# Patient Record
Sex: FEMALE | Race: WHITE | NOT HISPANIC OR LATINO | Employment: UNEMPLOYED | URBAN - NONMETROPOLITAN AREA
[De-identification: names, ages, dates, MRNs, and addresses within clinical notes are randomized per-mention and may not be internally consistent; named-entity substitution may affect disease eponyms.]

---

## 2018-01-18 NOTE — PROGRESS NOTES
Assessment    1  Urinary tract infection with hematuria, site unspecified (599 0,599 70) (N39 0,R31  9)    Plan  Urinary tract infection with hematuria, site unspecified    · Nitrofurantoin Monohyd Macro 100 MG Oral Capsule; TAKE 1 CAPSULE TWICE  DAILY UNTIL GONE    Discussion/Summary  Discussion Summary:   Urine will be sent  meds sent  hygiene, hydration and f/u with pcp if no improvement  Chief Complaint    1  Urinary Frequency  Chief Complaint Free Text Note Form: States of frequency of urination with abdominal cramping for 2 days,2/10      History of Present Illness  HPI: Corazon Oneill is here today c/o urinary frequency and discomfort for a few days, no fever or back pain  No nausea  Hospital Based Practices Required Assessment:   Pain Assessment   the patient states they have pain  The pain is located in the lower abdominal  The patient describes the pain as cramping  (on a scale of 0 to 10, the patient rates the pain at 2 )   Abuse And Domestic Violence Screen    Yes, the patient is safe at home  The patient states no one is hurting them  Depression And Suicide Screen  No, the patient has not had thoughts of hurting themself  No, the patient has not felt depressed in the past 7 days  Review of Systems  Focused-Female:   Genitourinary: as noted in HPI  Musculoskeletal: no complaints of arthralgia, no myalgia, no joint swelling or stiffness, no limb pain or swelling  Integumentary: no complaints of skin rash or lesion, no itching or dry skin, no skin wounds  Neurological: no complaints of headache, no confusion, no numbness or tingling, no dizziness or fainting  Active Problems    1  Hypercholesteremia (272 0) (E78 0)   2  Hypertension (401 9) (I10)   3  Urinary tract infection with hematuria, site unspecified (599 0,599 70) (N39 0,R31  9)    Social History    · Denied: History of Alcohol use   · Denied: History of Tobacco use    Surgical History    1   History of Hysterectomy    Current Meds   1  Co Q10 TABS; Therapy: (Recorded:16Jan2016) to Recorded   2  Crestor TABS; Therapy: (Recorded:16Jan2016) to Recorded   3  Diovan CAPS; Therapy: (Recorded:16Jan2016) to Recorded   4  Fish Oil OIL; Therapy: (Recorded:16Jan2016) to Recorded   5  PredniSONE TABS; Therapy: (Recorded:16Jan2016) to Recorded    Allergies    1  No Known Drug Allergies    Vitals  Signs [Data Includes: Current Encounter]   Recorded: [id number] 11:01AM   Temperature: 97 8 F  Heart Rate: 90  Respiration: 18  Systolic: 186  Diastolic: 70  Height: 5 ft 3 in  Weight: 186 lb   BMI Calculated: 32 95  BSA Calculated: 1 88  O2 Saturation: 96  Pain Scale: 2    Physical Exam    Constitutional   General appearance: No acute distress, well appearing and well nourished  Abdomen   Abdomen: Non-tender, no masses  Liver and spleen: No hepatomegaly or splenomegaly         Signatures   Electronically signed by : Rosa Ingram [facility]; Jan 16 2016 11:17AM EST                       (Author)    Electronically signed by : Chaya Serrano DO; Jan 16 2016  5:54PM EST

## 2023-11-26 ENCOUNTER — OFFICE VISIT (OUTPATIENT)
Dept: URGENT CARE | Facility: MEDICAL CENTER | Age: 78
End: 2023-11-26
Payer: MEDICARE

## 2023-11-26 VITALS
WEIGHT: 146 LBS | BODY MASS INDEX: 24.3 KG/M2 | OXYGEN SATURATION: 98 % | HEART RATE: 107 BPM | DIASTOLIC BLOOD PRESSURE: 77 MMHG | TEMPERATURE: 98.6 F | SYSTOLIC BLOOD PRESSURE: 167 MMHG | RESPIRATION RATE: 18 BRPM

## 2023-11-26 DIAGNOSIS — R35.0 FREQUENCY OF URINATION: Primary | ICD-10-CM

## 2023-11-26 LAB
SL AMB  POCT GLUCOSE, UA: NORMAL
SL AMB LEUKOCYTE ESTERASE,UA: NORMAL
SL AMB POCT BILIRUBIN,UA: NORMAL
SL AMB POCT BLOOD,UA: NORMAL
SL AMB POCT CLARITY,UA: CLEAR
SL AMB POCT COLOR,UA: YELLOW
SL AMB POCT KETONES,UA: NORMAL
SL AMB POCT NITRITE,UA: NORMAL
SL AMB POCT PH,UA: 6.5
SL AMB POCT SPECIFIC GRAVITY,UA: 1
SL AMB POCT URINE PROTEIN: NORMAL
SL AMB POCT UROBILINOGEN: 0.2

## 2023-11-26 PROCEDURE — 99213 OFFICE O/P EST LOW 20 MIN: CPT | Performed by: STUDENT IN AN ORGANIZED HEALTH CARE EDUCATION/TRAINING PROGRAM

## 2023-11-26 PROCEDURE — 81002 URINALYSIS NONAUTO W/O SCOPE: CPT | Performed by: STUDENT IN AN ORGANIZED HEALTH CARE EDUCATION/TRAINING PROGRAM

## 2023-11-26 PROCEDURE — G0463 HOSPITAL OUTPT CLINIC VISIT: HCPCS | Performed by: STUDENT IN AN ORGANIZED HEALTH CARE EDUCATION/TRAINING PROGRAM

## 2023-11-26 NOTE — PROGRESS NOTES
[facility]        NAME: Brandon Masterson is a 78 y.o. female  : 1945    MRN: [id number]    Assessment and Plan   Frequency of urination [R35.0]  1. Frequency of urination  POCT urine dip          Results for orders placed or performed in visit on 23   POCT urine dip   Result Value Ref Range    LEUKOCYTE ESTERASE,UA neg     NITRITE,UA neg     SL AMB POCT UROBILINOGEN 0.2     POCT URINE PROTEIN neg      PH,UA 6.5     BLOOD,UA neg     SPECIFIC GRAVITY,UA 1.005     KETONES,UA neg     BILIRUBIN,UA neg     GLUCOSE, UA neg      COLOR,UA yellow     CLARITY,UA clear      UA unremarkable, no suspicion of a UTI, frequent urination likely secondary to increased water intake. As for the other constitutional symptoms patient has such as weakness, confusion, significant unexpected weight loss, poor appetite, abnormal CBC results, she does have an appointment coming up with a hematologist in 2 to 3 weeks and I agree that this requires further work-up to rule out cancer first and foremost.  No acute concern at this time to be addressed in this regard. Patient Instructions       Follow up with PCP in 3-5 days. Proceed to  ER if symptoms worsen. Chief Complaint     Chief Complaint   Patient presents with    Possible UTI     Frequent urination that started last night          History of Present Illness       HPI    P/w daughter and , all provide history. Reports frequent urination that started last night. Denies fever, chills, nausea, flank pain, dysuria, hematuria, pelvic pressure, incontinence. Daughter does add that patient has recently reported a lot of weakness, poor appetite, 40lb weight loss over 2 months, confusion. Evaluated by PCP and noted to have abnormal WBC and RBC counts and has been referred to hematology for further evaluation. Also reports patient has been drinking more water as lab work showed that she is dehydrated.     Review of Systems   Review of Systems Constitutional:  Positive for appetite change, fatigue and unexpected weight change. Negative for chills and fever. HENT:  Negative for ear pain and sore throat. Eyes:  Negative for pain and visual disturbance. Respiratory:  Negative for cough, chest tightness and shortness of breath. Cardiovascular:  Negative for chest pain and palpitations. Gastrointestinal:  Negative for abdominal pain, constipation, diarrhea, nausea and vomiting. Genitourinary:  Positive for frequency. Negative for decreased urine volume, dysuria, flank pain, hematuria, menstrual problem, pelvic pain and urgency. Musculoskeletal:  Negative for arthralgias and back pain. Skin:  Negative for color change and rash. Neurological:  Positive for weakness. Negative for seizures and syncope. Psychiatric/Behavioral:  Positive for confusion. Negative for dysphoric mood and suicidal ideas. All other systems reviewed and are negative.       Current Medications       Current Outpatient Medications:     amLODIPine (NORVASC) 2.5 mg tablet, , Disp: , Rfl:     aspirin (ECOTRIN LOW STRENGTH) 81 mg EC tablet, Take 81 mg by mouth, Disp: , Rfl:     co-enzyme Q-10 30 MG capsule, Take by mouth, Disp: , Rfl:     DULoxetine (CYMBALTA) 60 mg delayed release capsule, , Disp: , Rfl:     FISH OIL-CANOLA OIL-VIT D3 PO, Use, Disp: , Rfl:     gabapentin (NEURONTIN) 100 mg capsule, , Disp: , Rfl:     TURMERIC PO, Take 1 capsule by mouth daily, Disp: , Rfl:     Current Allergies     Allergies as of 11/26/2023    (No Known Allergies)            The following portions of the patient's history were reviewed and updated as appropriate: allergies, current medications, past family history, past medical history, past social history, past surgical history and problem list.     Past Medical History:   Diagnosis Date    Fibromyalgia     Hyperlipidemia     Hypertension        Past Surgical History:   Procedure Laterality Date    ACHILLES TENDON REPAIR Right ADENOIDECTOMY      APPENDECTOMY      SKIN CANCER EXCISION      x 4 - back    TONSILLECTOMY      TOTAL ABDOMINAL HYSTERECTOMY         Family History   Problem Relation Age of Onset    Cancer Mother     Cervical cancer Sister     Breast cancer Sister     Breast cancer Sister          Medications have been verified. Objective   /77   Pulse (!) 107   Temp 98.6 °F (37 °C)   Resp 18   Wt 66.2 kg (146 lb)   SpO2 98%   BMI 24.30 kg/m²        Physical Exam     Physical Exam  Constitutional:       General: She is not in acute distress. Appearance: Normal appearance. HENT:      Head: Normocephalic and atraumatic. Eyes:      Extraocular Movements: Extraocular movements intact. Conjunctiva/sclera: Conjunctivae normal.   Cardiovascular:      Rate and Rhythm: Normal rate. Pulmonary:      Effort: Pulmonary effort is normal. No respiratory distress. Abdominal:      Tenderness: There is no abdominal tenderness. There is no right CVA tenderness, left CVA tenderness, guarding or rebound. Skin:     General: Skin is warm and dry. Neurological:      General: No focal deficit present. Mental Status: She is alert and oriented to person, place, and time.    Psychiatric:         Mood and Affect: Mood normal.         Behavior: Behavior normal.

## 2024-01-01 ENCOUNTER — APPOINTMENT (INPATIENT)
Dept: CT IMAGING | Facility: HOSPITAL | Age: 79
DRG: 871 | End: 2024-01-01
Payer: MEDICARE

## 2024-01-01 ENCOUNTER — APPOINTMENT (INPATIENT)
Dept: RADIOLOGY | Facility: HOSPITAL | Age: 79
DRG: 871 | End: 2024-01-01
Payer: MEDICARE

## 2024-01-01 ENCOUNTER — APPOINTMENT (INPATIENT)
Dept: NON INVASIVE DIAGNOSTICS | Facility: HOSPITAL | Age: 79
DRG: 871 | End: 2024-01-01
Payer: MEDICARE

## 2024-01-01 ENCOUNTER — APPOINTMENT (INPATIENT)
Dept: GASTROENTEROLOGY | Facility: HOSPITAL | Age: 79
DRG: 871 | End: 2024-01-01
Payer: MEDICARE

## 2024-01-01 ENCOUNTER — APPOINTMENT (INPATIENT)
Dept: RADIOLOGY | Facility: HOSPITAL | Age: 79
DRG: 871 | End: 2024-01-01
Attending: RADIOLOGY
Payer: MEDICARE

## 2024-01-01 ENCOUNTER — HOSPITAL ENCOUNTER (INPATIENT)
Facility: HOSPITAL | Age: 79
LOS: 17 days | DRG: 871 | End: 2024-03-20
Attending: INTERNAL MEDICINE | Admitting: INTERNAL MEDICINE
Payer: MEDICARE

## 2024-01-01 ENCOUNTER — ANESTHESIA EVENT (OUTPATIENT)
Dept: ANESTHESIOLOGY | Facility: HOSPITAL | Age: 79
End: 2024-01-01

## 2024-01-01 ENCOUNTER — ANESTHESIA (OUTPATIENT)
Dept: ANESTHESIOLOGY | Facility: HOSPITAL | Age: 79
End: 2024-01-01

## 2024-01-01 VITALS
OXYGEN SATURATION: 99 % | HEIGHT: 64 IN | TEMPERATURE: 97.4 F | RESPIRATION RATE: 12 BRPM | DIASTOLIC BLOOD PRESSURE: 51 MMHG | WEIGHT: 184.3 LBS | BODY MASS INDEX: 31.47 KG/M2 | HEART RATE: 88 BPM | SYSTOLIC BLOOD PRESSURE: 119 MMHG

## 2024-01-01 DIAGNOSIS — M32.9 LUPUS (HCC): ICD-10-CM

## 2024-01-01 DIAGNOSIS — I82.409 DVT (DEEP VENOUS THROMBOSIS) (HCC): ICD-10-CM

## 2024-01-01 DIAGNOSIS — B95.2 BACTEREMIA DUE TO ENTEROCOCCUS: ICD-10-CM

## 2024-01-01 DIAGNOSIS — E87.0 HYPERNATREMIA: ICD-10-CM

## 2024-01-01 DIAGNOSIS — R34 OLIGURIA: ICD-10-CM

## 2024-01-01 DIAGNOSIS — R19.7 DIARRHEA OF PRESUMED INFECTIOUS ORIGIN: ICD-10-CM

## 2024-01-01 DIAGNOSIS — G93.41 METABOLIC ENCEPHALOPATHY: ICD-10-CM

## 2024-01-01 DIAGNOSIS — R65.21 SEPTIC SHOCK (HCC): ICD-10-CM

## 2024-01-01 DIAGNOSIS — K51.00 PANCOLITIS (HCC): ICD-10-CM

## 2024-01-01 DIAGNOSIS — E87.20 METABOLIC ACIDOSIS: ICD-10-CM

## 2024-01-01 DIAGNOSIS — L89.90 PRESSURE ULCERS OF SKIN OF MULTIPLE TOPOGRAPHIC SITES: ICD-10-CM

## 2024-01-01 DIAGNOSIS — K92.2 GASTROINTESTINAL HEMORRHAGE, UNSPECIFIED GASTROINTESTINAL HEMORRHAGE TYPE: ICD-10-CM

## 2024-01-01 DIAGNOSIS — A41.9 SEPTIC SHOCK (HCC): ICD-10-CM

## 2024-01-01 DIAGNOSIS — J18.9 PNEUMONIA OF RIGHT LOWER LOBE DUE TO INFECTIOUS ORGANISM: ICD-10-CM

## 2024-01-01 DIAGNOSIS — R78.81 BACTEREMIA DUE TO ENTEROCOCCUS: ICD-10-CM

## 2024-01-01 DIAGNOSIS — J96.01 ACUTE RESPIRATORY FAILURE WITH HYPOXIA (HCC): Primary | ICD-10-CM

## 2024-01-01 DIAGNOSIS — K92.2 GI BLEED: ICD-10-CM

## 2024-01-01 DIAGNOSIS — I48.91 NEW ONSET A-FIB (HCC): ICD-10-CM

## 2024-01-01 DIAGNOSIS — M32.14 ISN/RPS CLASS III GLOMERULONEPHRITIS DUE TO SYSTEMIC LUPUS ERYTHEMATOSUS (SLE) (HCC): ICD-10-CM

## 2024-01-01 DIAGNOSIS — E43 SEVERE PROTEIN-CALORIE MALNUTRITION (HCC): ICD-10-CM

## 2024-01-01 DIAGNOSIS — R60.1 ANASARCA: ICD-10-CM

## 2024-01-01 LAB
ABO GROUP BLD BPU: NORMAL
ABO GROUP BLD: NORMAL
ALBUMIN SERPL BCP-MCNC: 1.9 G/DL (ref 3.5–5)
ALBUMIN SERPL BCP-MCNC: 2 G/DL (ref 3.5–5)
ALBUMIN SERPL BCP-MCNC: 2.1 G/DL (ref 3.5–5)
ALBUMIN SERPL BCP-MCNC: 2.1 G/DL (ref 3.5–5)
ALBUMIN SERPL BCP-MCNC: 2.2 G/DL (ref 3.5–5)
ALBUMIN SERPL BCP-MCNC: 2.3 G/DL (ref 3.5–5)
ALBUMIN SERPL BCP-MCNC: 2.4 G/DL (ref 3.5–5)
ALBUMIN SERPL BCP-MCNC: 2.7 G/DL (ref 3.5–5)
ALBUMIN SERPL BCP-MCNC: 2.8 G/DL (ref 3.5–5)
ALBUMIN SERPL BCP-MCNC: 2.9 G/DL (ref 3.5–5)
ALBUMIN SERPL BCP-MCNC: 3.6 G/DL (ref 3.5–5)
ALP SERPL-CCNC: 118 U/L (ref 34–104)
ALP SERPL-CCNC: 123 U/L (ref 34–104)
ALP SERPL-CCNC: 194 U/L (ref 34–104)
ALP SERPL-CCNC: 222 U/L (ref 34–104)
ALP SERPL-CCNC: 53 U/L (ref 34–104)
ALP SERPL-CCNC: 55 U/L (ref 34–104)
ALP SERPL-CCNC: 60 U/L (ref 34–104)
ALP SERPL-CCNC: 60 U/L (ref 34–104)
ALP SERPL-CCNC: 61 U/L (ref 34–104)
ALP SERPL-CCNC: 65 U/L (ref 34–104)
ALP SERPL-CCNC: 74 U/L (ref 34–104)
ALP SERPL-CCNC: 75 U/L (ref 34–104)
ALP SERPL-CCNC: 83 U/L (ref 34–104)
ALT SERPL W P-5'-P-CCNC: 109 U/L (ref 7–52)
ALT SERPL W P-5'-P-CCNC: 157 U/L (ref 7–52)
ALT SERPL W P-5'-P-CCNC: 28 U/L (ref 7–52)
ALT SERPL W P-5'-P-CCNC: 30 U/L (ref 7–52)
ALT SERPL W P-5'-P-CCNC: 385 U/L (ref 7–52)
ALT SERPL W P-5'-P-CCNC: 42 U/L (ref 7–52)
ALT SERPL W P-5'-P-CCNC: 74 U/L (ref 7–52)
ALT SERPL W P-5'-P-CCNC: 77 U/L (ref 7–52)
ALT SERPL W P-5'-P-CCNC: 79 U/L (ref 7–52)
ALT SERPL W P-5'-P-CCNC: 93 U/L (ref 7–52)
ALT SERPL W P-5'-P-CCNC: 99 U/L (ref 7–52)
ANION GAP SERPL CALCULATED.3IONS-SCNC: 10 MMOL/L (ref 4–13)
ANION GAP SERPL CALCULATED.3IONS-SCNC: 11 MMOL/L (ref 4–13)
ANION GAP SERPL CALCULATED.3IONS-SCNC: 12 MMOL/L
ANION GAP SERPL CALCULATED.3IONS-SCNC: 12 MMOL/L
ANION GAP SERPL CALCULATED.3IONS-SCNC: 13 MMOL/L
ANION GAP SERPL CALCULATED.3IONS-SCNC: 5 MMOL/L (ref 4–13)
ANION GAP SERPL CALCULATED.3IONS-SCNC: 6 MMOL/L
ANION GAP SERPL CALCULATED.3IONS-SCNC: 6 MMOL/L (ref 4–13)
ANION GAP SERPL CALCULATED.3IONS-SCNC: 6 MMOL/L (ref 4–13)
ANION GAP SERPL CALCULATED.3IONS-SCNC: 7 MMOL/L
ANION GAP SERPL CALCULATED.3IONS-SCNC: 7 MMOL/L (ref 4–13)
ANION GAP SERPL CALCULATED.3IONS-SCNC: 7 MMOL/L (ref 4–13)
ANION GAP SERPL CALCULATED.3IONS-SCNC: 8 MMOL/L
ANION GAP SERPL CALCULATED.3IONS-SCNC: 8 MMOL/L
ANION GAP SERPL CALCULATED.3IONS-SCNC: 8 MMOL/L (ref 4–13)
ANION GAP SERPL CALCULATED.3IONS-SCNC: 8 MMOL/L (ref 4–13)
ANION GAP SERPL CALCULATED.3IONS-SCNC: 9 MMOL/L
ANION GAP SERPL CALCULATED.3IONS-SCNC: 9 MMOL/L (ref 4–13)
ANION GAP SERPL CALCULATED.3IONS-SCNC: 9 MMOL/L (ref 4–13)
ANISOCYTOSIS BLD QL SMEAR: PRESENT
AORTIC ROOT: 3 CM
AORTIC ROOT: 3.1 CM
APICAL FOUR CHAMBER EJECTION FRACTION: 57 %
APICAL FOUR CHAMBER EJECTION FRACTION: 60 %
APTT PPP: 27 SECONDS (ref 23–37)
APTT PPP: 29 SECONDS (ref 23–37)
APTT PPP: 30 SECONDS (ref 23–37)
APTT PPP: 31 SECONDS (ref 23–37)
APTT PPP: 32 SECONDS (ref 23–37)
APTT PPP: 40 SECONDS (ref 23–37)
APTT PPP: 41 SECONDS (ref 23–37)
APTT PPP: 64 SECONDS (ref 23–37)
APTT PPP: >210 SECONDS (ref 23–37)
APTT PPP: >210 SECONDS (ref 23–37)
ARTERIAL PATENCY WRIST A: NO
ARTERIAL PATENCY WRIST A: YES
ASCENDING AORTA: 3.7 CM
AST SERPL W P-5'-P-CCNC: 14 U/L (ref 13–39)
AST SERPL W P-5'-P-CCNC: 15 U/L (ref 13–39)
AST SERPL W P-5'-P-CCNC: 156 U/L (ref 13–39)
AST SERPL W P-5'-P-CCNC: 16 U/L (ref 13–39)
AST SERPL W P-5'-P-CCNC: 174 U/L (ref 13–39)
AST SERPL W P-5'-P-CCNC: 22 U/L (ref 13–39)
AST SERPL W P-5'-P-CCNC: 29 U/L (ref 13–39)
AST SERPL W P-5'-P-CCNC: 38 U/L (ref 13–39)
AST SERPL W P-5'-P-CCNC: 44 U/L (ref 13–39)
AST SERPL W P-5'-P-CCNC: 49 U/L (ref 13–39)
AST SERPL W P-5'-P-CCNC: 515 U/L (ref 13–39)
AST SERPL W P-5'-P-CCNC: 53 U/L (ref 13–39)
AST SERPL W P-5'-P-CCNC: 80 U/L (ref 13–39)
ATRIAL RATE: 146 BPM
ATRIAL RATE: 160 BPM
ATRIAL RATE: 165 BPM
ATRIAL RATE: 56 BPM
BACTERIA BLD CULT: NORMAL
BACTERIA BLD CULT: NORMAL
BACTERIA UR QL AUTO: ABNORMAL /HPF
BASE EX.OXY STD BLDV CALC-SCNC: 28.6 % (ref 60–80)
BASE EX.OXY STD BLDV CALC-SCNC: 84.4 % (ref 60–80)
BASE EX.OXY STD BLDV CALC-SCNC: 91.9 % (ref 60–80)
BASE EXCESS BLDA CALC-SCNC: -2.6 MMOL/L
BASE EXCESS BLDA CALC-SCNC: -3.2 MMOL/L
BASE EXCESS BLDA CALC-SCNC: -3.3 MMOL/L
BASE EXCESS BLDA CALC-SCNC: -5 MMOL/L (ref -2–3)
BASE EXCESS BLDA CALC-SCNC: -6 MMOL/L (ref -2–3)
BASE EXCESS BLDV CALC-SCNC: -5.4 MMOL/L
BASE EXCESS BLDV CALC-SCNC: -5.6 MMOL/L
BASE EXCESS BLDV CALC-SCNC: -6.3 MMOL/L
BASOPHILS # BLD AUTO: 0.02 THOUSANDS/ÂΜL (ref 0–0.1)
BASOPHILS # BLD AUTO: 0.03 THOUSANDS/ÂΜL (ref 0–0.1)
BASOPHILS # BLD AUTO: 0.04 THOUSANDS/ÂΜL (ref 0–0.1)
BASOPHILS # BLD AUTO: 0.04 THOUSANDS/ÂΜL (ref 0–0.1)
BASOPHILS # BLD AUTO: 0.05 THOUSANDS/ÂΜL (ref 0–0.1)
BASOPHILS # BLD AUTO: 0.05 THOUSANDS/ÂΜL (ref 0–0.1)
BASOPHILS # BLD AUTO: 0.07 THOUSANDS/ÂΜL (ref 0–0.1)
BASOPHILS # BLD AUTO: 0.11 THOUSANDS/ÂΜL (ref 0–0.1)
BASOPHILS # BLD AUTO: 0.11 THOUSANDS/ÂΜL (ref 0–0.1)
BASOPHILS # BLD MANUAL: 0 THOUSAND/UL (ref 0–0.1)
BASOPHILS NFR BLD AUTO: 0 % (ref 0–1)
BASOPHILS NFR BLD AUTO: 1 % (ref 0–1)
BASOPHILS NFR BLD AUTO: 1 % (ref 0–1)
BASOPHILS NFR MAR MANUAL: 0 % (ref 0–1)
BILIRUB DIRECT SERPL-MCNC: 0.18 MG/DL (ref 0–0.2)
BILIRUB DIRECT SERPL-MCNC: 0.4 MG/DL (ref 0–0.2)
BILIRUB DIRECT SERPL-MCNC: 0.72 MG/DL (ref 0–0.2)
BILIRUB DIRECT SERPL-MCNC: 1.66 MG/DL (ref 0–0.2)
BILIRUB SERPL-MCNC: 0.59 MG/DL (ref 0.2–1)
BILIRUB SERPL-MCNC: 0.64 MG/DL (ref 0.2–1)
BILIRUB SERPL-MCNC: 0.67 MG/DL (ref 0.2–1)
BILIRUB SERPL-MCNC: 0.67 MG/DL (ref 0.2–1)
BILIRUB SERPL-MCNC: 0.71 MG/DL (ref 0.2–1)
BILIRUB SERPL-MCNC: 0.72 MG/DL (ref 0.2–1)
BILIRUB SERPL-MCNC: 0.82 MG/DL (ref 0.2–1)
BILIRUB SERPL-MCNC: 0.83 MG/DL (ref 0.2–1)
BILIRUB SERPL-MCNC: 0.83 MG/DL (ref 0.2–1)
BILIRUB SERPL-MCNC: 0.87 MG/DL (ref 0.2–1)
BILIRUB SERPL-MCNC: 0.92 MG/DL (ref 0.2–1)
BILIRUB SERPL-MCNC: 1.63 MG/DL (ref 0.2–1)
BILIRUB SERPL-MCNC: 2.95 MG/DL (ref 0.2–1)
BILIRUB UR QL STRIP: NEGATIVE
BLD GP AB SCN SERPL QL: NEGATIVE
BLD SMEAR INTERP: NORMAL
BODY TEMPERATURE: 95.2 DEGREES FEHRENHEIT
BPU ID: NORMAL
BSA FOR ECHO PROCEDURE: 1.86 M2
BSA FOR ECHO PROCEDURE: 1.88 M2
BUDDING YEAST: PRESENT
BUN SERPL-MCNC: 22 MG/DL (ref 5–25)
BUN SERPL-MCNC: 23 MG/DL (ref 5–25)
BUN SERPL-MCNC: 24 MG/DL (ref 5–25)
BUN SERPL-MCNC: 25 MG/DL (ref 5–25)
BUN SERPL-MCNC: 26 MG/DL (ref 5–25)
BUN SERPL-MCNC: 27 MG/DL (ref 5–25)
BUN SERPL-MCNC: 35 MG/DL (ref 5–25)
BUN SERPL-MCNC: 35 MG/DL (ref 5–25)
BURR CELLS BLD QL SMEAR: PRESENT
C3 SERPL-MCNC: 32 MG/DL (ref 87–200)
C4 SERPL-MCNC: <8 MG/DL (ref 19–52)
CA-I BLD-SCNC: 1.06 MMOL/L (ref 1.12–1.32)
CA-I BLD-SCNC: 1.08 MMOL/L (ref 1.12–1.32)
CA-I BLD-SCNC: 1.11 MMOL/L (ref 1.12–1.32)
CA-I BLD-SCNC: 1.12 MMOL/L (ref 1.12–1.32)
CA-I BLD-SCNC: 1.14 MMOL/L (ref 1.12–1.32)
CA-I BLD-SCNC: 1.17 MMOL/L (ref 1.12–1.32)
CA-I BLD-SCNC: 1.17 MMOL/L (ref 1.12–1.32)
CA-I BLD-SCNC: 1.2 MMOL/L (ref 1.12–1.32)
CA-I BLD-SCNC: 1.23 MMOL/L (ref 1.12–1.32)
CALCIUM ALBUM COR SERPL-MCNC: 10 MG/DL (ref 8.3–10.1)
CALCIUM ALBUM COR SERPL-MCNC: 8.3 MG/DL (ref 8.3–10.1)
CALCIUM ALBUM COR SERPL-MCNC: 8.4 MG/DL (ref 8.3–10.1)
CALCIUM ALBUM COR SERPL-MCNC: 8.7 MG/DL (ref 8.3–10.1)
CALCIUM ALBUM COR SERPL-MCNC: 8.7 MG/DL (ref 8.3–10.1)
CALCIUM ALBUM COR SERPL-MCNC: 8.8 MG/DL (ref 8.3–10.1)
CALCIUM ALBUM COR SERPL-MCNC: 8.9 MG/DL (ref 8.3–10.1)
CALCIUM ALBUM COR SERPL-MCNC: 9.1 MG/DL (ref 8.3–10.1)
CALCIUM ALBUM COR SERPL-MCNC: 9.2 MG/DL (ref 8.3–10.1)
CALCIUM SERPL-MCNC: 6.7 MG/DL (ref 8.4–10.2)
CALCIUM SERPL-MCNC: 7 MG/DL (ref 8.4–10.2)
CALCIUM SERPL-MCNC: 7.2 MG/DL (ref 8.4–10.2)
CALCIUM SERPL-MCNC: 7.2 MG/DL (ref 8.4–10.2)
CALCIUM SERPL-MCNC: 7.3 MG/DL (ref 8.4–10.2)
CALCIUM SERPL-MCNC: 7.4 MG/DL (ref 8.4–10.2)
CALCIUM SERPL-MCNC: 7.4 MG/DL (ref 8.4–10.2)
CALCIUM SERPL-MCNC: 7.5 MG/DL (ref 8.4–10.2)
CALCIUM SERPL-MCNC: 7.7 MG/DL (ref 8.4–10.2)
CALCIUM SERPL-MCNC: 7.8 MG/DL (ref 8.4–10.2)
CALCIUM SERPL-MCNC: 7.8 MG/DL (ref 8.4–10.2)
CALCIUM SERPL-MCNC: 8.1 MG/DL (ref 8.4–10.2)
CALCIUM SERPL-MCNC: 8.1 MG/DL (ref 8.4–10.2)
CALCIUM SERPL-MCNC: 8.2 MG/DL (ref 8.4–10.2)
CALCIUM SERPL-MCNC: 8.2 MG/DL (ref 8.4–10.2)
CALCIUM SERPL-MCNC: 8.3 MG/DL (ref 8.4–10.2)
CALCIUM SERPL-MCNC: 8.3 MG/DL (ref 8.4–10.2)
CALCIUM SERPL-MCNC: 8.4 MG/DL (ref 8.4–10.2)
CALCIUM SERPL-MCNC: 8.5 MG/DL (ref 8.4–10.2)
CALCIUM SERPL-MCNC: 8.5 MG/DL (ref 8.4–10.2)
CALCIUM SERPL-MCNC: 9 MG/DL (ref 8.4–10.2)
CHLORIDE SERPL-SCNC: 108 MMOL/L (ref 96–108)
CHLORIDE SERPL-SCNC: 109 MMOL/L (ref 96–108)
CHLORIDE SERPL-SCNC: 111 MMOL/L (ref 96–108)
CHLORIDE SERPL-SCNC: 112 MMOL/L (ref 96–108)
CHLORIDE SERPL-SCNC: 113 MMOL/L (ref 96–108)
CHLORIDE SERPL-SCNC: 113 MMOL/L (ref 96–108)
CHLORIDE SERPL-SCNC: 114 MMOL/L (ref 96–108)
CHLORIDE SERPL-SCNC: 114 MMOL/L (ref 96–108)
CHLORIDE SERPL-SCNC: 115 MMOL/L (ref 96–108)
CHLORIDE SERPL-SCNC: 116 MMOL/L (ref 96–108)
CHLORIDE SERPL-SCNC: 116 MMOL/L (ref 96–108)
CHLORIDE SERPL-SCNC: 117 MMOL/L (ref 96–108)
CHLORIDE SERPL-SCNC: 118 MMOL/L (ref 96–108)
CLARITY UR: ABNORMAL
CMV DNA SERPL NAA+PROBE-ACNC: 1500 IU/ML
CMV DNA SERPL NAA+PROBE-ACNC: DETECTED [IU]/ML
CMV IGG SERPL IA-ACNC: 9.2 U/ML (ref 0–0.59)
CMV IGM SERPL IA-ACNC: <30 AU/ML (ref 0–29.9)
CO2 SERPL-SCNC: 16 MMOL/L (ref 21–32)
CO2 SERPL-SCNC: 19 MMOL/L (ref 21–32)
CO2 SERPL-SCNC: 21 MMOL/L (ref 21–32)
CO2 SERPL-SCNC: 22 MMOL/L (ref 21–32)
CO2 SERPL-SCNC: 23 MMOL/L (ref 21–32)
CO2 SERPL-SCNC: 24 MMOL/L (ref 21–32)
CO2 SERPL-SCNC: 24 MMOL/L (ref 21–32)
CO2 SERPL-SCNC: 25 MMOL/L (ref 21–32)
CO2 SERPL-SCNC: 26 MMOL/L (ref 21–32)
CO2 SERPL-SCNC: 27 MMOL/L (ref 21–32)
CO2 SERPL-SCNC: 28 MMOL/L (ref 21–32)
COLOR UR: YELLOW
CORTIS SERPL-MCNC: 10.9 UG/DL
CORTIS SERPL-MCNC: 7.3 UG/DL
CORTIS SERPL-MCNC: 98.2 UG/DL
CREAT SERPL-MCNC: 0.68 MG/DL (ref 0.6–1.3)
CREAT SERPL-MCNC: 0.69 MG/DL (ref 0.6–1.3)
CREAT SERPL-MCNC: 0.7 MG/DL (ref 0.6–1.3)
CREAT SERPL-MCNC: 0.71 MG/DL (ref 0.6–1.3)
CREAT SERPL-MCNC: 0.74 MG/DL (ref 0.6–1.3)
CREAT SERPL-MCNC: 0.76 MG/DL (ref 0.6–1.3)
CREAT SERPL-MCNC: 0.86 MG/DL (ref 0.6–1.3)
CREAT SERPL-MCNC: 0.88 MG/DL (ref 0.6–1.3)
CREAT SERPL-MCNC: 0.88 MG/DL (ref 0.6–1.3)
CREAT SERPL-MCNC: 0.89 MG/DL (ref 0.6–1.3)
CREAT SERPL-MCNC: 0.92 MG/DL (ref 0.6–1.3)
CREAT SERPL-MCNC: 0.92 MG/DL (ref 0.6–1.3)
CREAT SERPL-MCNC: 0.98 MG/DL (ref 0.6–1.3)
CREAT SERPL-MCNC: 1.01 MG/DL (ref 0.6–1.3)
CREAT SERPL-MCNC: 1.02 MG/DL (ref 0.6–1.3)
CREAT SERPL-MCNC: 1.04 MG/DL (ref 0.6–1.3)
CREAT SERPL-MCNC: 1.06 MG/DL (ref 0.6–1.3)
CREAT SERPL-MCNC: 1.07 MG/DL (ref 0.6–1.3)
CREAT SERPL-MCNC: 1.14 MG/DL (ref 0.6–1.3)
CREAT SERPL-MCNC: 1.15 MG/DL (ref 0.6–1.3)
CREAT SERPL-MCNC: 1.16 MG/DL (ref 0.6–1.3)
CREAT SERPL-MCNC: 1.18 MG/DL (ref 0.6–1.3)
CREAT SERPL-MCNC: 1.18 MG/DL (ref 0.6–1.3)
CREAT SERPL-MCNC: 1.19 MG/DL (ref 0.6–1.3)
CREAT SERPL-MCNC: 1.25 MG/DL (ref 0.6–1.3)
CREAT SERPL-MCNC: 1.26 MG/DL (ref 0.6–1.3)
CREAT SERPL-MCNC: 1.27 MG/DL (ref 0.6–1.3)
CREAT UR-MCNC: 11.5 MG/DL
CREAT UR-MCNC: 17.8 MG/DL
CREAT UR-MCNC: 8 MG/DL
CROSSMATCH: NORMAL
CRP SERPL QL: 39.9 MG/L
CRP SERPL QL: <1 MG/L
DSDNA AB SER-ACNC: <1 IU/ML (ref 0–9)
E WAVE DECELERATION TIME: 204 MS
E/A RATIO: 0.75
EOSINOPHIL # BLD AUTO: 0 THOUSAND/ÂΜL (ref 0–0.61)
EOSINOPHIL # BLD AUTO: 0.01 THOUSAND/ÂΜL (ref 0–0.61)
EOSINOPHIL # BLD AUTO: 0.02 THOUSAND/ÂΜL (ref 0–0.61)
EOSINOPHIL # BLD MANUAL: 0 THOUSAND/UL (ref 0–0.4)
EOSINOPHIL NFR BLD AUTO: 0 % (ref 0–6)
EOSINOPHIL NFR BLD MANUAL: 0 % (ref 0–6)
ERYTHROCYTE [DISTWIDTH] IN BLOOD BY AUTOMATED COUNT: 15 % (ref 11.6–15.1)
ERYTHROCYTE [DISTWIDTH] IN BLOOD BY AUTOMATED COUNT: 15.2 % (ref 11.6–15.1)
ERYTHROCYTE [DISTWIDTH] IN BLOOD BY AUTOMATED COUNT: 15.4 % (ref 11.6–15.1)
ERYTHROCYTE [DISTWIDTH] IN BLOOD BY AUTOMATED COUNT: 15.4 % (ref 11.6–15.1)
ERYTHROCYTE [DISTWIDTH] IN BLOOD BY AUTOMATED COUNT: 15.6 % (ref 11.6–15.1)
ERYTHROCYTE [DISTWIDTH] IN BLOOD BY AUTOMATED COUNT: 16 % (ref 11.6–15.1)
ERYTHROCYTE [DISTWIDTH] IN BLOOD BY AUTOMATED COUNT: 16.2 % (ref 11.6–15.1)
ERYTHROCYTE [DISTWIDTH] IN BLOOD BY AUTOMATED COUNT: 16.6 % (ref 11.6–15.1)
ERYTHROCYTE [DISTWIDTH] IN BLOOD BY AUTOMATED COUNT: 16.9 % (ref 11.6–15.1)
ERYTHROCYTE [DISTWIDTH] IN BLOOD BY AUTOMATED COUNT: 17.2 % (ref 11.6–15.1)
ERYTHROCYTE [DISTWIDTH] IN BLOOD BY AUTOMATED COUNT: 17.5 % (ref 11.6–15.1)
ERYTHROCYTE [DISTWIDTH] IN BLOOD BY AUTOMATED COUNT: 17.8 % (ref 11.6–15.1)
ERYTHROCYTE [DISTWIDTH] IN BLOOD BY AUTOMATED COUNT: 18 % (ref 11.6–15.1)
ERYTHROCYTE [DISTWIDTH] IN BLOOD BY AUTOMATED COUNT: 18.2 % (ref 11.6–15.1)
ERYTHROCYTE [DISTWIDTH] IN BLOOD BY AUTOMATED COUNT: 18.3 % (ref 11.6–15.1)
ERYTHROCYTE [DISTWIDTH] IN BLOOD BY AUTOMATED COUNT: 18.6 % (ref 11.6–15.1)
ERYTHROCYTE [DISTWIDTH] IN BLOOD BY AUTOMATED COUNT: 18.6 % (ref 11.6–15.1)
ERYTHROCYTE [DISTWIDTH] IN BLOOD BY AUTOMATED COUNT: 18.8 % (ref 11.6–15.1)
ERYTHROCYTE [DISTWIDTH] IN BLOOD BY AUTOMATED COUNT: 18.9 % (ref 11.6–15.1)
ERYTHROCYTE [DISTWIDTH] IN BLOOD BY AUTOMATED COUNT: 18.9 % (ref 11.6–15.1)
ERYTHROCYTE [DISTWIDTH] IN BLOOD BY AUTOMATED COUNT: 19.4 % (ref 11.6–15.1)
ERYTHROCYTE [SEDIMENTATION RATE] IN BLOOD: <1 MM/HOUR (ref 0–29)
FDP BLD QL AGGL: >10 <20
FIBRINOGEN PPP-MCNC: 114 MG/DL (ref 207–520)
FIBRINOGEN PPP-MCNC: 166 MG/DL (ref 207–520)
FIBRINOGEN PPP-MCNC: 269 MG/DL (ref 207–520)
FRACTIONAL SHORTENING: 27 (ref 28–44)
GALACTOMANNAN AG SPEC IA-ACNC: 0.03 INDEX (ref 0–0.49)
GFR SERPL CREATININE-BSD FRML MDRD: 39 ML/MIN/1.73SQ M
GFR SERPL CREATININE-BSD FRML MDRD: 40 ML/MIN/1.73SQ M
GFR SERPL CREATININE-BSD FRML MDRD: 40 ML/MIN/1.73SQ M
GFR SERPL CREATININE-BSD FRML MDRD: 43 ML/MIN/1.73SQ M
GFR SERPL CREATININE-BSD FRML MDRD: 44 ML/MIN/1.73SQ M
GFR SERPL CREATININE-BSD FRML MDRD: 45 ML/MIN/1.73SQ M
GFR SERPL CREATININE-BSD FRML MDRD: 45 ML/MIN/1.73SQ M
GFR SERPL CREATININE-BSD FRML MDRD: 49 ML/MIN/1.73SQ M
GFR SERPL CREATININE-BSD FRML MDRD: 49 ML/MIN/1.73SQ M
GFR SERPL CREATININE-BSD FRML MDRD: 50 ML/MIN/1.73SQ M
GFR SERPL CREATININE-BSD FRML MDRD: 52 ML/MIN/1.73SQ M
GFR SERPL CREATININE-BSD FRML MDRD: 52 ML/MIN/1.73SQ M
GFR SERPL CREATININE-BSD FRML MDRD: 54 ML/MIN/1.73SQ M
GFR SERPL CREATININE-BSD FRML MDRD: 58 ML/MIN/1.73SQ M
GFR SERPL CREATININE-BSD FRML MDRD: 58 ML/MIN/1.73SQ M
GFR SERPL CREATININE-BSD FRML MDRD: 61 ML/MIN/1.73SQ M
GFR SERPL CREATININE-BSD FRML MDRD: 62 ML/MIN/1.73SQ M
GFR SERPL CREATININE-BSD FRML MDRD: 62 ML/MIN/1.73SQ M
GFR SERPL CREATININE-BSD FRML MDRD: 64 ML/MIN/1.73SQ M
GFR SERPL CREATININE-BSD FRML MDRD: 74 ML/MIN/1.73SQ M
GFR SERPL CREATININE-BSD FRML MDRD: 76 ML/MIN/1.73SQ M
GFR SERPL CREATININE-BSD FRML MDRD: 80 ML/MIN/1.73SQ M
GFR SERPL CREATININE-BSD FRML MDRD: 82 ML/MIN/1.73SQ M
GLUCOSE SERPL-MCNC: 100 MG/DL (ref 65–140)
GLUCOSE SERPL-MCNC: 101 MG/DL (ref 65–140)
GLUCOSE SERPL-MCNC: 103 MG/DL (ref 65–140)
GLUCOSE SERPL-MCNC: 107 MG/DL (ref 65–140)
GLUCOSE SERPL-MCNC: 108 MG/DL (ref 65–140)
GLUCOSE SERPL-MCNC: 112 MG/DL (ref 65–140)
GLUCOSE SERPL-MCNC: 113 MG/DL (ref 65–140)
GLUCOSE SERPL-MCNC: 114 MG/DL (ref 65–140)
GLUCOSE SERPL-MCNC: 114 MG/DL (ref 65–140)
GLUCOSE SERPL-MCNC: 115 MG/DL (ref 65–140)
GLUCOSE SERPL-MCNC: 117 MG/DL (ref 65–140)
GLUCOSE SERPL-MCNC: 118 MG/DL (ref 65–140)
GLUCOSE SERPL-MCNC: 119 MG/DL (ref 65–140)
GLUCOSE SERPL-MCNC: 120 MG/DL (ref 65–140)
GLUCOSE SERPL-MCNC: 123 MG/DL (ref 65–140)
GLUCOSE SERPL-MCNC: 125 MG/DL (ref 65–140)
GLUCOSE SERPL-MCNC: 125 MG/DL (ref 65–140)
GLUCOSE SERPL-MCNC: 126 MG/DL (ref 65–140)
GLUCOSE SERPL-MCNC: 127 MG/DL (ref 65–140)
GLUCOSE SERPL-MCNC: 127 MG/DL (ref 65–140)
GLUCOSE SERPL-MCNC: 130 MG/DL (ref 65–140)
GLUCOSE SERPL-MCNC: 131 MG/DL (ref 65–140)
GLUCOSE SERPL-MCNC: 132 MG/DL (ref 65–140)
GLUCOSE SERPL-MCNC: 133 MG/DL (ref 65–140)
GLUCOSE SERPL-MCNC: 134 MG/DL (ref 65–140)
GLUCOSE SERPL-MCNC: 135 MG/DL (ref 65–140)
GLUCOSE SERPL-MCNC: 136 MG/DL (ref 65–140)
GLUCOSE SERPL-MCNC: 140 MG/DL (ref 65–140)
GLUCOSE SERPL-MCNC: 142 MG/DL (ref 65–140)
GLUCOSE SERPL-MCNC: 143 MG/DL (ref 65–140)
GLUCOSE SERPL-MCNC: 144 MG/DL (ref 65–140)
GLUCOSE SERPL-MCNC: 146 MG/DL (ref 65–140)
GLUCOSE SERPL-MCNC: 149 MG/DL (ref 65–140)
GLUCOSE SERPL-MCNC: 150 MG/DL (ref 65–140)
GLUCOSE SERPL-MCNC: 151 MG/DL (ref 65–140)
GLUCOSE SERPL-MCNC: 152 MG/DL (ref 65–140)
GLUCOSE SERPL-MCNC: 152 MG/DL (ref 65–140)
GLUCOSE SERPL-MCNC: 153 MG/DL (ref 65–140)
GLUCOSE SERPL-MCNC: 154 MG/DL (ref 65–140)
GLUCOSE SERPL-MCNC: 155 MG/DL (ref 65–140)
GLUCOSE SERPL-MCNC: 156 MG/DL (ref 65–140)
GLUCOSE SERPL-MCNC: 158 MG/DL (ref 65–140)
GLUCOSE SERPL-MCNC: 159 MG/DL (ref 65–140)
GLUCOSE SERPL-MCNC: 161 MG/DL (ref 65–140)
GLUCOSE SERPL-MCNC: 161 MG/DL (ref 65–140)
GLUCOSE SERPL-MCNC: 162 MG/DL (ref 65–140)
GLUCOSE SERPL-MCNC: 162 MG/DL (ref 65–140)
GLUCOSE SERPL-MCNC: 163 MG/DL (ref 65–140)
GLUCOSE SERPL-MCNC: 163 MG/DL (ref 65–140)
GLUCOSE SERPL-MCNC: 164 MG/DL (ref 65–140)
GLUCOSE SERPL-MCNC: 165 MG/DL (ref 65–140)
GLUCOSE SERPL-MCNC: 166 MG/DL (ref 65–140)
GLUCOSE SERPL-MCNC: 167 MG/DL (ref 65–140)
GLUCOSE SERPL-MCNC: 167 MG/DL (ref 65–140)
GLUCOSE SERPL-MCNC: 170 MG/DL (ref 65–140)
GLUCOSE SERPL-MCNC: 172 MG/DL (ref 65–140)
GLUCOSE SERPL-MCNC: 173 MG/DL (ref 65–140)
GLUCOSE SERPL-MCNC: 174 MG/DL (ref 65–140)
GLUCOSE SERPL-MCNC: 174 MG/DL (ref 65–140)
GLUCOSE SERPL-MCNC: 175 MG/DL (ref 65–140)
GLUCOSE SERPL-MCNC: 179 MG/DL (ref 65–140)
GLUCOSE SERPL-MCNC: 179 MG/DL (ref 65–140)
GLUCOSE SERPL-MCNC: 180 MG/DL (ref 65–140)
GLUCOSE SERPL-MCNC: 180 MG/DL (ref 65–140)
GLUCOSE SERPL-MCNC: 183 MG/DL (ref 65–140)
GLUCOSE SERPL-MCNC: 188 MG/DL (ref 65–140)
GLUCOSE SERPL-MCNC: 188 MG/DL (ref 65–140)
GLUCOSE SERPL-MCNC: 189 MG/DL (ref 65–140)
GLUCOSE SERPL-MCNC: 191 MG/DL (ref 65–140)
GLUCOSE SERPL-MCNC: 198 MG/DL (ref 65–140)
GLUCOSE SERPL-MCNC: 198 MG/DL (ref 65–140)
GLUCOSE SERPL-MCNC: 200 MG/DL (ref 65–140)
GLUCOSE SERPL-MCNC: 201 MG/DL (ref 65–140)
GLUCOSE SERPL-MCNC: 207 MG/DL (ref 65–140)
GLUCOSE SERPL-MCNC: 209 MG/DL (ref 65–140)
GLUCOSE SERPL-MCNC: 209 MG/DL (ref 65–140)
GLUCOSE SERPL-MCNC: 210 MG/DL (ref 65–140)
GLUCOSE SERPL-MCNC: 224 MG/DL (ref 65–140)
GLUCOSE SERPL-MCNC: 224 MG/DL (ref 65–140)
GLUCOSE SERPL-MCNC: 231 MG/DL (ref 65–140)
GLUCOSE SERPL-MCNC: 234 MG/DL (ref 65–140)
GLUCOSE SERPL-MCNC: 234 MG/DL (ref 65–140)
GLUCOSE SERPL-MCNC: 240 MG/DL (ref 65–140)
GLUCOSE SERPL-MCNC: 243 MG/DL (ref 65–140)
GLUCOSE SERPL-MCNC: 247 MG/DL (ref 65–140)
GLUCOSE SERPL-MCNC: 252 MG/DL (ref 65–140)
GLUCOSE SERPL-MCNC: 254 MG/DL (ref 65–140)
GLUCOSE SERPL-MCNC: 256 MG/DL (ref 65–140)
GLUCOSE SERPL-MCNC: 256 MG/DL (ref 65–140)
GLUCOSE SERPL-MCNC: 265 MG/DL (ref 65–140)
GLUCOSE SERPL-MCNC: 266 MG/DL (ref 65–140)
GLUCOSE SERPL-MCNC: 267 MG/DL (ref 65–140)
GLUCOSE SERPL-MCNC: 267 MG/DL (ref 65–140)
GLUCOSE SERPL-MCNC: 268 MG/DL (ref 65–140)
GLUCOSE SERPL-MCNC: 269 MG/DL (ref 65–140)
GLUCOSE SERPL-MCNC: 272 MG/DL (ref 65–140)
GLUCOSE SERPL-MCNC: 272 MG/DL (ref 65–140)
GLUCOSE SERPL-MCNC: 275 MG/DL (ref 65–140)
GLUCOSE SERPL-MCNC: 276 MG/DL (ref 65–140)
GLUCOSE SERPL-MCNC: 278 MG/DL (ref 65–140)
GLUCOSE SERPL-MCNC: 290 MG/DL (ref 65–140)
GLUCOSE SERPL-MCNC: 295 MG/DL (ref 65–140)
GLUCOSE SERPL-MCNC: 302 MG/DL (ref 65–140)
GLUCOSE SERPL-MCNC: 302 MG/DL (ref 65–140)
GLUCOSE SERPL-MCNC: 307 MG/DL (ref 65–140)
GLUCOSE SERPL-MCNC: 311 MG/DL (ref 65–140)
GLUCOSE SERPL-MCNC: 312 MG/DL (ref 65–140)
GLUCOSE SERPL-MCNC: 338 MG/DL (ref 65–140)
GLUCOSE SERPL-MCNC: 56 MG/DL (ref 65–140)
GLUCOSE SERPL-MCNC: 82 MG/DL (ref 65–140)
GLUCOSE SERPL-MCNC: 84 MG/DL (ref 65–140)
GLUCOSE SERPL-MCNC: 90 MG/DL (ref 65–140)
GLUCOSE SERPL-MCNC: 94 MG/DL (ref 65–140)
GLUCOSE UR STRIP-MCNC: NEGATIVE MG/DL
HCO3 BLDA-SCNC: 17.8 MMOL/L (ref 22–28)
HCO3 BLDA-SCNC: 18.1 MMOL/L (ref 22–28)
HCO3 BLDA-SCNC: 19.1 MMOL/L (ref 22–28)
HCO3 BLDA-SCNC: 21.1 MMOL/L (ref 22–28)
HCO3 BLDA-SCNC: 22.5 MMOL/L (ref 22–28)
HCO3 BLDV-SCNC: 19.8 MMOL/L (ref 24–30)
HCO3 BLDV-SCNC: 20.4 MMOL/L (ref 24–30)
HCO3 BLDV-SCNC: 21.6 MMOL/L (ref 24–30)
HCT VFR BLD AUTO: 20 % (ref 34.8–46.1)
HCT VFR BLD AUTO: 20.3 % (ref 34.8–46.1)
HCT VFR BLD AUTO: 20.4 % (ref 34.8–46.1)
HCT VFR BLD AUTO: 20.5 % (ref 34.8–46.1)
HCT VFR BLD AUTO: 20.5 % (ref 34.8–46.1)
HCT VFR BLD AUTO: 22 % (ref 34.8–46.1)
HCT VFR BLD AUTO: 23.8 % (ref 34.8–46.1)
HCT VFR BLD AUTO: 23.8 % (ref 34.8–46.1)
HCT VFR BLD AUTO: 25.5 % (ref 34.8–46.1)
HCT VFR BLD AUTO: 26.1 % (ref 34.8–46.1)
HCT VFR BLD AUTO: 26.4 % (ref 34.8–46.1)
HCT VFR BLD AUTO: 26.6 % (ref 34.8–46.1)
HCT VFR BLD AUTO: 26.9 % (ref 34.8–46.1)
HCT VFR BLD AUTO: 27 % (ref 34.8–46.1)
HCT VFR BLD AUTO: 27.2 % (ref 34.8–46.1)
HCT VFR BLD AUTO: 27.2 % (ref 34.8–46.1)
HCT VFR BLD AUTO: 27.5 % (ref 34.8–46.1)
HCT VFR BLD AUTO: 27.5 % (ref 34.8–46.1)
HCT VFR BLD AUTO: 27.8 % (ref 34.8–46.1)
HCT VFR BLD AUTO: 29 % (ref 34.8–46.1)
HCT VFR BLD AUTO: 35.3 % (ref 34.8–46.1)
HCT VFR BLD AUTO: 35.3 % (ref 34.8–46.1)
HCT VFR BLD AUTO: 35.8 % (ref 34.8–46.1)
HCT VFR BLD AUTO: 36.8 % (ref 34.8–46.1)
HCT VFR BLD AUTO: 39.5 % (ref 34.8–46.1)
HCT VFR BLD CALC: 25 % (ref 34.8–46.1)
HCT VFR BLD CALC: 30 % (ref 34.8–46.1)
HGB BLD-MCNC: 10.2 G/DL (ref 11.5–15.4)
HGB BLD-MCNC: 10.4 G/DL (ref 11.5–15.4)
HGB BLD-MCNC: 12 G/DL (ref 11.5–15.4)
HGB BLD-MCNC: 12 G/DL (ref 11.5–15.4)
HGB BLD-MCNC: 12.4 G/DL (ref 11.5–15.4)
HGB BLD-MCNC: 12.4 G/DL (ref 11.5–15.4)
HGB BLD-MCNC: 12.5 G/DL (ref 11.5–15.4)
HGB BLD-MCNC: 12.7 G/DL (ref 11.5–15.4)
HGB BLD-MCNC: 12.7 G/DL (ref 11.5–15.4)
HGB BLD-MCNC: 12.8 G/DL (ref 11.5–15.4)
HGB BLD-MCNC: 13.6 G/DL (ref 11.5–15.4)
HGB BLD-MCNC: 14 G/DL (ref 11.5–15.4)
HGB BLD-MCNC: 5.8 G/DL (ref 11.5–15.4)
HGB BLD-MCNC: 6.2 G/DL (ref 11.5–15.4)
HGB BLD-MCNC: 6.3 G/DL (ref 11.5–15.4)
HGB BLD-MCNC: 6.5 G/DL (ref 11.5–15.4)
HGB BLD-MCNC: 6.6 G/DL (ref 11.5–15.4)
HGB BLD-MCNC: 6.6 G/DL (ref 11.5–15.4)
HGB BLD-MCNC: 6.7 G/DL (ref 11.5–15.4)
HGB BLD-MCNC: 6.8 G/DL (ref 11.5–15.4)
HGB BLD-MCNC: 7.1 G/DL (ref 11.5–15.4)
HGB BLD-MCNC: 7.3 G/DL (ref 11.5–15.4)
HGB BLD-MCNC: 7.6 G/DL (ref 11.5–15.4)
HGB BLD-MCNC: 7.6 G/DL (ref 11.5–15.4)
HGB BLD-MCNC: 7.8 G/DL (ref 11.5–15.4)
HGB BLD-MCNC: 7.9 G/DL (ref 11.5–15.4)
HGB BLD-MCNC: 8 G/DL (ref 11.5–15.4)
HGB BLD-MCNC: 8 G/DL (ref 11.5–15.4)
HGB BLD-MCNC: 8.1 G/DL (ref 11.5–15.4)
HGB BLD-MCNC: 8.2 G/DL (ref 11.5–15.4)
HGB BLD-MCNC: 8.3 G/DL (ref 11.5–15.4)
HGB BLD-MCNC: 8.4 G/DL (ref 11.5–15.4)
HGB BLD-MCNC: 8.5 G/DL (ref 11.5–15.4)
HGB BLD-MCNC: 8.6 G/DL (ref 11.5–15.4)
HGB BLD-MCNC: 8.6 G/DL (ref 11.5–15.4)
HGB BLD-MCNC: 8.7 G/DL (ref 11.5–15.4)
HGB BLD-MCNC: 8.7 G/DL (ref 11.5–15.4)
HGB BLD-MCNC: 8.8 G/DL (ref 11.5–15.4)
HGB BLD-MCNC: 8.9 G/DL (ref 11.5–15.4)
HGB BLD-MCNC: 9 G/DL (ref 11.5–15.4)
HGB BLD-MCNC: 9.1 G/DL (ref 11.5–15.4)
HGB BLD-MCNC: 9.2 G/DL (ref 11.5–15.4)
HGB BLD-MCNC: 9.2 G/DL (ref 11.5–15.4)
HGB BLD-MCNC: 9.3 G/DL (ref 11.5–15.4)
HGB BLDA-MCNC: 10.2 G/DL (ref 11.5–15.4)
HGB BLDA-MCNC: 8.5 G/DL (ref 11.5–15.4)
HGB UR QL STRIP.AUTO: ABNORMAL
HOROWITZ INDEX BLDA+IHG-RTO: 50 MM[HG]
HSV1 DNA SPEC QL NAA+PROBE: POSITIVE
HSV2 DNA SPEC QL NAA+PROBE: NEGATIVE
HYPHAE YEAST: PRESENT
IMM GRANULOCYTES # BLD AUTO: 0.12 THOUSAND/UL (ref 0–0.2)
IMM GRANULOCYTES # BLD AUTO: 0.17 THOUSAND/UL (ref 0–0.2)
IMM GRANULOCYTES # BLD AUTO: 0.19 THOUSAND/UL (ref 0–0.2)
IMM GRANULOCYTES # BLD AUTO: 0.19 THOUSAND/UL (ref 0–0.2)
IMM GRANULOCYTES # BLD AUTO: 0.32 THOUSAND/UL (ref 0–0.2)
IMM GRANULOCYTES # BLD AUTO: 0.5 THOUSAND/UL (ref 0–0.2)
IMM GRANULOCYTES # BLD AUTO: >0.5 THOUSAND/UL (ref 0–0.2)
IMM GRANULOCYTES NFR BLD AUTO: 1 % (ref 0–2)
IMM GRANULOCYTES NFR BLD AUTO: 2 % (ref 0–2)
IMM GRANULOCYTES NFR BLD AUTO: 2 % (ref 0–2)
IMM GRANULOCYTES NFR BLD AUTO: 3 % (ref 0–2)
IMM GRANULOCYTES NFR BLD AUTO: 4 % (ref 0–2)
INR PPP: 1 (ref 0.84–1.19)
INR PPP: 1.14 (ref 0.84–1.19)
INR PPP: 1.2 (ref 0.84–1.19)
INR PPP: 1.22 (ref 0.84–1.19)
INR PPP: 1.22 (ref 0.84–1.19)
INR PPP: 1.25 (ref 0.84–1.19)
INR PPP: 1.33 (ref 0.84–1.19)
INR PPP: 1.39 (ref 0.84–1.19)
INR PPP: 1.49 (ref 0.84–1.19)
INR PPP: 1.65 (ref 0.84–1.19)
INR PPP: 1.86 (ref 0.84–1.19)
INTERVENTRICULAR SEPTUM IN DIASTOLE (PARASTERNAL SHORT AXIS VIEW): 1.3 CM
INTERVENTRICULAR SEPTUM: 1.3 CM (ref 0.6–1.1)
IVC: 10 MM
KETONES UR STRIP-MCNC: ABNORMAL MG/DL
LACTATE SERPL-SCNC: 2.1 MMOL/L (ref 0.5–2)
LACTATE SERPL-SCNC: 2.3 MMOL/L (ref 0.5–2)
LACTATE SERPL-SCNC: 3 MMOL/L (ref 0.5–2)
LACTATE SERPL-SCNC: 3.1 MMOL/L (ref 0.5–2)
LEFT ATRIUM SIZE: 2.9 CM
LEFT INTERNAL DIMENSION IN SYSTOLE: 2.2 CM (ref 2.1–4)
LEFT VENTRICLE DIASTOLIC VOLUME (MOD BIPLANE): 62 ML
LEFT VENTRICLE DIASTOLIC VOLUME INDEX (MOD BIPLANE): 33.3 ML/M2
LEFT VENTRICLE SYSTOLIC VOLUME (MOD BIPLANE): 27 ML
LEFT VENTRICLE SYSTOLIC VOLUME INDEX (MOD BIPLANE): 14.5 ML/M2
LEFT VENTRICULAR INTERNAL DIMENSION IN DIASTOLE: 3 CM (ref 3.5–6)
LEFT VENTRICULAR POSTERIOR WALL IN END DIASTOLE: 1.3 CM
LEFT VENTRICULAR STROKE VOLUME: 20 ML
LEUKOCYTE ESTERASE UR QL STRIP: ABNORMAL
LV EF: 57 %
LVSV (TEICH): 20 ML
LYMPHOCYTES # BLD AUTO: 0.22 THOUSANDS/ÂΜL (ref 0.6–4.47)
LYMPHOCYTES # BLD AUTO: 0.23 THOUSANDS/ÂΜL (ref 0.6–4.47)
LYMPHOCYTES # BLD AUTO: 0.34 THOUSAND/UL (ref 0.6–4.47)
LYMPHOCYTES # BLD AUTO: 0.38 THOUSANDS/ÂΜL (ref 0.6–4.47)
LYMPHOCYTES # BLD AUTO: 0.39 THOUSANDS/ÂΜL (ref 0.6–4.47)
LYMPHOCYTES # BLD AUTO: 0.75 THOUSANDS/ÂΜL (ref 0.6–4.47)
LYMPHOCYTES # BLD AUTO: 1.1 THOUSANDS/ÂΜL (ref 0.6–4.47)
LYMPHOCYTES # BLD AUTO: 1.1 THOUSANDS/ÂΜL (ref 0.6–4.47)
LYMPHOCYTES # BLD AUTO: 1.12 THOUSAND/UL (ref 0.6–4.47)
LYMPHOCYTES # BLD AUTO: 1.17 THOUSANDS/ÂΜL (ref 0.6–4.47)
LYMPHOCYTES # BLD AUTO: 1.35 THOUSANDS/ÂΜL (ref 0.6–4.47)
LYMPHOCYTES # BLD AUTO: 1.42 THOUSAND/UL (ref 0.6–4.47)
LYMPHOCYTES # BLD AUTO: 1.62 THOUSAND/UL (ref 0.6–4.47)
LYMPHOCYTES # BLD AUTO: 15 % (ref 14–44)
LYMPHOCYTES # BLD AUTO: 3 % (ref 14–44)
LYMPHOCYTES # BLD AUTO: 6 % (ref 14–44)
LYMPHOCYTES # BLD AUTO: 7 % (ref 14–44)
LYMPHOCYTES NFR BLD AUTO: 10 % (ref 14–44)
LYMPHOCYTES NFR BLD AUTO: 2 % (ref 14–44)
LYMPHOCYTES NFR BLD AUTO: 2 % (ref 14–44)
LYMPHOCYTES NFR BLD AUTO: 3 % (ref 14–44)
LYMPHOCYTES NFR BLD AUTO: 3 % (ref 14–44)
LYMPHOCYTES NFR BLD AUTO: 4 % (ref 14–44)
LYMPHOCYTES NFR BLD AUTO: 5 % (ref 14–44)
LYMPHOCYTES NFR BLD AUTO: 5 % (ref 14–44)
LYMPHOCYTES NFR BLD AUTO: 9 % (ref 14–44)
MAGNESIUM SERPL-MCNC: 1.7 MG/DL (ref 1.9–2.7)
MAGNESIUM SERPL-MCNC: 1.8 MG/DL (ref 1.9–2.7)
MAGNESIUM SERPL-MCNC: 1.9 MG/DL (ref 1.9–2.7)
MAGNESIUM SERPL-MCNC: 2.3 MG/DL (ref 1.9–2.7)
MAGNESIUM SERPL-MCNC: 2.3 MG/DL (ref 1.9–2.7)
MAGNESIUM SERPL-MCNC: 3 MG/DL (ref 1.9–2.7)
MCH RBC QN AUTO: 29.1 PG (ref 26.8–34.3)
MCH RBC QN AUTO: 29.5 PG (ref 26.8–34.3)
MCH RBC QN AUTO: 29.7 PG (ref 26.8–34.3)
MCH RBC QN AUTO: 29.7 PG (ref 26.8–34.3)
MCH RBC QN AUTO: 29.9 PG (ref 26.8–34.3)
MCH RBC QN AUTO: 30 PG (ref 26.8–34.3)
MCH RBC QN AUTO: 30.3 PG (ref 26.8–34.3)
MCH RBC QN AUTO: 30.4 PG (ref 26.8–34.3)
MCH RBC QN AUTO: 30.5 PG (ref 26.8–34.3)
MCH RBC QN AUTO: 30.6 PG (ref 26.8–34.3)
MCH RBC QN AUTO: 30.7 PG (ref 26.8–34.3)
MCH RBC QN AUTO: 30.7 PG (ref 26.8–34.3)
MCH RBC QN AUTO: 30.8 PG (ref 26.8–34.3)
MCH RBC QN AUTO: 31 PG (ref 26.8–34.3)
MCH RBC QN AUTO: 31.1 PG (ref 26.8–34.3)
MCH RBC QN AUTO: 31.3 PG (ref 26.8–34.3)
MCH RBC QN AUTO: 31.4 PG (ref 26.8–34.3)
MCH RBC QN AUTO: 31.6 PG (ref 26.8–34.3)
MCHC RBC AUTO-ENTMCNC: 30.5 G/DL (ref 31.4–37.4)
MCHC RBC AUTO-ENTMCNC: 31.6 G/DL (ref 31.4–37.4)
MCHC RBC AUTO-ENTMCNC: 31.9 G/DL (ref 31.4–37.4)
MCHC RBC AUTO-ENTMCNC: 32.5 G/DL (ref 31.4–37.4)
MCHC RBC AUTO-ENTMCNC: 32.7 G/DL (ref 31.4–37.4)
MCHC RBC AUTO-ENTMCNC: 32.7 G/DL (ref 31.4–37.4)
MCHC RBC AUTO-ENTMCNC: 32.8 G/DL (ref 31.4–37.4)
MCHC RBC AUTO-ENTMCNC: 33.1 G/DL (ref 31.4–37.4)
MCHC RBC AUTO-ENTMCNC: 33.2 G/DL (ref 31.4–37.4)
MCHC RBC AUTO-ENTMCNC: 33.3 G/DL (ref 31.4–37.4)
MCHC RBC AUTO-ENTMCNC: 33.3 G/DL (ref 31.4–37.4)
MCHC RBC AUTO-ENTMCNC: 33.7 G/DL (ref 31.4–37.4)
MCHC RBC AUTO-ENTMCNC: 34.2 G/DL (ref 31.4–37.4)
MCHC RBC AUTO-ENTMCNC: 34.4 G/DL (ref 31.4–37.4)
MCHC RBC AUTO-ENTMCNC: 34.5 G/DL (ref 31.4–37.4)
MCHC RBC AUTO-ENTMCNC: 34.6 G/DL (ref 31.4–37.4)
MCHC RBC AUTO-ENTMCNC: 35.1 G/DL (ref 31.4–37.4)
MCHC RBC AUTO-ENTMCNC: 35.2 G/DL (ref 31.4–37.4)
MCHC RBC AUTO-ENTMCNC: 35.5 G/DL (ref 31.4–37.4)
MCHC RBC AUTO-ENTMCNC: 36 G/DL (ref 31.4–37.4)
MCV RBC AUTO: 101 FL (ref 82–98)
MCV RBC AUTO: 84 FL (ref 82–98)
MCV RBC AUTO: 86 FL (ref 82–98)
MCV RBC AUTO: 87 FL (ref 82–98)
MCV RBC AUTO: 87 FL (ref 82–98)
MCV RBC AUTO: 88 FL (ref 82–98)
MCV RBC AUTO: 89 FL (ref 82–98)
MCV RBC AUTO: 90 FL (ref 82–98)
MCV RBC AUTO: 90 FL (ref 82–98)
MCV RBC AUTO: 91 FL (ref 82–98)
MCV RBC AUTO: 92 FL (ref 82–98)
MCV RBC AUTO: 93 FL (ref 82–98)
MCV RBC AUTO: 94 FL (ref 82–98)
MCV RBC AUTO: 94 FL (ref 82–98)
MCV RBC AUTO: 97 FL (ref 82–98)
MCV RBC AUTO: 99 FL (ref 82–98)
METAMYELOCYTES NFR BLD MANUAL: 1 % (ref 0–1)
METAMYELOCYTES NFR BLD MANUAL: 1 % (ref 0–1)
MICROALBUMIN UR-MCNC: 25.7 MG/L
MICROALBUMIN/CREAT 24H UR: 223 MG/G CREATININE (ref 0–30)
MICROCYTES BLD QL AUTO: PRESENT
MONOCYTES # BLD AUTO: 0.23 THOUSAND/UL (ref 0–1.22)
MONOCYTES # BLD AUTO: 0.28 THOUSAND/UL (ref 0–1.22)
MONOCYTES # BLD AUTO: 0.33 THOUSAND/ÂΜL (ref 0.17–1.22)
MONOCYTES # BLD AUTO: 0.39 THOUSAND/ÂΜL (ref 0.17–1.22)
MONOCYTES # BLD AUTO: 0.39 THOUSAND/ÂΜL (ref 0.17–1.22)
MONOCYTES # BLD AUTO: 0.42 THOUSAND/ÂΜL (ref 0.17–1.22)
MONOCYTES # BLD AUTO: 0.43 THOUSAND/ÂΜL (ref 0.17–1.22)
MONOCYTES # BLD AUTO: 0.45 THOUSAND/ÂΜL (ref 0.17–1.22)
MONOCYTES # BLD AUTO: 0.46 THOUSAND/ÂΜL (ref 0.17–1.22)
MONOCYTES # BLD AUTO: 0.48 THOUSAND/UL (ref 0–1.22)
MONOCYTES # BLD AUTO: 0.53 THOUSAND/ÂΜL (ref 0.17–1.22)
MONOCYTES # BLD AUTO: 0.59 THOUSAND/ÂΜL (ref 0.17–1.22)
MONOCYTES # BLD AUTO: 1.08 THOUSAND/UL (ref 0–1.22)
MONOCYTES NFR BLD AUTO: 1 % (ref 4–12)
MONOCYTES NFR BLD AUTO: 2 % (ref 4–12)
MONOCYTES NFR BLD AUTO: 2 % (ref 4–12)
MONOCYTES NFR BLD AUTO: 3 % (ref 4–12)
MONOCYTES NFR BLD AUTO: 4 % (ref 4–12)
MONOCYTES NFR BLD AUTO: 4 % (ref 4–12)
MONOCYTES NFR BLD AUTO: 5 % (ref 4–12)
MONOCYTES NFR BLD: 2 % (ref 4–12)
MONOCYTES NFR BLD: 3 % (ref 4–12)
MONOCYTES NFR BLD: 3 % (ref 4–12)
MONOCYTES NFR BLD: 4 % (ref 4–12)
MRSA NOSE QL CULT: NORMAL
MUCOUS THREADS UR QL AUTO: ABNORMAL
MV PEAK A VEL: 0.61 M/S
MV PEAK E VEL: 46 CM/S
MV STENOSIS PRESSURE HALF TIME: 59 MS
MV VALVE AREA P 1/2 METHOD: 3.73
MYCOBACTERIUM SPEC CULT: NORMAL
NASAL CANNULA: 6
NEUTROPHILS # BLD AUTO: 10.92 THOUSANDS/ÂΜL (ref 1.85–7.62)
NEUTROPHILS # BLD AUTO: 11 THOUSANDS/ÂΜL (ref 1.85–7.62)
NEUTROPHILS # BLD AUTO: 12.41 THOUSANDS/ÂΜL (ref 1.85–7.62)
NEUTROPHILS # BLD AUTO: 13.95 THOUSANDS/ÂΜL (ref 1.85–7.62)
NEUTROPHILS # BLD AUTO: 21.49 THOUSANDS/ÂΜL (ref 1.85–7.62)
NEUTROPHILS # BLD AUTO: 23.67 THOUSANDS/ÂΜL (ref 1.85–7.62)
NEUTROPHILS # BLD AUTO: 25.41 THOUSANDS/ÂΜL (ref 1.85–7.62)
NEUTROPHILS # BLD AUTO: 6.38 THOUSANDS/ÂΜL (ref 1.85–7.62)
NEUTROPHILS # BLD AUTO: 9.91 THOUSANDS/ÂΜL (ref 1.85–7.62)
NEUTROPHILS # BLD MANUAL: 10.89 THOUSAND/UL (ref 1.85–7.62)
NEUTROPHILS # BLD MANUAL: 14.23 THOUSAND/UL (ref 1.85–7.62)
NEUTROPHILS # BLD MANUAL: 23.96 THOUSAND/UL (ref 1.85–7.62)
NEUTROPHILS # BLD MANUAL: 7.77 THOUSAND/UL (ref 1.85–7.62)
NEUTS BAND NFR BLD MANUAL: 12 % (ref 0–8)
NEUTS BAND NFR BLD MANUAL: 2 % (ref 0–8)
NEUTS SEG NFR BLD AUTO: 77 % (ref 43–75)
NEUTS SEG NFR BLD AUTO: 82 % (ref 43–75)
NEUTS SEG NFR BLD AUTO: 83 % (ref 43–75)
NEUTS SEG NFR BLD AUTO: 83 % (ref 43–75)
NEUTS SEG NFR BLD AUTO: 87 % (ref 43–75)
NEUTS SEG NFR BLD AUTO: 88 % (ref 43–75)
NEUTS SEG NFR BLD AUTO: 88 % (ref 43–75)
NEUTS SEG NFR BLD AUTO: 90 % (ref 43–75)
NEUTS SEG NFR BLD AUTO: 91 % (ref 43–75)
NEUTS SEG NFR BLD AUTO: 94 % (ref 43–75)
NEUTS SEG NFR BLD AUTO: 94 % (ref 43–75)
NEUTS SEG NFR BLD AUTO: 95 % (ref 43–75)
NEUTS SEG NFR BLD AUTO: 95 % (ref 43–75)
NITRITE UR QL STRIP: NEGATIVE
NON VENT ROOM AIR: 21 %
NON-SQ EPI CELLS URNS QL MICRO: ABNORMAL /HPF
NRBC BLD AUTO-RTO: 0 /100 WBCS
NRBC BLD AUTO-RTO: 0 /100 WBCS
NRBC BLD AUTO-RTO: 1 /100 WBCS
NRBC BLD AUTO-RTO: 18 /100 WBCS
NRBC BLD AUTO-RTO: 30 /100 WBCS
NRBC BLD AUTO-RTO: 38 /100 WBC (ref 0–2)
NRBC BLD AUTO-RTO: 4 /100 WBCS
NRBC BLD AUTO-RTO: 40 /100 WBC (ref 0–2)
NRBC BLD AUTO-RTO: 64 /100 WBCS
O2 CT BLDA-SCNC: 11.2 ML/DL (ref 16–23)
O2 CT BLDA-SCNC: 18 ML/DL (ref 16–23)
O2 CT BLDA-SCNC: 9.8 ML/DL (ref 16–23)
O2 CT BLDV-SCNC: 13.2 ML/DL
O2 CT BLDV-SCNC: 15.2 ML/DL
O2 CT BLDV-SCNC: 3.6 ML/DL
OSMOLALITY UR: 340 MMOL/KG
OXYHGB MFR BLDA: 95.9 % (ref 94–97)
OXYHGB MFR BLDA: 96 % (ref 94–97)
OXYHGB MFR BLDA: 97.2 % (ref 94–97)
P AXIS: 117 DEGREES
P AXIS: 53 DEGREES
PATHOLOGY REVIEW: YES
PCO2 BLD: 19 MMOL/L (ref 21–32)
PCO2 BLD: 20 MMOL/L (ref 21–32)
PCO2 BLD: 25.8 MM HG (ref 36–44)
PCO2 BLD: 35.8 MM HG (ref 36–44)
PCO2 BLDA: 23.9 MM HG (ref 36–44)
PCO2 BLDA: 33.9 MM HG (ref 36–44)
PCO2 BLDA: 40.1 MM HG (ref 36–44)
PCO2 BLDV: 41.7 MM HG (ref 42–50)
PCO2 BLDV: 41.9 MM HG (ref 42–50)
PCO2 BLDV: 49.7 MM HG (ref 42–50)
PCO2 TEMP ADJ BLDA: 36.9 MM HG (ref 36–44)
PEEP RESPIRATORY: 6 CM[H2O]
PH BLD: 7.33 [PH] (ref 7.35–7.45)
PH BLD: 7.39 [PH] (ref 7.35–7.45)
PH BLD: 7.45 [PH] (ref 7.35–7.45)
PH BLDA: 7.37 [PH] (ref 7.35–7.45)
PH BLDA: 7.41 [PH] (ref 7.35–7.45)
PH BLDA: 7.5 [PH] (ref 7.35–7.45)
PH BLDV: 7.25 [PH] (ref 7.3–7.4)
PH BLDV: 7.29 [PH] (ref 7.3–7.4)
PH BLDV: 7.31 [PH] (ref 7.3–7.4)
PH UR STRIP.AUTO: 5 [PH]
PHOSPHATE SERPL-MCNC: 2.2 MG/DL (ref 2.3–4.1)
PHOSPHATE SERPL-MCNC: 2.4 MG/DL (ref 2.3–4.1)
PHOSPHATE SERPL-MCNC: 3.5 MG/DL (ref 2.3–4.1)
PHOSPHATE SERPL-MCNC: 3.8 MG/DL (ref 2.3–4.1)
PHOSPHATE SERPL-MCNC: 3.9 MG/DL (ref 2.3–4.1)
PHOSPHATE SERPL-MCNC: 4 MG/DL (ref 2.3–4.1)
PHOSPHATE SERPL-MCNC: 4.4 MG/DL (ref 2.3–4.1)
PHOSPHATE SERPL-MCNC: 4.6 MG/DL (ref 2.3–4.1)
PLATELET # BLD AUTO: 107 THOUSANDS/UL (ref 149–390)
PLATELET # BLD AUTO: 118 THOUSANDS/UL (ref 149–390)
PLATELET # BLD AUTO: 118 THOUSANDS/UL (ref 149–390)
PLATELET # BLD AUTO: 123 THOUSANDS/UL (ref 149–390)
PLATELET # BLD AUTO: 138 THOUSANDS/UL (ref 149–390)
PLATELET # BLD AUTO: 182 THOUSANDS/UL (ref 149–390)
PLATELET # BLD AUTO: 186 THOUSANDS/UL (ref 149–390)
PLATELET # BLD AUTO: 199 THOUSANDS/UL (ref 149–390)
PLATELET # BLD AUTO: 205 THOUSANDS/UL (ref 149–390)
PLATELET # BLD AUTO: 23 THOUSANDS/UL (ref 149–390)
PLATELET # BLD AUTO: 284 THOUSANDS/UL (ref 149–390)
PLATELET # BLD AUTO: 43 THOUSANDS/UL (ref 149–390)
PLATELET # BLD AUTO: 44 THOUSANDS/UL (ref 149–390)
PLATELET # BLD AUTO: 54 THOUSANDS/UL (ref 149–390)
PLATELET # BLD AUTO: 56 THOUSANDS/UL (ref 149–390)
PLATELET # BLD AUTO: 57 THOUSANDS/UL (ref 149–390)
PLATELET # BLD AUTO: 61 THOUSANDS/UL (ref 149–390)
PLATELET # BLD AUTO: 65 THOUSANDS/UL (ref 149–390)
PLATELET # BLD AUTO: 66 THOUSANDS/UL (ref 149–390)
PLATELET # BLD AUTO: 68 THOUSANDS/UL (ref 149–390)
PLATELET # BLD AUTO: 82 THOUSANDS/UL (ref 149–390)
PLATELET # BLD AUTO: 85 THOUSANDS/UL (ref 149–390)
PLATELET # BLD AUTO: 89 THOUSANDS/UL (ref 149–390)
PLATELET # BLD AUTO: 92 THOUSANDS/UL (ref 149–390)
PLATELET # BLD AUTO: 95 THOUSANDS/UL (ref 149–390)
PLATELET BLD QL SMEAR: ABNORMAL
PLATELET BLD QL SMEAR: ADEQUATE
PMV BLD AUTO: 10.7 FL (ref 8.9–12.7)
PMV BLD AUTO: 10.9 FL (ref 8.9–12.7)
PMV BLD AUTO: 11 FL (ref 8.9–12.7)
PMV BLD AUTO: 11.2 FL (ref 8.9–12.7)
PMV BLD AUTO: 11.4 FL (ref 8.9–12.7)
PMV BLD AUTO: 11.7 FL (ref 8.9–12.7)
PMV BLD AUTO: 11.8 FL (ref 8.9–12.7)
PMV BLD AUTO: 11.9 FL (ref 8.9–12.7)
PMV BLD AUTO: 12.9 FL (ref 8.9–12.7)
PMV BLD AUTO: 13.3 FL (ref 8.9–12.7)
PMV BLD AUTO: 13.4 FL (ref 8.9–12.7)
PMV BLD AUTO: 13.6 FL (ref 8.9–12.7)
PMV BLD AUTO: 13.9 FL (ref 8.9–12.7)
PMV BLD AUTO: 14.1 FL (ref 8.9–12.7)
PMV BLD AUTO: 14.2 FL (ref 8.9–12.7)
PMV BLD AUTO: 14.5 FL (ref 8.9–12.7)
PO2 BLD: 103.7 MM HG (ref 75–129)
PO2 BLD: 168 MM HG (ref 75–129)
PO2 BLD: 54 MM HG (ref 75–129)
PO2 BLDA: 114.9 MM HG (ref 75–129)
PO2 BLDA: 161.7 MM HG (ref 75–129)
PO2 BLDA: 87.8 MM HG (ref 75–129)
PO2 BLDV: 20.8 MM HG (ref 35–45)
PO2 BLDV: 54.4 MM HG (ref 35–45)
PO2 BLDV: 80.2 MM HG (ref 35–45)
POIKILOCYTOSIS BLD QL SMEAR: PRESENT
POLYCHROMASIA BLD QL SMEAR: PRESENT
POTASSIUM BLD-SCNC: 3 MMOL/L (ref 3.5–5.3)
POTASSIUM BLD-SCNC: 3.3 MMOL/L (ref 3.5–5.3)
POTASSIUM SERPL-SCNC: 2.8 MMOL/L (ref 3.5–5.3)
POTASSIUM SERPL-SCNC: 2.9 MMOL/L (ref 3.5–5.3)
POTASSIUM SERPL-SCNC: 3.1 MMOL/L (ref 3.5–5.3)
POTASSIUM SERPL-SCNC: 3.1 MMOL/L (ref 3.5–5.3)
POTASSIUM SERPL-SCNC: 3.2 MMOL/L (ref 3.5–5.3)
POTASSIUM SERPL-SCNC: 3.2 MMOL/L (ref 3.5–5.3)
POTASSIUM SERPL-SCNC: 3.3 MMOL/L (ref 3.5–5.3)
POTASSIUM SERPL-SCNC: 3.4 MMOL/L (ref 3.5–5.3)
POTASSIUM SERPL-SCNC: 3.5 MMOL/L (ref 3.5–5.3)
POTASSIUM SERPL-SCNC: 3.6 MMOL/L (ref 3.5–5.3)
POTASSIUM SERPL-SCNC: 3.6 MMOL/L (ref 3.5–5.3)
POTASSIUM SERPL-SCNC: 3.7 MMOL/L (ref 3.5–5.3)
POTASSIUM SERPL-SCNC: 3.8 MMOL/L (ref 3.5–5.3)
POTASSIUM SERPL-SCNC: 3.9 MMOL/L (ref 3.5–5.3)
POTASSIUM SERPL-SCNC: 4 MMOL/L (ref 3.5–5.3)
POTASSIUM SERPL-SCNC: 4 MMOL/L (ref 3.5–5.3)
POTASSIUM SERPL-SCNC: 4.2 MMOL/L (ref 3.5–5.3)
POTASSIUM SERPL-SCNC: 4.5 MMOL/L (ref 3.5–5.3)
PR INTERVAL: 0 MS
PR INTERVAL: 0 MS
PR INTERVAL: 179 MS
PR INTERVAL: 88 MS
PROCALCITONIN SERPL-MCNC: 0.3 NG/ML
PROCALCITONIN SERPL-MCNC: 3.08 NG/ML
PROT SERPL-MCNC: 3.2 G/DL (ref 6.4–8.4)
PROT SERPL-MCNC: 3.3 G/DL (ref 6.4–8.4)
PROT SERPL-MCNC: 3.5 G/DL (ref 6.4–8.4)
PROT SERPL-MCNC: 3.7 G/DL (ref 6.4–8.4)
PROT SERPL-MCNC: 3.7 G/DL (ref 6.4–8.4)
PROT SERPL-MCNC: 3.8 G/DL (ref 6.4–8.4)
PROT SERPL-MCNC: 3.8 G/DL (ref 6.4–8.4)
PROT SERPL-MCNC: 4 G/DL (ref 6.4–8.4)
PROT SERPL-MCNC: 4.3 G/DL (ref 6.4–8.4)
PROT SERPL-MCNC: 4.6 G/DL (ref 6.4–8.4)
PROT SERPL-MCNC: 4.6 G/DL (ref 6.4–8.4)
PROT UR STRIP-MCNC: ABNORMAL MG/DL
PROT UR-MCNC: 34 MG/DL
PROT/CREAT UR: 4.25 MG/G{CREAT} (ref 0–0.1)
PROTHROMBIN TIME: 13.4 SECONDS (ref 11.6–14.5)
PROTHROMBIN TIME: 14.8 SECONDS (ref 11.6–14.5)
PROTHROMBIN TIME: 15.5 SECONDS (ref 11.6–14.5)
PROTHROMBIN TIME: 15.6 SECONDS (ref 11.6–14.5)
PROTHROMBIN TIME: 15.7 SECONDS (ref 11.6–14.5)
PROTHROMBIN TIME: 15.9 SECONDS (ref 11.6–14.5)
PROTHROMBIN TIME: 16.7 SECONDS (ref 11.6–14.5)
PROTHROMBIN TIME: 17.2 SECONDS (ref 11.6–14.5)
PROTHROMBIN TIME: 18.2 SECONDS (ref 11.6–14.5)
PROTHROMBIN TIME: 19.7 SECONDS (ref 11.6–14.5)
PROTHROMBIN TIME: 21.6 SECONDS (ref 11.6–14.5)
QRS AXIS: -2 DEGREES
QRS AXIS: 18 DEGREES
QRS AXIS: 2 DEGREES
QRS AXIS: 8 DEGREES
QRSD INTERVAL: 129 MS
QRSD INTERVAL: 133 MS
QRSD INTERVAL: 67 MS
QRSD INTERVAL: 88 MS
QT INTERVAL: 267 MS
QT INTERVAL: 271 MS
QT INTERVAL: 288 MS
QT INTERVAL: 500 MS
QTC INTERVAL: 441 MS
QTC INTERVAL: 449 MS
QTC INTERVAL: 449 MS
QTC INTERVAL: 483 MS
RA PRESSURE ESTIMATED: 8 MMHG
RBC # BLD AUTO: 2.01 MILLION/UL (ref 3.81–5.12)
RBC # BLD AUTO: 2.07 MILLION/UL (ref 3.81–5.12)
RBC # BLD AUTO: 2.24 MILLION/UL (ref 3.81–5.12)
RBC # BLD AUTO: 2.37 MILLION/UL (ref 3.81–5.12)
RBC # BLD AUTO: 2.41 MILLION/UL (ref 3.81–5.12)
RBC # BLD AUTO: 2.52 MILLION/UL (ref 3.81–5.12)
RBC # BLD AUTO: 2.59 MILLION/UL (ref 3.81–5.12)
RBC # BLD AUTO: 2.63 MILLION/UL (ref 3.81–5.12)
RBC # BLD AUTO: 2.83 MILLION/UL (ref 3.81–5.12)
RBC # BLD AUTO: 2.87 MILLION/UL (ref 3.81–5.12)
RBC # BLD AUTO: 2.91 MILLION/UL (ref 3.81–5.12)
RBC # BLD AUTO: 2.93 MILLION/UL (ref 3.81–5.12)
RBC # BLD AUTO: 2.95 MILLION/UL (ref 3.81–5.12)
RBC # BLD AUTO: 3.06 MILLION/UL (ref 3.81–5.12)
RBC # BLD AUTO: 3.15 MILLION/UL (ref 3.81–5.12)
RBC # BLD AUTO: 3.16 MILLION/UL (ref 3.81–5.12)
RBC # BLD AUTO: 3.46 MILLION/UL (ref 3.81–5.12)
RBC # BLD AUTO: 3.96 MILLION/UL (ref 3.81–5.12)
RBC # BLD AUTO: 4.18 MILLION/UL (ref 3.81–5.12)
RBC # BLD AUTO: 4.23 MILLION/UL (ref 3.81–5.12)
RBC # BLD AUTO: 4.25 MILLION/UL (ref 3.81–5.12)
RBC # BLD AUTO: 4.53 MILLION/UL (ref 3.81–5.12)
RBC #/AREA URNS AUTO: ABNORMAL /HPF
RBC MORPH BLD: PRESENT
RH BLD: POSITIVE
RHODAMINE-AURAMINE STN SPEC: NORMAL
RIGHT ATRIUM AREA SYSTOLE A4C: 11.3 CM2
RIGHT VENTRICLE ID DIMENSION: 3.4 CM
RIGHT VENTRICLE ID DIMENSION: 3.9 CM
RV PSP: 37 MMHG
SAO2 % BLD FROM PO2: 90 % (ref 60–85)
SAO2 % BLD FROM PO2: 99 % (ref 60–85)
SCAN RESULT: NORMAL
SIMV VENT INSPIRED AIR FIO2: 80
SIMV VENT PEEP: 6
SIMV VENT TIDAL VOLUME: 400
SIMV VENT: ABNORMAL
SIMV VENT: ABNORMAL
SL CV LV EF: 50
SL CV LV EF: 65
SL CV PED ECHO LEFT VENTRICLE DIASTOLIC VOLUME (MOD BIPLANE) 2D: 36 ML
SL CV PED ECHO LEFT VENTRICLE SYSTOLIC VOLUME (MOD BIPLANE) 2D: 16 ML
SMUDGE CELLS BLD QL SMEAR: PRESENT
SODIUM 24H UR-SCNC: 107 MOL/L
SODIUM BLD-SCNC: 143 MMOL/L (ref 136–145)
SODIUM BLD-SCNC: 147 MMOL/L (ref 136–145)
SODIUM SERPL-SCNC: 138 MMOL/L (ref 135–147)
SODIUM SERPL-SCNC: 138 MMOL/L (ref 135–147)
SODIUM SERPL-SCNC: 141 MMOL/L (ref 135–147)
SODIUM SERPL-SCNC: 142 MMOL/L (ref 135–147)
SODIUM SERPL-SCNC: 143 MMOL/L (ref 135–147)
SODIUM SERPL-SCNC: 143 MMOL/L (ref 135–147)
SODIUM SERPL-SCNC: 144 MMOL/L (ref 135–147)
SODIUM SERPL-SCNC: 145 MMOL/L (ref 135–147)
SODIUM SERPL-SCNC: 146 MMOL/L (ref 135–147)
SODIUM SERPL-SCNC: 147 MMOL/L (ref 135–147)
SODIUM SERPL-SCNC: 148 MMOL/L (ref 135–147)
SODIUM SERPL-SCNC: 149 MMOL/L (ref 135–147)
SODIUM SERPL-SCNC: 150 MMOL/L (ref 135–147)
SP GR UR STRIP.AUTO: 1.03 (ref 1–1.03)
SPECIMEN EXPIRATION DATE: NORMAL
SPECIMEN SOURCE: ABNORMAL
T WAVE AXIS: -41 DEGREES
T WAVE AXIS: 218 DEGREES
T WAVE AXIS: 256 DEGREES
T WAVE AXIS: 4 DEGREES
T4 FREE SERPL-MCNC: 0.38 NG/DL (ref 0.61–1.12)
TR MAX PG: 29 MMHG
TR MAX PG: 40.9 MMHG
TR PEAK VELOCITY: 2.7 M/S
TR PEAK VELOCITY: 3.2 M/S
TRICUSPID VALVE PEAK REGURGITATION VELOCITY: 2.71 M/S
TSH SERPL DL<=0.05 MIU/L-ACNC: 0.68 UIU/ML (ref 0.45–4.5)
TSH SERPL DL<=0.05 MIU/L-ACNC: 0.7 UIU/ML (ref 0.45–4.5)
TSH SERPL DL<=0.05 MIU/L-ACNC: 1.33 UIU/ML (ref 0.45–4.5)
UNIT DISPENSE STATUS: NORMAL
UNIT PRODUCT CODE: NORMAL
UNIT PRODUCT VOLUME: 125 ML
UNIT PRODUCT VOLUME: 205 ML
UNIT PRODUCT VOLUME: 205 ML
UNIT PRODUCT VOLUME: 226 ML
UNIT PRODUCT VOLUME: 245 ML
UNIT PRODUCT VOLUME: 250 ML
UNIT PRODUCT VOLUME: 280 ML
UNIT PRODUCT VOLUME: 300 ML
UNIT PRODUCT VOLUME: 312 ML
UNIT PRODUCT VOLUME: 320 ML
UNIT PRODUCT VOLUME: 350 ML
UNIT RH: NORMAL
UROBILINOGEN UR STRIP-ACNC: <2 MG/DL
VENT AC: 16
VENT SIMV: 16
VENT- AC: AC
VENTRICULAR RATE: 146 BPM
VENTRICULAR RATE: 164 BPM
VENTRICULAR RATE: 165 BPM
VENTRICULAR RATE: 56 BPM
VT SETTING VENT: 230 ML
WBC # BLD AUTO: 11.46 THOUSAND/UL (ref 4.31–10.16)
WBC # BLD AUTO: 11.99 THOUSAND/UL (ref 4.31–10.16)
WBC # BLD AUTO: 12.11 THOUSAND/UL (ref 4.31–10.16)
WBC # BLD AUTO: 13 THOUSAND/UL (ref 4.31–10.16)
WBC # BLD AUTO: 13.21 THOUSAND/UL (ref 4.31–10.16)
WBC # BLD AUTO: 13.25 THOUSAND/UL (ref 4.31–10.16)
WBC # BLD AUTO: 14.03 THOUSAND/UL (ref 4.31–10.16)
WBC # BLD AUTO: 14.86 THOUSAND/UL (ref 4.31–10.16)
WBC # BLD AUTO: 15.99 THOUSAND/UL (ref 4.31–10.16)
WBC # BLD AUTO: 16.43 THOUSAND/UL (ref 4.31–10.16)
WBC # BLD AUTO: 17.12 THOUSAND/UL (ref 4.31–10.16)
WBC # BLD AUTO: 17.24 THOUSAND/UL (ref 4.31–10.16)
WBC # BLD AUTO: 18.58 THOUSAND/UL (ref 4.31–10.16)
WBC # BLD AUTO: 24.05 THOUSAND/UL (ref 4.31–10.16)
WBC # BLD AUTO: 25.9 THOUSAND/UL (ref 4.31–10.16)
WBC # BLD AUTO: 26.3 THOUSAND/UL (ref 4.31–10.16)
WBC # BLD AUTO: 26.92 THOUSAND/UL (ref 4.31–10.16)
WBC # BLD AUTO: 26.92 THOUSAND/UL (ref 4.31–10.16)
WBC # BLD AUTO: 6.25 THOUSAND/UL (ref 4.31–10.16)
WBC # BLD AUTO: 7.35 THOUSAND/UL (ref 4.31–10.16)
WBC # BLD AUTO: 7.35 THOUSAND/UL (ref 4.31–10.16)
WBC # BLD AUTO: 7.49 THOUSAND/UL (ref 4.31–10.16)
WBC # BLD AUTO: 9.22 THOUSAND/UL (ref 4.31–10.16)
WBC # BLD AUTO: 9.47 THOUSAND/UL (ref 4.31–10.16)
WBC #/AREA URNS AUTO: ABNORMAL /HPF

## 2024-01-01 PROCEDURE — 88341 IMHCHEM/IMCYTCHM EA ADD ANTB: CPT | Performed by: STUDENT IN AN ORGANIZED HEALTH CARE EDUCATION/TRAINING PROGRAM

## 2024-01-01 PROCEDURE — 82570 ASSAY OF URINE CREATININE: CPT

## 2024-01-01 PROCEDURE — 80048 BASIC METABOLIC PNL TOTAL CA: CPT

## 2024-01-01 PROCEDURE — 85027 COMPLETE CBC AUTOMATED: CPT

## 2024-01-01 PROCEDURE — P9016 RBC LEUKOCYTES REDUCED: HCPCS

## 2024-01-01 PROCEDURE — 93321 DOPPLER ECHO F-UP/LMTD STD: CPT

## 2024-01-01 PROCEDURE — 99233 SBSQ HOSP IP/OBS HIGH 50: CPT | Performed by: INTERNAL MEDICINE

## 2024-01-01 PROCEDURE — 84443 ASSAY THYROID STIM HORMONE: CPT | Performed by: PHYSICIAN ASSISTANT

## 2024-01-01 PROCEDURE — 82948 REAGENT STRIP/BLOOD GLUCOSE: CPT

## 2024-01-01 PROCEDURE — 86901 BLOOD TYPING SEROLOGIC RH(D): CPT | Performed by: NURSE PRACTITIONER

## 2024-01-01 PROCEDURE — 86850 RBC ANTIBODY SCREEN: CPT

## 2024-01-01 PROCEDURE — 93010 ELECTROCARDIOGRAM REPORT: CPT | Performed by: INTERNAL MEDICINE

## 2024-01-01 PROCEDURE — 03HY32Z INSERTION OF MONITORING DEVICE INTO UPPER ARTERY, PERCUTANEOUS APPROACH: ICD-10-PCS | Performed by: INTERNAL MEDICINE

## 2024-01-01 PROCEDURE — 82533 TOTAL CORTISOL: CPT | Performed by: PHYSICIAN ASSISTANT

## 2024-01-01 PROCEDURE — 99232 SBSQ HOSP IP/OBS MODERATE 35: CPT | Performed by: INTERNAL MEDICINE

## 2024-01-01 PROCEDURE — 84100 ASSAY OF PHOSPHORUS: CPT | Performed by: NURSE PRACTITIONER

## 2024-01-01 PROCEDURE — 71045 X-RAY EXAM CHEST 1 VIEW: CPT

## 2024-01-01 PROCEDURE — 85018 HEMOGLOBIN: CPT

## 2024-01-01 PROCEDURE — 85730 THROMBOPLASTIN TIME PARTIAL: CPT

## 2024-01-01 PROCEDURE — 94640 AIRWAY INHALATION TREATMENT: CPT

## 2024-01-01 PROCEDURE — 93325 DOPPLER ECHO COLOR FLOW MAPG: CPT | Performed by: INTERNAL MEDICINE

## 2024-01-01 PROCEDURE — P9040 RBC LEUKOREDUCED IRRADIATED: HCPCS

## 2024-01-01 PROCEDURE — 84145 PROCALCITONIN (PCT): CPT | Performed by: PHYSICIAN ASSISTANT

## 2024-01-01 PROCEDURE — 82805 BLOOD GASES W/O2 SATURATION: CPT

## 2024-01-01 PROCEDURE — 93321 DOPPLER ECHO F-UP/LMTD STD: CPT | Performed by: INTERNAL MEDICINE

## 2024-01-01 PROCEDURE — 84132 ASSAY OF SERUM POTASSIUM: CPT

## 2024-01-01 PROCEDURE — 93005 ELECTROCARDIOGRAM TRACING: CPT

## 2024-01-01 PROCEDURE — 86923 COMPATIBILITY TEST ELECTRIC: CPT

## 2024-01-01 PROCEDURE — NC001 PR NO CHARGE: Performed by: INTERNAL MEDICINE

## 2024-01-01 PROCEDURE — B4141ZZ FLUOROSCOPY OF SUPERIOR MESENTERIC ARTERY USING LOW OSMOLAR CONTRAST: ICD-10-PCS | Performed by: RADIOLOGY

## 2024-01-01 PROCEDURE — 02HV33Z INSERTION OF INFUSION DEVICE INTO SUPERIOR VENA CAVA, PERCUTANEOUS APPROACH: ICD-10-PCS | Performed by: INTERNAL MEDICINE

## 2024-01-01 PROCEDURE — P9012 CRYOPRECIPITATE EACH UNIT: HCPCS

## 2024-01-01 PROCEDURE — 94760 N-INVAS EAR/PLS OXIMETRY 1: CPT

## 2024-01-01 PROCEDURE — 85384 FIBRINOGEN ACTIVITY: CPT

## 2024-01-01 PROCEDURE — 81001 URINALYSIS AUTO W/SCOPE: CPT

## 2024-01-01 PROCEDURE — 85027 COMPLETE CBC AUTOMATED: CPT | Performed by: FAMILY MEDICINE

## 2024-01-01 PROCEDURE — 85025 COMPLETE CBC W/AUTO DIFF WBC: CPT | Performed by: NURSE PRACTITIONER

## 2024-01-01 PROCEDURE — 97163 PT EVAL HIGH COMPLEX 45 MIN: CPT

## 2024-01-01 PROCEDURE — 85018 HEMOGLOBIN: CPT | Performed by: NURSE PRACTITIONER

## 2024-01-01 PROCEDURE — 30233L1 TRANSFUSION OF NONAUTOLOGOUS FRESH PLASMA INTO PERIPHERAL VEIN, PERCUTANEOUS APPROACH: ICD-10-PCS | Performed by: INTERNAL MEDICINE

## 2024-01-01 PROCEDURE — 36248 INS CATH ABD/L-EXT ART ADDL: CPT | Performed by: RADIOLOGY

## 2024-01-01 PROCEDURE — 85025 COMPLETE CBC W/AUTO DIFF WBC: CPT

## 2024-01-01 PROCEDURE — C1887 CATHETER, GUIDING: HCPCS

## 2024-01-01 PROCEDURE — 86644 CMV ANTIBODY: CPT

## 2024-01-01 PROCEDURE — 94003 VENT MGMT INPAT SUBQ DAY: CPT

## 2024-01-01 PROCEDURE — P9037 PLATE PHERES LEUKOREDU IRRAD: HCPCS

## 2024-01-01 PROCEDURE — 80053 COMPREHEN METABOLIC PANEL: CPT

## 2024-01-01 PROCEDURE — 36620 INSERTION CATHETER ARTERY: CPT

## 2024-01-01 PROCEDURE — 80076 HEPATIC FUNCTION PANEL: CPT

## 2024-01-01 PROCEDURE — 94150 VITAL CAPACITY TEST: CPT

## 2024-01-01 PROCEDURE — P9017 PLASMA 1 DONOR FRZ W/IN 8 HR: HCPCS

## 2024-01-01 PROCEDURE — 86225 DNA ANTIBODY NATIVE: CPT

## 2024-01-01 PROCEDURE — 4A133J1 MONITORING OF ARTERIAL PULSE, PERIPHERAL, PERCUTANEOUS APPROACH: ICD-10-PCS | Performed by: INTERNAL MEDICINE

## 2024-01-01 PROCEDURE — 85027 COMPLETE CBC AUTOMATED: CPT | Performed by: INTERNAL MEDICINE

## 2024-01-01 PROCEDURE — 84100 ASSAY OF PHOSPHORUS: CPT

## 2024-01-01 PROCEDURE — 87206 SMEAR FLUORESCENT/ACID STAI: CPT | Performed by: INTERNAL MEDICINE

## 2024-01-01 PROCEDURE — 82330 ASSAY OF CALCIUM: CPT

## 2024-01-01 PROCEDURE — 86920 COMPATIBILITY TEST SPIN: CPT

## 2024-01-01 PROCEDURE — 75726 ARTERY X-RAYS ABDOMEN: CPT | Performed by: RADIOLOGY

## 2024-01-01 PROCEDURE — 71250 CT THORAX DX C-: CPT

## 2024-01-01 PROCEDURE — 80048 BASIC METABOLIC PNL TOTAL CA: CPT | Performed by: NURSE PRACTITIONER

## 2024-01-01 PROCEDURE — 80053 COMPREHEN METABOLIC PANEL: CPT | Performed by: PHYSICIAN ASSISTANT

## 2024-01-01 PROCEDURE — 97167 OT EVAL HIGH COMPLEX 60 MIN: CPT

## 2024-01-01 PROCEDURE — 85730 THROMBOPLASTIN TIME PARTIAL: CPT | Performed by: NURSE PRACTITIONER

## 2024-01-01 PROCEDURE — 87040 BLOOD CULTURE FOR BACTERIA: CPT

## 2024-01-01 PROCEDURE — 85007 BL SMEAR W/DIFF WBC COUNT: CPT | Performed by: INTERNAL MEDICINE

## 2024-01-01 PROCEDURE — 99238 HOSP IP/OBS DSCHRG MGMT 30/<: CPT

## 2024-01-01 PROCEDURE — 85014 HEMATOCRIT: CPT

## 2024-01-01 PROCEDURE — 86645 CMV ANTIBODY IGM: CPT

## 2024-01-01 PROCEDURE — 85027 COMPLETE CBC AUTOMATED: CPT | Performed by: NURSE PRACTITIONER

## 2024-01-01 PROCEDURE — 84145 PROCALCITONIN (PCT): CPT

## 2024-01-01 PROCEDURE — 80053 COMPREHEN METABOLIC PANEL: CPT | Performed by: NURSE PRACTITIONER

## 2024-01-01 PROCEDURE — 82947 ASSAY GLUCOSE BLOOD QUANT: CPT

## 2024-01-01 PROCEDURE — 99291 CRITICAL CARE FIRST HOUR: CPT | Performed by: INTERNAL MEDICINE

## 2024-01-01 PROCEDURE — 30233M1 TRANSFUSION OF NONAUTOLOGOUS PLASMA CRYOPRECIPITATE INTO PERIPHERAL VEIN, PERCUTANEOUS APPROACH: ICD-10-PCS | Performed by: INTERNAL MEDICINE

## 2024-01-01 PROCEDURE — 82803 BLOOD GASES ANY COMBINATION: CPT

## 2024-01-01 PROCEDURE — 36140 INTRO NDL ICATH UPR/LXTR ART: CPT

## 2024-01-01 PROCEDURE — 94664 DEMO&/EVAL PT USE INHALER: CPT

## 2024-01-01 PROCEDURE — 85610 PROTHROMBIN TIME: CPT

## 2024-01-01 PROCEDURE — 88342 IMHCHEM/IMCYTCHM 1ST ANTB: CPT | Performed by: STUDENT IN AN ORGANIZED HEALTH CARE EDUCATION/TRAINING PROGRAM

## 2024-01-01 PROCEDURE — 85014 HEMATOCRIT: CPT | Performed by: INTERNAL MEDICINE

## 2024-01-01 PROCEDURE — 93308 TTE F-UP OR LMTD: CPT | Performed by: INTERNAL MEDICINE

## 2024-01-01 PROCEDURE — 85018 HEMOGLOBIN: CPT | Performed by: FAMILY MEDICINE

## 2024-01-01 PROCEDURE — 80053 COMPREHEN METABOLIC PANEL: CPT | Performed by: INTERNAL MEDICINE

## 2024-01-01 PROCEDURE — NC001 PR NO CHARGE

## 2024-01-01 PROCEDURE — 86140 C-REACTIVE PROTEIN: CPT

## 2024-01-01 PROCEDURE — 02HV33Z INSERTION OF INFUSION DEVICE INTO SUPERIOR VENA CAVA, PERCUTANEOUS APPROACH: ICD-10-PCS

## 2024-01-01 PROCEDURE — 88305 TISSUE EXAM BY PATHOLOGIST: CPT | Performed by: STUDENT IN AN ORGANIZED HEALTH CARE EDUCATION/TRAINING PROGRAM

## 2024-01-01 PROCEDURE — 45382 COLONOSCOPY W/CONTROL BLEED: CPT | Performed by: INTERNAL MEDICINE

## 2024-01-01 PROCEDURE — 84443 ASSAY THYROID STIM HORMONE: CPT

## 2024-01-01 PROCEDURE — 31500 INSERT EMERGENCY AIRWAY: CPT | Performed by: INTERNAL MEDICINE

## 2024-01-01 PROCEDURE — 85610 PROTHROMBIN TIME: CPT | Performed by: NURSE PRACTITIONER

## 2024-01-01 PROCEDURE — C1894 INTRO/SHEATH, NON-LASER: HCPCS

## 2024-01-01 PROCEDURE — 30233N1 TRANSFUSION OF NONAUTOLOGOUS RED BLOOD CELLS INTO PERIPHERAL VEIN, PERCUTANEOUS APPROACH: ICD-10-PCS | Performed by: INTERNAL MEDICINE

## 2024-01-01 PROCEDURE — 87116 MYCOBACTERIA CULTURE: CPT | Performed by: INTERNAL MEDICINE

## 2024-01-01 PROCEDURE — 82043 UR ALBUMIN QUANTITATIVE: CPT | Performed by: INTERNAL MEDICINE

## 2024-01-01 PROCEDURE — 45334 SIGMOIDOSCOPY FOR BLEEDING: CPT | Performed by: INTERNAL MEDICINE

## 2024-01-01 PROCEDURE — C1751 CATH, INF, PER/CENT/MIDLINE: HCPCS

## 2024-01-01 PROCEDURE — 83735 ASSAY OF MAGNESIUM: CPT

## 2024-01-01 PROCEDURE — 36247 INS CATH ABD/L-EXT ART 3RD: CPT | Performed by: RADIOLOGY

## 2024-01-01 PROCEDURE — 84439 ASSAY OF FREE THYROXINE: CPT

## 2024-01-01 PROCEDURE — 92610 EVALUATE SWALLOWING FUNCTION: CPT

## 2024-01-01 PROCEDURE — 93010 ELECTROCARDIOGRAM REPORT: CPT

## 2024-01-01 PROCEDURE — 70450 CT HEAD/BRAIN W/O DYE: CPT

## 2024-01-01 PROCEDURE — NC001 PR NO CHARGE: Performed by: STUDENT IN AN ORGANIZED HEALTH CARE EDUCATION/TRAINING PROGRAM

## 2024-01-01 PROCEDURE — 99223 1ST HOSP IP/OBS HIGH 75: CPT | Performed by: INTERNAL MEDICINE

## 2024-01-01 PROCEDURE — 37191 INS ENDOVAS VENA CAVA FILTR: CPT

## 2024-01-01 PROCEDURE — 87529 HSV DNA AMP PROBE: CPT

## 2024-01-01 PROCEDURE — 87081 CULTURE SCREEN ONLY: CPT | Performed by: INTERNAL MEDICINE

## 2024-01-01 PROCEDURE — 99222 1ST HOSP IP/OBS MODERATE 55: CPT | Performed by: SURGERY

## 2024-01-01 PROCEDURE — 74174 CTA ABD&PLVS W/CONTRAST: CPT

## 2024-01-01 PROCEDURE — 82570 ASSAY OF URINE CREATININE: CPT | Performed by: INTERNAL MEDICINE

## 2024-01-01 PROCEDURE — 37191 INS ENDOVAS VENA CAVA FILTR: CPT | Performed by: RADIOLOGY

## 2024-01-01 PROCEDURE — C9113 INJ PANTOPRAZOLE SODIUM, VIA: HCPCS

## 2024-01-01 PROCEDURE — 86901 BLOOD TYPING SEROLOGIC RH(D): CPT

## 2024-01-01 PROCEDURE — 94668 MNPJ CHEST WALL SBSQ: CPT

## 2024-01-01 PROCEDURE — 88365 INSITU HYBRIDIZATION (FISH): CPT | Performed by: STUDENT IN AN ORGANIZED HEALTH CARE EDUCATION/TRAINING PROGRAM

## 2024-01-01 PROCEDURE — 85652 RBC SED RATE AUTOMATED: CPT

## 2024-01-01 PROCEDURE — 83605 ASSAY OF LACTIC ACID: CPT

## 2024-01-01 PROCEDURE — 75726 ARTERY X-RAYS ABDOMEN: CPT

## 2024-01-01 PROCEDURE — 83935 ASSAY OF URINE OSMOLALITY: CPT

## 2024-01-01 PROCEDURE — 82805 BLOOD GASES W/O2 SATURATION: CPT | Performed by: INTERNAL MEDICINE

## 2024-01-01 PROCEDURE — 84295 ASSAY OF SERUM SODIUM: CPT

## 2024-01-01 PROCEDURE — 94002 VENT MGMT INPAT INIT DAY: CPT

## 2024-01-01 PROCEDURE — 93308 TTE F-UP OR LMTD: CPT

## 2024-01-01 PROCEDURE — 92526 ORAL FUNCTION THERAPY: CPT

## 2024-01-01 PROCEDURE — C1769 GUIDE WIRE: HCPCS

## 2024-01-01 PROCEDURE — C1880 VENA CAVA FILTER: HCPCS

## 2024-01-01 PROCEDURE — 99232 SBSQ HOSP IP/OBS MODERATE 35: CPT | Performed by: FAMILY MEDICINE

## 2024-01-01 PROCEDURE — 5A1935Z RESPIRATORY VENTILATION, LESS THAN 24 CONSECUTIVE HOURS: ICD-10-PCS | Performed by: INTERNAL MEDICINE

## 2024-01-01 PROCEDURE — 86900 BLOOD TYPING SEROLOGIC ABO: CPT

## 2024-01-01 PROCEDURE — 84300 ASSAY OF URINE SODIUM: CPT

## 2024-01-01 PROCEDURE — 99232 SBSQ HOSP IP/OBS MODERATE 35: CPT | Performed by: SURGERY

## 2024-01-01 PROCEDURE — 85384 FIBRINOGEN ACTIVITY: CPT | Performed by: NURSE PRACTITIONER

## 2024-01-01 PROCEDURE — 85007 BL SMEAR W/DIFF WBC COUNT: CPT | Performed by: FAMILY MEDICINE

## 2024-01-01 PROCEDURE — 30233R1 TRANSFUSION OF NONAUTOLOGOUS PLATELETS INTO PERIPHERAL VEIN, PERCUTANEOUS APPROACH: ICD-10-PCS | Performed by: INTERNAL MEDICINE

## 2024-01-01 PROCEDURE — 0W3P8ZZ CONTROL BLEEDING IN GASTROINTESTINAL TRACT, VIA NATURAL OR ARTIFICIAL OPENING ENDOSCOPIC: ICD-10-PCS | Performed by: INTERNAL MEDICINE

## 2024-01-01 PROCEDURE — 99497 ADVNCD CARE PLAN 30 MIN: CPT | Performed by: FAMILY MEDICINE

## 2024-01-01 PROCEDURE — 94669 MECHANICAL CHEST WALL OSCILL: CPT

## 2024-01-01 PROCEDURE — 82533 TOTAL CORTISOL: CPT

## 2024-01-01 PROCEDURE — 75774 ARTERY X-RAY EACH VESSEL: CPT | Performed by: RADIOLOGY

## 2024-01-01 PROCEDURE — 87305 ASPERGILLUS AG IA: CPT | Performed by: INTERNAL MEDICINE

## 2024-01-01 PROCEDURE — 85007 BL SMEAR W/DIFF WBC COUNT: CPT

## 2024-01-01 PROCEDURE — 36569 INSJ PICC 5 YR+ W/O IMAGING: CPT

## 2024-01-01 PROCEDURE — 85049 AUTOMATED PLATELET COUNT: CPT

## 2024-01-01 PROCEDURE — C1760 CLOSURE DEV, VASC: HCPCS

## 2024-01-01 PROCEDURE — 4A133B1 MONITORING OF ARTERIAL PRESSURE, PERIPHERAL, PERCUTANEOUS APPROACH: ICD-10-PCS | Performed by: INTERNAL MEDICINE

## 2024-01-01 PROCEDURE — 71275 CT ANGIOGRAPHY CHEST: CPT

## 2024-01-01 PROCEDURE — 93325 DOPPLER ECHO COLOR FLOW MAPG: CPT

## 2024-01-01 PROCEDURE — NC001 PR NO CHARGE: Performed by: RADIOLOGY

## 2024-01-01 PROCEDURE — 74178 CT ABD&PLV WO CNTR FLWD CNTR: CPT

## 2024-01-01 PROCEDURE — 80048 BASIC METABOLIC PNL TOTAL CA: CPT | Performed by: FAMILY MEDICINE

## 2024-01-01 PROCEDURE — 31500 INSERT EMERGENCY AIRWAY: CPT

## 2024-01-01 PROCEDURE — 97168 OT RE-EVAL EST PLAN CARE: CPT

## 2024-01-01 PROCEDURE — 83735 ASSAY OF MAGNESIUM: CPT | Performed by: FAMILY MEDICINE

## 2024-01-01 PROCEDURE — 85060 BLOOD SMEAR INTERPRETATION: CPT | Performed by: PATHOLOGY

## 2024-01-01 PROCEDURE — 83735 ASSAY OF MAGNESIUM: CPT | Performed by: NURSE PRACTITIONER

## 2024-01-01 PROCEDURE — 84156 ASSAY OF PROTEIN URINE: CPT

## 2024-01-01 PROCEDURE — 85362 FIBRIN DEGRADATION PRODUCTS: CPT

## 2024-01-01 PROCEDURE — 76937 US GUIDE VASCULAR ACCESS: CPT | Performed by: RADIOLOGY

## 2024-01-01 PROCEDURE — 5A1945Z RESPIRATORY VENTILATION, 24-96 CONSECUTIVE HOURS: ICD-10-PCS | Performed by: INTERNAL MEDICINE

## 2024-01-01 PROCEDURE — 85025 COMPLETE CBC W/AUTO DIFF WBC: CPT | Performed by: PHYSICIAN ASSISTANT

## 2024-01-01 PROCEDURE — 85018 HEMOGLOBIN: CPT | Performed by: INTERNAL MEDICINE

## 2024-01-01 PROCEDURE — 86160 COMPLEMENT ANTIGEN: CPT

## 2024-01-01 PROCEDURE — 99449 NTRPROF PH1/NTRNET/EHR 31/>: CPT | Performed by: STUDENT IN AN ORGANIZED HEALTH CARE EDUCATION/TRAINING PROGRAM

## 2024-01-01 PROCEDURE — 0BH17EZ INSERTION OF ENDOTRACHEAL AIRWAY INTO TRACHEA, VIA NATURAL OR ARTIFICIAL OPENING: ICD-10-PCS | Performed by: INTERNAL MEDICINE

## 2024-01-01 PROCEDURE — 86900 BLOOD TYPING SEROLOGIC ABO: CPT | Performed by: NURSE PRACTITIONER

## 2024-01-01 PROCEDURE — 86850 RBC ANTIBODY SCREEN: CPT | Performed by: NURSE PRACTITIONER

## 2024-01-01 PROCEDURE — 45331 SIGMOIDOSCOPY AND BIOPSY: CPT | Performed by: INTERNAL MEDICINE

## 2024-01-01 PROCEDURE — 06H03DZ INSERTION OF INTRALUMINAL DEVICE INTO INFERIOR VENA CAVA, PERCUTANEOUS APPROACH: ICD-10-PCS | Performed by: RADIOLOGY

## 2024-01-01 RX ORDER — FUROSEMIDE 10 MG/ML
40 INJECTION INTRAMUSCULAR; INTRAVENOUS
Status: COMPLETED | OUTPATIENT
Start: 2024-01-01 | End: 2024-01-01

## 2024-01-01 RX ORDER — PANTOPRAZOLE SODIUM 40 MG/1
40 TABLET, DELAYED RELEASE ORAL
Status: DISCONTINUED | OUTPATIENT
Start: 2024-01-01 | End: 2024-01-01

## 2024-01-01 RX ORDER — FUROSEMIDE 10 MG/ML
10 SYRINGE (ML) INJECTION CONTINUOUS
Status: DISCONTINUED | OUTPATIENT
Start: 2024-01-01 | End: 2024-01-01

## 2024-01-01 RX ORDER — FUROSEMIDE 10 MG/ML
40 INJECTION INTRAMUSCULAR; INTRAVENOUS
Status: DISCONTINUED | OUTPATIENT
Start: 2024-01-01 | End: 2024-01-01

## 2024-01-01 RX ORDER — POTASSIUM CHLORIDE 29.8 MG/ML
40 INJECTION INTRAVENOUS ONCE
Status: COMPLETED | OUTPATIENT
Start: 2024-01-01 | End: 2024-01-01

## 2024-01-01 RX ORDER — OLANZAPINE 5 MG/1
5 TABLET, ORALLY DISINTEGRATING ORAL
Status: DISCONTINUED | OUTPATIENT
Start: 2024-01-01 | End: 2024-01-01

## 2024-01-01 RX ORDER — DIPHENHYDRAMINE HYDROCHLORIDE 50 MG/ML
25 INJECTION INTRAMUSCULAR; INTRAVENOUS ONCE
Status: COMPLETED | OUTPATIENT
Start: 2024-01-01 | End: 2024-01-01

## 2024-01-01 RX ORDER — CEFEPIME HYDROCHLORIDE 2 G/50ML
2000 INJECTION, SOLUTION INTRAVENOUS EVERY 8 HOURS
Status: DISCONTINUED | OUTPATIENT
Start: 2024-01-01 | End: 2024-01-01

## 2024-01-01 RX ORDER — QUETIAPINE FUMARATE 25 MG/1
12.5 TABLET, FILM COATED ORAL
Status: DISCONTINUED | OUTPATIENT
Start: 2024-01-01 | End: 2024-01-01

## 2024-01-01 RX ORDER — POTASSIUM CHLORIDE 29.8 MG/ML
40 INJECTION INTRAVENOUS ONCE
Qty: 100 ML | Refills: 0 | Status: COMPLETED | OUTPATIENT
Start: 2024-01-01 | End: 2024-01-01

## 2024-01-01 RX ORDER — ACETAMINOPHEN 10 MG/ML
1000 INJECTION, SOLUTION INTRAVENOUS EVERY 6 HOURS PRN
Status: DISCONTINUED | OUTPATIENT
Start: 2024-01-01 | End: 2024-01-01

## 2024-01-01 RX ORDER — SODIUM CHLORIDE 9 MG/ML
100 INJECTION, SOLUTION INTRAVENOUS CONTINUOUS
Status: DISCONTINUED | OUTPATIENT
Start: 2024-01-01 | End: 2024-01-01

## 2024-01-01 RX ORDER — FENTANYL CITRATE 50 UG/ML
INJECTION, SOLUTION INTRAMUSCULAR; INTRAVENOUS
Status: COMPLETED
Start: 2024-01-01 | End: 2024-01-01

## 2024-01-01 RX ORDER — POTASSIUM CHLORIDE 29.8 MG/ML
40 INJECTION INTRAVENOUS EVERY 4 HOURS
Qty: 200 ML | Refills: 0 | Status: DISCONTINUED | OUTPATIENT
Start: 2024-01-01 | End: 2024-01-01

## 2024-01-01 RX ORDER — PROPOFOL 10 MG/ML
50 INJECTION, EMULSION INTRAVENOUS ONCE
Status: DISCONTINUED | OUTPATIENT
Start: 2024-01-01 | End: 2024-01-01

## 2024-01-01 RX ORDER — HYDROMORPHONE HCL/PF 1 MG/ML
0.3 SYRINGE (ML) INJECTION EVERY 2 HOUR PRN
Status: DISCONTINUED | OUTPATIENT
Start: 2024-01-01 | End: 2024-01-01 | Stop reason: HOSPADM

## 2024-01-01 RX ORDER — POTASSIUM CHLORIDE, DEXTROSE MONOHYDRATE 150; 5 MG/100ML; G/100ML
50 INJECTION, SOLUTION INTRAVENOUS CONTINUOUS
Status: DISPENSED | OUTPATIENT
Start: 2024-01-01 | End: 2024-01-01

## 2024-01-01 RX ORDER — MAGNESIUM SULFATE 1 G/100ML
1 INJECTION INTRAVENOUS ONCE
Status: COMPLETED | OUTPATIENT
Start: 2024-01-01 | End: 2024-01-01

## 2024-01-01 RX ORDER — HEPARIN SODIUM 1000 [USP'U]/ML
6000 INJECTION, SOLUTION INTRAVENOUS; SUBCUTANEOUS ONCE
Status: COMPLETED | OUTPATIENT
Start: 2024-01-01 | End: 2024-01-01

## 2024-01-01 RX ORDER — HEPARIN SODIUM 1000 [USP'U]/ML
6000 INJECTION, SOLUTION INTRAVENOUS; SUBCUTANEOUS EVERY 6 HOURS PRN
Status: DISCONTINUED | OUTPATIENT
Start: 2024-01-01 | End: 2024-01-01

## 2024-01-01 RX ORDER — FUROSEMIDE 10 MG/ML
80 INJECTION INTRAMUSCULAR; INTRAVENOUS ONCE
Status: COMPLETED | OUTPATIENT
Start: 2024-01-01 | End: 2024-01-01

## 2024-01-01 RX ORDER — INSULIN LISPRO 100 [IU]/ML
2-12 INJECTION, SOLUTION INTRAVENOUS; SUBCUTANEOUS EVERY 6 HOURS SCHEDULED
Status: DISCONTINUED | OUTPATIENT
Start: 2024-01-01 | End: 2024-01-01

## 2024-01-01 RX ORDER — HALOPERIDOL 5 MG/ML
0.5 INJECTION INTRAMUSCULAR EVERY 2 HOUR PRN
Status: DISCONTINUED | OUTPATIENT
Start: 2024-01-01 | End: 2024-01-01 | Stop reason: HOSPADM

## 2024-01-01 RX ORDER — MAGNESIUM SULFATE HEPTAHYDRATE 40 MG/ML
2 INJECTION, SOLUTION INTRAVENOUS ONCE
Status: COMPLETED | OUTPATIENT
Start: 2024-01-01 | End: 2024-01-01

## 2024-01-01 RX ORDER — FUROSEMIDE 10 MG/ML
80 INJECTION INTRAMUSCULAR; INTRAVENOUS ONCE
Status: DISCONTINUED | OUTPATIENT
Start: 2024-01-01 | End: 2024-01-01

## 2024-01-01 RX ORDER — ONDANSETRON 2 MG/ML
4 INJECTION INTRAMUSCULAR; INTRAVENOUS EVERY 4 HOURS PRN
Status: DISCONTINUED | OUTPATIENT
Start: 2024-01-01 | End: 2024-01-01 | Stop reason: HOSPADM

## 2024-01-01 RX ORDER — ACETAMINOPHEN 325 MG/1
650 TABLET ORAL EVERY 6 HOURS PRN
Status: DISCONTINUED | OUTPATIENT
Start: 2024-01-01 | End: 2024-01-01

## 2024-01-01 RX ORDER — LABETALOL HYDROCHLORIDE 5 MG/ML
INJECTION, SOLUTION INTRAVENOUS
Status: DISPENSED
Start: 2024-01-01 | End: 2024-01-01

## 2024-01-01 RX ORDER — POTASSIUM CHLORIDE 20MEQ/15ML
20 LIQUID (ML) ORAL ONCE
Status: COMPLETED | OUTPATIENT
Start: 2024-01-01 | End: 2024-01-01

## 2024-01-01 RX ORDER — FUROSEMIDE 10 MG/ML
40 INJECTION INTRAMUSCULAR; INTRAVENOUS
Qty: 8 ML | Refills: 0 | Status: COMPLETED | OUTPATIENT
Start: 2024-01-01 | End: 2024-01-01

## 2024-01-01 RX ORDER — FENTANYL CITRATE-0.9 % NACL/PF 10 MCG/ML
50 PLASTIC BAG, INJECTION (ML) INTRAVENOUS CONTINUOUS
Status: DISCONTINUED | OUTPATIENT
Start: 2024-01-01 | End: 2024-01-01

## 2024-01-01 RX ORDER — PROPOFOL 10 MG/ML
50 INJECTION, EMULSION INTRAVENOUS ONCE
Status: DISCONTINUED | OUTPATIENT
Start: 2024-01-01 | End: 2024-01-01 | Stop reason: SDUPTHER

## 2024-01-01 RX ORDER — VALACYCLOVIR HYDROCHLORIDE 500 MG/1
1000 TABLET, FILM COATED ORAL EVERY 8 HOURS SCHEDULED
Status: DISCONTINUED | OUTPATIENT
Start: 2024-01-01 | End: 2024-01-01

## 2024-01-01 RX ORDER — POTASSIUM CHLORIDE 20 MEQ/1
40 TABLET, EXTENDED RELEASE ORAL ONCE
Status: COMPLETED | OUTPATIENT
Start: 2024-01-01 | End: 2024-01-01

## 2024-01-01 RX ORDER — POTASSIUM CHLORIDE, DEXTROSE MONOHYDRATE 150; 5 MG/100ML; G/100ML
50 INJECTION, SOLUTION INTRAVENOUS CONTINUOUS
Status: DISCONTINUED | OUTPATIENT
Start: 2024-01-01 | End: 2024-01-01

## 2024-01-01 RX ORDER — LORAZEPAM 2 MG/ML
1 INJECTION INTRAMUSCULAR
Status: DISCONTINUED | OUTPATIENT
Start: 2024-01-01 | End: 2024-01-01 | Stop reason: HOSPADM

## 2024-01-01 RX ORDER — ESCITALOPRAM OXALATE 10 MG/1
10 TABLET ORAL DAILY
Status: DISCONTINUED | OUTPATIENT
Start: 2024-01-01 | End: 2024-01-01

## 2024-01-01 RX ORDER — FENTANYL CITRATE 50 UG/ML
25 INJECTION, SOLUTION INTRAMUSCULAR; INTRAVENOUS ONCE
Status: COMPLETED | OUTPATIENT
Start: 2024-01-01 | End: 2024-01-01

## 2024-01-01 RX ORDER — HALOPERIDOL 5 MG/ML
0.5 INJECTION INTRAMUSCULAR EVERY 2 HOUR PRN
Status: DISCONTINUED | OUTPATIENT
Start: 2024-01-01 | End: 2024-01-01

## 2024-01-01 RX ORDER — FAMOTIDINE 20 MG/1
20 TABLET, FILM COATED ORAL DAILY
Status: DISCONTINUED | OUTPATIENT
Start: 2024-01-01 | End: 2024-01-01

## 2024-01-01 RX ORDER — METOPROLOL TARTRATE 1 MG/ML
5 INJECTION, SOLUTION INTRAVENOUS ONCE
Status: COMPLETED | OUTPATIENT
Start: 2024-01-01 | End: 2024-01-01

## 2024-01-01 RX ORDER — ACETAMINOPHEN 10 MG/ML
1000 INJECTION, SOLUTION INTRAVENOUS ONCE
Status: COMPLETED | OUTPATIENT
Start: 2024-01-01 | End: 2024-01-01

## 2024-01-01 RX ORDER — POTASSIUM CHLORIDE 20MEQ/15ML
40 LIQUID (ML) ORAL ONCE
Status: COMPLETED | OUTPATIENT
Start: 2024-01-01 | End: 2024-01-01

## 2024-01-01 RX ORDER — ONDANSETRON 2 MG/ML
4 INJECTION INTRAMUSCULAR; INTRAVENOUS ONCE AS NEEDED
Status: DISCONTINUED | OUTPATIENT
Start: 2024-01-01 | End: 2024-01-01

## 2024-01-01 RX ORDER — POTASSIUM CHLORIDE 14.9 MG/ML
20 INJECTION INTRAVENOUS ONCE
Qty: 100 ML | Refills: 0 | Status: COMPLETED | OUTPATIENT
Start: 2024-01-01 | End: 2024-01-01

## 2024-01-01 RX ORDER — FENTANYL CITRATE 50 UG/ML
50 INJECTION, SOLUTION INTRAMUSCULAR; INTRAVENOUS EVERY 2 HOUR PRN
Status: DISCONTINUED | OUTPATIENT
Start: 2024-01-01 | End: 2024-01-01

## 2024-01-01 RX ORDER — ALBUMIN, HUMAN INJ 5% 5 %
25 SOLUTION INTRAVENOUS ONCE
Status: DISCONTINUED | OUTPATIENT
Start: 2024-01-01 | End: 2024-01-01

## 2024-01-01 RX ORDER — INSULIN LISPRO 100 [IU]/ML
1-5 INJECTION, SOLUTION INTRAVENOUS; SUBCUTANEOUS
Status: DISCONTINUED | OUTPATIENT
Start: 2024-01-01 | End: 2024-01-01

## 2024-01-01 RX ORDER — PANTOPRAZOLE SODIUM 40 MG/10ML
40 INJECTION, POWDER, LYOPHILIZED, FOR SOLUTION INTRAVENOUS
Status: DISCONTINUED | OUTPATIENT
Start: 2024-01-01 | End: 2024-01-01

## 2024-01-01 RX ORDER — SODIUM CHLORIDE 9 MG/ML
20 INJECTION, SOLUTION INTRAVENOUS ONCE
Status: COMPLETED | OUTPATIENT
Start: 2024-01-01 | End: 2024-01-01

## 2024-01-01 RX ORDER — METHYLPREDNISOLONE SODIUM SUCCINATE 125 MG/2ML
50 INJECTION, POWDER, LYOPHILIZED, FOR SOLUTION INTRAMUSCULAR; INTRAVENOUS DAILY
Status: DISCONTINUED | OUTPATIENT
Start: 2024-01-01 | End: 2024-01-01

## 2024-01-01 RX ORDER — METOPROLOL TARTRATE 1 MG/ML
5 INJECTION, SOLUTION INTRAVENOUS ONCE
Status: DISCONTINUED | OUTPATIENT
Start: 2024-01-01 | End: 2024-01-01

## 2024-01-01 RX ORDER — ACETAMINOPHEN 325 MG/1
650 TABLET ORAL ONCE
Status: DISCONTINUED | OUTPATIENT
Start: 2024-01-01 | End: 2024-01-01

## 2024-01-01 RX ORDER — SODIUM CHLORIDE, SODIUM GLUCONATE, SODIUM ACETATE, POTASSIUM CHLORIDE, MAGNESIUM CHLORIDE, SODIUM PHOSPHATE, DIBASIC, AND POTASSIUM PHOSPHATE .53; .5; .37; .037; .03; .012; .00082 G/100ML; G/100ML; G/100ML; G/100ML; G/100ML; G/100ML; G/100ML
500 INJECTION, SOLUTION INTRAVENOUS ONCE
Status: COMPLETED | OUTPATIENT
Start: 2024-01-01 | End: 2024-01-01

## 2024-01-01 RX ORDER — GLYCOPYRROLATE 0.2 MG/ML
0.1 INJECTION INTRAMUSCULAR; INTRAVENOUS EVERY 4 HOURS PRN
Status: DISCONTINUED | OUTPATIENT
Start: 2024-01-01 | End: 2024-01-01 | Stop reason: HOSPADM

## 2024-01-01 RX ORDER — LINEZOLID 2 MG/ML
600 INJECTION, SOLUTION INTRAVENOUS EVERY 12 HOURS
Status: DISCONTINUED | OUTPATIENT
Start: 2024-01-01 | End: 2024-01-01

## 2024-01-01 RX ORDER — LABETALOL HYDROCHLORIDE 5 MG/ML
5 INJECTION, SOLUTION INTRAVENOUS ONCE
Status: DISCONTINUED | OUTPATIENT
Start: 2024-01-01 | End: 2024-01-01

## 2024-01-01 RX ORDER — CEFTRIAXONE 1 G/50ML
1000 INJECTION, SOLUTION INTRAVENOUS EVERY 24 HOURS
Status: DISCONTINUED | OUTPATIENT
Start: 2024-01-01 | End: 2024-01-01 | Stop reason: ALTCHOICE

## 2024-01-01 RX ORDER — HEPARIN SODIUM 1000 [USP'U]/ML
3000 INJECTION, SOLUTION INTRAVENOUS; SUBCUTANEOUS EVERY 6 HOURS PRN
Status: DISCONTINUED | OUTPATIENT
Start: 2024-01-01 | End: 2024-01-01

## 2024-01-01 RX ORDER — DILTIAZEM HYDROCHLORIDE 5 MG/ML
10 INJECTION INTRAVENOUS ONCE
Status: COMPLETED | OUTPATIENT
Start: 2024-01-01 | End: 2024-01-01

## 2024-01-01 RX ORDER — FUROSEMIDE 10 MG/ML
20 INJECTION INTRAMUSCULAR; INTRAVENOUS ONCE
Status: DISCONTINUED | OUTPATIENT
Start: 2024-01-01 | End: 2024-01-01

## 2024-01-01 RX ORDER — POTASSIUM CHLORIDE 14.9 MG/ML
20 INJECTION INTRAVENOUS ONCE
Status: COMPLETED | OUTPATIENT
Start: 2024-01-01 | End: 2024-01-01

## 2024-01-01 RX ORDER — PREDNISONE 20 MG/1
40 TABLET ORAL DAILY
Status: DISCONTINUED | OUTPATIENT
Start: 2024-01-01 | End: 2024-01-01

## 2024-01-01 RX ORDER — DEXTROSE MONOHYDRATE 50 MG/ML
100 INJECTION, SOLUTION INTRAVENOUS CONTINUOUS
Status: DISCONTINUED | OUTPATIENT
Start: 2024-01-01 | End: 2024-01-01

## 2024-01-01 RX ORDER — HYDROMORPHONE HCL IN WATER/PF 6 MG/30 ML
0.2 PATIENT CONTROLLED ANALGESIA SYRINGE INTRAVENOUS
Status: DISCONTINUED | OUTPATIENT
Start: 2024-01-01 | End: 2024-01-01

## 2024-01-01 RX ORDER — HEPARIN SODIUM 10000 [USP'U]/100ML
3-30 INJECTION, SOLUTION INTRAVENOUS
Status: DISCONTINUED | OUTPATIENT
Start: 2024-01-01 | End: 2024-01-01

## 2024-01-01 RX ORDER — PROPOFOL 10 MG/ML
5-50 INJECTION, EMULSION INTRAVENOUS
Status: DISCONTINUED | OUTPATIENT
Start: 2024-01-01 | End: 2024-01-01

## 2024-01-01 RX ORDER — POTASSIUM CHLORIDE, DEXTROSE MONOHYDRATE 150; 5 MG/100ML; G/100ML
100 INJECTION, SOLUTION INTRAVENOUS CONTINUOUS
Status: DISCONTINUED | OUTPATIENT
Start: 2024-01-01 | End: 2024-01-01

## 2024-01-01 RX ORDER — POTASSIUM CHLORIDE 14.9 MG/ML
20 INJECTION INTRAVENOUS ONCE
Status: DISCONTINUED | OUTPATIENT
Start: 2024-01-01 | End: 2024-01-01

## 2024-01-01 RX ORDER — ATORVASTATIN CALCIUM 40 MG/1
40 TABLET, FILM COATED ORAL
Status: DISCONTINUED | OUTPATIENT
Start: 2024-01-01 | End: 2024-01-01

## 2024-01-01 RX ORDER — INSULIN LISPRO 100 [IU]/ML
1-5 INJECTION, SOLUTION INTRAVENOUS; SUBCUTANEOUS EVERY 6 HOURS SCHEDULED
Status: DISCONTINUED | OUTPATIENT
Start: 2024-01-01 | End: 2024-01-01 | Stop reason: SDUPTHER

## 2024-01-01 RX ORDER — INSULIN LISPRO 100 [IU]/ML
1-6 INJECTION, SOLUTION INTRAVENOUS; SUBCUTANEOUS EVERY 6 HOURS SCHEDULED
Status: DISCONTINUED | OUTPATIENT
Start: 2024-01-01 | End: 2024-01-01

## 2024-01-01 RX ORDER — SODIUM CHLORIDE, SODIUM GLUCONATE, SODIUM ACETATE, POTASSIUM CHLORIDE, MAGNESIUM CHLORIDE, SODIUM PHOSPHATE, DIBASIC, AND POTASSIUM PHOSPHATE .53; .5; .37; .037; .03; .012; .00082 G/100ML; G/100ML; G/100ML; G/100ML; G/100ML; G/100ML; G/100ML
100 INJECTION, SOLUTION INTRAVENOUS CONTINUOUS
Status: DISCONTINUED | OUTPATIENT
Start: 2024-01-01 | End: 2024-01-01

## 2024-01-01 RX ORDER — MAGNESIUM SULFATE HEPTAHYDRATE 40 MG/ML
2 INJECTION, SOLUTION INTRAVENOUS ONCE
Qty: 50 ML | Refills: 0 | Status: COMPLETED | OUTPATIENT
Start: 2024-01-01 | End: 2024-01-01

## 2024-01-01 RX ORDER — CARVEDILOL 6.25 MG/1
6.25 TABLET ORAL 2 TIMES DAILY WITH MEALS
Status: DISCONTINUED | OUTPATIENT
Start: 2024-01-01 | End: 2024-01-01

## 2024-01-01 RX ORDER — BISACODYL 10 MG
10 SUPPOSITORY, RECTAL RECTAL DAILY PRN
Status: DISCONTINUED | OUTPATIENT
Start: 2024-01-01 | End: 2024-01-01 | Stop reason: HOSPADM

## 2024-01-01 RX ORDER — DEXTROSE MONOHYDRATE 25 G/50ML
INJECTION, SOLUTION INTRAVENOUS
Status: COMPLETED
Start: 2024-01-01 | End: 2024-01-01

## 2024-01-01 RX ORDER — LABETALOL HYDROCHLORIDE 5 MG/ML
20 INJECTION, SOLUTION INTRAVENOUS EVERY 6 HOURS PRN
Status: DISCONTINUED | OUTPATIENT
Start: 2024-01-01 | End: 2024-01-01

## 2024-01-01 RX ORDER — INSULIN LISPRO 100 [IU]/ML
1-6 INJECTION, SOLUTION INTRAVENOUS; SUBCUTANEOUS
Status: DISCONTINUED | OUTPATIENT
Start: 2024-01-01 | End: 2024-01-01

## 2024-01-01 RX ORDER — FUROSEMIDE 10 MG/ML
40 INJECTION INTRAMUSCULAR; INTRAVENOUS ONCE
Status: DISCONTINUED | OUTPATIENT
Start: 2024-01-01 | End: 2024-01-01

## 2024-01-01 RX ORDER — DILTIAZEM HYDROCHLORIDE 60 MG/1
120 TABLET, FILM COATED ORAL DAILY
Status: DISCONTINUED | OUTPATIENT
Start: 2024-01-01 | End: 2024-01-01

## 2024-01-01 RX ORDER — LEVALBUTEROL INHALATION SOLUTION 1.25 MG/3ML
1.25 SOLUTION RESPIRATORY (INHALATION) EVERY 8 HOURS PRN
Status: DISCONTINUED | OUTPATIENT
Start: 2024-01-01 | End: 2024-01-01

## 2024-01-01 RX ORDER — LIDOCAINE 50 MG/G
1 PATCH TOPICAL DAILY
Status: DISCONTINUED | OUTPATIENT
Start: 2024-01-01 | End: 2024-01-01

## 2024-01-01 RX ORDER — CHLORHEXIDINE GLUCONATE ORAL RINSE 1.2 MG/ML
15 SOLUTION DENTAL EVERY 12 HOURS SCHEDULED
Status: DISCONTINUED | OUTPATIENT
Start: 2024-01-01 | End: 2024-01-01

## 2024-01-01 RX ORDER — POTASSIUM CHLORIDE 14.9 MG/ML
20 INJECTION INTRAVENOUS
Status: COMPLETED | OUTPATIENT
Start: 2024-01-01 | End: 2024-01-01

## 2024-01-01 RX ORDER — DILTIAZEM HYDROCHLORIDE 60 MG/1
60 TABLET, FILM COATED ORAL EVERY 6 HOURS SCHEDULED
Status: DISCONTINUED | OUTPATIENT
Start: 2024-01-01 | End: 2024-01-01

## 2024-01-01 RX ORDER — FUROSEMIDE 10 MG/ML
40 INJECTION INTRAMUSCULAR; INTRAVENOUS ONCE
Status: COMPLETED | OUTPATIENT
Start: 2024-01-01 | End: 2024-01-01

## 2024-01-01 RX ORDER — VANCOMYCIN HYDROCHLORIDE 1 G/200ML
15 INJECTION, SOLUTION INTRAVENOUS EVERY 12 HOURS
Status: DISCONTINUED | OUTPATIENT
Start: 2024-01-01 | End: 2024-01-01 | Stop reason: ALTCHOICE

## 2024-01-01 RX ORDER — PROPOFOL 10 MG/ML
INJECTION, EMULSION INTRAVENOUS
Status: COMPLETED
Start: 2024-01-01 | End: 2024-01-01

## 2024-01-01 RX ORDER — DILTIAZEM HYDROCHLORIDE 60 MG/1
30 TABLET, FILM COATED ORAL EVERY 6 HOURS SCHEDULED
Status: DISCONTINUED | OUTPATIENT
Start: 2024-01-01 | End: 2024-01-01

## 2024-01-01 RX ORDER — CALCIUM GLUCONATE 20 MG/ML
1 INJECTION, SOLUTION INTRAVENOUS ONCE
Status: COMPLETED | OUTPATIENT
Start: 2024-01-01 | End: 2024-01-01

## 2024-01-01 RX ORDER — CALCIUM GLUCONATE 20 MG/ML
2 INJECTION, SOLUTION INTRAVENOUS ONCE
Status: COMPLETED | OUTPATIENT
Start: 2024-01-01 | End: 2024-01-01

## 2024-01-01 RX ORDER — ACETAMINOPHEN 650 MG/20.3ML
650 SUSPENSION ORAL EVERY 4 HOURS PRN
Status: DISCONTINUED | OUTPATIENT
Start: 2024-01-01 | End: 2024-01-01 | Stop reason: HOSPADM

## 2024-01-01 RX ORDER — PANTOPRAZOLE SODIUM 40 MG/10ML
40 INJECTION, POWDER, LYOPHILIZED, FOR SOLUTION INTRAVENOUS EVERY 12 HOURS SCHEDULED
Status: DISCONTINUED | OUTPATIENT
Start: 2024-01-01 | End: 2024-01-01

## 2024-01-01 RX ORDER — DIPHENHYDRAMINE HCL 25 MG
25 TABLET ORAL ONCE
Status: DISCONTINUED | OUTPATIENT
Start: 2024-01-01 | End: 2024-01-01

## 2024-01-01 RX ORDER — METOLAZONE 5 MG/1
5 TABLET ORAL ONCE
Status: COMPLETED | OUTPATIENT
Start: 2024-01-01 | End: 2024-01-01

## 2024-01-01 RX ORDER — LORAZEPAM 2 MG/ML
0.5 INJECTION INTRAMUSCULAR
Status: DISCONTINUED | OUTPATIENT
Start: 2024-01-01 | End: 2024-01-01

## 2024-01-01 RX ORDER — ALBUMIN (HUMAN) 12.5 G/50ML
25 SOLUTION INTRAVENOUS 4 TIMES DAILY
Status: DISCONTINUED | OUTPATIENT
Start: 2024-01-01 | End: 2024-01-01

## 2024-01-01 RX ORDER — DEXMEDETOMIDINE HYDROCHLORIDE 4 UG/ML
.1-.7 INJECTION, SOLUTION INTRAVENOUS
Status: DISCONTINUED | OUTPATIENT
Start: 2024-01-01 | End: 2024-01-01

## 2024-01-01 RX ORDER — MIRTAZAPINE 7.5 MG/1
7.5 TABLET, FILM COATED ORAL
Status: DISCONTINUED | OUTPATIENT
Start: 2024-01-01 | End: 2024-01-01

## 2024-01-01 RX ORDER — INSULIN LISPRO 100 [IU]/ML
1-5 INJECTION, SOLUTION INTRAVENOUS; SUBCUTANEOUS EVERY 6 HOURS SCHEDULED
Status: DISCONTINUED | OUTPATIENT
Start: 2024-01-01 | End: 2024-01-01

## 2024-01-01 RX ORDER — ALBUMIN, HUMAN INJ 5% 5 %
25 SOLUTION INTRAVENOUS ONCE
Status: COMPLETED | OUTPATIENT
Start: 2024-01-01 | End: 2024-01-01

## 2024-01-01 RX ORDER — IPRATROPIUM BROMIDE AND ALBUTEROL SULFATE 2.5; .5 MG/3ML; MG/3ML
3 SOLUTION RESPIRATORY (INHALATION)
Status: DISCONTINUED | OUTPATIENT
Start: 2024-01-01 | End: 2024-01-01

## 2024-01-01 RX ORDER — CEFEPIME HYDROCHLORIDE 2 G/50ML
2000 INJECTION, SOLUTION INTRAVENOUS EVERY 12 HOURS
Status: DISCONTINUED | OUTPATIENT
Start: 2024-01-01 | End: 2024-01-01

## 2024-01-01 RX ORDER — METRONIDAZOLE 500 MG/100ML
500 INJECTION, SOLUTION INTRAVENOUS EVERY 8 HOURS
Status: DISCONTINUED | OUTPATIENT
Start: 2024-01-01 | End: 2024-01-01

## 2024-01-01 RX ORDER — ATORVASTATIN CALCIUM 40 MG/1
40 TABLET, FILM COATED ORAL DAILY
Status: DISCONTINUED | OUTPATIENT
Start: 2024-01-01 | End: 2024-01-01

## 2024-01-01 RX ORDER — SODIUM CHLORIDE, SODIUM GLUCONATE, SODIUM ACETATE, POTASSIUM CHLORIDE, MAGNESIUM CHLORIDE, SODIUM PHOSPHATE, DIBASIC, AND POTASSIUM PHOSPHATE .53; .5; .37; .037; .03; .012; .00082 G/100ML; G/100ML; G/100ML; G/100ML; G/100ML; G/100ML; G/100ML
500 INJECTION, SOLUTION INTRAVENOUS ONCE
Status: DISCONTINUED | OUTPATIENT
Start: 2024-01-01 | End: 2024-01-01

## 2024-01-01 RX ORDER — ATOVAQUONE 750 MG/5ML
1500 SUSPENSION ORAL
Status: DISCONTINUED | OUTPATIENT
Start: 2024-01-01 | End: 2024-01-01

## 2024-01-01 RX ORDER — ALBUTEROL SULFATE 2.5 MG/3ML
2.5 SOLUTION RESPIRATORY (INHALATION) ONCE AS NEEDED
Status: DISCONTINUED | OUTPATIENT
Start: 2024-01-01 | End: 2024-01-01

## 2024-01-01 RX ORDER — FENTANYL CITRATE 50 UG/ML
100 INJECTION, SOLUTION INTRAMUSCULAR; INTRAVENOUS ONCE
Status: COMPLETED | OUTPATIENT
Start: 2024-01-01 | End: 2024-01-01

## 2024-01-01 RX ORDER — QUETIAPINE FUMARATE 25 MG/1
25 TABLET, FILM COATED ORAL
Status: DISCONTINUED | OUTPATIENT
Start: 2024-01-01 | End: 2024-01-01

## 2024-01-01 RX ORDER — PROPOFOL 10 MG/ML
50 INJECTION, EMULSION INTRAVENOUS CONTINUOUS
Status: DISCONTINUED | OUTPATIENT
Start: 2024-01-01 | End: 2024-01-01

## 2024-01-01 RX ADMIN — ATORVASTATIN CALCIUM 40 MG: 40 TABLET, FILM COATED ORAL at 10:36

## 2024-01-01 RX ADMIN — FENTANYL CITRATE 25 MCG: 50 INJECTION INTRAMUSCULAR; INTRAVENOUS at 02:59

## 2024-01-01 RX ADMIN — INSULIN LISPRO 1 UNITS: 100 INJECTION, SOLUTION INTRAVENOUS; SUBCUTANEOUS at 23:45

## 2024-01-01 RX ADMIN — SODIUM CHLORIDE 3 G: 9 INJECTION, SOLUTION INTRAVENOUS at 18:22

## 2024-01-01 RX ADMIN — SODIUM CHLORIDE 1000 MG: 0.9 INJECTION, SOLUTION INTRAVENOUS at 18:30

## 2024-01-01 RX ADMIN — INSULIN LISPRO 1 UNITS: 100 INJECTION, SOLUTION INTRAVENOUS; SUBCUTANEOUS at 18:10

## 2024-01-01 RX ADMIN — SODIUM CHLORIDE 3 G: 9 INJECTION, SOLUTION INTRAVENOUS at 04:30

## 2024-01-01 RX ADMIN — DILTIAZEM HYDROCHLORIDE 30 MG: 60 TABLET ORAL at 06:06

## 2024-01-01 RX ADMIN — PANTOPRAZOLE SODIUM 40 MG: 40 TABLET, DELAYED RELEASE ORAL at 06:36

## 2024-01-01 RX ADMIN — Medication 20 MG: at 13:51

## 2024-01-01 RX ADMIN — PANTOPRAZOLE SODIUM 40 MG: 40 INJECTION, POWDER, FOR SOLUTION INTRAVENOUS at 08:10

## 2024-01-01 RX ADMIN — POTASSIUM CHLORIDE 40 MEQ: 29.8 INJECTION, SOLUTION INTRAVENOUS at 18:13

## 2024-01-01 RX ADMIN — POLYETHYLENE GLYCOL 3350, SODIUM SULFATE ANHYDROUS, SODIUM BICARBONATE, SODIUM CHLORIDE, POTASSIUM CHLORIDE 4000 ML: 236; 22.74; 6.74; 5.86; 2.97 POWDER, FOR SOLUTION ORAL at 11:16

## 2024-01-01 RX ADMIN — POLYETHYLENE GLYCOL 3350, SODIUM SULFATE ANHYDROUS, SODIUM BICARBONATE, SODIUM CHLORIDE, POTASSIUM CHLORIDE 2000 ML: 236; 22.74; 6.74; 5.86; 2.97 POWDER, FOR SOLUTION ORAL at 08:39

## 2024-01-01 RX ADMIN — INSULIN LISPRO 3 UNITS: 100 INJECTION, SOLUTION INTRAVENOUS; SUBCUTANEOUS at 11:18

## 2024-01-01 RX ADMIN — INSULIN LISPRO 1 UNITS: 100 INJECTION, SOLUTION INTRAVENOUS; SUBCUTANEOUS at 21:38

## 2024-01-01 RX ADMIN — GANCICLOVIR SODIUM 170 MG: 50 INJECTION, POWDER, LYOPHILIZED, FOR SOLUTION INTRAVENTRICULAR at 18:09

## 2024-01-01 RX ADMIN — FOLIC ACID: 5 INJECTION, SOLUTION INTRAMUSCULAR; INTRAVENOUS; SUBCUTANEOUS at 09:41

## 2024-01-01 RX ADMIN — IPRATROPIUM BROMIDE AND ALBUTEROL SULFATE 3 ML: 2.5; .5 SOLUTION RESPIRATORY (INHALATION) at 07:09

## 2024-01-01 RX ADMIN — PROPOFOL 30 MCG/KG/MIN: 10 INJECTION, EMULSION INTRAVENOUS at 17:21

## 2024-01-01 RX ADMIN — VALACYCLOVIR 1000 MG: 500 TABLET, FILM COATED ORAL at 14:02

## 2024-01-01 RX ADMIN — INSULIN LISPRO 1 UNITS: 100 INJECTION, SOLUTION INTRAVENOUS; SUBCUTANEOUS at 08:06

## 2024-01-01 RX ADMIN — PROPOFOL 5 MCG/KG/MIN: 10 INJECTION, EMULSION INTRAVENOUS at 17:06

## 2024-01-01 RX ADMIN — SODIUM CHLORIDE 3 G: 9 INJECTION, SOLUTION INTRAVENOUS at 05:04

## 2024-01-01 RX ADMIN — Medication 20 MG: at 08:43

## 2024-01-01 RX ADMIN — FAMOTIDINE 20 MG: 20 TABLET ORAL at 08:42

## 2024-01-01 RX ADMIN — SODIUM CHLORIDE, SODIUM GLUCONATE, SODIUM ACETATE, POTASSIUM CHLORIDE, MAGNESIUM CHLORIDE, SODIUM PHOSPHATE, DIBASIC, AND POTASSIUM PHOSPHATE 125 ML/HR: .53; .5; .37; .037; .03; .012; .00082 INJECTION, SOLUTION INTRAVENOUS at 20:26

## 2024-01-01 RX ADMIN — ESCITALOPRAM OXALATE 10 MG: 10 TABLET ORAL at 08:00

## 2024-01-01 RX ADMIN — OLANZAPINE 5 MG: 5 TABLET, ORALLY DISINTEGRATING ORAL at 21:38

## 2024-01-01 RX ADMIN — INSULIN LISPRO 2 UNITS: 100 INJECTION, SOLUTION INTRAVENOUS; SUBCUTANEOUS at 11:29

## 2024-01-01 RX ADMIN — ESCITALOPRAM OXALATE 10 MG: 10 TABLET ORAL at 10:36

## 2024-01-01 RX ADMIN — IPRATROPIUM BROMIDE AND ALBUTEROL SULFATE 3 ML: 2.5; .5 SOLUTION RESPIRATORY (INHALATION) at 19:10

## 2024-01-01 RX ADMIN — SODIUM CHLORIDE 3 G: 9 INJECTION, SOLUTION INTRAVENOUS at 23:19

## 2024-01-01 RX ADMIN — CALCIUM GLUCONATE 2 G: 20 INJECTION, SOLUTION INTRAVENOUS at 06:04

## 2024-01-01 RX ADMIN — DILTIAZEM HYDROCHLORIDE 30 MG: 60 TABLET ORAL at 13:35

## 2024-01-01 RX ADMIN — INSULIN LISPRO 1 UNITS: 100 INJECTION, SOLUTION INTRAVENOUS; SUBCUTANEOUS at 18:02

## 2024-01-01 RX ADMIN — COLLAGENASE SANTYL: 250 OINTMENT TOPICAL at 08:18

## 2024-01-01 RX ADMIN — SODIUM CHLORIDE 3 G: 9 INJECTION, SOLUTION INTRAVENOUS at 20:31

## 2024-01-01 RX ADMIN — PREDNISONE 50 MG: 20 TABLET ORAL at 08:01

## 2024-01-01 RX ADMIN — PHYTONADIONE 10 MG: 10 INJECTION, EMULSION INTRAMUSCULAR; INTRAVENOUS; SUBCUTANEOUS at 20:58

## 2024-01-01 RX ADMIN — INSULIN LISPRO 1 UNITS: 100 INJECTION, SOLUTION INTRAVENOUS; SUBCUTANEOUS at 16:44

## 2024-01-01 RX ADMIN — INSULIN LISPRO 2 UNITS: 100 INJECTION, SOLUTION INTRAVENOUS; SUBCUTANEOUS at 01:42

## 2024-01-01 RX ADMIN — SODIUM CHLORIDE 3 G: 9 INJECTION, SOLUTION INTRAVENOUS at 15:59

## 2024-01-01 RX ADMIN — INSULIN LISPRO 1 UNITS: 100 INJECTION, SOLUTION INTRAVENOUS; SUBCUTANEOUS at 12:03

## 2024-01-01 RX ADMIN — FENTANYL CITRATE 100 MCG: 50 INJECTION, SOLUTION INTRAMUSCULAR; INTRAVENOUS at 16:34

## 2024-01-01 RX ADMIN — INSULIN LISPRO 2 UNITS: 100 INJECTION, SOLUTION INTRAVENOUS; SUBCUTANEOUS at 21:25

## 2024-01-01 RX ADMIN — PREDNISONE 40 MG: 20 TABLET ORAL at 08:04

## 2024-01-01 RX ADMIN — DILTIAZEM HYDROCHLORIDE 30 MG: 60 TABLET ORAL at 13:05

## 2024-01-01 RX ADMIN — SODIUM CHLORIDE, SODIUM GLUCONATE, SODIUM ACETATE, POTASSIUM CHLORIDE, MAGNESIUM CHLORIDE, SODIUM PHOSPHATE, DIBASIC, AND POTASSIUM PHOSPHATE 125 ML/HR: .53; .5; .37; .037; .03; .012; .00082 INJECTION, SOLUTION INTRAVENOUS at 11:19

## 2024-01-01 RX ADMIN — POTASSIUM CHLORIDE 40 MEQ: 400 INJECTION, SOLUTION INTRAVENOUS at 13:02

## 2024-01-01 RX ADMIN — ACETAMINOPHEN 325MG 650 MG: 325 TABLET ORAL at 08:41

## 2024-01-01 RX ADMIN — SODIUM CHLORIDE 3 G: 9 INJECTION, SOLUTION INTRAVENOUS at 17:47

## 2024-01-01 RX ADMIN — SODIUM CHLORIDE 2 UNITS/HR: 9 INJECTION, SOLUTION INTRAVENOUS at 10:56

## 2024-01-01 RX ADMIN — POTASSIUM CHLORIDE 20 MEQ: 14.9 INJECTION, SOLUTION INTRAVENOUS at 10:00

## 2024-01-01 RX ADMIN — SODIUM CHLORIDE 80 MG: 9 INJECTION, SOLUTION INTRAVENOUS at 20:37

## 2024-01-01 RX ADMIN — HEPARIN SODIUM 18 UNITS/KG/HR: 10000 INJECTION, SOLUTION INTRAVENOUS at 11:38

## 2024-01-01 RX ADMIN — MAGNESIUM SULFATE HEPTAHYDRATE 2 G: 40 INJECTION, SOLUTION INTRAVENOUS at 15:41

## 2024-01-01 RX ADMIN — SODIUM CHLORIDE 3 G: 9 INJECTION, SOLUTION INTRAVENOUS at 23:00

## 2024-01-01 RX ADMIN — DILTIAZEM HYDROCHLORIDE 30 MG: 60 TABLET ORAL at 11:17

## 2024-01-01 RX ADMIN — PREDNISONE 40 MG: 20 TABLET ORAL at 08:18

## 2024-01-01 RX ADMIN — POTASSIUM CHLORIDE 40 MEQ: 29.8 INJECTION, SOLUTION INTRAVENOUS at 05:11

## 2024-01-01 RX ADMIN — SODIUM CHLORIDE, SODIUM GLUCONATE, SODIUM ACETATE, POTASSIUM CHLORIDE, MAGNESIUM CHLORIDE, SODIUM PHOSPHATE, DIBASIC, AND POTASSIUM PHOSPHATE 125 ML/HR: .53; .5; .37; .037; .03; .012; .00082 INJECTION, SOLUTION INTRAVENOUS at 03:34

## 2024-01-01 RX ADMIN — INSULIN LISPRO 2 UNITS: 100 INJECTION, SOLUTION INTRAVENOUS; SUBCUTANEOUS at 11:49

## 2024-01-01 RX ADMIN — SODIUM CHLORIDE 3 G: 9 INJECTION, SOLUTION INTRAVENOUS at 23:30

## 2024-01-01 RX ADMIN — METOPROLOL TARTRATE 5 MG: 5 INJECTION INTRAVENOUS at 15:49

## 2024-01-01 RX ADMIN — DEXTROSE 100 ML/HR: 5 SOLUTION INTRAVENOUS at 15:45

## 2024-01-01 RX ADMIN — SODIUM CHLORIDE 3 G: 9 INJECTION, SOLUTION INTRAVENOUS at 10:57

## 2024-01-01 RX ADMIN — FENTANYL CITRATE 100 MCG: 50 INJECTION INTRAMUSCULAR; INTRAVENOUS at 16:34

## 2024-01-01 RX ADMIN — INSULIN LISPRO 2 UNITS: 100 INJECTION, SOLUTION INTRAVENOUS; SUBCUTANEOUS at 17:47

## 2024-01-01 RX ADMIN — CARVEDILOL 6.25 MG: 6.25 TABLET, FILM COATED ORAL at 08:15

## 2024-01-01 RX ADMIN — DEXTROSE AND POTASSIUM CHLORIDE 50 ML/HR: 5; .15 SOLUTION INTRAVENOUS at 11:06

## 2024-01-01 RX ADMIN — SODIUM CHLORIDE 1 UNITS/HR: 9 INJECTION, SOLUTION INTRAVENOUS at 16:34

## 2024-01-01 RX ADMIN — PANTOPRAZOLE SODIUM 40 MG: 40 INJECTION, POWDER, FOR SOLUTION INTRAVENOUS at 09:41

## 2024-01-01 RX ADMIN — FAMOTIDINE 20 MG: 20 TABLET ORAL at 08:04

## 2024-01-01 RX ADMIN — INSULIN LISPRO 2 UNITS: 100 INJECTION, SOLUTION INTRAVENOUS; SUBCUTANEOUS at 17:34

## 2024-01-01 RX ADMIN — SODIUM CHLORIDE 3 G: 9 INJECTION, SOLUTION INTRAVENOUS at 13:30

## 2024-01-01 RX ADMIN — DILTIAZEM HYDROCHLORIDE 30 MG: 60 TABLET ORAL at 12:28

## 2024-01-01 RX ADMIN — ESCITALOPRAM OXALATE 10 MG: 10 TABLET ORAL at 08:14

## 2024-01-01 RX ADMIN — DILTIAZEM HYDROCHLORIDE 30 MG: 60 TABLET, FILM COATED ORAL at 13:33

## 2024-01-01 RX ADMIN — Medication 10 MG/HR: at 06:55

## 2024-01-01 RX ADMIN — SODIUM CHLORIDE 3 G: 9 INJECTION, SOLUTION INTRAVENOUS at 11:37

## 2024-01-01 RX ADMIN — POTASSIUM CHLORIDE 20 MEQ: 1.5 SOLUTION ORAL at 07:33

## 2024-01-01 RX ADMIN — VALACYCLOVIR 1000 MG: 500 TABLET, FILM COATED ORAL at 05:48

## 2024-01-01 RX ADMIN — INSULIN LISPRO 8 UNITS: 100 INJECTION, SOLUTION INTRAVENOUS; SUBCUTANEOUS at 17:41

## 2024-01-01 RX ADMIN — OLANZAPINE 5 MG: 5 TABLET, ORALLY DISINTEGRATING ORAL at 22:27

## 2024-01-01 RX ADMIN — SODIUM CHLORIDE 3 G: 9 INJECTION, SOLUTION INTRAVENOUS at 16:00

## 2024-01-01 RX ADMIN — ACETAMINOPHEN 1000 MG: 10 INJECTION INTRAVENOUS at 01:41

## 2024-01-01 RX ADMIN — POTASSIUM CHLORIDE 40 MEQ: 29.8 INJECTION, SOLUTION INTRAVENOUS at 08:28

## 2024-01-01 RX ADMIN — ATORVASTATIN CALCIUM 40 MG: 40 TABLET, FILM COATED ORAL at 08:00

## 2024-01-01 RX ADMIN — INSULIN LISPRO 2 UNITS: 100 INJECTION, SOLUTION INTRAVENOUS; SUBCUTANEOUS at 12:30

## 2024-01-01 RX ADMIN — METHYLPREDNISOLONE SODIUM SUCCINATE 50 MG: 125 INJECTION, POWDER, FOR SOLUTION INTRAMUSCULAR; INTRAVENOUS at 08:36

## 2024-01-01 RX ADMIN — PANTOPRAZOLE SODIUM 40 MG: 40 INJECTION, POWDER, FOR SOLUTION INTRAVENOUS at 20:13

## 2024-01-01 RX ADMIN — PREDNISONE 40 MG: 20 TABLET ORAL at 08:42

## 2024-01-01 RX ADMIN — Medication 20 MG: at 09:54

## 2024-01-01 RX ADMIN — DEXTROSE 100 ML/HR: 5 SOLUTION INTRAVENOUS at 16:34

## 2024-01-01 RX ADMIN — COLLAGENASE SANTYL: 250 OINTMENT TOPICAL at 08:02

## 2024-01-01 RX ADMIN — ALBUMIN (HUMAN) 25 G: 0.25 INJECTION, SOLUTION INTRAVENOUS at 22:03

## 2024-01-01 RX ADMIN — DIPHENHYDRAMINE HYDROCHLORIDE 25 MG: 50 INJECTION, SOLUTION INTRAMUSCULAR; INTRAVENOUS at 15:36

## 2024-01-01 RX ADMIN — SODIUM CHLORIDE 3 G: 9 INJECTION, SOLUTION INTRAVENOUS at 12:12

## 2024-01-01 RX ADMIN — POTASSIUM CHLORIDE 20 MEQ: 14.9 INJECTION, SOLUTION INTRAVENOUS at 11:23

## 2024-01-01 RX ADMIN — SODIUM CHLORIDE 3 G: 9 INJECTION, SOLUTION INTRAVENOUS at 05:27

## 2024-01-01 RX ADMIN — FENTANYL CITRATE 50 MCG: 50 INJECTION INTRAMUSCULAR; INTRAVENOUS at 02:37

## 2024-01-01 RX ADMIN — METRONIDAZOLE 500 MG: 500 INJECTION, SOLUTION INTRAVENOUS at 01:46

## 2024-01-01 RX ADMIN — DILTIAZEM HYDROCHLORIDE 30 MG: 60 TABLET ORAL at 23:46

## 2024-01-01 RX ADMIN — FUROSEMIDE 80 MG: 10 INJECTION, SOLUTION INTRAMUSCULAR; INTRAVENOUS at 07:33

## 2024-01-01 RX ADMIN — COLLAGENASE SANTYL 1 APPLICATION: 250 OINTMENT TOPICAL at 10:14

## 2024-01-01 RX ADMIN — ACETAMINOPHEN 1000 MG: 10 INJECTION INTRAVENOUS at 15:36

## 2024-01-01 RX ADMIN — COLLAGENASE SANTYL: 250 OINTMENT TOPICAL at 08:55

## 2024-01-01 RX ADMIN — OLANZAPINE 5 MG: 5 TABLET, ORALLY DISINTEGRATING ORAL at 23:09

## 2024-01-01 RX ADMIN — DILTIAZEM HYDROCHLORIDE 15 MG/HR: 5 INJECTION INTRAVENOUS at 11:07

## 2024-01-01 RX ADMIN — PANTOPRAZOLE SODIUM 40 MG: 40 TABLET, DELAYED RELEASE ORAL at 08:36

## 2024-01-01 RX ADMIN — DEXMEDETOMIDINE HYDROCHLORIDE 0.2 MCG/KG/HR: 400 INJECTION INTRAVENOUS at 03:30

## 2024-01-01 RX ADMIN — INSULIN LISPRO 1 UNITS: 100 INJECTION, SOLUTION INTRAVENOUS; SUBCUTANEOUS at 05:26

## 2024-01-01 RX ADMIN — CARVEDILOL 6.25 MG: 6.25 TABLET, FILM COATED ORAL at 16:34

## 2024-01-01 RX ADMIN — INSULIN LISPRO 3 UNITS: 100 INJECTION, SOLUTION INTRAVENOUS; SUBCUTANEOUS at 07:10

## 2024-01-01 RX ADMIN — SODIUM CHLORIDE 3 G: 9 INJECTION, SOLUTION INTRAVENOUS at 06:18

## 2024-01-01 RX ADMIN — Medication 10 MG/HR: at 10:51

## 2024-01-01 RX ADMIN — INSULIN LISPRO 1 UNITS: 100 INJECTION, SOLUTION INTRAVENOUS; SUBCUTANEOUS at 07:55

## 2024-01-01 RX ADMIN — SODIUM CHLORIDE 3 G: 9 INJECTION, SOLUTION INTRAVENOUS at 09:08

## 2024-01-01 RX ADMIN — ESCITALOPRAM OXALATE 10 MG: 10 TABLET ORAL at 08:04

## 2024-01-01 RX ADMIN — IOHEXOL 100 ML: 350 INJECTION, SOLUTION INTRAVENOUS at 19:12

## 2024-01-01 RX ADMIN — DILTIAZEM HYDROCHLORIDE 30 MG: 60 TABLET ORAL at 11:28

## 2024-01-01 RX ADMIN — POTASSIUM CHLORIDE 40 MEQ: 1.5 SOLUTION ORAL at 07:20

## 2024-01-01 RX ADMIN — DILTIAZEM HYDROCHLORIDE 12.5 MG/HR: 5 INJECTION INTRAVENOUS at 19:16

## 2024-01-01 RX ADMIN — Medication 20 MG: at 10:36

## 2024-01-01 RX ADMIN — ALBUMIN (HUMAN) 25 G: 0.25 INJECTION, SOLUTION INTRAVENOUS at 17:47

## 2024-01-01 RX ADMIN — DEXTROSE 100 ML/HR: 5 SOLUTION INTRAVENOUS at 02:33

## 2024-01-01 RX ADMIN — CARVEDILOL 6.25 MG: 6.25 TABLET, FILM COATED ORAL at 08:00

## 2024-01-01 RX ADMIN — FAMOTIDINE 20 MG: 20 TABLET ORAL at 10:36

## 2024-01-01 RX ADMIN — ACETAMINOPHEN 325MG 650 MG: 325 TABLET ORAL at 17:26

## 2024-01-01 RX ADMIN — SODIUM CHLORIDE 0.5 UNITS/HR: 9 INJECTION, SOLUTION INTRAVENOUS at 02:52

## 2024-01-01 RX ADMIN — FAMOTIDINE 20 MG: 20 TABLET ORAL at 09:25

## 2024-01-01 RX ADMIN — POLYETHYLENE GLYCOL 3350, SODIUM SULFATE ANHYDROUS, SODIUM BICARBONATE, SODIUM CHLORIDE, POTASSIUM CHLORIDE 2000 ML: 236; 22.74; 6.74; 5.86; 2.97 POWDER, FOR SOLUTION ORAL at 06:16

## 2024-01-01 RX ADMIN — Medication 20 MG: at 08:00

## 2024-01-01 RX ADMIN — GANCICLOVIR SODIUM 170 MG: 50 INJECTION, POWDER, LYOPHILIZED, FOR SOLUTION INTRAVENTRICULAR at 13:14

## 2024-01-01 RX ADMIN — SODIUM CHLORIDE 3 G: 9 INJECTION, SOLUTION INTRAVENOUS at 04:45

## 2024-01-01 RX ADMIN — SODIUM CHLORIDE 3 G: 9 INJECTION, SOLUTION INTRAVENOUS at 18:23

## 2024-01-01 RX ADMIN — Medication 20 MG: at 08:15

## 2024-01-01 RX ADMIN — INSULIN LISPRO 2 UNITS: 100 INJECTION, SOLUTION INTRAVENOUS; SUBCUTANEOUS at 05:00

## 2024-01-01 RX ADMIN — POTASSIUM CHLORIDE 40 MEQ: 29.8 INJECTION, SOLUTION INTRAVENOUS at 17:04

## 2024-01-01 RX ADMIN — ESCITALOPRAM OXALATE 10 MG: 10 TABLET ORAL at 08:43

## 2024-01-01 RX ADMIN — VALACYCLOVIR 1000 MG: 500 TABLET, FILM COATED ORAL at 22:11

## 2024-01-01 RX ADMIN — NOREPINEPHRINE BITARTRATE 3 MCG/MIN: 1 INJECTION, SOLUTION, CONCENTRATE INTRAVENOUS at 17:22

## 2024-01-01 RX ADMIN — GANCICLOVIR SODIUM 170 MG: 50 INJECTION, POWDER, LYOPHILIZED, FOR SOLUTION INTRAVENTRICULAR at 17:04

## 2024-01-01 RX ADMIN — DEXTROSE MONOHYDRATE 25 ML: 25 INJECTION, SOLUTION INTRAVENOUS at 19:50

## 2024-01-01 RX ADMIN — LEVALBUTEROL HYDROCHLORIDE 1.25 MG: 1.25 SOLUTION RESPIRATORY (INHALATION) at 14:15

## 2024-01-01 RX ADMIN — SODIUM CHLORIDE 1000 MG: 0.9 INJECTION, SOLUTION INTRAVENOUS at 10:57

## 2024-01-01 RX ADMIN — SODIUM CHLORIDE 100 ML/HR: 0.9 INJECTION, SOLUTION INTRAVENOUS at 17:04

## 2024-01-01 RX ADMIN — DILTIAZEM HYDROCHLORIDE 30 MG: 60 TABLET ORAL at 05:00

## 2024-01-01 RX ADMIN — PREDNISONE 40 MG: 20 TABLET ORAL at 08:00

## 2024-01-01 RX ADMIN — NOREPINEPHRINE BITARTRATE 15 MCG/MIN: 1 INJECTION, SOLUTION, CONCENTRATE INTRAVENOUS at 09:48

## 2024-01-01 RX ADMIN — INSULIN LISPRO 2 UNITS: 100 INJECTION, SOLUTION INTRAVENOUS; SUBCUTANEOUS at 23:19

## 2024-01-01 RX ADMIN — COLLAGENASE SANTYL: 250 OINTMENT TOPICAL at 09:28

## 2024-01-01 RX ADMIN — POTASSIUM CHLORIDE 20 MEQ: 14.9 INJECTION, SOLUTION INTRAVENOUS at 06:44

## 2024-01-01 RX ADMIN — FUROSEMIDE 40 MG: 10 INJECTION, SOLUTION INTRAMUSCULAR; INTRAVENOUS at 15:10

## 2024-01-01 RX ADMIN — SODIUM CHLORIDE 3 G: 9 INJECTION, SOLUTION INTRAVENOUS at 09:55

## 2024-01-01 RX ADMIN — DILTIAZEM HYDROCHLORIDE 30 MG: 60 TABLET ORAL at 17:58

## 2024-01-01 RX ADMIN — INSULIN LISPRO 3 UNITS: 100 INJECTION, SOLUTION INTRAVENOUS; SUBCUTANEOUS at 15:57

## 2024-01-01 RX ADMIN — POTASSIUM CHLORIDE 40 MEQ: 29.8 INJECTION, SOLUTION INTRAVENOUS at 05:39

## 2024-01-01 RX ADMIN — Medication 50 MCG/HR: at 19:35

## 2024-01-01 RX ADMIN — CARVEDILOL 6.25 MG: 6.25 TABLET, FILM COATED ORAL at 08:11

## 2024-01-01 RX ADMIN — POTASSIUM CHLORIDE 40 MEQ: 1.5 SOLUTION ORAL at 05:12

## 2024-01-01 RX ADMIN — SODIUM CHLORIDE 3 G: 9 INJECTION, SOLUTION INTRAVENOUS at 10:05

## 2024-01-01 RX ADMIN — SODIUM CHLORIDE 3 G: 9 INJECTION, SOLUTION INTRAVENOUS at 05:46

## 2024-01-01 RX ADMIN — CEFEPIME HYDROCHLORIDE 2000 MG: 2 INJECTION, SOLUTION INTRAVENOUS at 05:06

## 2024-01-01 RX ADMIN — ATORVASTATIN CALCIUM 40 MG: 40 TABLET, FILM COATED ORAL at 09:38

## 2024-01-01 RX ADMIN — INSULIN LISPRO 2 UNITS: 100 INJECTION, SOLUTION INTRAVENOUS; SUBCUTANEOUS at 05:52

## 2024-01-01 RX ADMIN — CHLORHEXIDINE GLUCONATE 15 ML: 1.2 RINSE ORAL at 20:32

## 2024-01-01 RX ADMIN — HYDROMORPHONE HYDROCHLORIDE 0.3 MG: 0.5 INJECTION, SOLUTION INTRAMUSCULAR; INTRAVENOUS; SUBCUTANEOUS at 13:55

## 2024-01-01 RX ADMIN — ATORVASTATIN CALCIUM 40 MG: 40 TABLET, FILM COATED ORAL at 08:14

## 2024-01-01 RX ADMIN — FUROSEMIDE 80 MG: 10 INJECTION, SOLUTION INTRAVENOUS at 07:32

## 2024-01-01 RX ADMIN — LINEZOLID 600 MG: 600 INJECTION, SOLUTION INTRAVENOUS at 21:49

## 2024-01-01 RX ADMIN — CALCIUM GLUCONATE 1 G: 20 INJECTION, SOLUTION INTRAVENOUS at 06:15

## 2024-01-01 RX ADMIN — SODIUM CHLORIDE 3 G: 9 INJECTION, SOLUTION INTRAVENOUS at 17:16

## 2024-01-01 RX ADMIN — DILTIAZEM HYDROCHLORIDE 30 MG: 60 TABLET ORAL at 05:53

## 2024-01-01 RX ADMIN — POTASSIUM CHLORIDE 20 MEQ: 14.9 INJECTION, SOLUTION INTRAVENOUS at 05:52

## 2024-01-01 RX ADMIN — INSULIN LISPRO 3 UNITS: 100 INJECTION, SOLUTION INTRAVENOUS; SUBCUTANEOUS at 20:44

## 2024-01-01 RX ADMIN — CARVEDILOL 6.25 MG: 6.25 TABLET, FILM COATED ORAL at 10:08

## 2024-01-01 RX ADMIN — INSULIN LISPRO 2 UNITS: 100 INJECTION, SOLUTION INTRAVENOUS; SUBCUTANEOUS at 00:13

## 2024-01-01 RX ADMIN — INSULIN LISPRO 4 UNITS: 100 INJECTION, SOLUTION INTRAVENOUS; SUBCUTANEOUS at 23:58

## 2024-01-01 RX ADMIN — SODIUM CHLORIDE, SODIUM GLUCONATE, SODIUM ACETATE, POTASSIUM CHLORIDE, MAGNESIUM CHLORIDE, SODIUM PHOSPHATE, DIBASIC, AND POTASSIUM PHOSPHATE 125 ML/HR: .53; .5; .37; .037; .03; .012; .00082 INJECTION, SOLUTION INTRAVENOUS at 03:33

## 2024-01-01 RX ADMIN — DILTIAZEM HYDROCHLORIDE 30 MG: 60 TABLET ORAL at 17:27

## 2024-01-01 RX ADMIN — INSULIN LISPRO 2 UNITS: 100 INJECTION, SOLUTION INTRAVENOUS; SUBCUTANEOUS at 17:31

## 2024-01-01 RX ADMIN — IPRATROPIUM BROMIDE AND ALBUTEROL SULFATE 3 ML: 2.5; .5 SOLUTION RESPIRATORY (INHALATION) at 00:28

## 2024-01-01 RX ADMIN — DEXTROSE AND POTASSIUM CHLORIDE 50 ML/HR: 5; .15 SOLUTION INTRAVENOUS at 02:45

## 2024-01-01 RX ADMIN — PREDNISONE 40 MG: 20 TABLET ORAL at 10:10

## 2024-01-01 RX ADMIN — INSULIN LISPRO 1 UNITS: 100 INJECTION, SOLUTION INTRAVENOUS; SUBCUTANEOUS at 08:36

## 2024-01-01 RX ADMIN — INSULIN LISPRO 2 UNITS: 100 INJECTION, SOLUTION INTRAVENOUS; SUBCUTANEOUS at 22:28

## 2024-01-01 RX ADMIN — POTASSIUM CHLORIDE 40 MEQ: 29.8 INJECTION, SOLUTION INTRAVENOUS at 07:33

## 2024-01-01 RX ADMIN — OLANZAPINE 5 MG: 5 TABLET, ORALLY DISINTEGRATING ORAL at 21:24

## 2024-01-01 RX ADMIN — SODIUM CHLORIDE 3 G: 9 INJECTION, SOLUTION INTRAVENOUS at 15:13

## 2024-01-01 RX ADMIN — PREDNISONE 40 MG: 20 TABLET ORAL at 09:25

## 2024-01-01 RX ADMIN — FAMOTIDINE 20 MG: 20 TABLET ORAL at 08:00

## 2024-01-01 RX ADMIN — MAGNESIUM SULFATE HEPTAHYDRATE 1 G: 1 INJECTION, SOLUTION INTRAVENOUS at 09:11

## 2024-01-01 RX ADMIN — CARVEDILOL 6.25 MG: 6.25 TABLET, FILM COATED ORAL at 15:57

## 2024-01-01 RX ADMIN — FUROSEMIDE 80 MG: 10 INJECTION, SOLUTION INTRAMUSCULAR; INTRAVENOUS at 15:39

## 2024-01-01 RX ADMIN — SODIUM CHLORIDE 3 G: 9 INJECTION, SOLUTION INTRAVENOUS at 20:36

## 2024-01-01 RX ADMIN — HALOPERIDOL LACTATE 0.5 MG: 5 INJECTION, SOLUTION INTRAMUSCULAR at 18:58

## 2024-01-01 RX ADMIN — MORPHINE SULFATE 2 MG: 2 INJECTION, SOLUTION INTRAMUSCULAR; INTRAVENOUS at 16:05

## 2024-01-01 RX ADMIN — SODIUM CHLORIDE, SODIUM GLUCONATE, SODIUM ACETATE, POTASSIUM CHLORIDE, MAGNESIUM CHLORIDE, SODIUM PHOSPHATE, DIBASIC, AND POTASSIUM PHOSPHATE 75 ML/HR: .53; .5; .37; .037; .03; .012; .00082 INJECTION, SOLUTION INTRAVENOUS at 05:04

## 2024-01-01 RX ADMIN — IOHEXOL 38 ML: 350 INJECTION, SOLUTION INTRAVENOUS at 11:45

## 2024-01-01 RX ADMIN — ATORVASTATIN CALCIUM 40 MG: 40 TABLET, FILM COATED ORAL at 08:42

## 2024-01-01 RX ADMIN — ALBUMIN (HUMAN) 25 G: 0.25 INJECTION, SOLUTION INTRAVENOUS at 11:06

## 2024-01-01 RX ADMIN — POTASSIUM PHOSPHATE, MONOBASIC POTASSIUM PHOSPHATE, DIBASIC 30 MMOL: 224; 236 INJECTION, SOLUTION, CONCENTRATE INTRAVENOUS at 07:45

## 2024-01-01 RX ADMIN — SODIUM CHLORIDE 3 G: 9 INJECTION, SOLUTION INTRAVENOUS at 16:32

## 2024-01-01 RX ADMIN — ATORVASTATIN CALCIUM 40 MG: 40 TABLET, FILM COATED ORAL at 10:10

## 2024-01-01 RX ADMIN — LORAZEPAM 0.5 MG: 2 INJECTION INTRAMUSCULAR; INTRAVENOUS at 16:05

## 2024-01-01 RX ADMIN — PREDNISONE 50 MG: 20 TABLET ORAL at 09:41

## 2024-01-01 RX ADMIN — MAGNESIUM SULFATE HEPTAHYDRATE 2 G: 40 INJECTION, SOLUTION INTRAVENOUS at 04:36

## 2024-01-01 RX ADMIN — IPRATROPIUM BROMIDE AND ALBUTEROL SULFATE 3 ML: 2.5; .5 SOLUTION RESPIRATORY (INHALATION) at 15:54

## 2024-01-01 RX ADMIN — ATORVASTATIN CALCIUM 40 MG: 40 TABLET, FILM COATED ORAL at 08:01

## 2024-01-01 RX ADMIN — NOREPINEPHRINE BITARTRATE 7 MCG/MIN: 1 INJECTION, SOLUTION, CONCENTRATE INTRAVENOUS at 04:27

## 2024-01-01 RX ADMIN — INSULIN LISPRO 4 UNITS: 100 INJECTION, SOLUTION INTRAVENOUS; SUBCUTANEOUS at 06:17

## 2024-01-01 RX ADMIN — CARVEDILOL 6.25 MG: 6.25 TABLET, FILM COATED ORAL at 08:41

## 2024-01-01 RX ADMIN — SODIUM CHLORIDE 3 G: 9 INJECTION, SOLUTION INTRAVENOUS at 22:34

## 2024-01-01 RX ADMIN — SODIUM CHLORIDE 3 G: 9 INJECTION, SOLUTION INTRAVENOUS at 10:33

## 2024-01-01 RX ADMIN — DILTIAZEM HYDROCHLORIDE 30 MG: 60 TABLET ORAL at 19:02

## 2024-01-01 RX ADMIN — FUROSEMIDE 40 MG: 10 INJECTION, SOLUTION INTRAMUSCULAR; INTRAVENOUS at 09:59

## 2024-01-01 RX ADMIN — MAGNESIUM SULFATE HEPTAHYDRATE 2 G: 40 INJECTION, SOLUTION INTRAVENOUS at 07:44

## 2024-01-01 RX ADMIN — INSULIN LISPRO 1 UNITS: 100 INJECTION, SOLUTION INTRAVENOUS; SUBCUTANEOUS at 02:03

## 2024-01-01 RX ADMIN — FUROSEMIDE 40 MG: 10 INJECTION, SOLUTION INTRAMUSCULAR; INTRAVENOUS at 16:44

## 2024-01-01 RX ADMIN — CEFEPIME HYDROCHLORIDE 2000 MG: 2 INJECTION, SOLUTION INTRAVENOUS at 17:38

## 2024-01-01 RX ADMIN — SODIUM CHLORIDE 3 G: 9 INJECTION, SOLUTION INTRAVENOUS at 22:06

## 2024-01-01 RX ADMIN — DILTIAZEM HYDROCHLORIDE 30 MG: 60 TABLET ORAL at 08:06

## 2024-01-01 RX ADMIN — SODIUM CHLORIDE 20 ML/HR: 0.9 INJECTION, SOLUTION INTRAVENOUS at 15:50

## 2024-01-01 RX ADMIN — DEXMEDETOMIDINE HYDROCHLORIDE 0.2 MCG/KG/HR: 400 INJECTION INTRAVENOUS at 02:19

## 2024-01-01 RX ADMIN — SODIUM CHLORIDE, SODIUM GLUCONATE, SODIUM ACETATE, POTASSIUM CHLORIDE, MAGNESIUM CHLORIDE, SODIUM PHOSPHATE, DIBASIC, AND POTASSIUM PHOSPHATE 75 ML/HR: .53; .5; .37; .037; .03; .012; .00082 INJECTION, SOLUTION INTRAVENOUS at 23:04

## 2024-01-01 RX ADMIN — SODIUM CHLORIDE 3 G: 9 INJECTION, SOLUTION INTRAVENOUS at 09:52

## 2024-01-01 RX ADMIN — FUROSEMIDE 40 MG: 10 INJECTION, SOLUTION INTRAMUSCULAR; INTRAVENOUS at 11:56

## 2024-01-01 RX ADMIN — IOHEXOL 20 ML: 350 INJECTION, SOLUTION INTRAVENOUS at 15:50

## 2024-01-01 RX ADMIN — FAMOTIDINE 20 MG: 20 TABLET ORAL at 10:06

## 2024-01-01 RX ADMIN — DILTIAZEM HYDROCHLORIDE 30 MG: 60 TABLET ORAL at 00:00

## 2024-01-01 RX ADMIN — FENTANYL CITRATE 50 MCG: 50 INJECTION INTRAMUSCULAR; INTRAVENOUS at 06:31

## 2024-01-01 RX ADMIN — SODIUM CHLORIDE 3 G: 9 INJECTION, SOLUTION INTRAVENOUS at 20:28

## 2024-01-01 RX ADMIN — METOLAZONE 5 MG: 5 TABLET ORAL at 14:49

## 2024-01-01 RX ADMIN — CARVEDILOL 6.25 MG: 6.25 TABLET, FILM COATED ORAL at 07:33

## 2024-01-01 RX ADMIN — CARVEDILOL 6.25 MG: 6.25 TABLET, FILM COATED ORAL at 16:43

## 2024-01-01 RX ADMIN — SODIUM CHLORIDE, SODIUM GLUCONATE, SODIUM ACETATE, POTASSIUM CHLORIDE, MAGNESIUM CHLORIDE, SODIUM PHOSPHATE, DIBASIC, AND POTASSIUM PHOSPHATE 500 ML: .53; .5; .37; .037; .03; .012; .00082 INJECTION, SOLUTION INTRAVENOUS at 23:37

## 2024-01-01 RX ADMIN — VASOPRESSIN 0.04 UNITS/MIN: 20 INJECTION INTRAVENOUS at 19:32

## 2024-01-01 RX ADMIN — COLLAGENASE SANTYL: 250 OINTMENT TOPICAL at 10:36

## 2024-01-01 RX ADMIN — FOLIC ACID: 5 INJECTION, SOLUTION INTRAMUSCULAR; INTRAVENOUS; SUBCUTANEOUS at 08:02

## 2024-01-01 RX ADMIN — FOLIC ACID: 5 INJECTION, SOLUTION INTRAMUSCULAR; INTRAVENOUS; SUBCUTANEOUS at 08:10

## 2024-01-01 RX ADMIN — CEFEPIME HYDROCHLORIDE 2000 MG: 2 INJECTION, SOLUTION INTRAVENOUS at 07:47

## 2024-01-01 RX ADMIN — DILTIAZEM HYDROCHLORIDE 30 MG: 60 TABLET ORAL at 07:12

## 2024-01-01 RX ADMIN — DILTIAZEM HYDROCHLORIDE 5 MG/HR: 5 INJECTION INTRAVENOUS at 16:55

## 2024-01-01 RX ADMIN — SODIUM CHLORIDE 1000 MG: 0.9 INJECTION, SOLUTION INTRAVENOUS at 09:57

## 2024-01-01 RX ADMIN — DEXTROSE AND POTASSIUM CHLORIDE 50 ML/HR: 5; .15 SOLUTION INTRAVENOUS at 09:54

## 2024-01-01 RX ADMIN — VASOPRESSIN 0.04 UNITS/MIN: 20 INJECTION INTRAVENOUS at 03:27

## 2024-01-01 RX ADMIN — SODIUM CHLORIDE 3 G: 9 INJECTION, SOLUTION INTRAVENOUS at 16:21

## 2024-01-01 RX ADMIN — HEPARIN SODIUM 6000 UNITS: 1000 INJECTION INTRAVENOUS; SUBCUTANEOUS at 11:37

## 2024-01-01 RX ADMIN — CHLORHEXIDINE GLUCONATE 15 ML: 1.2 RINSE ORAL at 08:01

## 2024-01-01 RX ADMIN — ATORVASTATIN CALCIUM 40 MG: 40 TABLET, FILM COATED ORAL at 08:04

## 2024-01-01 RX ADMIN — CARVEDILOL 6.25 MG: 6.25 TABLET, FILM COATED ORAL at 18:03

## 2024-01-01 RX ADMIN — SODIUM CHLORIDE 3 G: 9 INJECTION, SOLUTION INTRAVENOUS at 03:17

## 2024-01-01 RX ADMIN — INSULIN LISPRO 2 UNITS: 100 INJECTION, SOLUTION INTRAVENOUS; SUBCUTANEOUS at 00:43

## 2024-01-01 RX ADMIN — Medication 50 MCG/HR: at 08:30

## 2024-01-01 RX ADMIN — SODIUM CHLORIDE 3 G: 9 INJECTION, SOLUTION INTRAVENOUS at 11:23

## 2024-01-01 RX ADMIN — FAMOTIDINE 20 MG: 20 TABLET ORAL at 08:15

## 2024-01-01 RX ADMIN — FENTANYL CITRATE 25 MCG: 50 INJECTION INTRAMUSCULAR; INTRAVENOUS at 23:49

## 2024-01-01 RX ADMIN — ALBUMIN (HUMAN) 25 G: 12.5 INJECTION, SOLUTION INTRAVENOUS at 10:35

## 2024-01-01 RX ADMIN — POTASSIUM CHLORIDE 20 MEQ: 14.9 INJECTION, SOLUTION INTRAVENOUS at 09:16

## 2024-01-01 RX ADMIN — Medication 10 MG/HR: at 11:00

## 2024-01-01 RX ADMIN — DILTIAZEM HYDROCHLORIDE 15 MG/HR: 5 INJECTION INTRAVENOUS at 02:43

## 2024-01-01 RX ADMIN — METHYLPREDNISOLONE SODIUM SUCCINATE 50 MG: 125 INJECTION, POWDER, FOR SOLUTION INTRAMUSCULAR; INTRAVENOUS at 09:52

## 2024-01-01 RX ADMIN — MAGNESIUM SULFATE HEPTAHYDRATE 2 G: 40 INJECTION, SOLUTION INTRAVENOUS at 06:04

## 2024-01-01 RX ADMIN — SODIUM CHLORIDE 3 G: 9 INJECTION, SOLUTION INTRAVENOUS at 04:23

## 2024-01-01 RX ADMIN — PREDNISONE 50 MG: 20 TABLET ORAL at 08:10

## 2024-01-01 RX ADMIN — ESCITALOPRAM OXALATE 10 MG: 10 TABLET ORAL at 10:06

## 2024-01-01 RX ADMIN — NOREPINEPHRINE BITARTRATE 10 MCG/MIN: 1 INJECTION, SOLUTION, CONCENTRATE INTRAVENOUS at 15:30

## 2024-01-01 RX ADMIN — SODIUM CHLORIDE, SODIUM GLUCONATE, SODIUM ACETATE, POTASSIUM CHLORIDE, MAGNESIUM CHLORIDE, SODIUM PHOSPHATE, DIBASIC, AND POTASSIUM PHOSPHATE 75 ML/HR: .53; .5; .37; .037; .03; .012; .00082 INJECTION, SOLUTION INTRAVENOUS at 12:37

## 2024-01-01 RX ADMIN — POTASSIUM CHLORIDE 40 MEQ: 29.8 INJECTION, SOLUTION INTRAVENOUS at 09:38

## 2024-01-01 RX ADMIN — SODIUM CHLORIDE 3 G: 9 INJECTION, SOLUTION INTRAVENOUS at 18:06

## 2024-01-01 RX ADMIN — FENTANYL CITRATE 50 MCG: 50 INJECTION INTRAMUSCULAR; INTRAVENOUS at 22:54

## 2024-01-01 RX ADMIN — METOROPROLOL TARTRATE 5 MG: 5 INJECTION, SOLUTION INTRAVENOUS at 04:23

## 2024-01-01 RX ADMIN — COLLAGENASE SANTYL 1 APPLICATION: 250 OINTMENT TOPICAL at 08:04

## 2024-01-01 RX ADMIN — CARVEDILOL 6.25 MG: 6.25 TABLET, FILM COATED ORAL at 15:38

## 2024-01-01 RX ADMIN — ALBUMIN (HUMAN) 25 G: 0.25 INJECTION, SOLUTION INTRAVENOUS at 17:58

## 2024-01-01 RX ADMIN — IOHEXOL 100 ML: 350 INJECTION, SOLUTION INTRAVENOUS at 05:56

## 2024-01-01 RX ADMIN — PROPOFOL 20 MCG/KG/MIN: 10 INJECTION, EMULSION INTRAVENOUS at 22:04

## 2024-01-01 RX ADMIN — PROPOFOL 30 MCG/KG/MIN: 10 INJECTION, EMULSION INTRAVENOUS at 15:30

## 2024-01-01 RX ADMIN — INSULIN LISPRO 1 UNITS: 100 INJECTION, SOLUTION INTRAVENOUS; SUBCUTANEOUS at 05:52

## 2024-01-01 RX ADMIN — SODIUM CHLORIDE 3 G: 9 INJECTION, SOLUTION INTRAVENOUS at 12:55

## 2024-01-01 RX ADMIN — DEXTROSE AND POTASSIUM CHLORIDE 100 ML/HR: 5; .15 SOLUTION INTRAVENOUS at 08:13

## 2024-01-01 RX ADMIN — ATORVASTATIN CALCIUM 40 MG: 40 TABLET, FILM COATED ORAL at 08:18

## 2024-01-01 RX ADMIN — Medication 10 MCG/MIN: at 15:30

## 2024-01-01 RX ADMIN — SODIUM CHLORIDE 3 G: 9 INJECTION, SOLUTION INTRAVENOUS at 05:22

## 2024-01-01 RX ADMIN — FUROSEMIDE 40 MG: 10 INJECTION, SOLUTION INTRAVENOUS at 11:37

## 2024-01-01 RX ADMIN — GANCICLOVIR SODIUM 170 MG: 50 INJECTION, POWDER, LYOPHILIZED, FOR SOLUTION INTRAVENTRICULAR at 17:20

## 2024-01-01 RX ADMIN — SODIUM CHLORIDE 3 G: 9 INJECTION, SOLUTION INTRAVENOUS at 16:56

## 2024-01-01 RX ADMIN — POTASSIUM CHLORIDE 40 MEQ: 29.8 INJECTION, SOLUTION INTRAVENOUS at 10:54

## 2024-01-01 RX ADMIN — SODIUM CHLORIDE 1000 MG: 0.9 INJECTION, SOLUTION INTRAVENOUS at 17:19

## 2024-01-01 RX ADMIN — CALCIUM GLUCONATE 2 G: 20 INJECTION, SOLUTION INTRAVENOUS at 06:15

## 2024-01-01 RX ADMIN — NOREPINEPHRINE BITARTRATE 12 MCG/MIN: 1 INJECTION, SOLUTION, CONCENTRATE INTRAVENOUS at 22:05

## 2024-01-01 RX ADMIN — POTASSIUM CHLORIDE 20 MEQ: 14.9 INJECTION, SOLUTION INTRAVENOUS at 07:54

## 2024-01-01 RX ADMIN — SODIUM CHLORIDE 3 G: 9 INJECTION, SOLUTION INTRAVENOUS at 04:39

## 2024-01-01 RX ADMIN — CARVEDILOL 6.25 MG: 6.25 TABLET, FILM COATED ORAL at 16:36

## 2024-01-01 RX ADMIN — ALBUMIN (HUMAN) 25 G: 0.25 INJECTION, SOLUTION INTRAVENOUS at 08:04

## 2024-01-01 RX ADMIN — SODIUM CHLORIDE 3 G: 9 INJECTION, SOLUTION INTRAVENOUS at 22:30

## 2024-01-01 RX ADMIN — POTASSIUM CHLORIDE 40 MEQ: 29.8 INJECTION, SOLUTION INTRAVENOUS at 07:44

## 2024-01-01 RX ADMIN — POTASSIUM CHLORIDE 40 MEQ: 29.8 INJECTION, SOLUTION INTRAVENOUS at 17:59

## 2024-01-01 RX ADMIN — SODIUM CHLORIDE 3 G: 9 INJECTION, SOLUTION INTRAVENOUS at 02:03

## 2024-01-01 RX ADMIN — DAPTOMYCIN 600 MG: 500 INJECTION, POWDER, LYOPHILIZED, FOR SOLUTION INTRAVENOUS at 12:29

## 2024-01-01 RX ADMIN — DILTIAZEM HYDROCHLORIDE 15 MG/HR: 5 INJECTION INTRAVENOUS at 10:18

## 2024-01-01 RX ADMIN — DILTIAZEM HYDROCHLORIDE 10 MG: 5 INJECTION INTRAVENOUS at 16:25

## 2024-01-01 RX ADMIN — METOLAZONE 5 MG: 5 TABLET ORAL at 07:32

## 2024-01-01 RX ADMIN — INSULIN LISPRO 4 UNITS: 100 INJECTION, SOLUTION INTRAVENOUS; SUBCUTANEOUS at 00:29

## 2024-01-01 RX ADMIN — RITUXIMAB 708.8 MG: 10 INJECTION, SOLUTION INTRAVENOUS at 17:43

## 2024-01-01 RX ADMIN — DILTIAZEM HYDROCHLORIDE 30 MG: 60 TABLET ORAL at 18:03

## 2024-01-01 RX ADMIN — POTASSIUM CHLORIDE 20 MEQ: 14.9 INJECTION, SOLUTION INTRAVENOUS at 15:57

## 2024-01-01 RX ADMIN — POTASSIUM CHLORIDE 20 MEQ: 14.9 INJECTION, SOLUTION INTRAVENOUS at 20:25

## 2024-01-01 RX ADMIN — SODIUM CHLORIDE 3 G: 9 INJECTION, SOLUTION INTRAVENOUS at 15:27

## 2024-01-01 RX ADMIN — ATORVASTATIN CALCIUM 40 MG: 40 TABLET, FILM COATED ORAL at 15:38

## 2024-01-01 RX ADMIN — FUROSEMIDE 40 MG: 10 INJECTION, SOLUTION INTRAVENOUS at 15:18

## 2024-01-01 RX ADMIN — Medication 10 MG/HR: at 04:29

## 2024-01-01 RX ADMIN — COLLAGENASE SANTYL 1 APPLICATION: 250 OINTMENT TOPICAL at 13:05

## 2024-01-01 RX ADMIN — FUROSEMIDE 40 MG: 10 INJECTION, SOLUTION INTRAMUSCULAR; INTRAVENOUS at 17:02

## 2024-01-01 RX ADMIN — FUROSEMIDE 40 MG: 10 INJECTION, SOLUTION INTRAVENOUS at 07:47

## 2024-01-01 RX ADMIN — DILTIAZEM HYDROCHLORIDE 30 MG: 60 TABLET ORAL at 00:36

## 2024-01-01 RX ADMIN — SODIUM CHLORIDE 6 UNITS/HR: 9 INJECTION, SOLUTION INTRAVENOUS at 15:31

## 2024-01-01 RX ADMIN — COLLAGENASE SANTYL 1 APPLICATION: 250 OINTMENT TOPICAL at 08:27

## 2024-01-01 RX ADMIN — INSULIN LISPRO 1 UNITS: 100 INJECTION, SOLUTION INTRAVENOUS; SUBCUTANEOUS at 12:14

## 2024-01-01 RX ADMIN — ATORVASTATIN CALCIUM 40 MG: 40 TABLET, FILM COATED ORAL at 08:10

## 2024-01-01 RX ADMIN — POTASSIUM CHLORIDE 20 MEQ: 14.9 INJECTION, SOLUTION INTRAVENOUS at 07:32

## 2024-01-01 RX ADMIN — POTASSIUM CHLORIDE 40 MEQ: 29.8 INJECTION, SOLUTION INTRAVENOUS at 15:45

## 2024-01-01 RX ADMIN — GANCICLOVIR SODIUM 170 MG: 50 INJECTION, POWDER, LYOPHILIZED, FOR SOLUTION INTRAVENTRICULAR at 04:55

## 2024-01-01 RX ADMIN — LEVALBUTEROL HYDROCHLORIDE 1.25 MG: 1.25 SOLUTION RESPIRATORY (INHALATION) at 11:28

## 2024-01-01 RX ADMIN — SODIUM CHLORIDE 3 G: 9 INJECTION, SOLUTION INTRAVENOUS at 02:49

## 2024-01-01 RX ADMIN — MORPHINE SULFATE 2 MG: 2 INJECTION, SOLUTION INTRAMUSCULAR; INTRAVENOUS at 18:58

## 2024-01-01 RX ADMIN — SODIUM CHLORIDE 3 G: 9 INJECTION, SOLUTION INTRAVENOUS at 03:47

## 2024-01-01 RX ADMIN — ALBUMIN (HUMAN) 25 G: 0.25 INJECTION, SOLUTION INTRAVENOUS at 11:57

## 2024-01-01 RX ADMIN — Medication 20 MG: at 08:04

## 2024-01-01 RX ADMIN — SODIUM CHLORIDE 3 G: 9 INJECTION, SOLUTION INTRAVENOUS at 08:36

## 2024-01-01 RX ADMIN — DILTIAZEM HYDROCHLORIDE 10 MG: 5 INJECTION INTRAVENOUS at 02:02

## 2024-01-01 RX ADMIN — SODIUM CHLORIDE 3 G: 9 INJECTION, SOLUTION INTRAVENOUS at 11:33

## 2024-01-01 RX ADMIN — SODIUM CHLORIDE 3 G: 9 INJECTION, SOLUTION INTRAVENOUS at 21:58

## 2024-01-01 RX ADMIN — SODIUM CHLORIDE 1000 MG: 0.9 INJECTION, SOLUTION INTRAVENOUS at 08:19

## 2024-01-01 RX ADMIN — POTASSIUM CHLORIDE 40 MEQ: 29.8 INJECTION, SOLUTION INTRAVENOUS at 07:13

## 2024-01-01 RX ADMIN — DILTIAZEM HYDROCHLORIDE 30 MG: 60 TABLET ORAL at 17:47

## 2024-01-01 RX ADMIN — DILTIAZEM HYDROCHLORIDE 30 MG: 60 TABLET ORAL at 00:24

## 2024-01-01 RX ADMIN — MAGNESIUM SULFATE HEPTAHYDRATE 2 G: 40 INJECTION, SOLUTION INTRAVENOUS at 03:17

## 2024-01-01 RX ADMIN — SODIUM CHLORIDE 3 G: 9 INJECTION, SOLUTION INTRAVENOUS at 04:41

## 2024-01-01 RX ADMIN — SODIUM CHLORIDE 3 G: 9 INJECTION, SOLUTION INTRAVENOUS at 08:32

## 2024-01-01 RX ADMIN — POTASSIUM CHLORIDE 40 MEQ: 29.8 INJECTION, SOLUTION INTRAVENOUS at 17:14

## 2024-01-01 RX ADMIN — SODIUM CHLORIDE 3 G: 9 INJECTION, SOLUTION INTRAVENOUS at 23:28

## 2024-01-01 RX ADMIN — INSULIN LISPRO 1 UNITS: 100 INJECTION, SOLUTION INTRAVENOUS; SUBCUTANEOUS at 12:13

## 2024-01-01 RX ADMIN — CHLORHEXIDINE GLUCONATE 15 ML: 1.2 RINSE ORAL at 08:10

## 2024-01-01 RX ADMIN — CHLORHEXIDINE GLUCONATE 15 ML: 1.2 RINSE ORAL at 20:13

## 2024-01-01 RX ADMIN — SODIUM CHLORIDE, SODIUM GLUCONATE, SODIUM ACETATE, POTASSIUM CHLORIDE, MAGNESIUM CHLORIDE, SODIUM PHOSPHATE, DIBASIC, AND POTASSIUM PHOSPHATE 500 ML: .53; .5; .37; .037; .03; .012; .00082 INJECTION, SOLUTION INTRAVENOUS at 02:50

## 2024-01-01 RX ADMIN — PREDNISONE 40 MG: 20 TABLET ORAL at 10:37

## 2024-01-01 RX ADMIN — SODIUM CHLORIDE 3 G: 9 INJECTION, SOLUTION INTRAVENOUS at 18:35

## 2024-01-01 RX ADMIN — FUROSEMIDE 40 MG: 10 INJECTION, SOLUTION INTRAMUSCULAR; INTRAVENOUS at 18:03

## 2024-01-01 RX ADMIN — INSULIN LISPRO 2 UNITS: 100 INJECTION, SOLUTION INTRAVENOUS; SUBCUTANEOUS at 06:10

## 2024-01-01 RX ADMIN — ATOVAQUONE 1500 MG: 750 SUSPENSION ORAL at 08:11

## 2024-01-01 RX ADMIN — ALBUMIN (HUMAN) 25 G: 0.25 INJECTION, SOLUTION INTRAVENOUS at 22:06

## 2024-01-01 RX ADMIN — CHLORHEXIDINE GLUCONATE 15 ML: 1.2 RINSE ORAL at 20:57

## 2024-01-01 RX ADMIN — DILTIAZEM HYDROCHLORIDE 30 MG: 60 TABLET ORAL at 11:44

## 2024-01-01 RX ADMIN — CHLORHEXIDINE GLUCONATE 15 ML: 1.2 RINSE ORAL at 09:41

## 2024-01-01 RX ADMIN — GANCICLOVIR SODIUM 170 MG: 50 INJECTION, POWDER, LYOPHILIZED, FOR SOLUTION INTRAVENTRICULAR at 14:01

## 2024-01-01 RX ADMIN — POTASSIUM CHLORIDE 40 MEQ: 1500 TABLET, EXTENDED RELEASE ORAL at 09:24

## 2024-01-01 RX ADMIN — SODIUM CHLORIDE 3 G: 9 INJECTION, SOLUTION INTRAVENOUS at 22:03

## 2024-01-01 RX ADMIN — SODIUM CHLORIDE 3 G: 9 INJECTION, SOLUTION INTRAVENOUS at 22:07

## 2024-01-01 RX ADMIN — METRONIDAZOLE 500 MG: 500 INJECTION, SOLUTION INTRAVENOUS at 18:36

## 2024-01-01 RX ADMIN — SODIUM CHLORIDE 3 G: 9 INJECTION, SOLUTION INTRAVENOUS at 22:59

## 2024-01-01 RX ADMIN — NOREPINEPHRINE BITARTRATE 4 MCG/MIN: 1 INJECTION, SOLUTION, CONCENTRATE INTRAVENOUS at 01:41

## 2024-01-01 RX ADMIN — SODIUM CHLORIDE 2 UNITS/HR: 9 INJECTION, SOLUTION INTRAVENOUS at 11:09

## 2024-02-19 ENCOUNTER — APPOINTMENT (EMERGENCY)
Dept: CT IMAGING | Facility: HOSPITAL | Age: 79
DRG: 189 | End: 2024-02-19
Payer: MEDICARE

## 2024-02-19 ENCOUNTER — HOSPITAL ENCOUNTER (INPATIENT)
Facility: HOSPITAL | Age: 79
LOS: 1 days | Discharge: ANOTHER HOSPITAL | DRG: 189 | End: 2024-02-20
Attending: EMERGENCY MEDICINE | Admitting: FAMILY MEDICINE
Payer: MEDICARE

## 2024-02-19 ENCOUNTER — LAB REQUISITION (OUTPATIENT)
Dept: LAB | Facility: HOSPITAL | Age: 79
DRG: 189 | End: 2024-02-19
Payer: MEDICARE

## 2024-02-19 ENCOUNTER — APPOINTMENT (EMERGENCY)
Dept: RADIOLOGY | Facility: HOSPITAL | Age: 79
DRG: 189 | End: 2024-02-19
Payer: MEDICARE

## 2024-02-19 DIAGNOSIS — R09.02 HYPOXIA: ICD-10-CM

## 2024-02-19 DIAGNOSIS — J96.01 ACUTE HYPOXIC RESPIRATORY FAILURE (HCC): ICD-10-CM

## 2024-02-19 DIAGNOSIS — D64.9 ANEMIA: ICD-10-CM

## 2024-02-19 DIAGNOSIS — D74.9 METHEMOGLOBINEMIA: ICD-10-CM

## 2024-02-19 DIAGNOSIS — K51.00 PANCOLITIS (HCC): Primary | ICD-10-CM

## 2024-02-19 DIAGNOSIS — E87.1 HYPO-OSMOLALITY AND HYPONATREMIA: ICD-10-CM

## 2024-02-19 DIAGNOSIS — D63.8 ANEMIA IN OTHER CHRONIC DISEASES CLASSIFIED ELSEWHERE: ICD-10-CM

## 2024-02-19 DIAGNOSIS — M32.9 LUPUS (HCC): ICD-10-CM

## 2024-02-19 PROBLEM — R79.89 HIGH SERUM LACTIC ACID: Status: ACTIVE | Noted: 2024-02-19

## 2024-02-19 PROBLEM — E83.42 HYPOMAGNESEMIA: Status: ACTIVE | Noted: 2024-02-19

## 2024-02-19 PROBLEM — G93.41 METABOLIC ENCEPHALOPATHY: Status: ACTIVE | Noted: 2024-02-19

## 2024-02-19 PROBLEM — I10 HYPERTENSION: Status: ACTIVE | Noted: 2024-02-19

## 2024-02-19 LAB
2HR DELTA HS TROPONIN: -2 NG/L
4HR DELTA HS TROPONIN: -2 NG/L
ABO GROUP BLD: NORMAL
ABO GROUP BLD: NORMAL
ACANTHOCYTES BLD QL SMEAR: PRESENT
ALBUMIN SERPL BCP-MCNC: 2.5 G/DL (ref 3.5–5)
ALP SERPL-CCNC: 59 U/L (ref 34–104)
ALT SERPL W P-5'-P-CCNC: 21 U/L (ref 7–52)
ANION GAP SERPL CALCULATED.3IONS-SCNC: 10 MMOL/L
ANION GAP SERPL CALCULATED.3IONS-SCNC: 8 MMOL/L
ANISOCYTOSIS BLD QL SMEAR: PRESENT
APTT PPP: 26 SECONDS (ref 23–37)
ARTERIAL PATENCY WRIST A: YES
ARTERIAL PATENCY WRIST A: YES
AST SERPL W P-5'-P-CCNC: 16 U/L (ref 13–39)
ATRIAL RATE: 78 BPM
ATRIAL RATE: 79 BPM
BASE EXCESS BLDA CALC-SCNC: -0.1 MMOL/L
BASE EXCESS BLDA CALC-SCNC: -1.9 MMOL/L
BASOPHILS # BLD MANUAL: 0 THOUSAND/UL (ref 0–0.1)
BASOPHILS NFR MAR MANUAL: 0 % (ref 0–1)
BILIRUB SERPL-MCNC: 1.89 MG/DL (ref 0.2–1)
BLD GP AB SCN SERPL QL: NEGATIVE
BUN SERPL-MCNC: 25 MG/DL (ref 5–25)
BUN SERPL-MCNC: 25 MG/DL (ref 5–25)
CALCIUM ALBUM COR SERPL-MCNC: 9.6 MG/DL (ref 8.3–10.1)
CALCIUM SERPL-MCNC: 8.4 MG/DL (ref 8.4–10.2)
CALCIUM SERPL-MCNC: 8.4 MG/DL (ref 8.4–10.2)
CARDIAC TROPONIN I PNL SERPL HS: 13 NG/L
CARDIAC TROPONIN I PNL SERPL HS: 13 NG/L
CARDIAC TROPONIN I PNL SERPL HS: 15 NG/L
CHLORIDE SERPL-SCNC: 100 MMOL/L (ref 96–108)
CHLORIDE SERPL-SCNC: 100 MMOL/L (ref 96–108)
CO2 SERPL-SCNC: 22 MMOL/L (ref 21–32)
CO2 SERPL-SCNC: 25 MMOL/L (ref 21–32)
CREAT SERPL-MCNC: 1.02 MG/DL (ref 0.6–1.3)
CREAT SERPL-MCNC: 1.08 MG/DL (ref 0.6–1.3)
EOSINOPHIL # BLD MANUAL: 0.08 THOUSAND/UL (ref 0–0.4)
EOSINOPHIL NFR BLD MANUAL: 1 % (ref 0–6)
ERYTHROCYTE [DISTWIDTH] IN BLOOD BY AUTOMATED COUNT: 17.4 % (ref 11.6–15.1)
ERYTHROCYTE [DISTWIDTH] IN BLOOD BY AUTOMATED COUNT: 21.4 % (ref 11.6–15.1)
ERYTHROCYTE [DISTWIDTH] IN BLOOD BY AUTOMATED COUNT: 21.6 % (ref 11.6–15.1)
FLUAV RNA RESP QL NAA+PROBE: NEGATIVE
FLUBV RNA RESP QL NAA+PROBE: NEGATIVE
GFR SERPL CREATININE-BSD FRML MDRD: 48 ML/MIN/1.73SQ M
GFR SERPL CREATININE-BSD FRML MDRD: 52 ML/MIN/1.73SQ M
GLUCOSE SERPL-MCNC: 302 MG/DL (ref 65–140)
GLUCOSE SERPL-MCNC: 305 MG/DL (ref 65–140)
HCO3 BLDA-SCNC: 21.8 MMOL/L (ref 22–28)
HCO3 BLDA-SCNC: 24.5 MMOL/L (ref 22–28)
HCT VFR BLD AUTO: 17.1 % (ref 34.8–46.1)
HCT VFR BLD AUTO: 17.2 % (ref 34.8–46.1)
HCT VFR BLD AUTO: 29.8 % (ref 34.8–46.1)
HGB BLD-MCNC: 5.2 G/DL (ref 11.5–15.4)
HGB BLD-MCNC: 5.2 G/DL (ref 11.5–15.4)
HGB BLD-MCNC: 7.4 G/DL (ref 11.5–15.4)
HGB BLD-MCNC: 9.6 G/DL (ref 11.5–15.4)
INR PPP: 1.11 (ref 0.84–1.19)
LACTATE SERPL-SCNC: 1.2 MMOL/L (ref 0.5–2)
LACTATE SERPL-SCNC: 3.3 MMOL/L (ref 0.5–2)
LYMPHOCYTES # BLD AUTO: 0.3 THOUSAND/UL (ref 0.6–4.47)
LYMPHOCYTES # BLD AUTO: 4 % (ref 14–44)
MACROCYTES BLD QL AUTO: PRESENT
MAGNESIUM SERPL-MCNC: 1.6 MG/DL (ref 1.9–2.7)
MCH RBC QN AUTO: 30.2 PG (ref 26.8–34.3)
MCH RBC QN AUTO: 30.6 PG (ref 26.8–34.3)
MCH RBC QN AUTO: 30.6 PG (ref 26.8–34.3)
MCHC RBC AUTO-ENTMCNC: 30.2 G/DL (ref 31.4–37.4)
MCHC RBC AUTO-ENTMCNC: 30.4 G/DL (ref 31.4–37.4)
MCHC RBC AUTO-ENTMCNC: 32.2 G/DL (ref 31.4–37.4)
MCV RBC AUTO: 101 FL (ref 82–98)
MCV RBC AUTO: 95 FL (ref 82–98)
MCV RBC AUTO: 99 FL (ref 82–98)
MONOCYTES # BLD AUTO: 0.08 THOUSAND/UL (ref 0–1.22)
MONOCYTES NFR BLD: 1 % (ref 4–12)
NASAL CANNULA: ABNORMAL
NASAL CANNULA: ABNORMAL
NEUTROPHILS # BLD MANUAL: 7.14 THOUSAND/UL (ref 1.85–7.62)
NEUTS BAND NFR BLD MANUAL: 11 % (ref 0–8)
NEUTS SEG NFR BLD AUTO: 83 % (ref 43–75)
NRBC BLD AUTO-RTO: 5 /100 WBC (ref 0–2)
O2 CT BLDA-SCNC: 9.2 ML/DL (ref 16–23)
O2 CT BLDA-SCNC: 9.3 ML/DL (ref 16–23)
OXYHGB MFR BLDA: 83.3 % (ref 94–97)
OXYHGB MFR BLDA: 85.2 % (ref 94–97)
P AXIS: 39 DEGREES
PCO2 BLDA: 31.9 MM HG (ref 36–44)
PCO2 BLDA: 39.7 MM HG (ref 36–44)
PH BLDA: 7.41 [PH] (ref 7.35–7.45)
PH BLDA: 7.45 [PH] (ref 7.35–7.45)
PLATELET # BLD AUTO: 168 THOUSANDS/UL (ref 149–390)
PLATELET # BLD AUTO: 212 THOUSANDS/UL (ref 149–390)
PLATELET # BLD AUTO: 220 THOUSANDS/UL (ref 149–390)
PLATELET BLD QL SMEAR: ADEQUATE
PMV BLD AUTO: 10.6 FL (ref 8.9–12.7)
PMV BLD AUTO: 10.8 FL (ref 8.9–12.7)
PMV BLD AUTO: 11.4 FL (ref 8.9–12.7)
PO2 BLDA: 191.7 MM HG (ref 75–129)
PO2 BLDA: 254.8 MM HG (ref 75–129)
POIKILOCYTOSIS BLD QL SMEAR: PRESENT
POLYCHROMASIA BLD QL SMEAR: PRESENT
POTASSIUM SERPL-SCNC: 3.4 MMOL/L (ref 3.5–5.3)
POTASSIUM SERPL-SCNC: 3.6 MMOL/L (ref 3.5–5.3)
PR INTERVAL: 158 MS
PROT SERPL-MCNC: 3.9 G/DL (ref 6.4–8.4)
PROTHROMBIN TIME: 14.2 SECONDS (ref 11.6–14.5)
QRS AXIS: -3 DEGREES
QRS AXIS: 0 DEGREES
QRSD INTERVAL: 144 MS
QRSD INTERVAL: 146 MS
QT INTERVAL: 418 MS
QT INTERVAL: 426 MS
QTC INTERVAL: 476 MS
QTC INTERVAL: 488 MS
RBC # BLD AUTO: 1.7 MILLION/UL (ref 3.81–5.12)
RBC # BLD AUTO: 1.72 MILLION/UL (ref 3.81–5.12)
RBC # BLD AUTO: 3.14 MILLION/UL (ref 3.81–5.12)
RBC MORPH BLD: PRESENT
RH BLD: POSITIVE
RH BLD: POSITIVE
RSV RNA RESP QL NAA+PROBE: NEGATIVE
SARS-COV-2 RNA RESP QL NAA+PROBE: NEGATIVE
SODIUM SERPL-SCNC: 132 MMOL/L (ref 135–147)
SODIUM SERPL-SCNC: 133 MMOL/L (ref 135–147)
SPECIMEN EXPIRATION DATE: NORMAL
SPECIMEN SOURCE: ABNORMAL
SPECIMEN SOURCE: ABNORMAL
T WAVE AXIS: -22 DEGREES
T WAVE AXIS: -45 DEGREES
VENTRICULAR RATE: 78 BPM
VENTRICULAR RATE: 79 BPM
WBC # BLD AUTO: 7.37 THOUSAND/UL (ref 4.31–10.16)
WBC # BLD AUTO: 7.6 THOUSAND/UL (ref 4.31–10.16)
WBC # BLD AUTO: 9.55 THOUSAND/UL (ref 4.31–10.16)

## 2024-02-19 PROCEDURE — 86920 COMPATIBILITY TEST SPIN: CPT

## 2024-02-19 PROCEDURE — 85018 HEMOGLOBIN: CPT

## 2024-02-19 PROCEDURE — C9113 INJ PANTOPRAZOLE SODIUM, VIA: HCPCS | Performed by: PHYSICIAN ASSISTANT

## 2024-02-19 PROCEDURE — 36600 WITHDRAWAL OF ARTERIAL BLOOD: CPT

## 2024-02-19 PROCEDURE — 82805 BLOOD GASES W/O2 SATURATION: CPT | Performed by: FAMILY MEDICINE

## 2024-02-19 PROCEDURE — 93010 ELECTROCARDIOGRAM REPORT: CPT | Performed by: INTERNAL MEDICINE

## 2024-02-19 PROCEDURE — 71045 X-RAY EXAM CHEST 1 VIEW: CPT

## 2024-02-19 PROCEDURE — 99291 CRITICAL CARE FIRST HOUR: CPT | Performed by: PHYSICIAN ASSISTANT

## 2024-02-19 PROCEDURE — 83735 ASSAY OF MAGNESIUM: CPT | Performed by: PHYSICIAN ASSISTANT

## 2024-02-19 PROCEDURE — 84484 ASSAY OF TROPONIN QUANT: CPT | Performed by: PHYSICIAN ASSISTANT

## 2024-02-19 PROCEDURE — P9016 RBC LEUKOCYTES REDUCED: HCPCS

## 2024-02-19 PROCEDURE — 96374 THER/PROPH/DIAG INJ IV PUSH: CPT

## 2024-02-19 PROCEDURE — G1004 CDSM NDSC: HCPCS

## 2024-02-19 PROCEDURE — 85027 COMPLETE CBC AUTOMATED: CPT | Performed by: INTERNAL MEDICINE

## 2024-02-19 PROCEDURE — 86900 BLOOD TYPING SEROLOGIC ABO: CPT | Performed by: PHYSICIAN ASSISTANT

## 2024-02-19 PROCEDURE — 80053 COMPREHEN METABOLIC PANEL: CPT | Performed by: PHYSICIAN ASSISTANT

## 2024-02-19 PROCEDURE — 36415 COLL VENOUS BLD VENIPUNCTURE: CPT | Performed by: PHYSICIAN ASSISTANT

## 2024-02-19 PROCEDURE — 86901 BLOOD TYPING SEROLOGIC RH(D): CPT | Performed by: PHYSICIAN ASSISTANT

## 2024-02-19 PROCEDURE — 85730 THROMBOPLASTIN TIME PARTIAL: CPT | Performed by: PHYSICIAN ASSISTANT

## 2024-02-19 PROCEDURE — 30233N1 TRANSFUSION OF NONAUTOLOGOUS RED BLOOD CELLS INTO PERIPHERAL VEIN, PERCUTANEOUS APPROACH: ICD-10-PCS | Performed by: FAMILY MEDICINE

## 2024-02-19 PROCEDURE — 71260 CT THORAX DX C+: CPT

## 2024-02-19 PROCEDURE — 36430 TRANSFUSION BLD/BLD COMPNT: CPT

## 2024-02-19 PROCEDURE — 85610 PROTHROMBIN TIME: CPT | Performed by: PHYSICIAN ASSISTANT

## 2024-02-19 PROCEDURE — 99223 1ST HOSP IP/OBS HIGH 75: CPT | Performed by: FAMILY MEDICINE

## 2024-02-19 PROCEDURE — 85027 COMPLETE CBC AUTOMATED: CPT | Performed by: NURSE PRACTITIONER

## 2024-02-19 PROCEDURE — 74177 CT ABD & PELVIS W/CONTRAST: CPT

## 2024-02-19 PROCEDURE — 93005 ELECTROCARDIOGRAM TRACING: CPT

## 2024-02-19 PROCEDURE — 87040 BLOOD CULTURE FOR BACTERIA: CPT | Performed by: PHYSICIAN ASSISTANT

## 2024-02-19 PROCEDURE — 94760 N-INVAS EAR/PLS OXIMETRY 1: CPT

## 2024-02-19 PROCEDURE — 85027 COMPLETE CBC AUTOMATED: CPT | Performed by: PHYSICIAN ASSISTANT

## 2024-02-19 PROCEDURE — 96361 HYDRATE IV INFUSION ADD-ON: CPT

## 2024-02-19 PROCEDURE — 82805 BLOOD GASES W/O2 SATURATION: CPT

## 2024-02-19 PROCEDURE — 80048 BASIC METABOLIC PNL TOTAL CA: CPT | Performed by: NURSE PRACTITIONER

## 2024-02-19 PROCEDURE — 99284 EMERGENCY DEPT VISIT MOD MDM: CPT

## 2024-02-19 PROCEDURE — 0241U HB NFCT DS VIR RESP RNA 4 TRGT: CPT | Performed by: PHYSICIAN ASSISTANT

## 2024-02-19 PROCEDURE — 86850 RBC ANTIBODY SCREEN: CPT | Performed by: PHYSICIAN ASSISTANT

## 2024-02-19 PROCEDURE — 85007 BL SMEAR W/DIFF WBC COUNT: CPT | Performed by: PHYSICIAN ASSISTANT

## 2024-02-19 PROCEDURE — 83605 ASSAY OF LACTIC ACID: CPT | Performed by: PHYSICIAN ASSISTANT

## 2024-02-19 RX ORDER — ESCITALOPRAM OXALATE 10 MG/1
10 TABLET ORAL DAILY
Status: ON HOLD | COMMUNITY

## 2024-02-19 RX ORDER — DAPSONE 100 MG/1
100 TABLET ORAL DAILY
Status: DISCONTINUED | OUTPATIENT
Start: 2024-02-20 | End: 2024-02-19

## 2024-02-19 RX ORDER — ATORVASTATIN CALCIUM 40 MG/1
40 TABLET, FILM COATED ORAL DAILY
Status: DISCONTINUED | OUTPATIENT
Start: 2024-02-20 | End: 2024-02-20 | Stop reason: HOSPADM

## 2024-02-19 RX ORDER — LOSARTAN POTASSIUM 100 MG/1
100 TABLET ORAL DAILY
Status: ON HOLD | COMMUNITY

## 2024-02-19 RX ORDER — PANTOPRAZOLE SODIUM 40 MG/10ML
40 INJECTION, POWDER, LYOPHILIZED, FOR SOLUTION INTRAVENOUS ONCE
Status: COMPLETED | OUTPATIENT
Start: 2024-02-19 | End: 2024-02-19

## 2024-02-19 RX ORDER — FAMOTIDINE 20 MG/1
20 TABLET, FILM COATED ORAL DAILY
Status: ON HOLD | COMMUNITY

## 2024-02-19 RX ORDER — MIRTAZAPINE 7.5 MG/1
7.5 TABLET, FILM COATED ORAL
Status: ON HOLD | COMMUNITY

## 2024-02-19 RX ORDER — CARVEDILOL 6.25 MG/1
6.25 TABLET ORAL 2 TIMES DAILY WITH MEALS
COMMUNITY
End: 2024-02-20

## 2024-02-19 RX ORDER — CEFTRIAXONE 1 G/50ML
1000 INJECTION, SOLUTION INTRAVENOUS EVERY 24 HOURS
Status: DISCONTINUED | OUTPATIENT
Start: 2024-02-19 | End: 2024-02-20 | Stop reason: HOSPADM

## 2024-02-19 RX ORDER — MYCOPHENOLATE MOFETIL 500 MG/1
TABLET ORAL
Status: ON HOLD | COMMUNITY
Start: 2024-01-10

## 2024-02-19 RX ORDER — METRONIDAZOLE 500 MG/100ML
500 INJECTION, SOLUTION INTRAVENOUS EVERY 8 HOURS
Status: DISCONTINUED | OUTPATIENT
Start: 2024-02-19 | End: 2024-02-20 | Stop reason: HOSPADM

## 2024-02-19 RX ORDER — ALBUTEROL SULFATE 2.5 MG/3ML
2.5 SOLUTION RESPIRATORY (INHALATION) EVERY 4 HOURS PRN
Status: DISCONTINUED | OUTPATIENT
Start: 2024-02-19 | End: 2024-02-20 | Stop reason: HOSPADM

## 2024-02-19 RX ORDER — DAPSONE 100 MG/1
100 TABLET ORAL DAILY
COMMUNITY
End: 2024-02-20

## 2024-02-19 RX ORDER — HYDROXYCHLOROQUINE SULFATE 300 MG/1
1 TABLET ORAL DAILY
Status: ON HOLD | COMMUNITY

## 2024-02-19 RX ORDER — ESCITALOPRAM OXALATE 10 MG/1
10 TABLET ORAL DAILY
Status: DISCONTINUED | OUTPATIENT
Start: 2024-02-20 | End: 2024-02-20 | Stop reason: HOSPADM

## 2024-02-19 RX ORDER — HYDROXYCHLOROQUINE SULFATE 200 MG/1
200 TABLET, FILM COATED ORAL DAILY
Status: DISCONTINUED | OUTPATIENT
Start: 2024-02-20 | End: 2024-02-19

## 2024-02-19 RX ORDER — MIRTAZAPINE 15 MG/1
7.5 TABLET, FILM COATED ORAL
Status: DISCONTINUED | OUTPATIENT
Start: 2024-02-19 | End: 2024-02-20 | Stop reason: HOSPADM

## 2024-02-19 RX ORDER — PREDNISONE 10 MG/1
60 TABLET ORAL DAILY
Status: ON HOLD | COMMUNITY

## 2024-02-19 RX ORDER — ALBUTEROL SULFATE 2.5 MG/3ML
2.5 SOLUTION RESPIRATORY (INHALATION) EVERY 2 HOUR PRN
Status: ON HOLD | COMMUNITY
Start: 2024-02-16 | End: 2024-03-03

## 2024-02-19 RX ORDER — PANTOPRAZOLE SODIUM 40 MG/10ML
40 INJECTION, POWDER, LYOPHILIZED, FOR SOLUTION INTRAVENOUS EVERY 12 HOURS SCHEDULED
Status: DISCONTINUED | OUTPATIENT
Start: 2024-02-19 | End: 2024-02-20 | Stop reason: HOSPADM

## 2024-02-19 RX ORDER — ATORVASTATIN CALCIUM 40 MG/1
40 TABLET, FILM COATED ORAL DAILY
Status: ON HOLD | COMMUNITY

## 2024-02-19 RX ORDER — PANTOPRAZOLE SODIUM 40 MG/1
40 TABLET, DELAYED RELEASE ORAL 2 TIMES DAILY
Status: ON HOLD | COMMUNITY

## 2024-02-19 RX ADMIN — IOHEXOL 100 ML: 350 INJECTION, SOLUTION INTRAVENOUS at 13:43

## 2024-02-19 RX ADMIN — CEFTRIAXONE 1000 MG: 1 INJECTION, SOLUTION INTRAVENOUS at 18:14

## 2024-02-19 RX ADMIN — PANTOPRAZOLE SODIUM 40 MG: 40 INJECTION, POWDER, FOR SOLUTION INTRAVENOUS at 15:41

## 2024-02-19 RX ADMIN — SODIUM CHLORIDE 500 ML: 0.9 INJECTION, SOLUTION INTRAVENOUS at 14:03

## 2024-02-19 RX ADMIN — METRONIDAZOLE 500 MG: 5 INJECTION, SOLUTION INTRAVENOUS at 18:47

## 2024-02-19 NOTE — ED PROVIDER NOTES
History  Chief Complaint   Patient presents with    Abnormal Lab     Pt comes from SNF, recently had a CBC done this morning and hemoglobin was 5.2. Pt complains of dizziness and being lethargic.      Patient presents to the emergency department today from the fifth floor nursing facility.  She is here for evaluation of weakness dizziness lightheadedness and anemia noted on blood work today of 5.2.    I did review recent [facility] admission.  According to the notation on discharge summary she was admitted to [facility] on the ninth of this month secondary to syncope.  Workup included chest x-ray CT scan of the head CT scan PE CTA of the chest.  No acute abnormalities were noted.  She was noted to have progressive lower extremity weakness.  Notation from the 15th of this month noted the patient was still weak.  Her initial hemoglobin was 9.8 they noted to baseline somewhere between 9 and 11.  She was subsequently discharged to the skilled nursing facility.    Patient at bedside is acutely toxic appearing she is hypoxic hypotensive pale.  She is alert and oriented admitting to feeling generally weak and dizzy.  She states she had 4 episodes of diarrhea last evening she did not note any color unfortunately of the stool.  Immediate digital rectal exam was completed and is yellow to brown stool noted however it does test guaiac positive.    Blood was immediately ordered.  Patient was on 8 L nasal cannula by nursing staff upon my evaluation they noted hypoxia in the mid 80s despite 6 L nasal cannula.  Initial blood pressure 87/43.        Prior to Admission Medications   Prescriptions Last Dose Informant Patient Reported? Taking?   Cholecalciferol 50 MCG (2000 UT) TABS   Yes Yes   Sig: Take 1 tablet by mouth daily   DULoxetine (CYMBALTA) 60 mg delayed release capsule  Self Yes No   FISH OIL-CANOLA OIL-VIT D3 PO  Self Yes No   Sig: Use   Hydroxychloroquine Sulfate 300 MG TABS   Yes Yes   Sig: Take 1 tablet by mouth  daily   TURMERIC PO  Self Yes No   Sig: Take 1 capsule by mouth daily   albuterol (2.5 mg/3 mL) 0.083 % nebulizer solution   Yes Yes   Sig: Inhale 2.5 mg every 2 (two) hours as needed   amLODIPine (NORVASC) 2.5 mg tablet  Self Yes Yes   Sig: Take 5 mg by mouth daily   aspirin (ECOTRIN LOW STRENGTH) 81 mg EC tablet  Self Yes Yes   Sig: Take 81 mg by mouth   atorvastatin (LIPITOR) 40 mg tablet   Yes Yes   Sig: Take 40 mg by mouth daily   carvedilol (COREG) 6.25 mg tablet   Yes Yes   Sig: Take 6.25 mg by mouth 2 (two) times a day with meals   co-enzyme Q-10 30 MG capsule  Self Yes Yes   Sig: Take by mouth   cyanocobalamin (VITAMIN B-12) 2500 MCG TABS   Yes Yes   Sig: Take 1,000 mcg by mouth daily   dapsone 100 mg tablet   Yes Yes   Sig: Take 100 mg by mouth daily   escitalopram (LEXAPRO) 10 mg tablet   Yes Yes   Sig: Take 10 mg by mouth daily   famotidine (PEPCID) 20 mg tablet   Yes Yes   Sig: Take 20 mg by mouth daily   gabapentin (NEURONTIN) 100 mg capsule  Self Yes No   losartan (COZAAR) 100 MG tablet   Yes Yes   Sig: Take 100 mg by mouth daily   miconazole (MICOTIN) 2 % powder   Yes Yes   Sig: Apply 1 Application topically 2 (two) times a day   mirtazapine (REMERON) 7.5 MG tablet   Yes Yes   Sig: Take 7.5 mg by mouth daily at bedtime   mycophenolate (CELLCEPT) 500 mg tablet   Yes Yes   Sig: Take 2 tabs twice daily as instructed   pantoprazole (PROTONIX) 40 mg tablet   Yes Yes   Sig: Take 40 mg by mouth 2 (two) times a day   predniSONE 20 mg tablet   Yes Yes   Sig: Take 30 mg by mouth daily      Facility-Administered Medications: None       Past Medical History:   Diagnosis Date    Diabetes mellitus (HCC)     Fibromyalgia     Hyperlipidemia     Hypertension        Past Surgical History:   Procedure Laterality Date    ACHILLES TENDON REPAIR Right     ADENOIDECTOMY      APPENDECTOMY      SKIN CANCER EXCISION      x 4 - back    TONSILLECTOMY      TOTAL ABDOMINAL HYSTERECTOMY      US GUIDED KIDNEY BIOPSY  12/26/2023        Family History   Problem Relation Age of Onset    Cancer Mother     Cervical cancer Sister     Breast cancer Sister     Breast cancer Sister      I have reviewed and agree with the history as documented.    E-Cigarette/Vaping    E-Cigarette Use Never User      E-Cigarette/Vaping Substances    Nicotine No     THC No     CBD No     Flavoring No     Other No     Unknown No      Social History     Tobacco Use    Smoking status: Never    Smokeless tobacco: Never   Vaping Use    Vaping status: Never Used   Substance Use Topics    Alcohol use: Not Currently    Drug use: Never       Review of Systems   Constitutional:  Negative for chills and fever.   HENT:  Negative for ear pain and sore throat.    Eyes:  Negative for pain and visual disturbance.   Respiratory:  Negative for cough and shortness of breath.    Cardiovascular:  Negative for chest pain and palpitations.   Gastrointestinal:  Positive for diarrhea. Negative for abdominal pain and vomiting.   Genitourinary:  Negative for dysuria and hematuria.   Musculoskeletal:  Negative for arthralgias and back pain.   Skin:  Negative for color change and rash.   Neurological:  Positive for dizziness, weakness and light-headedness. Negative for seizures and syncope.   All other systems reviewed and are negative.      Physical Exam  Physical Exam  Vitals reviewed.   Constitutional:       General: She is not in acute distress.     Appearance: She is well-developed. She is ill-appearing and toxic-appearing. She is not diaphoretic.   HENT:      Right Ear: External ear normal. No swelling. Tympanic membrane is not bulging.      Left Ear: External ear normal. No swelling. Tympanic membrane is not bulging.      Nose: Nose normal.      Mouth/Throat:      Pharynx: No oropharyngeal exudate.   Eyes:      General: Lids are normal.      Conjunctiva/sclera: Conjunctivae normal.      Pupils: Pupils are equal, round, and reactive to light.   Neck:      Thyroid: No thyromegaly.       Vascular: No JVD.      Trachea: No tracheal deviation.   Cardiovascular:      Rate and Rhythm: Normal rate and regular rhythm.      Pulses: Normal pulses.      Heart sounds: Normal heart sounds. No murmur heard.     No friction rub. No gallop.   Pulmonary:      Effort: Pulmonary effort is normal. No respiratory distress.      Breath sounds: Normal breath sounds. No stridor. No wheezing or rales.   Chest:      Chest wall: No tenderness.   Abdominal:      General: Bowel sounds are normal. There is no distension.      Palpations: Abdomen is soft. There is no mass.      Tenderness: There is no abdominal tenderness. There is no guarding or rebound. Negative signs include Warren's sign.      Hernia: No hernia is present.   Genitourinary:     Rectum: Guaiac result positive.      Comments: Digital rectal exam completed with nursing at bedside, brown stool noted however guaiac testing performed, control positive sample positive.  Musculoskeletal:         General: No tenderness. Normal range of motion.      Cervical back: Normal range of motion and neck supple. No edema. Normal range of motion.   Lymphadenopathy:      Cervical: No cervical adenopathy.   Skin:     General: Skin is warm and dry.      Capillary Refill: Capillary refill takes less than 2 seconds.      Coloration: Skin is not pale.      Findings: No erythema or rash.   Neurological:      General: No focal deficit present.      Mental Status: She is alert and oriented to person, place, and time.      GCS: GCS eye subscore is 4. GCS verbal subscore is 5. GCS motor subscore is 6.      Cranial Nerves: No cranial nerve deficit.      Sensory: No sensory deficit.      Deep Tendon Reflexes: Reflexes are normal and symmetric.   Psychiatric:         Mood and Affect: Mood normal.         Speech: Speech normal.         Behavior: Behavior normal.         Thought Content: Thought content normal.         Judgment: Judgment normal.         Vital Signs  ED Triage Vitals    Temperature Pulse Respirations Blood Pressure SpO2   02/19/24 1314 02/19/24 1254 02/19/24 1324 02/19/24 1254 02/19/24 1254   99.7 °F (37.6 °C) 78 20 (!) 87/43 (!) 86 %      Temp Source Heart Rate Source Patient Position - Orthostatic VS BP Location FiO2 (%)   02/19/24 1314 02/19/24 1254 02/19/24 1254 02/19/24 1254 --   Rectal Monitor Lying Left arm       Pain Score       --                  Vitals:    02/19/24 1445 02/19/24 1500 02/19/24 1530 02/19/24 1546   BP: 108/55 109/55 110/57 122/55   Pulse: 74 75 76 73   Patient Position - Orthostatic VS:             Visual Acuity      ED Medications  Medications   sodium chloride 0.9 % bolus 500 mL (0 mL Intravenous Stopped 2/19/24 1548)   iohexol (OMNIPAQUE) 350 MG/ML injection (MULTI-DOSE) 100 mL (100 mL Intravenous Given 2/19/24 1343)   pantoprazole (PROTONIX) injection 40 mg (40 mg Intravenous Given 2/19/24 1541)       Diagnostic Studies  Results Reviewed       Procedure Component Value Units Date/Time    HS Troponin I 2hr [[id number]] Collected: 02/19/24 1532    Lab Status: In process Specimen: Blood from Arm, Left Updated: 02/19/24 1534    HS Troponin I 4hr [[id number]]     Lab Status: No result Specimen: Blood     FLU/RSV/COVID - if FLU/RSV clinically relevant [[id number]]  (Normal) Collected: 02/19/24 1317    Lab Status: Final result Specimen: Nares from Nose Updated: 02/19/24 1409     SARS-CoV-2 Negative     INFLUENZA A PCR Negative     INFLUENZA B PCR Negative     RSV PCR Negative    Narrative:      FOR PEDIATRIC PATIENTS - copy/paste COVID Guidelines URL to browser: [email or web address]    SARS-CoV-2 assay is a Nucleic Acid Amplification assay intended for the  qualitative detection of nucleic acid from SARS-CoV-2 in nasopharyngeal  swabs. Results are for the presumptive identification of SARS-CoV-2 RNA.    Positive results are indicative of infection with SARS-CoV-2, the virus  causing COVID-19, but do not  rule out bacterial infection or co-infection  with other viruses. Laboratories within the [place] and its  territories are required to report all positive results to the appropriate  public health authorities. Negative results do not preclude SARS-CoV-2  infection and should not be used as the sole basis for treatment or other  patient management decisions. Negative results must be combined with  clinical observations, patient history, and epidemiological information.  This test has not been FDA cleared or approved.    This test has been authorized by FDA under an Emergency Use Authorization  (EUA). This test is only authorized for the duration of time the  declaration that circumstances exist justifying the authorization of the  emergency use of an in vitro diagnostic tests for detection of SARS-CoV-2  virus and/or diagnosis of COVID-19 infection under section 564(b)(1) of  the Act, 21 U.S.C. 360bbb-3(b)(1), unless the authorization is terminated  or revoked sooner. The test has been validated but independent review by FDA  and CLIA is pending.    Test performed using [employer] Gene[employer]pert: This RT-PCR assay targets N2,  a region unique to SARS-CoV-2. A conserved region in the E-gene was chosen  for pan-Sarbecovirus detection which includes SARS-CoV-2.    According to CMS-2020-01-R, this platform meets the definition of high-throughput technology.    RBC Morphology Reflex Test [[id number]] Collected: 02/19/24 1317    Lab Status: Final result Specimen: Blood from Arm, Left Updated: 02/19/24 1401    CBC and differential [[id number]]  (Abnormal) Collected: 02/19/24 1317    Lab Status: Final result Specimen: Blood from Arm, Left Updated: 02/19/24 1350     WBC 7.60 Thousand/uL      RBC 1.70 Million/uL      Hemoglobin 5.2 g/dL      Hematocrit 17.2 %       fL      MCH 30.6 pg      MCHC 30.2 g/dL      RDW 21.6 %      MPV 11.4 fL      Platelets 220 Thousands/uL     Lactic acid, plasma (w/reflex if result > 2.0)  [[id number]]  (Abnormal) Collected: 02/19/24 1317    Lab Status: Final result Specimen: Blood from Arm, Left Updated: 02/19/24 1349     LACTIC ACID 3.3 mmol/L     Narrative:      Result may be elevated if tourniquet was used during collection.    Lactic acid 2 Hours [[id number]]     Lab Status: No result Specimen: Blood     HS Troponin 0hr (reflex protocol) [[id number]]  (Normal) Collected: 02/19/24 1317    Lab Status: Final result Specimen: Blood from Arm, Left Updated: 02/19/24 1347     hs TnI 0hr 15 ng/L     Manual Differential(PHLEBS Do Not Order) [[id number]]  (Abnormal) Collected: 02/19/24 1317    Lab Status: Final result Specimen: Blood from Arm, Left Updated: 02/19/24 1344     Segmented % 83 %      Bands % 11 %      Lymphocytes % 4 %      Monocytes % 1 %      Eosinophils, % 1 %      Basophils % 0 %      Absolute Neutrophils 7.14 Thousand/uL      Lymphocytes Absolute 0.30 Thousand/uL      Monocytes Absolute 0.08 Thousand/uL      Eosinophils Absolute 0.08 Thousand/uL      Basophils Absolute 0.00 Thousand/uL      Total Counted --     nRBC 5 /100 WBC      RBC Morphology Present     Platelet Estimate Adequate     Acanthocytes Present     Anisocytosis Present     Macrocytes Present     Poikilocytes Present     Polychromasia Present    Comprehensive metabolic panel [[id number]]  (Abnormal) Collected: 02/19/24 1317    Lab Status: Final result Specimen: Blood from Arm, Left Updated: 02/19/24 1341     Sodium 133 mmol/L      Potassium 3.6 mmol/L      Chloride 100 mmol/L      CO2 25 mmol/L      ANION GAP 8 mmol/L      BUN 25 mg/dL      Creatinine 1.08 mg/dL      Glucose 302 mg/dL      Calcium 8.4 mg/dL      Corrected Calcium 9.6 mg/dL      AST 16 U/L      ALT 21 U/L      Alkaline Phosphatase 59 U/L      Total Protein 3.9 g/dL      Albumin 2.5 g/dL      Total Bilirubin 1.89 mg/dL      eGFR 48 ml/min/1.73sq m     Narrative:      National Kidney Disease Foundation guidelines for Chronic Kidney Disease (CKD):     Stage 1 with  normal or high GFR (GFR > 90 mL/min/1.73 square meters)    Stage 2 Mild CKD (GFR = 60-89 mL/min/1.73 square meters)    Stage 3A Moderate CKD (GFR = 45-59 mL/min/1.73 square meters)    Stage 3B Moderate CKD (GFR = 30-44 mL/min/1.73 square meters)    Stage 4 Severe CKD (GFR = 15-29 mL/min/1.73 square meters)    Stage 5 End Stage CKD (GFR <15 mL/min/1.73 square meters)  Note: GFR calculation is accurate only with a steady state creatinine    Magnesium [[id number]]  (Abnormal) Collected: 02/19/24 1317    Lab Status: Final result Specimen: Blood from Arm, Left Updated: 02/19/24 1341     Magnesium 1.6 mg/dL     Protime-INR [[id number]]  (Normal) Collected: 02/19/24 1317    Lab Status: Final result Specimen: Blood from Arm, Left Updated: 02/19/24 1336     Protime 14.2 seconds      INR 1.11    APTT [[id number]]  (Normal) Collected: 02/19/24 1317    Lab Status: Final result Specimen: Blood from Arm, Left Updated: 02/19/24 1336     PTT 26 seconds     Blood culture #1 [[id number]] Collected: 02/19/24 1317    Lab Status: In process Specimen: Blood from Arm, Right Updated: 02/19/24 1322    Blood culture #2 [[id number]] Collected: 02/19/24 1317    Lab Status: In process Specimen: Blood from Arm, Left Updated: 02/19/24 1322    UA w Reflex to Microscopic w Reflex to Culture [[id number]]     Lab Status: No result Specimen: Urine                    CT chest abdomen pelvis w contrast   Final Result by Karo Blood MD (02/19 1500)      Findings suspicious for mild pancolitis.      Few gas locules in the urinary bladder. This could be related to recent instrumentation, but could also be related to infection. Recommend clinical correlation and suggest urinalysis to exclude infection.      The study was marked in EPIC for immediate notification.               Workstation performed: [id number]         XR chest 1 view portable    (Results Pending)              Procedures  ECG 12 Lead Documentation Only    Date/Time: 2/19/2024 1:38  PM    Performed by: Teddy Spangler PA-C  Authorized by: Teddy Spangler PA-C    Indications / Diagnosis:  Weakness  ECG reviewed by me, the ED Provider: yes    Patient location:  ED  Previous ECG:     Comparison to cardiac monitor: Yes    Interpretation:     Interpretation: non-specific    Rate:     ECG rate:  78    ECG rate assessment: normal    Rhythm:     Rhythm: sinus rhythm    Ectopy:     Ectopy: none    QRS:     QRS axis:  Normal  Conduction:     Conduction: abnormal      Abnormal conduction: complete RBBB    ST segments:     ST segments:  Normal  CriticalCare Time    Date/Time: 2/19/2024 3:55 PM    Performed by: Teddy Spangler PA-C  Authorized by: Teddy Spangler PA-C    Critical care provider statement:     Critical care time (minutes):  35    Critical care time was exclusive of:  Separately billable procedures and treating other patients    Critical care was necessary to treat or prevent imminent or life-threatening deterioration of the following conditions:  Circulatory failure    Critical care was time spent personally by me on the following activities:  Examination of patient, review of old charts, ordering and review of laboratory studies, ordering and review of radiographic studies, ordering and performing treatments and interventions, evaluation of patient's response to treatment, development of treatment plan with patient or surrogate and obtaining history from patient or surrogate  Comments:      Patient presents hypotensive as well as hypoxic requiring mid flow nasal cannula 11 L/min, she is found to be profoundly anemic and very symptomatic with his anemia and required blood transfusion.  This was compared to 2010, similar findings noted at that point.         ED Course             HEART Risk Score      Flowsheet Row Most Recent Value   Heart Score Risk Calculator    History 0 Filed at: 02/19/2024 1341   ECG 0 Filed at: 02/19/2024 1341   Age 2 Filed at: 02/19/2024 1341    Risk Factors 2 Filed at: 02/19/2024 1341   Troponin 0 Filed at: 02/19/2024 1341   HEART Score 4 Filed at: 02/19/2024 1341                          SBIRT 20yo+      Flowsheet Row Most Recent Value   Initial Alcohol Screen: US AUDIT-C     1. How often do you have a drink containing alcohol? 0 Filed at: 02/19/2024 1408   2. How many drinks containing alcohol do you have on a typical day you are drinking?  0 Filed at: 02/19/2024 1408   3a. Male UNDER 65: How often do you have five or more drinks on one occasion? 0 Filed at: 02/19/2024 1408   3b. FEMALE Any Age, or MALE 65+: How often do you have 4 or more drinks on one occassion? 0 Filed at: 02/19/2024 1408   Audit-C Score 0 Filed at: 02/19/2024 1408   ALIYAH: How many times in the past year have you...    Used an illegal drug or used a prescription medication for non-medical reasons? Never Filed at: 02/19/2024 1408                      Medical Decision Making  78-year-old female presenting from a skilled nursing facility on the fifth floor of this building who is here for evaluation of anemia weakness dizziness.  She did have recent admission through [facility] for syncope.  She tells me that she has had diarrhea x 4 last evening but was unsure of the color of the stool.  She presents hypoxic as well as hypotensive.  She is ill-appearing she admits to lightheadedness dizziness weakness she does appear pale.  Differential diagnosis includes acute coronary syndrome acute blood loss anemia acute kidney injury acute coronary syndrome sepsis  GI hemorrhage.    Workup significant for anemia hypoxia pancolitis she received IV Protonix blood transfusion small bolus of normal saline.  Discussed case with internal medicine agreeable to stepdown admission here.    Amount and/or Complexity of Data Reviewed  Labs: ordered.  Radiology: ordered.    Risk  Prescription drug management.  Decision regarding hospitalization.             Disposition  Final diagnoses:    Pancolitis (HCC)   Anemia   Hypoxia     Time reflects when diagnosis was documented in both MDM as applicable and the Disposition within this note       Time User Action Codes Description Comment    2/19/2024  3:03 PM Teddy Spangler Add [K51.00] Pancolitis (HCC)     2/19/2024  3:03 PM Teddy Spangler Add [D64.9] Anemia     2/19/2024  3:55 PM Teddy Spangler Add [R09.02] Hypoxia           ED Disposition       ED Disposition   Admit    Condition   Stable    Date/Time   Mon Feb 19, 2024 6184    Comment   Case was discussed with Dr. Sánchez and the patient's admission status was agreed to be Admission Status: inpatient status to the service of Dr. Sánchez .               Follow-up Information    None         Patient's Medications   Discharge Prescriptions    No medications on file       No discharge procedures on file.    PDMP Review       None            ED Provider  Electronically Signed by             Teddy Spangler PA-C  02/19/24 8070

## 2024-02-19 NOTE — ASSESSMENT & PLAN NOTE
Recently diagnosed 12/2023  Complicated by lupus nephritis placed on CellCept   Following with rheumatology at [facility]  Managed with plaquenil 300mg, prednisone 30mg, Cellcept 500bid, Dapsone 100mg.   Hold above regimen

## 2024-02-19 NOTE — H&P
[facility]  H&P  Name: Darlyn Nguyen 78 y.o. female I MRN: [id number]  Unit/Bed#: RM10 I Date of Admission: 2/19/2024   Date of Service: 2/19/2024 I Hospital Day: 0      Assessment/Plan   * Acute on chronic anemia  Assessment & Plan  Hgb 5.3  Baseline 9-10  IV Protonix bid  Hold ASA. Hold CellCept until GIB r/o  2 units pRBCs ordered in ED  Likely to need additional unit   Check Hgb 1 hr after transfusion completed - additional units as needed  Then trend H&H q6  Rectal exam in ED with brown stool, guaiac positive  Transfuse as indicated, Hgb >7  CLD. NPO MN  GI consult appreciated    Acute hypoxic respiratory failure (HCC)  Assessment & Plan  Hypoxic to 80s on arrival, Requiring 11L MFNC   Was on 3L NC when discharged from [facility], prior no known oxygen requirement  No clear etiology identified during that hospitalization  Wean as able to maintain SpO2 >90%  Despite this, patient in NAD wo respiratory complaints  CT chest unrevealing   Pulmonary consult appreciated         High serum lactic acid  Assessment & Plan  Lactic 3.3  Likely with acute anemia, hypovolemia   Trend     Hypomagnesemia  Assessment & Plan  Mag 1.6   Given 2mg mag sulfate    Metabolic encephalopathy  Assessment & Plan  Patient somnolent, easily awakens to verbal stimuli. Appropriately oriented.   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   Hold above regimen             VTE Pharmacologic Prophylaxis:   SCDs  Code Status: Level 1 - Full Code   Discussion with family:  Attending updated family at bedside.     Anticipated Length of Stay: Patient will be admitted on an inpatient basis with an anticipated length of stay of greater than 2 midnights secondary to AoC anemia, acute hypoxic respiratory failure.    Total Time Spent on Date of Encounter in care of patient:  mins. This time was spent on one or more of the following: performing physical exam; counseling and coordination of care; obtaining or reviewing history; documenting in the medical record; reviewing/ordering tests, medications or procedures; communicating with other healthcare professionals and discussing with patient's family/caregivers.    Chief Complaint: lethargy     History of Present Illness:  Darlyn Nguyen is a 78 y.o. female with a PMH of lupus, HTN, who presents given dizziness, lethargy noted at 5th floor rehab today. Had lab work revealing Hgb 5.2. She reported 4-5 episodes of diarrhea last night. Denies overt signs of GIB.  Otherwise, patient denies any acute concerns. She is somnolent, but awakens to verbal stimuli and responds appropriately.     Per chart review, patient was recently at [facility] for syncope. Was suspected to be vasovagal. Was discharged on 2/15 on 3L NC. Hgb noted to be 9.8. she additionally had hospitalization 12/16-12/28 where she was diagnosed with lups and initiated on dapsone, prednisone, plaquenil, Cellcept. In ED, had CT c/a/p revealing mild pancolitis. She was placed on 11L MFNC with stabilization in oxygen levels. She was then admitted to step-down unit for further evaluation and treatment with both GI and pulmonary consults     Review of Systems:  Review of Systems   Constitutional:  Negative for chills and fever.   Respiratory:  Negative for shortness of breath.    Cardiovascular:  Negative for chest pain.   Gastrointestinal:  Positive  for diarrhea. Negative for abdominal pain, nausea and vomiting.   Genitourinary:  Negative for difficulty urinating.   Neurological:  Positive for weakness.       Past Medical and Surgical History:   Past Medical History:   Diagnosis Date    Diabetes mellitus (HCC)     Fibromyalgia     Hyperlipidemia     Hypertension        Past Surgical History:   Procedure Laterality Date    ACHILLES TENDON REPAIR Right     ADENOIDECTOMY      APPENDECTOMY      SKIN CANCER EXCISION      x 4 - back    TONSILLECTOMY      TOTAL ABDOMINAL HYSTERECTOMY      US GUIDED KIDNEY BIOPSY  12/26/2023       Meds/Allergies:  Prior to Admission medications    Medication Sig Start Date End Date Taking? Authorizing Provider   albuterol (2.5 mg/3 mL) 0.083 % nebulizer solution Inhale 2.5 mg every 2 (two) hours as needed 2/16/24 2/15/25 Yes Historical Provider, MD   amLODIPine (NORVASC) 2.5 mg tablet Take 5 mg by mouth daily 12/2/21  Yes Historical Provider, MD   aspirin (ECOTRIN LOW STRENGTH) 81 mg EC tablet Take 81 mg by mouth   Yes Historical Provider, MD   atorvastatin (LIPITOR) 40 mg tablet Take 40 mg by mouth daily   Yes Historical Provider, MD   carvedilol (COREG) 6.25 mg tablet Take 6.25 mg by mouth 2 (two) times a day with meals   Yes Historical Provider, MD   Cholecalciferol 50 MCG (2000 UT) TABS Take 1 tablet by mouth daily   Yes Historical Provider, MD   co-enzyme Q-10 30 MG capsule Take by mouth   Yes Historical Provider, MD   cyanocobalamin (VITAMIN B-12) 2500 MCG TABS Take 1,000 mcg by mouth daily   Yes Historical Provider, MD   dapsone 100 mg tablet Take 100 mg by mouth daily   Yes Historical Provider, MD   escitalopram (LEXAPRO) 10 mg tablet Take 10 mg by mouth daily   Yes Historical Provider, MD   famotidine (PEPCID) 20 mg tablet Take 20 mg by mouth daily   Yes Historical Provider, MD   Hydroxychloroquine Sulfate 300 MG TABS Take 1 tablet by mouth daily   Yes Historical Provider, MD   losartan (COZAAR) 100 MG tablet Take 100 mg by  mouth daily   Yes Historical Provider, MD   miconazole (MICOTIN) 2 % powder Apply 1 Application topically 2 (two) times a day 2/16/24 2/26/24 Yes Historical Provider, MD   mirtazapine (REMERON) 7.5 MG tablet Take 7.5 mg by mouth daily at bedtime   Yes Historical Provider, MD   mycophenolate (CELLCEPT) 500 mg tablet Take 2 tabs twice daily as instructed 1/10/24  Yes Historical Provider, MD   pantoprazole (PROTONIX) 40 mg tablet Take 40 mg by mouth 2 (two) times a day   Yes Historical Provider, MD   predniSONE 20 mg tablet Take 30 mg by mouth daily   Yes Historical Provider, MD   DULoxetine (CYMBALTA) 60 mg delayed release capsule  12/31/21   Historical Provider, MD   FISH OIL-CANOLA OIL-VIT D3 PO Use    Historical Provider, MD   gabapentin (NEURONTIN) 100 mg capsule  12/5/21   Historical Provider, MD   TURMERIC PO Take 1 capsule by mouth daily    Historical Provider, MD     I have reviewed home medications using recent Epic encounter.    Allergies:   Allergies   Allergen Reactions    Rosuvastatin Other (See Comments)     Per SNF    Simvastatin Other (See Comments)     Per SNF       Social History:  Marital Status: /Civil Union   Occupation:   Patient Pre-hospital Living Situation:   Patient Pre-hospital Level of Mobility:   Patient Pre-hospital Diet Restrictions:   Substance Use History:   Social History     Substance and Sexual Activity   Alcohol Use Not Currently     Social History     Tobacco Use   Smoking Status Never   Smokeless Tobacco Never     Social History     Substance and Sexual Activity   Drug Use Never       Family History:  Family History   Problem Relation Age of Onset    Cancer Mother     Cervical cancer Sister     Breast cancer Sister     Breast cancer Sister        Physical Exam:     Vitals:   Blood Pressure: 119/57 (02/19/24 1600)  Pulse: 72 (02/19/24 1600)  Temperature: 97.7 °F (36.5 °C) (02/19/24 1600)  Temp Source: Temporal (02/19/24 1600)  Respirations: 18 (02/19/24 1600)  SpO2: 92 %  (02/19/24 1600)    Physical Exam  Constitutional:       Appearance: She is ill-appearing.   HENT:      Head: Normocephalic and atraumatic.   Cardiovascular:      Rate and Rhythm: Normal rate and regular rhythm.   Pulmonary:      Effort: Pulmonary effort is normal. No respiratory distress.      Breath sounds: Normal breath sounds. No wheezing or rales.      Comments: On 15L MFNC  Abdominal:      General: Abdomen is flat.      Palpations: Abdomen is soft.      Tenderness: There is abdominal tenderness.      Comments: Mild TTP in lower quadrants   Musculoskeletal:      Right lower leg: No edema.      Left lower leg: No edema.   Skin:     General: Skin is warm and dry.   Neurological:      General: No focal deficit present.      Mental Status: She is oriented to person, place, and time.      GCS: GCS eye subscore is 3. GCS verbal subscore is 5. GCS motor subscore is 6.      Cranial Nerves: No cranial nerve deficit, dysarthria or facial asymmetry.      Comments: Somnolent. Awakens to verbal stimuli, answers questions appropriately    Psychiatric:         Mood and Affect: Mood normal.         Behavior: Behavior normal.          Additional Data:     Lab Results:  Results from last 7 days   Lab Units 02/19/24  1317   WBC Thousand/uL 7.60   HEMOGLOBIN g/dL 5.2*   HEMATOCRIT % 17.2*   PLATELETS Thousands/uL 220   BANDS PCT % 11*   LYMPHO PCT % 4*   MONO PCT % 1*   EOS PCT % 1     Results from last 7 days   Lab Units 02/19/24  1317   SODIUM mmol/L 133*   POTASSIUM mmol/L 3.6   CHLORIDE mmol/L 100   CO2 mmol/L 25   BUN mg/dL 25   CREATININE mg/dL 1.08   ANION GAP mmol/L 8   CALCIUM mg/dL 8.4   ALBUMIN g/dL 2.5*   TOTAL BILIRUBIN mg/dL 1.89*   ALK PHOS U/L 59   ALT U/L 21   AST U/L 16   GLUCOSE RANDOM mg/dL 302*     Results from last 7 days   Lab Units 02/19/24  1317   INR  1.11             Results from last 7 days   Lab Units 02/19/24  1317   LACTIC ACID mmol/L 3.3*       Lines/Drains:  Invasive Devices       Peripheral  Intravenous Line  Duration             Peripheral IV 02/19/24 Dorsal (posterior);Left Hand <1 day    Peripheral IV 02/19/24 Left;Ventral (anterior) Forearm <1 day    Peripheral IV 02/19/24 Proximal;Right;Ventral (anterior) Forearm <1 day    Peripheral IV 02/19/24 Right;Ventral (anterior) Forearm <1 day                        Imaging: Reviewed radiology reports from this admission including: chest CT scan and abdominal/pelvic CT  CT chest abdomen pelvis w contrast   Final Result by Karo Blood MD (02/19 1500)      Findings suspicious for mild pancolitis.      Few gas locules in the urinary bladder. This could be related to recent instrumentation, but could also be related to infection. Recommend clinical correlation and suggest urinalysis to exclude infection.      The study was marked in EPIC for immediate notification.               Workstation performed: [id number]         XR chest 1 view portable    (Results Pending)       EKG and Other Studies Reviewed on Admission:   EKG: NSR. HR 78.    ** Please Note: This note has been constructed using a voice recognition system. **

## 2024-02-19 NOTE — ASSESSMENT & PLAN NOTE
Patient somnolent, easily awakens to verbal stimuli. Appropriately oriented.   Neuro exam non-focal  Suspect with above anemia, infection  Monitor neuro checks   Bicarb normal on CMP

## 2024-02-19 NOTE — ASSESSMENT & PLAN NOTE
Reporting 4 episodes of diarrhea last evening to ED provider   CT abd/pelvis revealing mild pancolitis   Stool studies   IV Rocephin/Flagyl   GI consult appreciated

## 2024-02-19 NOTE — QUICK NOTE
Pulmonary Brief Note    New consult noted, extensive history with recent admission to [facility] from 2/9/2024 - 2/13/2024 followed by d/c to SNF.  During this admission she was noted to have chronic anemia (hgb 9-11) with progressive LE weakness and syncope.  She was seen by rheumatology at that time.  She was recently diagnosed with lupus during prior admission [facility] from 12/16/2023 to 12/20/2023 and started on prednisone, Plaquenil, dapsone and Protonix, and was more recently started on CellCept.  She presented to the ED today from her SNF due to hemoglobin of 5.2 on morning labs with dizziness and lethargy.  No obvious source of bleeding was noted (although GENI was guaiac positive), and a CT C/A/P showed only evidence of mild pancolitis and gas in the urinary bladder with no clear pulmonary source.  She was noted to be hypotensive on admission, and since this time has had increasing O2 requirements up to 11 L O2.  She did have a recent CTA PE study at [facility] on 2/13/2024 which showed no evidence of PE or acute abnormalities per read although imaging is not currently available.    The patient's anemia is likely contributing to her hypoxia, less suspicious for VTE given recent negative CTA PE study although the current CT from here is not a PE protocol.  No clear pulmonary etiology is identified on imaging.  Recommend pRBC transfusion to goal hgb of 7 unless active bleeding is identified.  Patient needs an ABG, she is on dapsone which can cause methemoglobinemia.  If significant discrepancy between PA O2 and SpO2 would check methemoglobin level.  Would also recommend echocardiogram with bubble study when able.  Check LE dopplers.    Would recommend transfer to higher level of care given profound anemia and hypoxia.    Lindsay Hernandez MD

## 2024-02-19 NOTE — QUICK NOTE
Case was reviewed with accepting provider, will monitor patient at [facility]  Check ABG with Methhemoglobin  Transfuse for Hgb < 8     Rheumatology consult when available

## 2024-02-19 NOTE — ASSESSMENT & PLAN NOTE
Hypotensive initially, improved with IVF hydration  Hold home losartan 100, carvedilol 6.25 bid, Norvasc 5mg  Trend

## 2024-02-19 NOTE — ASSESSMENT & PLAN NOTE
Hypoxic to 80s on arrival, Requiring 11L MFNC   Was on 3L NC when discharged from [facility], prior no known oxygen requirement  No clear etiology identified during that hospitalization  Wean as able to maintain SpO2 >90%  Despite this, patient in NAD wo respiratory complaints  CT chest unrevealing   Pulmonary consult appreciated

## 2024-02-19 NOTE — ASSESSMENT & PLAN NOTE
Hgb 5.3  Baseline 9-10  IV Protonix bid  Hold ASA. Hold CellCept until GIB r/o  2 units pRBCs ordered in ED  Likely to need additional unit   Check Hgb 1 hr after transfusion completed - additional units as needed  Then trend H&H q6  Rectal exam in ED with brown stool, guaiac positive  Transfuse as indicated, Hgb >7  CLD. NPO MN  GI consult appreciated

## 2024-02-20 ENCOUNTER — APPOINTMENT (INPATIENT)
Dept: NON INVASIVE DIAGNOSTICS | Facility: HOSPITAL | Age: 79
DRG: 189 | End: 2024-02-20
Payer: MEDICARE

## 2024-02-20 VITALS
TEMPERATURE: 96.9 F | SYSTOLIC BLOOD PRESSURE: 143 MMHG | OXYGEN SATURATION: 86 % | WEIGHT: 138.89 LBS | HEART RATE: 84 BPM | BODY MASS INDEX: 23.71 KG/M2 | HEIGHT: 64 IN | RESPIRATION RATE: 16 BRPM | DIASTOLIC BLOOD PRESSURE: 65 MMHG

## 2024-02-20 LAB
ABO GROUP BLD BPU: NORMAL
ABO GROUP BLD BPU: NORMAL
ALBUMIN SERPL BCP-MCNC: 2.8 G/DL (ref 3.5–5)
ALP SERPL-CCNC: 75 U/L (ref 34–104)
ALT SERPL W P-5'-P-CCNC: 25 U/L (ref 7–52)
ANION GAP SERPL CALCULATED.3IONS-SCNC: 8 MMOL/L
AORTIC ROOT: 3.5 CM
APTT PPP: 26 SECONDS (ref 23–37)
AST SERPL W P-5'-P-CCNC: 14 U/L (ref 13–39)
BACTERIA UR QL AUTO: ABNORMAL /HPF
BASE EXCESS BLDA CALC-SCNC: -1.5 MMOL/L
BILIRUB SERPL-MCNC: 1.27 MG/DL (ref 0.2–1)
BILIRUB UR QL STRIP: ABNORMAL
BPU ID: NORMAL
BPU ID: NORMAL
BSA FOR ECHO PROCEDURE: 1.68 M2
BUN SERPL-MCNC: 23 MG/DL (ref 5–25)
CALCIUM ALBUM COR SERPL-MCNC: 9.6 MG/DL (ref 8.3–10.1)
CALCIUM SERPL-MCNC: 8.6 MG/DL (ref 8.4–10.2)
CHLORIDE SERPL-SCNC: 103 MMOL/L (ref 96–108)
CLARITY UR: ABNORMAL
CO2 SERPL-SCNC: 26 MMOL/L (ref 21–32)
COLOR UR: ABNORMAL
CREAT SERPL-MCNC: 0.99 MG/DL (ref 0.6–1.3)
CROSSMATCH: NORMAL
CROSSMATCH: NORMAL
E WAVE DECELERATION TIME: 310 MS
E/A RATIO: 0.64
ERYTHROCYTE [DISTWIDTH] IN BLOOD BY AUTOMATED COUNT: 17.8 % (ref 11.6–15.1)
ERYTHROCYTE [DISTWIDTH] IN BLOOD BY AUTOMATED COUNT: 18.8 % (ref 11.6–15.1)
FERRITIN SERPL-MCNC: 4269 NG/ML (ref 11–307)
FOLATE SERPL-MCNC: 5.2 NG/ML
GFR SERPL CREATININE-BSD FRML MDRD: 53 ML/MIN/1.73SQ M
GLUCOSE SERPL-MCNC: 221 MG/DL (ref 65–140)
GLUCOSE UR STRIP-MCNC: NEGATIVE MG/DL
HCO3 BLDA-SCNC: 21.5 MMOL/L (ref 22–28)
HCT VFR BLD AUTO: 29.2 % (ref 34.8–46.1)
HCT VFR BLD AUTO: 29.7 % (ref 34.8–46.1)
HGB BLD-MCNC: 9.8 G/DL (ref 11.5–15.4)
HGB BLD-MCNC: 9.8 G/DL (ref 11.5–15.4)
HGB UR QL STRIP.AUTO: ABNORMAL
INR PPP: 0.94 (ref 0.84–1.19)
INR PPP: 1.04 (ref 0.84–1.19)
IRON SATN MFR SERPL: 47 % (ref 15–50)
IRON SERPL-MCNC: 85 UG/DL (ref 50–212)
KETONES UR STRIP-MCNC: NEGATIVE MG/DL
LAAS-AP2: 22.4 CM2
LAAS-AP4: 18.9 CM2
LEFT ATRIUM SIZE: 2.3 CM
LEFT ATRIUM VOLUME (MOD BIPLANE): 69 ML
LEFT ATRIUM VOLUME INDEX (MOD BIPLANE): 41.1 ML/M2
LEUKOCYTE ESTERASE UR QL STRIP: ABNORMAL
MAGNESIUM SERPL-MCNC: 1.7 MG/DL (ref 1.9–2.7)
MCH RBC QN AUTO: 31.1 PG (ref 26.8–34.3)
MCH RBC QN AUTO: 31.2 PG (ref 26.8–34.3)
MCHC RBC AUTO-ENTMCNC: 33 G/DL (ref 31.4–37.4)
MCHC RBC AUTO-ENTMCNC: 33.6 G/DL (ref 31.4–37.4)
MCV RBC AUTO: 93 FL (ref 82–98)
MCV RBC AUTO: 95 FL (ref 82–98)
METHGB MFR BLDCO: 17.6 % (ref 0.1–1.5)
MV E'TISSUE VEL-LAT: 6 CM/S
MV E'TISSUE VEL-SEP: 4 CM/S
MV PEAK A VEL: 0.91 M/S
MV PEAK E VEL: 58 CM/S
MV STENOSIS PRESSURE HALF TIME: 90 MS
MV VALVE AREA P 1/2 METHOD: 2.44
NASAL CANNULA: ABNORMAL
NITRITE UR QL STRIP: POSITIVE
NON-SQ EPI CELLS URNS QL MICRO: ABNORMAL /HPF
O2 CT BLDA-SCNC: 12.7 ML/DL (ref 16–23)
OXYHGB MFR BLDA: 80.7 % (ref 94–97)
PCO2 BLDA: 30.3 MM HG (ref 36–44)
PH BLDA: 7.47 [PH] (ref 7.35–7.45)
PH UR STRIP.AUTO: 5.5 [PH]
PHOSPHATE SERPL-MCNC: 3.2 MG/DL (ref 2.3–4.1)
PLATELET # BLD AUTO: 156 THOUSANDS/UL (ref 149–390)
PLATELET # BLD AUTO: 181 THOUSANDS/UL (ref 149–390)
PMV BLD AUTO: 11 FL (ref 8.9–12.7)
PMV BLD AUTO: 11.5 FL (ref 8.9–12.7)
PO2 BLDA: 282.2 MM HG (ref 75–129)
POTASSIUM SERPL-SCNC: 3.5 MMOL/L (ref 3.5–5.3)
PROT SERPL-MCNC: 4.9 G/DL (ref 6.4–8.4)
PROT UR STRIP-MCNC: ABNORMAL MG/DL
PROTHROMBIN TIME: 12.5 SECONDS (ref 11.6–14.5)
PROTHROMBIN TIME: 13.5 SECONDS (ref 11.6–14.5)
PULM VEIN S/D RATIO: 0.63
PV PEAK D VEL: 0.19 M/S
PV PEAK S VEL: 0.12 M/S
RBC # BLD AUTO: 3.14 MILLION/UL (ref 3.81–5.12)
RBC # BLD AUTO: 3.15 MILLION/UL (ref 3.81–5.12)
RBC #/AREA URNS AUTO: ABNORMAL /HPF
RIGHT ATRIUM AREA SYSTOLE A4C: 12.8 CM2
RIGHT VENTRICLE ID DIMENSION: 2.5 CM
SL CV LEFT ATRIUM LENGTH A2C: 5.3 CM
SL CV LV EF: 65
SODIUM SERPL-SCNC: 137 MMOL/L (ref 135–147)
SP GR UR STRIP.AUTO: 1.02 (ref 1–1.03)
SPECIMEN SOURCE: ABNORMAL
TIBC SERPL-MCNC: 180 UG/DL (ref 250–450)
TR MAX PG: 27 MMHG
TR PEAK VELOCITY: 2.6 M/S
TRICUSPID ANNULAR PLANE SYSTOLIC EXCURSION: 2 CM
TRICUSPID VALVE PEAK E WAVE VELOCITY: 0.11 M/S
TRICUSPID VALVE PEAK REGURGITATION VELOCITY: 2.61 M/S
UIBC SERPL-MCNC: 95 UG/DL (ref 155–355)
UNIT DISPENSE STATUS: NORMAL
UNIT DISPENSE STATUS: NORMAL
UNIT PRODUCT CODE: NORMAL
UNIT PRODUCT CODE: NORMAL
UNIT PRODUCT VOLUME: 350 ML
UNIT PRODUCT VOLUME: 350 ML
UNIT RH: NORMAL
UNIT RH: NORMAL
UROBILINOGEN UR QL STRIP.AUTO: 0.2 E.U./DL
VIT B12 SERPL-MCNC: 6569 PG/ML (ref 180–914)
WBC # BLD AUTO: 10.57 THOUSAND/UL (ref 4.31–10.16)
WBC # BLD AUTO: 9.69 THOUSAND/UL (ref 4.31–10.16)
WBC #/AREA URNS AUTO: ABNORMAL /HPF

## 2024-02-20 PROCEDURE — 83550 IRON BINDING TEST: CPT | Performed by: FAMILY MEDICINE

## 2024-02-20 PROCEDURE — 94760 N-INVAS EAR/PLS OXIMETRY 1: CPT

## 2024-02-20 PROCEDURE — 82746 ASSAY OF FOLIC ACID SERUM: CPT | Performed by: FAMILY MEDICINE

## 2024-02-20 PROCEDURE — C9113 INJ PANTOPRAZOLE SODIUM, VIA: HCPCS | Performed by: FAMILY MEDICINE

## 2024-02-20 PROCEDURE — 87086 URINE CULTURE/COLONY COUNT: CPT | Performed by: PHYSICIAN ASSISTANT

## 2024-02-20 PROCEDURE — 99222 1ST HOSP IP/OBS MODERATE 55: CPT | Performed by: INTERNAL MEDICINE

## 2024-02-20 PROCEDURE — 81001 URINALYSIS AUTO W/SCOPE: CPT | Performed by: PHYSICIAN ASSISTANT

## 2024-02-20 PROCEDURE — 80053 COMPREHEN METABOLIC PANEL: CPT | Performed by: INTERNAL MEDICINE

## 2024-02-20 PROCEDURE — 85610 PROTHROMBIN TIME: CPT | Performed by: FAMILY MEDICINE

## 2024-02-20 PROCEDURE — 83735 ASSAY OF MAGNESIUM: CPT | Performed by: INTERNAL MEDICINE

## 2024-02-20 PROCEDURE — 85027 COMPLETE CBC AUTOMATED: CPT | Performed by: FAMILY MEDICINE

## 2024-02-20 PROCEDURE — 85610 PROTHROMBIN TIME: CPT | Performed by: INTERNAL MEDICINE

## 2024-02-20 PROCEDURE — 83050 HGB METHEMOGLOBIN QUAN: CPT

## 2024-02-20 PROCEDURE — 82805 BLOOD GASES W/O2 SATURATION: CPT

## 2024-02-20 PROCEDURE — 93970 EXTREMITY STUDY: CPT

## 2024-02-20 PROCEDURE — 36600 WITHDRAWAL OF ARTERIAL BLOOD: CPT

## 2024-02-20 PROCEDURE — 84100 ASSAY OF PHOSPHORUS: CPT | Performed by: INTERNAL MEDICINE

## 2024-02-20 PROCEDURE — 82607 VITAMIN B-12: CPT | Performed by: FAMILY MEDICINE

## 2024-02-20 PROCEDURE — 93970 EXTREMITY STUDY: CPT | Performed by: SURGERY

## 2024-02-20 PROCEDURE — 87186 SC STD MICRODIL/AGAR DIL: CPT | Performed by: PHYSICIAN ASSISTANT

## 2024-02-20 PROCEDURE — 83540 ASSAY OF IRON: CPT | Performed by: FAMILY MEDICINE

## 2024-02-20 PROCEDURE — 93306 TTE W/DOPPLER COMPLETE: CPT | Performed by: INTERNAL MEDICINE

## 2024-02-20 PROCEDURE — 87493 C DIFF AMPLIFIED PROBE: CPT

## 2024-02-20 PROCEDURE — 85027 COMPLETE CBC AUTOMATED: CPT | Performed by: INTERNAL MEDICINE

## 2024-02-20 PROCEDURE — 99223 1ST HOSP IP/OBS HIGH 75: CPT

## 2024-02-20 PROCEDURE — 99239 HOSP IP/OBS DSCHRG MGMT >30: CPT | Performed by: FAMILY MEDICINE

## 2024-02-20 PROCEDURE — 93306 TTE W/DOPPLER COMPLETE: CPT

## 2024-02-20 PROCEDURE — 85730 THROMBOPLASTIN TIME PARTIAL: CPT | Performed by: FAMILY MEDICINE

## 2024-02-20 PROCEDURE — 82728 ASSAY OF FERRITIN: CPT | Performed by: FAMILY MEDICINE

## 2024-02-20 PROCEDURE — 87077 CULTURE AEROBIC IDENTIFY: CPT | Performed by: PHYSICIAN ASSISTANT

## 2024-02-20 RX ORDER — HEPARIN SODIUM 10000 [USP'U]/100ML
3-30 INJECTION, SOLUTION INTRAVENOUS
Status: DISCONTINUED | OUTPATIENT
Start: 2024-02-20 | End: 2024-02-20 | Stop reason: HOSPADM

## 2024-02-20 RX ORDER — HYDROXYCHLOROQUINE SULFATE 200 MG/1
200 TABLET, FILM COATED ORAL DAILY
Status: DISCONTINUED | OUTPATIENT
Start: 2024-02-20 | End: 2024-02-20 | Stop reason: HOSPADM

## 2024-02-20 RX ADMIN — METRONIDAZOLE 500 MG: 5 INJECTION, SOLUTION INTRAVENOUS at 02:54

## 2024-02-20 RX ADMIN — HEPARIN SODIUM 18 UNITS/KG/HR: 10000 INJECTION, SOLUTION INTRAVENOUS at 14:10

## 2024-02-20 RX ADMIN — METRONIDAZOLE 500 MG: 5 INJECTION, SOLUTION INTRAVENOUS at 11:39

## 2024-02-20 RX ADMIN — ESCITALOPRAM OXALATE 10 MG: 10 TABLET ORAL at 09:13

## 2024-02-20 RX ADMIN — PANTOPRAZOLE SODIUM 40 MG: 40 INJECTION, POWDER, FOR SOLUTION INTRAVENOUS at 09:12

## 2024-02-20 RX ADMIN — ATORVASTATIN CALCIUM 40 MG: 40 TABLET, FILM COATED ORAL at 09:13

## 2024-02-20 RX ADMIN — PREDNISONE 30 MG: 20 TABLET ORAL at 13:11

## 2024-02-20 RX ADMIN — HYDROXYCHLOROQUINE SULFATE 200 MG: 200 TABLET, FILM COATED ORAL at 13:55

## 2024-02-20 NOTE — DISCHARGE INSTR - OTHER ORDERS
Plan:   -Cleanse bilateral buttocks sacrum inner lower buttocks with soap and water, pat dry apply thin layer of Triad paste BID and after each incontinent episode.  -Apply Hydraguard cream Bid to bilateral heels and distal posterior tibia. Offload on 2 pillows at all times.  -[id number] Low Air Loss Mattress

## 2024-02-20 NOTE — NURSING NOTE
Patient transferred to [facility]. Patient VSS. Patient and family verbalized understanding of transfer. Report given to RN at receiving facility.  All belongings returned to  upon discharge.

## 2024-02-20 NOTE — CASE MANAGEMENT
Case Management Assessment & Discharge Planning Note    Patient name Darlyn Nguyen  Location / MRN [id number]  : 1945 Date 2024       Current Admission Date: 2024  Current Admission Diagnosis:Acute on chronic anemia   Patient Active Problem List    Diagnosis Date Noted    Acute on chronic anemia 2024    Lupus (HCC) 2024    Acute hypoxic respiratory failure (HCC) 2024    Hypertension 2024    Pancolitis (HCC) 2024    Metabolic encephalopathy 2024    Hypomagnesemia 2024    High serum lactic acid 2024      LOS (days): 1  Geometric Mean LOS (GMLOS) (days): 4.6  Days to GMLOS:3.8     OBJECTIVE:    Risk of Unplanned Readmission Score: 12.93         Current admission status: Inpatient  Referral Reason:  (discharge planning to return to [facility] patient is not bedhold -spouse paying to keep bed)    Preferred Pharmacy:   [id number] - [place] - [address]  [address]  [place] [address]  Phone: [phone] Fax: [phone]    Primary Care Provider: Yaa Hedrick MD    Primary Insurance: MEDICARE  Secondary Insurance: [facility]    ASSESSMENT:    CM met with patient spouse at the bedside,baseline information  was obtained. CM discussed the role of CM in helping the patient develop a discharge plan and assist the patient in carry out their plan.    Patient resides in [facility] since 24.    Baseline per spouse patient was to weak to walk needed total assistance.      Active Health Care Proxies    There are no active Health Care Proxies on file.       Advance Directives  Does patient have a Health Care POA?: Yes  Does patient have Advance Directives?: Yes  Advance Directives: Living will, Power of  for health care  Primary Contact: Krystle Nguyen  spouse   [phone]         Readmission Root Cause  30 Day Readmission: No    Patient Information  Admitted from::  Facility  Mental Status: Alert  During Assessment patient was accompanied by: Not accompanied during assessment  Assessment information provided by:: Spouse  Primary Caregiver: Spouse  Caregiver's Name:: Krystle Nguyen  spouse  Caregiver's Relationship to Patient:: Significant Other  Caregiver's Telephone Number:: [phone]  Support Systems: Spouse/significant other  [facility] Residence: [facility]  [facility] do you live in[place]  Home entry access options. Select all that apply.: Elevator  Type of Current Residence: Facility  Upon entering residence, is there a bedroom on the main floor (no further steps)?: Yes  Upon entering residence, is there a bathroom on the main floor (no further steps)?: Yes  Living Arrangements: Lives w/ Spouse/significant other  Is patient a ?: No    Activities of Daily Living Prior to Admission  Functional Status: Total dependent  Completes ADLs independently?: No  Level of ADL dependence: Total Dependent  Ambulates independently?: No  Level of ambulatory dependence: Total Dependent  Does patient use assisted devices?: No  Does patient currently own DME?: No  Does patient have a history of Outpatient Therapy (PT/OT)?: No  Does the patient have a history of Short-Term Rehab?: No  Does patient have a history of HHC?: No  Does patient currently have HHC?: No         Patient Information Continued  Income Source: Pension/alf  Does patient have prescription coverage?: Yes  Does patient receive dialysis treatments?: No  Does patient have a history of substance abuse?: No  Does patient have a history of Mental Health Diagnosis?: No         Means of Transportation  [facility]      Social Determinants of Health (SDOH)      Flowsheet Row Most Recent Value   Housing Stability    In the last 12 months, was there a time when you were not able to pay the mortgage or rent on time? N   In the last 12 months, how many places have you lived? 1   In  the last 12 months, was there a time when you did not have a steady place to sleep or slept in a shelter (including now)? N   Transportation Needs    In the past 12 months, has lack of transportation kept you from medical appointments or from getting medications? no   In the past 12 months, has lack of transportation kept you from meetings, work, or from getting things needed for daily living? No   Food Insecurity    Within the past 12 months, you worried that your food would run out before you got the money to buy more. Never true   Within the past 12 months, the food you bought just didn't last and you didn't have money to get more. Never true   Utilities    In the past 12 months has the electric, gas, oil, or water company threatened to shut off services in your home? No            DISCHARGE DETAILS:    Discharge planning discussed with:: patient spouse            CM to follow for discharge Plans

## 2024-02-20 NOTE — DISCHARGE SUMMARY
Discharge Summary - Darlyn Nguyen 78 y.o. female MRN: [id number]    Unit/Bed#:  Encounter: [id number]    Admission Date:   Admission Orders (From admission, onward)       Ordered        02/19/24 1555  INPATIENT ADMISSION  Once                            Admitting Diagnosis: Pancolitis (HCC) [K51.00]  Anemia [D64.9]  Lupus (HCC) [M32.9]  Hypoxia [R09.02]  Low hemoglobin [D64.9]  Acute hypoxic respiratory failure (HCC) [J96.01]    HPI: Darlyn Nguyen is a 78 y.o. female with a PMH of lupus, HTN, who presents given dizziness, lethargy noted at 5th floor rehab today. Had lab work revealing Hgb 5.2. She reported 4-5 episodes of diarrhea last night. Denies overt signs of GIB.  Otherwise, patient denies any acute concerns. She is somnolent, but awakens to verbal stimuli and responds appropriately.      Per chart review, patient was recently at [facility] for syncope. Was suspected to be vasovagal. Was discharged on 2/15 on 3L NC. Hgb noted to be 9.8. she additionally had hospitalization 12/16-12/28 where she was diagnosed with lups and initiated on dapsone, prednisone, plaquenil, Cellcept. In ED, had CT c/a/p revealing mild pancolitis. She was placed on 11L MFNC with stabilization in oxygen levels. She was then admitted to step-down unit for further evaluation and treatment with both GI and pulmonary consults     Procedures Performed:   Orders Placed This Encounter   Procedures    Critical Care    ED ECG Documentation Only       Summary of Hospital Course:     1) Acute hypoxemic respiratory failure  2) Methemoglobinemia secondary to dapsone  3) Acute Bilateral DVT    78-year-old female with recent diagnosis of lupus, treated with prednisone and transition to CellCept/hydroxychloroquine/dapsone presented to the ED for evaluation from skilled nursing facility.  She was found to have a hemoglobin of 5.2, was requiring upwards of 11 L nasal cannula.    Patient received 2 units PRBCs, hemoglobin responded rather  well.  Hemoglobin on discharge was 9.8    Patient was evaluated by pulmonary medicine and review of ABGs, patient was noted to have SpO2 and PaO2 that were not correlating.  Methemoglobin was noted at 11%, and subsequently 17% prior to transfer. However patient did not have any acute complaints. In fact family stated that she looks at baseline.     Venous duplex revealed bilateral DVT, and patient will be started on heparin gtt. Discussed with accepting doctor that they will continue to monitor hemoglobin closely.  Patient received contrast yesterday, with her history of lupus nephritis is hesitant to administer another dose of contrast for CTA PE protocol.    Extensive discussion with specialist at [facility] [place], ultimately it was felt the patient is better served in a facility where rheumatology could evaluate the patient daily. Family accepted transfer to [facility] under the service of Dr. Azar            Significant Findings, Care, Treatment and Services Provided:     CT    Findings suspicious for mild pancolitis.     Few gas locules in the urinary bladder. This could be related to recent instrumentation, but could also be related to infection. Recommend clinical correlation and suggest urinalysis to exclude infection.     Complications: none    Discharge Diagnosis: see above    Medical Problems       Resolved Problems  Date Reviewed: 2/19/2024   None         Condition at Discharge: fair         Discharge instructions/Information to patient and family:   See after visit summary for information provided to patient and family.      Provisions for Follow-Up Care:  See after visit summary for information related to follow-up care and any pertinent home health orders.      PCP: Yaa Hedrick MD    Disposition: [facility]. Dr. Azar    Planned Readmission: No      Discharge Statement   I spent 90 minutes discharging the patient. This time was spent on the day of discharge. I had direct contact with the  patient on the day of discharge. Additional documentation is required if more than 30 minutes were spent on discharge.     Discharge Medications:  See after visit summary for reconciled discharge medications provided to patient and family.

## 2024-02-20 NOTE — PLAN OF CARE
Problem: PAIN - ADULT  Goal: Verbalizes/displays adequate comfort level or baseline comfort level  Description: Interventions:  - Encourage patient to monitor pain and request assistance  - Assess pain using appropriate pain scale  - Administer analgesics based on type and severity of pain and evaluate response  - Implement non-pharmacological measures as appropriate and evaluate response  - Consider cultural and social influences on pain and pain management  - Notify physician/advanced practitioner if interventions unsuccessful or patient reports new pain  Outcome: Progressing     Problem: INFECTION - ADULT  Goal: Absence or prevention of progression during hospitalization  Description: INTERVENTIONS:  - Assess and monitor for signs and symptoms of infection  - Monitor lab/diagnostic results  - Monitor all insertion sites, i.e. indwelling lines, tubes, and drains  - Monitor endotracheal if appropriate and nasal secretions for changes in amount and color  - Hardy appropriate cooling/warming therapies per order  - Administer medications as ordered  - Instruct and encourage patient and family to use good hand hygiene technique  - Identify and instruct in appropriate isolation precautions for identified infection/condition  Outcome: Progressing     Problem: SAFETY ADULT  Goal: Patient will remain free of falls  Description: INTERVENTIONS:  - Educate patient/family on patient safety including physical limitations  - Instruct patient to call for assistance with activity   - Consult OT/PT to assist with strengthening/mobility   - Keep Call bell within reach  - Keep bed low and locked with side rails adjusted as appropriate  - Keep care items and personal belongings within reach  - Initiate and maintain comfort rounds  - Make Fall Risk Sign visible to staff  - Offer Toileting every  Hours, in advance of need  - Initiate/Maintain alarm  - Obtain necessary fall risk management equipment:   - Apply yellow socks and  bracelet for high fall risk patients  - Consider moving patient to room near nurses station  Outcome: Progressing

## 2024-02-20 NOTE — CONSULTS
INTERPROFESSIONAL (PHONE) CONSULTATION - Medical Toxicology  Darlyn Nguyen 78 y.o. 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 Therefore, a dose of methylene blue at 1-2mg/kg can be given. It is worth noting that the pulse ox may read very low shortly after methylene blue is given. This is of no concern should be no cause for alarm. It is reflective only of the blue color of the medication.     Dapsone has a very long half-life. Therefore, methemoglobinemia can persist for quite some time and rebound after methylene blue. Therefore, cimetidine can be used for its  inhibition to reduce the oxidative metabolites that are responsible for inducing methemoglobinemia. Cimetidine 400mg TID can be started as well.         I did review the chart and the patient is confirmed not to have G6PD deficiency. Therefore, methylene blue can be given.     For further questions, please contact the medical  on call via [place] [employer] between 8am and 9pm. If between 9pm and 8am, please reach out to the [facility] at [phone].     Please see additional teaching note below:    Hx and PE limited by the dynamics of a phone consultation. I have not personally interviewed or evaluated the patient, but only advised based on the information provided to me. Primary provider is responsible for all clinical decisions.     Pertinent history, physical exam and clinical findings and course discussed: Darlyn Nguyen is a 78 y.o. year old female who presents with anemia and methemoglobinemia. She was sent to the ED today from a SNF due to  "dizziness, lightheadedness, and \"lethargy\". She was found to have low pulse ox. She had bloodwork that revealed significant anemia as well. Pt had been started on dapsone, plaquenil, prednisone within the past couple months for lupus. Work up here revealed Hgb of 5.3. She was given a blood transfusion. Methemoglobin level checked due to the dapsone and found to be 17.6%.     Review of systems and physical exam not performed by me.    Historical Information   Past Medical History:   Diagnosis Date    Diabetes mellitus (HCC)     Fibromyalgia     Hyperlipidemia     Hypertension      Past Surgical History:   Procedure Laterality Date    ACHILLES TENDON REPAIR Right     ADENOIDECTOMY      APPENDECTOMY      SKIN CANCER EXCISION      x 4 - back    TONSILLECTOMY      TOTAL ABDOMINAL HYSTERECTOMY      US GUIDED KIDNEY BIOPSY  12/26/2023     Social History   Social History     Substance and Sexual Activity   Alcohol Use Not Currently     Social History     Substance and Sexual Activity   Drug Use Never     Social History     Tobacco Use   Smoking Status Never   Smokeless Tobacco Never     Family History   Problem Relation Age of Onset    Cancer Mother     Cervical cancer Sister     Breast cancer Sister     Breast cancer Sister         Prior to Admission medications    Medication Sig Start Date End Date Taking? Authorizing Provider   albuterol (2.5 mg/3 mL) 0.083 % nebulizer solution Inhale 2.5 mg every 2 (two) hours as needed 2/16/24 2/15/25 Yes Historical Provider, MD   amLODIPine (NORVASC) 2.5 mg tablet Take 5 mg by mouth daily 12/2/21  Yes Historical Provider, MD   aspirin (ECOTRIN LOW STRENGTH) 81 mg EC tablet Take 81 mg by mouth   Yes Historical Provider, MD   atorvastatin (LIPITOR) 40 mg tablet Take 40 mg by mouth daily   Yes Historical Provider, MD   carvedilol (COREG) 6.25 mg tablet Take 6.25 mg by mouth 2 (two) times a day with meals   Yes Historical Provider, MD   Cholecalciferol 50 MCG (2000 UT) TABS Take 1 tablet " by mouth daily   Yes Historical Provider, MD   co-enzyme Q-10 30 MG capsule Take by mouth   Yes Historical Provider, MD   cyanocobalamin (VITAMIN B-12) 2500 MCG TABS Take 1,000 mcg by mouth daily   Yes Historical Provider, MD   DULoxetine (CYMBALTA) 60 mg delayed release capsule  12/31/21  Yes Historical Provider, MD   escitalopram (LEXAPRO) 10 mg tablet Take 10 mg by mouth daily   Yes Historical Provider, MD   famotidine (PEPCID) 20 mg tablet Take 20 mg by mouth daily   Yes Historical Provider, MD   FISH OIL-CANOLA OIL-VIT D3 PO Use   Yes Historical Provider, MD   Hydroxychloroquine Sulfate 300 MG TABS Take 1.5 tablets by mouth daily   Yes Historical Provider, MD   losartan (COZAAR) 100 MG tablet Take 100 mg by mouth daily   Yes Historical Provider, MD   miconazole (MICOTIN) 2 % powder Apply 1 Application topically 2 (two) times a day 2/16/24 2/26/24 Yes Historical Provider, MD   mirtazapine (REMERON) 7.5 MG tablet Take 7.5 mg by mouth daily at bedtime   Yes Historical Provider, MD   mycophenolate (CELLCEPT) 500 mg tablet Take 2 tabs twice daily as instructed 1/10/24  Yes Historical Provider, MD   predniSONE 20 mg tablet Take 30 mg by mouth daily   Yes Historical Provider, MD   TURMERIC PO Take 1 capsule by mouth daily   Yes Historical Provider, MD   dapsone 100 mg tablet Take 100 mg by mouth daily  Patient not taking: Reported on 2/19/2024    Historical Provider, MD   gabapentin (NEURONTIN) 100 mg capsule  12/5/21   Historical Provider, MD   pantoprazole (PROTONIX) 40 mg tablet Take 40 mg by mouth 2 (two) times a day    Historical Provider, MD       Current Facility-Administered Medications   Medication Dose Route Frequency    albuterol inhalation solution 2.5 mg  2.5 mg Nebulization Q4H PRN    atorvastatin (LIPITOR) tablet 40 mg  40 mg Oral Daily    cefTRIAXone (ROCEPHIN) IVPB (premix in dextrose) 1,000 mg 50 mL  1,000 mg Intravenous Q24H    escitalopram (LEXAPRO) tablet 10 mg  10 mg Oral Daily     hydroxychloroquine (PLAQUENIL) tablet 200 mg  200 mg Oral Daily    metroNIDAZOLE (FLAGYL) IVPB (premix) 500 mg 100 mL  500 mg Intravenous Q8H    mirtazapine (REMERON) tablet 7.5 mg  7.5 mg Oral HS    pantoprazole (PROTONIX) injection 40 mg  40 mg Intravenous Q12H CRUZITO    predniSONE tablet 30 mg  30 mg Oral Daily       Allergies   Allergen Reactions    Rosuvastatin Other (See Comments)     Per SNF    Simvastatin Other (See Comments)     Per SNF       Objective       Intake/Output Summary (Last 24 hours) at 2/20/2024 1313  Last data filed at 2/20/2024 0301  Gross per 24 hour   Intake 1389.17 ml   Output 275 ml   Net 1114.17 ml       Invasive Devices:   Peripheral IV 02/19/24 Right;Ventral (anterior) Forearm (Active)   Site Assessment [facility] 02/19/24 2100   Dressing Type Transparent 02/20/24 0734   Line Status Saline locked 02/20/24 0734   Dressing Status Clean;Dry;Intact 02/20/24 0734       Peripheral IV 02/19/24 Proximal;Right;Ventral (anterior) Forearm (Active)   Site Assessment [facility] 02/19/24 2100   Dressing Type Transparent 02/20/24 0734   Line Status Saline locked 02/20/24 0734   Dressing Status Clean;Dry;Intact 02/20/24 0734       Peripheral IV 02/19/24 Left;Ventral (anterior) Forearm (Active)   Site Assessment [facility] 02/19/24 2100   Dressing Type Transparent 02/20/24 0734   Line Status Saline locked;Infusing 02/20/24 0734   Dressing Status Clean;Dry;Intact 02/20/24 0734       Peripheral IV 02/19/24 Dorsal (posterior);Left Hand (Active)   Site Assessment [facility] 02/19/24 2100   Dressing Type Transparent 02/20/24 0734   Line Status Saline locked 02/20/24 0734   Dressing Status Clean;Dry;Intact 02/20/24 0734       External Urinary Catheter (Active)   Collection Container Canister and suction tubing (For Female) 02/19/24 2301   Suction Pressure (mmHg) 100 mmHg 02/19/24 2301   Interventions Removed and skin assessed;Pericare performed;Device changed 02/20/24 1112   Output (mL) 275 mL 02/20/24 0301       Vitals   Vitals:     "02/20/24 0504 02/20/24 0601 02/20/24 0650 02/20/24 1005   BP: 110/56 143/65  143/65   TempSrc: Tympanic  Tympanic    Pulse: 83 83  84   Resp: 20 16     Patient Position - Orthostatic VS: Lying Lying     Temp: (!) 96.8 °F (36 °C)  (!) 96.9 °F (36.1 °C)          EKG, Pathology, and/or Other Studies: I have personally reviewed pertinent reports.        Lab Results: I have personally reviewed pertinent reports.      Labs:    Results from last 7 days   Lab Units 02/20/24  0459 02/19/24  1815 02/19/24  1317   WBC Thousand/uL 10.57*   < > 7.60   HEMOGLOBIN g/dL 9.8*   < > 5.2*   HEMATOCRIT % 29.2*   < > 17.2*   PLATELETS Thousands/uL 181   < > 220   LYMPHO PCT %  --   --  4*   MONO PCT %  --   --  1*   EOS PCT %  --   --  1    < > = values in this interval not displayed.      Results from last 7 days   Lab Units 02/20/24  0459   SODIUM mmol/L 137   POTASSIUM mmol/L 3.5   CHLORIDE mmol/L 103   CO2 mmol/L 26   BUN mg/dL 23   CREATININE mg/dL 0.99   CALCIUM mg/dL 8.6   ALK PHOS U/L 75   ALT U/L 25   AST U/L 14   MAGNESIUM mg/dL 1.7*   PHOSPHORUS mg/dL 3.2      Results from last 7 days   Lab Units 02/20/24  0459 02/19/24  1317   INR  0.94 1.11   PTT seconds  --  26     Results from last 7 days   Lab Units 02/19/24  1645 02/19/24  1317   LACTIC ACID mmol/L 1.2 3.3*     No results found for: \"TROPONINI\"          Invalid input(s): \"EXTPREGUR\"      Imaging Studies: I have personally reviewed pertinent reports.      Counseling / Coordination of Care  Total time spent today 31 minutes. This was a phone consultation.       "

## 2024-02-20 NOTE — CONSULTS
Consultation - Pulmonary Medicine   Darlyn Nguyen 78 y.o. female MRN: [id number]  Unit/Bed#: [facility] 204 Encounter: [id number]      Physician Requesting Consult: Carlos Sánchez MD  Reason for Consult: Acute hypoxic respiratory failure    Assessment/Plan:  Acute Hypoxic Respiratory Failure  Methemoglobinemia  Anemia  SLE w/ Lupus Nephritis  Acute B/L LE DVTs    - Methemoglobinemia likely 2/2 dapsone which was started ~6 weeks ago due to new diagnosis of SLE.  Permanently discontinue dapsone.  - Rising methemoglobin levels likely 2/2 long half life of dapsone, per tox note recommend cimetidine to help with P450 inhibition and dose of methylene blue.  - Patient's true O2 needs are not reflected in pulse-ox 2/2 methemoglobinemia, saturations on ABG are adequate.  - Etiology of anemia unclear, recommend hematology evaluation given no obvious source of bleeding, would also check basic hemolysis labs.  - Start heparin gtt for new B/L LE DVTs.  - Echo/bubble study with no significant finds, given history of lupus nephritis would avoid additional contrast of a CT PE study at this time.  - The patient's dapsone was also doubling as her PJP prophylaxis, she will need the initiation of likely atovaquone as Bactrim is generally avoided in lupus patients.  - Plan currently is to transfer to [facility] where she has been following with rheumatology previously.  Hold on checking SLE labs given plan for transfer.  ______________________________________________________________________    HPI:    Darlyn Nguyen is a 78 y.o. female with PMHx significant for SLE w/ glomerulonephritis, HTN, chronic anemia, HLD, essential tremor and MCI who presented on 2/19/2024 due to anemia on lab work completed at [facility].  Lab work at [facility] revealed a hemoglobin of 5, on admission hemoglobin repeat was 5.2 x2, and she was noted to be hypoxic initially requiring 4 L O2 with rapid increase to 15 L O2 before being weaned to 11L O2.  Imaging with CT C/A/P with contrast  (not PE protocol) showed no findings in the chest, but mild pancolitis with air in the urinary bladder.  There were no reports of rupesh bleeding from the patient or staff at the SNF, and a GENI on admission was guaiac positive.  Pulmonary was consulted due to persistent hypoxia with unclear cause.  Of note the patient has had 2 recent admissions to [facility] 12/16/2023 - 12/28/2023 due to rash, vomiting, fatigue and decreased appetite where she was diagnosed with SLE and started on prednisone 60mg qd, dapsone 100mg and plaquenil.  She was also found to have nephrotic range proteinuria and renal bx was completed which showed class 3 lupus nephritis.  After discharge she was started on MMF initially 500mg BID and then 1000mg BID while prednisone was tapered.  By time of admission to [facility] she had been tapered to 30mg prednisone daily with a decrease in 10mg planned every 2 weeks.  She was then admitted to [facility] again from 2/9/2024 to 2/13/2024 due to a syncopal episode.  On admission her hgb was 9.8 (baseline 9-11) and she was noting LE weakness.  She was seen by rheumatology who ordered SLE activity markers which were improved from prior.  Towards the end of her admission she was noted to have intermittent hypoxia for which she had a CT PE study which did not show any evidence of PE and she was able to be discharged without O2 requirements.  G6PD testing was WNL.    Currently, the patient remains on 11L O2, serial ABGs have revealed a rising methemoglobin level with discordance between SaO2/PaO2 and SpO2.  She notes feeling weak and quite fatigued at this time, but denies dyspnea, chest pain, palpitations, abd pain or N/V/D.  She confirms she has not had any hemoptysis, hematemesis, hematuria or hematochezia/melena.    Methgb level since admission: 11.6% > 13.4% > 17.6%.    Review of Systems:    Full 12 point review of systems was performed. Aside from what was mentioned in the HPI, it is otherwise  "negative.    Historical Information   Past Medical History:   Diagnosis Date    Diabetes mellitus (HCC)     Fibromyalgia     Hyperlipidemia     Hypertension      Past Surgical History:   Procedure Laterality Date    ACHILLES TENDON REPAIR Right     ADENOIDECTOMY      APPENDECTOMY      SKIN CANCER EXCISION      x 4 - back    TONSILLECTOMY      TOTAL ABDOMINAL HYSTERECTOMY      US GUIDED KIDNEY BIOPSY  12/26/2023     Social History   Social History     Substance and Sexual Activity   Alcohol Use Not Currently     Social History     Tobacco Use   Smoking Status Never   Smokeless Tobacco Never       Family History:   Family History   Problem Relation Age of Onset    Cancer Mother     Cervical cancer Sister     Breast cancer Sister     Breast cancer Sister        Medications:  The patient's active and prehospital medications were reviewed.  Current Facility-Administered Medications   Medication Dose Route Frequency Provider Last Rate    albuterol  2.5 mg Nebulization Q4H PRN Prema Olmstead PA-C      atorvastatin  40 mg Oral Daily Prema Olmstead PA-C      cefTRIAXone  1,000 mg Intravenous Q24H Prema Olmstead PA-C Stopped (02/19/24 1842)    escitalopram  10 mg Oral Daily Prema Olmstead PA-C      metroNIDAZOLE  500 mg Intravenous Q8H Prema Olmstead PA-C 500 mg (02/20/24 1139)    mirtazapine  7.5 mg Oral HS Prema Olmstead PA-C      pantoprazole  40 mg Intravenous Q12H [facility] Carlos Sánchez MD         PhysicalExamination:  Vitals:   Vitals:    02/20/24 0504 02/20/24 0601 02/20/24 0650 02/20/24 1005   BP: 110/56 143/65  143/65   BP Location: Left arm Left arm     Pulse: 83 83  84   Resp: 20 16     Temp: (!) 96.8 °F (36 °C)  (!) 96.9 °F (36.1 °C)    TempSrc: Tympanic  Tympanic    SpO2: (!) 89% (!) 88%  (!) 86%   Weight:    63 kg (138 lb 14.2 oz)   Height:    5' 4\" (1.626 m)     Body mass index is 23.84 kg/m².  Temp  Min: 96.2 °F (35.7 °C)  Max: 99.7 °F (37.6 " °C)  IBW (Ideal Body Weight): 54.7 kg    SpO2: (!) 86 %,   SpO2 Activity: At Rest,   O2 Device: Mid flow nasal cannula    Physical Exam  Vitals reviewed.   Constitutional:       Appearance: She is ill-appearing.   HENT:      Head: Normocephalic and atraumatic.   Eyes:      General: No scleral icterus.  Cardiovascular:      Rate and Rhythm: Normal rate and regular rhythm.      Heart sounds: No murmur heard.     No friction rub. No gallop.   Pulmonary:      Effort: Pulmonary effort is normal.      Breath sounds: No wheezing, rhonchi or rales.      Comments: On 11L O2, saturations ranging 87-90%  Abdominal:      General: There is no distension.      Palpations: Abdomen is soft.      Tenderness: There is no abdominal tenderness.   Musculoskeletal:      Right lower leg: No edema.      Left lower leg: No edema.   Skin:     General: Skin is warm and dry.   Neurological:      General: No focal deficit present.      Comments: Appropriately responding to questions, appears fatigued       Diagnostic Data:  CBC:   Results from last 7 days   Lab Units 02/20/24  0459 02/19/24  2309 02/19/24  1815 02/19/24  1317   WBC Thousand/uL 10.57* 9.55  --  7.60   HEMOGLOBIN g/dL 9.8* 9.6* 7.4* 5.2*   HEMATOCRIT % 29.2* 29.8*  --  17.2*   PLATELETS Thousands/uL 181 168  --  220     CMP:   Results from last 7 days   Lab Units 02/20/24  0459 02/19/24  1317 02/19/24  1210   SODIUM mmol/L 137 133* 132*   POTASSIUM mmol/L 3.5 3.6 3.4*   CHLORIDE mmol/L 103 100 100   CO2 mmol/L 26 25 22   BUN mg/dL 23 25 25   CREATININE mg/dL 0.99 1.08 1.02   CALCIUM mg/dL 8.6 8.4 8.4   ALK PHOS U/L 75 59  --    ALT U/L 25 21  --    AST U/L 14 16  --        Microbiology:  Results from last 7 days   Lab Units 02/19/24  1317   BLOOD CULTURE  Received in Microbiology Lab. Culture in Progress.  Received in Microbiology Lab. Culture in Progress.         Results from last 7 days   Lab Units 02/19/24  1317   SARS-COV-2  Negative   INFLUENZA A PCR  Negative   INFLUENZA B  PCR  Negative   RSV PCR  Negative     ABG:   Lab Results   Component Value Date    PHART 7.468 (H) 02/20/2024    MDX3UEW 30.3 (L) 02/20/2024    PO2ART 282.2 (H) 02/20/2024    YKV7DXW 21.5 (L) 02/20/2024    BEART -1.5 02/20/2024    SOURCE Brachial, Right 02/20/2024     Imaging:  The pertinent imaging studies were reviewed personally on the PACS system:  B/L LE Duplex -- 2/20/2024  RIGHT LOWER LIMB:  There is evidence of acute non-occlusive deep vein thrombosis noted in the  common femoral and deep femoral veins and occlusive deep vein thrombosis in the  femoral, popliteal, gastrocnemius, posterior tibial and peroneal veins.  No evidence of superficial thrombophlebitis noted.  Doppler evaluation shows a normal response to augmentation maneuvers..  Popliteal, posterior tibial and anterior tibial arterial Doppler waveform's are  biphasic/monophasic.     LEFT LOWER LIMB:  There is evidence of acute non-occlusive deep vein thrombosis noted in the  peroneal veins.  No evidence of superficial thrombophlebitis noted.  Doppler evaluation shows a normal response to augmentation maneuvers.  Popliteal, posterior tibial and anterior tibial arterial Doppler waveform's are  triphasic/biphasic.    2D Echo w/ Bubble Study -- 2/20/2024    Left Ventricle: Left ventricular cavity size is normal. Wall thickness is normal. The left ventricular ejection fraction is 65%. Systolic function is vigorous. Wall motion is normal. Diastolic function is mildly abnormal, consistent with grade I (abnormal) relaxation.    Right Ventricle: Right ventricular cavity size is normal. Systolic function is normal.    Atrial Septum: No patent foramen ovale detected,with provocation with cough, using agitated saline contrast and with valsalva, using agitated saline contrast.    Mitral Valve: There is mild regurgitation.    This was a technically difficult study    CT C/A/P w/ Contrast -- 2/19/2024  LUNGS: No evidence of consolidation. No suspicious pulmonary  "nodule. Central airways are patent.  PLEURA: Unremarkable.  HEART/GREAT VESSELS: Heart is unremarkable for patient's age. No thoracic aortic aneurysm.  MEDIASTINUM AND GUILHERME: Unremarkable.  CHEST WALL AND LOWER NECK: Unremarkable.  Findings suspicious for mild pancolitis.  Few gas locules in the urinary bladder. This could be related to recent instrumentation, but could also be related to infection. Recommend clinical correlation and suggest urinalysis to exclude infection.    Cardiac lab/EKG/telemetry/ECHO:   Results from last 7 days   Lab Units 02/19/24  1815 02/19/24  1532 02/19/24  1317   HS TNI 0HR ng/L  --   --  15   HS TNI 2HR ng/L  --  13  --    HS TNI 4HR ng/L 13  --   --          No results found for: \"BNP\"    Code Status: Level 1 - Full Code    Lindsay Hernandez MD  "

## 2024-02-20 NOTE — CASE MANAGEMENT
Case Management Discharge Planning Note    Patient name Darlyn Nguyen  Location / MRN [id number]  : 1945 Date 2024       Current Admission Date: 2024  Current Admission Diagnosis:Acute on chronic anemia   Patient Active Problem List    Diagnosis Date Noted    Acute on chronic anemia 2024    Lupus (HCC) 2024    Acute hypoxic respiratory failure (HCC) 2024    Hypertension 2024    Pancolitis (HCC) 2024    Metabolic encephalopathy 2024    Hypomagnesemia 2024    High serum lactic acid 2024      LOS (days): 1  Geometric Mean LOS (GMLOS) (days): 4.6  Days to GMLOS:3.7     OBJECTIVE:  Risk of Unplanned Readmission Score: 12.96         Current admission status: Inpatient   Preferred Pharmacy:   [id number] - [place] - [address]  [address]  [place] [address]  Phone: [phone] Fax: [phone]    Primary Care Provider: Yaa Hedrick MD    Primary Insurance: MEDICARE  Secondary Insurance: [facility]    DISCHARGE DETAILS:          Patient [facility]  [place] [facility] under the service of Dr. Azar  rheumatology.    [facility] setting up transport.

## 2024-02-20 NOTE — CONSULTS
Consultation -  [facility]  Darlyn Nguyen 78 y.o. female MRN: [id number]  Unit/Bed#:  Encounter: [id number]        Inpatient consult to gastroenterology  Consult performed by: AUGUSTA Little  Consult ordered by: Carlos Sánchez MD          Reason for Consult / Principal Problem:     Pancolitis  Acute on chronic anemia      ASSESSMENT AND PLAN:     The patient is a 78 y.o. female with a past medical history of lupus, hypertension, DM, fibromyalgia, and hyperlipidemia, who is being seen for a consultation today for CT evidence of mild pancolitis as well as acute on chronic anemia.  The patient has received 2 units of packed red blood cells with stabilization of her hemoglobin currently at 9.8.    Serology: (2/20/24): Glucose 221, total protein 4.9, albumin 2.8, total bilirubin 1.27, magnesium 1.7, WBCs 10.57, RBCs 3.15, H&H 9.8/29.2, RDW 17.8.    Exam:  Oral mucosa normal upon visual inspection, without any sores, lesions, or ulcerations. Sclera without icterus and benign. Lung sounds decreased, but CTA b/l. Normal S1 & S2 upon exam. Abdomen is soft, nondistended, nontender with faint bowel sounds x 4.  No edema noted of the b/l lower extremities upon exam today. Skin is non-icteric.     Pancolitis  Acute on chronic anemia  Metabolic encephalopathy  High serum lactic acid  Hypertension  Lupus  Acute hypoxic respiratory failure    After a discussion with the attending at this point time since she does not have any overt signs of GI bleeding and her hemoglobin has stabilized after 2 units of blood and her respiratory status is concerning, we will hold off any kind of endoscopic procedures for now, but could consider a colonoscopy prior to discharge if stable or even as an outpatient, again, unless her hemoglobin will continue to be unstable or she would have any overt signs of GI bleeding.     There is also a question as to if the addition of the dapsone and Cellcept could possibly be linked  to the the current anemia since she started on this medications at the end of December.     Changed diet to clear liquids and can advance diet as tolerated for now.    Continue to maintain a large bore IV.  Continue to monitor hemoglobin and transfuse as per protocol.   Continue with serial abdominal exams.   Continue to monitor stool output for any overt signs of GI bleeding.   Continue PPI as ordered.   Continue PRN antiemetics as ordered.   Continue rest of medications as per primary team.   Continue supportive care.     Recommend continued follow up with Rheumatology regarding the new Lupus diagnosis for management and treatment.     GI will continue to follow.     ______________________________________________________________________    HPI: Patient is a 78 y.o. female with a past medical history of lupus, hypertension, DM, fibromyalgia, and hyperlipidemia, who was brought into the ER yesterday from the [facility] for rehab for dizziness and lethargy.  Her hemoglobin upon exam was 5.2 without any obvious or overt signs of GI bleeding.  She is currently in the ICU on high flow oxygen with her oxygen requirements decreasing since yesterday.  She has received 2 units of packed red blood cells and her hemoglobin has increased to 9.8.  Upon admission a CT scan of the chest abdomen pelvis with contrast did show mild pancolitis with few gas locules in the urinary bladder. This could be related to recent instrumentation, but could also be related to infection. Recommend clinical correlation and suggest urinalysis to exclude infection.     Recently she was hospitalized at [facility] with a primary diagnosis of lupus with initiation of dapsone, prednisone, Plaquenil, and CellCept.  The patient did have an EGD on December 19, 2023 while hospitalized early evaluate showed antral gastritis with the pathology coming back normal.    The patient currently denies any reflux, nausea, dysphagia, vomiting, decreased  appetite, unplanned weight loss, or abdominal pain.    The patient currently denies any bloating, diarrhea, constipation, straining, melena or bloody stools. Last BM: 2/18/24 without any blood or melena per patient. Flatus: Minimal.    The patient is currently is currently n.p.o.    Serology Upon Admission: (2/19/24) sodium 133, glucose 302, total protein 3.9, albumin 2.5, total bilirubin 1.89, magnesium 1.6, lactic acid 3.3, RBCs 1.70, H&H 5.2/17.2, , MCHC 30.2, RDW 21.6.    Colon Cancer  Personal: Denied  Family: Sister at unknown age.    Liver Disease  Personal: Denied  Family: Denied    Tobacco/Vaping: Denied  ETOH: Denied Hx of ETOH: Denied  Marijuana: Denied  Illicit Drug Use: Denied Hx of Illicit Drug Use: Denied    Meds: Protonix 40 mg twice daily, metronidazole 500 mg every 8.  NSAID Use: Denied  Tylenol Use: Denied    Imaging: (2/19/24) CT of the chest abdomen and pelvis with contrast: Mild pancolitis. Few gas locules in the urinary bladder. This could be related to recent instrumentation, but could also be related to infection. Recommend clinical correlation and suggest urinalysis to exclude infection.     Endoscopy History: EGD: (12/19/23): Antral gastritis. Path: Normal.     COLONOSCOPY: (2/2023 with [facility]): Normal per family no records available for review.    REVIEW OF SYSTEMS:    CONSTITUTIONAL: Denies any fever, chills, rigors, and weight loss.  HEENT: No earache or tinnitus. Denies hearing loss or visual disturbances.  CARDIOVASCULAR: No chest pain or palpitations.   RESPIRATORY: Denies any cough, hemoptysis, shortness of breath or dyspnea on exertion.  GASTROINTESTINAL: As noted in the History of Present Illness.   GENITOURINARY: No problems with urination. Denies any hematuria or dysuria.  NEUROLOGIC: No dizziness or vertigo, denies headaches.   MUSCULOSKELETAL: Denies any muscle or joint pain.   SKIN: Denies skin rashes or itching.   ENDOCRINE: Denies excessive thirst. Denies intolerance  to heat or cold.  PSYCHOSOCIAL: Denies depression or anxiety. Denies any recent memory loss.       Historical Information   Past Medical History:   Diagnosis Date    Diabetes mellitus (HCC)     Fibromyalgia     Hyperlipidemia     Hypertension      Past Surgical History:   Procedure Laterality Date    ACHILLES TENDON REPAIR Right     ADENOIDECTOMY      APPENDECTOMY      SKIN CANCER EXCISION      x 4 - back    TONSILLECTOMY      TOTAL ABDOMINAL HYSTERECTOMY      US GUIDED KIDNEY BIOPSY  12/26/2023     Social History   Social History     Substance and Sexual Activity   Alcohol Use Not Currently     Social History     Substance and Sexual Activity   Drug Use Never     Social History     Tobacco Use   Smoking Status Never   Smokeless Tobacco Never     Family History   Problem Relation Age of Onset    Cancer Mother     Cervical cancer Sister     Breast cancer Sister     Breast cancer Sister        Meds/Allergies     Medications Prior to Admission   Medication    albuterol (2.5 mg/3 mL) 0.083 % nebulizer solution    amLODIPine (NORVASC) 2.5 mg tablet    aspirin (ECOTRIN LOW STRENGTH) 81 mg EC tablet    atorvastatin (LIPITOR) 40 mg tablet    carvedilol (COREG) 6.25 mg tablet    Cholecalciferol 50 MCG (2000 UT) TABS    co-enzyme Q-10 30 MG capsule    cyanocobalamin (VITAMIN B-12) 2500 MCG TABS    DULoxetine (CYMBALTA) 60 mg delayed release capsule    escitalopram (LEXAPRO) 10 mg tablet    famotidine (PEPCID) 20 mg tablet    FISH OIL-CANOLA OIL-VIT D3 PO    Hydroxychloroquine Sulfate 300 MG TABS    losartan (COZAAR) 100 MG tablet    miconazole (MICOTIN) 2 % powder    mirtazapine (REMERON) 7.5 MG tablet    mycophenolate (CELLCEPT) 500 mg tablet    predniSONE 20 mg tablet    TURMERIC PO    dapsone 100 mg tablet    gabapentin (NEURONTIN) 100 mg capsule    pantoprazole (PROTONIX) 40 mg tablet     Current Facility-Administered Medications   Medication Dose Route Frequency    albuterol inhalation solution 2.5 mg  2.5 mg  "Nebulization Q4H PRN    atorvastatin (LIPITOR) tablet 40 mg  40 mg Oral Daily    cefTRIAXone (ROCEPHIN) IVPB (premix in dextrose) 1,000 mg 50 mL  1,000 mg Intravenous Q24H    escitalopram (LEXAPRO) tablet 10 mg  10 mg Oral Daily    metroNIDAZOLE (FLAGYL) IVPB (premix) 500 mg 100 mL  500 mg Intravenous Q8H    mirtazapine (REMERON) tablet 7.5 mg  7.5 mg Oral HS    pantoprazole (PROTONIX) injection 40 mg  40 mg Intravenous Q12H CRUZITO       Allergies   Allergen Reactions    Rosuvastatin Other (See Comments)     Per SNF    Simvastatin Other (See Comments)     Per SNF           Objective     Blood pressure 143/65, pulse 83, temperature (!) 96.9 °F (36.1 °C), temperature source Tympanic, resp. rate 16, height 5' 4\" (1.626 m), weight 63 kg (139 lb), SpO2 (!) 88%. Body mass index is 23.86 kg/m².      Intake/Output Summary (Last 24 hours) at 2/20/2024 0814  Last data filed at 2/20/2024 0301  Gross per 24 hour   Intake 1389.17 ml   Output 275 ml   Net 1114.17 ml         PHYSICAL EXAM:      General Appearance:   Alert, cooperative, no distress   HEENT:   Normocephalic, atraumatic, anicteric.     Neck:  Supple, symmetrical, trachea midline   Lungs:   Clear to auscultation bilaterally; no rales, rhonchi or wheezing; respirations unlabored    Heart::   Regular rate and rhythm; no murmur, rub, or gallop.   Abdomen:   Soft, non-tender, non-distended; normal bowel sounds; no masses, no organomegaly    Genitalia:   Deferred    Rectal:   Deferred    Extremities:  No cyanosis, clubbing or edema    Pulses:  2+ and symmetric all extremities    Skin:  No jaundice, rashes, or lesions    Lymph nodes:  No palpable cervical lymphadenopathy        Lab Results:   Admission on 02/19/2024   Component Date Value    Ventricular Rate 02/19/2024 79     Atrial Rate 02/19/2024 79     QRSD Interval 02/19/2024 146     QT Interval 02/19/2024 426     QTC Interval 02/19/2024 488     QRS Axis 02/19/2024 -3     T Wave [place] 02/19/2024 -22     WBC 02/19/2024 7.60  "    RBC 02/19/2024 1.70 (L)     Hemoglobin 02/19/2024 5.2 (LL)     Hematocrit 02/19/2024 17.2 (L)     MCV 02/19/2024 101 (H)     MCH 02/19/2024 30.6     MCHC 02/19/2024 30.2 (L)     RDW 02/19/2024 21.6 (H)     MPV 02/19/2024 11.4     Platelets 02/19/2024 220     Sodium 02/19/2024 133 (L)     Potassium 02/19/2024 3.6     Chloride 02/19/2024 100     CO2 02/19/2024 25     ANION GAP 02/19/2024 8     BUN 02/19/2024 25     Creatinine 02/19/2024 1.08     Glucose 02/19/2024 302 (H)     Calcium 02/19/2024 8.4     Corrected Calcium 02/19/2024 9.6     AST 02/19/2024 16     ALT 02/19/2024 21     Alkaline Phosphatase 02/19/2024 59     Total Protein 02/19/2024 3.9 (L)     Albumin 02/19/2024 2.5 (L)     Total Bilirubin 02/19/2024 1.89 (H)     eGFR 02/19/2024 48     Protime 02/19/2024 14.2     INR 02/19/2024 1.11     PTT 02/19/2024 26     ABO Grouping 02/19/2024 O     Rh Factor 02/19/2024 Positive     Antibody Screen 02/19/2024 Negative     Specimen Expiration Date 02/19/2024 [id number]     SARS-CoV-2 02/19/2024 Negative     INFLUENZA A PCR 02/19/2024 Negative     INFLUENZA B PCR 02/19/2024 Negative     RSV PCR 02/19/2024 Negative     Blood Culture 02/19/2024 Received in Microbiology Lab. Culture in Progress.     Blood Culture 02/19/2024 Received in Microbiology Lab. Culture in Progress.     Magnesium 02/19/2024 1.6 (L)     LACTIC ACID 02/19/2024 3.3 (HH)     hs TnI 0hr 02/19/2024 15     Color, UA 02/20/2024 Layla     Clarity, UA 02/20/2024 Cloudy     Specific Gravity, UA 02/20/2024 1.020     pH, UA 02/20/2024 5.5     Leukocytes, UA 02/20/2024 Trace (A)     Nitrite, UA 02/20/2024 Positive (A)     Protein, UA 02/20/2024 100 (2+) (A)     Glucose, UA 02/20/2024 Negative     Ketones, UA 02/20/2024 Negative     Urobilinogen, UA 02/20/2024 0.2     Bilirubin, UA 02/20/2024 Small (A)     Occult Blood, UA 02/20/2024 Small (A)     Unit Product Code 02/20/2024 [id number]     Unit Number 02/20/2024 [id number]     Unit ABO 02/20/2024 O     Unit  RH 02/20/2024 POS     Crossmatch 02/20/2024 Compatible     Unit Dispense Status 02/20/2024 Presumed Trans     Unit Product Volume 02/20/2024 350     Unit Product Code 02/20/2024 [id number]     Unit Number 02/20/2024 [id number]     Unit ABO 02/20/2024 O     Unit RH 02/20/2024 POS     Crossmatch 02/20/2024 Compatible     Unit Dispense Status 02/20/2024 Presumed Trans     Unit Product Volume 02/20/2024 350     Ventricular Rate 02/19/2024 78     Atrial Rate 02/19/2024 78     MA Interval 02/19/2024 158     QRSD Interval 02/19/2024 144     QT Interval 02/19/2024 418     QTC Interval 02/19/2024 476     P Axis 02/19/2024 39     QRS Axis 02/19/2024 0     T Wave [place] 02/19/2024 -45     ABO Grouping 02/19/2024 O     Rh Factor 02/19/2024 Positive     Segmented % 02/19/2024 83 (H)     Bands % 02/19/2024 11 (H)     Lymphocytes % 02/19/2024 4 (L)     Monocytes % 02/19/2024 1 (L)     Eosinophils, % 02/19/2024 1     Basophils % 02/19/2024 0     Absolute Neutrophils 02/19/2024 7.14     Lymphocytes Absolute 02/19/2024 0.30 (L)     Monocytes Absolute 02/19/2024 0.08     Eosinophils Absolute 02/19/2024 0.08     Basophils Absolute 02/19/2024 0.00     nRBC 02/19/2024 5 (H)     RBC Morphology 02/19/2024 Present     Platelet Estimate 02/19/2024 Adequate     Acanthocytes 02/19/2024 Present     Anisocytosis 02/19/2024 Present     Macrocytes 02/19/2024 Present     Poikilocytes 02/19/2024 Present     Polychromasia 02/19/2024 Present     hs TnI 2hr 02/19/2024 13     Delta 2hr hsTnI 02/19/2024 -2     LACTIC ACID 02/19/2024 1.2     hs TnI 4hr 02/19/2024 13     Delta 4hr hsTnI 02/19/2024 -2     pH, Arterial 02/19/2024 7.409     pCO2, Arterial 02/19/2024 39.7     pO2, Arterial 02/19/2024 254.8 (H)     HCO3, Arterial 02/19/2024 24.5     Base Excess, Arterial 02/19/2024 -0.1     O2 Content, Arterial 02/19/2024 9.3 (L)     O2 HGB,Arterial  02/19/2024 85.2 (L)     SOURCE 02/19/2024 Radial, Left     ZANA TEST 02/19/2024 Yes     Nasal Cannula  02/19/2024 11 lpm midflow     Hemoglobin 02/19/2024 7.4 (L)     pH, Arterial 02/19/2024 7.452 (H)     pCO2, Arterial 02/19/2024 31.9 (L)     pO2, Arterial 02/19/2024 191.7 (H)     HCO3, Arterial 02/19/2024 21.8 (L)     Base Excess, Arterial 02/19/2024 -1.9     O2 Content, Arterial 02/19/2024 9.2 (L)     O2 HGB,Arterial  02/19/2024 83.3 (L)     SOURCE 02/19/2024 Radial, Left     ZANA TEST 02/19/2024 Yes     Nasal Cannula 02/19/2024 8 lpm midflow     WBC 02/19/2024 9.55     RBC 02/19/2024 3.14 (L)     Hemoglobin 02/19/2024 9.6 (L)     Hematocrit 02/19/2024 29.8 (L)     MCV 02/19/2024 95     MCH 02/19/2024 30.6     MCHC 02/19/2024 32.2     RDW 02/19/2024 17.4 (H)     Platelets 02/19/2024 168     MPV 02/19/2024 10.8     RBC, UA 02/20/2024 1-2     WBC, UA 02/20/2024 20-30 (A)     Epithelial Cells 02/20/2024 Occasional     Bacteria, UA 02/20/2024 Innumerable (A)     WBC 02/20/2024 10.57 (H)     RBC 02/20/2024 3.15 (L)     Hemoglobin 02/20/2024 9.8 (L)     Hematocrit 02/20/2024 29.2 (L)     MCV 02/20/2024 93     MCH 02/20/2024 31.1     MCHC 02/20/2024 33.6     RDW 02/20/2024 17.8 (H)     Platelets 02/20/2024 181     MPV 02/20/2024 11.5     Sodium 02/20/2024 137     Potassium 02/20/2024 3.5     Chloride 02/20/2024 103     CO2 02/20/2024 26     ANION GAP 02/20/2024 8     BUN 02/20/2024 23     Creatinine 02/20/2024 0.99     Glucose 02/20/2024 221 (H)     Calcium 02/20/2024 8.6     Corrected Calcium 02/20/2024 9.6     AST 02/20/2024 14     ALT 02/20/2024 25     Alkaline Phosphatase 02/20/2024 75     Total Protein 02/20/2024 4.9 (L)     Albumin 02/20/2024 2.8 (L)     Total Bilirubin 02/20/2024 1.27 (H)     eGFR 02/20/2024 53     Magnesium 02/20/2024 1.7 (L)     Phosphorus 02/20/2024 3.2     Protime 02/20/2024 12.5     INR 02/20/2024 0.94        Imaging Studies: I have personally reviewed pertinent imaging studies.

## 2024-02-20 NOTE — WOUND OSTOMY CARE
Consult Note - Wound   Darlyn Nguyen 78 y.o. female MRN: [id number]  Unit/Bed#:  Encounter: [id number]        History and Present Illness:  78  year old female admitted with High serum lactic acid,hypomagnesium,Metabolic encephalopathy, Pancolitis,Acute hypoxic respiratory failure,Lupus, Acute on chronic anemai.    Assessment Findings: Transport here, transporting to [facility] at this time.  Seen today for initial wound assessment.Positioned on side, on Low air loss mattress. Has bilateral heel foams on.alert and oriented.  Agreeable to assessment, pt is transferring to [facility] approx 1600. Spouse present.   1)POA bilateral buttock, gluteal fold, and lower inner aspect of both buttocks partial thickness skin loss, Right upper outer buttocks 100% tan slough adhered to wound bed. Wounds are mix of friction moisture sheer and pressure. Right upper outer buttocks appears at stage 3 wound.  2)Gluteal fold appears due to moisture, pt has been experiencing loose frequent stools.Partial thickness skin loss, blanchable 100% pink  3)Right inner lower buttocks deep purple non blanchable appears as Deep Tissue Injury POA DTI may evolve to stage 3 4 or unstageable wound.  4)Lower inner aspect of left and right buttocks partial thickness skin loss 1005 pink.  5)Bilateral heel foams removed, pt has red, dry intact slow to shayne distal right tibia. Foam left off and hydraguard applied with heel offloaded.  6)Left heel red slow to shayne. Heel offloaded.  Plan:   -Cleanse bilateral buttocks sacrum inner lower buttocks with soap and water, pat dry apply thin layer of Triad paste BID and after each incontinent episode.  -Apply Hydraguard cream Bid to bilateral heels and distal posterior tibia. Offload on 2 pillows at all times.  -[id number] Low Air Loss Mattress     Wounds:  Wound 02/19/24 Pressure Injury Buttocks (Active)   Wound Image   02/20/24 1523   Wound Description Beefy red;Slough;Tan;White 02/20/24 1523   Pressure Injury Stage DTPI  02/20/24 1523   Shanta-wound Assessment Intact;Erythema;Fragile 02/20/24 1523   Wound Length (cm) 14 cm 02/20/24 1523   Wound Width (cm) 8 cm 02/20/24 1523   Wound Depth (cm) 0.1 cm 02/20/24 1523   Wound Surface Area (cm^2) 112 cm^2 02/20/24 1523   Wound Volume (cm^3) 11.2 cm^3 02/20/24 1523   Calculated Wound Volume (cm^3) 11.2 cm^3 02/20/24 1523   Drainage Amount None 02/20/24 1523   Treatments Cleansed;Site care 02/20/24 1523   Dressing Changed Reinforced 02/20/24 1523   Patient Tolerance Tolerated poorly 02/20/24 1523   Dressing Status Remoistened 02/20/24 1523       Wound 02/20/24 Pressure Injury Tibial Distal;Posterior;Right (Active)   Wound Image    02/20/24 1528   Wound Description Dry;Intact;Beefy red 02/20/24 1528   Pressure Injury Stage 1 02/20/24 1528   Shanta-wound Assessment Dry;Intact 02/20/24 1528   Wound Length (cm) 0.5 cm 02/20/24 1528   Wound Width (cm) 1 cm 02/20/24 1528   Wound Surface Area (cm^2) 0.5 cm^2 02/20/24 1528   Drainage Amount None 02/20/24 1528   Treatments Site care;Elevated 02/20/24 1528   Dressing Open to air 02/20/24 1528   Dressing Changed Other (Comment) 02/20/24 1528   Patient Tolerance Tolerated well 02/20/24 1528   Dressing Status Remoistened 02/20/24 1528       Wound 02/20/24 Pressure Injury Heel Distal;Left;Posterior (Active)   Wound Image    02/20/24 1529   Wound Description Beefy red;Intact;Dry 02/20/24 1529   Pressure Injury Stage 1 02/20/24 1529   Hsanta-wound Assessment Dry;Intact 02/20/24 1529   Wound Length (cm) 3 cm 02/20/24 1529   Wound Width (cm) 4 cm 02/20/24 1529   Wound Surface Area (cm^2) 12 cm^2 02/20/24 1529   Drainage Amount None 02/20/24 1529   Treatments Site care;Elevated 02/20/24 1529   Dressing Open to air 02/20/24 1529   Dressing Changed Other (Comment) 02/20/24 1529   Wound Care will sign off  Call or Tigertext with any questions    Payton CHRISTENSENN RN CWON

## 2024-02-20 NOTE — PLAN OF CARE
Problem: PAIN - ADULT  Goal: Verbalizes/displays adequate comfort level or baseline comfort level  Description: Interventions:  - Encourage patient to monitor pain and request assistance  - Assess pain using appropriate pain scale  - Administer analgesics based on type and severity of pain and evaluate response  - Implement non-pharmacological measures as appropriate and evaluate response  - Consider cultural and social influences on pain and pain management  - Notify physician/advanced practitioner if interventions unsuccessful or patient reports new pain  Outcome: Progressing     Problem: INFECTION - ADULT  Goal: Absence or prevention of progression during hospitalization  Description: INTERVENTIONS:  - Assess and monitor for signs and symptoms of infection  - Monitor lab/diagnostic results  - Monitor all insertion sites, i.e. indwelling lines, tubes, and drains  - Monitor endotracheal if appropriate and nasal secretions for changes in amount and color  - [place] appropriate cooling/warming therapies per order  - Administer medications as ordered  - Instruct and encourage patient and family to use good hand hygiene technique  - Identify and instruct in appropriate isolation precautions for identified infection/condition  Outcome: Progressing     Problem: SAFETY ADULT  Goal: Patient will remain free of falls  Description: INTERVENTIONS:  - Educate patient/family on patient safety including physical limitations  - Instruct patient to call for assistance with activity   - Consult OT/PT to assist with strengthening/mobility   - Keep Call bell within reach  - Keep bed low and locked with side rails adjusted as appropriate  - Keep care items and personal belongings within reach  - Initiate and maintain comfort rounds  - Make Fall Risk Sign visible to staff  - Offer Toileting every 2 Hours, in advance of need  - Initiate/Maintain bed/chair alarm  - Obtain necessary fall risk management equipment:   - Apply yellow  socks and bracelet for high fall risk patients  - Consider moving patient to room near nurses station  Outcome: Progressing  Goal: Maintain or return to baseline ADL function  Description: INTERVENTIONS:  -  Assess patient's ability to carry out ADLs; assess patient's baseline for ADL function and identify physical deficits which impact ability to perform ADLs (bathing, care of mouth/teeth, toileting, grooming, dressing, etc.)  - Assess/evaluate cause of self-care deficits   - Assess range of motion  - Assess patient's mobility; develop plan if impaired  - Assess patient's need for assistive devices and provide as appropriate  - Encourage maximum independence but intervene and supervise when necessary  - Involve family in performance of ADLs  - Assess for home care needs following discharge   - Consider OT consult to assist with ADL evaluation and planning for discharge  - Provide patient education as appropriate  Outcome: Progressing  Goal: Maintains/Returns to pre admission functional level  Description: INTERVENTIONS:  - Perform AM-PAC 6 Click Basic Mobility/ Daily Activity assessment daily.  - Set and communicate daily mobility goal to care team and patient/family/caregiver.   - Collaborate with rehabilitation services on mobility goals if consulted  - Perform Range of Motion 3 times a day.  - Reposition patient every 2 hours.  - Dangle patient 3 times a day  - Stand patient 3 times a day  - Ambulate patient 3 times a day  - Out of bed to chair 3 times a day   - Out of bed for meals 3 times a day  - Out of bed for toileting  - Record patient progress and toleration of activity level   Outcome: Progressing     Problem: DISCHARGE PLANNING  Goal: Discharge to home or other facility with appropriate resources  Description: INTERVENTIONS:  - Identify barriers to discharge w/patient and caregiver  - Arrange for needed discharge resources and transportation as appropriate  - Identify discharge learning needs (meds,  wound care, etc.)  - Refer to Case Management Department for coordinating discharge planning if the patient needs post-hospital services based on physician/advanced practitioner order or complex needs related to functional status, cognitive ability, or social support system  Outcome: Progressing     Problem: Knowledge Deficit  Goal: Patient/family/caregiver demonstrates understanding of disease process, treatment plan, medications, and discharge instructions  Description: Complete learning assessment and assess knowledge base.  Interventions:  - Provide teaching at level of understanding  - Provide teaching via preferred learning methods  Outcome: Progressing     Problem: CARDIOVASCULAR - ADULT  Goal: Maintains optimal cardiac output and hemodynamic stability  Description: INTERVENTIONS:  - Monitor I/O, vital signs and rhythm  - Monitor for S/S and trends of decreased cardiac output  - Administer and titrate ordered vasoactive medications to optimize hemodynamic stability  - Assess quality of pulses, skin color and temperature  - Assess for signs of decreased coronary artery perfusion  - Instruct patient to report change in severity of symptoms  Outcome: Progressing  Goal: Absence of cardiac dysrhythmias or at baseline rhythm  Description: INTERVENTIONS:  - Continuous cardiac monitoring, vital signs, obtain 12 lead EKG if ordered  - Administer antiarrhythmic and heart rate control medications as ordered  - Monitor electrolytes and administer replacement therapy as ordered  Outcome: Progressing     Problem: RESPIRATORY - ADULT  Goal: Achieves optimal ventilation and oxygenation  Description: INTERVENTIONS:  - Assess for changes in respiratory status  - Assess for changes in mentation and behavior  - Position to facilitate oxygenation and minimize respiratory effort  - Oxygen administered by appropriate delivery if ordered  - Initiate smoking cessation education as indicated  - Encourage broncho-pulmonary hygiene  including cough, deep breathe, Incentive Spirometry  - Assess the need for suctioning and aspirate as needed  - Assess and instruct to report SOB or any respiratory difficulty  - Respiratory Therapy support as indicated  Outcome: Progressing     Problem: GASTROINTESTINAL - ADULT  Goal: Minimal or absence of nausea and/or vomiting  Description: INTERVENTIONS:  - Administer IV fluids if ordered to ensure adequate hydration  - Maintain NPO status until nausea and vomiting are resolved  - Nasogastric tube if ordered  - Administer ordered antiemetic medications as needed  - Provide nonpharmacologic comfort measures as appropriate  - Advance diet as tolerated, if ordered  - Consider nutrition services referral to assist patient with adequate nutrition and appropriate food choices  Outcome: Progressing  Goal: Maintains or returns to baseline bowel function  Description: INTERVENTIONS:  - Assess bowel function  - Encourage oral fluids to ensure adequate hydration  - Administer IV fluids if ordered to ensure adequate hydration  - Administer ordered medications as needed  - Encourage mobilization and activity  - Consider nutritional services referral to assist patient with adequate nutrition and appropriate food choices  Outcome: Progressing  Goal: Maintains adequate nutritional intake  Description: INTERVENTIONS:  - Monitor percentage of each meal consumed  - Identify factors contributing to decreased intake, treat as appropriate  - Assist with meals as needed  - Monitor I&O, weight, and lab values if indicated  - Obtain nutrition services referral as needed  Outcome: Progressing  Goal: Oral mucous membranes remain intact  Description: INTERVENTIONS  - Assess oral mucosa and hygiene practices  - Implement preventative oral hygiene regimen  - Implement oral medicated treatments as ordered  - Initiate Nutrition services referral as needed  Outcome: Progressing     Problem: HEMATOLOGIC - ADULT  Goal: Maintains hematologic  stability  Description: INTERVENTIONS  - Assess for signs and symptoms of bleeding or hemorrhage  - Monitor labs  - Administer supportive blood products/factors as ordered and appropriate  Outcome: Progressing

## 2024-02-20 NOTE — RESPIRATORY THERAPY NOTE
RT Protocol Note  Darlyn Nguyen 78 y.o. female MRN: [id number]  Unit/Bed#:  Encounter: [id number]    Assessment    Principal Problem:    Acute on chronic anemia  Active Problems:    Lupus (HCC)    Acute hypoxic respiratory failure (HCC)    Hypertension    Pancolitis (HCC)    Metabolic encephalopathy    Hypomagnesemia    High serum lactic acid      Home Pulmonary Medications:         Past Medical History:   Diagnosis Date    Diabetes mellitus (HCC)     Fibromyalgia     Hyperlipidemia     Hypertension      Social History     Socioeconomic History    Marital status: /Civil Union     Spouse name: None    Number of children: None    Years of education: None    Highest education level: None   Occupational History    None   Tobacco Use    Smoking status: Never    Smokeless tobacco: Never   Vaping Use    Vaping status: Never Used   Substance and Sexual Activity    Alcohol use: Not Currently    Drug use: Never    Sexual activity: None   Other Topics Concern    None   Social History Narrative    None     Social Determinants of Health     Financial Resource Strain: Low Risk  (2/9/2024)    Received from [facility]    Overall Financial Resource Strain (CARDIA)     Difficulty of Paying Living Expenses: Not hard at all   Food Insecurity: No Food Insecurity (2/9/2024)    Received from [facility]    Hunger Vital Sign     Worried About Running Out of Food in the Last Year: Never true     Ran Out of Food in the Last Year: Never true   Transportation Needs: No Transportation Needs (2/9/2024)    Received from [facility]    PRAPARE - Transportation     Lack of Transportation (Medical): No     Lack of Transportation (Non-Medical): No   Physical Activity: Not on file   Stress: Not on file   Social Connections: Not on file   Intimate Partner Violence: Not At Risk (2/9/2024)    Received from [facility]    Humiliation, Afraid, Rape, and Kick questionnaire      "Fear of Current or Ex-Partner: No     Emotionally Abused: No     Physically Abused: No     Sexually Abused: No   Housing Stability: Low Risk  (2/9/2024)    Received from [facility]    Housing Stability Vital Sign     Unable to Pay for Housing in the Last Year: No     Number of Places Lived in the Last Year: 1     Unstable Housing in the Last Year: No       Subjective         Objective    Physical Exam:   Assessment Type: Assess only  General Appearance: Alert, Awake  Respiratory Pattern: Normal  Chest Assessment: Chest expansion symmetrical  Bilateral Breath Sounds: Diminished  Cough: None    Vitals:  Blood pressure 110/56, pulse 83, temperature (!) 96.8 °F (36 °C), temperature source Tympanic, resp. rate 20, height 5' 4\" (1.626 m), weight 63 kg (139 lb), SpO2 (!) 89%.    Results from last 7 days   Lab Units 02/19/24  1905   PH ART  7.452*   PCO2 ART mm Hg 31.9*   PO2 ART mm Hg 191.7*   HCO3 ART mmol/L 21.8*   BASE EXC ART mmol/L -1.9   O2 CONTENT ART mL/dL 9.2*   O2 HGB, ARTERIAL % 83.3*   ABG SOURCE  Radial, Left   ZANA TEST  Yes       Imaging and other studies: I have personally reviewed pertinent reports.            Plan    Respiratory Plan: Mild Distress pathway        Resp Comments: Patient assessed per RT protocol   "

## 2024-02-21 LAB — C DIFF TOX GENS STL QL NAA+PROBE: NEGATIVE

## 2024-02-22 LAB
BACTERIA UR CULT: ABNORMAL
BACTERIA UR CULT: ABNORMAL

## 2024-02-24 LAB
BACTERIA BLD CULT: NORMAL
BACTERIA BLD CULT: NORMAL

## 2024-02-29 ENCOUNTER — LAB REQUISITION (OUTPATIENT)
Dept: LAB | Facility: HOSPITAL | Age: 79
End: 2024-02-29

## 2024-02-29 ENCOUNTER — APPOINTMENT (EMERGENCY)
Dept: CT IMAGING | Facility: HOSPITAL | Age: 79
DRG: 208 | End: 2024-02-29
Payer: MEDICARE

## 2024-02-29 ENCOUNTER — HOSPITAL ENCOUNTER (INPATIENT)
Facility: HOSPITAL | Age: 79
LOS: 2 days | DRG: 208 | End: 2024-03-03
Attending: EMERGENCY MEDICINE | Admitting: FAMILY MEDICINE
Payer: MEDICARE

## 2024-02-29 ENCOUNTER — APPOINTMENT (EMERGENCY)
Dept: RADIOLOGY | Facility: HOSPITAL | Age: 79
DRG: 208 | End: 2024-02-29
Payer: MEDICARE

## 2024-02-29 DIAGNOSIS — R78.81 BACTEREMIA: ICD-10-CM

## 2024-02-29 DIAGNOSIS — J18.9 PNEUMONIA: ICD-10-CM

## 2024-02-29 DIAGNOSIS — E87.20 LACTIC ACIDOSIS: ICD-10-CM

## 2024-02-29 DIAGNOSIS — R78.81 POSITIVE BLOOD CULTURE: ICD-10-CM

## 2024-02-29 DIAGNOSIS — R19.7 DIARRHEA OF PRESUMED INFECTIOUS ORIGIN: ICD-10-CM

## 2024-02-29 DIAGNOSIS — A41.9 SEPSIS (HCC): ICD-10-CM

## 2024-02-29 DIAGNOSIS — M32.14 GLOMERULAR DISEASE IN SYSTEMIC LUPUS ERYTHEMATOSUS (HCC): ICD-10-CM

## 2024-02-29 DIAGNOSIS — R19.5 POSITIVE FECAL OCCULT BLOOD TEST: ICD-10-CM

## 2024-02-29 DIAGNOSIS — K92.2 LOWER GI BLEED: Primary | ICD-10-CM

## 2024-02-29 DIAGNOSIS — R34 OLIGURIA: ICD-10-CM

## 2024-02-29 DIAGNOSIS — D64.9 ANEMIA: ICD-10-CM

## 2024-02-29 DIAGNOSIS — K52.9 COLITIS: ICD-10-CM

## 2024-02-29 DIAGNOSIS — S31.000A SACRAL WOUND: ICD-10-CM

## 2024-02-29 DIAGNOSIS — D62 ACUTE BLOOD LOSS ANEMIA: ICD-10-CM

## 2024-02-29 DIAGNOSIS — E87.70 FLUID OVERLOAD: ICD-10-CM

## 2024-02-29 DIAGNOSIS — R78.81 GRAM-POSITIVE COCCI BACTEREMIA: ICD-10-CM

## 2024-02-29 PROBLEM — I82.409 DVT (DEEP VENOUS THROMBOSIS) (HCC): Status: ACTIVE | Noted: 2024-02-29

## 2024-02-29 LAB
ABO GROUP BLD: NORMAL
ALBUMIN SERPL BCP-MCNC: 2.4 G/DL (ref 3.5–5)
ALP SERPL-CCNC: 59 U/L (ref 34–104)
ALT SERPL W P-5'-P-CCNC: 15 U/L (ref 7–52)
ANION GAP SERPL CALCULATED.3IONS-SCNC: 8 MMOL/L
ANISOCYTOSIS BLD QL SMEAR: PRESENT
APTT PPP: 31 SECONDS (ref 23–37)
AST SERPL W P-5'-P-CCNC: 13 U/L (ref 13–39)
ATRIAL RATE: 78 BPM
BACTERIA UR QL AUTO: NORMAL /HPF
BASOPHILS # BLD MANUAL: 0 THOUSAND/UL (ref 0–0.1)
BASOPHILS NFR MAR MANUAL: 0 % (ref 0–1)
BILIRUB SERPL-MCNC: 0.72 MG/DL (ref 0.2–1)
BILIRUB UR QL STRIP: NEGATIVE
BLD GP AB SCN SERPL QL: NEGATIVE
BNP SERPL-MCNC: 64 PG/ML (ref 0–100)
BUN SERPL-MCNC: 58 MG/DL (ref 5–25)
CALCIUM ALBUM COR SERPL-MCNC: 9.9 MG/DL (ref 8.3–10.1)
CALCIUM SERPL-MCNC: 8.6 MG/DL (ref 8.4–10.2)
CHLORIDE SERPL-SCNC: 101 MMOL/L (ref 96–108)
CLARITY UR: CLEAR
CO2 SERPL-SCNC: 23 MMOL/L (ref 21–32)
COLOR UR: YELLOW
CREAT SERPL-MCNC: 1.1 MG/DL (ref 0.6–1.3)
EOSINOPHIL # BLD MANUAL: 0 THOUSAND/UL (ref 0–0.4)
EOSINOPHIL NFR BLD MANUAL: 0 % (ref 0–6)
ERYTHROCYTE [DISTWIDTH] IN BLOOD BY AUTOMATED COUNT: 18.6 % (ref 11.6–15.1)
FLUAV RNA RESP QL NAA+PROBE: NEGATIVE
FLUBV RNA RESP QL NAA+PROBE: NEGATIVE
GFR SERPL CREATININE-BSD FRML MDRD: 47 ML/MIN/1.73SQ M
GLUCOSE SERPL-MCNC: 272 MG/DL (ref 65–140)
GLUCOSE UR STRIP-MCNC: NEGATIVE MG/DL
HCT VFR BLD AUTO: 23.7 % (ref 34.8–46.1)
HCT VFR BLD AUTO: 30.5 % (ref 34.8–46.1)
HGB BLD-MCNC: 7 G/DL (ref 11.5–15.4)
HGB BLD-MCNC: 9.3 G/DL (ref 11.5–15.4)
HGB UR QL STRIP.AUTO: ABNORMAL
INR PPP: 1.95 (ref 0.84–1.19)
KETONES UR STRIP-MCNC: ABNORMAL MG/DL
LACTATE SERPL-SCNC: 1.8 MMOL/L (ref 0.5–2)
LACTATE SERPL-SCNC: 4.1 MMOL/L (ref 0.5–2)
LEUKOCYTE ESTERASE UR QL STRIP: NEGATIVE
LYMPHOCYTES # BLD AUTO: 0.96 THOUSAND/UL (ref 0.6–4.47)
LYMPHOCYTES # BLD AUTO: 11 % (ref 14–44)
MACROCYTES BLD QL AUTO: PRESENT
MAGNESIUM SERPL-MCNC: 2 MG/DL (ref 1.9–2.7)
MCH RBC QN AUTO: 31.5 PG (ref 26.8–34.3)
MCHC RBC AUTO-ENTMCNC: 29.5 G/DL (ref 31.4–37.4)
MCV RBC AUTO: 107 FL (ref 82–98)
MONOCYTES # BLD AUTO: 0.44 THOUSAND/UL (ref 0–1.22)
MONOCYTES NFR BLD: 5 % (ref 4–12)
NEUTROPHILS # BLD MANUAL: 7.32 THOUSAND/UL (ref 1.85–7.62)
NEUTS BAND NFR BLD MANUAL: 5 % (ref 0–8)
NEUTS SEG NFR BLD AUTO: 79 % (ref 43–75)
NITRITE UR QL STRIP: POSITIVE
NON-SQ EPI CELLS URNS QL MICRO: NORMAL /HPF
P AXIS: 43 DEGREES
PH UR STRIP.AUTO: 5.5 [PH]
PLATELET # BLD AUTO: 315 THOUSANDS/UL (ref 149–390)
PLATELET BLD QL SMEAR: ADEQUATE
PMV BLD AUTO: 11.8 FL (ref 8.9–12.7)
POTASSIUM SERPL-SCNC: 4.8 MMOL/L (ref 3.5–5.3)
PR INTERVAL: 170 MS
PROCALCITONIN SERPL-MCNC: 0.61 NG/ML
PROT SERPL-MCNC: 4.1 G/DL (ref 6.4–8.4)
PROT UR STRIP-MCNC: ABNORMAL MG/DL
PROTHROMBIN TIME: 21.9 SECONDS (ref 11.6–14.5)
QRS AXIS: 13 DEGREES
QRSD INTERVAL: 142 MS
QT INTERVAL: 416 MS
QTC INTERVAL: 474 MS
RBC # BLD AUTO: 2.22 MILLION/UL (ref 3.81–5.12)
RBC #/AREA URNS AUTO: NORMAL /HPF
RBC MORPH BLD: PRESENT
RH BLD: POSITIVE
RSV RNA RESP QL NAA+PROBE: NEGATIVE
SARS-COV-2 RNA RESP QL NAA+PROBE: NEGATIVE
SODIUM SERPL-SCNC: 132 MMOL/L (ref 135–147)
SP GR UR STRIP.AUTO: 1.01 (ref 1–1.03)
SPECIMEN EXPIRATION DATE: NORMAL
T WAVE AXIS: -24 DEGREES
TSH SERPL DL<=0.05 MIU/L-ACNC: 1.25 UIU/ML (ref 0.45–4.5)
UROBILINOGEN UR QL STRIP.AUTO: 0.2 E.U./DL
VENTRICULAR RATE: 78 BPM
WBC # BLD AUTO: 8.72 THOUSAND/UL (ref 4.31–10.16)
WBC #/AREA URNS AUTO: NORMAL /HPF

## 2024-02-29 PROCEDURE — 80053 COMPREHEN METABOLIC PANEL: CPT

## 2024-02-29 PROCEDURE — 87070 CULTURE OTHR SPECIMN AEROBIC: CPT | Performed by: NURSE PRACTITIONER

## 2024-02-29 PROCEDURE — 36430 TRANSFUSION BLD/BLD COMPNT: CPT

## 2024-02-29 PROCEDURE — 84443 ASSAY THYROID STIM HORMONE: CPT | Performed by: PHYSICIAN ASSISTANT

## 2024-02-29 PROCEDURE — 87186 SC STD MICRODIL/AGAR DIL: CPT | Performed by: PHYSICIAN ASSISTANT

## 2024-02-29 PROCEDURE — 86920 COMPATIBILITY TEST SPIN: CPT

## 2024-02-29 PROCEDURE — 87186 SC STD MICRODIL/AGAR DIL: CPT | Performed by: NURSE PRACTITIONER

## 2024-02-29 PROCEDURE — 86901 BLOOD TYPING SEROLOGIC RH(D): CPT

## 2024-02-29 PROCEDURE — 30233N1 TRANSFUSION OF NONAUTOLOGOUS RED BLOOD CELLS INTO PERIPHERAL VEIN, PERCUTANEOUS APPROACH: ICD-10-PCS | Performed by: EMERGENCY MEDICINE

## 2024-02-29 PROCEDURE — 83735 ASSAY OF MAGNESIUM: CPT

## 2024-02-29 PROCEDURE — 87154 CUL TYP ID BLD PTHGN 6+ TRGT: CPT

## 2024-02-29 PROCEDURE — 84145 PROCALCITONIN (PCT): CPT

## 2024-02-29 PROCEDURE — 0241U HB NFCT DS VIR RESP RNA 4 TRGT: CPT

## 2024-02-29 PROCEDURE — 85730 THROMBOPLASTIN TIME PARTIAL: CPT

## 2024-02-29 PROCEDURE — 99285 EMERGENCY DEPT VISIT HI MDM: CPT

## 2024-02-29 PROCEDURE — P9040 RBC LEUKOREDUCED IRRADIATED: HCPCS

## 2024-02-29 PROCEDURE — 99285 EMERGENCY DEPT VISIT HI MDM: CPT | Performed by: EMERGENCY MEDICINE

## 2024-02-29 PROCEDURE — 74177 CT ABD & PELVIS W/CONTRAST: CPT

## 2024-02-29 PROCEDURE — 87086 URINE CULTURE/COLONY COUNT: CPT | Performed by: PHYSICIAN ASSISTANT

## 2024-02-29 PROCEDURE — 87205 SMEAR GRAM STAIN: CPT | Performed by: NURSE PRACTITIONER

## 2024-02-29 PROCEDURE — 96367 TX/PROPH/DG ADDL SEQ IV INF: CPT

## 2024-02-29 PROCEDURE — 71045 X-RAY EXAM CHEST 1 VIEW: CPT

## 2024-02-29 PROCEDURE — 96368 THER/DIAG CONCURRENT INF: CPT

## 2024-02-29 PROCEDURE — 85610 PROTHROMBIN TIME: CPT

## 2024-02-29 PROCEDURE — 87449 NOS EACH ORGANISM AG IA: CPT | Performed by: PHYSICIAN ASSISTANT

## 2024-02-29 PROCEDURE — 83880 ASSAY OF NATRIURETIC PEPTIDE: CPT

## 2024-02-29 PROCEDURE — 87186 SC STD MICRODIL/AGAR DIL: CPT

## 2024-02-29 PROCEDURE — 36415 COLL VENOUS BLD VENIPUNCTURE: CPT

## 2024-02-29 PROCEDURE — 96365 THER/PROPH/DIAG IV INF INIT: CPT

## 2024-02-29 PROCEDURE — 85027 COMPLETE CBC AUTOMATED: CPT

## 2024-02-29 PROCEDURE — 86850 RBC ANTIBODY SCREEN: CPT

## 2024-02-29 PROCEDURE — 87077 CULTURE AEROBIC IDENTIFY: CPT

## 2024-02-29 PROCEDURE — 87040 BLOOD CULTURE FOR BACTERIA: CPT

## 2024-02-29 PROCEDURE — 93010 ELECTROCARDIOGRAM REPORT: CPT | Performed by: INTERNAL MEDICINE

## 2024-02-29 PROCEDURE — 85014 HEMATOCRIT: CPT | Performed by: PHYSICIAN ASSISTANT

## 2024-02-29 PROCEDURE — 87081 CULTURE SCREEN ONLY: CPT | Performed by: PHYSICIAN ASSISTANT

## 2024-02-29 PROCEDURE — 86900 BLOOD TYPING SEROLOGIC ABO: CPT

## 2024-02-29 PROCEDURE — 85018 HEMOGLOBIN: CPT | Performed by: PHYSICIAN ASSISTANT

## 2024-02-29 PROCEDURE — 83605 ASSAY OF LACTIC ACID: CPT

## 2024-02-29 PROCEDURE — 87077 CULTURE AEROBIC IDENTIFY: CPT | Performed by: NURSE PRACTITIONER

## 2024-02-29 PROCEDURE — 85007 BL SMEAR W/DIFF WBC COUNT: CPT

## 2024-02-29 PROCEDURE — 93005 ELECTROCARDIOGRAM TRACING: CPT

## 2024-02-29 PROCEDURE — 87077 CULTURE AEROBIC IDENTIFY: CPT | Performed by: PHYSICIAN ASSISTANT

## 2024-02-29 PROCEDURE — 81001 URINALYSIS AUTO W/SCOPE: CPT | Performed by: PHYSICIAN ASSISTANT

## 2024-02-29 PROCEDURE — 99223 1ST HOSP IP/OBS HIGH 75: CPT | Performed by: FAMILY MEDICINE

## 2024-02-29 RX ORDER — AMLODIPINE BESYLATE 5 MG/1
5 TABLET ORAL DAILY
Status: ON HOLD | COMMUNITY

## 2024-02-29 RX ORDER — CEFTRIAXONE 1 G/50ML
1000 INJECTION, SOLUTION INTRAVENOUS EVERY 24 HOURS
Status: DISCONTINUED | OUTPATIENT
Start: 2024-02-29 | End: 2024-03-02

## 2024-02-29 RX ORDER — UBIDECARENONE 100 MG
100 CAPSULE ORAL DAILY
Status: ON HOLD | COMMUNITY

## 2024-02-29 RX ORDER — MIRTAZAPINE 15 MG/1
7.5 TABLET, FILM COATED ORAL
Status: DISCONTINUED | OUTPATIENT
Start: 2024-02-29 | End: 2024-03-03

## 2024-02-29 RX ORDER — ATORVASTATIN CALCIUM 40 MG/1
40 TABLET, FILM COATED ORAL DAILY
Status: DISCONTINUED | OUTPATIENT
Start: 2024-03-01 | End: 2024-03-03

## 2024-02-29 RX ORDER — ESCITALOPRAM OXALATE 10 MG/1
10 TABLET ORAL DAILY
Status: DISCONTINUED | OUTPATIENT
Start: 2024-03-01 | End: 2024-03-03

## 2024-02-29 RX ORDER — ATOVAQUONE 750 MG/5ML
750 SUSPENSION ORAL 2 TIMES DAILY
Status: ON HOLD | COMMUNITY

## 2024-02-29 RX ORDER — ACETAMINOPHEN 325 MG/1
650 TABLET ORAL EVERY 6 HOURS PRN
Status: DISCONTINUED | OUTPATIENT
Start: 2024-02-29 | End: 2024-03-03

## 2024-02-29 RX ORDER — METHOCARBAMOL 500 MG/1
500 TABLET, FILM COATED ORAL EVERY 12 HOURS PRN
Status: ON HOLD | COMMUNITY

## 2024-02-29 RX ORDER — OXYCODONE HYDROCHLORIDE 5 MG/1
5 TABLET ORAL EVERY 6 HOURS PRN
Status: DISCONTINUED | OUTPATIENT
Start: 2024-02-29 | End: 2024-03-03

## 2024-02-29 RX ORDER — FOLIC ACID 1 MG/1
1 TABLET ORAL DAILY
Status: DISCONTINUED | OUTPATIENT
Start: 2024-03-01 | End: 2024-03-03

## 2024-02-29 RX ORDER — OXYCODONE HYDROCHLORIDE 5 MG/1
2.5 TABLET ORAL EVERY 6 HOURS PRN
Status: ON HOLD | COMMUNITY

## 2024-02-29 RX ORDER — ALBUTEROL SULFATE 2.5 MG/3ML
2.5 SOLUTION RESPIRATORY (INHALATION) EVERY 2 HOUR PRN
Status: DISCONTINUED | OUTPATIENT
Start: 2024-02-29 | End: 2024-03-03 | Stop reason: HOSPADM

## 2024-02-29 RX ORDER — ONDANSETRON 2 MG/ML
4 INJECTION INTRAMUSCULAR; INTRAVENOUS EVERY 6 HOURS PRN
Status: DISCONTINUED | OUTPATIENT
Start: 2024-02-29 | End: 2024-03-03 | Stop reason: HOSPADM

## 2024-02-29 RX ORDER — SODIUM CHLORIDE, SODIUM GLUCONATE, SODIUM ACETATE, POTASSIUM CHLORIDE, MAGNESIUM CHLORIDE, SODIUM PHOSPHATE, DIBASIC, AND POTASSIUM PHOSPHATE .53; .5; .37; .037; .03; .012; .00082 G/100ML; G/100ML; G/100ML; G/100ML; G/100ML; G/100ML; G/100ML
500 INJECTION, SOLUTION INTRAVENOUS ONCE
Status: COMPLETED | OUTPATIENT
Start: 2024-02-29 | End: 2024-02-29

## 2024-02-29 RX ORDER — ARGININE/GLUTAMINE/CALCIUM BMB 7G-7G-1.5G
1 POWDER IN PACKET (EA) ORAL 2 TIMES DAILY
Status: ON HOLD | COMMUNITY

## 2024-02-29 RX ORDER — CARVEDILOL 6.25 MG/1
6.25 TABLET ORAL 2 TIMES DAILY WITH MEALS
Status: ON HOLD | COMMUNITY

## 2024-02-29 RX ORDER — INSULIN LISPRO 100 [IU]/ML
2-10 INJECTION, SOLUTION INTRAVENOUS; SUBCUTANEOUS
Status: ON HOLD | COMMUNITY

## 2024-02-29 RX ORDER — FAMOTIDINE 20 MG/1
20 TABLET, FILM COATED ORAL DAILY
Status: DISCONTINUED | OUTPATIENT
Start: 2024-03-01 | End: 2024-03-03

## 2024-02-29 RX ORDER — GUAIFENESIN 600 MG/1
600 TABLET, EXTENDED RELEASE ORAL EVERY 12 HOURS SCHEDULED
Status: DISCONTINUED | OUTPATIENT
Start: 2024-03-01 | End: 2024-03-03

## 2024-02-29 RX ORDER — HYDROMORPHONE HCL IN WATER/PF 6 MG/30 ML
0.2 PATIENT CONTROLLED ANALGESIA SYRINGE INTRAVENOUS EVERY 4 HOURS PRN
Status: DISCONTINUED | OUTPATIENT
Start: 2024-02-29 | End: 2024-03-03

## 2024-02-29 RX ORDER — MYCOPHENOLATE MOFETIL 250 MG/1
1000 CAPSULE ORAL EVERY 12 HOURS SCHEDULED
Status: DISCONTINUED | OUTPATIENT
Start: 2024-02-29 | End: 2024-03-02

## 2024-02-29 RX ORDER — METRONIDAZOLE 500 MG/1
500 TABLET ORAL EVERY 8 HOURS SCHEDULED
Status: DISCONTINUED | OUTPATIENT
Start: 2024-02-29 | End: 2024-03-03

## 2024-02-29 RX ORDER — PREDNISONE 20 MG/1
60 TABLET ORAL DAILY
Status: COMPLETED | OUTPATIENT
Start: 2024-03-01 | End: 2024-03-02

## 2024-02-29 RX ORDER — HYDROXYCHLOROQUINE SULFATE 200 MG/1
300 TABLET, FILM COATED ORAL DAILY
Status: DISCONTINUED | OUTPATIENT
Start: 2024-03-01 | End: 2024-03-03

## 2024-02-29 RX ORDER — CARVEDILOL 3.12 MG/1
6.25 TABLET ORAL 2 TIMES DAILY WITH MEALS
Status: DISCONTINUED | OUTPATIENT
Start: 2024-02-29 | End: 2024-03-03

## 2024-02-29 RX ORDER — PANTOPRAZOLE SODIUM 40 MG/1
40 TABLET, DELAYED RELEASE ORAL
Status: DISCONTINUED | OUTPATIENT
Start: 2024-03-01 | End: 2024-03-03

## 2024-02-29 RX ADMIN — MIRTAZAPINE 7.5 MG: 15 TABLET, FILM COATED ORAL at 21:26

## 2024-02-29 RX ADMIN — IOHEXOL 100 ML: 350 INJECTION, SOLUTION INTRAVENOUS at 13:49

## 2024-02-29 RX ADMIN — SODIUM CHLORIDE, SODIUM GLUCONATE, SODIUM ACETATE, POTASSIUM CHLORIDE, MAGNESIUM CHLORIDE, SODIUM PHOSPHATE, DIBASIC, AND POTASSIUM PHOSPHATE 500 ML: .53; .5; .37; .037; .03; .012; .00082 INJECTION, SOLUTION INTRAVENOUS at 12:39

## 2024-02-29 RX ADMIN — PIPERACILLIN AND TAZOBACTAM 2.25 G: 2; .25 INJECTION, POWDER, FOR SOLUTION INTRAVENOUS at 12:39

## 2024-02-29 RX ADMIN — CEFTRIAXONE 1000 MG: 1 INJECTION, SOLUTION INTRAVENOUS at 21:23

## 2024-02-29 RX ADMIN — METRONIDAZOLE 500 MG: 500 TABLET ORAL at 21:26

## 2024-02-29 RX ADMIN — THIAMINE HYDROCHLORIDE 50 MG: 100 INJECTION, SOLUTION INTRAMUSCULAR; INTRAVENOUS at 14:13

## 2024-02-29 RX ADMIN — MYCOPHENOLATE MOFETIL 1000 MG: 250 CAPSULE ORAL at 21:26

## 2024-02-29 NOTE — ED PROVIDER NOTES
History  Chief Complaint   Patient presents with    Rectal Bleeding     5th floor staff reports that patient had bloody diarrhea this morning. Patient reports rectal pain but no abdominal pain.      78-year-old female past medical history remarkable for SLE with secondary lupus nephritis, DVT ( on Eliquis) who presents to the emergency department from short-term rehab for further management of acute anemia and positive FOBT .     The patient was recently hospitalized from 2/19/2024 -2/20/2024 for acute anemia and acute respiratory failure management.  She received 2 blood units during the hospitalization with remarkable stabilization of her H/H.  However the patient was diagnosed with methemoglobinemia during the hospitalization secondary to recent Use for SLE management and was to [facility] on 2/20/2024 for further management.  She was discharged to the rehab center from [facility] on 2/24/2024.       Today the patient reports generalized weakness and fatigue along with buttock pain.  She is not sure about the onset of symptoms.  No fevers or chills, nausea or vomiting, chest pain or tightness, or shortness of breath.  Patient denies witnessing any blood per rectum or change in stool color however reports having loose stools for the past several weeks.      Report from nursing staff at the rehab center remarkable for positive Hemoccult test, acute anemia that was noted earlier today.   No blood per rectum or change in stool color was noted.          Prior to Admission Medications   Prescriptions Last Dose Informant Patient Reported? Taking?   DULoxetine (CYMBALTA) 60 mg delayed release capsule  Self Yes No   FISH OIL-CANOLA OIL-VIT D3 PO  Self Yes No   Sig: Use   Hydroxychloroquine Sulfate 300 MG TABS   Yes No   Sig: Take 1.5 tablets by mouth daily   TURMERIC PO  Self Yes No   Sig: Take 1 capsule by mouth daily   albuterol (2.5 mg/3 mL) 0.083 % nebulizer solution   Yes No   Sig: Inhale 2.5 mg every 2 (two)  hours as needed   aspirin (ECOTRIN LOW STRENGTH) 81 mg EC tablet  Self Yes No   Sig: Take 81 mg by mouth   atorvastatin (LIPITOR) 40 mg tablet   Yes No   Sig: Take 40 mg by mouth daily   escitalopram (LEXAPRO) 10 mg tablet   Yes No   Sig: Take 10 mg by mouth daily   famotidine (PEPCID) 20 mg tablet   Yes No   Sig: Take 20 mg by mouth daily   gabapentin (NEURONTIN) 100 mg capsule  Self Yes No   losartan (COZAAR) 100 MG tablet   Yes No   Sig: Take 100 mg by mouth daily   mirtazapine (REMERON) 7.5 MG tablet   Yes No   Sig: Take 7.5 mg by mouth daily at bedtime   mycophenolate (CELLCEPT) 500 mg tablet   Yes No   Sig: Take 2 tabs twice daily as instructed   pantoprazole (PROTONIX) 40 mg tablet   Yes No   Sig: Take 40 mg by mouth 2 (two) times a day   predniSONE 20 mg tablet   Yes No   Sig: Take 30 mg by mouth daily      Facility-Administered Medications: None       Past Medical History:   Diagnosis Date    Diabetes mellitus (HCC)     Fibromyalgia     Hyperlipidemia     Hypertension        Past Surgical History:   Procedure Laterality Date    ACHILLES TENDON REPAIR Right     ADENOIDECTOMY      APPENDECTOMY      SKIN CANCER EXCISION      x 4 - back    TONSILLECTOMY      TOTAL ABDOMINAL HYSTERECTOMY      US GUIDED KIDNEY BIOPSY  12/26/2023       Family History   Problem Relation Age of Onset    Cancer Mother     Cervical cancer Sister     Breast cancer Sister     Breast cancer Sister      I have reviewed and agree with the history as documented.    E-Cigarette/Vaping    E-Cigarette Use Never User      E-Cigarette/Vaping Substances    Nicotine No     THC No     CBD No     Flavoring No     Other No     Unknown No      Social History     Tobacco Use    Smoking status: Never    Smokeless tobacco: Never   Vaping Use    Vaping status: Never Used   Substance Use Topics    Alcohol use: Not Currently    Drug use: Never        Review of Systems   Constitutional:  Positive for activity change and fatigue.   Respiratory:  Positive for  cough. Negative for shortness of breath.    Cardiovascular:  Negative for chest pain and palpitations.   Gastrointestinal:  Positive for blood in stool (per [facility] nursing staff ( microscopic)) and diarrhea. Negative for abdominal pain, nausea and vomiting.   Genitourinary:  Negative for dysuria.       Physical Exam  ED Triage Vitals   Temperature Pulse Respirations Blood Pressure SpO2   24 1137 24 1137 24 1137 24 1137 24 1212   97.7 °F (36.5 °C) 78 15 113/55 (!) 85 %      Temp Source Heart Rate Source Patient Position - Orthostatic VS BP Location FiO2 (%)   24 1137 24 1137 24 1137 24 1137 --   Temporal Monitor Lying Left arm       Pain Score       24 1137       4             Orthostatic Vital Signs  Vitals:    24 1300 24 1330 24 1405 24 1530   BP: 109/54 112/54 105/52 106/59   Pulse: 69 69 72 74   Patient Position - Orthostatic VS: Lying Lying  Lying       Physical Exam  Vitals and nursing note reviewed.   Constitutional:       Appearance: She is ill-appearing.   HENT:      Mouth/Throat:      Mouth: Mucous membranes are moist.      Pharynx: Oropharynx is clear.   Cardiovascular:      Rate and Rhythm: Normal rate and regular rhythm.      Pulses: Normal pulses.           Radial pulses are 2+ on the right side and 2+ on the left side.      Heart sounds: Normal heart sounds. No murmur heard.  Pulmonary:      Effort: Pulmonary effort is normal.      Breath sounds: Decreased breath sounds and rales present.   Abdominal:      General: Abdomen is flat. Bowel sounds are normal.      Tenderness: There is abdominal tenderness (to deep palpation) in the right lower quadrant, epigastric area and left lower quadrant.   Musculoskeletal:      Cervical back: Normal range of motion.      Right lower le+ Edema present.      Left lower le+ Edema present.   Skin:     Capillary Refill: Capillary refill takes less than 2 seconds.              Comments: 3 wounds noted on the marked area above  G1 - tender to palpation.   Skin sloughing/erosions noted.   No masses/fluctuance on palpation .   Neurological:      General: No focal deficit present.      Mental Status: She is alert.         ED Medications  Medications   piperacillin-tazobactam (ZOSYN) 2.25 g in sodium chloride 0.9 % 50 mL IVPB (0 g Intravenous Stopped 2/29/24 1308)   multi-electrolyte (ISOLYTE-S PH 7.4) bolus 500 mL (0 mL Intravenous Stopped 2/29/24 1339)   thiamine (VITAMIN B1) 50 mg in sodium chloride 0.9 % 50 mL IVPB (0 mg Intravenous Stopped 2/29/24 1443)   iohexol (OMNIPAQUE) 350 MG/ML injection (MULTI-DOSE) 100 mL (100 mL Intravenous Given 2/29/24 1349)       Diagnostic Studies  Results Reviewed       Procedure Component Value Units Date/Time    Lactic acid 2 Hours [[id number]]  (Normal) Collected: 02/29/24 1526    Lab Status: Final result Specimen: Blood from Arm, Right Updated: 02/29/24 1547     LACTIC ACID 1.8 mmol/L     Narrative:      Result may be elevated if tourniquet was used during collection.    FLU/RSV/COVID - if FLU/RSV clinically relevant [[id number]]  (Normal) Collected: 02/29/24 1405    Lab Status: Final result Specimen: Nares from Nose Updated: 02/29/24 1454     SARS-CoV-2 Negative     INFLUENZA A PCR Negative     INFLUENZA B PCR Negative     RSV PCR Negative    Narrative:      FOR PEDIATRIC PATIENTS - copy/paste COVID Guidelines URL to browser: [email or web address]    SARS-CoV-2 assay is a Nucleic Acid Amplification assay intended for the  qualitative detection of nucleic acid from SARS-CoV-2 in nasopharyngeal  swabs. Results are for the presumptive identification of SARS-CoV-2 RNA.    Positive results are indicative of infection with SARS-CoV-2, the virus  causing COVID-19, but do not rule out bacterial infection or co-infection  with other viruses. Laboratories within the [place] and its  territories are  required to report all positive results to the appropriate  public health authorities. Negative results do not preclude SARS-CoV-2  infection and should not be used as the sole basis for treatment or other  patient management decisions. Negative results must be combined with  clinical observations, patient history, and epidemiological information.  This test has not been FDA cleared or approved.    This test has been authorized by FDA under an Emergency Use Authorization  (EUA). This test is only authorized for the duration of time the  declaration that circumstances exist justifying the authorization of the  emergency use of an in vitro diagnostic tests for detection of SARS-CoV-2  virus and/or diagnosis of COVID-19 infection under section 564(b)(1) of  the Act, 21 U.S.C. 360bbb-3(b)(1), unless the authorization is terminated  or revoked sooner. The test has been validated but independent review by FDA  and CLIA is pending.    Test performed using [employer] GeneXpert: This RT-PCR assay targets N2,  a region unique to SARS-CoV-2. A conserved region in the E-gene was chosen  for pan-Sarbecovirus detection which includes SARS-CoV-2.    According to CMS-2020-01-R, this platform meets the definition of high-throughput technology.    Magnesium [[id number]]  (Normal) Collected: 02/29/24 1226    Lab Status: Final result Specimen: Blood from Arm, Left Updated: 02/29/24 1307     Magnesium 2.0 mg/dL     Procalcitonin [[id number]]  (Abnormal) Collected: 02/29/24 1226    Lab Status: Final result Specimen: Blood from Arm, Left Updated: 02/29/24 1303     Procalcitonin 0.61 ng/ml     RBC Morphology Reflex Test [[id number]] Collected: 02/29/24 1226    Lab Status: Final result Specimen: Blood from Arm, Left Updated: 02/29/24 1301    Lactic acid [[id number]]  (Abnormal) Collected: 02/29/24 1226    Lab Status: Final result Specimen: Blood from Arm, Left Updated: 02/29/24 1259     LACTIC ACID 4.1 mmol/L     Narrative:      Result may be  elevated if tourniquet was used during collection.    B-Type Natriuretic Peptide(BNP) [[id number]]  (Normal) Collected: 02/29/24 1226    Lab Status: Final result Specimen: Blood from Arm, Left Updated: 02/29/24 1259     BNP 64 pg/mL     CBC and differential [[id number]]  (Abnormal) Collected: 02/29/24 1226    Lab Status: Final result Specimen: Blood from Arm, Left Updated: 02/29/24 1254     WBC 8.72 Thousand/uL      RBC 2.22 Million/uL      Hemoglobin 7.0 g/dL      Hematocrit 23.7 %       fL      MCH 31.5 pg      MCHC 29.5 g/dL      RDW 18.6 %      MPV 11.8 fL      Platelets 315 Thousands/uL     Comprehensive metabolic panel [[id number]]  (Abnormal) Collected: 02/29/24 1226    Lab Status: Final result Specimen: Blood from Arm, Left Updated: 02/29/24 1254     Sodium 132 mmol/L      Potassium 4.8 mmol/L      Chloride 101 mmol/L      CO2 23 mmol/L      ANION GAP 8 mmol/L      BUN 58 mg/dL      Creatinine 1.10 mg/dL      Glucose 272 mg/dL      Calcium 8.6 mg/dL      Corrected Calcium 9.9 mg/dL      AST 13 U/L      ALT 15 U/L      Alkaline Phosphatase 59 U/L      Total Protein 4.1 g/dL      Albumin 2.4 g/dL      Total Bilirubin 0.72 mg/dL      eGFR 47 ml/min/1.73sq m     Narrative:      National Kidney Disease Foundation guidelines for Chronic Kidney Disease (CKD):     Stage 1 with normal or high GFR (GFR > 90 mL/min/1.73 square meters)    Stage 2 Mild CKD (GFR = 60-89 mL/min/1.73 square meters)    Stage 3A Moderate CKD (GFR = 45-59 mL/min/1.73 square meters)    Stage 3B Moderate CKD (GFR = 30-44 mL/min/1.73 square meters)    Stage 4 Severe CKD (GFR = 15-29 mL/min/1.73 square meters)    Stage 5 End Stage CKD (GFR <15 mL/min/1.73 square meters)  Note: GFR calculation is accurate only with a steady state creatinine    Manual Differential(PHLEBS Do Not Order) [[id number]]  (Abnormal) Collected: 02/29/24 1226    Lab Status: Final result Specimen: Blood from Arm, Left Updated: 02/29/24 1254     Segmented % 79 %      Bands  % 5 %      Lymphocytes % 11 %      Monocytes % 5 %      Eosinophils, % 0 %      Basophils % 0 %      Absolute Neutrophils 7.32 Thousand/uL      Lymphocytes Absolute 0.96 Thousand/uL      Monocytes Absolute 0.44 Thousand/uL      Eosinophils Absolute 0.00 Thousand/uL      Basophils Absolute 0.00 Thousand/uL      Total Counted --     RBC Morphology Present     Platelet Estimate Adequate     Anisocytosis Present     Macrocytes Present    Protime-INR [[id number]]  (Abnormal) Collected: 02/29/24 1226    Lab Status: Final result Specimen: Blood from Arm, Left Updated: 02/29/24 1253     Protime 21.9 seconds      INR 1.95    APTT [[id number]]  (Normal) Collected: 02/29/24 1226    Lab Status: Final result Specimen: Blood from Arm, Left Updated: 02/29/24 1253     PTT 31 seconds     Blood culture #1 [[id number]] Collected: 02/29/24 1226    Lab Status: In process Specimen: Blood from Arm, Right Updated: 02/29/24 1236    Blood culture #2 [[id number]] Collected: 02/29/24 1226    Lab Status: In process Specimen: Blood from Arm, Left Updated: 02/29/24 1235    UA w Reflex to Microscopic w Reflex to Culture [[id number]]     Lab Status: No result Specimen: Urine                    CT abdomen pelvis with contrast   Final Result by Olman Tierney MD (02/29 1510)      Patchy right basilar consolidation suspicious for pneumonia.      Persistent fluid throughout the colon similar in appearance to 2/19/2024 with improved areas of mucosal hyperemia and thickening in keeping with an improving colitis.         The study was marked in EPIC for immediate notification.      Workstation performed: [id number]         XR chest 1 view portable   ED Interpretation by Dallin Massey DO (02/29 1237)   Airway is midline. Lungs are clear bilaterally with no evidence of pulmonary vascular congestion/focal infiltrate/pleural effusion/pneumothorax. Cardiac and mediastinal silhouettes are within normal limits. Osseous structures appear normal.               Procedures  ECG 12 Lead Documentation Only    Date/Time: 2/29/2024 12:29 PM    Performed by: Darien Layton MD  Authorized by: Darien Layton MD    ECG reviewed by me, the ED Provider: yes    Patient location:  ED  Previous ECG:     Previous ECG:  Compared to current    Similarity:  No change  Interpretation:     Interpretation: abnormal    Rate:     ECG rate assessment: normal    Rhythm:     Rhythm: sinus rhythm    QRS:     QRS axis:  Normal  Conduction:     Conduction: abnormal      Abnormal conduction: complete RBBB    ST segments:     ST segments:  Normal  T waves:     T waves: non-specific          ED Course                             SBIRT 20yo+      Flowsheet Row Most Recent Value   Initial Alcohol Screen: US AUDIT-C     1. How often do you have a drink containing alcohol? 0 Filed at: 02/29/2024 1137   2. How many drinks containing alcohol do you have on a typical day you are drinking?  0 Filed at: 02/29/2024 1137   3a. Male UNDER 65: How often do you have five or more drinks on one occasion? 0 Filed at: 02/29/2024 1137   3b. FEMALE Any Age, or MALE 65+: How often do you have 4 or more drinks on one occassion? 0 Filed at: 02/29/2024 1137   Audit-C Score 0 Filed at: 02/29/2024 1137   ALIYAH: How many times in the past year have you...    Used an illegal drug or used a prescription medication for non-medical reasons? Never Filed at: 02/29/2024 1137                  Medical Decision Making  78-year-old female presents to the emergency department from [facility] center with positive Hemoccult study and acute anemia per nursing staff.  No blood per rectum noted or melena.    Patient is complaining of generalized fatigue and nonspecific abdominal discomfort and lower back pain.  Initial ED workup included a CBC which was remarkable for downtrending hemoglobin from 7.5-7 today.  F0BT positive    Borderline hypotensive.  Patient received 500 mL Plasma-Lyte bolus x 1  With a marked improvement in blood  pressure.    Interval follow-up at 1: 30 pm ; patient reports marked improvement in symptoms.    CT ABD/P :   Patchy right basilar consolidation suspicious for pneumonia.  Persistent fluid throughout the colon similar in appearance to 2/19/2024 with improved areas of mucosal hyperemia and thickening in keeping with an improving colitis.    S/p Zosyn x 1     Patient will receiving 1 packed RBC unit today.  Case was discussed with hospitalist team and we agreed on admitting the patient to observation status for further management of lactic acidosis/colitis/acute anemia/complicated pneumonia    Amount and/or Complexity of Data Reviewed  Labs: ordered.  Radiology: ordered and independent interpretation performed.    Risk  Prescription drug management.  Decision regarding hospitalization.          Disposition  Final diagnoses:   Lower GI bleed   Acute blood loss anemia   Colitis   Anemia   Pneumonia     Time reflects when diagnosis was documented in both MDM as applicable and the Disposition within this note       Time User Action Codes Description Comment    2/29/2024  3:52 PM Darien Layton Add [K92.2] Lower GI bleed     2/29/2024  3:52 PM Darien Layton Add [D62] Acute blood loss anemia     2/29/2024  3:52 PM Darien Layton Add [K52.9] Colitis     2/29/2024  3:52 PM Almyadirani Ledezma Add [D64.9] Anemia     2/29/2024  3:53 PM Darien Layton Add [J18.9] Pneumonia           ED Disposition       ED Disposition   Admit    Condition   Fair    Date/Time   Thu Feb 29, 2024 9102    Comment   Case was discussed with Dr. Tasha Cee and the patient's admission status was agreed to be Admission Status: observation status to the service of Dr. Tasha Cee .               Follow-up Information    None         Patient's Medications   Discharge Prescriptions    No medications on file     No discharge procedures on file.    PDMP Review       None             ED Provider  Attending physically available and evaluated Darlyn STEPHENSON  Wendy. I managed the patient along with the ED Attending.    Electronically Signed by           Darien Layton MD  02/29/24 0542

## 2024-02-29 NOTE — PLAN OF CARE
Problem: PAIN - ADULT  Goal: Verbalizes/displays adequate comfort level or baseline comfort level  Description: Interventions:  - Encourage patient to monitor pain and request assistance  - Assess pain using appropriate pain scale  - Administer analgesics based on type and severity of pain and evaluate response  - Implement non-pharmacological measures as appropriate and evaluate response  - Consider cultural and social influences on pain and pain management  - Notify physician/advanced practitioner if interventions unsuccessful or patient reports new pain  Outcome: Progressing     Problem: INFECTION - ADULT  Goal: Absence or prevention of progression during hospitalization  Description: INTERVENTIONS:  - Assess and monitor for signs and symptoms of infection  - Monitor lab/diagnostic results  - Monitor all insertion sites, i.e. indwelling lines, tubes, and drains  - Monitor endotracheal if appropriate and nasal secretions for changes in amount and color  - [place] appropriate cooling/warming therapies per order  - Administer medications as ordered  - Instruct and encourage patient and family to use good hand hygiene technique  - Identify and instruct in appropriate isolation precautions for identified infection/condition  Outcome: Progressing  Goal: Absence of fever/infection during neutropenic period  Description: INTERVENTIONS:  - Monitor WBC    Outcome: Progressing     Problem: SAFETY ADULT  Goal: Patient will remain free of falls  Description: INTERVENTIONS:  - Educate patient/family on patient safety including physical limitations  - Instruct patient to call for assistance with activity   - Consult OT/PT to assist with strengthening/mobility   - Keep Call bell within reach  - Keep bed low and locked with side rails adjusted as appropriate  - Keep care items and personal belongings within reach  - Initiate and maintain comfort rounds  - Make Fall Risk Sign visible to staff  - Offer Toileting every 2 Hours,  in advance of need  - Initiate/Maintain bed/chair alarm  - Obtain necessary fall risk management equipment: bed/chair alarm  - Apply yellow socks and bracelet for high fall risk patients  - Consider moving patient to room near nurses station  Outcome: Progressing  Goal: Maintain or return to baseline ADL function  Description: INTERVENTIONS:  -  Assess patient's ability to carry out ADLs; assess patient's baseline for ADL function and identify physical deficits which impact ability to perform ADLs (bathing, care of mouth/teeth, toileting, grooming, dressing, etc.)  - Assess/evaluate cause of self-care deficits   - Assess range of motion  - Assess patient's mobility; develop plan if impaired  - Assess patient's need for assistive devices and provide as appropriate  - Encourage maximum independence but intervene and supervise when necessary  - Involve family in performance of ADLs  - Assess for home care needs following discharge   - Consider OT consult to assist with ADL evaluation and planning for discharge  - Provide patient education as appropriate  Outcome: Progressing  Goal: Maintains/Returns to pre admission functional level  Description: INTERVENTIONS:  - Perform AM-PAC 6 Click Basic Mobility/ Daily Activity assessment daily.  - Set and communicate daily mobility goal to care team and patient/family/caregiver.   - Collaborate with rehabilitation services on mobility goals if consulted  - Perform Range of Motion 3-4 times a day.  - Reposition patient every 2 hours.  - Dangle patient 3 times a day  - Stand patient 3 times a day  - Ambulate patient 3 times a day  - Out of bed to chair 3 times a day   - Out of bed for meals 3 times a day  - Out of bed for toileting  - Record patient progress and toleration of activity level   Outcome: Progressing     Problem: DISCHARGE PLANNING  Goal: Discharge to home or other facility with appropriate resources  Description: INTERVENTIONS:  - Identify barriers to discharge  w/patient and caregiver  - Arrange for needed discharge resources and transportation as appropriate  - Identify discharge learning needs (meds, wound care, etc.)  - Arrange for interpretive services to assist at discharge as needed  - Refer to Case Management Department for coordinating discharge planning if the patient needs post-hospital services based on physician/advanced practitioner order or complex needs related to functional status, cognitive ability, or social support system  Outcome: Progressing     Problem: Knowledge Deficit  Goal: Patient/family/caregiver demonstrates understanding of disease process, treatment plan, medications, and discharge instructions  Description: Complete learning assessment and assess knowledge base.  Interventions:  - Provide teaching at level of understanding  - Provide teaching via preferred learning methods  Outcome: Progressing     Problem: CARDIOVASCULAR - ADULT  Goal: Maintains optimal cardiac output and hemodynamic stability  Description: INTERVENTIONS:  - Monitor I/O, vital signs and rhythm  - Monitor for S/S and trends of decreased cardiac output  - Administer and titrate ordered vasoactive medications to optimize hemodynamic stability  - Assess quality of pulses, skin color and temperature  - Assess for signs of decreased coronary artery perfusion  - Instruct patient to report change in severity of symptoms  Outcome: Progressing  Goal: Absence of cardiac dysrhythmias or at baseline rhythm  Description: INTERVENTIONS:  - Continuous cardiac monitoring, vital signs, obtain 12 lead EKG if ordered  - Administer antiarrhythmic and heart rate control medications as ordered  - Monitor electrolytes and administer replacement therapy as ordered  Outcome: Progressing     Problem: GASTROINTESTINAL - ADULT  Goal: Minimal or absence of nausea and/or vomiting  Description: INTERVENTIONS:  - Administer IV fluids if ordered to ensure adequate hydration  - Maintain NPO status until  nausea and vomiting are resolved  - Nasogastric tube if ordered  - Administer ordered antiemetic medications as needed  - Provide nonpharmacologic comfort measures as appropriate  - Advance diet as tolerated, if ordered  - Consider nutrition services referral to assist patient with adequate nutrition and appropriate food choices  Outcome: Progressing  Goal: Maintains or returns to baseline bowel function  Description: INTERVENTIONS:  - Assess bowel function  - Encourage oral fluids to ensure adequate hydration  - Administer IV fluids if ordered to ensure adequate hydration  - Administer ordered medications as needed  - Encourage mobilization and activity  - Consider nutritional services referral to assist patient with adequate nutrition and appropriate food choices  Outcome: Progressing  Goal: Maintains adequate nutritional intake  Description: INTERVENTIONS:  - Monitor percentage of each meal consumed  - Identify factors contributing to decreased intake, treat as appropriate  - Assist with meals as needed  - Monitor I&O, weight, and lab values if indicated  - Obtain nutrition services referral as needed  Outcome: Progressing     Problem: SKIN/TISSUE INTEGRITY - ADULT  Goal: Skin Integrity remains intact(Skin Breakdown Prevention)  Description: Assess:  -Perform Дмитрий assessment every shift  -Clean and moisturize skin every day  -Inspect skin when repositioning, toileting, and assisting with ADLS  -Assess under medical devices such as oxygen tubing every 2 hours  -Assess extremities for adequate circulation and sensation     Bed Management:  -Have minimal linens on bed & keep smooth, unwrinkled  -Change linens as needed when moist or perspiring  -Avoid sitting or lying in one position for more than 2 hours while in bed  -Keep HOB at 30degrees     Toileting:  -Offer bedside commode  -Assess for incontinence every 2 hours    Activity:  -Mobilize patient 3 times a day  -Encourage activity and walks on unit  -Encourage  or provide ROM exercises   -Turn and reposition patient every 2 Hours  -Use appropriate equipment to lift or move patient in bed  -Instruct/ Assist with weight shifting every 15 minutes when out of bed in chair  -Consider limitation of chair time 2 hour intervals    Skin Care:  -Avoid use of baby powder, tape, friction and shearing, hot water or constrictive clothing  -Relieve pressure over bony prominences using pillows/wedges  -Do not massage red bony areas    Outcome: Progressing  Goal: Incision(s), wounds(s) or drain site(s) healing without S/S of infection  Description: INTERVENTIONS  - Assess and document dressing, incision, wound bed, drain sites and surrounding tissue  - Provide patient and family education  - Perform skin care/dressing changes every shift  Outcome: Progressing  Goal: Pressure injury heals and does not worsen  Description: Interventions:  - Implement low air loss mattress or specialty surface (Criteria met)  - Apply silicone foam dressing  - Instruct/assist with weight shifting every 15 minutes when in chair   - Limit chair time to 2 hour intervals  - Use special pressure reducing interventions such as air cushion when in chair   - Apply fecal or urinary incontinence containment device   - Perform passive or active ROM every 3-4 hours  - Turn and reposition patient & offload bony prominences every 2 hours   - Utilize friction reducing device or surface for transfers   - Consider consults to  interdisciplinary teams such as wound care RN  - Consider nutrition services referral as needed  Outcome: Progressing

## 2024-02-29 NOTE — ED NOTES
Pt transferred to [address]. 4th floor charge made aware.      Pablo Medrano RN  02/29/24 [id number]

## 2024-02-29 NOTE — Clinical Note
Case was discussed with Dr. Tasha Cee and the patient's admission status was agreed to be Admission Status: observation status to the service of Dr. Tasha Cee .

## 2024-03-01 PROBLEM — S31.000A SACRAL WOUND: Status: ACTIVE | Noted: 2024-03-01

## 2024-03-01 PROBLEM — L89.90 PRESSURE ULCERS OF SKIN OF MULTIPLE TOPOGRAPHIC SITES: Status: ACTIVE | Noted: 2024-03-01

## 2024-03-01 LAB
ABO GROUP BLD BPU: NORMAL
ALBUMIN SERPL BCP-MCNC: 2.6 G/DL (ref 3.5–5)
ALL TARGETS: NOT DETECTED
ALP SERPL-CCNC: 65 U/L (ref 34–104)
ALT SERPL W P-5'-P-CCNC: 15 U/L (ref 7–52)
ANION GAP SERPL CALCULATED.3IONS-SCNC: 8 MMOL/L
ANISOCYTOSIS BLD QL SMEAR: PRESENT
APTT PPP: 29 SECONDS (ref 23–37)
APTT PPP: >210 SECONDS (ref 23–37)
AST SERPL W P-5'-P-CCNC: 11 U/L (ref 13–39)
BASOPHILS # BLD MANUAL: 0 THOUSAND/UL (ref 0–0.1)
BASOPHILS NFR MAR MANUAL: 0 % (ref 0–1)
BILIRUB SERPL-MCNC: 0.71 MG/DL (ref 0.2–1)
BPU ID: NORMAL
BUN SERPL-MCNC: 42 MG/DL (ref 5–25)
CALCIUM ALBUM COR SERPL-MCNC: 9.8 MG/DL (ref 8.3–10.1)
CALCIUM SERPL-MCNC: 8.7 MG/DL (ref 8.4–10.2)
CHLORIDE SERPL-SCNC: 102 MMOL/L (ref 96–108)
CO2 SERPL-SCNC: 25 MMOL/L (ref 21–32)
CREAT SERPL-MCNC: 0.87 MG/DL (ref 0.6–1.3)
CROSSMATCH: NORMAL
EOSINOPHIL # BLD MANUAL: 0 THOUSAND/UL (ref 0–0.4)
EOSINOPHIL NFR BLD MANUAL: 0 % (ref 0–6)
ERYTHROCYTE [DISTWIDTH] IN BLOOD BY AUTOMATED COUNT: 21.3 % (ref 11.6–15.1)
EST. AVERAGE GLUCOSE BLD GHB EST-MCNC: 114 MG/DL
GFR SERPL CREATININE-BSD FRML MDRD: 63 ML/MIN/1.73SQ M
GLUCOSE SERPL-MCNC: 170 MG/DL (ref 65–140)
GLUCOSE SERPL-MCNC: 214 MG/DL (ref 65–140)
HBA1C MFR BLD: 5.6 %
HCT VFR BLD AUTO: 29 % (ref 34.8–46.1)
HGB BLD-MCNC: 9 G/DL (ref 11.5–15.4)
INR PPP: 1.2 (ref 0.84–1.19)
L PNEUMO1 AG UR QL IA.RAPID: NEGATIVE
LYMPHOCYTES # BLD AUTO: 1.04 THOUSAND/UL (ref 0.6–4.47)
LYMPHOCYTES # BLD AUTO: 8 % (ref 14–44)
MACROCYTES BLD QL AUTO: PRESENT
MAGNESIUM SERPL-MCNC: 2 MG/DL (ref 1.9–2.7)
MCH RBC QN AUTO: 30 PG (ref 26.8–34.3)
MCHC RBC AUTO-ENTMCNC: 31 G/DL (ref 31.4–37.4)
MCV RBC AUTO: 97 FL (ref 82–98)
METHGB MFR BLDCO: 0.3 % (ref 0.1–1.5)
MONOCYTES # BLD AUTO: 0.78 THOUSAND/UL (ref 0–1.22)
MONOCYTES NFR BLD: 6 % (ref 4–12)
NEUTROPHILS # BLD MANUAL: 11.15 THOUSAND/UL (ref 1.85–7.62)
NEUTS BAND NFR BLD MANUAL: 11 % (ref 0–8)
NEUTS SEG NFR BLD AUTO: 75 % (ref 43–75)
NRBC BLD AUTO-RTO: 3 /100 WBC (ref 0–2)
PLATELET # BLD AUTO: 325 THOUSANDS/UL (ref 149–390)
PLATELET BLD QL SMEAR: ADEQUATE
PMV BLD AUTO: 11.5 FL (ref 8.9–12.7)
POTASSIUM SERPL-SCNC: 4.5 MMOL/L (ref 3.5–5.3)
PROCALCITONIN SERPL-MCNC: 0.76 NG/ML
PROT SERPL-MCNC: 4.6 G/DL (ref 6.4–8.4)
PROTHROMBIN TIME: 15.1 SECONDS (ref 11.6–14.5)
RBC # BLD AUTO: 3 MILLION/UL (ref 3.81–5.12)
RBC MORPH BLD: PRESENT
S PNEUM AG UR QL: NEGATIVE
SODIUM SERPL-SCNC: 135 MMOL/L (ref 135–147)
UNIT DISPENSE STATUS: NORMAL
UNIT PRODUCT CODE: NORMAL
UNIT PRODUCT VOLUME: 350 ML
UNIT RH: NORMAL
WBC # BLD AUTO: 12.97 THOUSAND/UL (ref 4.31–10.16)

## 2024-03-01 PROCEDURE — 85730 THROMBOPLASTIN TIME PARTIAL: CPT

## 2024-03-01 PROCEDURE — 99222 1ST HOSP IP/OBS MODERATE 55: CPT | Performed by: SURGERY

## 2024-03-01 PROCEDURE — 82948 REAGENT STRIP/BLOOD GLUCOSE: CPT

## 2024-03-01 PROCEDURE — 97530 THERAPEUTIC ACTIVITIES: CPT

## 2024-03-01 PROCEDURE — 97167 OT EVAL HIGH COMPLEX 60 MIN: CPT

## 2024-03-01 PROCEDURE — 85007 BL SMEAR W/DIFF WBC COUNT: CPT | Performed by: PHYSICIAN ASSISTANT

## 2024-03-01 PROCEDURE — 85610 PROTHROMBIN TIME: CPT | Performed by: FAMILY MEDICINE

## 2024-03-01 PROCEDURE — 85027 COMPLETE CBC AUTOMATED: CPT | Performed by: PHYSICIAN ASSISTANT

## 2024-03-01 PROCEDURE — NC001 PR NO CHARGE

## 2024-03-01 PROCEDURE — 83735 ASSAY OF MAGNESIUM: CPT | Performed by: PHYSICIAN ASSISTANT

## 2024-03-01 PROCEDURE — 80053 COMPREHEN METABOLIC PANEL: CPT | Performed by: PHYSICIAN ASSISTANT

## 2024-03-01 PROCEDURE — 99232 SBSQ HOSP IP/OBS MODERATE 35: CPT | Performed by: FAMILY MEDICINE

## 2024-03-01 PROCEDURE — 99223 1ST HOSP IP/OBS HIGH 75: CPT | Performed by: STUDENT IN AN ORGANIZED HEALTH CARE EDUCATION/TRAINING PROGRAM

## 2024-03-01 PROCEDURE — 85730 THROMBOPLASTIN TIME PARTIAL: CPT | Performed by: FAMILY MEDICINE

## 2024-03-01 PROCEDURE — 84145 PROCALCITONIN (PCT): CPT | Performed by: PHYSICIAN ASSISTANT

## 2024-03-01 PROCEDURE — 97163 PT EVAL HIGH COMPLEX 45 MIN: CPT

## 2024-03-01 PROCEDURE — 83050 HGB METHEMOGLOBIN QUAN: CPT | Performed by: PHYSICIAN ASSISTANT

## 2024-03-01 PROCEDURE — 83036 HEMOGLOBIN GLYCOSYLATED A1C: CPT | Performed by: FAMILY MEDICINE

## 2024-03-01 PROCEDURE — 92610 EVALUATE SWALLOWING FUNCTION: CPT

## 2024-03-01 RX ORDER — HEPARIN SODIUM 1000 [USP'U]/ML
1950 INJECTION, SOLUTION INTRAVENOUS; SUBCUTANEOUS EVERY 6 HOURS PRN
Status: DISCONTINUED | OUTPATIENT
Start: 2024-03-01 | End: 2024-03-03 | Stop reason: HOSPADM

## 2024-03-01 RX ORDER — HEPARIN SODIUM 10000 [USP'U]/100ML
3-20 INJECTION, SOLUTION INTRAVENOUS
Status: DISCONTINUED | OUTPATIENT
Start: 2024-03-01 | End: 2024-03-03 | Stop reason: HOSPADM

## 2024-03-01 RX ORDER — HEPARIN SODIUM 1000 [USP'U]/ML
3900 INJECTION, SOLUTION INTRAVENOUS; SUBCUTANEOUS EVERY 6 HOURS PRN
Status: DISCONTINUED | OUTPATIENT
Start: 2024-03-01 | End: 2024-03-03 | Stop reason: HOSPADM

## 2024-03-01 RX ORDER — HEPARIN SODIUM 1000 [USP'U]/ML
3900 INJECTION, SOLUTION INTRAVENOUS; SUBCUTANEOUS ONCE
Status: COMPLETED | OUTPATIENT
Start: 2024-03-01 | End: 2024-03-01

## 2024-03-01 RX ADMIN — OXYCODONE HYDROCHLORIDE 5 MG: 5 TABLET ORAL at 10:11

## 2024-03-01 RX ADMIN — ESCITALOPRAM OXALATE 10 MG: 10 TABLET ORAL at 09:44

## 2024-03-01 RX ADMIN — FOLIC ACID 1 MG: 1 TABLET ORAL at 09:44

## 2024-03-01 RX ADMIN — PREDNISONE 60 MG: 20 TABLET ORAL at 09:43

## 2024-03-01 RX ADMIN — CARVEDILOL 6.25 MG: 3.12 TABLET, FILM COATED ORAL at 09:43

## 2024-03-01 RX ADMIN — ATORVASTATIN CALCIUM 40 MG: 40 TABLET, FILM COATED ORAL at 09:44

## 2024-03-01 RX ADMIN — HYDROMORPHONE HYDROCHLORIDE 0.2 MG: 0.2 INJECTION, SOLUTION INTRAMUSCULAR; INTRAVENOUS; SUBCUTANEOUS at 21:17

## 2024-03-01 RX ADMIN — MIRTAZAPINE 7.5 MG: 15 TABLET, FILM COATED ORAL at 21:18

## 2024-03-01 RX ADMIN — METRONIDAZOLE 500 MG: 500 TABLET ORAL at 06:04

## 2024-03-01 RX ADMIN — ONDANSETRON 4 MG: 2 INJECTION INTRAMUSCULAR; INTRAVENOUS at 16:41

## 2024-03-01 RX ADMIN — HYDROXYCHLOROQUINE SULFATE 300 MG: 200 TABLET, FILM COATED ORAL at 11:48

## 2024-03-01 RX ADMIN — PANTOPRAZOLE SODIUM 40 MG: 40 TABLET, DELAYED RELEASE ORAL at 06:04

## 2024-03-01 RX ADMIN — MYCOPHENOLATE MOFETIL 1000 MG: 250 CAPSULE ORAL at 21:17

## 2024-03-01 RX ADMIN — METRONIDAZOLE 500 MG: 500 TABLET ORAL at 21:17

## 2024-03-01 RX ADMIN — MYCOPHENOLATE MOFETIL 1000 MG: 250 CAPSULE ORAL at 09:43

## 2024-03-01 RX ADMIN — HEPARIN SODIUM 3900 UNITS: 1000 INJECTION INTRAVENOUS; SUBCUTANEOUS at 14:51

## 2024-03-01 RX ADMIN — PANTOPRAZOLE SODIUM 40 MG: 40 TABLET, DELAYED RELEASE ORAL at 16:41

## 2024-03-01 RX ADMIN — OXYCODONE HYDROCHLORIDE 5 MG: 5 TABLET ORAL at 18:16

## 2024-03-01 RX ADMIN — ACETAMINOPHEN 650 MG: 325 TABLET ORAL at 06:04

## 2024-03-01 RX ADMIN — CEFTRIAXONE 1000 MG: 1 INJECTION, SOLUTION INTRAVENOUS at 18:22

## 2024-03-01 RX ADMIN — VANCOMYCIN HYDROCHLORIDE 1500 MG: 5 INJECTION, POWDER, LYOPHILIZED, FOR SOLUTION INTRAVENOUS at 16:31

## 2024-03-01 RX ADMIN — GUAIFENESIN 600 MG: 600 TABLET, EXTENDED RELEASE ORAL at 09:44

## 2024-03-01 RX ADMIN — FAMOTIDINE 20 MG: 20 TABLET, FILM COATED ORAL at 09:44

## 2024-03-01 RX ADMIN — HYDROMORPHONE HYDROCHLORIDE 0.2 MG: 0.2 INJECTION, SOLUTION INTRAMUSCULAR; INTRAVENOUS; SUBCUTANEOUS at 11:48

## 2024-03-01 RX ADMIN — METRONIDAZOLE 500 MG: 500 TABLET ORAL at 14:43

## 2024-03-01 RX ADMIN — HEPARIN SODIUM 12 UNITS/KG/HR: 10000 INJECTION, SOLUTION INTRAVENOUS at 14:44

## 2024-03-01 RX ADMIN — GUAIFENESIN 600 MG: 600 TABLET, EXTENDED RELEASE ORAL at 21:17

## 2024-03-01 NOTE — PROGRESS NOTES
Darlyn Nguyen is a 78 y.o. female who is currently ordered Vancomycin IV with management by the Pharmacy Consult service.  Relevant clinical data and objective / subjective history reviewed.  Vancomycin Assessment:  Indication and Goal AUC/Trough: Bacteremia (goal -600, trough >10)  Clinical Status:  New  Micro:     Renal Function:  SCr: 0.87 mg/dL  CrCl: 48 mL/min  Renal replacement: Not on dialysis  Days of Therapy: 1  Current Dose: 1000mg IV q12h  Vancomycin Plan:  New Dosinmg IV once, 1250mg IV q24h  Estimated AUC: 500 mcg*hr/mL  Estimated Trough: 14.7 mcg/mL  Next Level: 3/4/24 with AM labs  Renal Function Monitoring: Daily BMP and UOP  Pharmacy will continue to follow closely for s/sx of nephrotoxicity, infusion reactions and appropriateness of therapy.  BMP and CBC will be ordered per protocol. We will continue to follow the patient’s culture results and clinical progress daily.    Shant Arnett, Pharmacist

## 2024-03-01 NOTE — PLAN OF CARE
Problem: OCCUPATIONAL THERAPY ADULT  Goal: Performs self-care activities at highest level of function for planned discharge setting.  See evaluation for individualized goals.  Description: Treatment Interventions: ADL retraining, UE strengthening/ROM, Functional transfer training, Endurance training, Patient/family training, Equipment evaluation/education, Compensatory technique education, Activityengagement, Energy conservation     See flowsheet documentation for full assessment, interventions and recommendations.   Outcome: Progressing  Note: Limitation: Decreased ADL status, Decreased UE ROM, Decreased UE strength, Decreased Safe judgement during ADL, Decreased endurance, Decreased self-care trans, Decreased high-level ADLs     Assessment: Pt is a 78 y.o. female seen for OT evaluation s/p admit to [facility] on 2/29/2024 w/ Diarrhea of presumed infectious origin.  Comorbidities affecting pt's functional performance at time of assessment include: HTN, hyperlipidemia, fibromyalgia, diabetes mellitus. Personal factors affecting pt at time of IE include:behavioral pattern, difficulty performing ADLS, difficulty performing IADLS , limited insight into deficits, decreased initiation and engagement , health management , and environment. Prior to admission, pt was (A) with ADLs and IADLs. Upon evaluation: Pt requires Max(A) level with no use of device 2* the following deficits impacting occupational performance: weakness, decreased ROM, decreased strength, decreased balance, decreased tolerance, impaired initiation, impaired sequencing, impaired problem solving, and decreased safety awareness. Pt to benefit from continued skilled OT tx while in the hospital to address deficits as defined above and maximize level of functional independence w ADL's and functional mobility. Occupational Performance areas to address include: grooming, bathing/shower, toilet hygiene, dressing, health maintenance, functional mobility, and clothing  management. The patient's raw score on the AM-PAC Daily Activity Inpatient Short Form is 13. A raw score of less than 19 suggests the patient may benefit from discharge to post-acute rehabilitation services. Discharge recommendation at this time is level I maximum resource intensity.  Pt benefited from co-evaluation of skilled OT and PT therapists in order to most appropriately address functional deficits d/t extensive assistance required for safe functional mobility, decreased activity tolerance, and regression from functioning level prior to admission and/or onset of present illness. OT/PT objectives were addressed separately; please see PT note for specific goal areas targeted.     Rehab Resource Intensity Level, OT: I (Maximum Resource Intensity)

## 2024-03-01 NOTE — PLAN OF CARE
Problem: PHYSICAL THERAPY ADULT  Goal: Performs mobility at highest level of function for planned discharge setting.  See evaluation for individualized goals.  Description: Treatment/Interventions: Functional transfer training, LE strengthening/ROM, Therapeutic exercise, Endurance training, Bed mobility, Gait training          See flowsheet documentation for full assessment, interventions and recommendations.  Note: Prognosis: Guarded  Problem List: Decreased strength, Decreased endurance, Impaired balance, Decreased mobility  Assessment: Patient is a 78 y.o. female evaluated by Physical Therapy s/p admit to [facility] on 2/29/2024 with admitting diagnosis of: Acute blood loss anemia, Colitis, Rectal bleeding, Pneumonia, Anemia, Lower GI bleed, and principal problem of: Diarrhea of presumed infectious origin. PT was consulted to assess patient's functional mobility and discharge needs. Ordered are PT Evaluation and treatment with activity level of: up and OOB as tolerated. Comorbidities affecting patient's physical performance at time of assessment include:  chronic anemia, lupus, HTN, DVT . Personal factors affecting the patient at time of IE include: ambulating with assistive device, inability to navigate community distances, inability/difficulty performing IADLs, and inability/difficulty performing ADLs. Please locate objective findings from PT assessment regarding body systems outlined above. Upon evaluation, pt motivated to participate however required maxA x 2 to perform all bed mobility. Sitting EOB, pt core stability gradually declining to the point where she was unable to maintain upright seated posture or self-correct LOB. Because of this, further transfer training not attempted. Pt returned to supine and repositioned for safety and comfort. HR and SpO2 remained WFL on RA throughout; 1L O2 reapplied for comfort at end of session. The patient's AM-PAC Basic Mobility Inpatient Short Form Raw  Score is 6. A Raw score of less than or equal to 16 suggests the patient may benefit from discharge to post-acute rehabilitation services. Please also refer to the recommendation of the Physical Therapist for safe discharge planning. Co treatment with OT secondary to complex medical condition of pt, possible A of 2 required to achieve and maintain transitional movements, requiring the need of skilled therapeutic intervention of 2 therapists to achieve delivery of services. Pt will benefit from continued PT intervention during LOS to address current deficits, increase LOF, and facilitate safe d/c to next level of care when medically appropriate. D/c recommendation at this time is rehabilitation resource intensity level I.        Rehab Resource Intensity Level, PT: I (Maximum Resource Intensity)    See flowsheet documentation for full assessment.

## 2024-03-01 NOTE — SPEECH THERAPY NOTE
Speech Language/Pathology  Speech-Language Pathology Bedside Swallow Evaluation      Patient Name: Darlyn Nguyen    Today's Date: 3/1/2024     Problem List  Principal Problem:    Diarrhea of presumed infectious origin  Active Problems:    Chronic anemia    Lupus (HCC)    Hypertension    Pneumonia    Positive fecal occult blood test    DVT (deep venous thrombosis) (HCC)      Past Medical History  Past Medical History:   Diagnosis Date    Diabetes mellitus (HCC)     Fibromyalgia     Hyperlipidemia     Hypertension        Past Surgical History  Past Surgical History:   Procedure Laterality Date    ACHILLES TENDON REPAIR Right     ADENOIDECTOMY      APPENDECTOMY      SKIN CANCER EXCISION      x 4 - back    TONSILLECTOMY      TOTAL ABDOMINAL HYSTERECTOMY      US GUIDED KIDNEY BIOPSY  12/26/2023       Summary   Pt presented with s/s suggestive of mild oral and suspected mild-moderate pharyngeal dysphagia.  Symptoms or concerns included unorganized mastication, sluggish/reduced bolus formation and AP transfer with suspected oral retention after soft solids, reduced hyolaryngal excursion, audible swallow incoordination most notable with thin liquid via straw, and inconsistent secondary swallow observed after intake of soft solid suspicious of pharyngeal retention. S/s of asp/pen include immediate wet cough x1 following thin liquid via cup however unsure if related to asp/pen as pt observed to have persistent wet cough from baseline. Pt is familiar to clinician as she was receiving ST services on 5th floor ; it is notable that pt reported significant decrease in intake stemming from October of 2023 which she contributes to not being able to taste and also feeling nauseous during/after meals. Pt also was observed to having low level of alertness/ not being able to stay awake during meal time which is suspected to be contributing to low appetite. Pt has good awareness to swallow impairment. It is recommended at this time for  diet modification of Dysphagia level 2/thin liquid, no straws, in addition to ensuring that supplemental nutrition shake is provided at meals to promote proper nutritional intake.     Risk/s for Aspiration: mild-moderate      Recommended Diet: mechanically altered/level 2 diet and thin liquids   Recommended Form of Meds: whole with puree and crushed with puree   Aspiration precautions and swallowing strategies: only feed when fully alert, slow rate of feeding, small bites/sips, no straws, and alternating bites and sips  Other Recommendations: Continue frequent oral care    Current Medical Status  Per MD 2/29/2024:  78-year-old female past medical history remarkable for SLE with secondary lupus nephritis, DVT ( on Eliquis) who presents to the emergency department from short-term rehab for further management of acute anemia and positive FOBT .      The patient was recently hospitalized from 2/19/2024 -2/20/2024 for acute anemia and acute respiratory failure management.  She received 2 blood units during the hospitalization with remarkable stabilization of her H/H.  However the patient was diagnosed with methemoglobinemia during the hospitalization secondary to recent Use for SLE management and was to [facility] on 2/20/2024 for further management.  She was discharged to the rehab center from [facility] on 2/24/2024.         Today the patient reports generalized weakness and fatigue along with buttock pain.  She is not sure about the onset of symptoms.  No fevers or chills, nausea or vomiting, chest pain or tightness, or shortness of breath.  Patient denies witnessing any blood per rectum or change in stool color however reports having loose stools for the past several weeks.     Report from nursing staff at the rehab center remarkable for positive Hemoccult test, acute anemia that was noted earlier today.   No blood per rectum or change in stool color was noted.    Allergies:  No known food allergies    Past  medical history:  Please see H&P for details    Special Studies:  2/29/2024 Imaging CT abdomen pelvis w contrast:  IMPRESSION:     Patchy right basilar consolidation suspicious for pneumonia.     Persistent fluid throughout the colon similar in appearance to 2/19/2024 with improved areas of mucosal hyperemia and thickening in keeping with an improving colitis.    Social/Education/Vocational Hx:  Pt lives with family    Swallow Information   Current Risks for Dysphagia & Aspiration: known history of dysphagia and decreased intake at meals  Current Symptoms/Concerns: coughing with po and decreased intake causing nutritional concern   Current Diet: regular diet and thin liquids   Baseline Diet: regular diet and thin liquids      Baseline Assessment   Behavior/Cognition: alert and lethargic  Speech/Language Status: able to follow commands and limited verbal output  Patient Positioning: upright in bed  Pain Status/Interventions/Response to Interventions: No report of or nonverbal indications of pain.       Swallow Mechanism Exam  Facial: symmetrical  Labial: WFL  Lingual: WFL  Velum: symmetrical  Mandible: adequate ROM  Dentition: adequate  Vocal quality:clear/adequate   Volitional Cough: strong/productive   Respiratory Status: on 1L O2 via NC  Tracheostomy: n/a    Consistencies Assessed and Performance   Consistencies Administered: thin liquids and soft solids  Materials administered included thin liquid via cup and straw and banana     Oral Stage: mild  Mastication was unorganized with the materials administered today. Bolus formation and transfer were  sluggish/reduced. Suspect oral retention with soft solid.  No overt s/s reduced oral control.    Pharyngeal Stage: mild-moderate  Swallowing initiation appeared prompt.  Laryngeal rise was palpated and judged to be reduced. Audible swallow incoordination most notable with thin liquid via straw. Inconsistent secondary swallow observed after intake of soft solid  suspicious of pharyngeal retention.  Cough x1 following thin liquid via cup however unsure if related to asp/pen as pt observed to have persistent cough from baseline.  No throat clearing, change in vocal quality or respiratory status noted today.     Esophageal Concerns:  HX of GERD, observed belch during PO x1    Strategies and Efficacy: Pt required maximum verbal cues for use of small sips/bites and slow rate of intake.     Summary and Recommendations (see above)    Results Reviewed with: patient and family     Treatment Recommended: Yes to further  tolerance to diet and further train pt in compensatory strategies to maximize swallow function.    Frequency of treatment: 1/2x per week    Patient Stated Goal: Pt did not state goal     Dysphagia LTG  -Patient will demonstrate safe and effective oral intake (without overt s/s significant oral/pharyngeal dysphagia including s/s penetration or aspiration) for the highest appropriate diet level.     Short Term Goals:  -Pt will tolerate Dysphagia 2/mechanical soft diet and thin liquid with no significant s/s oral or pharyngeal dysphagia across 1-3 diagnostic session/s.    -Patient will tolerate trials of upgraded food and/or liquid texture with no significant s/s of oral or pharyngeal dysphagia including aspiration across 1-3 diagnostic sessions     -Patient will utilize trained swallowing maneuver (e.g., supraglottic swallow, Mendelsohn maneuver, effortful swallow, etc.) with 80%  to facilitate improved airway protection,  tongue base retraction, pharyngeal constriction  and/or clearance of the bolus through the pharynx with 90% accuracy    Speech Therapy Prognosis   Prognosis: fair    Prognosis Considerations: age, medical status, and prior medical history

## 2024-03-01 NOTE — ASSESSMENT & PLAN NOTE
Baseline hgb around 9.0  Was recently worked up extensively for acute on chronic anemia at [facility] without significant cause  When discharged from [facility] was at 7.5, on admission around 7 range  Folate level there was slightly low, replete here  EGD showed mild gastritis on biopsies  Denies melena, hematochezia  Continue to trend, no overt bleeding noted per SNF staff but FOBT returned positive

## 2024-03-01 NOTE — ASSESSMENT & PLAN NOTE
Seen on imaging  Initiated on ceftriaxone  Check urinary antigens  Mucinex  Check procal in the AM

## 2024-03-01 NOTE — ED ATTENDING ATTESTATION
2/29/2024    I, Dallin Massey DO, saw and evaluated the patient. I have discussed the patient with Dr Layotn and agree with  his findings, Plan of Care, and MDM as documented in his note, except where noted. All available labs and Radiology studies were reviewed.  I was present for key portions of any procedure(s) performed by Dr Layton,and I was immediately available to provide assistance.     At this point I agree with the current assessment done in the Emergency Department.  I have conducted an independent evaluation of this patient. The history and physical is as follows:    This is an 78-year-old woman who is brought to the emergency department from the fifth floor rehab facility in the hospital for evaluation of blood in stool noted by nursing staff while they were changing her today. Patient herself is unaware of any bleeding as she is dependent upon the nursing staff for toileting and cleaning.  By report of fifth floor nursing staff, there was visible blood in stool along with a positive fecal occult blood test.  Patient transferred to the emergency department for further evaluation.  Patient had recently been hospitalized from 19-24 February for acute anemia and respiratory failure; received 2 units PRBC during that hospitalization with improvement in hemoglobin. 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  Eventually improved from all these acute issues and discharged to rehab facility on 24 February.  Recently started on apixaban as well.  Patient herself reports no nausea/vomiting/chest pain/wheeze/cough/dyspnea.  Patient's family did later note a phlegmy cough that was new for patient; patient also hypoxemic in the emergency department which was unusual for her (had been hypoxemic and in fact requiring high flow nasal cannula during recent hospitalization for acute respiratory failure, but was eventually weaned to no oxygen  requirement at time of transfer to rehab facility).  Patient self denies bleeding elsewhere of which she is aware including any epistaxis/hemoptysis/hematemesis.  Slated for colonoscopy in the near future related to blood loss.  Did have recent EGD that showed no source for blood loss.  ROS as per HPI; otherwise negative.    General: Awake/alert/no distress.   Vital signs and nursing notes reviewed  Pale    HEENT:  Normocephalic/atraumatic  External ear/hearing wnl bilaterally.    Neck:  Phonation normal with no stridor/dysphonia.  Normal active ROM.  No palpable masses or thyromegaly.  No overlying skin changes.    Cardiac:  Radial pulses 2+ bilaterally  DP/PT pulses 2+ bilaterally  RRR with s1/s2; no m/r/g.    Pulmonary:   No respiratory distress.  No accessory muscle use  Lungs CTA b/l with no w/c/r    Abdomen:  Flat. Nondistended. Mild epigastric and right lower quadrant ttp.  Negative Rovsing sign.  No palpable masses.  No guarding/rebound ttp.    Skin:  Warm/dry. No diaphoresis.  Multiple stent areas of soft tissue excoriation and blistering with epidermal sloughing in the lumbar back and buttocks with exposed dermis.  No deeper structures involved.  Barrier ointment applied to these areas.    Neuro:  GCS 15.  PERRLA; EOMI. Facial expressions symmetric.  Tongue/uvula midline.  Shoulder shrug equal bilaterally  Strength 5/5 in UE/LE bilaterally  Intact sensation in UE/LE bilaterally.    ED course: Fecal occult blood testing obtained by RN in the ED showed brown stool that was guaiac positive.  There was no visible blood.  No melena.  Exam as documented notable for the skin excoriations as well for mild epigastric abdominal tenderness.  Concern for intra-abdominal pathology including diverticulitis/colitis or other inflammatory abnormalities versus bleeding from diverticuli/angiodysplasia.  There was no evidence of hematochezia or melena, and only laboratory testing to assess endorgan perfusion status as well as  current hemoglobin.  CT imaging given the reproducible tenderness present on examination.  Laboratory testing was notable for elevated lactic at suggesting endorgan dysfunction along with slightly worsening anemia with hemoglobin 7 g/deciliter compared to most recent prior to 7.5 g/deciliter.  Mild hyponatremia similar to prior.  There was a mild elevation in BUN/creatinine compared to prior.  BMP was normal. Elevated procalcitonin raised suspicion for infection although of unclear source. CT abdomen/pelvis did not reveal any intra-abdominal pathology that accounted for symptoms, although patient was found to have pneumonia which would correlate with the elevated procalcitonin, cough, and hypoxemia.  Piperacillin/tazobactam was administered empirically due to suspicion for infection.  This should provide adequate infection for lower respiratory tract infection at this point.  The patient also administered 1 unit PRBCs given the endorgan dysfunction as demonstrated by the elevated lactic acid.  Hospitalization indicated given these multiple metabolic abnormalities.  Consultation made with internal medicine who agreed with inpatient admission.    ED Course  ED Course as of 03/01/24 0725   Thu Feb 29, 2024   1235 ECG NSR 78 bpm    QTc 474  Right bundle branch block  No acute ST changes.  T wave inversions consistent with right bundle branch block.  No Q waves.  Interpreted by me   1300 Lactic acid(!!)  Elevated   1301 Manual Differential(PHLEBS Do Not Order)(!)   1301 B-Type Natriuretic Peptide(BNP)  Normal   1301 Comprehensive metabolic panel(!)  Mild hyponatremia; similar to prior  Mild elevation in BUN/creatinine compared to prior  Transaminases normal   1301 APTT   1301 Protime-INR(!)  Elevated consistent with apixaban usage   1301 CBC and differential(!)  WBC normal.  Slight decrement in hemoglobin compared to prior.  Platelets normal.   1304 Elevated lactic acid suggesting endorgan dysfunction in  setting of GI bleeding or at least symptomatic anemia; will transfuse 1 unit PRBCs   1304 Procalcitonin(!)  Elevated increasing concern for infection.  Empiric piperacillin/tazobactam already ordered   1311 Magnesium   1354 CT completed and awaiting interpretation   1436 Plan for hospitalization given endorgan dysfunction demonstrated by elevated lactic acid once CT report is obtained   1457 FLU/RSV/COVID - if FLU/RSV clinically relevant   1526 CT abdomen pelvis with contrast  IMPRESSION:     Patchy right basilar consolidation suspicious for pneumonia.     Persistent fluid throughout the colon similar in appearance to 2/19/2024 with improved areas of mucosal hyperemia and thickening in keeping with an improving colitis.     1549 Lactic acid 2 Hours         Critical Care Time  Procedures

## 2024-03-01 NOTE — CONSULTS
"Consultation -  [facility]  Darlyn Nguyen 78 y.o. 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 Hx of appendectomy. Pt was sent from 5th floor rehabilitation unit for diarrhea and episode of rectal bleeding (?) with positive FOBT. She had a contrasted CT of the abdomen and pelvis completed which commented on persistent fluid throughout the colon similar to appearance on CT and 02/19/2024 indicating improving colitis and a right basilar consolidation.  Hemoglobin on admission was 7.0, , platelets 315, BUN 58, creatinine 1.10, AST 13, ALT 15, ALP 59, albumin 2.4, T. bili 0.72, INR 1.95.  Stool studies were ordered though not yet completed.  She was transfused 1U PRBC.  Repeat CBC on 3/1/2024 with a hemoglobin of 9.0. She was started on abx therapy with ceftriaxone and flagyl.  notes intermittent diarrhea over past few months however she will also have periods of time with normally formed brown stool.     Pt has had a challenging few months, in 12/2023 she was diagnosed with lupus, started on prednisone, Plaquenil, CellCept, and dapsone.  She was noted to have an anemia at that time and underwent an EGD which demonstrated gastritis only, with biopsies negative for H. pylori.    She was hospitalized again in 02/2024, diagnosed with methemoglobinemia at that time, had acute anemia once again, Hb lanette of 5.0, attributed to hemolytic anemia from dapsone use. Stool panel was negative for infection, she had a normal fecal elastase at 556, though she had elevated fecal calprotectin at 493. " Dapsone was d/c and atovaquone was started.     Colitis on CT scan  Elevated fecal calprotectin   Intermittent diarrhea    Etiol of colitis/alteration in bowel habits unclear at this time. Consider infectious, inflammatory, ischemic, medication induced, etc.   Follow up on results of infectious stool studies completed while inpatient. Within past few weeks had elevated fecal calprotectin.   Primary team has started patient on antibiotics.   Ideally, would want to proceed with either limited colonoscopy vs complete colonoscopy in a timely manner to investigate into these abnormalities.     Macrocytic anemia  Low folate   Positive FOBT    Maintain IV access, monitor Hb, transfuse per protocol or for hemodynamic instability.   Etiol is not clear at this time. May be related to outlet bleeding, colitis, polyp, malignancy, etc.   Eventual colonoscopy would be recommended to evaluate into this abnormal stool testing.   Continue to monitor stool output for signs of overt bleeding.   Labs from outside facility on 02/20/24 did not indicate iron deficiency (though could be abnormal in the setting of acute inflammation)   B12 was adequately repleted, folate low at 5.6, would recommend supplementation.     GI will continue to follow along with this pt at present.   ______________________________________________________________________    HPI: Patient is a 78 y.o. 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 Hx of appendectomy.     Patient presented to ER from fifth floor rehabilitation with complaints of diarrhea and rectal bleeding.  She had FOBT completed which was positive. She had a contrasted CT of the abdomen and pelvis completed which commented on persistent fluid throughout the colon similar to appearance on CT and 02/19/2024 indicating improving colitis and a right basilar consolidation.  Hemoglobin on admission was 7.0, MCV  107, platelets 315, BUN 58, creatinine 1.10, AST 13, ALT 15, ALP 59, albumin 2.4, T. bili 0.72, INR 1.95.  Stool studies were ordered though not yet completed.  She was transfused 1U PRBC.  Repeat CBC on 3/1/2024 with a hemoglobin of 9.0.    Her  is at bedside and helps to provide majority of history.  He shares that her issues with intermittent diarrhea started back in 10/2023.  She began to have episodic syncopal events, and would have diarrhea following these events.  It was initially nonbloody.  In between these episodes, she was having formed brown stool with no excess straining or diarrhea.  She has had a challenging few months, in 12/2023 she was diagnosed with lupus, started on prednisone, Plaquenil, CellCept, and dapsone.  She was noted to have an anemia at that time and underwent an EGD which demonstrated gastritis only, with biopsies negative for H. pylori.    She was hospitalized again in 02/2024, diagnosed with methemoglobinemia at that time, had acute anemia once again, Hb lanette of 5.0, attributed to hemolytic anemia from dapsone use. Stool panel was negative for infection, she had a normal fecal elastase at 556, though she had elevated fecal calprotectin at 493.    Pt has a personal history of colon polyps. No family hx of CRC.    Endoscopic history:   EGD: 12/2023:   A. Gastric antrum, biopsy: Mild chronic gastritis with associated regenerative changes. Immunostain for Helicobacter pylori negative   for result     Review of Systems   Constitutional:  Positive for appetite change and fatigue.   HENT:  Negative for mouth sores and trouble swallowing.    Respiratory:  Positive for shortness of breath.    Gastrointestinal:  Positive for blood in stool and diarrhea. Negative for abdominal distention, abdominal pain, constipation, nausea and vomiting.   Skin:  Negative for pallor.   Per HPI    Historical Information   Past Medical History:   Diagnosis Date    Diabetes mellitus (HCC)     Fibromyalgia      Hyperlipidemia     Hypertension      Past Surgical History:   Procedure Laterality Date    ACHILLES TENDON REPAIR Right     ADENOIDECTOMY      APPENDECTOMY      SKIN CANCER EXCISION      x 4 - back    TONSILLECTOMY      TOTAL ABDOMINAL HYSTERECTOMY      US GUIDED KIDNEY BIOPSY  12/26/2023     Social History   Social History     Substance and Sexual Activity   Alcohol Use Not Currently     Social History     Substance and Sexual Activity   Drug Use Never     Social History     Tobacco Use   Smoking Status Never   Smokeless Tobacco Never     Family History   Problem Relation Age of Onset    Cancer Mother     Cervical cancer Sister     Breast cancer Sister     Breast cancer Sister      Meds/Allergies     Medications Prior to Admission   Medication    Acetaminophen 500 MG    amLODIPine (NORVASC) 5 mg tablet    apixaban (ELIQUIS) 5 mg    atorvastatin (LIPITOR) 40 mg tablet    atovaquone (MEPRON) 750 mg/5 mL suspension    carvedilol (Coreg) 6.25 mg tablet    Cholecalciferol 50 MCG (2000 UT) CAPS    co-enzyme Q-10 100 mg capsule    Cyanocobalamin 1000 MCG CAPS    escitalopram (LEXAPRO) 10 mg tablet    famotidine (PEPCID) 20 mg tablet    Hydroxychloroquine Sulfate 300 MG TABS    insulin lispro (HumALOG/ADMELOG) 100 units/mL injection    insulin lispro (HumALOG/ADMELOG) 100 units/mL injection    losartan (COZAAR) 100 MG tablet    methocarbamol (ROBAXIN) 500 mg tablet    mirtazapine (REMERON) 7.5 MG tablet    mycophenolate (CELLCEPT) 500 mg tablet    Nutritional Supplements (Al) PACK    oxyCODONE (ROXICODONE) 5 immediate release tablet    pantoprazole (PROTONIX) 40 mg tablet    predniSONE 10 mg tablet    albuterol (2.5 mg/3 mL) 0.083 % nebulizer solution    aspirin (ECOTRIN LOW STRENGTH) 81 mg EC tablet    DULoxetine (CYMBALTA) 60 mg delayed release capsule    FISH OIL-CANOLA OIL-VIT D3 PO    gabapentin (NEURONTIN) 100 mg capsule    TURMERIC PO     Current Facility-Administered Medications   Medication Dose Route  "Frequency    acetaminophen (TYLENOL) tablet 650 mg  650 mg Oral Q6H PRN    albuterol inhalation solution 2.5 mg  2.5 mg Nebulization Q2H PRN    atorvastatin (LIPITOR) tablet 40 mg  40 mg Oral Daily    carvedilol (COREG) tablet 6.25 mg  6.25 mg Oral BID With Meals    cefTRIAXone (ROCEPHIN) IVPB (premix in dextrose) 1,000 mg 50 mL  1,000 mg Intravenous Q24H    escitalopram (LEXAPRO) tablet 10 mg  10 mg Oral Daily    famotidine (PEPCID) tablet 20 mg  20 mg Oral Daily    folic acid (FOLVITE) tablet 1 mg  1 mg Oral Daily    guaiFENesin (MUCINEX) 12 hr tablet 600 mg  600 mg Oral Q12H CRUZITO    HYDROmorphone HCl (DILAUDID) injection 0.2 mg  0.2 mg Intravenous Q4H PRN    hydroxychloroquine (PLAQUENIL) tablet 300 mg  300 mg Oral Daily    metroNIDAZOLE (FLAGYL) tablet 500 mg  500 mg Oral Q8H CRUZITO    mirtazapine (REMERON) tablet 7.5 mg  7.5 mg Oral HS    mycophenolate (CELLCEPT) capsule 1,000 mg  1,000 mg Oral Q12H CRUZITO    ondansetron (ZOFRAN) injection 4 mg  4 mg Intravenous Q6H PRN    oxyCODONE (ROXICODONE) IR tablet 5 mg  5 mg Oral Q6H PRN    pantoprazole (PROTONIX) EC tablet 40 mg  40 mg Oral BID AC    predniSONE tablet 60 mg  60 mg Oral Daily     Allergies   Allergen Reactions    Rosuvastatin Other (See Comments)     Per SNF    Simvastatin Other (See Comments)     Per SNF     Objective     Blood pressure 141/75, pulse 87, temperature 98.9 °F (37.2 °C), temperature source Oral, resp. rate 21, height 5' 4\" (1.626 m), weight 67 kg (147 lb 11.3 oz), SpO2 96%. Body mass index is 25.35 kg/m².      Intake/Output Summary (Last 24 hours) at 3/1/2024 0732  Last data filed at 2/29/2024 2229  Gross per 24 hour   Intake 1480 ml   Output 600 ml   Net 880 ml     Physical Exam  Vitals and nursing note reviewed.   Constitutional:       General: She is not in acute distress.     Appearance: She is well-developed.   HENT:      Head: Normocephalic and atraumatic.   Eyes:      General: No scleral icterus.     Conjunctiva/sclera: Conjunctivae " normal.   Cardiovascular:      Rate and Rhythm: Normal rate.      Heart sounds: Murmur heard.   Pulmonary:      Effort: Pulmonary effort is normal. No respiratory distress.      Comments: 1L O2  Abdominal:      General: Bowel sounds are normal. There is no distension.      Palpations: Abdomen is soft.      Tenderness: There is no abdominal tenderness. There is no guarding or rebound.   Genitourinary:     Comments: Unable to perform rectal examination at bedside given pt's body habitus and immobility   Musculoskeletal:      Right lower leg: Edema present.      Left lower leg: Edema present.   Skin:     General: Skin is warm and dry.      Coloration: Skin is not jaundiced.   Neurological:      General: No focal deficit present.      Mental Status: She is alert.   Psychiatric:         Mood and Affect: Mood normal.         Behavior: Behavior normal.        Lab Results:   Admission on 02/29/2024   Component Date Value    Ventricular Rate 02/29/2024 78     Atrial Rate 02/29/2024 78     [place] Interval 02/29/2024 170     QRSD Interval 02/29/2024 142     QT Interval 02/29/2024 416     QTC Interval 02/29/2024 474     P Axis 02/29/2024 43     QRS [place] 02/29/2024 13     T Wave [place] 02/29/2024 -24     WBC 02/29/2024 8.72     RBC 02/29/2024 2.22 (L)     Hemoglobin 02/29/2024 7.0 (L)     Hematocrit 02/29/2024 23.7 (L)     MCV 02/29/2024 107 (H)     MCH 02/29/2024 31.5     MCHC 02/29/2024 29.5 (L)     RDW 02/29/2024 18.6 (H)     MPV 02/29/2024 11.8     Platelets 02/29/2024 315     Sodium 02/29/2024 132 (L)     Potassium 02/29/2024 4.8     Chloride 02/29/2024 101     CO2 02/29/2024 23     ANION GAP 02/29/2024 8     BUN 02/29/2024 58 (H)     Creatinine 02/29/2024 1.10     Glucose 02/29/2024 272 (H)     Calcium 02/29/2024 8.6     Corrected Calcium 02/29/2024 9.9     AST 02/29/2024 13     ALT 02/29/2024 15     Alkaline Phosphatase 02/29/2024 59     Total Protein 02/29/2024 4.1 (L)     Albumin 02/29/2024 2.4 (L)     Total Bilirubin  02/29/2024 0.72     eGFR 02/29/2024 47     BNP 02/29/2024 64     LACTIC ACID 02/29/2024 4.1 (HH)     Procalcitonin 02/29/2024 0.61 (H)     Protime 02/29/2024 21.9 (H)     INR 02/29/2024 1.95 (H)     PTT 02/29/2024 31     Blood Culture 02/29/2024 Received in Microbiology Lab. Culture in Progress.     Blood Culture 02/29/2024 Received in Microbiology Lab. Culture in Progress.     Color, UA 02/29/2024 Yellow     Clarity, UA 02/29/2024 Clear     Specific Gravity, UA 02/29/2024 1.015     pH, UA 02/29/2024 5.5     Leukocytes, UA 02/29/2024 Negative     Nitrite, UA 02/29/2024 Positive (A)     Protein, UA 02/29/2024 Trace (A)     Glucose, UA 02/29/2024 Negative     Ketones, UA 02/29/2024 Trace (A)     Urobilinogen, UA 02/29/2024 0.2     Bilirubin, UA 02/29/2024 Negative     Occult Blood, UA 02/29/2024 Trace-Intact (A)     Magnesium 02/29/2024 2.0     ABO Grouping 02/29/2024 O     Rh Factor 02/29/2024 Positive     Antibody Screen 02/29/2024 Negative     Specimen Expiration Date 02/29/2024 [id number]     Segmented % 02/29/2024 79 (H)     Bands % 02/29/2024 5     Lymphocytes % 02/29/2024 11 (L)     Monocytes % 02/29/2024 5     Eosinophils, % 02/29/2024 0     Basophils % 02/29/2024 0     Absolute Neutrophils 02/29/2024 7.32     Lymphocytes Absolute 02/29/2024 0.96     Monocytes Absolute 02/29/2024 0.44     Eosinophils Absolute 02/29/2024 0.00     Basophils Absolute 02/29/2024 0.00     RBC Morphology 02/29/2024 Present     Platelet Estimate 02/29/2024 Adequate     Anisocytosis 02/29/2024 Present     Macrocytes 02/29/2024 Present     LACTIC ACID 02/29/2024 1.8     Unit Product Code 03/01/2024 [id number]     Unit Number 03/01/2024 [id number]     Unit ABO 03/01/2024 O     Unit RH 03/01/2024 POS     Crossmatch 03/01/2024 Compatible     Unit Dispense Status 03/01/2024 Presumed Trans     Unit Product Volume 03/01/2024 350     SARS-CoV-2 02/29/2024 Negative     INFLUENZA A PCR 02/29/2024 Negative     INFLUENZA B PCR 02/29/2024  Negative     RSV PCR 02/29/2024 Negative     Hemoglobin 02/29/2024 9.3 (L)     Hematocrit 02/29/2024 30.5 (L)     TSH 3RD GENERATON 02/29/2024 1.248     RBC, UA 02/29/2024 None Seen     WBC, UA 02/29/2024 None Seen     Epithelial Cells 02/29/2024 None Seen     Bacteria, UA 02/29/2024 Occasional     Sodium 03/01/2024 135     Potassium 03/01/2024 4.5     Chloride 03/01/2024 102     CO2 03/01/2024 25     ANION GAP 03/01/2024 8     BUN 03/01/2024 42 (H)     Creatinine 03/01/2024 0.87     Glucose 03/01/2024 170 (H)     Calcium 03/01/2024 8.7     Corrected Calcium 03/01/2024 9.8     AST 03/01/2024 11 (L)     ALT 03/01/2024 15     Alkaline Phosphatase 03/01/2024 65     Total Protein 03/01/2024 4.6 (L)     Albumin 03/01/2024 2.6 (L)     Total Bilirubin 03/01/2024 0.71     eGFR 03/01/2024 63     Magnesium 03/01/2024 2.0     WBC 03/01/2024 12.97 (H)     RBC 03/01/2024 3.00 (L)     Hemoglobin 03/01/2024 9.0 (L)     Hematocrit 03/01/2024 29.0 (L)     MCV 03/01/2024 97     MCH 03/01/2024 30.0     MCHC 03/01/2024 31.0 (L)     RDW 03/01/2024 21.3 (H)     MPV 03/01/2024 11.5     Platelets 03/01/2024 325     Procalcitonin 03/01/2024 0.76 (H)     Methemoglobin 03/01/2024 0.3     POC Glucose 03/01/2024 214 (H)     Segmented % 03/01/2024 75     Bands % 03/01/2024 11 (H)     Lymphocytes % 03/01/2024 8 (L)     Monocytes % 03/01/2024 6     Eosinophils, % 03/01/2024 0     Basophils % 03/01/2024 0     Absolute Neutrophils 03/01/2024 11.15 (H)     Lymphocytes Absolute 03/01/2024 1.04     Monocytes Absolute 03/01/2024 0.78     Eosinophils Absolute 03/01/2024 0.00     Basophils Absolute 03/01/2024 0.00     nRBC 03/01/2024 3 (H)     RBC Morphology 03/01/2024 Present     Platelet Estimate 03/01/2024 Adequate     Anisocytosis 03/01/2024 Present     Macrocytes 03/01/2024 Present        Imaging Studies: I have personally reviewed pertinent imaging studies.    Ginger Dhillon PA-C    **Please note:  Dictation voice to text software may have been  used in the creation of this record.  Occasional wrong word or “sound alike” substitutions may have occurred due to the inherent limitations of voice recognition software.  Read the chart carefully and recognize, using context, where substitutions have occurred.**

## 2024-03-01 NOTE — H&P
[facility]  H&P  Name: Darlyn Nguyen 78 y.o. female I MRN: [id number]  Unit/Bed#: 406-01 I Date of Admission: 2/29/2024   Date of Service: 2/29/2024 I Hospital Day: 0      Assessment/Plan   DVT (deep venous thrombosis) (HCC)  Assessment & Plan  Recently diagnosed on most recent [facility] admission  Was initiated on Eliquis at that time and was still taking 10 mg BID per loading instructions  Hold for now    Positive fecal occult blood test  Assessment & Plan  Positive prior to admission, monitor stool output and blood counts    Pneumonia  Assessment & Plan  Seen on imaging  Initiated on ceftriaxone  Check urinary antigens  Mucinex  Check procal in the AM    Hypertension  Assessment & Plan  BP stable, continue PTA meds    Lupus (HCC)  Assessment & Plan  Continue on cellcept and atovaquon  Was initiated on steroid taper on [facility] admission    Chronic anemia  Assessment & Plan  Baseline hgb around 9.0  Was recently worked up extensively for acute on chronic anemia at [facility] without significant cause  When discharged from [facility] was at 7.5, on admission around 7 range  Folate level there was slightly low, replete here  EGD showed mild gastritis on biopsies  Denies melena, hematochezia  Continue to trend, no overt bleeding noted per SNF staff but FOBT returned positive    * Diarrhea of presumed infectious origin  Assessment & Plan  Patient reports this to be ongoing  C diff negative 9 days ago  Fecal calprotectin and elastase were elevated on most recent [facility] admission  Check stool enteric panel  CT abdomen does show inflammation  Nursing to monitor stool output as at [facility] she was not noted to have diarrhea  GI consulted           VTE Pharmacologic Prophylaxis:   Moderate Risk (Score 3-4) - Pharmacological DVT Prophylaxis Contraindicated. Sequential Compression Devices Ordered.  Code Status: Level 1 - Full Code   Discussion with family: Updated  () at bedside.    Anticipated Length of  Stay: Patient will be admitted on an observation basis with an anticipated length of stay of less than 2 midnights secondary to monitoring blood counts.    Total Time Spent on Date of Encounter in care of patient: 55 mins. This time was spent on one or more of the following: performing physical exam; counseling and coordination of care; obtaining or reviewing history; documenting in the medical record; reviewing/ordering tests, medications or procedures; communicating with other healthcare professionals and discussing with patient's family/caregivers.    Chief Complaint: diarrhea, positive FOBT    History of Present Illness:  Darlyn Nguyen is a 78 y.o. female with a PMH of lupus, DM, HTN who presents with diarrhea. Patient was recently admitted to the hospital. 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 She was transferred to [facility] soon after. During that hospitalization, she was worked up for diarrhea and acute anemia. C diff negative, fecal calprotectin elevated, elastase wnl. LDH was elevated , folate slightly low, haptoglobin low, bilirubin wnl. EGD was completed and biopsies showed mild gastritis. She required 2 units to be transfused while inpatient. She was noted to not have significant diarrhea during that admission despite patient's complaints of frequent diarrhea ongoing. She was discharged to SNF. At SNF, she was noted to have a diarrhea episode. She had FOBT performed on the stool and it was positive. Patient does admit to rectal pain, fatigue, and weakness.  Hgb on discharged from [facility] 7.5, hgb today 7.0. CXR does show concern for pneumonia. Redceived zosyn in the ED.    Review of Systems:  Review of Systems   Constitutional:  Positive for fatigue. Negative for diaphoresis and fever.   Respiratory:  Positive for cough and shortness of breath.    Cardiovascular:  Negative for chest pain, palpitations and leg swelling.   Gastrointestinal:  Positive for blood in stool and diarrhea. Negative for nausea  and vomiting.   Genitourinary:  Negative for difficulty urinating, dyspareunia and hematuria.   Musculoskeletal:  Negative for arthralgias and back pain.   Neurological:  Positive for weakness. Negative for dizziness, light-headedness and headaches.   Psychiatric/Behavioral:  Negative for confusion. The patient is not nervous/anxious.        Past Medical and Surgical History:   Past Medical History:   Diagnosis Date    Diabetes mellitus (HCC)     Fibromyalgia     Hyperlipidemia     Hypertension        Past Surgical History:   Procedure Laterality Date    ACHILLES TENDON REPAIR Right     ADENOIDECTOMY      APPENDECTOMY      SKIN CANCER EXCISION      x 4 - back    TONSILLECTOMY      TOTAL ABDOMINAL HYSTERECTOMY      US GUIDED KIDNEY BIOPSY  12/26/2023       Meds/Allergies:  Prior to Admission medications    Medication Sig Start Date End Date Taking? Authorizing Provider   Acetaminophen 500 MG Take 2 capsules by mouth every 6 (six) hours as needed for mild pain   Yes Historical Provider, MD   amLODIPine (NORVASC) 5 mg tablet Take 5 mg by mouth daily   Yes Historical Provider, MD   apixaban (ELIQUIS) 5 mg Take 5 mg by mouth 2 (two) times a day   Yes Historical Provider, MD   atorvastatin (LIPITOR) 40 mg tablet Take 40 mg by mouth daily   Yes Historical Provider, MD   atovaquone (MEPRON) 750 mg/5 mL suspension Take 750 mg by mouth 2 (two) times a day   Yes Historical Provider, MD   carvedilol (Coreg) 6.25 mg tablet Take 6.25 mg by mouth 2 (two) times a day with meals   Yes Historical Provider, MD   Cholecalciferol 50 MCG (2000 UT) CAPS Take 1 capsule by mouth daily   Yes Historical Provider, MD   co-enzyme Q-10 100 mg capsule Take 100 mg by mouth daily   Yes Historical Provider, MD   Cyanocobalamin 1000 MCG CAPS Take 1 capsule by mouth daily   Yes Historical Provider, MD   escitalopram (LEXAPRO) 10 mg tablet Take 10 mg by mouth daily   Yes Historical Provider, MD   famotidine (PEPCID) 20 mg tablet Take 20 mg by mouth  daily   Yes Historical Provider, MD   Hydroxychloroquine Sulfate 300 MG TABS Take 1 tablet by mouth daily   Yes Historical Provider, MD   insulin lispro (HumALOG/ADMELOG) 100 units/mL injection Inject 2-10 Units under the skin 3 (three) times a day with meals   Yes Historical Provider, MD   insulin lispro (HumALOG/ADMELOG) 100 units/mL injection Inject 2-10 Units under the skin daily at bedtime   Yes Historical Provider, MD   losartan (COZAAR) 100 MG tablet Take 100 mg by mouth daily   Yes Historical Provider, MD   methocarbamol (ROBAXIN) 500 mg tablet Take 500 mg by mouth every 12 (twelve) hours as needed for muscle spasms   Yes Historical Provider, MD   mirtazapine (REMERON) 7.5 MG tablet Take 7.5 mg by mouth daily at bedtime   Yes Historical Provider, MD   mycophenolate (CELLCEPT) 500 mg tablet Take 2 tabs twice daily as instructed 1/10/24  Yes Historical Provider, MD   Nutritional Supplements (Al) PACK Take 1 each by mouth 2 (two) times a day   Yes Historical Provider, MD   oxyCODONE (ROXICODONE) 5 immediate release tablet Take 2.5 mg by mouth every 6 (six) hours as needed for moderate pain   Yes Historical Provider, MD   pantoprazole (PROTONIX) 40 mg tablet Take 40 mg by mouth 2 (two) times a day   Yes Historical Provider, MD   predniSONE 10 mg tablet Take 60 mg by mouth daily   Yes Historical Provider, MD   albuterol (2.5 mg/3 mL) 0.083 % nebulizer solution Inhale 2.5 mg every 2 (two) hours as needed  Patient not taking: Reported on 2/29/2024 2/16/24 2/15/25  Historical Provider, MD   aspirin (ECOTRIN LOW STRENGTH) 81 mg EC tablet Take 81 mg by mouth  Patient not taking: Reported on 2/29/2024    Historical Provider, MD   DULoxetine (CYMBALTA) 60 mg delayed release capsule  12/31/21   Historical Provider, MD   FISH OIL-CANOLA OIL-VIT D3 PO Use  Patient not taking: Reported on 2/29/2024    Historical Provider, MD   gabapentin (NEURONTIN) 100 mg capsule  12/5/21   Historical Provider, MD   TURMERIC PO Take 1  "capsule by mouth daily  Patient not taking: Reported on 2/29/2024    Historical Provider, MD     I have reviewed home medications using recent Epic encounter.    Allergies:   Allergies   Allergen Reactions    Rosuvastatin Other (See Comments)     Per SNF    Simvastatin Other (See Comments)     Per SNF       Social History:  Marital Status: /Civil Union   Occupation: none  Patient Pre-hospital Living Situation: Home  Patient Pre-hospital Level of Mobility: walks  Patient Pre-hospital Diet Restrictions: none  Substance Use History:   Social History     Substance and Sexual Activity   Alcohol Use Not Currently     Social History     Tobacco Use   Smoking Status Never   Smokeless Tobacco Never     Social History     Substance and Sexual Activity   Drug Use Never       Family History:  Family History   Problem Relation Age of Onset    Cancer Mother     Cervical cancer Sister     Breast cancer Sister     Breast cancer Sister        Physical Exam:     Vitals:   Blood Pressure: 126/61 (02/29/24 2229)  Pulse: 78 (02/29/24 2229)  Temperature: 98.1 °F (36.7 °C) (02/29/24 2229)  Temp Source: Tympanic (02/29/24 2229)  Respirations: 18 (02/29/24 2229)  Height: 5' 4\" (162.6 cm) (02/29/24 1652)  Weight - Scale: 67 kg (147 lb 11.3 oz) (02/29/24 1652)  SpO2: 96 % (02/29/24 2229)    Physical Exam  Vitals and nursing note reviewed.   Constitutional:       Appearance: She is obese. She is ill-appearing.   Cardiovascular:      Rate and Rhythm: Normal rate and regular rhythm.      Pulses: Normal pulses.      Heart sounds: Normal heart sounds.   Pulmonary:      Effort: Pulmonary effort is normal.      Breath sounds: No wheezing or rales.   Abdominal:      General: Bowel sounds are normal.      Tenderness: There is no abdominal tenderness.   Musculoskeletal:      Right lower leg: No edema.      Left lower leg: No edema.   Neurological:      Mental Status: She is alert. Mental status is at baseline.   Psychiatric:         Mood and " Affect: Mood normal.         Behavior: Behavior normal.          Additional Data:     Lab Results:  Results from last 7 days   Lab Units 02/29/24  1804 02/29/24  1226   WBC Thousand/uL  --  8.72   HEMOGLOBIN g/dL 9.3* 7.0*   HEMATOCRIT % 30.5* 23.7*   PLATELETS Thousands/uL  --  315   BANDS PCT %  --  5   LYMPHO PCT %  --  11*   MONO PCT %  --  5   EOS PCT %  --  0     Results from last 7 days   Lab Units 02/29/24  1226   SODIUM mmol/L 132*   POTASSIUM mmol/L 4.8   CHLORIDE mmol/L 101   CO2 mmol/L 23   BUN mg/dL 58*   CREATININE mg/dL 1.10   ANION GAP mmol/L 8   CALCIUM mg/dL 8.6   ALBUMIN g/dL 2.4*   TOTAL BILIRUBIN mg/dL 0.72   ALK PHOS U/L 59   ALT U/L 15   AST U/L 13   GLUCOSE RANDOM mg/dL 272*     Results from last 7 days   Lab Units 02/29/24  1226   INR  1.95*         Results from last 7 days   Lab Units 02/26/24  1134   HEMOGLOBIN A1C % 5.6     Results from last 7 days   Lab Units 02/29/24  1526 02/29/24  1226   LACTIC ACID mmol/L 1.8 4.1*   PROCALCITONIN ng/ml  --  0.61*       Lines/Drains:  Invasive Devices       Peripheral Intravenous Line  Duration             Peripheral IV 02/29/24 Proximal;Right;Ventral (anterior) Forearm <1 day              Drain  Duration             External Urinary Catheter 9 days                        Imaging: Reviewed radiology reports from this admission including: abdominal/pelvic CT  CT abdomen pelvis with contrast   Final Result by Olman Tierney MD (02/29 1510)      Patchy right basilar consolidation suspicious for pneumonia.      Persistent fluid throughout the colon similar in appearance to 2/19/2024 with improved areas of mucosal hyperemia and thickening in keeping with an improving colitis.         The study was marked in EPIC for immediate notification.      Workstation performed: [id number]         XR chest 1 view portable   ED Interpretation by Dallin Massey DO (02/29 1237)   Airway is midline. Lungs are clear bilaterally with no evidence of pulmonary  vascular congestion/focal infiltrate/pleural effusion/pneumothorax. Cardiac and mediastinal silhouettes are within normal limits. Osseous structures appear normal.        Final Result by Edwar Mehta MD (02/29 1638)      Right lung opacities, suspicious for pneumonia in the appropriate clinical setting.         Resident: RUFINO DAVID I, the attending radiologist, have reviewed the images and agree with the final report above.      Workstation performed: [id number]             EKG and Other Studies Reviewed on Admission:   EKG: NSR. HR 76.    ** Please Note: This note has been constructed using a voice recognition system. **

## 2024-03-01 NOTE — PLAN OF CARE
Problem: PAIN - ADULT  Goal: Verbalizes/displays adequate comfort level or baseline comfort level  Description: Interventions:  - Encourage patient to monitor pain and request assistance  - Assess pain using appropriate pain scale  - Administer analgesics based on type and severity of pain and evaluate response  - Implement non-pharmacological measures as appropriate and evaluate response  - Consider cultural and social influences on pain and pain management  - Notify physician/advanced practitioner if interventions unsuccessful or patient reports new pain  Outcome: Progressing     Problem: INFECTION - ADULT  Goal: Absence or prevention of progression during hospitalization  Description: INTERVENTIONS:  - Assess and monitor for signs and symptoms of infection  - Monitor lab/diagnostic results  - Monitor all insertion sites, i.e. indwelling lines, tubes, and drains  - Monitor endotracheal if appropriate and nasal secretions for changes in amount and color  - [place] appropriate cooling/warming therapies per order  - Administer medications as ordered  - Instruct and encourage patient and family to use good hand hygiene technique  - Identify and instruct in appropriate isolation precautions for identified infection/condition  Outcome: Progressing  Goal: Absence of fever/infection during neutropenic period  Description: INTERVENTIONS:  - Monitor WBC    Outcome: Progressing     Problem: SAFETY ADULT  Goal: Patient will remain free of falls  Description: INTERVENTIONS:  - Educate patient/family on patient safety including physical limitations  - Instruct patient to call for assistance with activity   - Consult OT/PT to assist with strengthening/mobility   - Keep Call bell within reach  - Keep bed low and locked with side rails adjusted as appropriate  - Keep care items and personal belongings within reach  - Initiate and maintain comfort rounds  - Make Fall Risk Sign visible to staff  - Offer Toileting every 2 Hours,  in advance of need  - Initiate/Maintain bed alarm  - Obtain necessary fall risk management equipment:   - Apply yellow socks and bracelet for high fall risk patients  - Consider moving patient to room near nurses station  Outcome: Progressing  Goal: Maintain or return to baseline ADL function  Description: INTERVENTIONS:  -  Assess patient's ability to carry out ADLs; assess patient's baseline for ADL function and identify physical deficits which impact ability to perform ADLs (bathing, care of mouth/teeth, toileting, grooming, dressing, etc.)  - Assess/evaluate cause of self-care deficits   - Assess range of motion  - Assess patient's mobility; develop plan if impaired  - Assess patient's need for assistive devices and provide as appropriate  - Encourage maximum independence but intervene and supervise when necessary  - Involve family in performance of ADLs  - Assess for home care needs following discharge   - Consider OT consult to assist with ADL evaluation and planning for discharge  - Provide patient education as appropriate  Outcome: Progressing  Goal: Maintains/Returns to pre admission functional level  Description: INTERVENTIONS:  - Perform AM-PAC 6 Click Basic Mobility/ Daily Activity assessment daily.  - Set and communicate daily mobility goal to care team and patient/family/caregiver.   - Collaborate with rehabilitation services on mobility goals if consulted  - Perform Range of Motion 2 times a day.  - Reposition patient every 2 hours.  - Dangle patient 2 times a day  - Stand patient 2 times a day  - Ambulate patient 2 times a day  - Out of bed to chair 2 times a day   - Out of bed for meals 2

## 2024-03-01 NOTE — DISCHARGE INSTR - OTHER ORDERS
Skin care plans:  1-Cleanse sacro-buttocks with soap and water. Apply TRIAD paste to open areas daily and cover with allevyn foam   2-Hydraguard to bilateral heel BID and PRN  3-Elevate heels to offload pressure  4-Ehob cushion when out of bed.  5-Turn/repoisiton q2h or when medically stable for pressure re-distribution on skin.  6-Moisturize skin daily with skin nourishing cream   7-Triad to open areas of perirectal areas daily and prn   8-P500 Low Air Loss Mattress

## 2024-03-01 NOTE — WOUND OSTOMY CARE
Consult Note - Wound   Darlyn Nguyen 78 y.o. female MRN: [id number]  Unit/Bed#: 406-01 Encounter: [id number]        History and Present Illness:  Recently hospitalized form 2/19 to 2/20/24 discharged from [facility] to rehab center 2/24/24.Presented to ED 2/29/24 from [facility] fifth floor rehab due to bloody stool.    Assessment Findings:   POA 4 open areas including both buttocks sacrum and perirectal areas.  MD aware of lower buttocks blistered areas.  Assessment Findings:   1)POA right buttocks evolving DTI 6x3x.1 90% deep maroon intact tissue non blanchable 10% beefy red    2)POA Sacrum. Evolving DTI 5x3x.1 scant bloody drainage on dressing.   20% deep purple and 80% tan slough    3)POA , MASD VS herpetic lesion. Perirectal areas of partial thickness skinloss. Discussed with MD will continue to observe questionable lesions due to presentation.    4)POA Left buttocks Evovling DTI presents with 90% tan adhered slough      Skin care plans:  1-Cleanse sacro-buttocks with soap and water. Apply TRIAD paste to open areas daily and cover with allevyn foam   2-Hydraguard to bilateral heel BID and PRN  3-Elevate heels to offload pressure  4-Ehob cushion when out of bed.  5-Turn/repoisiton q2h or when medically stable for pressure re-distribution on skin.  6-Moisturize skin daily with skin nourishing cream   7-Triad to open areas of perirectal areas daily and prn   8-P500 Low Air Loss Mattress      Wounds:  Wound 02/29/24 Pressure Injury Buttocks Right (Active)   Wound Image   03/01/24 1003   Wound Description Light purple;Non-blanchable erythema 03/01/24 1003   Pressure Injury Stage DTPI 03/01/24 1003   Shanta-wound Assessment Denuded;Purple 03/01/24 1003   Wound Length (cm) 1 cm 02/29/24 1715   Wound Width (cm) 1 cm 02/29/24 1715   Wound Depth (cm) 0.1 cm 02/29/24 1715   Wound Surface Area (cm^2) 1 cm^2 02/29/24 1715   Wound Volume (cm^3) 0.1 cm^3 02/29/24 1715   Calculated Wound Volume (cm^3) 0.1 cm^3 02/29/24 1715   Drainage Amount  Scant 03/01/24 1003   Drainage Description Bloody 03/01/24 1003   Treatments Site care 03/01/24 1003   Dressing Foam, Silicon (eg. Allevyn, etc);Other (Comment) 03/01/24 1003   Dressing Changed Changed 03/01/24 1003   Patient Tolerance Tolerated well 03/01/24 1003   Dressing Status Dry;Intact;Clean 03/01/24 1003       Wound 02/29/24 Pressure Injury Sacrum Medial (Active)   Wound Image   03/01/24 1003   Wound Description Slough;Tan 03/01/24 1003   Pressure Injury Stage U 03/01/24 1003   Shanta-wound Assessment Clean;Dry 03/01/24 1003   Wound Length (cm) 5 cm 03/01/24 1002   Wound Width (cm) 3 cm 03/01/24 1002   Wound Depth (cm) 0.1 cm 03/01/24 1002   Wound Surface Area (cm^2) 15 cm^2 03/01/24 1002   Wound Volume (cm^3) 1.5 cm^3 03/01/24 1002   Calculated Wound Volume (cm^3) 1.5 cm^3 03/01/24 1002   Change in Wound Size % -27.12 03/01/24 1002   Drainage Amount Scant 03/01/24 1003   Drainage Description Bloody 03/01/24 1003   Non-staged Wound Description Full thickness 03/01/24 1003   Treatments Cleansed;Site care 03/01/24 1003   Dressing Foam, Silicon (eg. Allevyn, etc);Other (Comment) 03/01/24 1003   Dressing Changed New 03/01/24 1003   Patient Tolerance Tolerated well 03/01/24 1003   Dressing Status Clean;Dry;Intact 03/01/24 1003       Wound 02/29/24 MASD Buttocks (Active)   Wound Image   02/29/24 1715   Wound Description Dry;Fragile;Pink 03/01/24 1003   Pressure Injury Stage 2 02/29/24 1715   Shanta-wound Assessment Dry;Intact 03/01/24 1003   Wound Length (cm) 5 cm 02/29/24 1715   Wound Width (cm) 5 cm 02/29/24 1715   Wound Depth (cm) 0.1 cm 02/29/24 1715   Wound Surface Area (cm^2) 25 cm^2 02/29/24 1715   Wound Volume (cm^3) 2.5 cm^3 02/29/24 1715   Calculated Wound Volume (cm^3) 2.5 cm^3 02/29/24 1715   Drainage Amount Small 03/01/24 1003   Drainage Description Clear 03/01/24 1003   Non-staged Wound Description Partial thickness 03/01/24 1003   Treatments Cleansed;Site care 03/01/24 1003   Dressing Other (Comment)  03/01/24 1003   Dressing Changed Changed 03/01/24 1003   Patient Tolerance Tolerated well 03/01/24 1003   Dressing Status Remoistened 03/01/24 1003       Wound 02/29/24 Pressure Injury Groin (Active)   Wound Image   02/29/24 1715   Wound Description Clean;Beefy red;Fragile 02/29/24 1715   Pressure Injury Stage 2 02/29/24 1715   Shanta-wound Assessment Fragile;Pink 02/29/24 1715   Wound Length (cm) 1 cm 02/29/24 1715   Wound Width (cm) 1.5 cm 02/29/24 1715   Wound Depth (cm) 0.1 cm 02/29/24 1715   Wound Surface Area (cm^2) 1.5 cm^2 02/29/24 1715   Wound Volume (cm^3) 0.15 cm^3 02/29/24 1715   Calculated Wound Volume (cm^3) 0.15 cm^3 02/29/24 1715   Drainage Amount None 02/29/24 1715   Dressing Open to air 02/29/24 1715       Wound 03/01/24 Pressure Injury Buttocks Left (Active)         Wound Description Slough;Tan 03/01/24 1300   Pressure Injury Stage U 03/01/24 1300   Shanta-wound Assessment Intact 03/01/24 1300   Wound Length (cm) 6 cm 03/01/24 1300   Wound Width (cm) 6 cm 03/01/24 1300   Wound Depth (cm) 0.1 cm 03/01/24 1300   Wound Surface Area (cm^2) 36 cm^2 03/01/24 1300   Wound Volume (cm^3) 3.6 cm^3 03/01/24 1300   Calculated Wound Volume (cm^3) 3.6 cm^3 03/01/24 1300   Drainage Amount Scant 03/01/24 1300   Drainage Description Bloody 03/01/24 1300   Treatments Cleansed;Site care 03/01/24 1300   Dressing Foam, Silicon (eg. Allevyn, etc) 03/01/24 1300   Dressing Changed Changed 03/01/24 1300   Patient Tolerance Tolerated well 03/01/24 1300   Dressing Status Clean;Dry;Intact 03/01/24 1300     Call or tigertext with any questions  Wound Care will continue to follow weekly     Payton Kumar BSN RN CWON

## 2024-03-01 NOTE — ASSESSMENT & PLAN NOTE
Seen on CXR and CT  Urinary antigens negative. Procalcitonin elevated (0.76)  Continue ceftriaxone  Mucinex BID

## 2024-03-01 NOTE — ASSESSMENT & PLAN NOTE
Pressure wounds of sacrum and bilateral buttocks (present for 3-4 months, per ), likely 2/2 ambulatory dysfunction  Consulted General Surgery & Wound care nurse; we appreciate their recommendations.  No current need for debridement  Follow wound care instructions per WC nurse's note, including regular repositioning, low-air-loss mattress, daily Triad paste and Allevyn foam.  Nutritional supplements  PT/OT to evaluate and treat

## 2024-03-01 NOTE — PHYSICAL THERAPY NOTE
Physical Therapy Evaluation    Patient Name: Darlyn Nguyen    Today's Date: 3/1/2024     Problem List  Principal Problem:    Diarrhea of presumed infectious origin  Active Problems:    Chronic anemia    Lupus (HCC)    Hypertension    Pneumonia    Positive fecal occult blood test    DVT (deep venous thrombosis) (HCC)    Pressure ulcers of skin of multiple topographic sites       Past Medical History  Past Medical History:   Diagnosis Date    Diabetes mellitus (HCC)     Fibromyalgia     Hyperlipidemia     Hypertension         Past Surgical History  Past Surgical History:   Procedure Laterality Date    ACHILLES TENDON REPAIR Right     ADENOIDECTOMY      APPENDECTOMY      SKIN CANCER EXCISION      x 4 - back    TONSILLECTOMY      TOTAL ABDOMINAL HYSTERECTOMY      US GUIDED KIDNEY BIOPSY  12/26/2023 03/01/24 1409   PT Last Visit   PT Visit Date 03/01/24   Note Type   Note type Evaluation   Pain Assessment   Pain Assessment Tool 0-10   Pain Score No Pain   Restrictions/Precautions   Weight Bearing Precautions Per Order No   Other Precautions Fall Risk;O2;Multiple lines   Home Living   Type of Home SNF   Additional Comments pt currently residing on 5th floor SNF for STR   Prior Function   Level of [place] Needs assistance with ADLs;Needs assistance with functional mobility;Needs assistance with IADLS   Lives With Facility staff   Receives Help From Personal care attendant   IADLs Family/Friend/Other provides transportation;Family/Friend/Other provides meals;Family/Friend/Other provides medication management   Falls in the last 6 months 0   Vocational Retired   General   Family/Caregiver Present Yes   Cognition   Overall Cognitive Status Impaired   Arousal/Participation Cooperative   Orientation Level Oriented to person;Oriented to place;Disoriented to time;Disoriented to situation   Following Commands Follows one step commands without difficulty    Subjective   Subjective pt pleasant and cooperative   RLE Assessment   RLE Assessment X  (3-/5 grossly)   LLE Assessment   LLE Assessment X  (3-/5 grossly)   Bed Mobility   Supine to Sit 2  Maximal assistance   Additional items Assist x 2;Increased time required;Verbal cues   Sit to Supine 2  Maximal assistance   Additional items Assist x 2;Increased time required;Verbal cues   Transfers   Sit to Stand Unable to assess   Stand to Sit Unable to assess   Ambulation/Elevation   Gait pattern Not appropriate;Not tested   Balance   Static Sitting Fair -   Dynamic Sitting Poor +   Static Standing Zero   Endurance Deficit   Endurance Deficit Yes   Endurance Deficit Description pt easily fatigued with bed mobility   Activity Tolerance   Activity Tolerance Patient limited by fatigue   Assessment   Prognosis Guarded   Problem List Decreased strength;Decreased endurance;Impaired balance;Decreased mobility   Assessment Patient is a 78 y.o. female evaluated by Physical Therapy s/p admit to [facility] on 2/29/2024 with admitting diagnosis of: Acute blood loss anemia, Colitis, Rectal bleeding, Pneumonia, Anemia, Lower GI bleed, and principal problem of: Diarrhea of presumed infectious origin. PT was consulted to assess patient's functional mobility and discharge needs. Ordered are PT Evaluation and treatment with activity level of: up and OOB as tolerated. Comorbidities affecting patient's physical performance at time of assessment include:  chronic anemia, lupus, HTN, DVT . Personal factors affecting the patient at time of IE include: ambulating with assistive device, inability to navigate community distances, inability/difficulty performing IADLs, and inability/difficulty performing ADLs. Please locate objective findings from PT assessment regarding body systems outlined above. Upon evaluation, pt motivated to participate however required maxA x 2 to perform all bed mobility. Sitting EOB, pt core stability  gradually declining to the point where she was unable to maintain upright seated posture or self-correct LOB. Because of this, further transfer training not attempted. Pt returned to supine and repositioned for safety and comfort. HR and SpO2 remained WFL on RA throughout; 1L O2 reapplied for comfort at end of session. The patient's AM-[facility] Basic Mobility Inpatient Short Form Raw Score is 6. A Raw score of less than or equal to 16 suggests the patient may benefit from discharge to post-acute rehabilitation services. Please also refer to the recommendation of the Physical Therapist for safe discharge planning. Co treatment with OT secondary to complex medical condition of pt, possible A of 2 required to achieve and maintain transitional movements, requiring the need of skilled therapeutic intervention of 2 therapists to achieve delivery of services. Pt will benefit from continued PT intervention during LOS to address current deficits, increase LOF, and facilitate safe d/c to next level of care when medically appropriate. D/c recommendation at this time is rehabilitation resource intensity level I.   Goals   Patient Goals to feel better   LTG Expiration Date 03/15/24   Long Term Goal #1 Pt will participate in B LE strengthening exercises to facilitate improved functional activity tolerance. Pt will perform all functional transfers and bed mobility mod(I) with good safety awareness. Pt will ambulate 50' mod(I) with LRAD while maintaining good functional dynamic balance.   Plan   Treatment/Interventions Functional transfer training;LE strengthening/ROM;Therapeutic exercise;Endurance training;Bed mobility;Gait training   PT Frequency 3-5x/wk   Discharge Recommendation   Rehab Resource Intensity Level, PT I (Maximum Resource Intensity)   -PAC Basic Mobility Inpatient   Turning in Flat Bed Without Bedrails 1   Lying on Back to Sitting on Edge of Flat Bed Without Bedrails 1   Moving Bed to Chair 1   Standing Up From Chair  Using Arms 1   Walk in Room 1   Climb 3-5 Stairs With Railing 1   Basic Mobility Inpatient Raw Score 6   Turning Head Towards Sound 4   Follow Simple Instructions 3   Low Function Basic Mobility Raw Score  13   Low Function Basic Mobility Standardized Score  20.14   Highest Level Of Mobility   -HLM Goal 2: Bed activities/Dependent transfer   JH-HLM Achieved 3: Sit at edge of bed   End of Consult   Patient Position at End of Consult Supine;All needs within reach

## 2024-03-01 NOTE — ASSESSMENT & PLAN NOTE
Recently diagnosed on most recent [facility] admission  Was initiated on Eliquis at that time and was still taking 10 mg BID per loading instructions  Hold for now d/t concern for GI bleed

## 2024-03-01 NOTE — ASSESSMENT & PLAN NOTE
Recently diagnosed on most recent [facility] admission  Was initiated on Eliquis at that time and was still taking 10 mg BID per loading instructions  Hold for now

## 2024-03-01 NOTE — ASSESSMENT & PLAN NOTE
Patient (and ) report this to be ongoing intermittently since October.  C diff negative 9 days ago  Fecal calprotectin and elastase were elevated on most recent [facility] admission  Check stool enteric panel  CT abdomen does show inflammation, improving  Nursing to monitor stool output as at [facility] she was not noted to have diarrhea  Continue Flagyl for now  GI consulted

## 2024-03-01 NOTE — ASSESSMENT & PLAN NOTE
Baseline hgb around 9.0  History of Lupus, treated with Mycophenolate and Hydroxychloroquine  Was recently worked up extensively for acute on chronic anemia at [facility] without significant cause (iron panel showed ferritin 2,238, TIBC <210, TSAT unable to be calculated; B12 5,327)  When discharged from [facility] was at 7.5, on admission around 7 range  Folate level there was slightly low, replete here  EGD showed mild gastritis on biopsies  Hx of hematochezia, though not currently  Continue to trend, no overt bleeding noted per SNF staff but FOBT returned positive

## 2024-03-01 NOTE — PLAN OF CARE
Problem: PAIN - ADULT  Goal: Verbalizes/displays adequate comfort level or baseline comfort level  Description: Interventions:  - Encourage patient to monitor pain and request assistance  - Assess pain using appropriate pain scale  - Administer analgesics based on type and severity of pain and evaluate response  - Implement non-pharmacological measures as appropriate and evaluate response  - Consider cultural and social influences on pain and pain management  - Notify physician/advanced practitioner if interventions unsuccessful or patient reports new pain  Outcome: Progressing     Problem: INFECTION - ADULT  Goal: Absence or prevention of progression during hospitalization  Description: INTERVENTIONS:  - Assess and monitor for signs and symptoms of infection  - Monitor lab/diagnostic results  - Monitor all insertion sites, i.e. indwelling lines, tubes, and drains  - Monitor endotracheal if appropriate and nasal secretions for changes in amount and color  - [place] appropriate cooling/warming therapies per order  - Administer medications as ordered  - Instruct and encourage patient and family to use good hand hygiene technique  - Identify and instruct in appropriate isolation precautions for identified infection/condition  Outcome: Progressing  Goal: Absence of fever/infection during neutropenic period  Description: INTERVENTIONS:  - Monitor WBC    Outcome: Progressing     Problem: SAFETY ADULT  Goal: Patient will remain free of falls  Description: INTERVENTIONS:  - Educate patient/family on patient safety including physical limitations  - Instruct patient to call for assistance with activity   - Consult OT/PT to assist with strengthening/mobility   - Keep Call bell within reach  - Keep bed low and locked with side rails adjusted as appropriate  - Keep care items and personal belongings within reach  - Initiate and maintain comfort rounds  - Make Fall Risk Sign visible to staff  - Offer Toileting every 2 Hours,  in advance of need  - Initiate/Maintain bedalarm  - Obtain necessary fall risk management equipment:   - Apply yellow socks and bracelet for high fall risk patients  - Consider moving patient to room near nurses station  Outcome: Progressing  Goal: Maintain or return to baseline ADL function  Description: INTERVENTIONS:  -  Assess patient's ability to carry out ADLs; assess patient's baseline for ADL function and identify physical deficits which impact ability to perform ADLs (bathing, care of mouth/teeth, toileting, grooming, dressing, etc.)  - Assess/evaluate cause of self-care deficits   - Assess range of motion  - Assess patient's mobility; develop plan if impaired  - Assess patient's need for assistive devices and provide as appropriate  - Encourage maximum independence but intervene and supervise when necessary  - Involve family in performance of ADLs  - Assess for home care needs following discharge   - Consider OT consult to assist with ADL evaluation and planning for discharge  - Provide patient education as appropriate  Outcome: Progressing  Goal: Maintains/Returns to pre admission functional level  Description: INTERVENTIONS:  - Perform AM-PAC 6 Click Basic Mobility/ Daily Activity assessment daily.  - Set and communicate daily mobility goal to care team and patient/family/caregiver.   - Collaborate with rehabilitation services on mobility goals if consulted  - Perform Range of Motion 2 times a day.  - Reposition patient every 2 hours.  - Dangle patient 2 times a day  - Stand patient 2 times a day  - Ambulate patient 2 times a day  - Out of bed to chair 2 times a day   - Out of bed for meals 2 times a day  - Out of bed for toileting  - Record patient progress and toleration of activity level   Outcome: Progressing     Problem: DISCHARGE PLANNING  Goal: Discharge to home or other facility with appropriate resources  Description: INTERVENTIONS:  - Identify barriers to discharge w/patient and caregiver  -  Arrange for needed discharge resources and transportation as appropriate  - Identify discharge learning needs (meds, wound care, etc.)  - Arrange for interpretive services to assist at discharge as needed  - Refer to Case Management Department for coordinating discharge planning if the patient needs post-hospital services based on physician/advanced practitioner order or complex needs related to functional status, cognitive ability, or social support system  Outcome: Progressing     Problem: Knowledge Deficit  Goal: Patient/family/caregiver demonstrates understanding of disease process, treatment plan, medications, and discharge instructions  Description: Complete learning assessment and assess knowledge base.  Interventions:  - Provide teaching at level of understanding  - Provide teaching via preferred learning methods  Outcome: Progressing     Problem: CARDIOVASCULAR - ADULT  Goal: Maintains optimal cardiac output and hemodynamic stability  Description: INTERVENTIONS:  - Monitor I/O, vital signs and rhythm  - Monitor for S/S and trends of decreased cardiac output  - Administer and titrate ordered vasoactive medications to optimize hemodynamic stability  - Assess quality of pulses, skin color and temperature  - Assess for signs of decreased coronary artery perfusion  - Instruct patient to report change in severity of symptoms  Outcome: Progressing  Goal: Absence of cardiac dysrhythmias or at baseline rhythm  Description: INTERVENTIONS:  - Continuous cardiac monitoring, vital signs, obtain 12 lead EKG if ordered  - Administer antiarrhythmic and heart rate control medications as ordered  - Monitor electrolytes and administer replacement therapy as ordered  Outcome: Progressing     Problem: GASTROINTESTINAL - ADULT  Goal: Minimal or absence of nausea and/or vomiting  Description: INTERVENTIONS:  - Administer IV fluids if ordered to ensure adequate hydration  - Maintain NPO status until nausea and vomiting are  resolved  - Nasogastric tube if ordered  - Administer ordered antiemetic medications as needed  - Provide nonpharmacologic comfort measures as appropriate  - Advance diet as tolerated, if ordered  - Consider nutrition services referral to assist patient with adequate nutrition and appropriate food choices  Outcome: Progressing  Goal: Maintains or returns to baseline bowel function  Description: INTERVENTIONS:  - Assess bowel function  - Encourage oral fluids to ensure adequate hydration  - Administer IV fluids if ordered to ensure adequate hydration  - Administer ordered medications as needed  - Encourage mobilization and activity  - Consider nutritional services referral to assist patient with adequate nutrition and appropriate food choices  Outcome: Progressing  Goal: Maintains adequate nutritional intake  Description: INTERVENTIONS:  - Monitor percentage of each meal consumed  - Identify factors contributing to decreased intake, treat as appropriate  - Assist with meals as needed  - Monitor I&O, weight, and lab values if indicated  - Obtain nutrition services referral as needed  Outcome: Progressing     Problem: SKIN/TISSUE INTEGRITY - ADULT  Goal: Skin Integrity remains intact(Skin Breakdown Prevention)  Description: Assess:  -Perform Дмитрий assessment every shift  -Clean and moisturize skin   -Inspect skin when repositioning, toileting, and assisting with ADLS  -Assess under medical devices  -Assess extremities for adequate circulation and sensation     Bed Management:  -Have minimal linens on bed & keep smooth, unwrinkled  -Change linens as needed when moist or perspiring  -Avoid sitting or lying in one position for more than 4 hours while in bed  -Keep HOB at 30degrees     Toileting:  -Offer bedside commode  -Assess for incontinence   -Use incontinent care products after each incontinent episode     Activity:  -Mobilize patient 3 times a day  -Encourage activity and walks on unit  -Encourage or provide ROM  exercises   -Turn and reposition patient every 2 Hours  -Use appropriate equipment to lift or move patient in bed  -Instruct/ Assist with weight shifting every hour when out of bed in chair  -Consider limitation of chair time 4 hour intervals    Skin Care:  -Avoid use of baby powder, tape, friction and shearing, hot water or constrictive clothing  -Relieve pressure over bony prominences   -Do not massage red bony areas    Next Steps:  -Teach patient strategies to minimize risks    -Consider consults to  interdisciplinary teams   Outcome: Progressing  Goal: Incision(s), wounds(s) or drain site(s) healing without S/S of infection  Description: INTERVENTIONS  - Assess and document dressing, incision, wound bed, drain sites and surrounding tissue  - Provide patient and family education  - Perform skin care/dressing changes   Outcome: Progressing  Goal: Pressure injury heals and does not worsen  Description: Interventions:  - Implement low air loss mattress or specialty surface (Criteria met)  - Apply silicone foam dressing  - Instruct/assist with weight shifting every 60 minutes when in chair   - Limit chair time to 4 hour intervals  - Use special pressure reducing interventions when in chair   - Apply fecal or urinary incontinence containment device   - Perform passive or active ROM every shift  - Turn and reposition patient & offload bony prominences every 3 hours   - Utilize friction reducing device or surface for transfers   - Consider consults to  interdisciplinary teams  - Use incontinent care products after each incontinent episode  - Consider nutrition services referral as needed  Outcome: Progressing     Problem: Prexisting or High Potential for Compromised Skin Integrity  Goal: Skin integrity is maintained or improved  Description: INTERVENTIONS:  - Identify patients at risk for skin breakdown  - Assess and monitor skin integrity  - Assess and monitor nutrition and hydration status  - Monitor labs   - Assess  for incontinence   - Turn and reposition patient  - Assist with mobility/ambulation  - Relieve pressure over bony prominences  - Avoid friction and shearing  - Provide appropriate hygiene as needed including keeping skin clean and dry  - Evaluate need for skin moisturizer/barrier cream  - Collaborate with interdisciplinary team   - Patient/family teaching  - Consider wound care consult   Outcome: Progressing     Problem: Nutrition/Hydration-ADULT  Goal: Nutrient/Hydration intake appropriate for improving, restoring or maintaining nutritional needs  Description: Monitor and assess patient's nutrition/hydration status for malnutrition. Collaborate with interdisciplinary team and initiate plan and interventions as ordered.  Monitor patient's weight and dietary intake as ordered or per policy. Utilize nutrition screening tool and intervene as necessary. Determine patient's food preferences and provide high-protein, high-caloric foods as appropriate.     INTERVENTIONS:  - Monitor oral intake, urinary output, labs, and treatment plans  - Assess nutrition and hydration status and recommend course of action  - Evaluate amount of meals eaten  - Assist patient with eating if necessary   - Allow adequate time for meals  - Recommend/ encourage appropriate diets, oral nutritional supplements, and vitamin/mineral supplements  - Order, calculate, and assess calorie counts as needed  - Recommend, monitor, and adjust tube feedings and TPN/PPN based on assessed needs  - Assess need for intravenous fluids  - Provide specific nutrition/hydration education as appropriate  - Include patient/family/caregiver in decisions related to nutrition  Outcome: Progressing

## 2024-03-01 NOTE — CONSULTS
Consultation - General Surgery   Darlyn Nguyen 78 y.o. female MRN: [id number]  Unit/Bed#: 406-01 Encounter: [id number]    Assessment/Plan     Assessment:    General surgery has been asked to evaluate the patient with regard to DTI pressure injuries to bilateral buttocks, sacrum.  The patient is nonverbal, her  is present with her at this time and provides most of the history.    Examination of the patient as well as review of wound care ostomy notes and recommendations.  No role for surgical debridement.    78-year-old female resident on the fifth floor rehab was admitted through the ED yesterday due to bloody diarrhea.  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  The patient is somnolent, she is nonverbal at this time.  Her  is present here and provides most of the history.  The patient has been incontinent of bowel and bladder.  Peralta catheter draining clear concentrated colored urine.    She was recently hospitalized after being transferred from [facility] to [facility] because of DVT and was initiated Eliquis 10 mg twice daily currently on hold.  The patient was admitted here because of rectal bleeding and diarrhea of presumed infectious etiology.  Her C. difficile was -9 days ago at  CBC, fecal calprotectin and elastase were elevated there as well.  Stool for enteric panel has been obtained.  CT scan abdomen pelvis showed patchy right basilar consolidation suspicious for pneumonia.  Persistent fluid throughout the colon similar to appearance from February 19 with improved area of mucosal hyperemia and thickening in keeping with improving colitis.  Also chest x-ray shows right lung opacity suspicious for  "pneumonia.    General surgery has been consulted regarding sacral pressure injury.  She had been seen by the wound care nurse most recently on the day of admission at the skilled nursing facility with recommendations for open areas including both buttocks, sacrum and perirectal areas.  Recommendations are the wound care nurse as follows:    \"1)POA right buttocks evolving DTI 6x3x.1 90% deep maroon intact tissue non blanchable 10% beefy red  2)POA Sacrum. Evolving DTI 5x3x.1 scant bloody drainage on dressing.   20% deep purple and 80% tan slough  3)POA , MASD VS herpetic lesion. Perirectal areas of partial thickness skinloss. Discussed with MD will continue to observe questionable lesions due to presentation.  4)POA Left buttocks Evovling DTI presents with 90% tan adhered slough\"     CBC shows a white count 12.97  Hemoglobin 9.0  Platelets 325  CMP normal electrolytes.  BUN and creatinine 42 and 0.87.  Glucose 170.  Total protein and albumin both quite low 4.6/2.7  Total bilirubin 0.71  Magnesium normal 2.0  Procalcitonin 0.76  MRSA culture pending  Blood and urine cultures pending  Stool enteric bacterial panel pending  Giardia lamblia, EIA and ova and parasite stool pending  Lactic acid elevated on admission 4.1, repeated 1.8.  Flu/RSV/COVID all negative    Plan:  GI consultation and recommendations reviewed.  Patient personally examined, needed 2 person assistance to roll her to the left side.  Discussed in person regarding recommendations with Dr. Larry Dunn from general surgery.  Continue local wound care per wound care nurse recommendations and orders.  Cleanse sacrum and buttocks with soap and water daily and apply Triad paste to open areas daily, cover with Allevyn foam.  Low air loss mattress   Continue offloading and repositioning to sacrum every 2 hours  No need for any surgical debridement.  General surgery will not follow over the weekend unless requested or problems arise.  Peralta catheter has been " inserted  Hygiene and protection of the wounds from any fecal contamination  Remainder of management of the patient's multiple medical comorbidities direction of primary attending hospitalist service.    History of Present Illness     HPI:  Darlyn Nguyen is a 78 y.o. female who resides on the fifth floor rehab unit seen to the ED with diarrhea and rectal bleeding.  Positive fecal occult blood noted.  Patient is nonverbal.  Her  is present, he phonates with the use of a laryngeal resonate here.  Patient has CT scan abdomen pelvis showed persistent fluid to the colon and similar appearance on CT from when she had been hospitalized here and subsequently transferred to [facility] earlier this month because of new onset DVT.  The patient was started on Eliquis and had been on steroid therapy.  Patient known with sacral and buttock pressure injuries for which wound care nurse has been following the patient up on the fifth floor with daily wound care and Triad paste covered with Allevyn dressing.  Patient remains incontinent of urine and stool.  She was admitted to medicine service with GI bleeding and also diagnosed with pneumonia.  General surgery has been consulted with regards to evaluation of sacral pressure wounds.    Inpatient consult to Acute Care Surgery  Consult performed by: Martin Phelps PA-C  Consult ordered by: Tasha Cee MD          Review of Systems   Unable to perform ROS: Dementia       Historical Information   Past Medical History:   Diagnosis Date    Diabetes mellitus (HCC)     Fibromyalgia     Hyperlipidemia     Hypertension      Past Surgical History:   Procedure Laterality Date    ACHILLES TENDON REPAIR Right     ADENOIDECTOMY      APPENDECTOMY      SKIN CANCER EXCISION      x 4 - back    TONSILLECTOMY      TOTAL ABDOMINAL HYSTERECTOMY      US GUIDED KIDNEY BIOPSY  12/26/2023     Social History   Social History     Substance and Sexual Activity   Alcohol Use Not  Currently     Social History     Substance and Sexual Activity   Drug Use Never     E-Cigarette/Vaping    E-Cigarette Use Never User      E-Cigarette/Vaping Substances    Nicotine No     THC No     CBD No     Flavoring No     Other No     Unknown No      Social History     Tobacco Use   Smoking Status Never   Smokeless Tobacco Never     Family History:   Family History   Problem Relation Age of Onset    Cancer Mother     Cervical cancer Sister     Breast cancer Sister     Breast cancer Sister        Meds/Allergies   current meds:   Current Facility-Administered Medications   Medication Dose Route Frequency    acetaminophen (TYLENOL) tablet 650 mg  650 mg Oral Q6H PRN    albuterol inhalation solution 2.5 mg  2.5 mg Nebulization Q2H PRN    atorvastatin (LIPITOR) tablet 40 mg  40 mg Oral Daily    carvedilol (COREG) tablet 6.25 mg  6.25 mg Oral BID With Meals    cefTRIAXone (ROCEPHIN) IVPB (premix in dextrose) 1,000 mg 50 mL  1,000 mg Intravenous Q24H    escitalopram (LEXAPRO) tablet 10 mg  10 mg Oral Daily    famotidine (PEPCID) tablet 20 mg  20 mg Oral Daily    folic acid (FOLVITE) tablet 1 mg  1 mg Oral Daily    guaiFENesin (MUCINEX) 12 hr tablet 600 mg  600 mg Oral Q12H CRUZITO    HYDROmorphone HCl (DILAUDID) injection 0.2 mg  0.2 mg Intravenous Q4H PRN    hydroxychloroquine (PLAQUENIL) tablet 300 mg  300 mg Oral Daily    metroNIDAZOLE (FLAGYL) tablet 500 mg  500 mg Oral Q8H CRUZITO    mirtazapine (REMERON) tablet 7.5 mg  7.5 mg Oral HS    mycophenolate (CELLCEPT) capsule 1,000 mg  1,000 mg Oral Q12H CRUZITO    ondansetron (ZOFRAN) injection 4 mg  4 mg Intravenous Q6H PRN    oxyCODONE (ROXICODONE) IR tablet 5 mg  5 mg Oral Q6H PRN    pantoprazole (PROTONIX) EC tablet 40 mg  40 mg Oral BID AC    predniSONE tablet 60 mg  60 mg Oral Daily     Allergies   Allergen Reactions    Rosuvastatin Other (See Comments)     Per SNF    Simvastatin Other (See Comments)     Per SNF       Objective   First Vitals:   Blood Pressure: 113/55  "(02/29/24 1137)  Pulse: 78 (02/29/24 1137)  Temperature: 97.7 °F (36.5 °C) (02/29/24 1137)  Temp Source: Temporal (02/29/24 1137)  Respirations: 15 (02/29/24 1137)  Height: 5' 4\" (162.6 cm) (02/29/24 1137)  Weight - Scale: 62.6 kg (138 lb) (02/29/24 1137)  SpO2: (!) 85 % (02/29/24 1212)    Current Vitals:   Blood Pressure: 141/75 (03/01/24 0705)  Pulse: 87 (03/01/24 0705)  Temperature: 98.9 °F (37.2 °C) (03/01/24 0705)  Temp Source: Oral (03/01/24 0705)  Respirations: 21 (03/01/24 0705)  Height: 5' 4\" (162.6 cm) (02/29/24 1652)  Weight - Scale: 67 kg (147 lb 11.3 oz) (02/29/24 1652)  SpO2: 96 % (03/01/24 0705)      Intake/Output Summary (Last 24 hours) at 3/1/2024 1146  Last data filed at 3/1/2024 0947  Gross per 24 hour   Intake 1480 ml   Output 750 ml   Net 730 ml       Invasive Devices       Peripheral Intravenous Line  Duration             Peripheral IV 02/29/24 Proximal;Right;Ventral (anterior) Forearm <1 day              Drain  Duration             Urethral Catheter Latex;Straight-tip 16 Fr. <1 day                    Physical Exam  Constitutional:       General: She is not in acute distress.     Appearance: She is ill-appearing.      Comments: Patient is chronically ill, nonverbal.  Opens eyes to stimuli.  Upper extremities are contracted with clenched fist.   HENT:      Head: Normocephalic.      Nose: Nose normal.      Mouth/Throat:      Mouth: Mucous membranes are moist.   Eyes:      Conjunctiva/sclera: Conjunctivae normal.   Neck:      Vascular: No carotid bruit.   Cardiovascular:      Rate and Rhythm: Normal rate and regular rhythm.      Heart sounds: No murmur heard.  Pulmonary:      Breath sounds: Normal breath sounds. No wheezing or rales.   Abdominal:      General: Bowel sounds are normal. There is no distension.      Palpations: Abdomen is soft.      Tenderness: There is no abdominal tenderness.   Genitourinary:     General: Normal vulva.      Rectum: Normal.   Musculoskeletal:         General: No " swelling, tenderness, deformity or signs of injury.      Cervical back: Normal range of motion.      Comments: Muscle wasting, skin turgor decreased.  Appears malnourished.   Skin:     General: Skin is warm and dry.      Capillary Refill: Capillary refill takes less than 2 seconds.      Coloration: Skin is not pale.      Findings: Rash present. No erythema.      Comments: Sacral and bilateral DTI wounds as noted in the pictures below.  There is no fluctuance.  No drainage.  Wounds appear dry.  No visible bone.  Small area of necrosis on the superficial lateral buttock wound noted in the picture.  Triad paste and Allevyn dressing applied.  Frequent repositioning every 2 hours and offloading to sacrum required.   Neurological:      Mental Status: She is alert. She is disoriented.      Motor: Weakness present.   Psychiatric:      Comments: Somnolent, nonverbal.  Does not follow commands.  No tremor noted.  Upper extremities contracted.                       Lab Results: I have personally reviewed pertinent lab results.  , CBC:   Lab Results   Component Value Date    WBC 12.97 (H) 03/01/2024    HGB 9.0 (L) 03/01/2024    HCT 29.0 (L) 03/01/2024    MCV 97 03/01/2024     03/01/2024    RBC 3.00 (L) 03/01/2024    MCH 30.0 03/01/2024    MCHC 31.0 (L) 03/01/2024    RDW 21.3 (H) 03/01/2024    MPV 11.5 03/01/2024    NRBC 3 (H) 03/01/2024   , CMP:   Lab Results   Component Value Date    SODIUM 135 03/01/2024    K 4.5 03/01/2024     03/01/2024    CO2 25 03/01/2024    BUN 42 (H) 03/01/2024    CREATININE 0.87 03/01/2024    CALCIUM 8.7 03/01/2024    AST 11 (L) 03/01/2024    ALT 15 03/01/2024    ALKPHOS 65 03/01/2024    EGFR 63 03/01/2024   , Coagulation:   Lab Results   Component Value Date    INR 1.95 (H) 02/29/2024   , Urinalysis:   Lab Results   Component Value Date    COLORU Yellow 02/29/2024    CLARITYU Clear 02/29/2024    SPECGRAV 1.015 02/29/2024    PHUR 5.5 02/29/2024    LEUKOCYTESUR Negative 02/29/2024     "NITRITE Positive (A) 02/29/2024    GLUCOSEU Negative 02/29/2024    KETONESU Trace (A) 02/29/2024    BILIRUBINUR Negative 02/29/2024    BLOODU Trace-Intact (A) 02/29/2024     Imaging: I have personally reviewed pertinent reports.      CT ABDOMEN AND PELVIS WITH IV CONTRAST     INDICATION: aucte anemia, abdominal pain , blood per rectum . recent colitis.  \"78-year-old female presents to the emergency department from rehab center with positive Hemoccult study and acute anemia per nursing staff.\"  \"Patient is complaining of   generalized fatigue and nonspecific abdominal discomfort and lower back pain.\"     COMPARISON: CT chest abdomen pelvis 2/19/2024.     TECHNIQUE: CT examination of the abdomen and pelvis was performed. Multiplanar 2D reformatted images were created from the source data.     This examination, like all CT scans performed in the [facility], was performed utilizing techniques to minimize radiation dose exposure, including the use of iterative reconstruction and automated exposure control. Radiation dose length   product (DLP) for this visit: 487.99 mGy-cm     IV Contrast: 100 mL of iohexol (OMNIPAQUE)  Enteric Contrast: Not administered.     FINDINGS:     ABDOMEN     LOWER CHEST: Mild right basilar patchy consolidation #2/5 suspicious for pneumonia. Unchanged cardiomegaly.     LIVER/BILIARY TREE: Unremarkable.     GALLBLADDER: Cholelithiasis without findings of acute cholecystitis.     SPLEEN: Numerous peripheral splenic hypodensities measuring up to 16 mm #2/37 suspicious for splenic infarcts.     PANCREAS: Unremarkable.     ADRENAL GLANDS: Unremarkable.     KIDNEYS/URETERS: No hydronephrosis. Bilateral renal parapelvic and cortical cysts.     STOMACH AND BOWEL: Persistent fluid throughout the length of the colon. Previously seen areas of colonic mucosal hyperemia are improved.     Colonic diverticulosis without diverticulitis.     APPENDIX: No findings to suggest appendicitis.   "   ABDOMINOPELVIC CAVITY: No ascites. No pneumoperitoneum. No lymphadenopathy.     VESSELS: Unremarkable for patient's age.     PELVIS     REPRODUCTIVE ORGANS: Hysterectomy.     URINARY BLADDER: Unremarkable.     ABDOMINAL WALL/INGUINAL REGIONS: Unremarkable.     BONES: No acute fracture or suspicious osseous lesion. Spinal degenerative changes.     IMPRESSION:     Patchy right basilar consolidation suspicious for pneumonia.     Persistent fluid throughout the colon similar in appearance to 2/19/2024 with improved areas of mucosal hyperemia and thickening in keeping with an improving colitis.        EKG, Pathology, and Other Studies: I have personally reviewed pertinent reports.      Counseling / Coordination of Care  Total floor / unit time spent today 40 minutes.  Greater than 50% of total time was spent with the patient and / or family counseling and / or coordination of care.  A description of the counseling / coordination of care: History was very difficult from the patient, majority was obtained from the  present here.  Patient is not cooperative for exam, she does not follow commands.  Chart review was conducted with recommendations from the wound care nurse.  At this time no need for surgical debridement.  General surgery will follow only if required over the weekend.    Martin Phelps PA-C

## 2024-03-01 NOTE — CASE MANAGEMENT
Case Management Assessment & Discharge Planning Note    Patient name Darlyn Nguyen  Location [facility] 406/406-01 MRN [id number]  : 1945 Date 3/1/2024       Current Admission Date: 2024  Current Admission Diagnosis:Diarrhea of presumed infectious origin   Patient Active Problem List    Diagnosis Date Noted    Pressure ulcers of skin of multiple topographic sites 2024    Diarrhea of presumed infectious origin 2024    Pneumonia 2024    Positive fecal occult blood test 2024    DVT (deep venous thrombosis) ([facility]) 2024    Chronic anemia 2024    Lupus ([facility]) 2024    Acute hypoxic respiratory failure ([facility]) 2024    Hypertension 2024    Pancolitis ([facility]) 2024    Metabolic encephalopathy 2024    Hypomagnesemia 2024    High serum lactic acid 2024      LOS (days): 0  Geometric Mean LOS (GMLOS) (days): 2.9  Days to GMLOS:2.9     OBJECTIVE:  PATIENT READMITTED TO HOSPITAL            Current admission status: Inpatient       Preferred Pharmacy:   [id number] [place] - [address]  [address]  [place] [address]  Phone: [phone] Fax: [phone]    Primary Care Provider: Yaa Hedrick MD    Primary Insurance: MEDICARE  Secondary Insurance: AARP    ASSESSMENT:  Active Health Care Proxies    There are no active Health Care Proxies on file.       Advance Directives  Does patient have a Health Care POA?: Yes  Does patient have Advance Directives?: Yes  Advance Directives: Living will, Power of  for health care  Primary Contact: Krystle Nguyen [phone]         Readmission Root Cause  30 Day Readmission: Yes  Who directed you to return to the hospital?: Other (comment) (Staff on 5th floor)  Did you understand whom to contact if you had questions or problems?: Yes  Did you get your prescriptions before you left the hospital?: Yes  Were you able to get your prescriptions filled  when you left the hospital?: Yes  Did you take your medications as prescribed?: Yes  Were you able to get to your follow-up appointments?: No  Reason:: Readmitted prior to appointment  During previous admission, was a post-acute recommendation made?: Yes  What post-acute resources were offered?: STR (Pt went to the 5th floor)  Patient was readmitted due to: DVT , Acute Blood loss anemia  Action Plan: Pt will return to 5th floor for STR when medically ready    Patient Information  Admitted from:: Facility (5th floor)  Mental Status: Alert  During Assessment patient was accompanied by: Not accompanied during assessment  Assessment information provided by:: Patient, Spouse, Other - please comment (5th floor staff)  Support Systems: Spouse/significant other  County of Residence: [facility]  What city do you live in?: [facility]  Home entry access options. Select all that apply.: No steps to enter home  Type of Current Residence: Facility (5th floor)  Upon entering residence, is there a bedroom on the main floor (no further steps)?: Yes  Upon entering residence, is there a bathroom on the main floor (no further steps)?: Yes  Living Arrangements: Other (Comment)  Is patient a ?: No    Activities of Daily Living Prior to Admission  Functional Status: Assistance (Pt requires max assistance for bathing and dressing.)  Completes ADLs independently?: No  Level of ADL dependence: Assistance  Ambulates independently?: No  Level of ambulatory dependence: Total Dependent  Does patient use assisted devices?: Yes  Assisted Devices (DME) used: Walker  Does patient currently own DME?: Yes  What DME does the patient currently own?: Walker  Does patient have a history of Outpatient Therapy (PT/OT)?: No  Does the patient have a history of Short-Term Rehab?: Yes (Pt was on 5th floor for STR.)  Does patient have a history of HHC?: No  Does patient currently have HHC?: No         Patient Information Continued  Income Source:  Pension/correction  Does patient have prescription coverage?: Yes  Does patient receive dialysis treatments?: No  Does patient have a history of substance abuse?: No  Does patient have a history of Mental Health Diagnosis?: No         Means of Transportation  Means of Transport to Appts:: Family transport (Pt's  drives patient to appts.)  [employer] Application:  (Pt is not appropriate.)      Social Determinants of Health (SDOH)      Flowsheet Row Most Recent Value   Housing Stability    In the last 12 months, was there a time when you were not able to pay the mortgage or rent on time? N   In the last 12 months, how many places have you lived? 1   In the last 12 months, was there a time when you did not have a steady place to sleep or slept in a shelter (including now)? N   Transportation Needs    In the past 12 months, has lack of transportation kept you from medical appointments or from getting medications? no   In the past 12 months, has lack of transportation kept you from meetings, work, or from getting things needed for daily living? No   Food Insecurity    Within the past 12 months, you worried that your food would run out before you got the money to buy more. Never true   Within the past 12 months, the food you bought just didn't last and you didn't have money to get more. Never true   Utilities    In the past 12 months has the electric, gas, oil, or water company threatened to shut off services in your home? No            DISCHARGE DETAILS:    Discharge planning discussed with:: Pt and   Freedom of Choice: Yes     CM contacted family/caregiver?: Yes           Pt is a readmission. Pt was discharged from [facility] on 2/19/24  after being treated for Pancolitis , Anemia , Lupus. Pt was admitted to [facility] on 2/20/24 . Pt is currently at [facility] on 5th floor , Pt is a bedhold . Pt requires max assist with ADL's. As per physician during discharge planning patient will be here through the  weekend.

## 2024-03-01 NOTE — ASSESSMENT & PLAN NOTE
Continue on cellcept and atovaquon  Was initiated on steroid taper on [facility] admission  Taper Prednisone

## 2024-03-01 NOTE — ASSESSMENT & PLAN NOTE
Positive prior to admission, monitor stool output and blood counts  Consulting GI, especially in setting of acute anemia

## 2024-03-01 NOTE — PROGRESS NOTES
[facility]  Progress Note  Name: Darlyn Nguyen I  MRN: [id number]  Unit/Bed#: 406-01 I Date of Admission: 2/29/2024   Date of Service: 3/1/2024 I Hospital Day: 0    Assessment/Plan   Chronic anemia  Assessment & Plan  Baseline hgb around 9.0  History of Lupus, treated with Mycophenolate and Hydroxychloroquine  Was recently worked up extensively for acute on chronic anemia at [facility] without significant cause (iron panel showed ferritin 2,238, TIBC <210, TSAT unable to be calculated; B12 5,327)  When discharged from [facility] was at 7.5, on admission around 7 range  Recently Macrocytic  Folate level there was slightly low, replete here  EGD showed mild gastritis on biopsies  Hx of hematochezia, though not currently  Continue to trend, no overt bleeding noted per SNF staff but FOBT returned positive    Positive fecal occult blood test  Assessment & Plan  Positive prior to admission, monitor stool output and blood counts  Consulting GI, especially in setting of acute anemia    * Diarrhea of presumed infectious origin  Assessment & Plan  Patient (and ) report this to be ongoing intermittently since October.  C diff negative 9 days ago  Fecal calprotectin and elastase were elevated on most recent [facility] admission  Check stool enteric panel  CT abdomen does show inflammation, improving  Nursing to monitor stool output as at [facility] she was not noted to have diarrhea  Continue Flagyl for now  GI consulted    Pneumonia  Assessment & Plan  Seen on CXR and CT  Urinary antigens negative. Procalcitonin elevated (0.76)  Continue ceftriaxone  Mucinex BID    DVT (deep venous thrombosis) (HCC)  Assessment & Plan  Recently diagnosed on most recent [facility] admission  Was initiated on Eliquis at that time and was still taking 10 mg BID per loading instructions  Hold for now d/t concern for GI bleed    Hypertension  Assessment & Plan  BP stable, continue PTA meds    Lupus (HCC)  Assessment & Plan  Continue on cellcept  and mal  Was initiated on steroid taper on [facility] admission  Taper Prednisone          VTE Pharmacologic Prophylaxis:   Pharmacologic: Pharmacologic VTE Prophylaxis contraindicated due to concern for GI bleed  Mechanical VTE Prophylaxis in Place: Yes    Patient Centered Rounds: I have performed bedside rounds with nursing staff today.    Discussions with Specialists or Other Care Team Provider: Attending physician and multidisciplinary care team    Education and Discussions with Family / Patient:  at bedside    Time Spent for Care: 30 minutes.  More than 50% of total time spent on counseling and coordination of care as described above.    Current Length of Stay: 0 day(s)    Current Patient Status: Observation   Certification Statement: The patient will continue to require additional inpatient hospital stay due to acute anemia and concern for GI bleed    Discharge Plan: When stable    Code Status: Level 1 - Full Code      Subjective:   Spoke with patient at bedside, with  present.  Patient's main complaint is fatigue.  She denies shortness of breath but admits to cough (coughing throughout exam).  She denies nausea, abdominal pain, and hematochezia, but admits to poor appetite.  Per the , her anemia was first diagnosed around the time when she first started having hematochezia, which was back in October.  He says that at that time, she would pass out with diarrhea episodes.  Diarrhea has been on and off since then.  She no longer has visible blood in her stool, and she no longer has syncope with diarrhea.  He mentions that she was on dapsone 4 weeks ago.  Leg swelling started after prednisone.    Objective:     Vitals:   Temp (24hrs), Av.9 °F (36.6 °C), Min:97.2 °F (36.2 °C), Max:98.9 °F (37.2 °C)    Temp:  [97.2 °F (36.2 °C)-98.9 °F (37.2 °C)] 98.9 °F (37.2 °C)  HR:  [69-87] 87  Resp:  [15-21] 21  BP: ()/(52-75) 141/75  SpO2:  [85 %-99 %] 96 %  Body mass index is 25.35 kg/m².      Input and Output Summary (last 24 hours):       Intake/Output Summary (Last 24 hours) at 3/1/2024 1012  Last data filed at 3/1/2024 0947  Gross per 24 hour   Intake 1480 ml   Output 750 ml   Net 730 ml       Physical Exam  Constitutional:       General: She is not in acute distress.     Appearance: She is obese. She is ill-appearing. She is not toxic-appearing or diaphoretic.   Cardiovascular:      Rate and Rhythm: Normal rate and regular rhythm.      Heart sounds: Normal heart sounds.   Pulmonary:      Effort: Pulmonary effort is normal. No respiratory distress.      Breath sounds: Examination of the right-lower field reveals decreased breath sounds. Decreased breath sounds present. No wheezing, rhonchi or rales.   Abdominal:      General: Abdomen is flat. Bowel sounds are normal. There is no distension.      Palpations: Abdomen is soft.      Tenderness: There is no abdominal tenderness. There is no guarding.   Musculoskeletal:      Right lower le+ Pitting Edema present.      Left lower le+ Pitting Edema present.   Neurological:      Mental Status: She is alert.           Additional Data:     Labs:    Results from last 7 days   Lab Units 24  0542   WBC Thousand/uL 12.97*   HEMOGLOBIN g/dL 9.0*   HEMATOCRIT % 29.0*   PLATELETS Thousands/uL 325   BANDS PCT % 11*   LYMPHO PCT % 8*   MONO PCT % 6   EOS PCT % 0     Results from last 7 days   Lab Units 24  0542   SODIUM mmol/L 135   POTASSIUM mmol/L 4.5   CHLORIDE mmol/L 102   CO2 mmol/L 25   BUN mg/dL 42*   CREATININE mg/dL 0.87   ANION GAP mmol/L 8   CALCIUM mg/dL 8.7   ALBUMIN g/dL 2.6*   TOTAL BILIRUBIN mg/dL 0.71   ALK PHOS U/L 65   ALT U/L 15   AST U/L 11*   GLUCOSE RANDOM mg/dL 170*     Results from last 7 days   Lab Units 24  1226   INR  1.95*     Results from last 7 days   Lab Units 24  0435   POC GLUCOSE mg/dl 214*     Results from last 7 days   Lab Units 24  1134   HEMOGLOBIN A1C % 5.6     Results from last 7 days    Lab Units 03/01/24  0542 02/29/24  1526 02/29/24  1226   LACTIC ACID mmol/L  --  1.8 4.1*   PROCALCITONIN ng/ml 0.76*  --  0.61*           * I Have Reviewed All Lab Data Listed Above.  * Additional Pertinent Lab Tests Reviewed: All Labs Within Last 24 Hours Reviewed    Imaging:    Imaging Reports Reviewed Today Include: CXR & CT abd/pelvis  Imaging Personally Reviewed by Myself Includes:  none    Recent Cultures (last 7 days):     Results from last 7 days   Lab Units 02/29/24  2118 02/29/24  1226 02/29/24  1027   BLOOD CULTURE   --  Received in Microbiology Lab. Culture in Progress.  Received in Microbiology Lab. Culture in Progress.  --    GRAM STAIN RESULT   --   --  Rare Gram negative rods*  Rare Gram positive cocci in pairs*  No polys seen*   LEGIONELLA URINARY ANTIGEN  Negative  --   --        Last 24 Hours Medication List:   Current Facility-Administered Medications   Medication Dose Route Frequency Provider Last Rate    acetaminophen  650 mg Oral Q6H PRN Nola Baer, PA-C      albuterol  2.5 mg Nebulization Q2H PRN Nola Baer, PA-C      atorvastatin  40 mg Oral Daily Nola Baer, PA-C      carvedilol  6.25 mg Oral BID With Meals JAMIE Foster-C      cefTRIAXone  1,000 mg Intravenous Q24H Nola Baer, PA-C 1,000 mg (02/29/24 2123)    escitalopram  10 mg Oral Daily Nola Baer, PA-C      famotidine  20 mg Oral Daily Nola Baer, PA-C      folic acid  1 mg Oral Daily Nola Baer, PA-C      guaiFENesin  600 mg Oral Q12H CRUZITO Nola Keyur, PA-C      HYDROmorphone  0.2 mg Intravenous Q4H PRN Nola Keyur, PA-C      hydroxychloroquine  300 mg Oral Daily Nola Baer, PA-C      metroNIDAZOLE  500 mg Oral Q8H CRUZITO Nola Baer, PA-C      mirtazapine  7.5 mg Oral HS Nola Baer, PA-C      mycophenolate  1,000 mg Oral Q12H CRUZITO Nola Keyur, PA-C      ondansetron  4 mg Intravenous Q6H PRN Nola Baer, PA-C      oxyCODONE  5 mg Oral Q6H PRN Nola Baer PA-C      pantoprazole  40 mg Oral BID AC Nola Baer PA-C       predniSONE  60 mg Oral Daily Nola Baer PA-C          Today, Patient Was Seen By: Chau Kessler DO    ** Please Note: Dictation voice to text software may have been used in the creation of this document. **

## 2024-03-01 NOTE — ASSESSMENT & PLAN NOTE
Baseline hgb around 9.0  History of Lupus, treated with Mycophenolate and Hydroxychloroquine  Was recently worked up extensively for acute on chronic anemia at [facility] without significant cause (iron panel showed ferritin 2,238, TIBC <210, TSAT unable to be calculated; B12 5,327)  Recent methemoglobinemia 2/2 Dapsone, which was discontinued  When discharged from [facility] was at 7.5, on admission around 7 range; s/p 2 units  Recently Macrocytic  Folate level there was slightly low, replete here  EGD showed mild gastritis on biopsies  Hx of hematochezia, though not currently  Continue Heparin for DVT Ppx  Continue to trend Hgb; no overt bleeding noted per SNF staff but FOBT returned positive

## 2024-03-01 NOTE — OCCUPATIONAL THERAPY NOTE
"    Occupational Therapy Evaluation     Patient Name: Darlyn Nguyen  Today's Date: 3/1/2024  Problem List  Principal Problem:    Diarrhea of presumed infectious origin  Active Problems:    Chronic anemia    Lupus (HCC)    Hypertension    Pneumonia    Positive fecal occult blood test    DVT (deep venous thrombosis) (HCC)    Pressure ulcers of skin of multiple topographic sites    Past Medical History  Past Medical History:   Diagnosis Date    Diabetes mellitus (HCC)     Fibromyalgia     Hyperlipidemia     Hypertension      Past Surgical History  Past Surgical History:   Procedure Laterality Date    ACHILLES TENDON REPAIR Right     ADENOIDECTOMY      APPENDECTOMY      SKIN CANCER EXCISION      x 4 - back    TONSILLECTOMY      TOTAL ABDOMINAL HYSTERECTOMY      US GUIDED KIDNEY BIOPSY  12/26/2023 03/01/24 1405   OT Last Visit   OT Visit Date 03/01/24   Note Type   Note type Evaluation   Pain Assessment   Pain Assessment Tool 0-10   Pain Score No Pain   Restrictions/Precautions   Weight Bearing Precautions Per Order No   Other Precautions Multiple lines;Fall Risk   Home Living   Type of Home SNF   Additional Comments pt currently resides on 5th floor at [facility]   Prior Function   Level of [place] Needs assistance with ADLs;Needs assistance with IADLS;Needs assistance with functional mobility   Lives With Facility staff   Receives Help From Personal care attendant   IADLs Family/Friend/Other provides transportation;Family/Friend/Other provides meals;Family/Friend/Other provides medication management   Vocational Retired   Subjective   Subjective \"I don't know why I feel so dizzy\"   ADL   Where Assessed Edge of bed   LB Dressing Assistance 2  Maximal Assistance   LB Dressing Deficit Verbal cueing;Increased time to complete;Don/doff R sock;Don/doff L sock   Additional Comments pt donned R and L sock at Max(A) x1 level   Bed Mobility   Supine to Sit 2  Maximal assistance   Additional items Assist x 2;Increased " time required;Verbal cues   Sit to Supine 2  Maximal assistance   Additional items Increased time required;Verbal cues   Additional Comments pt on RA during session; SP02 WFL with no complaints of SOB; pt ended session supine in bed   Transfers   Additional Comments pt unable to perform functional transfers due to significant anterior lean when sitting EOB, and decreased tolerance   Functional Mobility   Additional Comments unable to access due to to significant anterior lean and decreased tolerance during session   Balance   Static Sitting Fair -   Dynamic Sitting Fair -   Activity Tolerance   Activity Tolerance Patient limited by fatigue   RUE Assessment   RUE Assessment X  (3-/5 UE gross bilateral)   LUE Assessment   LUE Assessment X  (3-/5 UE gross bilateral)   Hand Function   Gross Motor Coordination Impaired   Fine Motor Coordination Impaired   Psychosocial   Psychosocial (WDL) WDL   Perception   Inattention/Neglect Appears intact   Cognition   Overall Cognitive Status Impaired   Arousal/Participation Alert;Cooperative   Attention Within functional limits   Orientation Level Oriented to person;Oriented to place;Disoriented to time;Disoriented to situation   Following Commands Follows one step commands without difficulty   Assessment   Limitation Decreased ADL status;Decreased UE ROM;Decreased UE strength;Decreased Safe judgement during ADL;Decreased endurance;Decreased self-care trans;Decreased high-level ADLs   Assessment Pt is a 78 y.o. female seen for OT evaluation s/p admit to [facility] on 2/29/2024 w/ Diarrhea of presumed infectious origin.  Comorbidities affecting pt's functional performance at time of assessment include: HTN, hyperlipidemia, fibromyalgia, diabetes mellitus. Personal factors affecting pt at time of IE include:behavioral pattern, difficulty performing ADLS, difficulty performing IADLS , limited insight into deficits, decreased initiation and engagement , health management , and environment.  Prior to admission, pt was (A) with ADLs and IADLs. Upon evaluation: Pt requires Max(A) level with no use of device 2* the following deficits impacting occupational performance: weakness, decreased ROM, decreased strength, decreased balance, decreased tolerance, impaired initiation, impaired sequencing, impaired problem solving, and decreased safety awareness. Pt to benefit from continued skilled OT tx while in the hospital to address deficits as defined above and maximize level of functional independence w ADL's and functional mobility. Occupational Performance areas to address include: grooming, bathing/shower, toilet hygiene, dressing, health maintenance, functional mobility, and clothing management. The patient's raw score on the -PAC Daily Activity Inpatient Short Form is 13. A raw score of less than 19 suggests the patient may benefit from discharge to post-acute rehabilitation services. Discharge recommendation at this time is level I maximum resource intensity.  Pt benefited from co-evaluation of skilled OT and PT therapists in order to most appropriately address functional deficits d/t extensive assistance required for safe functional mobility, decreased activity tolerance, and regression from functioning level prior to admission and/or onset of present illness. OT/PT objectives were addressed separately; please see PT note for specific goal areas targeted.   Goals   Patient Goals to go home   Short Term Goal #1 pt will perform UE strengthening exercises   Short Term Goal #2 pt will demonstrate functional transfers at Min(A) x2 level with use of RW   Long Term Goal #1 pt will demonstrate toilet transfers and hygiene at Min(A) x2 level with use of RW   Long Term Goal #2 pt will demonstrate bathing and grooming tasks at Min(A) x1 level with  AE   Plan   Treatment Interventions ADL retraining;UE strengthening/ROM;Functional transfer training;Endurance training;Patient/family training;Equipment  evaluation/education;Compensatory technique education;Activityengagement;Energy conservation   Goal Expiration Date 04/12/24   OT Frequency 3-5x/wk   Discharge Recommendation   Rehab Resource Intensity Level, OT I (Maximum Resource Intensity)   AM-PAC Daily Activity Inpatient   Lower Body Dressing 2   Bathing 2   Toileting 2   Upper Body Dressing 2   Grooming 2   Eating 3   Daily Activity Raw Score 13   Daily Activity Standardized Score (Calc for Raw Score >=11) 32.03   AM-PAC Applied Cognition Inpatient   Following a Speech/Presentation 4   Understanding Ordinary Conversation 4   Taking Medications 3   Remembering Where Things Are Placed or Put Away 3   Remembering List of 4-5 Errands 3   Taking Care of Complicated Tasks 3   Applied Cognition Raw Score 20   Applied Cognition Standardized Score 41.76

## 2024-03-02 PROBLEM — R78.81 GRAM-POSITIVE COCCI BACTEREMIA: Status: ACTIVE | Noted: 2024-03-02

## 2024-03-02 LAB
ALBUMIN SERPL BCP-MCNC: 2.6 G/DL (ref 3.5–5)
ALP SERPL-CCNC: 75 U/L (ref 34–104)
ALT SERPL W P-5'-P-CCNC: 12 U/L (ref 7–52)
ANION GAP SERPL CALCULATED.3IONS-SCNC: 7 MMOL/L
ANISOCYTOSIS BLD QL SMEAR: PRESENT
APTT PPP: 113 SECONDS (ref 23–37)
APTT PPP: 27 SECONDS (ref 23–37)
AST SERPL W P-5'-P-CCNC: 8 U/L (ref 13–39)
BACTERIA BLD CULT: ABNORMAL
BACTERIA WND AEROBE CULT: ABNORMAL
BASOPHILS # BLD MANUAL: 0 THOUSAND/UL (ref 0–0.1)
BASOPHILS NFR MAR MANUAL: 0 % (ref 0–1)
BILIRUB SERPL-MCNC: 0.51 MG/DL (ref 0.2–1)
BUN SERPL-MCNC: 34 MG/DL (ref 5–25)
CALCIUM ALBUM COR SERPL-MCNC: 9.9 MG/DL (ref 8.3–10.1)
CALCIUM SERPL-MCNC: 8.8 MG/DL (ref 8.4–10.2)
CHLORIDE SERPL-SCNC: 104 MMOL/L (ref 96–108)
CO2 SERPL-SCNC: 26 MMOL/L (ref 21–32)
CREAT SERPL-MCNC: 0.84 MG/DL (ref 0.6–1.3)
EOSINOPHIL # BLD MANUAL: 0 THOUSAND/UL (ref 0–0.4)
EOSINOPHIL NFR BLD MANUAL: 0 % (ref 0–6)
ERYTHROCYTE [DISTWIDTH] IN BLOOD BY AUTOMATED COUNT: 20.8 % (ref 11.6–15.1)
GFR SERPL CREATININE-BSD FRML MDRD: 65 ML/MIN/1.73SQ M
GLUCOSE SERPL-MCNC: 315 MG/DL (ref 65–140)
GRAM STN SPEC: ABNORMAL
HCT VFR BLD AUTO: 28.3 % (ref 34.8–46.1)
HGB BLD-MCNC: 8.8 G/DL (ref 11.5–15.4)
LYMPHOCYTES # BLD AUTO: 1.06 THOUSAND/UL (ref 0.6–4.47)
LYMPHOCYTES # BLD AUTO: 5 % (ref 14–44)
MACROCYTES BLD QL AUTO: PRESENT
MCH RBC QN AUTO: 31.2 PG (ref 26.8–34.3)
MCHC RBC AUTO-ENTMCNC: 31.1 G/DL (ref 31.4–37.4)
MCV RBC AUTO: 100 FL (ref 82–98)
MONOCYTES # BLD AUTO: 0.64 THOUSAND/UL (ref 0–1.22)
MONOCYTES NFR BLD: 3 % (ref 4–12)
MRSA NOSE QL CULT: NORMAL
NEUTROPHILS # BLD MANUAL: 19.57 THOUSAND/UL (ref 1.85–7.62)
NEUTS SEG NFR BLD AUTO: 92 % (ref 43–75)
PLATELET # BLD AUTO: 332 THOUSANDS/UL (ref 149–390)
PLATELET BLD QL SMEAR: ADEQUATE
PMV BLD AUTO: 11 FL (ref 8.9–12.7)
POTASSIUM SERPL-SCNC: 4.6 MMOL/L (ref 3.5–5.3)
PROT SERPL-MCNC: 4.6 G/DL (ref 6.4–8.4)
RBC # BLD AUTO: 2.82 MILLION/UL (ref 3.81–5.12)
RBC MORPH BLD: PRESENT
SODIUM SERPL-SCNC: 137 MMOL/L (ref 135–147)
WBC # BLD AUTO: 21.27 THOUSAND/UL (ref 4.31–10.16)

## 2024-03-02 PROCEDURE — 85007 BL SMEAR W/DIFF WBC COUNT: CPT

## 2024-03-02 PROCEDURE — 87177 OVA AND PARASITES SMEARS: CPT | Performed by: PHYSICIAN ASSISTANT

## 2024-03-02 PROCEDURE — 87040 BLOOD CULTURE FOR BACTERIA: CPT | Performed by: FAMILY MEDICINE

## 2024-03-02 PROCEDURE — 80053 COMPREHEN METABOLIC PANEL: CPT

## 2024-03-02 PROCEDURE — 85027 COMPLETE CBC AUTOMATED: CPT

## 2024-03-02 PROCEDURE — 87209 SMEAR COMPLEX STAIN: CPT | Performed by: PHYSICIAN ASSISTANT

## 2024-03-02 PROCEDURE — 85730 THROMBOPLASTIN TIME PARTIAL: CPT | Performed by: FAMILY MEDICINE

## 2024-03-02 PROCEDURE — 87505 NFCT AGENT DETECTION GI: CPT | Performed by: PHYSICIAN ASSISTANT

## 2024-03-02 PROCEDURE — 87329 GIARDIA AG IA: CPT | Performed by: PHYSICIAN ASSISTANT

## 2024-03-02 PROCEDURE — 99232 SBSQ HOSP IP/OBS MODERATE 35: CPT | Performed by: FAMILY MEDICINE

## 2024-03-02 RX ORDER — LINEZOLID 2 MG/ML
600 INJECTION, SOLUTION INTRAVENOUS EVERY 12 HOURS
Status: DISCONTINUED | OUTPATIENT
Start: 2024-03-02 | End: 2024-03-03 | Stop reason: HOSPADM

## 2024-03-02 RX ORDER — CEFEPIME HYDROCHLORIDE 2 G/50ML
2000 INJECTION, SOLUTION INTRAVENOUS EVERY 12 HOURS SCHEDULED
Qty: 500 ML | Refills: 0 | Status: DISCONTINUED | OUTPATIENT
Start: 2024-03-02 | End: 2024-03-03 | Stop reason: HOSPADM

## 2024-03-02 RX ORDER — LOPERAMIDE HYDROCHLORIDE 2 MG/1
2 CAPSULE ORAL 4 TIMES DAILY PRN
Status: DISCONTINUED | OUTPATIENT
Start: 2024-03-02 | End: 2024-03-03

## 2024-03-02 RX ORDER — VANCOMYCIN HYDROCHLORIDE 1 G/200ML
15 INJECTION, SOLUTION INTRAVENOUS EVERY 12 HOURS
Status: DISCONTINUED | OUTPATIENT
Start: 2024-03-02 | End: 2024-03-02

## 2024-03-02 RX ADMIN — CEFEPIME HYDROCHLORIDE 2000 MG: 2 INJECTION, SOLUTION INTRAVENOUS at 17:30

## 2024-03-02 RX ADMIN — VANCOMYCIN HYDROCHLORIDE 1250 MG: 5 INJECTION, POWDER, LYOPHILIZED, FOR SOLUTION INTRAVENOUS at 09:23

## 2024-03-02 RX ADMIN — CARVEDILOL 6.25 MG: 3.12 TABLET, FILM COATED ORAL at 09:15

## 2024-03-02 RX ADMIN — PREDNISONE 60 MG: 20 TABLET ORAL at 09:15

## 2024-03-02 RX ADMIN — ATORVASTATIN CALCIUM 40 MG: 40 TABLET, FILM COATED ORAL at 09:15

## 2024-03-02 RX ADMIN — MIRTAZAPINE 7.5 MG: 15 TABLET, FILM COATED ORAL at 21:17

## 2024-03-02 RX ADMIN — MYCOPHENOLATE MOFETIL 1000 MG: 250 CAPSULE ORAL at 09:21

## 2024-03-02 RX ADMIN — FOLIC ACID 1 MG: 1 TABLET ORAL at 09:15

## 2024-03-02 RX ADMIN — CARVEDILOL 6.25 MG: 3.12 TABLET, FILM COATED ORAL at 17:30

## 2024-03-02 RX ADMIN — OXYCODONE HYDROCHLORIDE 5 MG: 5 TABLET ORAL at 20:15

## 2024-03-02 RX ADMIN — METRONIDAZOLE 500 MG: 500 TABLET ORAL at 06:34

## 2024-03-02 RX ADMIN — METRONIDAZOLE 500 MG: 500 TABLET ORAL at 14:21

## 2024-03-02 RX ADMIN — HEPARIN SODIUM 3900 UNITS: 1000 INJECTION INTRAVENOUS; SUBCUTANEOUS at 19:58

## 2024-03-02 RX ADMIN — FAMOTIDINE 20 MG: 20 TABLET, FILM COATED ORAL at 09:15

## 2024-03-02 RX ADMIN — GUAIFENESIN 600 MG: 600 TABLET, EXTENDED RELEASE ORAL at 09:15

## 2024-03-02 RX ADMIN — METRONIDAZOLE 500 MG: 500 TABLET ORAL at 21:17

## 2024-03-02 RX ADMIN — ESCITALOPRAM OXALATE 10 MG: 10 TABLET ORAL at 09:15

## 2024-03-02 RX ADMIN — OXYCODONE HYDROCHLORIDE 5 MG: 5 TABLET ORAL at 09:44

## 2024-03-02 RX ADMIN — PANTOPRAZOLE SODIUM 40 MG: 40 TABLET, DELAYED RELEASE ORAL at 06:34

## 2024-03-02 RX ADMIN — HYDROXYCHLOROQUINE SULFATE 300 MG: 200 TABLET, FILM COATED ORAL at 09:21

## 2024-03-02 RX ADMIN — LINEZOLID 600 MG: 600 INJECTION, SOLUTION INTRAVENOUS at 20:11

## 2024-03-02 RX ADMIN — PANTOPRAZOLE SODIUM 40 MG: 40 TABLET, DELAYED RELEASE ORAL at 17:30

## 2024-03-02 NOTE — ASSESSMENT & PLAN NOTE
Positive prior to admission  No signs of overt GI bleeding  GI consulted; we appreciate their recommendations  Monitor stool output and blood counts  Continue DVT Ppx  Transfuse as needed

## 2024-03-02 NOTE — PROGRESS NOTES
[facility]  Progress Note  Name: Darlyn Nguyen I  MRN: [id number]  Unit/Bed#: 406-01 I Date of Admission: 2/29/2024   Date of Service: 3/2/2024 I Hospital Day: 1    Assessment/Plan   Gram-positive cocci bacteremia  Assessment & Plan  BC from 2/29 grew gram+ cocci in pairs, alpha hemolytic strep  Initiated Vancomycin  Continue Rocephin & Flagyl    Chronic anemia  Assessment & Plan  Baseline hgb around 9.0  History of Lupus, treated with Mycophenolate and Hydroxychloroquine  Was recently worked up extensively for acute on chronic anemia at [facility] without significant cause (iron panel showed ferritin 2,238, TIBC <210, TSAT unable to be calculated; B12 5,327)  Recent methemoglobinemia 2/2 Dapsone, which was discontinued  When discharged from [facility] was at 7.5, on admission around 7 range; s/p 2 units  Recently Macrocytic  Folate level there was slightly low, replete here  EGD showed mild gastritis on biopsies  Hx of hematochezia, though not currently  Continue Heparin for DVT Ppx  Continue to trend Hgb; no overt bleeding noted per SNF staff but FOBT returned positive    * Diarrhea of presumed infectious origin  Assessment & Plan  Infectious vs IBD vs microscopic colitis vs SIBO vs side effect of lupus meds  Patient (and ) report this to be ongoing intermittently since October.  C diff negative 9 days ago  Fecal calprotectin and elastase were elevated on most recent [facility] admission  CT abdomen does show inflammation, improving  GI consulted; we appreciate their recommendations  Check stool enteric panel + O&P. If negative, start imodium PRN.  Nursing to monitor stool output as at [facility] she was not noted to have diarrhea  Continue Flagyl for now  Banatrol TID with meals for stool bulk  Keep MAP >65 mmHg    Positive fecal occult blood test  Assessment & Plan  Positive prior to admission  No signs of overt GI bleeding  GI consulted; we appreciate their recommendations  Monitor stool output and blood  counts  Continue DVT Ppx  Transfuse as needed    Pneumonia  Assessment & Plan  Seen on CXR and CT  Urinary antigens negative. Procalcitonin elevated (0.76)  Continue ceftriaxone (Day 3/5)  Mucinex BID  Adding Vancomycin d/t worsening leukocytosis  Consulting ID regarding discontinuation of lupus meds    Pressure ulcers of skin of multiple topographic sites  Assessment & Plan  Pressure wounds of sacrum and bilateral buttocks (present for 3-4 months, per ), likely 2/2 ambulatory dysfunction  Consulted General Surgery & Wound care nurse; we appreciate their recommendations.  No current need for debridement  Follow wound care instructions per  nurse's note, including regular repositioning, low-air-loss mattress, daily Triad paste and Allevyn foam.  Nutritional supplements  PT/OT to evaluate and treat    DVT (deep venous thrombosis) (HCC)  Assessment & Plan  Recently diagnosed on most recent [facility] admission  Was initiated on Eliquis at that time and was still taking 10 mg BID per loading instructions  Hold for now d/t concern for GI bleed    Hypertension  Assessment & Plan  BP stable, continue PTA meds    Lupus (HCC)  Assessment & Plan  Continue on cellcept and plaquenil for now.  Consulting ID about discontinuing one or both d/t current infection  [facility]  Undergoing prednisone taper from recent discharge from [facility] 2/24/24 with the following schedule  Take 6 tablets (60 mg total) by mouth daily for 10 days, then taper by 10 mg daily every 14 days       VTE Pharmacologic Prophylaxis:   Pharmacologic: Heparin  Mechanical VTE Prophylaxis in Place: Yes    Patient Centered Rounds: I have performed bedside rounds with nursing staff today.    Discussions with Specialists or Other Care Team Provider: attending physician    Education and Discussions with Family / Patient: with  at bedside    Time Spent for Care: 30 minutes.  More than 50% of total time spent on counseling  and coordination of care as described above.    Current Length of Stay: 1 day(s)    Current Patient Status: Inpatient   Certification Statement: The patient will continue to require additional inpatient hospital stay due to acute pneumonia, acute/chronic anemia, and acute on chronic diarrhea    Discharge Plan: when stable    Code Status: Level 1 - Full Code      Subjective:   Patient was sleeping during interview, for the most part, though she made a couple comments that points.  She complains of fatigue, cough, and sore buttocks.  She asked to be left alone, so I mostly discussed with her  and daughter.   voices concern that patient's immunosuppressive drugs for lupus may be exerting side effects as manifested by current infection.  We discussed risks and benefits of stopping CellCept and/or Plaquenil, and plan to consult infectious disease.    Objective:     Vitals:   Temp (24hrs), Av.2 °F (36.8 °C), Min:97.6 °F (36.4 °C), Max:99.1 °F (37.3 °C)    Temp:  [97.6 °F (36.4 °C)-99.1 °F (37.3 °C)] 99.1 °F (37.3 °C)  HR:  [75-81] 81  Resp:  [12-16] 16  BP: ()/(46-62) 131/62  SpO2:  [94 %-96 %] 96 %  Body mass index is 25.35 kg/m².     Input and Output Summary (last 24 hours):       Intake/Output Summary (Last 24 hours) at 3/2/2024 1525  Last data filed at 3/2/2024 0825  Gross per 24 hour   Intake 120 ml   Output 550 ml   Net -430 ml       Physical Exam:     Physical Exam  Vitals reviewed.   Constitutional:       General: She is sleeping. She is not in acute distress.     Appearance: She is overweight. She is ill-appearing. She is not toxic-appearing or diaphoretic.   Cardiovascular:      Rate and Rhythm: Normal rate and regular rhythm.      Heart sounds: Normal heart sounds.   Pulmonary:      Effort: Pulmonary effort is normal. No respiratory distress.      Breath sounds: Decreased breath sounds present. No wheezing, rhonchi or rales.   Abdominal:      General: Bowel sounds are normal. There is no  distension.      Palpations: Abdomen is soft.      Tenderness: There is no abdominal tenderness. There is no guarding.   Musculoskeletal:      Right lower leg: Edema present.      Left lower leg: Edema present.   Neurological:      Mental Status: She is easily aroused.   Psychiatric:         Behavior: Behavior is cooperative.         Additional Data:     Labs:    Results from last 7 days   Lab Units 03/02/24  0658 03/01/24  0542   WBC Thousand/uL 21.27* 12.97*   HEMOGLOBIN g/dL 8.8* 9.0*   HEMATOCRIT % 28.3* 29.0*   PLATELETS Thousands/uL 332 325   BANDS PCT %  --  11*   LYMPHO PCT % 5* 8*   MONO PCT % 3* 6   EOS PCT % 0 0     Results from last 7 days   Lab Units 03/02/24  0658   SODIUM mmol/L 137   POTASSIUM mmol/L 4.6   CHLORIDE mmol/L 104   CO2 mmol/L 26   BUN mg/dL 34*   CREATININE mg/dL 0.84   ANION GAP mmol/L 7   CALCIUM mg/dL 8.8   ALBUMIN g/dL 2.6*   TOTAL BILIRUBIN mg/dL 0.51   ALK PHOS U/L 75   ALT U/L 12   AST U/L 8*   GLUCOSE RANDOM mg/dL 315*     Results from last 7 days   Lab Units 03/01/24  1438   INR  1.20*     Results from last 7 days   Lab Units 03/01/24  0435   POC GLUCOSE mg/dl 214*     Results from last 7 days   Lab Units 03/01/24  0542 02/26/24  1134   HEMOGLOBIN A1C % 5.6 5.6     Results from last 7 days   Lab Units 03/01/24  0542 02/29/24  1526 02/29/24  1226   LACTIC ACID mmol/L  --  1.8 4.1*   PROCALCITONIN ng/ml 0.76*  --  0.61*           * I Have Reviewed All Lab Data Listed Above.  * Additional Pertinent Lab Tests Reviewed: All Labs Within Last 24 Hours Reviewed    Imaging:    Imaging Reports Reviewed Today Include: CXR  Imaging Personally Reviewed by Myself Includes:  none    Recent Cultures (last 7 days):     Results from last 7 days   Lab Units 02/29/24  2118 02/29/24  1226 02/29/24  1027   BLOOD CULTURE   --  Alpha Hemolytic Streptococcus*  No Growth at 24 hrs.  --    GRAM STAIN RESULT   --  Gram positive cocci in pairs* Rare Gram negative rods*  Rare Gram positive cocci in pairs*   No polys seen*   URINE CULTURE  10,000-19,000 cfu/ml Pseudomonas aeruginosa*  --   --    WOUND CULTURE   --   --  3+ Growth of Escherichia coli*  3+ Growth of Proteus mirabilis*  3+ Growth of Pseudomonas aeruginosa MDR*  2+ Growth of   LEGIONELLA URINARY ANTIGEN  Negative  --   --        Last 24 Hours Medication List:   Current Facility-Administered Medications   Medication Dose Route Frequency Provider Last Rate    acetaminophen  650 mg Oral Q6H PRN JAMIE Foster-DASHAWN      albuterol  2.5 mg Nebulization Q2H PRN Nola Baer, PA-DASHAWN      atorvastatin  40 mg Oral Daily JAMIE Foster-C      carvedilol  6.25 mg Oral BID With Meals JAMIE Foster-DASHAWN      cefTRIAXone  1,000 mg Intravenous Q24H JAMIE Foster-C 1,000 mg (03/01/24 1822)    escitalopram  10 mg Oral Daily Nola Baer, PA-C      famotidine  20 mg Oral Daily Nola Baer, PA-C      folic acid  1 mg Oral Daily JAMIE Foster-DASHAWN      guaiFENesin  600 mg Oral Q12H [facility] JAMIE Foster-DASHAWN      heparin (porcine)  3-20 Units/kg/hr (Order-Specific) Intravenous Titrated Tasha Cee MD 6 Units/kg/hr (03/02/24 1115)    heparin (porcine)  1,950 Units Intravenous Q6H PRN Tasha Cee MD      heparin (porcine)  3,900 Units Intravenous Q6H PRN Tasha Cee MD      HYDROmorphone  0.2 mg Intravenous Q4H PRN JAMIE Foster-DASHAWN      hydroxychloroquine  300 mg Oral Daily JAMIE Foster-DASHAWN      metroNIDAZOLE  500 mg Oral Q8H [facility] JAMIE Foster-DASHAWN      mirtazapine  7.5 mg Oral HS Nola Baer PA-C      ondansetron  4 mg Intravenous Q6H PRN JAMIE Foster-DASHAWN      oxyCODONE  5 mg Oral Q6H PRN Nola Baer, PA-DASHAWN      pantoprazole  40 mg Oral BID AC Nola Baer PA-C      vancomycin  1,250 mg Intravenous Q24H Tasha Cee MD 1,250 mg (03/02/24 0923)        Today, Patient Was Seen By: Chau Kessler DO    ** Please Note: Dictation voice to text software may have been used in the creation of this document. **

## 2024-03-02 NOTE — ASSESSMENT & PLAN NOTE
Continue on cellcept and plaquenil for now.  Consulting ID about discontinuing one or both d/t current infection  [facility]  Undergoing prednisone taper from recent discharge from [facility] 2/24/24 with the following schedule  Take 6 tablets (60 mg total) by mouth daily for 10 days, then taper by 10 mg daily every 14 days

## 2024-03-02 NOTE — ASSESSMENT & PLAN NOTE
Seen on CXR and CT  Urinary antigens negative. Procalcitonin elevated (0.76)  Continue ceftriaxone (Day 3/5)  Mucinex BID  Adding Vancomycin d/t worsening leukocytosis  Consulting ID regarding discontinuation of lupus meds

## 2024-03-02 NOTE — PLAN OF CARE
Problem: PAIN - ADULT  Goal: Verbalizes/displays adequate comfort level or baseline comfort level  Description: Interventions:  - Encourage patient to monitor pain and request assistance  - Assess pain using appropriate pain scale  - Administer analgesics based on type and severity of pain and evaluate response  - Implement non-pharmacological measures as appropriate and evaluate response  - Consider cultural and social influences on pain and pain management  - Notify physician/advanced practitioner if interventions unsuccessful or patient reports new pain  Outcome: Progressing     Problem: INFECTION - ADULT  Goal: Absence or prevention of progression during hospitalization  Description: INTERVENTIONS:  - Assess and monitor for signs and symptoms of infection  - Monitor lab/diagnostic results  - Monitor all insertion sites, i.e. indwelling lines, tubes, and drains  - Monitor endotracheal if appropriate and nasal secretions for changes in amount and color  - [place] appropriate cooling/warming therapies per order  - Administer medications as ordered  - Instruct and encourage patient and family to use good hand hygiene technique  - Identify and instruct in appropriate isolation precautions for identified infection/condition  Outcome: Progressing  Goal: Absence of fever/infection during neutropenic period  Description: INTERVENTIONS:  - Monitor WBC    Outcome: Progressing     Problem: SAFETY ADULT  Goal: Patient will remain free of falls  Description: INTERVENTIONS:  - Educate patient/family on patient safety including physical limitations  - Instruct patient to call for assistance with activity   - Consult OT/PT to assist with strengthening/mobility   - Keep Call bell within reach  - Keep bed low and locked with side rails adjusted as appropriate  - Keep care items and personal belongings within reach  - Initiate and maintain comfort rounds  - Make Fall Risk Sign visible to staff  - Offer Toileting every 2 Hours,  in advance of need  - Initiate/Maintain bed/chair alarm  - Obtain necessary fall risk management equipment: non skid socks  - Apply yellow socks and bracelet for high fall risk patients  - Consider moving patient to room near nurses station  Outcome: Progressing  Goal: Maintain or return to baseline ADL function  Description: INTERVENTIONS:  -  Assess patient's ability to carry out ADLs; assess patient's baseline for ADL function and identify physical deficits which impact ability to perform ADLs (bathing, care of mouth/teeth, toileting, grooming, dressing, etc.)  - Assess/evaluate cause of self-care deficits   - Assess range of motion  - Assess patient's mobility; develop plan if impaired  - Assess patient's need for assistive devices and provide as appropriate  - Encourage maximum independence but intervene and supervise when necessary  - Involve family in performance of ADLs  - Assess for home care needs following discharge   - Consider OT consult to assist with ADL evaluation and planning for discharge  - Provide patient education as appropriate  Outcome: Progressing  Goal: Maintains/Returns to pre admission functional level  Description: INTERVENTIONS:  - Perform AM-PAC 6 Click Basic Mobility/ Daily Activity assessment daily.  - Set and communicate daily mobility goal to care team and patient/family/caregiver.   - Collaborate with rehabilitation services on mobility goals if consulted  - Perform Range of Motion 3 times a day.  - Reposition patient every 2 hours.  - Dangle patient 3 times a day  - Stand patient 3 times a day  - Ambulate patient 3 times a day  - Out of bed to chair 3 times a day   - Out of bed for meals 3 times a day  - Out of bed for toileting  - Record patient progress and toleration of activity level   Outcome: Progressing     Problem: DISCHARGE PLANNING  Goal: Discharge to home or other facility with appropriate resources  Description: INTERVENTIONS:  - Identify barriers to discharge  w/patient and caregiver  - Arrange for needed discharge resources and transportation as appropriate  - Identify discharge learning needs (meds, wound care, etc.)  - Arrange for interpretive services to assist at discharge as needed  - Refer to Case Management Department for coordinating discharge planning if the patient needs post-hospital services based on physician/advanced practitioner order or complex needs related to functional status, cognitive ability, or social support system  Outcome: Progressing     Problem: Knowledge Deficit  Goal: Patient/family/caregiver demonstrates understanding of disease process, treatment plan, medications, and discharge instructions  Description: Complete learning assessment and assess knowledge base.  Interventions:  - Provide teaching at level of understanding  - Provide teaching via preferred learning methods  Outcome: Progressing     Problem: CARDIOVASCULAR - ADULT  Goal: Maintains optimal cardiac output and hemodynamic stability  Description: INTERVENTIONS:  - Monitor I/O, vital signs and rhythm  - Monitor for S/S and trends of decreased cardiac output  - Administer and titrate ordered vasoactive medications to optimize hemodynamic stability  - Assess quality of pulses, skin color and temperature  - Assess for signs of decreased coronary artery perfusion  - Instruct patient to report change in severity of symptoms  Outcome: Progressing  Goal: Absence of cardiac dysrhythmias or at baseline rhythm  Description: INTERVENTIONS:  - Continuous cardiac monitoring, vital signs, obtain 12 lead EKG if ordered  - Administer antiarrhythmic and heart rate control medications as ordered  - Monitor electrolytes and administer replacement therapy as ordered  Outcome: Progressing     Problem: GASTROINTESTINAL - ADULT  Goal: Minimal or absence of nausea and/or vomiting  Description: INTERVENTIONS:  - Administer IV fluids if ordered to ensure adequate hydration  - Maintain NPO status until  nausea and vomiting are resolved  - Nasogastric tube if ordered  - Administer ordered antiemetic medications as needed  - Provide nonpharmacologic comfort measures as appropriate  - Advance diet as tolerated, if ordered  - Consider nutrition services referral to assist patient with adequate nutrition and appropriate food choices  Outcome: Progressing  Goal: Maintains or returns to baseline bowel function  Description: INTERVENTIONS:  - Assess bowel function  - Encourage oral fluids to ensure adequate hydration  - Administer IV fluids if ordered to ensure adequate hydration  - Administer ordered medications as needed  - Encourage mobilization and activity  - Consider nutritional services referral to assist patient with adequate nutrition and appropriate food choices  Outcome: Progressing  Goal: Maintains adequate nutritional intake  Description: INTERVENTIONS:  - Monitor percentage of each meal consumed  - Identify factors contributing to decreased intake, treat as appropriate  - Assist with meals as needed  - Monitor I&O, weight, and lab values if indicated  - Obtain nutrition services referral as needed  Outcome: Progressing     Problem: SKIN/TISSUE INTEGRITY - ADULT  Goal: Skin Integrity remains intact(Skin Breakdown Prevention)  Description: Assess:  -Perform Дмитрий assessment every shift  -Clean and moisturize skin every shift/prn  -Inspect skin when repositioning, toileting, and assisting with ADLS  -Assess extremities for adequate circulation and sensation     Bed Management:  -Have minimal linens on bed & keep smooth, unwrinkled  -Change linens as needed when moist or perspiring  -Avoid sitting or lying in one position for more than 2 hours while in bed  -Keep HOB at 30 degrees     Toileting:  -Offer bedside commode  -Assess for incontinence every shift  -Use incontinent care products after each incontinent episode such as radha wipes    Activity:  -Mobilize patient 3 times a day  -Encourage activity and walks  on unit  -Encourage or provide ROM exercises   -Turn and reposition patient every 2 Hours  -Use appropriate equipment to lift or move patient in bed  -Instruct/ Assist with weight shifting every 4 hours when out of bed in chair  -Consider limitation of chair time 6 hour intervals    Skin Care:  -Avoid use of baby powder, tape, friction and shearing, hot water or constrictive clothing  -Relieve pressure over bony prominences using foam wedges/pillows  -Do not massage red bony areas      Outcome: Progressing  Goal: Incision(s), wounds(s) or drain site(s) healing without S/S of infection  Description: INTERVENTIONS  - Assess and document dressing, incision, wound bed, drain sites and surrounding tissue  - Provide patient and family education  - Perform skin care/dressing changes every shift/per order  Outcome: Progressing  Goal: Pressure injury heals and does not worsen  Description: Interventions:  - Implement low air loss mattress or specialty surface (Criteria met)  - Apply silicone foam dressing  - Instruct/assist with weight shifting every 60 minutes when in chair   - Limit chair time to 6 hour intervals  - Use special pressure reducing interventions such as waffle cushion when in chair   - Apply fecal or urinary incontinence containment device   - Perform passive or active ROM every 2 hours  - Turn and reposition patient & offload bony prominences every 2 hours   - Utilize friction reducing device or surface for transfers   - Use incontinent care products after each incontinent episode such as radha wipes  - Consider nutrition services referral as needed  Outcome: Progressing     Problem: Prexisting or High Potential for Compromised Skin Integrity  Goal: Skin integrity is maintained or improved  Description: INTERVENTIONS:  - Identify patients at risk for skin breakdown  - Assess and monitor skin integrity  - Assess and monitor nutrition and hydration status  - Monitor labs   - Assess for incontinence   - Turn and  reposition patient  - Assist with mobility/ambulation  - Relieve pressure over bony prominences  - Avoid friction and shearing  - Provide appropriate hygiene as needed including keeping skin clean and dry  - Evaluate need for skin moisturizer/barrier cream  - Collaborate with interdisciplinary team   - Patient/family teaching  - Consider wound care consult   Outcome: Progressing     Problem: Nutrition/Hydration-ADULT  Goal: Nutrient/Hydration intake appropriate for improving, restoring or maintaining nutritional needs  Description: Monitor and assess patient's nutrition/hydration status for malnutrition. Collaborate with interdisciplinary team and initiate plan and interventions as ordered.  Monitor patient's weight and dietary intake as ordered or per policy. Utilize nutrition screening tool and intervene as necessary. Determine patient's food preferences and provide high-protein, high-caloric foods as appropriate.     INTERVENTIONS:  - Monitor oral intake, urinary output, labs, and treatment plans  - Assess nutrition and hydration status and recommend course of action  - Evaluate amount of meals eaten  - Assist patient with eating if necessary   - Allow adequate time for meals  - Recommend/ encourage appropriate diets, oral nutritional supplements, and vitamin/mineral supplements  - Order, calculate, and assess calorie counts as needed  - Recommend, monitor, and adjust tube feedings and TPN/PPN based on assessed needs  - Assess need for intravenous fluids  - Provide specific nutrition/hydration education as appropriate  - Include patient/family/caregiver in decisions related to nutrition  Outcome: Progressing

## 2024-03-02 NOTE — ASSESSMENT & PLAN NOTE
Infectious vs IBD vs microscopic colitis vs SIBO vs side effect of lupus meds  Patient (and ) report this to be ongoing intermittently since October.  C diff negative 9 days ago  Fecal calprotectin and elastase were elevated on most recent [facility] admission  CT abdomen does show inflammation, improving  GI consulted; we appreciate their recommendations  Check stool enteric panel + O&P. If negative, start imodium PRN.  Nursing to monitor stool output as at [facility] she was not noted to have diarrhea  Continue Flagyl for now  Banatrol TID with meals for stool bulk  Keep MAP >65 mmHg

## 2024-03-02 NOTE — ASSESSMENT & PLAN NOTE
BC from 2/29 grew gram+ cocci in pairs, alpha hemolytic strep  Initiated Vancomycin  Continue Rocephin & Flagyl

## 2024-03-03 ENCOUNTER — APPOINTMENT (INPATIENT)
Dept: RADIOLOGY | Facility: HOSPITAL | Age: 79
DRG: 208 | End: 2024-03-03
Payer: MEDICARE

## 2024-03-03 VITALS
DIASTOLIC BLOOD PRESSURE: 50 MMHG | TEMPERATURE: 99.3 F | RESPIRATION RATE: 18 BRPM | SYSTOLIC BLOOD PRESSURE: 95 MMHG | OXYGEN SATURATION: 98 % | WEIGHT: 154.54 LBS | HEIGHT: 64 IN | BODY MASS INDEX: 26.38 KG/M2 | HEART RATE: 75 BPM

## 2024-03-03 PROBLEM — A41.9 SEPTIC SHOCK (HCC): Status: ACTIVE | Noted: 2024-03-03

## 2024-03-03 PROBLEM — E87.20 METABOLIC ACIDOSIS: Status: ACTIVE | Noted: 2024-03-03

## 2024-03-03 PROBLEM — R57.9 SHOCK (HCC): Status: ACTIVE | Noted: 2024-03-03

## 2024-03-03 PROBLEM — E11.9 DIABETES MELLITUS (HCC): Status: ACTIVE | Noted: 2024-03-03

## 2024-03-03 PROBLEM — K92.2 ACUTE GI BLEEDING: Status: ACTIVE | Noted: 2024-03-03

## 2024-03-03 PROBLEM — R65.21 SEPTIC SHOCK (HCC): Status: ACTIVE | Noted: 2024-03-03

## 2024-03-03 PROBLEM — J18.9 PNEUMONIA: Status: RESOLVED | Noted: 2024-02-29 | Resolved: 2024-03-03

## 2024-03-03 PROBLEM — B95.2 BACTEREMIA DUE TO ENTEROCOCCUS: Status: ACTIVE | Noted: 2024-03-02

## 2024-03-03 PROBLEM — Z86.39 HISTORY OF DIABETES MELLITUS: Status: ACTIVE | Noted: 2024-03-03

## 2024-03-03 LAB
ALBUMIN SERPL BCP-MCNC: 1.9 G/DL (ref 3.5–5)
ALBUMIN SERPL BCP-MCNC: 2.3 G/DL (ref 3.5–5)
ALP SERPL-CCNC: 59 U/L (ref 34–104)
ALP SERPL-CCNC: 70 U/L (ref 34–104)
ALT SERPL W P-5'-P-CCNC: 18 U/L (ref 7–52)
ALT SERPL W P-5'-P-CCNC: 57 U/L (ref 7–52)
AMORPH URATE CRY URNS QL MICRO: ABNORMAL /HPF
ANION GAP SERPL CALCULATED.3IONS-SCNC: 10 MMOL/L
ANION GAP SERPL CALCULATED.3IONS-SCNC: 11 MMOL/L
APTT PPP: 34 SECONDS (ref 23–37)
APTT PPP: >210 SECONDS (ref 23–37)
ARTERIAL PATENCY WRIST A: YES
ARTERIAL PATENCY WRIST A: YES
AST SERPL W P-5'-P-CCNC: 13 U/L (ref 13–39)
AST SERPL W P-5'-P-CCNC: 66 U/L (ref 13–39)
BACTERIA UR CULT: ABNORMAL
BACTERIA UR QL AUTO: ABNORMAL /HPF
BASE EXCESS BLDA CALC-SCNC: -4.8 MMOL/L
BASE EXCESS BLDA CALC-SCNC: -5.3 MMOL/L
BASOPHILS # BLD AUTO: 0.01 THOUSANDS/ÂΜL (ref 0–0.1)
BASOPHILS NFR BLD AUTO: 0 % (ref 0–1)
BILIRUB SERPL-MCNC: 0.61 MG/DL (ref 0.2–1)
BILIRUB SERPL-MCNC: 0.61 MG/DL (ref 0.2–1)
BILIRUB UR QL STRIP: ABNORMAL
BUN SERPL-MCNC: 36 MG/DL (ref 5–25)
BUN SERPL-MCNC: 36 MG/DL (ref 5–25)
C COLI+JEJUNI TUF STL QL NAA+PROBE: NEGATIVE
CALCIUM ALBUM COR SERPL-MCNC: 9.4 MG/DL (ref 8.3–10.1)
CALCIUM ALBUM COR SERPL-MCNC: 9.9 MG/DL (ref 8.3–10.1)
CALCIUM SERPL-MCNC: 7.7 MG/DL (ref 8.4–10.2)
CALCIUM SERPL-MCNC: 8.5 MG/DL (ref 8.4–10.2)
CHLORIDE SERPL-SCNC: 106 MMOL/L (ref 96–108)
CHLORIDE SERPL-SCNC: 106 MMOL/L (ref 96–108)
CLARITY UR: ABNORMAL
CO2 SERPL-SCNC: 20 MMOL/L (ref 21–32)
CO2 SERPL-SCNC: 22 MMOL/L (ref 21–32)
COARSE GRAN CASTS URNS QL MICRO: ABNORMAL /LPF
COLOR UR: YELLOW
CREAT SERPL-MCNC: 0.85 MG/DL (ref 0.6–1.3)
CREAT SERPL-MCNC: 0.92 MG/DL (ref 0.6–1.3)
EC STX1+STX2 GENES STL QL NAA+PROBE: NEGATIVE
EOSINOPHIL # BLD AUTO: 0 THOUSAND/ÂΜL (ref 0–0.61)
EOSINOPHIL NFR BLD AUTO: 0 % (ref 0–6)
ERYTHROCYTE [DISTWIDTH] IN BLOOD BY AUTOMATED COUNT: 19.9 % (ref 11.6–15.1)
EST. AVERAGE GLUCOSE BLD GHB EST-MCNC: 123 MG/DL
FOLATE SERPL-MCNC: 7.4 NG/ML
GFR SERPL CREATININE-BSD FRML MDRD: 58 ML/MIN/1.73SQ M
GFR SERPL CREATININE-BSD FRML MDRD: 64 ML/MIN/1.73SQ M
GLUCOSE SERPL-MCNC: 420 MG/DL (ref 65–140)
GLUCOSE SERPL-MCNC: 447 MG/DL (ref 65–140)
GLUCOSE SERPL-MCNC: 479 MG/DL (ref 65–140)
GLUCOSE UR STRIP-MCNC: ABNORMAL MG/DL
HBA1C MFR BLD: 5.9 %
HCO3 BLDA-SCNC: 19.4 MMOL/L (ref 22–28)
HCO3 BLDA-SCNC: 20.4 MMOL/L (ref 22–28)
HCT VFR BLD AUTO: 27.2 % (ref 34.8–46.1)
HGB BLD-MCNC: 8 G/DL (ref 11.5–15.4)
HGB UR QL STRIP.AUTO: ABNORMAL
HOROWITZ INDEX BLDA+IHG-RTO: 70 MM[HG]
IMM GRANULOCYTES # BLD AUTO: 0.13 THOUSAND/UL (ref 0–0.2)
IMM GRANULOCYTES NFR BLD AUTO: 1 % (ref 0–2)
KETONES UR STRIP-MCNC: NEGATIVE MG/DL
LACTATE SERPL-SCNC: 4.5 MMOL/L (ref 0.5–2)
LACTATE SERPL-SCNC: 7.1 MMOL/L (ref 0.5–2)
LEUKOCYTE ESTERASE UR QL STRIP: NEGATIVE
LYMPHOCYTES # BLD AUTO: 0.74 THOUSANDS/ÂΜL (ref 0.6–4.47)
LYMPHOCYTES NFR BLD AUTO: 4 % (ref 14–44)
MCH RBC QN AUTO: 30.3 PG (ref 26.8–34.3)
MCHC RBC AUTO-ENTMCNC: 29.4 G/DL (ref 31.4–37.4)
MCV RBC AUTO: 103 FL (ref 82–98)
MONOCYTES # BLD AUTO: 0.74 THOUSAND/ÂΜL (ref 0.17–1.22)
MONOCYTES NFR BLD AUTO: 4 % (ref 4–12)
NASAL CANNULA: 5
NEUTROPHILS # BLD AUTO: 16.76 THOUSANDS/ÂΜL (ref 1.85–7.62)
NEUTS SEG NFR BLD AUTO: 91 % (ref 43–75)
NITRITE UR QL STRIP: NEGATIVE
NON-SQ EPI CELLS URNS QL MICRO: ABNORMAL /HPF
NRBC BLD AUTO-RTO: 1 /100 WBCS
O2 CT BLDA-SCNC: 7.3 ML/DL (ref 16–23)
O2 CT BLDA-SCNC: 8.9 ML/DL (ref 16–23)
OXYHGB MFR BLDA: 81.8 % (ref 94–97)
OXYHGB MFR BLDA: 95.9 % (ref 94–97)
PCO2 BLDA: 33.8 MM HG (ref 36–44)
PCO2 BLDA: 38.3 MM HG (ref 36–44)
PEEP RESPIRATORY: 6 CM[H2O]
PH BLDA: 7.34 [PH] (ref 7.35–7.45)
PH BLDA: 7.38 [PH] (ref 7.35–7.45)
PH UR STRIP.AUTO: 5.5 [PH]
PLATELET # BLD AUTO: 333 THOUSANDS/UL (ref 149–390)
PMV BLD AUTO: 11.5 FL (ref 8.9–12.7)
PO2 BLDA: 117.8 MM HG (ref 75–129)
PO2 BLDA: 51.2 MM HG (ref 75–129)
POTASSIUM SERPL-SCNC: 4.1 MMOL/L (ref 3.5–5.3)
POTASSIUM SERPL-SCNC: 4.2 MMOL/L (ref 3.5–5.3)
PROT SERPL-MCNC: 3.3 G/DL (ref 6.4–8.4)
PROT SERPL-MCNC: 4.1 G/DL (ref 6.4–8.4)
PROT UR STRIP-MCNC: ABNORMAL MG/DL
RBC # BLD AUTO: 2.64 MILLION/UL (ref 3.81–5.12)
RBC #/AREA URNS AUTO: ABNORMAL /HPF
SALMONELLA SP SPAO STL QL NAA+PROBE: NEGATIVE
SHIGELLA SP+EIEC IPAH STL QL NAA+PROBE: NEGATIVE
SODIUM SERPL-SCNC: 137 MMOL/L (ref 135–147)
SODIUM SERPL-SCNC: 138 MMOL/L (ref 135–147)
SP GR UR STRIP.AUTO: >=1.03 (ref 1–1.03)
SPECIMEN SOURCE: ABNORMAL
SPECIMEN SOURCE: ABNORMAL
URATE CRY URNS QL MICRO: ABNORMAL /HPF
UROBILINOGEN UR QL STRIP.AUTO: 0.2 E.U./DL
VENT AC: 14
VENT- AC: AC
VT SETTING VENT: 420 ML
WBC # BLD AUTO: 18.38 THOUSAND/UL (ref 4.31–10.16)
WBC #/AREA URNS AUTO: ABNORMAL /HPF

## 2024-03-03 PROCEDURE — 02HV33Z INSERTION OF INFUSION DEVICE INTO SUPERIOR VENA CAVA, PERCUTANEOUS APPROACH: ICD-10-PCS | Performed by: EMERGENCY MEDICINE

## 2024-03-03 PROCEDURE — 80053 COMPREHEN METABOLIC PANEL: CPT

## 2024-03-03 PROCEDURE — 5A1935Z RESPIRATORY VENTILATION, LESS THAN 24 CONSECUTIVE HOURS: ICD-10-PCS | Performed by: EMERGENCY MEDICINE

## 2024-03-03 PROCEDURE — 82746 ASSAY OF FOLIC ACID SERUM: CPT

## 2024-03-03 PROCEDURE — 99223 1ST HOSP IP/OBS HIGH 75: CPT | Performed by: NURSE PRACTITIONER

## 2024-03-03 PROCEDURE — 71045 X-RAY EXAM CHEST 1 VIEW: CPT

## 2024-03-03 PROCEDURE — 94760 N-INVAS EAR/PLS OXIMETRY 1: CPT

## 2024-03-03 PROCEDURE — 31500 INSERT EMERGENCY AIRWAY: CPT

## 2024-03-03 PROCEDURE — 83036 HEMOGLOBIN GLYCOSYLATED A1C: CPT | Performed by: FAMILY MEDICINE

## 2024-03-03 PROCEDURE — 94002 VENT MGMT INPAT INIT DAY: CPT

## 2024-03-03 PROCEDURE — 87106 FUNGI IDENTIFICATION YEAST: CPT | Performed by: FAMILY MEDICINE

## 2024-03-03 PROCEDURE — 0BH17EZ INSERTION OF ENDOTRACHEAL AIRWAY INTO TRACHEA, VIA NATURAL OR ARTIFICIAL OPENING: ICD-10-PCS | Performed by: EMERGENCY MEDICINE

## 2024-03-03 PROCEDURE — 87070 CULTURE OTHR SPECIMN AEROBIC: CPT | Performed by: FAMILY MEDICINE

## 2024-03-03 PROCEDURE — 85730 THROMBOPLASTIN TIME PARTIAL: CPT | Performed by: FAMILY MEDICINE

## 2024-03-03 PROCEDURE — 87205 SMEAR GRAM STAIN: CPT | Performed by: FAMILY MEDICINE

## 2024-03-03 PROCEDURE — 99239 HOSP IP/OBS DSCHRG MGMT >30: CPT | Performed by: FAMILY MEDICINE

## 2024-03-03 PROCEDURE — 87077 CULTURE AEROBIC IDENTIFY: CPT | Performed by: FAMILY MEDICINE

## 2024-03-03 PROCEDURE — 82805 BLOOD GASES W/O2 SATURATION: CPT | Performed by: FAMILY MEDICINE

## 2024-03-03 PROCEDURE — 83605 ASSAY OF LACTIC ACID: CPT | Performed by: FAMILY MEDICINE

## 2024-03-03 PROCEDURE — 80053 COMPREHEN METABOLIC PANEL: CPT | Performed by: FAMILY MEDICINE

## 2024-03-03 PROCEDURE — 82948 REAGENT STRIP/BLOOD GLUCOSE: CPT

## 2024-03-03 PROCEDURE — 85025 COMPLETE CBC W/AUTO DIFF WBC: CPT

## 2024-03-03 PROCEDURE — C9113 INJ PANTOPRAZOLE SODIUM, VIA: HCPCS | Performed by: FAMILY MEDICINE

## 2024-03-03 PROCEDURE — 36600 WITHDRAWAL OF ARTERIAL BLOOD: CPT

## 2024-03-03 PROCEDURE — 87186 SC STD MICRODIL/AGAR DIL: CPT | Performed by: FAMILY MEDICINE

## 2024-03-03 PROCEDURE — 82805 BLOOD GASES W/O2 SATURATION: CPT

## 2024-03-03 PROCEDURE — 81001 URINALYSIS AUTO W/SCOPE: CPT | Performed by: FAMILY MEDICINE

## 2024-03-03 PROCEDURE — 87040 BLOOD CULTURE FOR BACTERIA: CPT | Performed by: FAMILY MEDICINE

## 2024-03-03 RX ORDER — SODIUM CHLORIDE, SODIUM GLUCONATE, SODIUM ACETATE, POTASSIUM CHLORIDE, MAGNESIUM CHLORIDE, SODIUM PHOSPHATE, DIBASIC, AND POTASSIUM PHOSPHATE .53; .5; .37; .037; .03; .012; .00082 G/100ML; G/100ML; G/100ML; G/100ML; G/100ML; G/100ML; G/100ML
1000 INJECTION, SOLUTION INTRAVENOUS ONCE
Status: COMPLETED | OUTPATIENT
Start: 2024-03-03 | End: 2024-03-03

## 2024-03-03 RX ORDER — CHLORHEXIDINE GLUCONATE ORAL RINSE 1.2 MG/ML
15 SOLUTION DENTAL EVERY 12 HOURS SCHEDULED
Status: DISCONTINUED | OUTPATIENT
Start: 2024-03-03 | End: 2024-03-03 | Stop reason: HOSPADM

## 2024-03-03 RX ORDER — DEXMEDETOMIDINE HYDROCHLORIDE 4 UG/ML
.1-.7 INJECTION, SOLUTION INTRAVENOUS
Status: DISCONTINUED | OUTPATIENT
Start: 2024-03-03 | End: 2024-03-03 | Stop reason: HOSPADM

## 2024-03-03 RX ORDER — INSULIN LISPRO 100 [IU]/ML
1-5 INJECTION, SOLUTION INTRAVENOUS; SUBCUTANEOUS
Status: DISCONTINUED | OUTPATIENT
Start: 2024-03-03 | End: 2024-03-03

## 2024-03-03 RX ORDER — SUCCINYLCHOLINE/SOD CL,ISO/PF 100 MG/5ML
100 SYRINGE (ML) INTRAVENOUS ONCE
Status: COMPLETED | OUTPATIENT
Start: 2024-03-03 | End: 2024-03-03

## 2024-03-03 RX ORDER — METRONIDAZOLE 500 MG/100ML
500 INJECTION, SOLUTION INTRAVENOUS EVERY 8 HOURS
Status: DISCONTINUED | OUTPATIENT
Start: 2024-03-03 | End: 2024-03-03 | Stop reason: HOSPADM

## 2024-03-03 RX ORDER — PANTOPRAZOLE SODIUM 40 MG/10ML
40 INJECTION, POWDER, LYOPHILIZED, FOR SOLUTION INTRAVENOUS
Status: DISCONTINUED | OUTPATIENT
Start: 2024-03-03 | End: 2024-03-03 | Stop reason: HOSPADM

## 2024-03-03 RX ORDER — INSULIN LISPRO 100 [IU]/ML
1-5 INJECTION, SOLUTION INTRAVENOUS; SUBCUTANEOUS EVERY 6 HOURS SCHEDULED
Status: DISCONTINUED | OUTPATIENT
Start: 2024-03-03 | End: 2024-03-03 | Stop reason: HOSPADM

## 2024-03-03 RX ORDER — ETOMIDATE 2 MG/ML
20 INJECTION INTRAVENOUS ONCE
Status: COMPLETED | OUTPATIENT
Start: 2024-03-03 | End: 2024-03-03

## 2024-03-03 RX ORDER — ALBUMIN (HUMAN) 12.5 G/50ML
25 SOLUTION INTRAVENOUS ONCE
Qty: 100 ML | Refills: 0 | Status: DISCONTINUED | OUTPATIENT
Start: 2024-03-03 | End: 2024-03-03 | Stop reason: HOSPADM

## 2024-03-03 RX ORDER — SODIUM CHLORIDE, SODIUM GLUCONATE, SODIUM ACETATE, POTASSIUM CHLORIDE, MAGNESIUM CHLORIDE, SODIUM PHOSPHATE, DIBASIC, AND POTASSIUM PHOSPHATE .53; .5; .37; .037; .03; .012; .00082 G/100ML; G/100ML; G/100ML; G/100ML; G/100ML; G/100ML; G/100ML
500 INJECTION, SOLUTION INTRAVENOUS ONCE
Status: COMPLETED | OUTPATIENT
Start: 2024-03-03 | End: 2024-03-03

## 2024-03-03 RX ORDER — ALBUMIN (HUMAN) 12.5 G/50ML
12.5 SOLUTION INTRAVENOUS ONCE
Qty: 50 ML | Refills: 0 | Status: COMPLETED | OUTPATIENT
Start: 2024-03-03 | End: 2024-03-03

## 2024-03-03 RX ADMIN — NOREPINEPHRINE BITARTRATE 5 MCG/MIN: 1 SOLUTION INTRAVENOUS at 12:05

## 2024-03-03 RX ADMIN — SODIUM CHLORIDE, SODIUM GLUCONATE, SODIUM ACETATE, POTASSIUM CHLORIDE, MAGNESIUM CHLORIDE, SODIUM PHOSPHATE, DIBASIC, AND POTASSIUM PHOSPHATE 500 ML: .53; .5; .37; .037; .03; .012; .00082 INJECTION, SOLUTION INTRAVENOUS at 11:55

## 2024-03-03 RX ADMIN — CEFEPIME HYDROCHLORIDE 2000 MG: 2 INJECTION, SOLUTION INTRAVENOUS at 05:25

## 2024-03-03 RX ADMIN — ETOMIDATE 20 MG: 2 INJECTION, SOLUTION INTRAVENOUS at 12:23

## 2024-03-03 RX ADMIN — SODIUM CHLORIDE, SODIUM GLUCONATE, SODIUM ACETATE, POTASSIUM CHLORIDE, MAGNESIUM CHLORIDE, SODIUM PHOSPHATE, DIBASIC, AND POTASSIUM PHOSPHATE 1000 ML: .53; .5; .37; .037; .03; .012; .00082 INJECTION, SOLUTION INTRAVENOUS at 10:39

## 2024-03-03 RX ADMIN — LINEZOLID 600 MG: 600 INJECTION, SOLUTION INTRAVENOUS at 09:11

## 2024-03-03 RX ADMIN — Medication 100 MG: at 12:24

## 2024-03-03 RX ADMIN — MEROPENEM 1000 MG: 1 INJECTION, POWDER, FOR SOLUTION INTRAVENOUS at 13:51

## 2024-03-03 RX ADMIN — FAMOTIDINE 20 MG: 20 TABLET, FILM COATED ORAL at 09:10

## 2024-03-03 RX ADMIN — PREDNISONE 50 MG: 20 TABLET ORAL at 09:10

## 2024-03-03 RX ADMIN — ATORVASTATIN CALCIUM 40 MG: 40 TABLET, FILM COATED ORAL at 09:10

## 2024-03-03 RX ADMIN — SODIUM CHLORIDE, SODIUM GLUCONATE, SODIUM ACETATE, POTASSIUM CHLORIDE, MAGNESIUM CHLORIDE, SODIUM PHOSPHATE, DIBASIC, AND POTASSIUM PHOSPHATE 1000 ML: .53; .5; .37; .037; .03; .012; .00082 INJECTION, SOLUTION INTRAVENOUS at 11:15

## 2024-03-03 RX ADMIN — HYDROCORTISONE SODIUM SUCCINATE 50 MG: 100 INJECTION, POWDER, FOR SOLUTION INTRAMUSCULAR; INTRAVENOUS at 13:38

## 2024-03-03 RX ADMIN — HEPARIN SODIUM 3900 UNITS: 1000 INJECTION INTRAVENOUS; SUBCUTANEOUS at 02:19

## 2024-03-03 RX ADMIN — INSULIN LISPRO 5 UNITS: 100 INJECTION, SOLUTION INTRAVENOUS; SUBCUTANEOUS at 11:57

## 2024-03-03 RX ADMIN — GUAIFENESIN 600 MG: 600 TABLET, EXTENDED RELEASE ORAL at 09:10

## 2024-03-03 RX ADMIN — CARVEDILOL 6.25 MG: 3.12 TABLET, FILM COATED ORAL at 09:10

## 2024-03-03 RX ADMIN — SODIUM CHLORIDE, SODIUM GLUCONATE, SODIUM ACETATE, POTASSIUM CHLORIDE, MAGNESIUM CHLORIDE, SODIUM PHOSPHATE, DIBASIC, AND POTASSIUM PHOSPHATE 1000 ML: .53; .5; .37; .037; .03; .012; .00082 INJECTION, SOLUTION INTRAVENOUS at 10:10

## 2024-03-03 RX ADMIN — ESCITALOPRAM OXALATE 10 MG: 10 TABLET ORAL at 09:10

## 2024-03-03 RX ADMIN — HYDROXYCHLOROQUINE SULFATE 300 MG: 200 TABLET, FILM COATED ORAL at 09:32

## 2024-03-03 RX ADMIN — DEXMEDETOMIDINE HYDROCHLORIDE 0.2 MCG/KG/HR: 400 INJECTION INTRAVENOUS at 12:40

## 2024-03-03 RX ADMIN — ALBUMIN HUMAN 12.5 G: 0.25 SOLUTION INTRAVENOUS at 11:30

## 2024-03-03 RX ADMIN — VASOPRESSIN 0.03 UNITS/MIN: 20 INJECTION INTRAVENOUS at 12:54

## 2024-03-03 RX ADMIN — FOLIC ACID 1 MG: 1 TABLET ORAL at 09:10

## 2024-03-03 RX ADMIN — PANTOPRAZOLE SODIUM 40 MG: 40 INJECTION, POWDER, FOR SOLUTION INTRAVENOUS at 12:46

## 2024-03-03 NOTE — PLAN OF CARE
Problem: PAIN - ADULT  Goal: Verbalizes/displays adequate comfort level or baseline comfort level  Description: Interventions:  - Encourage patient to monitor pain and request assistance  - Assess pain using appropriate pain scale  - Administer analgesics based on type and severity of pain and evaluate response  - Implement non-pharmacological measures as appropriate and evaluate response  - Consider cultural and social influences on pain and pain management  - Notify physician/advanced practitioner if interventions unsuccessful or patient reports new pain  Outcome: Progressing     Problem: INFECTION - ADULT  Goal: Absence or prevention of progression during hospitalization  Description: INTERVENTIONS:  - Assess and monitor for signs and symptoms of infection  - Monitor lab/diagnostic results  - Monitor all insertion sites, i.e. indwelling lines, tubes, and drains  - Monitor endotracheal if appropriate and nasal secretions for changes in amount and color  - [place] appropriate cooling/warming therapies per order  - Administer medications as ordered  - Instruct and encourage patient and family to use good hand hygiene technique  - Identify and instruct in appropriate isolation precautions for identified infection/condition  Outcome: Progressing  Goal: Absence of fever/infection during neutropenic period  Description: INTERVENTIONS:  - Monitor WBC    Outcome: Progressing     Problem: SAFETY ADULT  Goal: Patient will remain free of falls  Description: INTERVENTIONS:  - Educate patient/family on patient safety including physical limitations  - Instruct patient to call for assistance with activity   - Consult OT/PT to assist with strengthening/mobility   - Keep Call bell within reach  - Keep bed low and locked with side rails adjusted as appropriate  - Keep care items and personal belongings within reach  - Initiate and maintain comfort rounds  - Make Fall Risk Sign visible to staff  - Offer Toileting every 2 Hours,  in advance of need  - Initiate/Maintain bed/chair alarm  - Obtain necessary fall risk management equipment: non skid socks  - Apply yellow socks and bracelet for high fall risk patients  - Consider moving patient to room near nurses station  Outcome: Progressing  Goal: Maintain or return to baseline ADL function  Description: INTERVENTIONS:  -  Assess patient's ability to carry out ADLs; assess patient's baseline for ADL function and identify physical deficits which impact ability to perform ADLs (bathing, care of mouth/teeth, toileting, grooming, dressing, etc.)  - Assess/evaluate cause of self-care deficits   - Assess range of motion  - Assess patient's mobility; develop plan if impaired  - Assess patient's need for assistive devices and provide as appropriate  - Encourage maximum independence but intervene and supervise when necessary  - Involve family in performance of ADLs  - Assess for home care needs following discharge   - Consider OT consult to assist with ADL evaluation and planning for discharge  - Provide patient education as appropriate  Outcome: Progressing  Goal: Maintains/Returns to pre admission functional level  Description: INTERVENTIONS:  - Perform AM-PAC 6 Click Basic Mobility/ Daily Activity assessment daily.  - Set and communicate daily mobility goal to care team and patient/family/caregiver.   - Collaborate with rehabilitation services on mobility goals if consulted  - Perform Range of Motion 3 times a day.  - Reposition patient every 2 hours.  - Dangle patient 3 times a day  - Stand patient 3 times a day  - Ambulate patient 3 times a day  - Out of bed to chair 3 times a day   - Out of bed for meals 3 times a day  - Out of bed for toileting  - Record patient progress and toleration of activity level   Outcome: Progressing     Problem: DISCHARGE PLANNING  Goal: Discharge to home or other facility with appropriate resources  Description: INTERVENTIONS:  - Identify barriers to discharge  w/patient and caregiver  - Arrange for needed discharge resources and transportation as appropriate  - Identify discharge learning needs (meds, wound care, etc.)  - Arrange for interpretive services to assist at discharge as needed  - Refer to Case Management Department for coordinating discharge planning if the patient needs post-hospital services based on physician/advanced practitioner order or complex needs related to functional status, cognitive ability, or social support system  Outcome: Progressing     Problem: Knowledge Deficit  Goal: Patient/family/caregiver demonstrates understanding of disease process, treatment plan, medications, and discharge instructions  Description: Complete learning assessment and assess knowledge base.  Interventions:  - Provide teaching at level of understanding  - Provide teaching via preferred learning methods  Outcome: Progressing     Problem: CARDIOVASCULAR - ADULT  Goal: Maintains optimal cardiac output and hemodynamic stability  Description: INTERVENTIONS:  - Monitor I/O, vital signs and rhythm  - Monitor for S/S and trends of decreased cardiac output  - Administer and titrate ordered vasoactive medications to optimize hemodynamic stability  - Assess quality of pulses, skin color and temperature  - Assess for signs of decreased coronary artery perfusion  - Instruct patient to report change in severity of symptoms  Outcome: Progressing  Goal: Absence of cardiac dysrhythmias or at baseline rhythm  Description: INTERVENTIONS:  - Continuous cardiac monitoring, vital signs, obtain 12 lead EKG if ordered  - Administer antiarrhythmic and heart rate control medications as ordered  - Monitor electrolytes and administer replacement therapy as ordered  Outcome: Progressing     Problem: GASTROINTESTINAL - ADULT  Goal: Minimal or absence of nausea and/or vomiting  Description: INTERVENTIONS:  - Administer IV fluids if ordered to ensure adequate hydration  - Maintain NPO status until  nausea and vomiting are resolved  - Nasogastric tube if ordered  - Administer ordered antiemetic medications as needed  - Provide nonpharmacologic comfort measures as appropriate  - Advance diet as tolerated, if ordered  - Consider nutrition services referral to assist patient with adequate nutrition and appropriate food choices  Outcome: Progressing  Goal: Maintains or returns to baseline bowel function  Description: INTERVENTIONS:  - Assess bowel function  - Encourage oral fluids to ensure adequate hydration  - Administer IV fluids if ordered to ensure adequate hydration  - Administer ordered medications as needed  - Encourage mobilization and activity  - Consider nutritional services referral to assist patient with adequate nutrition and appropriate food choices  Outcome: Progressing  Goal: Maintains adequate nutritional intake  Description: INTERVENTIONS:  - Monitor percentage of each meal consumed  - Identify factors contributing to decreased intake, treat as appropriate  - Assist with meals as needed  - Monitor I&O, weight, and lab values if indicated  - Obtain nutrition services referral as needed  Outcome: Progressing     Problem: SKIN/TISSUE INTEGRITY - ADULT  Goal: Skin Integrity remains intact(Skin Breakdown Prevention)  Description: Assess:  -Perform Дмитрий assessment every shift  -Clean and moisturize skin every shift/prn  -Inspect skin when repositioning, toileting, and assisting with ADLS  -Assess extremities for adequate circulation and sensation     Bed Management:  -Have minimal linens on bed & keep smooth, unwrinkled  -Change linens as needed when moist or perspiring  -Avoid sitting or lying in one position for more than 2 hours while in bed  -Keep HOB at 30 degrees     Toileting:  -Offer bedside commode  -Assess for incontinence every shift  -Use incontinent care products after each incontinent episode such as radha wipes    Activity:  -Mobilize patient 3 times a day  -Encourage activity and walks  on unit  -Encourage or provide ROM exercises   -Turn and reposition patient every 2 Hours  -Use appropriate equipment to lift or move patient in bed  -Instruct/ Assist with weight shifting every 4 hours when out of bed in chair  -Consider limitation of chair time 6 hour intervals    Skin Care:  -Avoid use of baby powder, tape, friction and shearing, hot water or constrictive clothing  -Relieve pressure over bony prominences using foam wedges/pillows  -Do not massage red bony areas      Outcome: Progressing  Goal: Incision(s), wounds(s) or drain site(s) healing without S/S of infection  Description: INTERVENTIONS  - Assess and document dressing, incision, wound bed, drain sites and surrounding tissue  - Provide patient and family education  - Perform skin care/dressing changes every shift/per order  Outcome: Progressing  Goal: Pressure injury heals and does not worsen  Description: Interventions:  - Implement low air loss mattress or specialty surface (Criteria met)  - Apply silicone foam dressing  - Instruct/assist with weight shifting every 60 minutes when in chair   - Limit chair time to 6 hour intervals  - Use special pressure reducing interventions such as waffle cushion when in chair   - Apply fecal or urinary incontinence containment device   - Perform passive or active ROM every 2 hours  - Turn and reposition patient & offload bony prominences every 2 hours   - Utilize friction reducing device or surface for transfers   - Use incontinent care products after each incontinent episode such as radha wipes  - Consider nutrition services referral as needed  Outcome: Progressing     Problem: Prexisting or High Potential for Compromised Skin Integrity  Goal: Skin integrity is maintained or improved  Description: INTERVENTIONS:  - Identify patients at risk for skin breakdown  - Assess and monitor skin integrity  - Assess and monitor nutrition and hydration status  - Monitor labs   - Assess for incontinence   - Turn and  reposition patient  - Assist with mobility/ambulation  - Relieve pressure over bony prominences  - Avoid friction and shearing  - Provide appropriate hygiene as needed including keeping skin clean and dry  - Evaluate need for skin moisturizer/barrier cream  - Collaborate with interdisciplinary team   - Patient/family teaching  - Consider wound care consult   Outcome: Progressing     Problem: Nutrition/Hydration-ADULT  Goal: Nutrient/Hydration intake appropriate for improving, restoring or maintaining nutritional needs  Description: Monitor and assess patient's nutrition/hydration status for malnutrition. Collaborate with interdisciplinary team and initiate plan and interventions as ordered.  Monitor patient's weight and dietary intake as ordered or per policy. Utilize nutrition screening tool and intervene as necessary. Determine patient's food preferences and provide high-protein, high-caloric foods as appropriate.     INTERVENTIONS:  - Monitor oral intake, urinary output, labs, and treatment plans  - Assess nutrition and hydration status and recommend course of action  - Evaluate amount of meals eaten  - Assist patient with eating if necessary   - Allow adequate time for meals  - Recommend/ encourage appropriate diets, oral nutritional supplements, and vitamin/mineral supplements  - Order, calculate, and assess calorie counts as needed  - Recommend, monitor, and adjust tube feedings and TPN/PPN based on assessed needs  - Assess need for intravenous fluids  - Provide specific nutrition/hydration education as appropriate  - Include patient/family/caregiver in decisions related to nutrition  Outcome: Progressing

## 2024-03-03 NOTE — SEPSIS NOTE
"  Sepsis Note   Darlyn Nguyen 78 y.o. female MRN: [id number]  Unit/Bed#: [facility] 206 Encounter: [id number]       Initial Sepsis Screening       Row Name 03/03/24 1004                Is the patient's history suggestive of a new or worsening infection? Yes (Proceed)  -PL        Suspected source of infection pneumonia;urinary tract infection  -PL        Indicate SIRS criteria Tachycardia > 90 bpm;Leukocytosis (WBC > 04682 IJL) OR Leukopenia (WBC <4000 IJL) OR Bandemia (WBC >10% bands)  -PL        Are two or more of the above signs & symptoms of infection both present and new to the patient? Yes (Proceed)  -PL        Assess for evidence of organ dysfunction: Are any of the below criteria present within 6 hours of suspected infection and SIRS criteria that are NOT considered to be chronic conditions? Lactate >/equal 4.0  -PL        Date of presentation of septic shock --        Time of presentation of septic shock --        Fluid Resuscitation: 30 ml/kg IV fluid bolus will be given based on actual body weight  -PL        Is the patient is persistently hypotensive in the hour after fluid bolus administration? If yes, patient meets criteria for vasopressor use. NO  -PL        Sepsis Note: Click \"NEXT\" below (NOT \"close\") to generate sepsis note based on above information. YES (proceed by clicking \"NEXT\")  -PL                  User Key  (r) = Recorded By, (t) = Taken By, (c) = Cosigned By      Initials Name Provider Type    PL Tasha Cee MD Physician                    Default Flowsheet Data (last 720 hours)       Sepsis Reassess       Row Name 03/03/24 1155                   Repeat Volume Status and Tissue Perfusion Assessment Performed    Date of Reassessment: 03/03/24  -PL        Time of Reassessment: 1155  -PL        Sepsis Reassessment Note: Click \"NEXT\" below (NOT \"close\") to generate sepsis reassessment note. YES (proceed by clicking \"NEXT\")  -PL        Repeat Volume Status and Tissue Perfusion Assessment " Performed --                  User Key  (r) = Recorded By, (t) = Taken By, (c) = Cosigned By      Initials Name Provider Type    PL Tasha Cee MD Physician                    Body mass index is 26.53 kg/m².  Wt Readings from Last 1 Encounters:   03/03/24 70.1 kg (154 lb 8.7 oz)        Ideal body weight: 54.7 kg (120 lb 9.5 oz)  Adjusted ideal body weight: 60.9 kg (134 lb 2.8 oz)

## 2024-03-03 NOTE — ASSESSMENT & PLAN NOTE
Positive prior to admission  No signs of overt GI bleeding  Presents with mild drop in hemoglobin from 7.5 to 7  GI consulted; we appreciate their recommendations  Monitor stool output and blood counts - will need colonoscopy  On heparin infusion for recent history of DVT - on Eliquis at home

## 2024-03-03 NOTE — SEPSIS NOTE
"  Sepsis Note   Darlyn Nguyen 78 y.o. female MRN: [id number]  Unit/Bed#: 406-01 Encounter: [id number]       Initial Sepsis Screening       Row Name 03/03/24 1004                Is the patient's history suggestive of a new or worsening infection? Yes (Proceed)  -PL        Suspected source of infection pneumonia;urinary tract infection  -PL        Indicate SIRS criteria Tachycardia > 90 bpm;Leukocytosis (WBC > 38123 IJL) OR Leukopenia (WBC <4000 IJL) OR Bandemia (WBC >10% bands)  -PL        Are two or more of the above signs & symptoms of infection both present and new to the patient? Yes (Proceed)  -PL        Assess for evidence of organ dysfunction: Are any of the below criteria present within 6 hours of suspected infection and SIRS criteria that are NOT considered to be chronic conditions? Lactate >/equal 4.0  -PL        Date of presentation of septic shock --        Time of presentation of septic shock --        Fluid Resuscitation: 30 ml/kg IV fluid bolus will be given based on actual body weight  -PL        Is the patient is persistently hypotensive in the hour after fluid bolus administration? If yes, patient meets criteria for vasopressor use. NO  -PL        Sepsis Note: Click \"NEXT\" below (NOT \"close\") to generate sepsis note based on above information. YES (proceed by clicking \"NEXT\")  -PL                  User Key  (r) = Recorded By, (t) = Taken By, (c) = Cosigned By      Initials Name Provider Type    PL Tasha Cee MD Physician                        Body mass index is 25.35 kg/m².  Wt Readings from Last 1 Encounters:   02/29/24 67 kg (147 lb 11.3 oz)        Ideal body weight: 54.7 kg (120 lb 9.5 oz)  Adjusted ideal body weight: 59.6 kg (131 lb 7 oz)    "

## 2024-03-03 NOTE — ASSESSMENT & PLAN NOTE
Due to sepsis, lactic acidosis  Undergoing sepsis treatment with IV fluids, pressor therapy  Nephrology consult appreciated

## 2024-03-03 NOTE — ASSESSMENT & PLAN NOTE
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

## 2024-03-03 NOTE — ASSESSMENT & PLAN NOTE
Baseline hgb around 9.0  History of Lupus, treated with Mycophenolate and Hydroxychloroquine  Was recently worked up extensively for acute on chronic anemia at [facility] without significant cause (iron panel showed ferritin 2,238, TIBC <210, TSAT unable to be calculated; B12 5,327)  Recent methemoglobinemia secondary Dapsone, which was discontinued  When discharged from [facility] was at 7.5, on admission around 7 range; s/p 2 units  Recently Macrocytic  Folate level there was slightly low, was receiving replacement  EGD showed mild gastritis on biopsies  Hx of hematochezia, though not currently  On Eliquis at home for history of b/l LE DVT - was held, now on heparin infusion  Continue to trend Hgb; no overt bleeding noted per SNF staff but FOBT returned positive

## 2024-03-03 NOTE — PLAN OF CARE
Problem: PAIN - ADULT  Goal: Verbalizes/displays adequate comfort level or baseline comfort level  Description: Interventions:  - Encourage patient to monitor pain and request assistance  - Assess pain using appropriate pain scale  - Administer analgesics based on type and severity of pain and evaluate response  - Implement non-pharmacological measures as appropriate and evaluate response  - Consider cultural and social influences on pain and pain management  - Notify physician/advanced practitioner if interventions unsuccessful or patient reports new pain  Outcome: Progressing     Problem: INFECTION - ADULT  Goal: Absence or prevention of progression during hospitalization  Description: INTERVENTIONS:  - Assess and monitor for signs and symptoms of infection  - Monitor lab/diagnostic results  - Monitor all insertion sites, i.e. indwelling lines, tubes, and drains  - Monitor endotracheal if appropriate and nasal secretions for changes in amount and color  - [place] appropriate cooling/warming therapies per order  - Administer medications as ordered  - Instruct and encourage patient and family to use good hand hygiene technique  - Identify and instruct in appropriate isolation precautions for identified infection/condition  Outcome: Progressing  Goal: Absence of fever/infection during neutropenic period  Description: INTERVENTIONS:  - Monitor WBC    Outcome: Progressing     Problem: SAFETY ADULT  Goal: Patient will remain free of falls  Description: INTERVENTIONS:  - Educate patient/family on patient safety including physical limitations  - Instruct patient to call for assistance with activity   - Consult OT/PT to assist with strengthening/mobility   - Keep Call bell within reach  - Keep bed low and locked with side rails adjusted as appropriate  - Keep care items and personal belongings within reach  - Initiate and maintain comfort rounds  - Make Fall Risk Sign visible to staff  - Offer Toileting every 2 Hours,  in advance of need  - Initiate/Maintain bed/chair alarm  - Obtain necessary fall risk management equipment: non skid socks  - Apply yellow socks and bracelet for high fall risk patients  - Consider moving patient to room near nurses station  Outcome: Progressing  Goal: Maintain or return to baseline ADL function  Description: INTERVENTIONS:  -  Assess patient's ability to carry out ADLs; assess patient's baseline for ADL function and identify physical deficits which impact ability to perform ADLs (bathing, care of mouth/teeth, toileting, grooming, dressing, etc.)  - Assess/evaluate cause of self-care deficits   - Assess range of motion  - Assess patient's mobility; develop plan if impaired  - Assess patient's need for assistive devices and provide as appropriate  - Encourage maximum independence but intervene and supervise when necessary  - Involve family in performance of ADLs  - Assess for home care needs following discharge   - Consider OT consult to assist with ADL evaluation and planning for discharge  - Provide patient education as appropriate  Outcome: Progressing  Goal: Maintains/Returns to pre admission functional level  Description: INTERVENTIONS:  - Perform AM-PAC 6 Click Basic Mobility/ Daily Activity assessment daily.  - Set and communicate daily mobility goal to care team and patient/family/caregiver.   - Collaborate with rehabilitation services on mobility goals if consulted  - Perform Range of Motion 3 times a day.  - Reposition patient every 2 hours.  - Dangle patient 3 times a day  - Stand patient 3 times a day  - Ambulate patient 3 times a day  - Out of bed to chair 3 times a day   - Out of bed for meals 3 times a day  - Out of bed for toileting  - Record patient progress and toleration of activity level   Outcome: Progressing     Problem: DISCHARGE PLANNING  Goal: Discharge to home or other facility with appropriate resources  Description: INTERVENTIONS:  - Identify barriers to discharge  w/patient and caregiver  - Arrange for needed discharge resources and transportation as appropriate  - Identify discharge learning needs (meds, wound care, etc.)  - Arrange for interpretive services to assist at discharge as needed  - Refer to Case Management Department for coordinating discharge planning if the patient needs post-hospital services based on physician/advanced practitioner order or complex needs related to functional status, cognitive ability, or social support system  Outcome: Progressing     Problem: Knowledge Deficit  Goal: Patient/family/caregiver demonstrates understanding of disease process, treatment plan, medications, and discharge instructions  Description: Complete learning assessment and assess knowledge base.  Interventions:  - Provide teaching at level of understanding  - Provide teaching via preferred learning methods  Outcome: Progressing     Problem: CARDIOVASCULAR - ADULT  Goal: Maintains optimal cardiac output and hemodynamic stability  Description: INTERVENTIONS:  - Monitor I/O, vital signs and rhythm  - Monitor for S/S and trends of decreased cardiac output  - Administer and titrate ordered vasoactive medications to optimize hemodynamic stability  - Assess quality of pulses, skin color and temperature  - Assess for signs of decreased coronary artery perfusion  - Instruct patient to report change in severity of symptoms  Outcome: Progressing  Goal: Absence of cardiac dysrhythmias or at baseline rhythm  Description: INTERVENTIONS:  - Continuous cardiac monitoring, vital signs, obtain 12 lead EKG if ordered  - Administer antiarrhythmic and heart rate control medications as ordered  - Monitor electrolytes and administer replacement therapy as ordered  Outcome: Progressing     Problem: GASTROINTESTINAL - ADULT  Goal: Minimal or absence of nausea and/or vomiting  Description: INTERVENTIONS:  - Administer IV fluids if ordered to ensure adequate hydration  - Maintain NPO status until  nausea and vomiting are resolved  - Nasogastric tube if ordered  - Administer ordered antiemetic medications as needed  - Provide nonpharmacologic comfort measures as appropriate  - Advance diet as tolerated, if ordered  - Consider nutrition services referral to assist patient with adequate nutrition and appropriate food choices  Outcome: Progressing  Goal: Maintains or returns to baseline bowel function  Description: INTERVENTIONS:  - Assess bowel function  - Encourage oral fluids to ensure adequate hydration  - Administer IV fluids if ordered to ensure adequate hydration  - Administer ordered medications as needed  - Encourage mobilization and activity  - Consider nutritional services referral to assist patient with adequate nutrition and appropriate food choices  Outcome: Progressing  Goal: Maintains adequate nutritional intake  Description: INTERVENTIONS:  - Monitor percentage of each meal consumed  - Identify factors contributing to decreased intake, treat as appropriate  - Assist with meals as needed  - Monitor I&O, weight, and lab values if indicated  - Obtain nutrition services referral as needed  Outcome: Progressing     Problem: SKIN/TISSUE INTEGRITY - ADULT  Goal: Skin Integrity remains intact(Skin Breakdown Prevention)  Description: Assess:  -Perform Дмитрий assessment every shift  -Clean and moisturize skin every shift/prn  -Inspect skin when repositioning, toileting, and assisting with ADLS  -Assess extremities for adequate circulation and sensation     Bed Management:  -Have minimal linens on bed & keep smooth, unwrinkled  -Change linens as needed when moist or perspiring  -Avoid sitting or lying in one position for more than 2 hours while in bed  -Keep HOB at 30 degrees     Toileting:  -Offer bedside commode  -Assess for incontinence every shift  -Use incontinent care products after each incontinent episode such as radha wipes    Activity:  -Mobilize patient 3 times a day  -Encourage activity and walks  on unit  -Encourage or provide ROM exercises   -Turn and reposition patient every 2 Hours  -Use appropriate equipment to lift or move patient in bed  -Instruct/ Assist with weight shifting every 4 hours when out of bed in chair  -Consider limitation of chair time 6 hour intervals    Skin Care:  -Avoid use of baby powder, tape, friction and shearing, hot water or constrictive clothing  -Relieve pressure over bony prominences using foam wedges/pillows  -Do not massage red bony areas      Outcome: Progressing  Goal: Incision(s), wounds(s) or drain site(s) healing without S/S of infection  Description: INTERVENTIONS  - Assess and document dressing, incision, wound bed, drain sites and surrounding tissue  - Provide patient and family education  - Perform skin care/dressing changes every shift/per order  Outcome: Progressing  Goal: Pressure injury heals and does not worsen  Description: Interventions:  - Implement low air loss mattress or specialty surface (Criteria met)  - Apply silicone foam dressing  - Instruct/assist with weight shifting every 60 minutes when in chair   - Limit chair time to 6 hour intervals  - Use special pressure reducing interventions such as waffle cushion when in chair   - Apply fecal or urinary incontinence containment device   - Perform passive or active ROM every 2 hours  - Turn and reposition patient & offload bony prominences every 2 hours   - Utilize friction reducing device or surface for transfers   - Use incontinent care products after each incontinent episode such as radha wipes  - Consider nutrition services referral as needed  Outcome: Progressing     Problem: Prexisting or High Potential for Compromised Skin Integrity  Goal: Skin integrity is maintained or improved  Description: INTERVENTIONS:  - Identify patients at risk for skin breakdown  - Assess and monitor skin integrity  - Assess and monitor nutrition and hydration status  - Monitor labs   - Assess for incontinence   - Turn and  reposition patient  - Assist with mobility/ambulation  - Relieve pressure over bony prominences  - Avoid friction and shearing  - Provide appropriate hygiene as needed including keeping skin clean and dry  - Evaluate need for skin moisturizer/barrier cream  - Collaborate with interdisciplinary team   - Patient/family teaching  - Consider wound care consult   Outcome: Progressing     Problem: Nutrition/Hydration-ADULT  Goal: Nutrient/Hydration intake appropriate for improving, restoring or maintaining nutritional needs  Description: Monitor and assess patient's nutrition/hydration status for malnutrition. Collaborate with interdisciplinary team and initiate plan and interventions as ordered.  Monitor patient's weight and dietary intake as ordered or per policy. Utilize nutrition screening tool and intervene as necessary. Determine patient's food preferences and provide high-protein, high-caloric foods as appropriate.     INTERVENTIONS:  - Monitor oral intake, urinary output, labs, and treatment plans  - Assess nutrition and hydration status and recommend course of action  - Evaluate amount of meals eaten  - Assist patient with eating if necessary   - Allow adequate time for meals  - Recommend/ encourage appropriate diets, oral nutritional supplements, and vitamin/mineral supplements  - Order, calculate, and assess calorie counts as needed  - Recommend, monitor, and adjust tube feedings and TPN/PPN based on assessed needs  - Assess need for intravenous fluids  - Provide specific nutrition/hydration education as appropriate  - Include patient/family/caregiver in decisions related to nutrition  Outcome: Progressing

## 2024-03-03 NOTE — ASSESSMENT & PLAN NOTE
Pressure wounds of sacrum and bilateral buttocks (present for 3-4 months, per ), likely 2/2 ambulatory dysfunction  Consulted General Surgery & Wound care nurse; we appreciate their recommendations.  No current need for debridement  Multiple organisms on wound cultures, receiving broad spectrum Abx

## 2024-03-03 NOTE — ASSESSMENT & PLAN NOTE
Patient met sepsis criteria this morning 3/3/2024 with leukocytosis 18 and tachycardia 100  Sepsis alert was called  Suspected sources include pneumonia, bacteremia, colitis with other potential sources including urine and sacral wound  Lactic acid 7.1, hypotension despite 30 mL/kg IVF resuscitation   Blood cultures drawn  Patient already on broad spectrum antibiotics with blood cultures from admission with Enterococcus avium receiving Linezolid; already on cefepime for MDR pseudomonas on Wound cultures, psedomonas on urine culture  Changed Flagyl from p.o. to IV, added meropenem to augment Pseudomonas coverage  Due to persistent hypotension despite IV fluid boluses, initiated on Levophed, vasopressin  Intubated and sedated  Being transferred to [facility] ICU, patient accepted under the critical care service Dr. Carey

## 2024-03-03 NOTE — ASSESSMENT & PLAN NOTE
Seen on CXR and CT  Urinary antigens negative. Procalcitonin elevated (0.76)  Patient is now in septic shock  Antibiotics broadened to cefepime, meropenem, linezolid based on multiple cultures

## 2024-03-03 NOTE — ASSESSMENT & PLAN NOTE
Secondary to pneumonia, possibly also due to fluid overload due to initiating septic shock fluid resuscitation protocol  Status post successful intubation 3/3/2024  Please refer to above under septic shock  Patient is being transferred to [facility] critical care

## 2024-03-03 NOTE — H&P
Critical access hospital  H&P  Name: Darlyn Nguyen 78 y.o. female I MRN: [id number]  Unit/Bed#: ICU 01 I Date of Admission: 3/3/2024   Date of Service: 3/3/2024 I Hospital Day: 0      Assessment/Plan   * Acute respiratory failure with hypoxia (HCC)  Assessment & Plan  CXR 2/29/24: Right lung opacities, suspicious for pneumonia in the appropriate clinical setting.  CT abdomen pelvis 2/29/2024: LOWER CHEST: Mild right basilar patchy consolidation #2/5 suspicious for pneumonia.  Procal elevated and uptrending to 0.71 on 3/1/24    Pneumonia suspected at admission.   CXR on 3/3 reviewed and appears to show worsening RUL consolidation, which may account for acutely worsening respiratory function.    Continue abx  Repeat procal in AM  Continue vent: Current settings AC 14/420/60/6  Daily SBT for early vent liberation    Acute GI bleeding  Assessment & Plan  Recent persistent diarrhea  FOBT positive minors  Small bloody bowel movement on presentation to [facility]  Acute drop in hemoglobin from 8.0 morning of 3/3/2024 to 6.2 approximately 12 hours later  Recent CT A/P demonstrating colitis  No history of significant GI bleed per family  Family reports colonoscopy approximately 1 year ago without significant findings    PTT supratherapeutic >210    Would consider that GI bleed may resolve spontaneously as PTT normalizes off Hep    Transfuse 2 units PRBC now  Hold heparin GTT, started in place of home Eliquis for DVT found on 2/20/2024  Give Protonix 80 mg IV now, followed by Protonix 40 mg IV Q12H  Consider FFP if persistent bleeding  GI consulted, appreciate recommendations    Shock (HCC)  Assessment & Plan  Multiple possible etiologies, including hemorrhagic shock in the setting of acute GI bleed, septic shock in the setting of Enterococcus bacteremia, and adrenal insufficiency in the setting of chronic steroid use and acute illness    Continue levo and vaso and wean as able for MAP goal >=65  Continue  Solucortef    Bacteremia due to Enterococcus  Assessment & Plan  Enterococcus avium bacteremia. Pt recently started on atovaquone, hydroxychloroquine, and mycophenolate. Would consider possible gut translocation vs diarrhea contaminating sacral wound.     E. avium associated with increased risk of vancomycin resistance    Repeat cultures show NGTD    Continue linezolid   ID consulted, appreciate recommendations    Diabetes mellitus (HCC)  Assessment & Plan  Lab Results   Component Value Date    HGBA1C 5.9 (H) 03/03/2024       Recent Labs     03/01/24  0435 03/03/24  1155 03/03/24  1703 03/03/24  2040   POCGLU 214* 479* 312* 278*       Blood Sugar Average: Last 72 hrs:  (P) 295    History of DM, well-controlled with last A1c 5.6 on 2/26, but 5.9 today. Developed hyperglycemia with initiation of high dose steroids.    Start ISS Q6H while NPO  Fingersticks Q6H while NPO  Avoid hypoglycemia per protocol  Monitor BG and insulin requirements and adjust regimen as needed    Metabolic acidosis with normal anion gap  Assessment & Plan  pH 7.345; pCO2 38.3; Bicarb 20.4    Suspect due to GI bicarb losses with recent persistent diarrhea    Sacral wound  Assessment & Plan  Repositioning and wound care with nursing    Lupus (HCC)  Assessment & Plan  Diagnosed in December,   Initially started on steroids prednisone 60 mg daily which patient had tapered to 30 mg daily prior to admission with the addition of atovaquone, hydroxychloroquine, and mycophenolate    Hold atovaquone, hydroxychloroquine, and mycophenolate in the setting of acute infection with shock  Patient started on Solu-Cortef prior to discharge from [facility] for possible relative adrenal insufficiency contributing to shock state.    Continue Solu-Cortef, given shock in the setting of chronic steroid use           -------------------------------------------------------------------------------------------------------------  Chief Complaint: Respiratory failure  requiring intubation and transfer for ICU care    History of Present Illness   HX and PE limited by: patient intubated and sedated  Darlyn Nguyen is a 78 y.o. 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  Initial evaluation demonstrated evidence of colitis, pneumonia and a mild drop of her hemoglobin from prior.  The patient received 2 units PRBC on admission and was started on broad-spectrum antibiotics.  Patient's pneumonia workup was unrevealing.  Her sacral ulcer wound cultures were multi microbial including E. coli, Proteus mirabilis and Pseudomonas aeruginosa MDR.  Her urine cultures were also noted for low count Pseudomonas, 10,000-19,000 CFU.  Patient's diarrhea persisted throughout her hospitalization.  Blood cultures growing Enterococcus avium in 1 of 2 sets.  The patient was noted for poor oral intake and overall clinical deterioration.  Her antibiotic therapy initially consisted of ceftriaxone and Flagyl, broadened to cefepime, Flagyl and IV vancomycin with vancomycin then replaced with linezolid.    On 3/3/2024 the patient was noted to meet sepsis criteria with leukocytosis and tachycardia, lactic acid level at 7.1 consistent with septic shock. The patient was initiated on fluid resuscitation however developed hypotension following IV fluids per sepsis protocol with 30 mL/kg.  She was initiated on Levophed. The patient started to develop respiratory failure and worsening mental status and was intubated. Central line was placed and vasopressin was added.  Meropenem was also added to therapy.  Arrangements were made for transfer to [facility] ICU where she was accepted under critical care service Dr. Carey.  Pt arrived to the  [facility] ICU intubated, on Levophed 25, vasopression 0.04, and heparin drip. BP in the hypertensive range, so levo weaned to 15. Noted to have small bloody bowel movement upon arrival and bloody secretions suctioned from ETT. Hgb 6.2 from 8.0 this morning, so heparin drip held and GI consulted. PTT subsequently resulted supratherapeutic. Propofol initiated for sedation.  Family seen at bedside. Report that patient had been independent and in good health as recently as October 2023, but has declined since that time. Was diagnosed with Lupus in December 2023. Has been predominantly confined to the couch in recent months, and has developed painful sacral ulcer, exacerbated by recent diarrhea. They note a family history of GI cancers in multiple family members, including colon cancer in a sister and gastric cancer in a nephew. They deny any prior history of significant GI bleed, though her spouse does believe she's had episodes hemorrhoidal bleeding in the past.   With respect to Enterococcus avium infection, pt's daughter and son-in-law keep chickens, but state that patient is not exposed to these or other birds.   Reviewed home medications with patient's spouse. While he does not recognize Eliquis as a home med, this is likely because this medication appears to have been started on 2/24/24 following discovery of acute BLE DVTs on 2/20/24.      History obtained from chart review and family (spouse, daughter, son-in-law).  -------------------------------------------------------------------------------------------------------------  Dispo: Admit to Critical Care     Code Status: Level 2 - DNAR: but accepts endotracheal intubation  --------------------------------------------------------------------------------------------------------------  Review of Systems    Review of systems was unable to be performed secondary to intubation and sedation    Physical Exam  Vitals reviewed.   Constitutional:       General: She is not  in acute distress.     Appearance: She is ill-appearing. She is not toxic-appearing or diaphoretic.      Comments: RASS -2 to -1   HENT:      Head: Normocephalic and atraumatic.   Neck:      Comments: [facility] CVC in place  Cardiovascular:      Rate and Rhythm: Normal rate and regular rhythm.      Pulses: Normal pulses.      Comments: Faint heart sounds. No murmurs, rubs, or gallops appreciated  Pulmonary:      Breath sounds: Wheezing (intermittent late inspiratory wheezes) present.      Comments: Intubated. Intermittent late-inspiratory wheezes appreciated. Coarse breath sounds appreciated throughout may reflect fluid in the tubing  Abdominal:      General: There is no distension.      Palpations: Abdomen is soft.      Comments: Hypoactive bowel sounds   Musculoskeletal:      Right lower leg: Edema present.      Left lower leg: Edema present.   Skin:     General: Skin is warm and dry.       --------------------------------------------------------------------------------------------------------------  Vitals:   Vitals:    03/03/24 2029 03/03/24 2030 03/03/24 2036 03/03/24 2052   BP: 132/60 132/60 127/58 119/64   Pulse: 65 62 64 66   Resp: 14 14 14 15   Temp: 97.9 °F (36.6 °C) 97.9 °F (36.6 °C) 97.9 °F (36.6 °C) 97.5 °F (36.4 °C)   SpO2:  99% 100% 100%     Temp  Min: 97.2 °F (36.2 °C)  Max: 100.1 °F (37.8 °C)        There is no height or weight on file to calculate BMI.    Laboratory and Diagnostics:  Results from last 7 days   Lab Units 03/03/24  1640 03/03/24  0404 03/02/24  0658 03/01/24  0542 02/29/24  1804 02/29/24  1226 02/26/24  1134 02/26/24  0625   WBC Thousand/uL 26.30* 18.38* 21.27* 12.97*  --  8.72 9.81 8.63   HEMOGLOBIN g/dL 6.2* 8.0* 8.8* 9.0* 9.3* 7.0* 7.5* 7.4*   HEMATOCRIT % 20.3* 27.2* 28.3* 29.0* 30.5* 23.7* 24.5* 23.6*   PLATELETS Thousands/uL 284 333 332 325  --  315 236 238   NEUTROS PCT % 91* 91*  --   --   --   --   --   --    BANDS PCT %  --   --   --  11*  --  5  --  7   MONOS PCT % 2* 4  --   --    --   --   --   --    MONO PCT %  --   --  3* 6  --  5  --  4   EOS PCT % 0 0 0 0  --  0  --  0     Results from last 7 days   Lab Units 03/03/24  1640 03/03/24  1149 03/03/24  0404 03/02/24  0658 03/01/24  0542 02/29/24  1226 02/26/24  0625   SODIUM mmol/L 138 137 138 137 135 132* 133*   POTASSIUM mmol/L 3.8 4.1 4.2 4.6 4.5 4.8 3.6   CHLORIDE mmol/L 108 106 106 104 102 101 101   CO2 mmol/L 22 20* 22 26 25 23 25   ANION GAP mmol/L 8 11 10 7 8 8 7   BUN mg/dL 35* 36* 36* 34* 42* 58* 25   CREATININE mg/dL 0.89 0.92 0.85 0.84 0.87 1.10 0.89   CALCIUM mg/dL 7.7* 7.7* 8.5 8.8 8.7 8.6 8.3*   GLUCOSE RANDOM mg/dL 302* 420* 447* 315* 170* 272* 356*   ALT U/L 109* 57* 18 12 15 15 16   AST U/L 156* 66* 13 8* 11* 13 10*   ALK PHOS U/L 65 59 70 75 65 59 53   ALBUMIN g/dL 2.7* 1.9* 2.3* 2.6* 2.6* 2.4* 2.4*   TOTAL BILIRUBIN mg/dL 0.83 0.61 0.61 0.51 0.71 0.72 0.87     Results from last 7 days   Lab Units 03/03/24 1640 03/01/24  0542 02/29/24  1226   MAGNESIUM mg/dL 1.9 2.0 2.0   PHOSPHORUS mg/dL 2.4  --   --       Results from last 7 days   Lab Units 03/03/24  1640 03/03/24  0823 03/03/24  0152 03/02/24  1917 03/02/24  0658 03/01/24  2159 03/01/24  1438 02/29/24  1226   INR  1.86*  --   --   --   --   --  1.20* 1.95*   PTT seconds >210* >210* 34 27 113* >210* 29 31          Results from last 7 days   Lab Units 03/03/24  1313 03/03/24  1019 02/29/24  1526 02/29/24  1226   LACTIC ACID mmol/L 4.5* 7.1* 1.8 4.1*     ABG:  Results from last 7 days   Lab Units 03/03/24  1401   PH ART  7.345*   PCO2 ART mm Hg 38.3   PO2 ART mm Hg 117.8   HCO3 ART mmol/L 20.4*   BASE EXC ART mmol/L -4.8   ABG SOURCE  Radial, Left     VBG:  Results from last 7 days   Lab Units 03/03/24  1401   ABG SOURCE  Radial, Left     Results from last 7 days   Lab Units 03/01/24  0542 02/29/24  1226   PROCALCITONIN ng/ml 0.76* 0.61*       Micro:  Results from last 7 days   Lab Units 03/03/24  1148 03/03/24  1019 03/02/24  0823 03/02/24  0700 02/29/24  2118  02/29/24  1226 02/29/24  1027   BLOOD CULTURE  Received in Microbiology Lab. Culture in Progress. Received in Microbiology Lab. Culture in Progress. No Growth at 24 hrs. No Growth at 24 hrs.  --  No Growth at 72 hrs.  Enterococcus avium*  --    GRAM STAIN RESULT   --   --   --   --   --  Gram positive cocci in pairs* Rare Gram negative rods*  Rare Gram positive cocci in pairs*  No polys seen*   URINE CULTURE   --   --   --   --  10,000-19,000 cfu/ml Pseudomonas aeruginosa*  --   --    WOUND CULTURE   --   --   --   --   --   --  3+ Growth of Escherichia coli*  3+ Growth of Proteus mirabilis*  3+ Growth of Pseudomonas aeruginosa MDR*  2+ Growth of   MRSA CULTURE ONLY   --   --   --   --  No Methicillin Resistant Staphlyococcus aureus (MRSA) isolated  --   --    LEGIONELLA URINARY ANTIGEN   --   --   --   --  Negative  --   --    STREP PNEUMONIAE ANTIGEN, URINE   --   --   --   --  Negative  --   --        EKG: NSR HR 62  Imaging: I have personally reviewed pertinent reports.   and I have personally reviewed pertinent films in PACS      Historical Information   Past Medical History:   Diagnosis Date    Diabetes mellitus (HCC)     Fibromyalgia     Hyperlipidemia     Hypertension      Past Surgical History:   Procedure Laterality Date    ACHILLES TENDON REPAIR Right     ADENOIDECTOMY      APPENDECTOMY      SKIN CANCER EXCISION      x 4 - back    TONSILLECTOMY      TOTAL ABDOMINAL HYSTERECTOMY      US GUIDED KIDNEY BIOPSY  12/26/2023     Social History   Social History     Substance and Sexual Activity   Alcohol Use Not Currently     Social History     Substance and Sexual Activity   Drug Use Never     Social History     Tobacco Use   Smoking Status Never   Smokeless Tobacco Never     Family History:   Family History   Problem Relation Age of Onset    Cancer Mother     Cervical cancer Sister     Breast cancer Sister     Breast cancer Sister      I have reviewed this patient's family history and commented on  sigificant items within the HPI--multiple family members with history of various GI cancers, including gastric and colon.      Medications:  Current Facility-Administered Medications   Medication Dose Route Frequency    [START ON 3/4/2024] atorvastatin (LIPITOR) tablet 40 mg  40 mg Oral Daily    cefepime (MAXIPIME) IVPB (premix in dextrose) 2,000 mg 50 mL  2,000 mg Intravenous Q12H    chlorhexidine (PERIDEX) 0.12 % oral rinse 15 mL  15 mL Mouth/Throat Q12H CRUZITO    fentaNYL 1000 mcg in sodium chloride 0.9% 100mL infusion  50 mcg/hr Intravenous Continuous    fentanyl citrate (PF) 100 MCG/2ML 50 mcg  50 mcg Intravenous Q2H PRN    [START ON 3/4/2024] folic acid 1 mg, thiamine (VITAMIN B1) 100 mg in sodium chloride 0.9 % 100 mL IV piggyback   Intravenous Daily    insulin lispro (HumALOG/ADMELOG) 100 units/mL subcutaneous injection 1-5 Units  1-5 Units Subcutaneous Q6H [facility]    levalbuterol (XOPENEX) inhalation solution 1.25 mg  1.25 mg Nebulization Q8H PRN    linezolid (ZYVOX) IVPB (premix in dextrose) 600 mg 300 mL  600 mg Intravenous Q12H    metroNIDAZOLE (FLAGYL) IVPB (premix) 500 mg 100 mL  500 mg Intravenous Q8H    NOREPINEPHRINE 4 MG  ML NSS (CMPD ORDER) infusion  1-30 mcg/min Intravenous Titrated    ondansetron (ZOFRAN) injection 4 mg  4 mg Intravenous Q4H PRN    [START ON 3/4/2024] pantoprazole (PROTONIX) injection 40 mg  40 mg Intravenous Q12H [facility]    phytonadione (AQUA-MEPHYTON) 10 mg/mL 10 mg in sodium chloride 0.9 % 50 mL IVPB  10 mg Intravenous Once    vasopressin (PITRESSIN) 20 Units in sodium chloride 0.9 % 100 mL infusion  0.04 Units/min Intravenous Continuous     Home medications:  Prior to Admission Medications   Prescriptions Last Dose Informant Patient Reported? Taking?   Acetaminophen 500 MG Past Month  Yes Yes   Sig: Take 2 capsules by mouth every 6 (six) hours as needed for mild pain   Cholecalciferol 50 MCG (2000 UT) CAPS Past Week  Yes Yes   Sig: Take 1 capsule by mouth daily   Cyanocobalamin  1000 MCG CAPS Past Week  Yes Yes   Sig: Take 1 capsule by mouth daily   FISH OIL-CANOLA OIL-VIT D3 PO Past Week Self Yes Yes   Sig: Use   Hydroxychloroquine Sulfate 300 MG TABS Past Week  Yes Yes   Sig: Take 1 tablet by mouth daily   Nutritional Supplements (Al) PACK Not Taking  Yes No   Sig: Take 1 each by mouth 2 (two) times a day   Patient not taking: Reported on 3/3/2024   TURMERIC PO Not Taking Self Yes No   Sig: Take 1 capsule by mouth daily   Patient not taking: Reported on 2/29/2024   amLODIPine (NORVASC) 5 mg tablet 3/3/2024  Yes Yes   Sig: Take 5 mg by mouth daily   apixaban (ELIQUIS) 5 mg Not Taking  Yes No   Sig: Take 5 mg by mouth 2 (two) times a day   Patient not taking: Reported on 3/3/2024   aspirin (ECOTRIN LOW STRENGTH) 81 mg EC tablet Past Week Self Yes Yes   Sig: Take 81 mg by mouth   atorvastatin (LIPITOR) 40 mg tablet Past Week  Yes Yes   Sig: Take 40 mg by mouth daily   atovaquone (MEPRON) 750 mg/5 mL suspension Past Week  Yes Yes   Sig: Take 750 mg by mouth 2 (two) times a day   carvedilol (Coreg) 6.25 mg tablet Past Week  Yes Yes   Sig: Take 6.25 mg by mouth 2 (two) times a day with meals   co-enzyme Q-10 100 mg capsule Past Week  Yes Yes   Sig: Take 100 mg by mouth daily   escitalopram (LEXAPRO) 10 mg tablet Past Week  Yes Yes   Sig: Take 10 mg by mouth daily   famotidine (PEPCID) 20 mg tablet Past Week  Yes Yes   Sig: Take 20 mg by mouth daily   gabapentin (NEURONTIN) 100 mg capsule Unknown Self Yes No   Patient not taking: Reported on 2/29/2024   insulin lispro (HumALOG/ADMELOG) 100 units/mL injection Not Taking  Yes No   Sig: Inject 2-10 Units under the skin 3 (three) times a day with meals   Patient not taking: Reported on 3/3/2024   insulin lispro (HumALOG/ADMELOG) 100 units/mL injection Not Taking  Yes No   Sig: Inject 2-10 Units under the skin daily at bedtime   Patient not taking: Reported on 3/3/2024   losartan (COZAAR) 100 MG tablet Past Week  Yes Yes   Sig: Take 100 mg by  "mouth daily   methocarbamol (ROBAXIN) 500 mg tablet Unknown  Yes No   Sig: Take 500 mg by mouth every 12 (twelve) hours as needed for muscle spasms   mirtazapine (REMERON) 7.5 MG tablet Past Week  Yes Yes   Sig: Take 7.5 mg by mouth daily at bedtime   mycophenolate (CELLCEPT) 500 mg tablet Past Week  Yes Yes   Sig: Take 2 tabs twice daily as instructed   oxyCODONE (ROXICODONE) 5 immediate release tablet Not Taking  Yes No   Sig: Take 2.5 mg by mouth every 6 (six) hours as needed for moderate pain   Patient not taking: Reported on 3/3/2024   pantoprazole (PROTONIX) 40 mg tablet Past Week  Yes Yes   Sig: Take 40 mg by mouth 2 (two) times a day   predniSONE 10 mg tablet Past Week  Yes Yes   Sig: Take 60 mg by mouth daily      Facility-Administered Medications: None     Allergies:  Allergies   Allergen Reactions    Rosuvastatin Other (See Comments)     Per [facility]    Simvastatin Other (See Comments)     Per SNF    Myalgias       ------------------------------------------------------------------------------------------------------------  Advance Directive and Living Will: Yes    Power of :    POLST:    ------------------------------------------------------------------------------------------------------------  Anticipated Length of Stay is > 2 midnights    Care Time Delivered:       Uche Blair MD        Portions of the record may have been created with voice recognition software.  Occasional wrong word or \"sound a like\" substitutions may have occurred due to the inherent limitations of voice recognition software.  Read the chart carefully and recognize, using context, where substitutions have occurred   "

## 2024-03-03 NOTE — DISCHARGE SUMMARY
[facility]  Discharge- Darlyn Nguyen 1945, 78 y.o. female MRN: [id number]  Unit/Bed#:  Encounter: [id number]  Primary Care Provider: Yaa Hedrick MD   Date and time admitted to hospital: 2/29/2024 11:32 AM    * Septic shock (HCC)  Assessment & Plan  Patient met sepsis criteria this morning 3/3/2024 with leukocytosis 18 and tachycardia 100  Sepsis alert was called  Suspected sources include pneumonia, bacteremia, colitis with other potential sources including urine and sacral wound  Lactic acid 7.1, hypotension despite 30 mL/kg IVF resuscitation   Blood cultures drawn  Patient already on broad spectrum antibiotics with blood cultures from admission with Enterococcus avium receiving Linezolid; already on cefepime for MDR pseudomonas on Wound cultures, psedomonas on urine culture  Changed Flagyl from p.o. to IV, added meropenem to augment Pseudomonas coverage  Due to persistent hypotension despite IV fluid boluses, initiated on Levophed, vasopressin  Intubated and sedated  Being transferred to [facility] ICU, patient accepted under the critical care service Dr. Carey    Acute respiratory failure with hypoxia (HCC)  Assessment & Plan  Secondary to pneumonia, possibly also due to fluid overload due to initiating septic shock fluid resuscitation protocol  Status post successful intubation 3/3/2024  Please refer to above under septic shock  Patient is being transferred to [facility] critical care    History of diabetes mellitus  Assessment & Plan  Last hemoglobin A1c of 5.6  Patient is now noted for steroid-induced hyperglycemia and initiated on Accu-Cheks, sliding scale    Metabolic acidosis  Assessment & Plan  Due to sepsis, lactic acidosis  Undergoing sepsis treatment with IV fluids, pressor therapy  Nephrology consult appreciated    Bacteremia due to Enterococcus  Assessment & Plan  Preliminary blood cultures with Enterococcus avium  Initiated on  Linezolid for this due to clinical worsening due to hx of vancomycin resistance of this organism  Patient is on broad spectrum Abx due to developing septic shock  2D Echo ordered    Pressure ulcers of skin of multiple topographic sites  Assessment & Plan  Pressure wounds of sacrum and bilateral buttocks (present for 3-4 months, per ), likely 2/2 ambulatory dysfunction  Consulted General Surgery & Wound care nurse; we appreciate their recommendations.  No current need for debridement  Multiple organisms on wound cultures, receiving broad spectrum Abx    DVT (deep venous thrombosis) (HCC)  Assessment & Plan  Diagnosed on 2/20/24 -bilateral lower extremity DVTs with both occlusive and nonocclusive DVTs  Was initiated on Eliquis  Held due to GI bleed  On heparin infusion    Positive fecal occult blood test  Assessment & Plan  Positive prior to admission  No signs of overt GI bleeding  Presents with mild drop in hemoglobin from 7.5 to 7  GI consulted; we appreciate their recommendations  Monitor stool output and blood counts - will need colonoscopy  On heparin infusion for recent history of DVT - on Eliquis at home    Pneumonia of right lower lobe due to infectious organism  Assessment & Plan  Seen on CXR and CT  Urinary antigens negative. Procalcitonin elevated (0.76)  Patient is now in septic shock  Antibiotics broadened to cefepime, meropenem, linezolid based on multiple cultures    Diarrhea of presumed infectious origin  Assessment & Plan  Infectious vs IBD vs microscopic colitis vs SIBO vs side effect of lupus meds  Patient (and ) report this to be ongoing intermittently since October.  C diff negative 10 days ago, stool enteric panel + O&P panel from this admission pending  [facility]  [facility] abdomen does show colon inflammation, improving  GI consulted; we appreciate their recommendations. Patient will need short term colonoscopy  On broad  spectrum Abx    Hypertension  Assessment & Plan  BP stable, continue PTA meds    Lupus (HCC)  Assessment & Plan  Hold cellcept and plaquenil for now.  Will require ID evaluation ASAP  [facility]  Undergoing prednisone taper from recent discharge from [facility] 2/24/24 with the following schedule  Take 6 tablets (60 mg total) by mouth daily for 10 days, then taper by 10 mg daily every 14 days  NPO/intubated  Will start Solu-cortef      Acute on chronic anemia  Assessment & Plan  Baseline hgb around 9.0  History of Lupus, treated with Mycophenolate and Hydroxychloroquine  Was recently worked up extensively for acute on chronic anemia at [facility] without significant cause (iron panel showed ferritin 2,238, TIBC <210, TSAT unable to be calculated; B12 5,327)  Recent methemoglobinemia secondary Dapsone, which was discontinued  When discharged from [facility] was at 7.5, on admission around 7 range; s/p 2 units  Recently Macrocytic  Folate level there was slightly low, was receiving replacement  EGD showed mild gastritis on biopsies  Hx of hematochezia, though not currently  On Eliquis at home for history of b/l LE DVT - was held, now on heparin infusion  Continue to trend Hgb; no overt bleeding noted per SNF staff but FOBT returned positive      Medical Problems       Resolved Problems  Date Reviewed: 3/3/2024   None       Discharging Physician / Practitioner: Tasha Cee MD  PCP: Yaa Hedrick MD  Admission Date:   Admission Orders (From admission, onward)       Ordered        03/01/24 1259  Inpatient Admission  Once            02/29/24 1552  Place in Observation  Once                          Discharge Date: 03/03/24    Consultations During Hospital Stay:  Nephrology, GI    Procedures Performed:   Endotracheal intubation 3/3/24   Central line placement 3/3/24    CT abdomen pelvis with contrast   Final Result by Olman Tierney MD (02/29 1510)      Patchy right  basilar consolidation suspicious for pneumonia.      Persistent fluid throughout the colon similar in appearance to 2/19/2024 with improved areas of mucosal hyperemia and thickening in keeping with an improving colitis.         The study was marked in EPIC for immediate notification.      Workstation performed: [id number]         XR chest 1 view portable   ED Interpretation by Dallin Massey DO (02/29 2097)   Airway is midline. Lungs are clear bilaterally with no evidence of pulmonary vascular congestion/focal infiltrate/pleural effusion/pneumothorax. Cardiac and mediastinal silhouettes are within normal limits. Osseous structures appear normal.        Final Result by Edwar Mehta MD (02/29 1557)      Right lung opacities, suspicious for pneumonia in the appropriate clinical setting.         Resident: RUFINO DAVDI I, the attending radiologist, have reviewed the images and agree with the final report above.      Workstation performed: [id number]         XR chest portable ICU    (Results Pending)         Significant Findings / Test Results:   Results from last 7 days   Lab Units 03/03/24  0404   WBC Thousand/uL 18.38*   HEMOGLOBIN g/dL 8.0*   HEMATOCRIT % 27.2*   PLATELETS Thousands/uL 333     Results from last 7 days   Lab Units 03/03/24  1149   SODIUM mmol/L 137   POTASSIUM mmol/L 4.1   CHLORIDE mmol/L 106   CO2 mmol/L 20*   BUN mg/dL 36*   CREATININE mg/dL 0.92   CALCIUM mg/dL 7.7*         Test Results Pending at Discharge (will require follow up):   Blood cultures     Outpatient Tests Requested:  N/a    Complications: clinical deterioration    Reason for Admission:     Hospital Course:   Darlyn Nguyen is a 78 y.o. female patient with complex medical history including recently diagnosed lupus,  diabetes, methemoglobinemia deemed adverse effect of dapsone which was discontinued, hemolytic anemia, subacute diarrhea, hypertension and sacral ulcer who originally presented to the hospital on 2/29/2024 due  to persistent diarrhea, positive FOBT.  The patient was found to have evidence of colitis, pneumonia and a mild drop of her hemoglobin from prior.  The patient received 2 units PRBC on admission and was started on broad-spectrum antibiotics.  Patient's pneumonia workup was unrevealing.  Her sacral ulcer wound cultures were multi microbial including E. coli, Proteus mirabilis and Pseudomonas aeruginosa MDR.  Her urine cultures were also noted for Pseudomonas, low count 10,000-19,000.  Patient's diarrhea persisted throughout her hospitalization.  Her blood cultures resulted and Enterococcus avium, 1 of 2 sets.  The patient was noted for poor oral intake and overall clinical deterioration.  Her antibiotic therapy initially consisted of ceftriaxone and Flagyl later broadened to cefepime, Flagyl and IV vancomycin with vancomycin replaced to linezolid.  On 3/3/2024 the patient was noted to meet sepsis criteria with leukocytosis and tachycardia.  Sepsis alert was called with her lactic acid level at 7.1 consistent with septic shock.  The patient was initiated on fluid resuscitation however developed hypotension following IV fluids per sepsis protocol with 3 mL/kg.  She was initiated on Levophed.  The patient started to develop respiratory failure and worsening mental status and was intubated.  Central line was placed and vasopressin was added.  Meropenem was also added to therapy.  Arrangements for made for transfer to [facility] ICU where she was accepted under critical care service Dr. Carey.      Please see above list of diagnoses and related plan for additional information.     Condition at Discharge: critical    Discharge Day Visit / Exam:     Subjective: Patient is acutely ill.  She is now sedated on Precedex.  She does not appear in distress.    Vitals: Blood Pressure: (!) 75/39 (03/03/24 1205)  Pulse: 82 (03/03/24 1205)  Temperature: 99.3 °F (37.4 °C) (03/03/24 1130)  Temp Source: Oral (03/03/24  "0609)  Respirations: (!) 24 (03/03/24 1200)  Height: 5' 4\" (162.6 cm) (03/03/24 1230)  Weight - Scale: 70.1 kg (154 lb 8.7 oz) (03/03/24 1058)  SpO2: 94 % (03/03/24 1200)  Exam:   Physical Exam  Constitutional:       Appearance: She is toxic-appearing.      Interventions: She is sedated and intubated.   HENT:      Head: Normocephalic and atraumatic.   Cardiovascular:      Rate and Rhythm: Tachycardia present.   Pulmonary:      Effort: No respiratory distress. She is intubated.      Comments: Decrease breath sounds  Abdominal:      General: There is no distension.      Tenderness: There is no abdominal tenderness. There is no guarding.   Musculoskeletal:         General: Swelling (b/l UE) present.      Right lower leg: Edema present.      Left lower leg: Edema present.          Discussion with Family: Updated  (, daughter, and nephew) at bedside.    Discharge instructions/Information to patient and family:   See after visit summary for information provided to patient and family.      Provisions for Follow-Up Care:  See after visit summary for information related to follow-up care and any pertinent home health orders.      Mobility at time of Discharge:   Basic Mobility Inpatient Raw Score: 6  JH-HLM Goal: 2: Bed activities/Dependent transfer  JH-HLM Achieved: 2: Bed activities/Dependent transfer  HLM Goal NOT achieved. Continue to encourage mobility in post discharge setting.     Disposition:   Acute Care Hospital Transfer to [facility] ICU    Planned Readmission: No     Discharge Statement:  I spent 775 minutes discharging the patient. This time was spent on the day of discharge. I had direct contact with the patient on the day of discharge. Greater than 50% of the total time was spent examining patient, answering all patient questions, arranging and discussing plan of care with patient as well as directly providing post-discharge instructions.  Additional time then spent on discharge " activities.    Discharge Medications:  See after visit summary for reconciled discharge medications provided to patient and/or family.      **Please Note: This note may have been constructed using a voice recognition system**

## 2024-03-03 NOTE — RESPIRATORY THERAPY NOTE
03/03/24 1414   Respiratory Assessment   Resp Comments decreased FIO2 to 90%, flight crew just arrived   AC/VC Settings   Resp Rate (BPM) 14 BPM   Vt (mL) 420 mL   FIO2 (%) 90 %   PEEP (cmH2O) 6 cmH2O   Flow Pattern (LPM) 40 L/min   Trigger Sensitivity Flow (lpm) 3 %   Humidification Heater   Heater Temperature (Set) 98.6 °F (37 °C)   AC/VC Actuals   Resp Rate (BPM) 17 BPM   VT (mL) 465   MV 6.67   MAP (cmH2O) 9.7 cmH2O   Peak Pressure (cmH2O) 20 cmH2O   Heater Temperature (Obs) 98.6 °F (37 °C)

## 2024-03-03 NOTE — CONSULTS
CONSULTATION-NEPHROLOGY   Darlyn Nguyen 78 y.o. female MRN: [id number]  Unit/Bed#:  Encounter: [id number]        Assessment and Plan:    Stage II chronic kidney disease with baseline creatinine 0.7-0.8 mg/dL  Follows with Dr. Calvo due to #2.  Oliguric at present.  Patient at high risk for ARYAN given severe sepsis with septic shock.  Did briefly discuss dialysis with patient's  and she would be agreeable.  No indication at present.  Patient tentative transfer to tertiary center.  Check BMP now with next lactic acid.  Catheter already in place.  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  MMF, atovaquone on hold.  Immunosuppressive status  Recommend stress dose steroids during shock state  Shock, septic and potentially adrenal insufficiency  Patient received appropriate bolus for sepsis alert with refractory hypotension.  Norepinephrine added per primary team.  After discussion with primary team, vasopressin and stress dose steroids will be added.  Patient tentative transfer to tertiary center  1/2 blood culture positive for Enterococcus avium  Per ID, receiving team  Lactic acidosis  In setting of shock state.  Repeat pending  Acute on chronic anemia  Status post PRBCs this hospitalization-trend hemoglobin, maintain active TS    HPI:    Darlyn Nguyen is a 78 y.o. female with SLE, class III lupus nephritis (12/2023), CVA with chronic right hemiplegia, PMR, hypertension, MDD [facility] for acute anemia and positive FOBT.  She received 2 units PRBCs thus far this admission.  She was also noted to have RLL pneumonia placed on ceftriaxone.  CellCept, atovaquone, prednisone taper was initially all continued.  General surgery evaluated patient's sacral wound.  Yesterday 1/2 blood culture came back positive for Enterococcus AVM and CellCept discontinued.   Concern for true bacteremia and patient placed on linezolid.  Today patient sepsis alert and transferred to ICU for AMS, lactic acidosis, presumed septic shock placed on vasopressor.  Nephrology consulted for oliguria, lactic acidosis.    History provided by patient's  and chart review.    Interval history:    Patient was hospitalized at [facility] in December 2023 for serositis secondary to lupus.  She had a renal biopsy done due to proteinuria.  Renal biopsy significant for lupus nephritis class III and she was initiated on MMF, prednisone.  Last nephrology visit was via telemedicine in January 16, 2024 with Dr. Calvo.    Hospitalized again at [facility] in February 2024 due to dapsone started for PJP prophylaxis however she developed hemolytic anemia and methemoglobinemia and large acute occlusive DVT right lower extremity and left lower extremity.  She was placed on prolonged steroid taper and Eliquis.  PJP prophylaxis transition to atovaquone.  She was also evaluated for colitis however had only 1 episode of loose stool so endoscopy was deferred.      Reason for Consult: Oliguria, lactic acidosis    Review of Systems:  Unable to obtain due to altered mental status    Historical Information   Past Medical History:   Diagnosis Date    Diabetes mellitus (HCC)     Fibromyalgia     Hyperlipidemia     Hypertension      Past Surgical History:   Procedure Laterality Date    ACHILLES TENDON REPAIR Right     ADENOIDECTOMY      APPENDECTOMY      SKIN CANCER EXCISION      x 4 - back    TONSILLECTOMY      TOTAL ABDOMINAL HYSTERECTOMY      US GUIDED KIDNEY BIOPSY  12/26/2023     Social History   Social History     Substance and Sexual Activity   Alcohol Use Not Currently     Social History     Substance and Sexual Activity   Drug Use Never     Social History     Tobacco Use   Smoking Status Never   Smokeless Tobacco Never       Family History:   Family History   Problem Relation Age of Onset    Cancer Mother      "Cervical cancer Sister     Breast cancer Sister     Breast cancer Sister        Medications:  Pertinent medications were reviewed  Current Facility-Administered Medications   Medication Dose Route Frequency Provider Last Rate    acetaminophen  650 mg Oral Q6H PRN Nola Baer PA-C      albuterol  2.5 mg Nebulization Q2H PRN Nola Baer PA-C      cefepime  2,000 mg Intravenous Q12H [facility] Chau Kessler, DO 2,000 mg (03/03/24 0525)    heparin (porcine)  3-20 Units/kg/hr (Order-Specific) Intravenous Titrated Tasha Cee MD 11 Units/kg/hr (03/03/24 1048)    heparin (porcine)  1,950 Units Intravenous Q6H PRN Tasha Cee MD      heparin (porcine)  3,900 Units Intravenous Q6H PRN Tasha Cee MD      HYDROmorphone  0.2 mg Intravenous Q4H PRN Nola Baer PA-C      linezolid  600 mg Intravenous Q12H Tasha Cee MD Stopped (03/03/24 1011)    loperamide  2 mg Oral 4x Daily PRN Tasha Cee MD      metroNIDAZOLE  500 mg Intravenous Q8H Tasha Cee MD      multi-electrolyte  500 mL Intravenous Once AUGUSTA Matthews      norepinephrine  1-30 mcg/min Intravenous Titrated Tasha Cee MD      ondansetron  4 mg Intravenous Q6H PRN Nola Baer PA-C      oxyCODONE  5 mg Oral Q6H PRN Nola Baer PA-C           Allergies   Allergen Reactions    Rosuvastatin Other (See Comments)     Per SNF    Simvastatin Other (See Comments)     Per SNF    Myalgias         Vitals:   BP (!) 78/39   Pulse 84   Temp 99.3 °F (37.4 °C)   Resp (!) 24   Ht 5' 4\" (1.626 m)   Wt 70.1 kg (154 lb 8.7 oz)   SpO2 94%   BMI 26.53 kg/m²   Body mass index is 26.53 kg/m².  SpO2: 94 %,   SpO2 Activity: At Rest,   O2 Device: Nasal cannula      Intake/Output Summary (Last 24 hours) at 3/3/2024 1203  Last data filed at 3/3/2024 1115  Gross per 24 hour   Intake 2010 ml   Output 850 ml   Net 1160 ml     Invasive Devices       Peripheral Intravenous Line  Duration             Peripheral IV 02/29/24 " "Proximal;Right;Ventral (anterior) Forearm 2 days    Peripheral IV 03/01/24 Dorsal (posterior);Right Forearm 1 day              Drain  Duration             Urethral Catheter Latex;Straight-tip 16 Fr. 2 days                    Physical Exam:  General: Acutely ill-appearing  Eyes: conjunctivae pink, anicteric sclerae  ENT: lips and mucous membranes dry  Neck: supple, no JVD, no masses  Chest: very diminished breath sounds bilateral, no crackles, ronchus or wheezings  CVS: S1 & S2, normal rate, regular rhythm  Abdomen: soft, non-tender, non-distended, normoactive bowel sounds  Extremities: no edema of both legs  Skin: no rash  Neuro: encephalopathic, lethargic      Diagnostic Data:  Lab: I have personally reviewed pertinent lab results.,   CBC:  Results from last 7 days   Lab Units 03/03/24  0404   WBC Thousand/uL 18.38*   HEMOGLOBIN g/dL 8.0*   HEMATOCRIT % 27.2*   PLATELETS Thousands/uL 333      CMP:   Lab Results   Component Value Date    SODIUM 138 03/03/2024    K 4.2 03/03/2024     03/03/2024    CO2 22 03/03/2024    BUN 36 (H) 03/03/2024    CREATININE 0.85 03/03/2024    CALCIUM 8.5 03/03/2024    AST 13 03/03/2024    ALT 18 03/03/2024    ALKPHOS 70 03/03/2024    EGFR 64 03/03/2024   ,   PT/INR: No results found for: \"PT\", \"INR\",   Magnesium: No components found for: \"MAG\",  Phosphorous: No results found for: \"PHOS\"    Microbiology:  @LABRCNTIP,(urinecx:7)@        AUGUSTA Matthews    Portions of the record may have been created with voice recognition software. Occasional wrong word or \"sound a like\" substitutions may have occurred due to the inherent limitations of voice recognition software. Read the chart carefully and recognize, using context, where substitutions have occurred.      "

## 2024-03-03 NOTE — ASSESSMENT & PLAN NOTE
Infectious vs IBD vs microscopic colitis vs SIBO vs side effect of lupus meds  Patient (and ) report this to be ongoing intermittently since October.  C diff negative 10 days ago, stool enteric panel + O&P panel from this admission pending  [facility]  [facility] abdomen does show colon inflammation, improving  GI consulted; we appreciate their recommendations. Patient will need short term colonoscopy  On broad spectrum Abx

## 2024-03-03 NOTE — ASSESSMENT & PLAN NOTE
Last hemoglobin A1c of 5.6  Patient is now noted for steroid-induced hyperglycemia and initiated on Accu-Cheks, sliding scale

## 2024-03-03 NOTE — RESPIRATORY THERAPY NOTE
NOTE:      ABG  report was done on 100%  but lab recorded it as 70%   Latest Reference Range & Units 03/03/24 14:01   ZANA TEST  Yes   pH, Arterial 7.350 - 7.450  7.345 (L)   pCO2, Arterial 36.0 - 44.0 mm Hg 38.3   pO2, Arterial 75.0 - 129.0 mm Hg 117.8   HCO3, Arterial 22.0 - 28.0 mmol/L 20.4 (L)   Base Excess, Arterial mmol/L -4.8   O2 Content, Arterial 16.0 - 23.0 mL/dL 8.9 (L)   O2 HGB,Arterial 94.0 - 97.0 % 95.9   ABG SOURCE  Radial, Left   AC Rate  14   Tidal Volume ml 420   Inspired Air (FIO2)  70   PEEP  6   Vent Type- AC  AC   (L): Data is abnormally low

## 2024-03-03 NOTE — ASSESSMENT & PLAN NOTE
Hold cellcept and plaquenil for now.  Will require ID evaluation ASAP  [facility]  Undergoing prednisone taper from recent discharge from [facility] 2/24/24 with the following schedule  Take 6 tablets (60 mg total) by mouth daily for 10 days, then taper by 10 mg daily every 14 days  NPO/intubated  Will start Solu-cortef

## 2024-03-03 NOTE — ED PROCEDURE NOTE
PROCEDURE  Intubation    Date/Time: 3/3/2024 12:11 PM    Performed by: Maikel Franklin DO  Authorized by: Maikel Franklin DO    Patient location:  Other (comment) (ICU)  Other Assisting Provider: No    Consent:     Consent obtained:  Verbal    Consent given by:  Patient and spouse    Risks discussed:  Aspiration, dental trauma, hypoxia, bleeding and death    Alternatives discussed:  Delayed treatment  Universal protocol:     Procedure explained and questions answered to patient or proxy's satisfaction: yes      Patient identity confirmed:  Verbally with patient and hospital-assigned identification number  Pre-procedure details:     Patient status:  Awake    Mallampati score:  3    Pretreatment medications:  Etomidate    Paralytics:  Succinylcholine  Indications:     Indications for intubation: respiratory failure, airway protection and hypoxemia    Procedure details:     Preoxygenation:  Nonrebreather mask    CPR in progress: no      Intubation method:  Oral    Oral intubation technique:  Direct    Laryngoscope blade:  Mac 4    Tube size (mm):  8.0    Tube type:  Cuffed    Number of attempts:  1    Cricoid pressure: yes      Tube visualized through cords: yes    Placement assessment:     ETT to lip:  24    Tube secured with:  ETT ventura    Breath sounds:  Equal and absent over the epigastrium    Placement verification: chest rise, CXR verification, direct visualization, equal breath sounds, ETCO2 detector and tube exhalation      CXR findings:  ETT in proper place  Post-procedure details:     Patient tolerance of procedure:  Tolerated well, no immediate complications  Central Line    Date/Time: 3/3/2024 12:53 PM    Performed by: Maikel Franklin DO  Authorized by: Maikel Franklin DO    Patient location:  ICU  Other Assisting Provider: No    Consent:     Consent obtained:  Verbal    Consent given by:  Patient and spouse    Risks discussed:  Arterial puncture, incorrect placement,  nerve damage, pneumothorax, infection and bleeding    Alternatives discussed:  Delayed treatment  Universal protocol:     Procedure explained and questions answered to patient or proxy's satisfaction: yes      Patient identity confirmed:  Verbally with patient and hospital-assigned identification number  Pre-procedure details:     Hand hygiene: Hand hygiene performed prior to insertion      Sterile barrier technique: All elements of maximal sterile technique followed      Skin preparation:  2% chlorhexidine    Skin preparation agent: Skin preparation agent completely dried prior to procedure    Indications:     Central line indications: medications requiring central line and hemodynamic monitoring    Anesthesia (see MAR for exact dosages):     Anesthesia method:  Local infiltration    Local anesthetic:  Lidocaine 1% w/o epi  Procedure details:     Location:  Right internal jugular    Vessel type: vein      Laterality:  Right    Approach: percutaneous technique used      Patient position:  Flat    Catheter type:  Triple lumen 20cm    Catheter size:  7.5 Fr    Landmarks identified: yes      Ultrasound guidance: yes      Ultrasound image availability:  Not obtained due to urgency and not saved    Sterile ultrasound techniques: Sterile gel and sterile probe covers were used      Number of attempts:  1    Successful placement: yes      Catheter tip vessel location: atriocaval junction    Post-procedure details:     Post-procedure:  Dressing applied and line sutured    Assessment:  Blood return through all ports, no pneumothorax on x-ray, placement verified by x-ray and free fluid flow    Post-procedure complications: none      Patient tolerance of procedure:  Tolerated well, no immediate complications       Maikel Franklin, DO  03/03/24 0699

## 2024-03-03 NOTE — RESPIRATORY THERAPY NOTE
"   03/03/24 1230   Respiratory Assessment   Resp Comments Called to room 206  to prepare for intubation   Vent Information   Vent type     Vent Mode AC/VC   $ Vent Charge-INITIAL Yes   Ventilator Start Yes   $ Pulse Oximetry Spot Check Charge Completed   AC/VC Settings   Resp Rate (BPM) 14 BPM   Vt (mL) 420 mL   FIO2 (%) 100   PEEP (cmH2O) 6 cmH2O   Flow Pattern (LPM) 40 L/min   Trigger Sensitivity Flow (lpm) 3 %   Humidification Heater   Heater Temperature (Set) 98.6 °F (37 °C)   AC/VC Actuals   Resp Rate (BPM) 14 BPM   VT (mL) 452   MV 6.35   MAP (cmH2O) 11 cmH2O   Peak Pressure (cmH2O) 21 cmH2O   I/E Ratio (Obs) 1:2.3   Heater Temperature (Obs) 98.4 °F (36.9 °C)   Plateau Pressure (cm H2O) 19 cm H2O   AC/VC Alarms   High Peak Pressure (cmH2O) 40   High Resp Rate (BPM) 35 BPM   High MV (L/min) 20 L/min   Low MV (L/min) 4 L/min   Vt High (mL) 850 mL   Vt Low (mL) 300 mL   AC/VC Apnea Settings   Resp Rate (BPM) 14 BPM   VT (mL) 420 mL   FIO2 (%) 100 %   Apnea Time (s) 20 S   Apnea Flow (L/min) 40 L/min   Maintenance   Alarm (pink) cable attached No   Resuscitation bag with peep valve at bedside Yes   Water bag changed No  (new)   Circuit changed No  (new)   IHI Ventilator Associated Pneumonia Bundle   Daily Assessment of Readiness to Extubate Not applicable (Comment)   Head of Bed Elevated HOB 20   Oral Care Mouth suctioned   Adult IBW/VT Calculations   Height 5' 4\" (1.626 m)   IBW (Ideal Body Weight) 54.7 kg   Range VT 6 ML/Kg 328.2 mL/kg   Range VT 8 ML/Kg 437.6 mL/kg       "

## 2024-03-03 NOTE — NURSING NOTE
0945 pt found to meet sepsis criteria. Dr. Cee notified, sepsis alert initiated. Orders received for sepsis protocol. 1100 Near completion of fluid bolus. Pts SpO2 found to be 83 % on 2L nc, RR 20, HR 90 bpm, BP 94/37, and increased lethargy. Pts lung sounds assessed. Coarse crackles heard on auscultation.  Elevated HOB and increased O2 to 4L. Notified Antonia Mar RT and Dr. Cee of change in status. 1110 Received order to transfer pt to ICU. RT instructed to place pt on non-rebreather mask for transport on 15L of O2. 1137 Pt arrived to ICU. Report given to Payton Chou RN.

## 2024-03-03 NOTE — PROGRESS NOTES
Attempted to call report several times to [facility] ICU Bed 1 @ [phone] and received no answer.

## 2024-03-04 PROBLEM — R73.9 HYPERGLYCEMIA: Status: ACTIVE | Noted: 2024-03-04

## 2024-03-04 PROBLEM — M32.14: Status: ACTIVE | Noted: 2024-03-04

## 2024-03-04 PROBLEM — N18.2 CKD (CHRONIC KIDNEY DISEASE), STAGE II: Status: ACTIVE | Noted: 2024-03-04

## 2024-03-04 PROBLEM — E87.6 HYPOKALEMIA: Status: ACTIVE | Noted: 2024-03-04

## 2024-03-04 LAB
ALL TARGETS: NOT DETECTED
BACTERIA BLD CULT: ABNORMAL
GRAM STN SPEC: ABNORMAL

## 2024-03-04 NOTE — PLAN OF CARE
Problem: Prexisting or High Potential for Compromised Skin Integrity  Goal: Skin integrity is maintained or improved  Description: INTERVENTIONS:  - Identify patients at risk for skin breakdown  - Assess and monitor skin integrity  - Assess and monitor nutrition and hydration status  - Monitor labs   - Assess for incontinence   - Turn and reposition patient  - Assist with mobility/ambulation  - Relieve pressure over bony prominences  - Avoid friction and shearing  - Provide appropriate hygiene as needed including keeping skin clean and dry  - Evaluate need for skin moisturizer/barrier cream  - Collaborate with interdisciplinary team   - Patient/family teaching  - Consider wound care consult   Outcome: Progressing     Problem: SAFETY ADULT  Goal: Patient will remain free of falls  Description: INTERVENTIONS:  - Educate patient/family on patient safety including physical limitations  - Instruct patient to call for assistance with activity   - Consult OT/PT to assist with strengthening/mobility   - Keep Call bell within reach  - Keep bed low and locked with side rails adjusted as appropriate  - Keep care items and personal belongings within reach  - Initiate and maintain comfort rounds  - Make Fall Risk Sign visible to staff  - Offer Toileting every  Hours, in advance of need  - Initiate/Maintain alarm  - Obtain necessary fall risk management equipment:   - Apply yellow socks and bracelet for high fall risk patients  - Consider moving patient to room near nurses station  Outcome: Progressing  Goal: Maintain or return to baseline ADL function  Description: INTERVENTIONS:  -  Assess patient's ability to carry out ADLs; assess patient's baseline for ADL function and identify physical deficits which impact ability to perform ADLs (bathing, care of mouth/teeth, toileting, grooming, dressing, etc.)  - Assess/evaluate cause of self-care deficits   - Assess range of motion  - Assess patient's mobility; develop plan if  impaired  - Assess patient's need for assistive devices and provide as appropriate  - Encourage maximum independence but intervene and supervise when necessary  - Involve family in performance of ADLs  - Assess for home care needs following discharge   - Consider OT consult to assist with ADL evaluation and planning for discharge  - Provide patient education as appropriate  Outcome: Progressing  Goal: Maintains/Returns to pre admission functional level  Description: INTERVENTIONS:  - Perform AM-PAC 6 Click Basic Mobility/ Daily Activity assessment daily.  - Set and communicate daily mobility goal to care team and patient/family/caregiver.   - Collaborate with rehabilitation services on mobility goals if consulted  - Perform Range of Motion  times a day.  - Reposition patient every  hours.  - Dangle patient  times a day  - Stand patient  times a day  - Ambulate patient  times a day  - Out of bed to chair  times a day   - Out of bed for meals  times a day  - Out of bed for toileting  - Record patient progress and toleration of activity level   Outcome: Progressing     Problem: DISCHARGE PLANNING  Goal: Discharge to home or other facility with appropriate resources  Description: INTERVENTIONS:  - Identify barriers to discharge w/patient and caregiver  - Arrange for needed discharge resources and transportation as appropriate  - Identify discharge learning needs (meds, wound care, etc.)  - Arrange for interpretive services to assist at discharge as needed  - Refer to Case Management Department for coordinating discharge planning if the patient needs post-hospital services based on physician/advanced practitioner order or complex needs related to functional status, cognitive ability, or social support system  Outcome: Progressing     Problem: Knowledge Deficit  Goal: Patient/family/caregiver demonstrates understanding of disease process, treatment plan, medications, and discharge instructions  Description: Complete  learning assessment and assess knowledge base.  Interventions:  - Provide teaching at level of understanding  - Provide teaching via preferred learning methods  Outcome: Progressing     Problem: RESPIRATORY - ADULT  Goal: Achieves optimal ventilation and oxygenation  Description: INTERVENTIONS:  - Assess for changes in respiratory status  - Assess for changes in mentation and behavior  - Position to facilitate oxygenation and minimize respiratory effort  - Oxygen administered by appropriate delivery if ordered  - Initiate smoking cessation education as indicated  - Encourage broncho-pulmonary hygiene including cough, deep breathe, Incentive Spirometry  - Assess the need for suctioning and aspirate as needed  - Assess and instruct to report SOB or any respiratory difficulty  - Respiratory Therapy support as indicated  Outcome: Progressing     Problem: HEMATOLOGIC - ADULT  Goal: Maintains hematologic stability  Description: INTERVENTIONS  - Assess for signs and symptoms of bleeding or hemorrhage  - Monitor labs  - Administer supportive blood products/factors as ordered and appropriate  Outcome: Progressing     Problem: Nutrition/Hydration-ADULT  Goal: Nutrient/Hydration intake appropriate for improving, restoring or maintaining nutritional needs  Description: Monitor and assess patient's nutrition/hydration status for malnutrition. Collaborate with interdisciplinary team and initiate plan and interventions as ordered.  Monitor patient's weight and dietary intake as ordered or per policy. Utilize nutrition screening tool and intervene as necessary. Determine patient's food preferences and provide high-protein, high-caloric foods as appropriate.     INTERVENTIONS:  - Monitor oral intake, urinary output, labs, and treatment plans  - Assess nutrition and hydration status and recommend course of action  - Evaluate amount of meals eaten  - Assist patient with eating if necessary   - Allow adequate time for meals  -  Recommend/ encourage appropriate diets, oral nutritional supplements, and vitamin/mineral supplements  - Order, calculate, and assess calorie counts as needed  - Recommend, monitor, and adjust tube feedings and TPN/PPN based on assessed needs  - Assess need for intravenous fluids  - Provide specific nutrition/hydration education as appropriate  - Include patient/family/caregiver in decisions related to nutrition  Outcome: Progressing

## 2024-03-04 NOTE — ASSESSMENT & PLAN NOTE
pH 7.345; pCO2 38.3; Bicarb 20.4    Suspect due to GI bicarb losses with recent persistent diarrhea

## 2024-03-04 NOTE — ASSESSMENT & PLAN NOTE
Recent persistent diarrhea  FOBT positive minors  Small bloody bowel movement on presentation to [place] ICU  Acute drop in hemoglobin from 8.0 morning of 3/3/2024 to 6.2 approximately 12 hours later  Recent CT A/P demonstrating colitis  No history of significant GI bleed per family  Family reports colonoscopy approximately 1 year ago without significant findings    PTT supratherapeutic >210    Would consider that GI bleed may resolve spontaneously as PTT normalizes off Hep    Transfused 2 units PRBC  Hold heparin GTT, started in place of home Eliquis for DVT found on 2/20/2024  Given Protonix 80 mg IV, currently on Protonix 40 mg IV Q12H  Consider FFP if persistent bleeding  GI consulted, appreciate recommendations

## 2024-03-04 NOTE — ASSESSMENT & PLAN NOTE
CXR 2/29/24: Right lung opacities, suspicious for pneumonia in the appropriate clinical setting.  CT abdomen pelvis 2/29/2024: LOWER CHEST: Mild right basilar patchy consolidation #2/5 suspicious for pneumonia.  Procal elevated and uptrending to 0.71 on 3/1/24    Pneumonia suspected at admission.   CXR on 3/3 reviewed and appears to show worsening RUL consolidation, which may account for acutely worsening respiratory function.    Continue abx  Repeat procal in AM  Continue vent: Current settings AC 14/420/60/6  Daily SBT for early vent liberation

## 2024-03-04 NOTE — PROGRESS NOTES
Interval Progress Note - Critical Care   Darlyn Nguyen 78 y.o. female MRN: [id number]  Unit/Bed#: ICU 01 Encounter: [id number]    CT reviewed.  Has dense consolidation with small pleural effusion.  There is cavitation which raises the concern for possible tuberculosis in this immunocompromised patient.  Recommended airborne isolation and TB rule out by Dr. Dalton of infectious disease.  Orders placed      Thanh Coleman MD

## 2024-03-04 NOTE — ASSESSMENT & PLAN NOTE
Lab Results   Component Value Date    HGBA1C 5.9 (H) 03/03/2024       Recent Labs     03/03/24  2040 03/04/24  0044 03/04/24  0046 03/04/24  0518   POCGLU 278* 311* 254* 189*         Blood Sugar Average: Last 72 hrs:  (P) 268.8    History of DM, well-controlled with last A1c 5.6 on 2/26, but 5.9 today. Developed hyperglycemia with initiation of high dose steroids.    Start ISS Q6H while NPO  Fingersticks Q6H while NPO  Avoid hypoglycemia per protocol  Monitor BG and insulin requirements and adjust regimen as needed

## 2024-03-04 NOTE — PROGRESS NOTES
[facility]  Progress Note  Name: Darlyn Nguyen I  MRN: [id number]  Unit/Bed#: ICU 01 I Date of Admission: 3/3/2024   Date of Service: 3/4/2024 I Hospital Day: 1    Assessment/Plan   * Acute respiratory failure with hypoxia (HCC)  Assessment & Plan  CXR 2/29/24: Right lung opacities, suspicious for pneumonia in the appropriate clinical setting.  CT abdomen pelvis 2/29/2024: LOWER CHEST: Mild right basilar patchy consolidation #2/5 suspicious for pneumonia.  Procal elevated and uptrending from 0.76 on 3/1/24 to 3.08 on 3/4/24    Pneumonia suspected at admission.   CXR on 3/3 reviewed and appears to show worsening RUL consolidation, which may account for acutely worsening respiratory function.    Continue abx  Continue vent: Current settings CMV 18/420/50/6  Daily SBT for early vent liberation    Acute GI bleeding  Assessment & Plan  Recent persistent diarrhea  FOBT positive minors  Small bloody bowel movement on presentation to [facility]  Acute drop in hemoglobin from 8.0 morning of 3/3/2024 to 6.2 approximately 12 hours later  Recent CT A/P demonstrating colitis  No history of significant GI bleed per family  Family reports colonoscopy approximately 1 year ago without significant findings    PTT supratherapeutic >210    Would consider that GI bleed may resolve spontaneously as PTT normalizes off Hep    Transfused 2 units PRBC  Hold heparin GTT, started in place of home Eliquis for DVT found on 2/20/2024  Given Protonix 80 mg IV, currently on Protonix 40 mg IV Q12H  Consider FFP if persistent bleeding  GI consulted, appreciate recommendations    Diabetes mellitus (HCC)  Assessment & Plan  Lab Results   Component Value Date    HGBA1C 5.9 (H) 03/03/2024       Recent Labs     03/03/24  2040 03/04/24  0044 03/04/24  0046 03/04/24  0518   POCGLU 278* 311* 254* 189*         Blood Sugar Average: Last 72 hrs:  (P) 268.8    History of DM, well-controlled with last A1c 5.6 on 2/26, but 5.9 today.  Developed hyperglycemia with initiation of high dose steroids.    Start ISS Q6H while NPO  Fingersticks Q6H while NPO  Avoid hypoglycemia per protocol  Monitor BG and insulin requirements and adjust regimen as needed    Metabolic acidosis with normal anion gap  Assessment & Plan  pH 7.345; pCO2 38.3; Bicarb 20.4    Suspect due to GI bicarb losses with recent persistent diarrhea    Shock (HCC)  Assessment & Plan  Multiple possible etiologies, including hemorrhagic shock in the setting of acute GI bleed, septic shock in the setting of Enterococcus bacteremia, and adrenal insufficiency in the setting of chronic steroid use and acute illness    Discontinue levo and vaso; continue to monitor with MAP goal >=65  Continue Solucortef    Bacteremia due to Enterococcus  Assessment & Plan  Enterococcus avium bacteremia. Pt recently started on atovaquone, hydroxychloroquine, and mycophenolate. Would consider possible gut translocation vs diarrhea contaminating sacral wound.     E. avium associated with increased risk of vancomycin resistance    Repeat cultures show NGTD    Continue linezolid   ID consulted, appreciate recommendations    Sacral wound  Assessment & Plan  Repositioning and wound care with nursing    Lupus (HCC)  Assessment & Plan  Diagnosed in December,   Initially started on steroids prednisone 60 mg daily which patient had tapered to 30 mg daily prior to admission with the addition of atovaquone, hydroxychloroquine, and mycophenolate    Hold atovaquone, hydroxychloroquine, and mycophenolate in the setting of acute infection with shock  Patient started on Solu-Cortef prior to discharge from [facility] for possible relative adrenal insufficiency contributing to shock state.    Continue Solu-Cortef, given shock in the setting of chronic steroid use         ----------------------------------------------------------------------------------------  HPI/24hr events: Patient had 1 episode of hypotension requiring 1 L  NS.  Levophed empirically so discontinued.  2 episodes of tach and liquid like bowel movements requiring 2 units PRBC transfusion.    Patient appropriate for transfer out of the ICU today?: No  Disposition: Continue Critical Care   Code Status: Level 2 - DNAR: but accepts endotracheal intubation  ---------------------------------------------------------------------------------------  SUBJECTIVE  Patient is lying in bed intubated.  In no acute respiratory distress.    Review of Systems  Review of systems was unable to be performed secondary to intubation and somnolence.  ---------------------------------------------------------------------------------------  OBJECTIVE    Vitals   Vitals:    24 0700 24 0751 24 0800 24 0830   BP: 118/59  104/54 109/56   Pulse: (!) 54  56 58   Resp: 15  16 14   Temp: 98.2 °F (36.8 °C)  97.9 °F (36.6 °C) 97.9 °F (36.6 °C)   SpO2: 100% 100% 100% 100%     Temp (24hrs), Av °F (36.7 °C), Min:97.5 °F (36.4 °C), Max:99.3 °F (37.4 °C)  Current: Temperature: 97.9 °F (36.6 °C)          Respiratory:  SpO2: SpO2: 100 %   CMV 19/420/6/50%    Invasive/non-invasive ventilation settings   Respiratory      Lab Data (Last 4 hours)      None           O2/Vent Data (Last 4 hours)         0751          Patient safety screen outcome: Failed                       Physical Exam  Constitutional:       Appearance: She is ill-appearing.      Comments: RASS -3   HENT:      Mouth/Throat:      Mouth: Mucous membranes are moist.   Neck:      Comments: RIJ CVL in place  Cardiovascular:      Rate and Rhythm: Regular rhythm. Bradycardia present.      Pulses: Normal pulses.      Comments: Faint heart sounds  Pulmonary:      Effort: No respiratory distress.      Breath sounds: Normal breath sounds.      Comments: Intubated. Decreased breath sounds.  Abdominal:      Comments: Hypoactive bowel sounds.  Flat, nondistended.   Musculoskeletal:      Comments: Bilateral lower extremity edema +1    Skin:     Comments: Hands and feet cool to the touch.  Capillary refill 2 to 3 seconds.             Laboratory and Diagnostics:  Results from last 7 days   Lab Units 03/04/24 0455 03/03/24  2250 03/03/24  1640 03/03/24  0404 03/02/24  0658 03/01/24  0542 02/29/24  1804 02/29/24  1226 02/26/24  1134   WBC Thousand/uL 24.05*  --  26.30* 18.38* 21.27* 12.97*  --  8.72 9.81   HEMOGLOBIN g/dL 8.8* 8.2* 6.2* 8.0* 8.8* 9.0* 9.3* 7.0* 7.5*   HEMATOCRIT % 26.9*  --  20.3* 27.2* 28.3* 29.0* 30.5* 23.7* 24.5*   PLATELETS Thousands/uL 205  --  284 333 332 325  --  315 236   NEUTROS PCT % 88*  --  91* 91*  --   --   --   --   --    BANDS PCT %  --   --   --   --   --  11*  --  5  --    MONOS PCT % 2*  --  2* 4  --   --   --   --   --    MONO PCT %  --   --   --   --  3* 6  --  5  --    EOS PCT % 0  --  0 0 0 0  --  0  --      Results from last 7 days   Lab Units 03/04/24 0455 03/03/24  1640 03/03/24  1149 03/03/24  0404 03/02/24  0658 03/01/24  0542 02/29/24  1226   SODIUM mmol/L 138 138 137 138 137 135 132*   POTASSIUM mmol/L 3.4* 3.8 4.1 4.2 4.6 4.5 4.8   CHLORIDE mmol/L 109* 108 106 106 104 102 101   CO2 mmol/L 23 22 20* 22 26 25 23   ANION GAP mmol/L 6 8 11 10 7 8 8   BUN mg/dL 35* 35* 36* 36* 34* 42* 58*   CREATININE mg/dL 0.88 0.89 0.92 0.85 0.84 0.87 1.10   CALCIUM mg/dL 7.4* 7.7* 7.7* 8.5 8.8 8.7 8.6   GLUCOSE RANDOM mg/dL 224* 302* 420* 447* 315* 170* 272*   ALT U/L 93* 109* 57* 18 12 15 15   AST U/L 80* 156* 66* 13 8* 11* 13   ALK PHOS U/L 61 65 59 70 75 65 59   ALBUMIN g/dL 2.3* 2.7* 1.9* 2.3* 2.6* 2.6* 2.4*   TOTAL BILIRUBIN mg/dL 0.83 0.83 0.61 0.61 0.51 0.71 0.72     Results from last 7 days   Lab Units 03/04/24  0455 03/03/24  1640 03/01/24  0542 02/29/24  1226   MAGNESIUM mg/dL 1.9 1.9 2.0 2.0   PHOSPHORUS mg/dL 2.2* 2.4  --   --       Results from last 7 days   Lab Units 03/04/24  0455 03/03/24  2250 03/03/24  1640 03/03/24  0823 03/03/24  0152 03/02/24  1917 03/02/24  0658 03/01/24  2159 03/01/24  1438  02/29/24  1226   INR  1.49*  --  1.86*  --   --   --   --   --  1.20* 1.95*   PTT seconds  --  41* >210* >210* 34 27 113* >210* 29 31          Results from last 7 days   Lab Units 03/04/24  0150 03/03/24  2250 03/03/24  1313 03/03/24  1019 02/29/24  1526 02/29/24  1226   LACTIC ACID mmol/L 2.1* 3.1* 4.5* 7.1* 1.8 4.1*     ABG:  Results from last 7 days   Lab Units 03/03/24  1401   PH ART  7.345*   PCO2 ART mm Hg 38.3   PO2 ART mm Hg 117.8   HCO3 ART mmol/L 20.4*   BASE EXC ART mmol/L -4.8   ABG SOURCE  Radial, Left     VBG:  Results from last 7 days   Lab Units 03/03/24  2250 03/03/24  1401   PH RADHA  7.255*  --    PCO2 RADHA mm Hg 49.7  --    PO2 RADHA mm Hg 20.8*  --    HCO3 RADHA mmol/L 21.6*  --    BASE EXC RADHA mmol/L -5.4  --    ABG SOURCE   --  Radial, Left     Results from last 7 days   Lab Units 03/04/24  0455 03/01/24  0542 02/29/24  1226   PROCALCITONIN ng/ml 3.08* 0.76* 0.61*       Micro  Results from last 7 days   Lab Units 03/03/24  2255 03/03/24  1148 03/03/24  1019 03/02/24  0823 03/02/24  0700 02/29/24  2118 02/29/24  1226 02/29/24  1027   BLOOD CULTURE  Received in Microbiology Lab. Culture in Progress.  Received in Microbiology Lab. Culture in Progress. Received in Microbiology Lab. Culture in Progress. Received in Microbiology Lab. Culture in Progress. No Growth at 24 hrs. No Growth at 24 hrs.  --  No Growth at 72 hrs.  Enterococcus avium*  --    GRAM STAIN RESULT   --   --   --   --   --   --  Gram positive cocci in pairs* Rare Gram negative rods*  Rare Gram positive cocci in pairs*  No polys seen*   URINE CULTURE   --   --   --   --   --  10,000-19,000 cfu/ml Pseudomonas aeruginosa*  --   --    WOUND CULTURE   --   --   --   --   --   --   --  3+ Growth of Escherichia coli*  3+ Growth of Proteus mirabilis*  3+ Growth of Pseudomonas aeruginosa MDR*  2+ Growth of   MRSA CULTURE ONLY   --   --   --   --   --  No Methicillin Resistant Staphlyococcus aureus (MRSA) isolated  --   --    LEGIONELLA  URINARY ANTIGEN   --   --   --   --   --  Negative  --   --    STREP PNEUMONIAE ANTIGEN, URINE   --   --   --   --   --  Negative  --   --        EK2024: Normal sinus rhythm.  Right bundle branch block.  When compared with EKG of 2024.  No significant change was found.    Imaging: I have personally reviewed pertinent reports.      Intake and Output  I/O          07    I.V.  989.3     Blood  329.2     Total Intake  1318.5     Urine  275     Stool  0     Total Output  275     Net  +1043.5            Unmeasured Stool Occurrence  2 x             Height and Weights         There is no height or weight on file to calculate BMI.  Weight (last 2 days)       None              Nutrition       Diet Orders   (From admission, onward)                 Start     Ordered    24  Diet NPO  Diet effective now        References:    Adult Nutrition Support Algorithm    RD Therapeutic Diet Order Protocol   Question Answer Comment   Diet Type NPO    RD to adjust diet per protocol? Yes        24                      Active Medications  Scheduled Meds:  Current Facility-Administered Medications   Medication Dose Route Frequency Provider Last Rate    atorvastatin  40 mg Oral Daily Uche Blair MD      cefepime  2,000 mg Intravenous Q12H AUGUSTA Ibarra 2,000 mg (24 0506)    chlorhexidine  15 mL Mouth/Throat Q12H [facility] AUGUSTA Ibarra      fentaNYL  50 mcg/hr Intravenous Continuous AUGUSTA Hutchison 50 mcg/hr (24 0830)    fentanyl citrate (PF)  50 mcg Intravenous Q2H PRN AUGUSTA Hutchison      folic acid 1 mg, thiamine (VITAMIN B1) 100 mg in sodium chloride 0.9 % 100 mL IV piggyback   Intravenous Daily AUGUSTA Ibarra      insulin lispro  2-12 Units Subcutaneous Q6H [facility] AUGUSTA Garcia      levalbuterol  1.25 mg Nebulization Q8H PRN AUGSUTA Ibarra      linezolid  600 mg Intravenous  Q12H AUGUSTA Ibarra      magnesium sulfate  2 g Intravenous Once Palak A Maury, CRNP 2 g (03/04/24 0744)    metroNIDAZOLE  500 mg Intravenous Q8H Isaías Nichols, JAKENP 500 mg (03/04/24 0146)    multi-electrolyte  75 mL/hr Intravenous Continuous Palak A Maury, CRNP 75 mL/hr (03/04/24 0742)    norepinephrine  1-30 mcg/min Intravenous Titrated Uche Blair MD Stopped (03/04/24 0628)    ondansetron  4 mg Intravenous Q4H PRN AUGUSTA Ibarra      pantoprazole  40 mg Intravenous Q12H [facility] Uche Blair MD      potassium chloride  40 mEq Intravenous Once Palak A [place], CRNP 40 mEq (03/04/24 0744)    potassium phosphate  30 mmol Intravenous Once Palak A [place], CRNP 30 mmol (03/04/24 0745)    predniSONE  50 mg Oral Daily AUGUSTA Garcia      vasopressin (PITRESSIN) 20 Units in sodium chloride 0.9 % 100 mL infusion  0.04 Units/min Intravenous Continuous Uche Blair MD Stopped (03/04/24 0652)     Continuous Infusions:  fentaNYL, 50 mcg/hr, Last Rate: 50 mcg/hr (03/04/24 0830)  multi-electrolyte, 75 mL/hr, Last Rate: 75 mL/hr (03/04/24 0742)  norepinephrine, 1-30 mcg/min, Last Rate: Stopped (03/04/24 0628)  vasopressin (PITRESSIN) 20 Units in sodium chloride 0.9 % 100 mL infusion, 0.04 Units/min, Last Rate: Stopped (03/04/24 0652)      PRN Meds:   fentanyl citrate (PF), 50 mcg, Q2H PRN  levalbuterol, 1.25 mg, Q8H PRN  ondansetron, 4 mg, Q4H PRN        Invasive Devices Review  Invasive Devices       Central Venous Catheter Line  Duration             CVC Central Lines 03/03/24 Triple 20cm <1 day              Peripheral Intravenous Line  Duration             Peripheral IV 02/29/24 Proximal;Right;Ventral (anterior) Forearm 3 days    Peripheral IV 03/01/24 Dorsal (posterior);Right Forearm 2 days              Drain  Duration             Urethral Catheter Latex;Straight-tip 16 Fr. 3 days    NG/OG/Enteral Tube Orogastric Center mouth <1 day              Airway  Duration             ETT   "Hi-Lo;Cuffed 8 mm 1 day    ETT  Cuffed 8 mm <1 day                    Rationale for remaining devices: monitoring  ---------------------------------------------------------------------------------------  Advance Directive and Living Will: Yes    Power of :    POLST:    ---------------------------------------------------------------------------------------  Care Time Delivered:   Upon my evaluation, this patient had a high probability of imminent or life-threatening deterioration due to acute respiratory failure, which required my direct attention, intervention, and personal management.  I have personally provided 33 minutes  of critical care time, exclusive of procedures, teaching, family meetings, and any prior time recorded by providers other than myself.       rFed Ibarra MD      Portions of the record may have been created with voice recognition software.  Occasional wrong word or \"sound a like\" substitutions may have occurred due to the inherent limitations of voice recognition software.  Read the chart carefully and recognize, using context, where substitutions have occurred      "

## 2024-03-04 NOTE — ASSESSMENT & PLAN NOTE
Diagnosed in December,   Initially started on steroids prednisone 60 mg daily which patient had tapered to 30 mg daily prior to admission with the addition of atovaquone, hydroxychloroquine, and mycophenolate    Hold atovaquone, hydroxychloroquine, and mycophenolate in the setting of acute infection with shock  Patient started on Solu-Cortef prior to discharge from [facility] for possible relative adrenal insufficiency contributing to shock state.    Continue Solu-Cortef, given shock in the setting of chronic steroid use

## 2024-03-04 NOTE — CASE MANAGEMENT
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    Hypokalemia 2024    Hyperglycemia 2024    Shock (HCC) 2024    Lactic acidosis 2024    Diabetes mellitus (HCC) 2024    Acute GI bleeding 2024    Bacteremia due to Enterococcus 2024    Pressure ulcers of skin of multiple topographic sites 2024    Sacral wound 2024    Diarrhea of presumed infectious origin 2024    Positive fecal occult blood test 2024    DVT (deep venous thrombosis) ([facility]) 2024    Acute on chronic anemia 2024    Lupus (HCC) 2024    Acute respiratory failure with hypoxia (HCC) 2024    Hypertension 2024    Pancolitis ([facility]) 2024    Metabolic encephalopathy 2024    Hypomagnesemia 2024    High serum lactic acid 2024      LOS (days): 2  Geometric Mean LOS (GMLOS) (days): 4.1  Days to GMLOS:2     OBJECTIVE:  Risk of Unplanned Readmission Score: 29.31         Current admission status: Inpatient   Preferred Pharmacy:   [id number] - [place] - [address]  [address]  [place] [address]  Phone: [phone] Fax: [phone]    Primary Care Provider: Yaa Hedrick MD    Primary Insurance: MEDICARE  Secondary Insurance: [facility]    DISCHARGE DETAILS:  Late entry;Pt was transferred on 3/3/24 to [facility].

## 2024-03-04 NOTE — ASSESSMENT & PLAN NOTE
Multiple possible etiologies, including hemorrhagic shock in the setting of acute GI bleed, septic shock in the setting of Enterococcus bacteremia, and adrenal insufficiency in the setting of chronic steroid use and acute illness    Discontinue levo and vaso; continue to monitor with MAP goal >=65  Continue Solucortef

## 2024-03-04 NOTE — ASSESSMENT & PLAN NOTE
Lab Results   Component Value Date    HGBA1C 5.9 (H) 03/03/2024       Recent Labs     03/01/24  0435 03/03/24  1155 03/03/24  1703 03/03/24 2040   POCGLU 214* 479* 312* 278*       Blood Sugar Average: Last 72 hrs:  (P) 295    History of DM, well-controlled with last A1c 5.6 on 2/26, but 5.9 today. Developed hyperglycemia with initiation of high dose steroids.    Start ISS Q6H while NPO  Fingersticks Q6H while NPO  Avoid hypoglycemia per protocol  Monitor BG and insulin requirements and adjust regimen as needed

## 2024-03-04 NOTE — ASSESSMENT & PLAN NOTE
Multiple possible etiologies, including hemorrhagic shock in the setting of acute GI bleed, septic shock in the setting of Enterococcus bacteremia, and adrenal insufficiency in the setting of chronic steroid use and acute illness    Continue levo and vaso and wean as able for MAP goal >=65  Continue Solucortef

## 2024-03-04 NOTE — CONSULTS
Darlyn Nguyen Patient MRN: [id number]  Date of Service: 3/4/2024  Referring Physician: Dr Uche Blair  Provider Creating Note: AUGUSTA Zepeda  PCP: Yaa Hedrick  Reason for Consult: GI bleed  HPI  Darlyn Nguyen is a 78 y.o. female who was admitted with Acute respiratory failure with hypoxia (HCC). PMH of Lupus, CVA with chronic right hemiplegia, PMR, HTN, subacute vs chronic diarrhea, chromic anemia, recent DVT on Eliquis. Admitted to [facility] 2/29 with diarrhea and generalized weakness. Noted for acute anemia 7.0 and positive FOBT. Received 2 units PRBCs and Hgb up to 9. Eliquis was placed on hold 2/29.  Also noted to have pneumonia RLL and placed on Ceftriaxone. CellCept, atovaquone, prednisone taper was continued. Sacral wound evaluated by Surgery. BC back with Enterococcus atovaquone and CellCept were discontinued, placed on Linezolid. With Sepsis alert, pt was transferred to [facility] ICU on 3/3.   On arrival to ICU, pt with a bloody BM and Hgb dropped from 8.8 down to 6.2, was given 2 units PRBCs. Was on Heparin gtt with PTT >210, was discontinued yesterday and PTT today is 41. Per RN, pt had 3 BMs over night with red blood and mucus. No further BMs this morning. Hgb up to 8.3. CT shows improving colitis. BUN 35 with normal creat.  VSS, pt intubated and comfortably resting.     Pt was recently hospitalized at [facility] in December for serositis secondary to lupus. Had a renal bx due to proteinuria, positive for lupus nephritis class III and was started on MMF, prednisone.  Repeat hospitalization Feb for dapsone start for PJP prophylaxis, however developed hemolytic anemia with Hgb 5.0, 2 units transfused, and also occlusive DVT RLE and LLE, placed on Eliquis (2/24) and pred. Dx with colitis with 1 episode loose stool, endoscopy was deferred to outpatient follow up. D/c to acute Rehab on 2/24. Per chart, family states colonoscopy 1 year ago unremarkable.         Past Medical History:    Diagnosis Date    Diabetes mellitus (HCC)     Fibromyalgia     Hyperlipidemia     Hypertension      Past Surgical History:   Procedure Laterality Date    ACHILLES TENDON REPAIR Right     ADENOIDECTOMY      APPENDECTOMY      SKIN CANCER EXCISION      x 4 - back    TONSILLECTOMY      TOTAL ABDOMINAL HYSTERECTOMY      US GUIDED KIDNEY BIOPSY  12/26/2023       Medications  Home Medications:   Prior to Admission medications    Medication Sig Start Date End Date Taking? Authorizing Provider   Acetaminophen 500 MG Take 2 capsules by mouth every 6 (six) hours as needed for mild pain   Yes Historical Provider, MD   amLODIPine (NORVASC) 5 mg tablet Take 5 mg by mouth daily   Yes Historical Provider, MD   aspirin (ECOTRIN LOW STRENGTH) 81 mg EC tablet Take 81 mg by mouth   Yes Historical Provider, MD   atorvastatin (LIPITOR) 40 mg tablet Take 40 mg by mouth daily   Yes Historical Provider, MD   atovaquone (MEPRON) 750 mg/5 mL suspension Take 750 mg by mouth 2 (two) times a day   Yes Historical Provider, MD   carvedilol (Coreg) 6.25 mg tablet Take 6.25 mg by mouth 2 (two) times a day with meals   Yes Historical Provider, MD   Cholecalciferol 50 MCG (2000 UT) CAPS Take 1 capsule by mouth daily   Yes Historical Provider, MD   co-enzyme Q-10 100 mg capsule Take 100 mg by mouth daily   Yes Historical Provider, MD   Cyanocobalamin 1000 MCG CAPS Take 1 capsule by mouth daily   Yes Historical Provider, MD   escitalopram (LEXAPRO) 10 mg tablet Take 10 mg by mouth daily   Yes Historical Provider, MD   famotidine (PEPCID) 20 mg tablet Take 20 mg by mouth daily   Yes Historical Provider, MD   FISH OIL-CANOLA OIL-VIT D3 PO Use   Yes Historical Provider, MD   Hydroxychloroquine Sulfate 300 MG TABS Take 1 tablet by mouth daily   Yes Historical Provider, MD   losartan (COZAAR) 100 MG tablet Take 100 mg by mouth daily   Yes Historical Provider, MD   mirtazapine (REMERON) 7.5 MG tablet Take 7.5 mg by mouth daily at bedtime   Yes Historical  Provider, MD   mycophenolate (CELLCEPT) 500 mg tablet Take 2 tabs twice daily as instructed 1/10/24  Yes Historical Provider, MD   pantoprazole (PROTONIX) 40 mg tablet Take 40 mg by mouth 2 (two) times a day   Yes Historical Provider, MD   predniSONE 10 mg tablet Take 60 mg by mouth daily   Yes Historical Provider, MD   apixaban (ELIQUIS) 5 mg Take 5 mg by mouth 2 (two) times a day  Patient not taking: Reported on 3/3/2024    Historical Provider, MD   gabapentin (NEURONTIN) 100 mg capsule  12/5/21   Historical Provider, MD   insulin lispro (HumALOG/ADMELOG) 100 units/mL injection Inject 2-10 Units under the skin 3 (three) times a day with meals  Patient not taking: Reported on 3/3/2024    Historical Provider, MD   insulin lispro (HumALOG/ADMELOG) 100 units/mL injection Inject 2-10 Units under the skin daily at bedtime  Patient not taking: Reported on 3/3/2024    Historical Provider, MD   methocarbamol (ROBAXIN) 500 mg tablet Take 500 mg by mouth every 12 (twelve) hours as needed for muscle spasms    Historical Provider, MD   Nutritional Supplements (Al) PACK Take 1 each by mouth 2 (two) times a day  Patient not taking: Reported on 3/3/2024    Historical Provider, MD   oxyCODONE (ROXICODONE) 5 immediate release tablet Take 2.5 mg by mouth every 6 (six) hours as needed for moderate pain  Patient not taking: Reported on 3/3/2024    Historical Provider, MD   TURMERIC PO Take 1 capsule by mouth daily  Patient not taking: Reported on 2/29/2024    Historical Provider, MD       Inhouse Medications    Current Facility-Administered Medications:     atorvastatin (LIPITOR) tablet 40 mg, 40 mg, Oral, Daily, Uche Blair MD    cefepime (MAXIPIME) IVPB (premix in dextrose) 2,000 mg 50 mL, 2,000 mg, Intravenous, Q12H, AUGUSTA Ibarra, Last Rate: 100 mL/hr at 03/04/24 0506, 2,000 mg at 03/04/24 0506    chlorhexidine (PERIDEX) 0.12 % oral rinse 15 mL, 15 mL, Mouth/Throat, Q12H CRUZITO, AUGUSTA Ibarra, 15 mL at  03/03/24 2057    fentaNYL 1000 mcg in sodium chloride 0.9% 100mL infusion, 50 mcg/hr, Intravenous, Continuous, AUGUSTA Hutchison, Last Rate: 5 mL/hr at 03/04/24 0742, 50 mcg/hr at 03/04/24 0742    fentanyl citrate (PF) 100 MCG/2ML 50 mcg, 50 mcg, Intravenous, Q2H PRN, AUGUSTA Hutchison    folic acid 1 mg, thiamine (VITAMIN B1) 100 mg in sodium chloride 0.9 % 100 mL IV piggyback, , Intravenous, Daily, AUGUSTA Ibarra    insulin lispro (HumALOG/ADMELOG) 100 units/mL subcutaneous injection 2-12 Units, 2-12 Units, Subcutaneous, Q6H CRUZITO **AND** Fingerstick Glucose (POCT), , , Q6H, AUGUSTA Garcia    levalbuterol (XOPENEX) inhalation solution 1.25 mg, 1.25 mg, Nebulization, Q8H PRN, AUGUSTA Ibarra    linezolid (ZYVOX) IVPB (premix in dextrose) 600 mg 300 mL, 600 mg, Intravenous, Q12H, AUGUSTA Ibarra, 600 mg at 03/03/24 2149    magnesium sulfate 2 g/50 mL IVPB (premix) 2 g, 2 g, Intravenous, Once, AUGUSTA Hutchison, Last Rate: 25 mL/hr at 03/04/24 0744, 2 g at 03/04/24 0744    metroNIDAZOLE (FLAGYL) IVPB (premix) 500 mg 100 mL, 500 mg, Intravenous, Q8H, AUGUSTA Ibarra, Last Rate: 200 mL/hr at 03/04/24 0146, 500 mg at 03/04/24 0146    multi-electrolyte (PLASMALYTE-A/ISOLYTE-S PH 7.4) IV solution, 75 mL/hr, Intravenous, Continuous, AUGUSTA Hutchison, Last Rate: 75 mL/hr at 03/04/24 0742, 75 mL/hr at 03/04/24 0742    NOREPINEPHRINE 4 MG  ML NSS (CMPD ORDER) infusion, 1-30 mcg/min, Intravenous, Titrated, Uche Lifton, MD, Stopped at 03/04/24 0628    ondansetron (ZOFRAN) injection 4 mg, 4 mg, Intravenous, Q4H PRN, AUGUSTA Ibarra    pantoprazole (PROTONIX) injection 40 mg, 40 mg, Intravenous, Q12H CRUZITO, Uche Blair MD    potassium chloride 40 mEq IVPB (premix), 40 mEq, Intravenous, Once, AUGUSTA Hutchison, Last Rate: 25 mL/hr at 03/04/24 0744, 40 mEq at 03/04/24 0744    potassium phosphates 30 mmol in sodium chloride 0.9 % 250 mL infusion, 30 mmol,  Intravenous, Once, AUGUSTA Hutchison, Last Rate: 41.7 mL/hr at 03/04/24 0745, 30 mmol at 03/04/24 0745    predniSONE tablet 50 mg, 50 mg, Oral, Daily, AUGUSTA Garcia    vasopressin (PITRESSIN) 20 Units in sodium chloride 0.9 % 100 mL infusion, 0.04 Units/min, Intravenous, Continuous, Uche Blair MD, Stopped at 03/04/24 0652    Allergies  Allergies   Allergen Reactions    Rosuvastatin Other (See Comments)     Per SNF    Simvastatin Other (See Comments)     Per SNF    Myalgias       Social History   reports that she has never smoked. She has never used smokeless tobacco. She reports that she does not currently use alcohol. She reports that she does not use drugs.    Family History  Family History   Problem Relation Age of Onset    Cancer Mother     Cervical cancer Sister     Breast cancer Sister     Breast cancer Sister        ROS  ROS: Unable to obtain    Objective   Vitals  /59   Pulse (!) 54   Temp 98.2 °F (36.8 °C)   Resp 15   SpO2 100%   General: No apparent distress, sleeping.  Eyes: No scleral icterus, EOM's intact.  ENT: MM dry, ET tube in place.  Card: RRR no murmur  Lungs: Wheezing and coarse breath sounds, intubated.   Abdomen: Soft. Nontender. Nondistended. Bowel sounds present and normoactive.   Skin: No jaundice  Ext: BLE edema. No clubbing or cyanosis.   Psych:  Sleeping  Neuro: Resting, intubated.       Laboratory Studies  Lab Results   Component Value Date    WBC 24.05 (H) 03/04/2024    HGB 8.8 (L) 03/04/2024    HCT 26.9 (L) 03/04/2024     03/04/2024    MCV 92 03/04/2024     Lab Results   Component Value Date    CREATININE 0.88 03/04/2024    BUN 35 (H) 03/04/2024    SODIUM 138 03/04/2024    K 3.4 (L) 03/04/2024     (H) 03/04/2024    CO2 23 03/04/2024    CALCIUM 7.4 (L) 03/04/2024    ALKPHOS 61 03/04/2024    AST 80 (H) 03/04/2024    ALT 93 (H) 03/04/2024     Lab Results   Component Value Date    PROTIME 18.2 (H) 03/04/2024    INR 1.49 (H) 03/04/2024        Imaging and Other Studies      Assessment and Plan:    Acute GI bleed with improving colitis on CT. In patient newly on Eliquis 2/24 for DVT, was discontinued on 2/29 due to GI bleeding. Placed on Heparin gtt, bloody BM 3/3 with PTT >210, was discontinued and given 2 units, hgb today 8.8. PTT now 41.  3 bloody BMs last night, none so far this am. Continue to closely monitor stool, H&H, transfuse prn. Will discuss with Dr Stevens colonoscopy vs Flex sig vs monitoring. Pt with Pneumonia and intubated.   Recent persistent diarrhea, colitis on CT but improving, elevated fecal calprotectin 493 2/22 and negative C diff, stool elastase normal.  Colonoscopy 1 year ago.    Acute respiratory failure with hypoxia and suspicious for pneumonia with worsening consolidation on CXR 3/3.   Bacteriemia due to enterococcus  Sacral wound  Lupus      Principal Problem:    Acute respiratory failure with hypoxia (HCC)  Active Problems:    Lupus (HCC)    Sacral wound    Bacteremia due to Enterococcus    Shock (HCC)    Metabolic acidosis with normal anion gap    Diabetes mellitus (HCC)    Acute GI bleeding      AUGUSTA Zepeda

## 2024-03-04 NOTE — ASSESSMENT & PLAN NOTE
Recent persistent diarrhea  FOBT positive minors  Small bloody bowel movement on presentation to [facility] ICU  Acute drop in hemoglobin from 8.0 morning of 3/3/2024 to 6.2 approximately 12 hours later  Recent CT A/P demonstrating colitis  No history of significant GI bleed per family  Family reports colonoscopy approximately 1 year ago without significant findings    PTT supratherapeutic >210    Would consider that GI bleed may resolve spontaneously as PTT normalizes off Hep    Transfuse 2 units PRBC now  Hold heparin GTT, started in place of home Eliquis for DVT found on 2/20/2024  Give Protonix 80 mg IV now, followed by Protonix 40 mg IV Q12H  Consider FFP if persistent bleeding  GI consulted, appreciate recommendations

## 2024-03-04 NOTE — SEPSIS NOTE
Sepsis Note   Darlyn Nguyen 78 y.o. female MRN: [id number]  Unit/Bed#: ICU 01 Encounter: [id number]      Sepsis alert fired for respiratory rate 27, WBCs 26, lactic 4.5, patient is already being treated for sepsis, status post 30 cc/kg fluid bolus already completed at [facility].         There is no height or weight on file to calculate BMI.  Wt Readings from Last 1 Encounters:   03/03/24 70.1 kg (154 lb 8.7 oz)     IBW (Ideal Body Weight): 54.7 kg    Ideal body weight: 54.7 kg (120 lb 9.5 oz)  Adjusted ideal body weight: 60.9 kg (134 lb 2.8 oz)

## 2024-03-04 NOTE — ASSESSMENT & PLAN NOTE
Diagnosed in December,   Initially started on steroids prednisone 60 mg daily which patient had tapered to 30 mg daily prior to admission with the addition of atovaquone, hydroxychloroquine, and mycophenolate    Hold atovaquone, hydroxychloroquine, and mycophenolate in the setting of acute infection with shock  Patient started on Solu-Cortef prior to discharge from [facility] for possible relative adrenal insufficiency contributing to shock state.    Continue Solu-Cortef, given shock in the setting of chronic steroid use

## 2024-03-04 NOTE — ASSESSMENT & PLAN NOTE
Initially treated with MMF, prednisone  Immunoprophylaxis with dapsone but developed methemoglobinemia and was transitioned to atovaquone  Given current shock state however MMF and atovaquone are on hold  Creatinine is 0.88  Will check an albumin to creatinine ratio  Currently on prednisone 50 mg daily-rheumatology notes in Care Everywhere reviewed patient was to start a steroid taper for janaury 13th with 10 mg decrease every 2 weeks would also consult rheumatology in the setting of the patient's critical illness and immunosuppression for now we will maintain on prednisone 50 mg daily

## 2024-03-04 NOTE — ASSESSMENT & PLAN NOTE
Enterococcus avium bacteremia. Pt recently started on atovaquone, hydroxychloroquine, and mycophenolate. Would consider possible gut translocation vs diarrhea contaminating sacral wound.     E. avium associated with increased risk of vancomycin resistance    Repeat cultures show NGTD    Continue linezolid   ID consulted, appreciate recommendations

## 2024-03-04 NOTE — PLAN OF CARE
Problem: HEMATOLOGIC - ADULT  Goal: Maintains hematologic stability  Description: INTERVENTIONS  - Assess for signs and symptoms of bleeding or hemorrhage  - Monitor labs  - Administer supportive blood products/factors as ordered and appropriate  Outcome: Progressing     Problem: RESPIRATORY - ADULT  Goal: Achieves optimal ventilation and oxygenation  Description: INTERVENTIONS:  - Assess for changes in respiratory status  - Assess for changes in mentation and behavior  - Position to facilitate oxygenation and minimize respiratory effort  - Oxygen administered by appropriate delivery if ordered  - Initiate smoking cessation education as indicated  - Encourage broncho-pulmonary hygiene including cough, deep breathe, Incentive Spirometry  - Assess the need for suctioning and aspirate as needed  - Assess and instruct to report SOB or any respiratory difficulty  - Respiratory Therapy support as indicated  Outcome: Progressing     Problem: Knowledge Deficit  Goal: Patient/family/caregiver demonstrates understanding of disease process, treatment plan, medications, and discharge instructions  Description: Complete learning assessment and assess knowledge base.  Interventions:  - Provide teaching at level of understanding  - Provide teaching via preferred learning methods  Outcome: Progressing     Problem: DISCHARGE PLANNING  Goal: Discharge to home or other facility with appropriate resources  Description: INTERVENTIONS:  - Identify barriers to discharge w/patient and caregiver  - Arrange for needed discharge resources and transportation as appropriate  - Identify discharge learning needs (meds, wound care, etc.)  - Arrange for interpretive services to assist at discharge as needed  - Refer to Case Management Department for coordinating discharge planning if the patient needs post-hospital services based on physician/advanced practitioner order or complex needs related to functional status, cognitive ability, or social  support system  Outcome: Progressing     Problem: SAFETY ADULT  Goal: Maintain or return to baseline ADL function  Description: INTERVENTIONS:  -  Assess patient's ability to carry out ADLs; assess patient's baseline for ADL function and identify physical deficits which impact ability to perform ADLs (bathing, care of mouth/teeth, toileting, grooming, dressing, etc.)  - Assess/evaluate cause of self-care deficits   - Assess range of motion  - Assess patient's mobility; develop plan if impaired  - Assess patient's need for assistive devices and provide as appropriate  - Encourage maximum independence but intervene and supervise when necessary  - Involve family in performance of ADLs  - Assess for home care needs following discharge   - Consider OT consult to assist with ADL evaluation and planning for discharge  - Provide patient education as appropriate  Outcome: Progressing     Problem: SAFETY ADULT  Goal: Patient will remain free of falls  Description: INTERVENTIONS:  - Educate patient/family on patient safety including physical limitations  - Instruct patient to call for assistance with activity   - Consult OT/PT to assist with strengthening/mobility   - Keep Call bell within reach  - Keep bed low and locked with side rails adjusted as appropriate  - Keep care items and personal belongings within reach  - Initiate and maintain comfort rounds  - Make Fall Risk Sign visible to staff  - Apply yellow socks and bracelet for high fall risk patients  - Consider moving patient to room near nurses station  Outcome: Progressing     Problem: Prexisting or High Potential for Compromised Skin Integrity  Goal: Skin integrity is maintained or improved  Description: INTERVENTIONS:  - Identify patients at risk for skin breakdown  - Assess and monitor skin integrity  - Assess and monitor nutrition and hydration status  - Monitor labs   - Assess for incontinence   - Turn and reposition patient  - Assist with mobility/ambulation  -  Relieve pressure over bony prominences  - Avoid friction and shearing  - Provide appropriate hygiene as needed including keeping skin clean and dry  - Evaluate need for skin moisturizer/barrier cream  - Collaborate with interdisciplinary team   - Patient/family teaching  - Consider wound care consult   Outcome: Progressing

## 2024-03-04 NOTE — ASSESSMENT & PLAN NOTE
CXR 2/29/24: Right lung opacities, suspicious for pneumonia in the appropriate clinical setting.  CT abdomen pelvis 2/29/2024: LOWER CHEST: Mild right basilar patchy consolidation #2/5 suspicious for pneumonia.  Procal elevated and uptrending from 0.76 on 3/1/24 to 3.08 on 3/4/24    Pneumonia suspected at admission.   CXR on 3/3 reviewed and appears to show worsening RUL consolidation, which may account for acutely worsening respiratory function.    Continue abx  Continue vent: Current settings CMV 18/420/50/6  Daily SBT for early vent liberation

## 2024-03-04 NOTE — ASSESSMENT & PLAN NOTE
Lab Results   Component Value Date    EGFR 62 03/04/2024    EGFR 61 03/03/2024    EGFR 58 03/03/2024    CREATININE 0.88 03/04/2024    CREATININE 0.89 03/03/2024    CREATININE 0.92 03/03/2024   Patient's baseline creatinine is around 0.8  GFR is around 60 which likely is overestimated given muscle mass

## 2024-03-04 NOTE — ASSESSMENT & PLAN NOTE
Patient received 2 units packed red blood cells  Blood pressures are currently acceptable  Hemoglobin is 8.8, platelet count 205  Continue to monitor  And ongoing ICU and GI management  Concern for colitis with red blood and mucus in stool

## 2024-03-04 NOTE — PROGRESS NOTES
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) ([facility])  Assessment & Plan  Initially treated with MMF, prednisone  Immunoprophylaxis with dapsone but developed methemoglobinemia and was transitioned to atovaquone  Given current shock state however MMF and atovaquone are on hold  Creatinine is 0.88  Will check an albumin to creatinine ratio  Currently on prednisone 50 mg daily-rheumatology notes in Care Everywhere reviewed patient was to start a steroid taper for janaury 13th with 10 mg decrease every 2 weeks would also consult rheumatology in the setting of the patient's critical illness and immunosuppression for now we will maintain on prednisone 50 mg daily  Currently oliguric we will need to watch this closely for developing ARYAN    Acute GI bleeding  Assessment & Plan  Patient received 2 units packed red blood cells  Blood pressures are currently acceptable  Hemoglobin is 8.8, platelet count 205  Continue to monitor  And ongoing ICU and GI management  Concern for colitis with red blood and mucus in stool    CKD (chronic kidney disease), stage II  Assessment & Plan  Lab Results   Component Value Date    EGFR 62 03/04/2024    EGFR 61 03/03/2024    EGFR 58 03/03/2024    CREATININE 0.88 03/04/2024    CREATININE 0.89 03/03/2024    CREATININE 0.92 03/03/2024   Patient's baseline creatinine is around 0.8  GFR is around 60 which likely is overestimated given muscle mass    Hypokalemia  Assessment & Plan  Can replete potassium as needed    Hyperglycemia  Assessment & Plan  Moderate glycemic control while in the hospital and on prednisone    Bacteremia due to Enterococcus  Assessment & Plan  Medications removed reviewed and is currently on cefepime and linezolid      Shock  (HCC)  Assessment & Plan  Requiring norepinephrine, maintain a MAP above 65, monitor urine output    * Acute respiratory failure with hypoxia (HCC)  Assessment & Plan  With concerns for a pneumonia, in the ICU and intubated    Lactic acidosis  Assessment & Plan  Anion gap decreased lactate was up in the 2 range, continue to treat underlying cause, continue to improve and maintain blood pressure MAP and will monitor    Sacral wound  Assessment & Plan  Ongoing wound care         Discussion:    This plan of care was discussed with Dr. Coleman of critical care team and we were in agreement with immunosuppressive changes as well as maintaining MAP above 65 for renal perfusion    SUBJECTIVE:    Patient was seen today, robina critically ill and intubated in the ICU    12 point review of systems was not completed due to patient's critical illness    Medications:    Current Facility-Administered Medications:     atorvastatin (LIPITOR) tablet 40 mg, 40 mg, Oral, Daily, Uche Blair MD    cefepime (MAXIPIME) IVPB (premix in dextrose) 2,000 mg 50 mL, 2,000 mg, Intravenous, Q12H, AUGUSTA Ibarra, Last Rate: 100 mL/hr at 03/04/24 0506, 2,000 mg at 03/04/24 0506    chlorhexidine (PERIDEX) 0.12 % oral rinse 15 mL, 15 mL, Mouth/Throat, Q12H CRUZITO, AUGUSTA Ibarra, 15 mL at 03/03/24 2057    fentaNYL 1000 mcg in sodium chloride 0.9% 100mL infusion, 50 mcg/hr, Intravenous, Continuous, AUUGSTA Hutchison, Last Rate: 5 mL/hr at 03/04/24 0830, 50 mcg/hr at 03/04/24 0830    fentanyl citrate (PF) 100 MCG/2ML 50 mcg, 50 mcg, Intravenous, Q2H PRN, AUGUSTA Hutchison    folic acid 1 mg, thiamine (VITAMIN B1) 100 mg in sodium chloride 0.9 % 100 mL IV piggyback, , Intravenous, Daily, AUGUSTA Ibarra    insulin lispro (HumALOG/ADMELOG) 100 units/mL subcutaneous injection 2-12 Units, 2-12 Units, Subcutaneous, Q6H CRUZITO **AND** Fingerstick Glucose (POCT), , , Q6H, AUGUSTA Garcia    levalbuterol (XOPENEX) inhalation  solution 1.25 mg, 1.25 mg, Nebulization, Q8H PRN, AUGUSTA Ibarra    linezolid (ZYVOX) IVPB (premix in dextrose) 600 mg 300 mL, 600 mg, Intravenous, Q12H, AUGUSTA Ibarra, 600 mg at 03/03/24 2149    magnesium sulfate 2 g/50 mL IVPB (premix) 2 g, 2 g, Intravenous, Once, AUGUSTA Hutchison, Last Rate: 25 mL/hr at 03/04/24 0744, 2 g at 03/04/24 0744    metroNIDAZOLE (FLAGYL) IVPB (premix) 500 mg 100 mL, 500 mg, Intravenous, Q8H, AUGUSTA Ibarra, Last Rate: 200 mL/hr at 03/04/24 0146, 500 mg at 03/04/24 0146    multi-electrolyte (PLASMALYTE-A/ISOLYTE-S PH 7.4) IV solution, 75 mL/hr, Intravenous, Continuous, AUGUSTA Hutchison, Last Rate: 75 mL/hr at 03/04/24 0742, 75 mL/hr at 03/04/24 0742    NOREPINEPHRINE 4 MG  ML NSS (CMPD ORDER) infusion, 1-30 mcg/min, Intravenous, Titrated, Uche Blair MD, Last Rate: 7.5 mL/hr at 03/04/24 0920, 2 mcg/min at 03/04/24 0920    ondansetron (ZOFRAN) injection 4 mg, 4 mg, Intravenous, Q4H PRN, AUGUSTA Ibarra    pantoprazole (PROTONIX) injection 40 mg, 40 mg, Intravenous, Q12H CRUZITO, Uche Blair MD    potassium chloride 40 mEq IVPB (premix), 40 mEq, Intravenous, Once, AUGUSTA Hutchison, Last Rate: 25 mL/hr at 03/04/24 0744, 40 mEq at 03/04/24 0744    potassium phosphates 30 mmol in sodium chloride 0.9 % 250 mL infusion, 30 mmol, Intravenous, Once, AUGUSTA Hutchison, Last Rate: 41.7 mL/hr at 03/04/24 0745, 30 mmol at 03/04/24 0745    predniSONE tablet 50 mg, 50 mg, Oral, Daily, AUGUSTA Garcia    OBJECTIVE:    Vitals:    03/04/24 0751 03/04/24 0800 03/04/24 0830 03/04/24 0900   BP:  104/54 109/56 (!) 78/47   Pulse:  56 58 (!) 54   Resp:  16 14 17   Temp:  97.9 °F (36.6 °C) 97.9 °F (36.6 °C) 97.9 °F (36.6 °C)   SpO2: 100% 100% 100% 100%        Temp:  [97.5 °F (36.4 °C)-99.3 °F (37.4 °C)] 97.9 °F (36.6 °C)  HR:  [54-92] 54  Resp:  [14-33] 17  BP: ()/(37-79) 78/47  SpO2:  [83 %-100 %] 100 %     There is no height or weight  on file to calculate BMI.    Weight (last 2 days)       None            I/O last 3 completed shifts:  In: 1318.5 [I.V.:989.3; Blood:329.2]  Out: 275 [Urine:275]    I/O this shift:  In: 200 [I.V.:200]  Out: 27 [Urine:27]      Physical exam:    General: no acute distress, cooperative  Eyes: conjunctivae pink, anicteric sclerae  ENT: lips and mucous membranes moist, no exudates, normal external ears  Neck: ROM intact, no JVD  Chest: Coarse breath sounds remains on the ventilator  CVS: normal rate, non pericardial friction rub  Abdomen: soft, non-tender, non-distended, normoactive bowel sounds  Extremities: no edema of both legs  Skin: no rash  Neuro: Bated and sedated    Invasive Devices:   Urethral Catheter Latex;Straight-tip 16 Fr. (Active)   Reasons to continue Urinary Catheter  Stage III/IV sacral ulcers or open perineal wounds in incontinent patients 03/03/24 2000   Goal for Removal Other (Comment) 03/03/24 2000   Site Assessment Clean;Skin intact 03/03/24 2000   Peralta Care Done 03/03/24 2100   Collection Container Standard drainage bag 03/03/24 2000   Securement Method Securing device (Describe) 03/03/24 2000   Output (mL) 15 mL 03/04/24 0830     Lab Results:   Results from last 7 days   Lab Units 03/04/24  0455 03/03/24  2255 03/03/24  2250 03/03/24  1640 03/03/24  1404 03/03/24  1149 03/03/24  1148 03/03/24  1019 03/03/24  0404 03/02/24  0823 03/02/24  0700 03/02/24  0658 03/01/24  0542 02/29/24  2118 02/29/24  1804 02/29/24  1226   WBC Thousand/uL 24.05*  --   --  26.30*  --   --   --   --  18.38*  --   --  21.27* 12.97*  --   --  8.72   HEMOGLOBIN g/dL 8.8*  --  8.2* 6.2*  --   --   --   --  8.0*  --   --  8.8* 9.0*  --    < > 7.0*   HEMATOCRIT % 26.9*  --   --  20.3*  --   --   --   --  27.2*  --   --  28.3* 29.0*  --    < > 23.7*   PLATELETS Thousands/uL 205  --   --  284  --   --   --   --  333  --   --  332 325  --   --  315   POTASSIUM mmol/L 3.4*  --   --  3.8  --  4.1  --   --  4.2  --   --  4.6 4.5   "--   --  4.8   CHLORIDE mmol/L 109*  --   --  108  --  106  --   --  106  --   --  104 102  --   --  101   CO2 mmol/L 23  --   --  22  --  20*  --   --  22  --   --  26 25  --   --  23   BUN mg/dL 35*  --   --  35*  --  36*  --   --  36*  --   --  34* 42*  --   --  58*   CREATININE mg/dL 0.88  --   --  0.89  --  0.92  --   --  0.85  --   --  0.84 0.87  --   --  1.10   CALCIUM mg/dL 7.4*  --   --  7.7*  --  7.7*  --   --  8.5  --   --  8.8 8.7  --   --  8.6   MAGNESIUM mg/dL 1.9  --   --  1.9  --   --   --   --   --   --   --   --  2.0  --   --  2.0   PHOSPHORUS mg/dL 2.2*  --   --  2.4  --   --   --   --   --   --   --   --   --   --   --   --    ALK PHOS U/L 61  --   --  65  --  59  --   --  70  --   --  75 65  --   --  59   ALT U/L 93*  --   --  109*  --  57*  --   --  18  --   --  12 15  --   --  15   AST U/L 80*  --   --  156*  --  66*  --   --  13  --   --  8* 11*  --   --  13   BLOOD CULTURE   --  Received in Microbiology Lab. Culture in Progress.  Received in Microbiology Lab. Culture in Progress.  --   --   --   --  Received in Microbiology Lab. Culture in Progress. Received in Microbiology Lab. Culture in Progress.  --  No Growth at 24 hrs. No Growth at 24 hrs.  --   --   --   --  Enterococcus avium*  No Growth at 72 hrs.   NITRITE UA   --   --   --   --  Negative  --   --   --   --   --   --   --   --  Positive*  --   --    LEUKOCYTES UA   --   --   --   --  Negative  --   --   --   --   --   --   --   --  Negative  --   --    BLOOD UA   --   --   --   --  Moderate*  --   --   --   --   --   --   --   --  Trace-Intact*  --   --     < > = values in this interval not displayed.         Portions of the record may have been created with voice recognition software. Occasional wrong word or \"sound a like\" substitutions may have occurred due to the inherent limitations of voice recognition software. Read the chart carefully and recognize, using context, where substitutions have occurred.If you have any " questions, please contact the dictating provider.

## 2024-03-04 NOTE — ASSESSMENT & PLAN NOTE
Anion gap decreased lactate was up in the 2 range, continue to treat underlying cause, continue to improve and maintain blood pressure MAP and will monitor

## 2024-03-04 NOTE — CONSULTS
Consultation - Infectious Disease   Darlyn Nguyen 78 y.o. female MRN: [id number]  Unit/Bed#: ICU 01 Encounter: [id number]    IMPRESSION & RECOMMENDATIONS:   1. Septic shock. Developing over admission. Leukocytosis, hypotension, and lactic acidosis. Likely secondary to enterococcus avium bacteremia. Suspect GI source given patient's recent GI bleed and pancolitis with heavy diarrhea. This is complicated by fact that patient is immunosuppressed in setting of recent SLE with lupus nephritis diagnosis. Patient is currently intubated with ongoing pressor support. She remains afebrile. Last chest xray with atelectasis and RUL opacity. New CXR is pending. Initial repeat blood cultures are negative >24 hours. New repeat blood cultures are pending.  -antibiotic as below  -check CBCD and BMP tomorrow  -follow up blood cultures  -follow up repeat CXR  -monitor vitals  -supportive care    2. Enterococcus avium bacteremia. Single set positive from 2/29/2024. Suspect likely GI source given patient's recent GI bleed and pancolitis with heavy diarrhea. Recent urine and wound cultures with no growth of this pathogen. Patient completed CT A/P which noted scattered splenic hypodensities. Concern for septic emboli. Recommend CT chest and head to further assess for septic emboli. With this pathogen there is concern for vancomycin resistance. She was therefore started on Linezolid. She has also been receiving cefepime and flagyl due to concern for UTI and pneumonia. Recommend stopping this regimen now. Will transition patient to IV Daptomycin, dose reviewed by ID pharmacist. Patient has no intravascular devices or hardware. Initial repeat blood cultures are negative >24 hours. New repeat blood cultures are pending. TTE was negative but she may require DYANA for further assessment.   -stop IV cefepime  -stop IV flagyl  -stop IV Linezolid  -start IV Daptomycin, 600mg daily  -check CBCD and BMP tomorrow  -follow up blood cultures  -recommend  CT chest  -recommend CT head  -consider DYANA  -monitor vitals    3. Pancolitis with heavy diarrhea. Now with positive FOB at SNF and concern for GI bleed. Concern this may be source of bacteremia above. Patient following with GI prior to transfer to [facility]. Recent stool PCR was negative. Giardia and O&P are pending. Fecal calprotectin positive. I personally reviewed newest CT A/P which showed persistent fluid throughout the colon, improvement to previous areas of colonic mucosal hyperemia, and colonic diverticulosis without diverticulitis. Suspect she may benefit from colonoscopy once medically stable. Gastroenterology consult is pending.  -serial abdominal exams  -follow up gastroenterology consult    4. Acute hypoxic respiratory failure. Likely secondary to sepsis with shock above. Consider possibility of aspiration as patient had RUL opacitiy on most recent CXR. New CXR is pending. Consider possibility of septic emboli in bacteremic patient with new splenic hypodensities. Recommend further evaluation with chest CT. She did recently have B/L LE DVTs, however she's been on Eliquis so less concern for PE. She is intubated on mechanical ventilation this morning.   -antibiotic as above  -check CBCD and BMP tomorrow  -follow up CXR  -recommend chest CT  -monitor vitals  -monitor respiratory status  -mechanical ventilation support per critical care team     5. SLE. With lupus nephritis. Diagnosed in 12/2023 at [facility]. She has been managed by rheumatology and nephrology. She was started on prednisone, atovaquone, hydroxychloroquine, and mycophenolate. Atovaquone, hydroxychloroquine, and mycophenolate currently on hold. She was given a dose of Solu-Cortef at [facility]. Nephrology has assessed and patient is on prednisone. Steroid use is risk factor for infection.  -steroid per nephrology  -patient will eventually need to go back on mepron from PJP prophylaxis  -continue close follow up with nephrology  -consider  rheumatology consult as needed    6. CKD stage 2. This can affect antibiotic dosing. Upon review of patient's available past medical records it appears her baseline creatinine is approximately 0.8. Also with lupus nephritis as above. Suspect patient is high risk for ARYAN.   -monitor creatinine  -dose adjust antibiotic for renal function as needed  -avoid nephrotoxins  -continue close follow up with nephrology    7. Sacral and buttock ulcers. Some areas more superficial and appearing as raw granular tissue. Other areas are likely stage 2 with some gray stringy slough and fibrous tissue. Wounds do not appear to tunnel or undermine. It was documented that patient had a gluteal skin care during recent hospitalization at [facility]. Suspect pressure and prolonged down time are contributing to skin integrity. Recommend formal wound management appointment.   -serial skin exams  -frequent turning and repositioning to offload pressure  -recommend formal wound management evaluation     8. Type 2 diabetes mellitus without long term insulin use. Patient's last HbA1c was 5.9% on 3/3/2024. Elevated blood glucose is risk factor for wounds and infection. Recommend tight glycemic control.  -blood glucose management per primary service    Above plan was discussed in detail with critical care team who agree with ongoing IV antibiotic treatment plan.    HISTORY OF PRESENT ILLNESS:  Reason for Consult: pneumonia of right lower lobe due to infectious organism, acute respiratory failure with hypoxia, bacteremia due to enterococcus, septic shock, diarrhea of presumed infectious origin, metabolic encephalopathy.   HPI: Darlyn Nguyen is a 78 y.o. year old female who presented to the [facility] ED on 2/29/2024 with rectal bleeding. Patient with recent complex medical needs after diagnosis of SLE with secondary nephritis in December 2023 at [facility]. She was then hospitalized again in February 2024 with acute hypoxic respiratory failure, diarrhea,  pancolitis, gluteal skin tear, and DVT of the B/L LE. She was diagnosed with methemoglobinemia. She was discharged to a SNF. Nursing facility staff reported patient had weakness, fatigue, and buttock pain. She had ongoing diarrhea. They performed hemoccult test which was positive.    After admission GI was consulted for diarrhea. General surgery was consulted for the buttock wound. Nephrology was consulted for ongoing management in setting of newly diagnosed lupus nephritis. The patient was found to have evidence of colitis and pneumonia. She was given 2 units PRBC and broad-spectrum antibiotics. Sacral wound culture showed e coli, proteus, and pseudomonas. Urine grew low colony count pseudomonas. Blood cultures grew enterococcus avium. On 3/3/2024 the patient met sepsis criteria with leukocytosis, tachycardia, and lactic acidosis. Hypotension developed and she was started on pressor. She had respiratory failure and was intubated. She was transferred to [facility] for escalated level of care.    Since arriving at [facility] she's been receiving cefepime, flagyl, and linezolid as her antibiotic regimen. She has received additional PRBC. Her chronic atovaquone, hydroxychloroquine, and mycophenolate have been placed on hold. She was started on Solu-Cortef at [facility]. We have been asked for pneumonia of right lower lobe due to infectious organism, acute respiratory failure with hypoxia, bacteremia due to enterococcus, septic shock, diarrhea of presumed infectious origin, and metabolic encephalopathy.     The patient has DMII, HTN, hyperlimidemia, fibromyalgia, lupus, frailty, failure to thrive, essential tremor, weight loss, cognitive impairment, moderate protein-calorie malnutrition, and anemia. She has a history of high dose steroid use, pancolitis, metabolic encephalopathy, hypomagnesemia, high serum lactic acid, diarrhea, positive fecal occult blood test, DVT, pressure ulcers, R achilles  tendon repair, proteinuria, hypoalbuminemia, rash requiring punch biopsy, appendectomy, knee arthroscopy with debridement, endoscopy, serositis, kidney biopsy, adenoidectomy, tonsillectomy, MANUELA, pleural effusion, pericardial effusion, and excision of skin cancer from the back. She has allergies to rosuvastatin and simvastatin.    REVIEW OF SYSTEMS:  Unable to perform ROS, patient intubated with light sedation. Did open her eyes to verbal stimuli but did not attempt to communicate or follow commands.     PAST MEDICAL HISTORY:  Past Medical History:   Diagnosis Date    Diabetes mellitus (HCC)     Fibromyalgia     Hyperlipidemia     Hypertension      Past Surgical History:   Procedure Laterality Date    ACHILLES TENDON REPAIR Right     ADENOIDECTOMY      APPENDECTOMY      SKIN CANCER EXCISION      x 4 - back    TONSILLECTOMY      TOTAL ABDOMINAL HYSTERECTOMY      US GUIDED KIDNEY BIOPSY  2023     FAMILY HISTORY:  Non-contributory    SOCIAL HISTORY:  Social History   Social History     Substance and Sexual Activity   Alcohol Use Not Currently     Social History     Substance and Sexual Activity   Drug Use Never     Social History     Tobacco Use   Smoking Status Never   Smokeless Tobacco Never     ALLERGIES:  Allergies   Allergen Reactions    Rosuvastatin Other (See Comments)     Per SNF    Simvastatin Other (See Comments)     Per SNF    Myalgias     MEDICATIONS:  All current active medications have been reviewed.    ANTIBIOTICS:  Cefepime 3  Linezolid 3  Flagyl 5  Antibiotics 5    PHYSICAL EXAM:  Temp:  [97.5 °F (36.4 °C)-99.3 °F (37.4 °C)] 98.2 °F (36.8 °C)  HR:  [] 54  Resp:  [14-33] 15  BP: ()/(37-79) 118/59  SpO2:  [83 %-100 %] 100 %  Temp (24hrs), Av °F (36.7 °C), Min:97.5 °F (36.4 °C), Max:99.3 °F (37.4 °C)  Current: Temperature: 98.2 °F (36.8 °C)    Intake/Output Summary (Last 24 hours) at 3/4/2024 0724  Last data filed at 3/4/2024 0600  Gross per 24 hour   Intake 1318.5 ml   Output 275 ml    Net 1043.5 ml     General Appearance:  Appearing intubated, lightly sedated. Appears calm and comfortable sitting up in bed.   Head:  Normocephalic, without obvious abnormality, atraumatic.   Eyes:  Conjunctiva pink and sclera anicteric, both eyes.   Nose: Nares normal, mucosa normal, no drainage.   Throat: ETT intact. OG tube intact.   Neck: Supple, symmetrical, no adenopathy, no mass/nodules.   Back:   Very stiff to role, assist x3 in room for turn.   Lungs:   Mechanical breath sounds to auscultation bilaterally, respirations unlabored on mechanical ventilation.   Chest Wall:  No deformity.   Heart:  Bradycardic; no murmur, rub or gallop.   Abdomen:   Soft, no facial grimace with abdominal palpation, non-distended, very hypotensive bowel sounds throughout.   Extremities: No cyanosis or clubbing, no edema.   Skin: B/L buttocks and sacral region with scattered ulcers, some more superficial with raw granular tissue, others stage 2 with slough and stringy gray fibrous tissue, no undermining or tunneling. Protective foam on the B/L heels.    Lymph nodes: Cervical, supraclavicular nodes normal.   Neurologic: Unable to assess, patient with light sedation, intubated on mechanical ventilation at time of my visit, did open her eyes to verbal stimuli.      LABS, IMAGING, & OTHER STUDIES:  Lab Results:  I have personally reviewed pertinent labs.  Results from last 7 days   Lab Units 03/04/24  0455 03/03/24  2250 03/03/24  1640 03/03/24  0404   WBC Thousand/uL 24.05*  --  26.30* 18.38*   HEMOGLOBIN g/dL 8.8* 8.2* 6.2* 8.0*   PLATELETS Thousands/uL 205  --  284 333     Results from last 7 days   Lab Units 03/04/24  0455 03/03/24  1640 03/03/24  1149   POTASSIUM mmol/L 3.4* 3.8 4.1   CHLORIDE mmol/L 109* 108 106   CO2 mmol/L 23 22 20*   BUN mg/dL 35* 35* 36*   CREATININE mg/dL 0.88 0.89 0.92   EGFR ml/min/1.73sq m 62 61 58   CALCIUM mg/dL 7.4* 7.7* 7.7*   AST U/L 80* 156* 66*   ALT U/L 93* 109* 57*   ALK PHOS U/L 61 65 59      Results from last 7 days   Lab Units 03/03/24  2255 03/03/24  1148 03/03/24  1019 03/02/24  0823 03/02/24  0700 02/29/24  2118 02/29/24  1226 02/29/24  1027   BLOOD CULTURE  Received in Microbiology Lab. Culture in Progress.  Received in Microbiology Lab. Culture in Progress. Received in Microbiology Lab. Culture in Progress. Received in Microbiology Lab. Culture in Progress. No Growth at 24 hrs. No Growth at 24 hrs.  --  No Growth at 72 hrs.  Enterococcus avium*  --    GRAM STAIN RESULT   --   --   --   --   --   --  Gram positive cocci in pairs* Rare Gram negative rods*  Rare Gram positive cocci in pairs*  No polys seen*   URINE CULTURE   --   --   --   --   --  10,000-19,000 cfu/ml Pseudomonas aeruginosa*  --   --    WOUND CULTURE   --   --   --   --   --   --   --  3+ Growth of Escherichia coli*  3+ Growth of Proteus mirabilis*  3+ Growth of Pseudomonas aeruginosa MDR*  2+ Growth of   MRSA CULTURE ONLY   --   --   --   --   --  No Methicillin Resistant Staphlyococcus aureus (MRSA) isolated  --   --    LEGIONELLA URINARY ANTIGEN   --   --   --   --   --  Negative  --   --      Results from last 7 days   Lab Units 03/04/24  0455 03/01/24  0542 02/29/24  1226   PROCALCITONIN ng/ml 3.08* 0.76* 0.61*     03/02/2024 0944 03/03/2024 1327 Stool Enteric Bacterial Panel by PCR [[id number]]   Stool from Rectum    Final result [facility]  Component Value   Salmonella sp PCR Negative    Shigella sp/Enteroinvasive E. coli (EIEC) PCR Negative    Campylobacter sp (jejuni and coli) PCR Negative    Shiga toxin 1/Shiga toxin 2 genes PCR Negative         Imaging Studies:   I have personally reviewed pertinent imaging study reports and images in PACS.    CT ABDOMEN PELVIS WITH CONTRAST 2/29/2024 - I personally reviewed this study which showed R basilar patchy consolidation, cardiomegaly, unremarkable liver and pancreas, cholelithiasis, numerous splenic hypodensities suspicious for infarcts, no  hydronephrosis or renal obstruction, unremarkable bladder, persistent fluid throughout the colon, improvement to previous areas of colonic mucosal hyperemia, and colonic diverticulosis without diverticulitis.     XR CHEST 1 VIEW PORTABLE 2/29/2024 - I personally reviewed this image which showed R lung opacities in the perihilar airspace and midlung.     Other Studies:   CXR 3/4/2024 is pending.  I have personally reviewed pertinent reports:  02/29/2024 1405 02/29/2024 1454 FLU/RSV/COVID - if FLU/RSV clinically relevant [[id number]]    Nares from Nose    Final result [facility] - copy/paste COVID Guidelines URL to browser: [email or web address]   SARS-CoV-2 assay is a Nucleic Acid Amplification assay intended for the   qualitative detection of nucleic acid from SARS-CoV-2 in nasopharyngeal   swabs. Results are for the presumptive identification of SARS-CoV-2 RNA.   Positive results are indicative of infection with SARS-CoV-2, the virus   causing COVID-19, but do not rule out bacterial infection or co-infection   with other viruses. Laboratories within the [place] and its   territories are required to report all positive results to the appropriate   public health authorities. Negative results do not preclude SARS-CoV-2   infection and should not be used as the sole basis for treatment or other   patient management decisions. Negative results must be combined with   clinical observations, patient history, and epidemiological information.   This test has not been FDA cleared or approved.   This test has been authorized by FDA under an Emergency Use Authorization   (EUA). This test is only authorized for the duration of time the   declaration that circumstances exist justifying the authorization of the   emergency use of an in vitro diagnostic tests for detection of SARS-CoV-2   virus and/or diagnosis of COVID-19  infection under section 564(b)(1) of   the Act, 21 U.S.C. 360bbb-3(b)(1), unless the authorization is terminated   or revoked sooner. The test has been validated but independent review by FDA   and CLIA is pending.   Test performed using "[employer]: This RT-PCR assay targets N2,   a region unique to SARS-CoV-2. A conserved region in the E-gene was chosen   for pan-Sarbecovirus detection which includes SARS-CoV-2.   According to CMS-2020-01-R, this platform meets the definition of high-throughput technology.

## 2024-03-05 PROBLEM — R57.9 SHOCK (HCC): Status: RESOLVED | Noted: 2024-01-01 | Resolved: 2024-01-01

## 2024-03-05 PROBLEM — E87.20 LACTIC ACIDOSIS: Status: RESOLVED | Noted: 2024-01-01 | Resolved: 2024-01-01

## 2024-03-05 PROBLEM — A41.9 SEPSIS (HCC): Status: ACTIVE | Noted: 2024-01-01

## 2024-03-05 PROBLEM — J98.4 CAVITARY PNEUMONIA: Status: ACTIVE | Noted: 2024-02-29

## 2024-03-05 PROBLEM — R13.10 DYSPHAGIA: Status: ACTIVE | Noted: 2024-03-05

## 2024-03-05 PROBLEM — R73.9 HYPERGLYCEMIA: Status: RESOLVED | Noted: 2024-01-01 | Resolved: 2024-01-01

## 2024-03-05 PROBLEM — I82.403 ACUTE DEEP VEIN THROMBOSIS (DVT) OF BOTH LOWER EXTREMITIES (HCC): Status: ACTIVE | Noted: 2024-03-05

## 2024-03-05 PROBLEM — K92.2 ACUTE GI BLEEDING: Status: RESOLVED | Noted: 2024-01-01 | Resolved: 2024-01-01

## 2024-03-05 PROBLEM — E87.6 HYPOKALEMIA: Status: RESOLVED | Noted: 2024-03-04 | Resolved: 2024-03-05

## 2024-03-05 LAB
BACTERIA BLD CULT: NORMAL
G LAMBLIA AG STL QL IA: NEGATIVE
O+P STL CONC: NORMAL

## 2024-03-05 NOTE — ASSESSMENT & PLAN NOTE
[facility]    Oliguria--UOP over 24h 0.34 ml/kg/h    BUN down-trending, minimally elevated  Cr down-trending and within normal range  Consider initiation of loop diuretic, given volume overload on exam  Nephrology on board, appreciate recommendations

## 2024-03-05 NOTE — PROGRESS NOTES
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)  Assessment & Plan  Initially treated with MMF, prednisone  Immunoprophylaxis with dapsone but developed methemoglobinemia and was transitioned to atovaquone  Given current shock state however MMF and atovaquone are on hold  Creatinine is 0.74 today  Will check an albumin to creatinine ratio  Currently on prednisone 50 mg daily-rheumatology notes in Care Everywhere reviewed patient was to start a steroid taper for janaury 13th with 10 mg decrease every 2 weeks would also consult rheumatology in the setting of the patient's critical illness and immunosuppression for now we will maintain on prednisone 50 mg daily given other immunosuppression on hold  Discussed with ICU ok to challenge with diuretics    * Bacteremia due to Enterococcus  Assessment & Plan  Now on unazyn, id following      Acute respiratory failure with hypoxia (HCC)  Assessment & Plan  With concerns for a pneumonia, in the ICU now exubated  Ruling out tb given immunocomprized state    Sacral wound  Assessment & Plan  Ongoing wound care         SUBJECTIVE:    Leo was seen working with PT/OT  Overall stable exubated urine output low and with some anasarca    12 point review of systems was otherwise negative besides what is mentioned above.    Medications:    Current Facility-Administered Medications:     ampicillin-sulbactam (UNASYN) 3 g in sodium chloride 0.9 % 100 mL IVPB, 3 g, Intravenous, Q6H, Ana Laura Dalton MD, Stopped at 03/05/24 1230    atorvastatin (LIPITOR) tablet 40 mg, 40 mg, Oral, Daily, Uche Blair MD, 40 mg at 03/05/24 0810    chlorhexidine (PERIDEX) 0.12 % oral rinse 15 mL, 15 mL, Mouth/Throat, Q12H [facility], AUGUSTA Ibarra, 15 mL at 03/05/24  0810    collagenase (SANTYL) ointment, , Topical, Daily, Thanh Coleman MD, Given at 03/05/24 0855    folic acid 1 mg, thiamine (VITAMIN B1) 100 mg in sodium chloride 0.9 % 100 mL IV piggyback, , Intravenous, Daily, AUGUSTA Ibarra, Last Rate: 0 mL/hr at 03/04/24 1346, New Bag at 03/05/24 0810    heparin (porcine) 25,000 units in 0.45% NaCl 250 mL infusion (premix), 3-30 Units/kg/hr (Order-Specific), Intravenous, Titrated, Uche Blair MD, Last Rate: 13.5 mL/hr at 03/05/24 1138, 18 Units/kg/hr at 03/05/24 1138    heparin (porcine) injection 3,000 Units, 3,000 Units, Intravenous, Q6H PRN, Uche Blair MD    heparin (porcine) injection 6,000 Units, 6,000 Units, Intravenous, Q6H PRN, Uche Blair MD    insulin lispro (HumALOG/ADMELOG) 100 units/mL subcutaneous injection 2-12 Units, 2-12 Units, Subcutaneous, Q6H CRUZITO, 2 Units at 03/04/24 2319 **AND** Fingerstick Glucose (POCT), , , Q6H, AUGUSTA Garcia    levalbuterol (XOPENEX) inhalation solution 1.25 mg, 1.25 mg, Nebulization, Q8H PRN, AUGUSTA Ibarra    multi-electrolyte (ISOLYTE-S PH 7.4) bolus 500 mL, 500 mL, Intravenous, Once, Sienna Perez DO, Stopped at 03/04/24 1114    multi-electrolyte (PLASMALYTE-A/ISOLYTE-S PH 7.4) IV solution, 125 mL/hr, Intravenous, Continuous, AUGUSTA Garcia, Last Rate: 125 mL/hr at 03/05/24 0333, 125 mL/hr at 03/05/24 0333    ondansetron (ZOFRAN) injection 4 mg, 4 mg, Intravenous, Q4H PRN, AUGUSTA Ibarra    pantoprazole (PROTONIX) EC tablet 40 mg, 40 mg, Oral, BID AC, Thanh Coleman MD    predniSONE tablet 50 mg, 50 mg, Oral, Daily, AUGUSTA Garcia, 50 mg at 03/05/24 0810    OBJECTIVE:    Vitals:    03/05/24 1010 03/05/24 1100 03/05/24 1200 03/05/24 1300   BP: 152/67 125/61 137/59 124/61   BP Location:   Left arm    Pulse: 98 82 86 82   Resp: (!) 30 18 19 17   Temp:   98.5 °F (36.9 °C)    TempSrc:   Oral    SpO2: 94% 97% 96% 98%   Weight:            Temp:  [97.5  °F (36.4 °C)-98.5 °F (36.9 °C)] 98.5 °F (36.9 °C)  HR:  [58-98] 82  Resp:  [11-30] 17  BP: ()/() 124/61  SpO2:  [88 %-100 %] 98 %     Body mass index is 28.91 kg/m².    Weight (last 2 days)       Date/Time Weight    03/05/24 0558 76.4 (168.43)            I/O last 3 completed shifts:  In: 5391.7 [I.V.:4262.5; Blood:329.2; NG/GT:50; IV Piggyback:750]  Out: 906 [Urine:906]    I/O this shift:  In: 891.7 [I.V.:791.7; IV Piggyback:100]  Out: 195 [Urine:195]      Physical exam:    General: no acute distress, cooperative  Eyes: conjunctivae pink, anicteric sclerae  ENT: lips and mucous membranes moist, no exudates, normal external ears  Neck: ROM intact, no JVD  Chest: No respiratory distress, no accessory muscle use  CVS: normal rate, non pericardial friction rub  Abdomen: soft, non-tender, non-distended, normoactive bowel sounds  Extremities: + edema  Skin: no rash  Neuro: awake, alert, oriented, grossly intact  Psych:  Pleasant affect    Invasive Devices:   Urethral Catheter Latex;Straight-tip 16 Fr. (Active)   Reasons to continue Urinary Catheter  Stage III/IV sacral ulcers or open perineal wounds in incontinent patients 03/05/24 1200   Goal for Removal Voiding trial when ambulation improves 03/05/24 1200   Site Assessment Clean;Skin intact 03/05/24 1200   Peralta Care Done 03/05/24 0827   Collection Container Standard drainage bag 03/05/24 1200   Securement Method Securing device (Describe) 03/05/24 1200   Output (mL) 75 mL 03/05/24 1200     Lab Results:   Results from last 7 days   Lab Units 03/05/24  0450 03/04/24  0455 03/03/24  2255 03/03/24  2250 03/03/24  1640 03/03/24  1404 03/03/24  1149 03/03/24  1148 03/03/24  1019 03/03/24  0404 03/02/24  0823 03/02/24  0700 03/02/24  0658 03/01/24  0542 02/29/24  2118 02/29/24  1804 02/29/24  1226   WBC Thousand/uL 26.92* 24.05*  --   --  26.30*  --   --   --   --  18.38*  --   --  21.27* 12.97*  --   --  8.72   HEMOGLOBIN g/dL 8.7* 8.8*  --  8.2* 6.2*  --   --    --   --  8.0*  --   --  8.8* 9.0*  --    < > 7.0*   HEMATOCRIT % 27.5* 26.9*  --   --  20.3*  --   --   --   --  27.2*  --   --  28.3* 29.0*  --    < > 23.7*   PLATELETS Thousands/uL 199 205  --   --  284  --   --   --   --  333  --   --  332 325  --   --  315   POTASSIUM mmol/L 3.8 3.4*  --   --  3.8  --  4.1  --   --  4.2  --   --  4.6 4.5  --   --  4.8   CHLORIDE mmol/L 111* 109*  --   --  108  --  106  --   --  106  --   --  104 102  --   --  101   CO2 mmol/L 23 23  --   --  22  --  20*  --   --  22  --   --  26 25  --   --  23   BUN mg/dL 27* 35*  --   --  35*  --  36*  --   --  36*  --   --  34* 42*  --   --  58*   CREATININE mg/dL 0.74 0.88  --   --  0.89  --  0.92  --   --  0.85  --   --  0.84 0.87  --   --  1.10   CALCIUM mg/dL 7.2* 7.4*  --   --  7.7*  --  7.7*  --   --  8.5  --   --  8.8 8.7  --   --  8.6   MAGNESIUM mg/dL  --  1.9  --   --  1.9  --   --   --   --   --   --   --   --  2.0  --   --  2.0   PHOSPHORUS mg/dL  --  2.2*  --   --  2.4  --   --   --   --   --   --   --   --   --   --   --   --    ALK PHOS U/L 75 61  --   --  65  --  59  --   --  70  --   --  75 65  --   --  59   ALT U/L 79* 93*  --   --  109*  --  57*  --   --  18  --   --  12 15  --   --  15   AST U/L 44* 80*  --   --  156*  --  66*  --   --  13  --   --  8* 11*  --   --  13   BLOOD CULTURE   --   --  No Growth at 24 hrs.  No Growth at 24 hrs.  --   --   --   --  No Growth at 24 hrs. No Growth at 24 hrs.  --  No Growth at 48 hrs. No Growth at 48 hrs.  --   --   --   --  No Growth After 4 Days.  Enterococcus avium*   NITRITE UA   --   --   --   --   --  Negative  --   --   --   --   --   --   --   --  Positive*  --   --    LEUKOCYTES UA   --   --   --   --   --  Negative  --   --   --   --   --   --   --   --  Negative  --   --    BLOOD UA   --   --   --   --   --  Moderate*  --   --   --   --   --   --   --   --  Trace-Intact*  --   --     < > = values in this interval not displayed.         Portions of the record may have  "been created with voice recognition software. Occasional wrong word or \"sound a like\" substitutions may have occurred due to the inherent limitations of voice recognition software. Read the chart carefully and recognize, using context, where substitutions have occurred.If you have any questions, please contact the dictating provider.      "

## 2024-03-05 NOTE — PROGRESS NOTES
Progress Note - Infectious Disease   Darlyn Nguyen 78 y.o. female MRN: [id number]  Unit/Bed#: ICU 01 Encounter: [id number]    Impression/Plan:  1. Septic shock. Developing over admission. Leukocytosis, hypotension, and lactic acidosis. Likely secondary to enterococcus avium bacteremia and RUL pneumonia with cavitary lesion. This is complicated by fact that patient is immunosuppressed in setting of recent SLE with lupus nephritis diagnosis. The patient has made some clinical improvement. She was extubated and her pressor support is currently off. She remains afebrile. Initial repeat blood cultures are negative >48 hours. New repeat blood cultures are negative >24 hours.  -antibiotic as below  -check CBCD and BMP tomorrow  -follow up repeat blood cultures  -monitor vitals  -supportive care     2. Enterococcus avium bacteremia. Single set positive from 2/29/2024. Suspect likely GI source given patient's recent GI bleed and pancolitis with heavy diarrhea. Recent urine and wound cultures with no growth of this pathogen. Patient completed CT A/P which noted scattered splenic hypodensities which is concerning for septic emboli. With this pathogen there is concern for vancomycin resistance. Patient has no intravascular devices or hardware. Initial repeat blood cultures are negative >48 hours. New repeat blood cultures are negative >24 hours. CT head with no acute findings. TTE was negative but she may require DYANA for further assessment. The patient has been transitioned to IV Unasyn. She appears to be tolerating the antibiotic without difficulty. I will continue the Unasyn for now.  -continue IV Unasyn  -check CBCD and BMP tomorrow  -follow up repeat blood cultures  -consider DYANA  -monitor vitals     3. RUL pneumonia with cavitary lung lesions. I personally reviewed patient's chest CT which showed a RUL cavitary lesion, dense RUL consolidation, R basilar atelectasis, and small B/L pleural effusions. Consider aspiration  played a role. Consider possilibily of septic emboli in setting of bacteremia. With new immunosuppression will need to consider TB. AFB sputum cultures are pending. TB PCR is pending but repeat needs to be sent as expectorated sputum. She must remain on airborne isolation for now.  -maintain airborne isolation for now  -follow up AFB sputum cultures  -collect new TB PCR as expectorated sputum  -monitor vitals  -monitor respiratory status  -O2 support per primary service     4. Pancolitis with heavy diarrhea. Patient had positive FOB at [facility] concerning for GI bleed. This may be source of her bacteremia above. Patient following with GI prior to transfer to [facility]. Recent stool PCR was negative. Giardia and O&P are pending. Fecal calprotectin positive. Newest CT A/P showed persistent fluid throughout the colon, improvement to previous areas of colonic mucosal hyperemia, and colonic diverticulosis without diverticulitis. Suspect she may benefit from colonoscopy once medically stable. Gastroenterology is following. Per patient and her family she has not had any episodes of diarrhea so far today.  -serial abdominal exams  -monitor GI symptoms  -monitor stool output   -anticipate eventual colonoscopy   -continue follow up with gastroenterology    5. Acute hypoxic respiratory failure. Likely secondary to RUL pnuemonia with cavitary lesion. Also consider role of sepsis with shock. Consider possibility of aspiration vs septic emboli in bacteremic patient with new splenic hypodensities. She did recently have B/L LE DVTs, however she's been on Eliquis so less concern for PE. Patient's respiratory status has improved and she has been extubated. Her O2 saturation is stable on mid-flow O2 this morning.   -antibiotic as above  -check CBCD and BMP tomorrow  -monitor vitals  -monitor respiratory status  -O2 support per critical care team      6. SLE. With lupus nephritis. Diagnosed in 12/2023 at [facility]. She has been managed by rheumatology  and nephrology. She was started on prednisone, atovaquone, hydroxychloroquine, and mycophenolate. Atovaquone, hydroxychloroquine, and mycophenolate currently on hold. She was given a dose of Solu-Cortef at [facility]. Nephrology has assessed and patient is on prednisone. Steroid use is risk factor for infection.  -steroid per nephrology  -patient will eventually need to go back on mepron from PJP prophylaxis  -continue close follow up with nephrology  -consider rheumatology consult as needed     7. CKD stage 2. This can affect antibiotic dosing. Upon review of patient's available past medical records it appears her baseline creatinine is approximately 0.8. Also with lupus nephritis as above. Suspect patient is high risk for ARYAN.   -monitor creatinine  -dose adjust antibiotic for renal function as needed  -avoid nephrotoxins  -continue close follow up with nephrology     8. Sacral and buttock ulcers. Some areas more superficial and appearing as raw granular tissue. Other areas are likely stage 2 with some gray stringy slough and fibrous tissue. Wounds do not appear to tunnel or undermine. It was documented that patient had a gluteal skin care during recent hospitalization at [facility]. Suspect pressure and prolonged down time are contributing to skin integrity. Wound management is following.  -serial skin exams  -frequent turning and repositioning to offload pressure  -continue follow up with wound management     9. Type 2 diabetes mellitus without long term insulin use. Patient's last HbA1c was 5.9% on 3/3/2024. Elevated blood glucose is risk factor for wounds and infection. Recommend tight glycemic control.  -blood glucose management per primary service    Above plan was discussed in detail with patient and family at the bedside.  Above plan was discussed in detail with critical care who agreed with ongoing antibiotic plan while we await AFB cultures.    Antibiotics:  Unasyn 2  Antibiotics  6    Subjective:  Patient  is interactive today but seems a bit confused still, does answer most questions but family also at bedside to offer ROS. Patient has no fever, chills, sweats, shakes; no nausea or vomiting. She has not had any episodes of diarrhea so far today. Patient has been coughing frequently. She denies chest pain and abdominal pain. She offers no other symptoms. She does express frustration to us regarding how long she's been sick and how it's been a lot for her body. The buttock ulcerations are causing her the most pain and stress.    Objective:  Vitals:  Temp:  [97.2 °F (36.2 °C)-98.2 °F (36.8 °C)] 97.6 °F (36.4 °C)  HR:  [54-78] 78  Resp:  [13-27] 17  BP: ()/(41-75) 144/63  SpO2:  [96 %-100 %] 100 %  Temp (24hrs), Av.6 °F (36.4 °C), Min:97.2 °F (36.2 °C), Max:98.2 °F (36.8 °C)  Current: Temperature: 97.6 °F (36.4 °C)    Physical Exam:   General Appearance:  Alert, interactive but still somewhat confused. She appears comfortable semi-supine in bed tilted to the right.   Throat: Oropharynx moist without lesions.    Lungs:   Decreased to auscultation bilaterally; respirations unlabored on mid-flow O2. Frequent loose wet cough during out visit.   Heart:  RRR; no murmur, rub or gallop.   Abdomen:   Soft, non-tender, non-distended, positive bowel sounds.     Extremities: B/L venodynes on.   Skin: No new rashes noted on exposed skin.     Labs, Imaging, & Other studies:   All pertinent labs and imaging studies were personally reviewed  Results from last 7 days   Lab Units 24  0450 24  0455 24  2250 24  1640   WBC Thousand/uL 26.92* 24.05*  --  26.30*   HEMOGLOBIN g/dL 8.7* 8.8* 8.2* 6.2*   PLATELETS Thousands/uL 199 205  --  284     Results from last 7 days   Lab Units 24  0450 24  0455 24  1640   POTASSIUM mmol/L 3.8 3.4* 3.8   CHLORIDE mmol/L 111* 109* 108   CO2 mmol/L 23 23 22   BUN mg/dL 27* 35* 35*   CREATININE mg/dL 0.74 0.88 0.89   EGFR ml/min/1.73sq m 76 62 61   CALCIUM  mg/dL 7.2* 7.4* 7.7*   AST U/L 44* 80* 156*   ALT U/L 79* 93* 109*   ALK PHOS U/L 75 61 65     Results from last 7 days   Lab Units 03/03/24  2255 03/03/24  1404 03/03/24  1148 03/03/24  1019 03/02/24  0823 03/02/24  0700 02/29/24  2118 02/29/24  1226 02/29/24  1027   BLOOD CULTURE  No Growth at 24 hrs.  No Growth at 24 hrs.  --  No Growth at 24 hrs. No Growth at 24 hrs. No Growth at 48 hrs. No Growth at 48 hrs.  --  No Growth After 4 Days.  Enterococcus avium*  --    SPUTUM CULTURE   --  Culture too young- will reincubate  --   --   --   --   --   --   --    GRAM STAIN RESULT   --  3+ Polys*  1+ Budding yeast*  1+ Gram negative rods*  --   --   --   --   --  Gram positive cocci in pairs* Rare Gram negative rods*  Rare Gram positive cocci in pairs*  No polys seen*   URINE CULTURE   --   --   --   --   --   --  10,000-19,000 cfu/ml Pseudomonas aeruginosa*  --   --    WOUND CULTURE   --   --   --   --   --   --   --   --  3+ Growth of Escherichia coli*  3+ Growth of Proteus mirabilis*  3+ Growth of Pseudomonas aeruginosa MDR*  2+ Growth of   MRSA CULTURE ONLY   --   --   --   --   --   --  No Methicillin Resistant Staphlyococcus aureus (MRSA) isolated  --   --    LEGIONELLA URINARY ANTIGEN   --   --   --   --   --   --  Negative  --   --

## 2024-03-05 NOTE — ASSESSMENT & PLAN NOTE
Lab Results   Component Value Date    HGBA1C 5.9 (H) 03/03/2024       Recent Labs     03/04/24  1229 03/04/24  1821 03/04/24  2318 03/05/24  0507   POCGLU 152* 149* 153* 107         Blood Sugar Average: Last 72 hrs:  (P) [id number]    History of DM, well-controlled with last A1c 5.6 on 2/26, but 5.9 today. Developed hyperglycemia with initiation of high dose steroids.    Start ISS Q6H while NPO  Fingersticks Q6H while NPO  Transition to mealtime when starting diet  Avoid hypoglycemia per protocol  Monitor BG and insulin requirements and adjust regimen as needed

## 2024-03-05 NOTE — ASSESSMENT & PLAN NOTE
Recently changed to prednisone 50mg daily.  Remains immunosuppressed, continue prednisone at 50 mg daily  Continue to hold Mepron, Plaquenil, and CellCept.  These medications are on hold secondary to resolving septic shock, cavitary pneumonia, and bacteremia.

## 2024-03-05 NOTE — PROGRESS NOTES
Patient Name: Darlyn Nguyen  Patient MRN: [id number]  Date: 03/05/24  Service: [facility]    Subjective   Pt was placed on airborne isolation with concerns of possible TB with immunocomprised state and consolidation with pleural effusion noted on CT, ID consulted.  Following along with rectal bleeding on 3/3, Hgb remains stable. No further episodes of bleeding.      Objective     Vitals  /67   Pulse 98   Temp 98 °F (36.7 °C) (Oral)   Resp (!) 30   Wt 76.4 kg (168 lb 6.9 oz)   SpO2 94%   BMI 28.91 kg/m²     Pt not examed, reviewed with staff and Dr Stevens.    Laboratory Studies  Lab Results   Component Value Date    CREATININE 0.74 03/05/2024    BUN 27 (H) 03/05/2024    SODIUM 141 03/05/2024    K 3.8 03/05/2024     (H) 03/05/2024    CO2 23 03/05/2024    CALCIUM 7.2 (L) 03/05/2024    ALKPHOS 75 03/05/2024    AST 44 (H) 03/05/2024    ALT 79 (H) 03/05/2024     Lab Results   Component Value Date    WBC 26.92 (H) 03/05/2024    HGB 8.7 (L) 03/05/2024    HCT 27.5 (L) 03/05/2024     03/05/2024    MCV 93 03/05/2024     Lab Results   Component Value Date    PROTIME 15.6 (H) 03/05/2024    INR 1.22 (H) 03/05/2024       Imaging and Other Studies    Inhouse Medications       Current Facility-Administered Medications:     ampicillin-sulbactam (UNASYN) 3 g in sodium chloride 0.9 % 100 mL IVPB, 3 g, Intravenous, Q6H, Ana Laura Dalton MD, Stopped at 03/05/24 0800    atorvastatin (LIPITOR) tablet 40 mg, 40 mg, Oral, Daily, Uche Blair MD, 40 mg at 03/05/24 0810    chlorhexidine (PERIDEX) 0.12 % oral rinse 15 mL, 15 mL, Mouth/Throat, Q12H CRUZITO, AUGUSTA Ibarra, 15 mL at 03/05/24 0810    collagenase (SANTYL) ointment, , Topical, Daily, Thanh Coleman MD, Given at 03/05/24 0855    folic acid 1 mg, thiamine (VITAMIN B1) 100 mg in sodium chloride 0.9 % 100 mL IV piggyback, , Intravenous, Daily, AUGUSTA Ibarra, Last Rate: 0 mL/hr at 03/04/24 1346, New Bag at 03/05/24 0810     furosemide (LASIX) injection 40 mg, 40 mg, Intravenous, Once, Uche Blair MD    heparin (porcine) 25,000 units in 0.45% NaCl 250 mL infusion (premix), 3-30 Units/kg/hr (Order-Specific), Intravenous, Titrated, Uche Blair MD    heparin (porcine) injection 3,000 Units, 3,000 Units, Intravenous, Q6H PRN, Uche Blair MD    heparin (porcine) injection 6,000 Units, 6,000 Units, Intravenous, Once, Uche Blair MD    heparin (porcine) injection 6,000 Units, 6,000 Units, Intravenous, Q6H PRN, Uche Blair MD    insulin lispro (HumALOG/ADMELOG) 100 units/mL subcutaneous injection 2-12 Units, 2-12 Units, Subcutaneous, Q6H CRUZITO, 2 Units at 03/04/24 2319 **AND** Fingerstick Glucose (POCT), , , Q6H, AUGUSTA Garcia    levalbuterol (XOPENEX) inhalation solution 1.25 mg, 1.25 mg, Nebulization, Q8H PRN, AUGUSTA Ibarra    multi-electrolyte (ISOLYTE-S PH 7.4) bolus 500 mL, 500 mL, Intravenous, Once, iSenna Perez DO, Stopped at 03/04/24 1114    multi-electrolyte (PLASMALYTE-A/ISOLYTE-S PH 7.4) IV solution, 125 mL/hr, Intravenous, Continuous, AUGUSTA Garcia, Last Rate: 125 mL/hr at 03/05/24 0333, 125 mL/hr at 03/05/24 0333    ondansetron (ZOFRAN) injection 4 mg, 4 mg, Intravenous, Q4H PRN, AUGUSTA Ibarra    pantoprazole (PROTONIX) EC tablet 40 mg, 40 mg, Oral, BID AC, Thanh Cloeman MD    predniSONE tablet 50 mg, 50 mg, Oral, Daily, AUGUSTA Garcia, 50 mg at 03/05/24 0810      Assessment/Plan:    Acute GI bleeding with bloody stool 3/3 and recent colitis, improving on recent CT. Suspect ischemic colitis, also the possibility of IBD in setting of other autoimmune disorders.  Had been on Eliquis, changed to Heparin gtt, were discontinued. No further rectal bleeding. Hgb stable. Restart heparin per Dr Stevens's recommendations. No plans for colonoscopy at this time. Continue with supportive measures. We will continue to follow.   Bacteremia with suspicious pneumonia,  now looking to rule out TB. ID consulted.   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

## 2024-03-05 NOTE — DISCHARGE INSTR - OTHER ORDERS
Wound Care Plan:   1-Hydraguard lotion to bilateral heels twice daily and as needed.  2-Elevate heels off of bed/chair surface to offload pressure--offloading heel boots.  3-Offloading air cushion in chair when out of bed.  4-Moisturize skin daily with skin nourishing lotion.  5-Turn/reposition every 2 hours while in bed using positioning wedges; and weight shift frequently while in chair for pressure re-distribution on skin.   6-Low air-loss mattress.  7-Breast folds--cleanse with soap and water, pat dry.  Apply Interdry in single layer to skin fold, ensure approximately 2 inches of fabric is visible outside of fold.  Remove Interdry daily for bathing and re-apply.  Change Interdry sheet every 3 days or if visibly soiled.  DO NOT USE CREAMS OR POWDERS IN AREAS WHERE INTERDRY IS IN USE.  8-Buttocks/sacrum--cleanse with normal saline or foam cleanser (avoid using bath wipes), pat dry.  Apply silver gel to central open area with slough and loosely pack with saline moistened gauze.  Apply Triad paste to remainder of open areas on buttocks.  Lay an ABD over all wounds (no need to tape).  Change dressing daily and as needed with soilage.  9-Groin folds, medial/posterior thighs, and perineum--cleanse with foam cleanser, pat dry.  Apply Calazime paste three times daily.  If Calazime is not adhering to wounds, may dust with stomahesive powder prior to paste application.    Follow-up at the [facility]--[phone].

## 2024-03-05 NOTE — ASSESSMENT & PLAN NOTE
AFB of the sputum preliminary is negative  ID involved  Continue Unasyn for tx of E avium bacteremia  Serum galactomannan ordered to r/o chronic aspergillosis.

## 2024-03-05 NOTE — ASSESSMENT & PLAN NOTE
Extubated on 3/4  Doing well  Continue pulmonary toileting  Currently on 2 L nasal cannula and satting in high 90s

## 2024-03-05 NOTE — ASSESSMENT & PLAN NOTE
Lab Results   Component Value Date    HGBA1C 5.9 (H) 03/03/2024       Recent Labs     03/04/24  1229 03/04/24  1821 03/04/24  2318 03/05/24  0507   POCGLU 152* 149* 153* 107       Continue sliding scale

## 2024-03-05 NOTE — WOUND OSTOMY CARE
Consult Note - Wound   Darlyn Nguyen 78 y.o. female MRN: [id number]  Unit/Bed#: ICU 01 Encounter: [id number]      History and Present Illness:  78 year old female presented to [facility] with diarrhea.  She developed septic shock and was transferred to [facility] ICU, requiring intubation and pressors.  Patient's history significant for DM, hemolytic anemia, HTN, sacral wounds, recently diagnosed with lupus.    Assessment Findings:   Patient is very weak and sleepy, but agreeable to assessment.  She is dependent for turning/repositioning.  On low air-loss mattress with ATR positioning system in use.  Stevenson catheter in place, incontinent of stool.  Bilateral heels intact--offloading boots applied.  Arms edematous with ecchymosis.  Left arm weeping from pinhole opening at wrist. Nutrition team following, currently NPO.  Breast folds pink, intact, moist.    Present on admission evolving deep tissue pressure injury to mid/right sacrum--it appears previously mid sacrum and right buttock wound have converged.  Wound bed with tan and brown slough, pink and purple edges.  Radha-wound with blanchable erythema.  No induration.  Scant tan drainage.    Present on admission evolving deep tissue pressure injury to left buttock--wound bed with beefy red and brown slough, scant serosanguinous drainage, full thickness.  Radha-wound with blanchable erythema, no induration.  Present on admission wounds to perineum/radha-rectal area of unclear etiology--scattered round areas of pale pink, and pale yellow fibrinous tissue with full and partial thickness tissue loss and scant serous drainage.  Some areas with purple edges.  Open areas extend to radha-rectal area and vaginal opening.  Atypical presentation of wounds.      MASD to groin/labia--left groin fold and labia with blanchable erythema.  No maceration or open areas at this time.    See flowsheet for wound details.  Patient would benefit from maintaining stevenson  catheter to prevent wound contamination.      Deep tissue pressure injuries can be stage 3, 4, or unstageable when fully evolved.      Wound Care Plan:   1-Hydraguard lotion to bilateral heels twice daily and as needed.  2-Elevate heels off of bed/chair surface to offload pressure--offloading heel boots.  3-Offloading air cushion in chair when out of bed.  4-Moisturize skin daily with skin nourishing lotion.  5-Turn/reposition every 2 hours while in bed using positioning wedges; and weight shift frequently while in chair for pressure re-distribution on skin.   6-Low air-loss mattress--Place order for P500 if transferred out of ICU.  7-Perineum/radha-rectal wounds--cleanse with soap and water, pat dry.  Apply Triad paste three times daily and as needed with soilage.  8-Buttocks/sacrum--cleanse with normal saline, pat dry.  Apply skin prep to radha-wound skin.  Then apply Santyl to open areas in nickel thick layer.  Fluff a 2 x 2 moistened with normal saline to sacral opening.  Cover with silicone bordered foam dressing for treatment.  Change dressing daily and as needed with soilage.  9-Breast folds--cleanse with soap and water, pat dry.  Apply Interdry in single layer to skin fold, ensure approximately 2 inches of fabric is visible outside of fold.  Remove Interdry daily for bathing and re-apply.  Change Interdry sheet every 3 days or if visibly soiled.  DO NOT USE CREAMS OR POWDERS IN AREAS WHERE INTERDRY IS IN USE.    Wound care team to follow.  Plan of care reviewed with primary RN.    Dr. Coleman made aware of assessment findings and recommendations for Santyl debridement of sacral/buttock wounds.  Patient remains on pressors with poor nutrition--not a good candidate for surgical debridement at this time.      Patient will require close follow-up outpatient for sacral wounds.    Wound 02/29/24 Pressure Injury Sacrum Right (Active)   Wound Image   03/04/24 1531   Pressure Injury Stage DTPI 03/04/24 1531   Wound Length  (cm) 6 cm 03/04/24 1531   Wound Width (cm) 6 cm 03/04/24 1531   Wound Depth (cm) 0.2 cm 03/04/24 1531   Wound Surface Area (cm^2) 36 cm^2 03/04/24 1531   Wound Volume (cm^3) 7.2 cm^3 03/04/24 1531   Calculated Wound Volume (cm^3) 7.2 cm^3 03/04/24 1531   Change in Wound Size % -510.17 03/04/24 1531   Drainage Description Tan 03/04/24 1531   Non-staged Wound Description Full thickness 03/04/24 1531   Treatments Irrigation with NSS;Cleansed 03/04/24 1531   Dressing Changed Changed 03/04/24 1531   Patient Tolerance Tolerated well 03/04/24 1531   Dressing Status Clean;Dry;Intact 03/04/24 1531       Wound 02/29/24 MASD Perineum (Active)   Wound Image   03/04/24 1532   Wound Length (cm) 6.8 cm 03/04/24 1532   Wound Width (cm) 6.5 cm 03/04/24 1532   Wound Depth (cm) 0.1 cm 03/04/24 1532   Wound Surface Area (cm^2) 44.2 cm^2 03/04/24 1532   Wound Volume (cm^3) 4.42 cm^3 03/04/24 1532   Calculated Wound Volume (cm^3) 4.42 cm^3 03/04/24 1532   Change in Wound Size % -76.8 03/04/24 1532   Non-staged Wound Description Full thickness 03/04/24 1532   Treatments Cleansed 03/04/24 1532       Wound 02/29/24 MASD Groin Left (Active)   Wound Image   03/04/24 1528   Wound Length (cm) 0 cm 03/04/24 1528   Wound Width (cm) 0 cm 03/04/24 1528   Wound Depth (cm) 0 cm 03/04/24 1528   Wound Surface Area (cm^2) 0 cm^2 03/04/24 1528   Wound Volume (cm^3) 0 cm^3 03/04/24 1528   Calculated Wound Volume (cm^3) 0 cm^3 03/04/24 1528   Change in Wound Size % 100 03/04/24 1528   Dressing Open to air 03/04/24 1600       Wound 03/01/24 Pressure Injury Buttocks Left (Active)   Wound Description Beefy red;Brown;Slough 03/04/24 1530   Pressure Injury Stage DTPI 03/04/24 1530   Shanta-wound Assessment Erythema 03/04/24 1530   Wound Length (cm) 5.3 cm 03/04/24 1530   Wound Width (cm) 2.5 cm 03/04/24 1530   Wound Depth (cm) 0.1 cm 03/04/24 1530   Wound Surface Area (cm^2) 13.25 cm^2 03/04/24 1530   Wound Volume (cm^3) 1.325 cm^3 03/04/24 1530   Calculated  Wound Volume (cm^3) 1.33 cm^3 03/04/24 1530   Change in Wound Size % 63.06 03/04/24 1530   Drainage Amount Scant 03/04/24 1530   Drainage Description Serosanguineous 03/04/24 1530   Non-staged Wound Description Full thickness 03/04/24 1530   Treatments Cleansed;Irrigation with NSS 03/04/24 1530   Dressing Foam, Silicon (eg. Allevyn, etc);Other (Comment) 03/04/24 1530   Dressing Changed Changed 03/04/24 1530   Patient Tolerance Tolerated well 03/04/24 1530   Dressing Status Clean;Dry;Intact 03/04/24 1530       Liz CHRISTENSENN, RN, CWON

## 2024-03-05 NOTE — ASSESSMENT & PLAN NOTE
Enterococcus avium bacteremia. Pt recently started on prednisone, atovaquone, hydroxychloroquine, and mycophenolate. Most likely gut translocation.     E. avium associated with increased risk of vancomycin resistance    Repeat cultures show NGTD  Susceptibilities show pan-sensitive enterococcus    ID on board, appreciate recommendations  Narrowed to Unasyn 3 g Q6H (Day 2 of anticipated prolonged course)  DYANA when able, especially in light of new splenic infarcts concerning for septic emboli

## 2024-03-05 NOTE — ASSESSMENT & PLAN NOTE
Diagnosed in December,   Initially started on steroids prednisone 60 mg daily which patient had tapered to 30 mg daily prior to admission with the addition of atovaquone, hydroxychloroquine, and mycophenolate    Hold atovaquone, hydroxychloroquine, and mycophenolate in the setting of acute infection with shock  Patient started on Solu-Cortef prior to discharge from [facility] for possible relative adrenal insufficiency contributing to shock state.    Continue prednisone 50 mg QD  Consider rheumatology consult

## 2024-03-05 NOTE — ACP (ADVANCE CARE PLANNING)
Advanced Care Planning Progress Note    Serious Illness Conversation    What is your understanding now of where you are with your illness?  Prognostic Understanding: family me be overestimating prognosis      What did you (clinician) communicate to the patient?  Prognostic Communication: Uncertain - It can be difficult to predict what will happen with your illness. I hope you will continue to live well for a long time but I’m worried that you could get sick quickly, and I think it is important to prepare for that possibility.  Discussion with , brad     If your health situation worsens, what are your most important goals?  All treatments with hope patient can return to her baseline     What are the biggest fears and worries about the future and your health?  Family's worry is patient getting worse; want to find cause of her condition and have all treatments     If you become sicker, how much are you willing to go through for the possibility of gaining more time?  Be in the hospital: Yes    Be in the ICU: Yes    Be on a ventilator: Yes     Undergo aggressive test and/or procedures: Yes   How much does your proxy and family know about your priorities and wishes?  Discussion Discussion: wants clinician to talk with family  Per family     I’ve heard you say that patient receiving diagnostic workup and all treaments and life-saving measures are really important to you. Keeping that in mind, and what we know about your illness, I recommend that we transfer the patient to an ICU at a tertiary facility. This will help us make sure that your treatment plans reflect what’s important to you.     How does this plan sound to you? I will do everything I can to help you through this.     I have spent 35 minutes speaking with my patient on advanced care planning today or during this visit     Advanced directives         Tasha Cee MD

## 2024-03-05 NOTE — SPEECH THERAPY NOTE
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)    Sepsis (HCC)    Acute deep vein thrombosis (DVT) of both lower extremities (HCC)    Dysphagia    Past Medical History  Past Medical History:   Diagnosis Date    Acute GI bleeding 03/03/2024    Diabetes mellitus (HCC)     Fibromyalgia     Hyperlipidemia     Hypertension     Shock (HCC) 03/03/2024     Past Surgical History  Past Surgical History:   Procedure Laterality Date    ACHILLES TENDON REPAIR Right     ADENOIDECTOMY      APPENDECTOMY      SKIN CANCER EXCISION      x 4 - back    TONSILLECTOMY      TOTAL ABDOMINAL HYSTERECTOMY      US GUIDED KIDNEY BIOPSY  12/26/2023       ST note:   Chart review initiated. Pt NPO for flex sig later today. Will f/u as able and appropriate.     Per [facility] note:  2/9/24  Of note, patient recently admitted 12/16 - 12/28/23 for generalized weakness, rash, nearly 30lb weight loss and diagnosed w/ Lupus. Elevated inflammatory markers including ESR, SRP, cardiolipin IgM antibody, DHEERAJ; anemia, leukopenia, and proteinuria. Heme/onc was consulted for evaluation of anemia - determined to be anemia of chronic disease/inflammation. Not consistent w/ bone marrow process or malignancy. Derm skin biopsy of rash consistent w/ cutaneous lupus. She was seen by rheumatology at this time and started on Prednisone, Plaquenil 200mg, vitamin D, dapsone, and Protonix for GI prophylaxis. Rash eventually cleared w/ prednisone. Renal bx was performed to clarify proteinuria (sub-nephrotic range) revealing lupus nephritis class III. EGD also performed at time of admission revealing mild  gastritis (-) H pylori. Since discharge, she has followed w/ rheumatology and nephrology. Plan for continuing prednisone taper, plaquenil increased to 300mg, started on cellept 500mg BID w/ goal to increase to 1g BID.     Pt seen 3/1/24 at [facility] by ST. Level 2 [facility] soft and thin recommended.

## 2024-03-05 NOTE — ASSESSMENT & PLAN NOTE
Initially admitted to [facility] on 2/29, developed septic shock on 3/3, criteria for sepsis , was intubated and transferred to [facility]; required pressors; eventually developed bloody stool, required a flex sigmoidoscopy that was noted for rectal ulcer and underwent cauterization  Transferred out of ICU on 3/9/24  On antibiotics per infectious disease for enterococcal bacteremia  Continue to monitor vital signs  Patient developed bloody stool again on 3/8/2024 with hemoglobin dropped to 5.8, hemoglobin improved to 8 status post 1 unit of red blood cells  GI following

## 2024-03-05 NOTE — PROGRESS NOTES
Critical access hospital  Progress Note  Name: Darlyn Nguyen I  MRN: [id number]  Unit/Bed#: ICU 01 I Date of Admission: 3/3/2024   Date of Service: 3/5/2024 I Hospital Day: 2    Assessment/Plan   * Bacteremia due to Enterococcus  Assessment & Plan  Enterococcus avium bacteremia. Pt recently started on prednisone, atovaquone, hydroxychloroquine, and mycophenolate. Most likely gut translocation.     E. avium associated with increased risk of vancomycin resistance    Repeat cultures show NGTD  Susceptibilities show pan-sensitive enterococcus    ID on board, appreciate recommendations  Narrowed to Unasyn 3 g Q6H (Day 2 of anticipated prolonged course)  DYANA when able, especially in light of new splenic infarcts concerning for septic emboli      Acute GI bleeding  Assessment & Plan  Recent persistent diarrhea  FOBT positive @ [facility]  Small bloody bowel movement on presentation to [facility]  Acute drop in hemoglobin from 8.0 morning of 3/3/2024 to 6.2 approximately 12 hours later  PTT>210 at presentation  Recent CT A/P demonstrating colitis  No history of significant GI bleed per family  Family reports colonoscopy approximately 1 year ago without significant findings    Hep gtt held at presentation.  PTT= 31 morning of 3/5/24    No further episodes of GIB and Hgb stable since transfusion of 2 U pRBC on 3/3    Pt on home Eliquis for DVT found on 2/20/2024  Given Protonix 80 mg IV once, now on Protonix 40 mg IV Q12H  Consider restarting hep gtt with close monitoring of PTT  GI on board, appreciate recommendations    Cavitary pneumonia  Assessment & Plan  Seen on CT 3/4/24  Consider reactivation of TB vs septic emboli vs aspiration    Continue Unasyn for E. avium bacteremia  Holding immunosuppressants, excepting prednisone  Check DYANA when able  ID on board, appreciate recommendations    Shock (HCC)  Assessment & Plan  Pt presented in shock on Levo and Vaso  Multiple possible etiologies, including hemorrhagic  shock in the setting of acute GI bleed, septic shock in the setting of Enterococcus bacteremia and cavitary PNA, and adrenal insufficiency in the setting of chronic steroid use and acute illness    Off pressors since 3/4/24 @ 20:17; continue to monitor with MAP goal >=65  Continue prednisone 50 mg QD    Diabetes mellitus (HCC)  Assessment & Plan  Lab Results   Component Value Date    HGBA1C 5.9 (H) 03/03/2024       Recent Labs     03/04/24  1229 03/04/24  1821 03/04/24  2318 03/05/24  0507   POCGLU 152* 149* 153* 107         Blood Sugar Average: Last 72 hrs:  (P) [id number]    History of DM, well-controlled with last A1c 5.6 on 2/26, but 5.9 today. Developed hyperglycemia with initiation of high dose steroids.    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  CT abdomen pelvis 2/29/2024: LOWER CHEST: Mild right basilar patchy consolidation #2/5 suspicious for pneumonia.  Procal elevated and uptrending from 0.76 on 3/1/24 to 3.08 on 3/4/24  CT Chest 3/4/24 shows extensive right upper lobe cavitary pneumonia right basilar atelectasis and/or pneumonia with small bilateral pleural effusions    Rule out reactivation of TB in this immunosuppressed patient; AFB x 3 pending  Consider also aspiration vs septic emboli vs community acquired   Continue Unasyn as  above  Extubated 3/4/24  Wean O2 as tolerated, Currently 5 L [facility], satting %    Lupus (HCC)  Assessment & Plan  Diagnosed in December,   Initially started on steroids prednisone 60 mg daily which patient had tapered to 30 mg daily prior to admission with the addition of atovaquone, hydroxychloroquine, and mycophenolate    Hold atovaquone, hydroxychloroquine, and mycophenolate in the setting of acute infection with shock  Patient started on Solu-Cortef prior to discharge from [facility] for possible relative adrenal insufficiency contributing to shock state.    Continue prednisone 50 mg QD  Consider rheumatology consult       ----------------------------------------------------------------------------------------  HPI/24hr events: Extubated yesterday afternoon at approximately 14:00. Off pressors since last night at 20:17. No acute events o/n.     Patient appropriate for transfer out of the ICU today?: Patient does not meet criteria for ICU Follow-up Clinic; referral has not been made.   Disposition: Transfer to Stepdown Level 2  Code Status: Level 2 - DNAR: but accepts endotracheal intubation  ---------------------------------------------------------------------------------------  SUBJECTIVE  This morning, Ms. Nguyen is seen lying up in bed, awake, alert, engaged with exam, in no acute distress. She reports weeks-long pain in the abdomen, but none at present. No acute complaints at this time. Reports poor appetite. No BM today.     Review of Systems  Review of systems was reviewed and negative unless stated above in HPI/24-hour events   ---------------------------------------------------------------------------------------  OBJECTIVE    Vitals   Vitals:    03/05/24 0500 03/05/24 0558 03/05/24 0600 03/05/24 0700   BP: 132/64  144/63 136/60   BP Location:       Pulse: 74  78 76   Resp: 16  17 (!) 11   Temp:    98 °F (36.7 °C)   TempSrc:    Oral   SpO2: 99%  100% 100%   Weight:  76.4 kg (168 lb 6.9 oz)        Temp (24hrs), Av.5 °F (36.4 °C), Min:97.2 °F (36.2 °C), Max:98 °F (36.7 °C)  Current: Temperature: 98 °F (36.7 °C)          Respiratory:  SpO2: SpO2: 100 %, SpO2 Activity: SpO2 Activity: At Rest, SpO2 Device: O2 Device: Mid flow nasal cannula, Capnography:    Nasal Cannula O2 Flow Rate (L/min): 5 L/min    Invasive/non-invasive ventilation settings   Respiratory      Lab Data (Last 4 hours)      None           O2/Vent Data (Last 4 hours)      None                    Physical Exam  Vitals and nursing note reviewed.   Constitutional:       General: She is not in acute distress.     Appearance: She is ill-appearing. She is not toxic-appearing or diaphoretic.   HENT:      Head: Normocephalic and atraumatic.   Eyes:      General: No scleral icterus.        Right eye: No discharge.         Left eye: No discharge.      Conjunctiva/sclera: Conjunctivae normal.   Neck:      Comments: RIJ CVC  Cardiovascular:      Rate and Rhythm: Normal rate and regular rhythm.      Pulses: Normal pulses.      Heart sounds: Normal heart sounds. No murmur heard.     No friction rub. No gallop.   Pulmonary:      Breath sounds: Rhonchi present.      Comments: Inspiratory rhonchi in R superior anterior chest. Auscultation limited by positioning and body habitus. Posterior lungs not auscultated  Abdominal:      General: Bowel sounds are normal. There is no distension.      Palpations: Abdomen is soft.      Tenderness: There is no abdominal tenderness. There is no guarding or rebound.   Musculoskeletal:         General: Swelling present.      Right lower leg: Edema present.      Left lower leg: Edema present.      Comments: Dependent pitting edema of BUE and BLE   Skin:     General: Skin is warm and dry.      Coloration: Skin is not jaundiced or pale.   Neurological:      Mental Status: She is alert. She is disoriented.      Comments: Oriented to self, not to place or time   Psychiatric:         Mood and Affect: Mood normal.          Behavior: Behavior normal.         Laboratory and Diagnostics:  Results from last 7 days   Lab Units 03/05/24  0450 03/04/24  0455 03/03/24  2250 03/03/24  1640 03/03/24  0404 03/02/24  0658 03/01/24  0542 02/29/24  1804 02/29/24  1226   WBC Thousand/uL 26.92* 24.05*  --  26.30* 18.38* 21.27* 12.97*  --  8.72   HEMOGLOBIN g/dL 8.7* 8.8* 8.2* 6.2* 8.0* 8.8* 9.0* 9.3* 7.0*   HEMATOCRIT % 27.5* 26.9*  --  20.3* 27.2* 28.3* 29.0* 30.5* 23.7*   PLATELETS Thousands/uL 199 205  --  284 333 332 325  --  315   NEUTROS PCT %  --  88*  --  91* 91*  --   --   --   --    BANDS PCT %  --   --   --   --   --   --  11*  --  5   MONOS PCT %  --  2*  --  2* 4  --   --   --   --    MONO PCT %  --   --   --   --   --  3* 6  --  5   EOS PCT %  --  0  --  0 0 0 0  --  0     Results from last 7 days   Lab Units 03/05/24 0450 03/04/24 0455 03/03/24 1640 03/03/24  1149 03/03/24  0404 03/02/24  0658 03/01/24  0542   SODIUM mmol/L 141 138 138 137 138 137 135   POTASSIUM mmol/L 3.8 3.4* 3.8 4.1 4.2 4.6 4.5   CHLORIDE mmol/L 111* 109* 108 106 106 104 102   CO2 mmol/L 23 23 22 20* 22 26 25   ANION GAP mmol/L 7 6 8 11 10 7 8   BUN mg/dL 27* 35* 35* 36* 36* 34* 42*   CREATININE mg/dL 0.74 0.88 0.89 0.92 0.85 0.84 0.87   CALCIUM mg/dL 7.2* 7.4* 7.7* 7.7* 8.5 8.8 8.7   GLUCOSE RANDOM mg/dL 125 224* 302* 420* 447* 315* 170*   ALT U/L 79* 93* 109* 57* 18 12 15   AST U/L 44* 80* 156* 66* 13 8* 11*   ALK PHOS U/L 75 61 65 59 70 75 65   ALBUMIN g/dL 2.1* 2.3* 2.7* 1.9* 2.3* 2.6* 2.6*   TOTAL BILIRUBIN mg/dL 0.67 0.83 0.83 0.61 0.61 0.51 0.71     Results from last 7 days   Lab Units 03/04/24  0455 03/03/24  1640 03/01/24  0542 02/29/24  1226   MAGNESIUM mg/dL 1.9 1.9 2.0 2.0   PHOSPHORUS mg/dL 2.2* 2.4  --   --       Results from last 7 days   Lab Units 03/05/24  0646 03/04/24  0455 03/03/24  2250 03/03/24  1640 03/03/24  0823 03/03/24  0152 03/02/24  1917 03/02/24  0658 03/01/24  2159 03/01/24  1438 02/29/24  1226   INR  1.22* 1.49*  --  1.86*  --    --   --   --   --  1.20* 1.95*   PTT seconds 31  --  41* >210* >210* 34 27 113*   < > 29 31    < > = values in this interval not displayed.          Results from last 7 days   Lab Units 03/04/24  0150 03/03/24  2250 03/03/24  1313 03/03/24  1019 02/29/24  1526 02/29/24  1226   LACTIC ACID mmol/L 2.1* 3.1* 4.5* 7.1* 1.8 4.1*     ABG:  Results from last 7 days   Lab Units 03/03/24  1401   PH ART  7.345*   PCO2 ART mm Hg 38.3   PO2 ART mm Hg 117.8   HCO3 ART mmol/L 20.4*   BASE EXC ART mmol/L -4.8   ABG SOURCE  Radial, Left     VBG:  Results from last 7 days   Lab Units 03/03/24  2250 03/03/24  1401   PH RADHA  7.255*  --    PCO2 RADHA mm Hg 49.7  --    PO2 RADHA mm Hg 20.8*  --    HCO3 RADHA mmol/L 21.6*  --    BASE EXC RADHA mmol/L -5.4  --    ABG SOURCE   --  Radial, Left     Results from last 7 days   Lab Units 03/04/24  0455 03/01/24  0542 02/29/24  1226   PROCALCITONIN ng/ml 3.08* 0.76* 0.61*       Micro  Results from last 7 days   Lab Units 03/03/24  2255 03/03/24  1404 03/03/24  1148 03/03/24  1019 03/02/24  0823 03/02/24  0700 02/29/24  2118 02/29/24  1226 02/29/24  1027   BLOOD CULTURE  No Growth at 24 hrs.  No Growth at 24 hrs.  --  No Growth at 24 hrs. No Growth at 24 hrs. No Growth at 48 hrs. No Growth at 48 hrs.  --  No Growth After 4 Days.  Enterococcus avium*  --    SPUTUM CULTURE   --  Culture results to follow.  --   --   --   --   --   --   --    GRAM STAIN RESULT   --  3+ Polys*  1+ Budding yeast*  1+ Gram negative rods*  --   --   --   --   --  Gram positive cocci in pairs* Rare Gram negative rods*  Rare Gram positive cocci in pairs*  No polys seen*   URINE CULTURE   --   --   --   --   --   --  10,000-19,000 cfu/ml Pseudomonas aeruginosa*  --   --    WOUND CULTURE   --   --   --   --   --   --   --   --  3+ Growth of Escherichia coli*  3+ Growth of Proteus mirabilis*  3+ Growth of Pseudomonas aeruginosa MDR*  2+ Growth of   MRSA CULTURE ONLY   --   --   --   --   --   --  No Methicillin Resistant  Staphlyococcus aureus (MRSA) isolated  --   --    LEGIONELLA URINARY ANTIGEN   --   --   --   --   --   --  Negative  --   --    STREP PNEUMONIAE ANTIGEN, URINE   --   --   --   --   --   --  Negative  --   --        EKG: Tele reviewed. NSR. No events. HR 60s-70s  Imaging: I have personally reviewed pertinent reports.   and I have personally reviewed pertinent films in PACS    Intake and Output  I/O         03/03 0701 03/04 0700 03/04 0701 03/05 0700 03/05 0701 03/06 0700    P.O. 0 0     I.V. (mL/kg) 1139.3 3123.2 (40.9)     Blood 329.2      NG/GT  50     IV Piggyback  750     Total Intake(mL/kg) 1468.5 3923.2 (51.4)     Urine (mL/kg/hr) 275 631 (0.3)     Stool 0      Total Output 275 631     Net +1193.5 +3292.2            Unmeasured Stool Occurrence 2 x              Height and Weights         Body mass index is 28.91 kg/m².  Weight (last 2 days)       Date/Time Weight    03/05/24 0558 76.4 (168.43)              Nutrition       Diet Orders   (From admission, onward)                 Start     Ordered    03/03/24 1917  Diet NPO  Diet effective now        References:    Adult Nutrition Support Algorithm    RD Therapeutic Diet Order Protocol   Question Answer Comment   Diet Type NPO    RD to adjust diet per protocol? Yes        03/03/24 1924                      Active Medications  Scheduled Meds:  Current Facility-Administered Medications   Medication Dose Route Frequency Provider Last Rate    ampicillin-sulbactam  3 g Intravenous Q6H Ana Laura Dalton MD 3 g (03/05/24 0504)    atorvastatin  40 mg Oral Daily Uche Blair MD      chlorhexidine  15 mL Mouth/Throat Q12H [facility] AUGUSTA Ibarra      collagenase   Topical Daily Thanh Coleman MD      fentaNYL  50 mcg/hr Intravenous Continuous AUGUSTA Hutchison Stopped (03/04/24 1202)    fentanyl citrate (PF)  50 mcg Intravenous Q2H PRN AUGUSTA Hutchison      folic acid 1 mg, thiamine (VITAMIN B1) 100 mg in sodium chloride 0.9 % 100 mL IV piggyback    "Intravenous Daily AUGUSTA Ibarra 0 mL/hr at 03/04/24 1346    insulin lispro  2-12 Units Subcutaneous Q6H CRUZITO AUGUSTA Garcia      levalbuterol  1.25 mg Nebulization Q8H PRN AUGUSTA Ibarra      multi-electrolyte  500 mL Intravenous Once Sienna Perez DO Stopped (03/04/24 1114)    multi-electrolyte  125 mL/hr Intravenous Continuous Bhavani AUGUSTA Davalos 125 mL/hr (03/05/24 0333)    norepinephrine  1-30 mcg/min Intravenous Titrated Uche Blair MD Stopped (03/04/24 2017)    ondansetron  4 mg Intravenous Q4H PRN AUGUSTA Ibarra      pantoprazole  40 mg Intravenous Q12H CRUZITO Uche Blair MD      predniSONE  50 mg Oral Daily AUGUSTA Garcia       Continuous Infusions:  fentaNYL, 50 mcg/hr, Last Rate: Stopped (03/04/24 1202)  multi-electrolyte, 125 mL/hr, Last Rate: 125 mL/hr (03/05/24 0333)  norepinephrine, 1-30 mcg/min, Last Rate: Stopped (03/04/24 2017)      PRN Meds:   fentanyl citrate (PF), 50 mcg, Q2H PRN  levalbuterol, 1.25 mg, Q8H PRN  ondansetron, 4 mg, Q4H PRN        Invasive Devices Review  Invasive Devices       Central Venous Catheter Line  Duration             CVC Central Lines 03/03/24 Triple 20cm 1 day              Drain  Duration             Urethral Catheter Latex;Straight-tip 16 Fr. 4 days                    Rationale for remaining devices: Can likely D/C CVC  ---------------------------------------------------------------------------------------  Advance Directive and Living Will: Yes    Power of :    POLST:    ---------------------------------------------------------------------------------------  Care Time Delivered:       Uche Blair MD      Portions of the record may have been created with voice recognition software.  Occasional wrong word or \"sound a like\" substitutions may have occurred due to the inherent limitations of voice recognition software.  Read the chart carefully and recognize, using context, where substitutions " have occurred

## 2024-03-05 NOTE — ASSESSMENT & PLAN NOTE
CXR 2/29/24: Right lung opacities, suspicious for pneumonia in the appropriate clinical setting.  CT abdomen pelvis 2/29/2024: LOWER CHEST: Mild right basilar patchy consolidation #2/5 suspicious for pneumonia.  Procal elevated and uptrending from 0.76 on 3/1/24 to 3.08 on 3/4/24  CT Chest 3/4/24 shows extensive right upper lobe cavitary pneumonia right basilar atelectasis and/or pneumonia with small bilateral pleural effusions    Rule out reactivation of TB in this immunosuppressed patient; AFB x 3 pending  Consider also aspiration vs septic emboli vs community acquired   Continue Unasyn as above  Extubated 3/4/24  Wean O2 as tolerated, Currently 5 L [facility], satting %

## 2024-03-05 NOTE — ASSESSMENT & PLAN NOTE
Recent persistent diarrhea  FOBT positive @ [facility]  Small bloody bowel movement on presentation to [place] ICU  Acute drop in hemoglobin from 8.0 morning of 3/3/2024 to 6.2 approximately 12 hours later  PTT>210 at presentation  Recent CT A/P demonstrating colitis  No history of significant GI bleed per family  Family reports colonoscopy approximately 1 year ago without significant findings    Hep gtt held at presentation.  PTT= 31 morning of 3/5/24    No further episodes of GIB and Hgb stable since transfusion of 2 U pRBC on 3/3    Pt on home Eliquis for DVT found on 2/20/2024  Given Protonix 80 mg IV once, now on Protonix 40 mg IV Q12H  Consider restarting hep gtt with close monitoring of PTT  GI on board, appreciate recommendations

## 2024-03-05 NOTE — PLAN OF CARE
Problem: SAFETY ADULT  Goal: Patient will remain free of falls  Description: INTERVENTIONS:  - Educate patient/family on patient safety including physical limitations  - Instruct patient to call for assistance with activity   - Consult OT/PT to assist with strengthening/mobility   - Keep Call bell within reach  - Keep bed low and locked with side rails adjusted as appropriate  - Keep care items and personal belongings within reach  - Initiate and maintain comfort rounds  - Make Fall Risk Sign visible to staff  - Offer Toileting in advance of need  - Initiate/Maintain bed alarm  - Obtain necessary fall risk management equipment  - Apply yellow socks and bracelet for high fall risk patients  - Consider moving patient to room near nurses station  Outcome: Progressing     Problem: RESPIRATORY - ADULT  Goal: Achieves optimal ventilation and oxygenation  Description: INTERVENTIONS:  - Assess for changes in respiratory status  - Assess for changes in mentation and behavior  - Position to facilitate oxygenation and minimize respiratory effort  - Oxygen administered by appropriate delivery if ordered  - Initiate smoking cessation education as indicated  - Encourage broncho-pulmonary hygiene including cough, deep breathe, Incentive Spirometry  - Assess the need for suctioning and aspirate as needed  - Assess and instruct to report SOB or any respiratory difficulty  - Respiratory Therapy support as indicated  Outcome: Progressing     Problem: HEMATOLOGIC - ADULT  Goal: Maintains hematologic stability  Description: INTERVENTIONS  - Assess for signs and symptoms of bleeding or hemorrhage  - Monitor labs  - Administer supportive blood products/factors as ordered and appropriate  Outcome: Progressing     Problem: SAFETY,RESTRAINT: NV/NON-SELF DESTRUCTIVE BEHAVIOR  Goal: Remains free of harm/injury (restraint for non violent/non self-detsructive behavior)  Description: INTERVENTIONS:  - Instruct patient/family regarding restraint  use   - Assess and monitor physiologic and psychological status   - Provide interventions and comfort measures to meet assessed patient needs   - Identify and implement measures to help patient regain control  - Assess readiness for release of restraint   Outcome: Progressing  Goal: Returns to optimal restraint-free functioning  Description: INTERVENTIONS:  - Assess the patient's behavior and symptoms that indicate continued need for restraint  - Identify and implement measures to help patient regain control  - Assess readiness for release of restraint   Outcome: Progressing     Problem: Prexisting or High Potential for Compromised Skin Integrity  Goal: Skin integrity is maintained or improved  Description: INTERVENTIONS:  - Identify patients at risk for skin breakdown  - Assess and monitor skin integrity  - Assess and monitor nutrition and hydration status  - Monitor labs   - Assess for incontinence   - Turn and reposition patient  - Assist with mobility/ambulation  - Relieve pressure over bony prominences  - Avoid friction and shearing  - Provide appropriate hygiene as needed including keeping skin clean and dry  - Evaluate need for skin moisturizer/barrier cream  - Collaborate with interdisciplinary team   - Patient/family teaching  - Consider wound care consult   Outcome: Not Progressing     Problem: SAFETY ADULT  Goal: Maintain or return to baseline ADL function  Description: INTERVENTIONS:  -  Assess patient's ability to carry out ADLs; assess patient's baseline for ADL function and identify physical deficits which impact ability to perform ADLs (bathing, care of mouth/teeth, toileting, grooming, dressing, etc.)  - Assess/evaluate cause of self-care deficits   - Assess range of motion  - Assess patient's mobility; develop plan if impaired  - Assess patient's need for assistive devices and provide as appropriate  - Encourage maximum independence but intervene and supervise when necessary  - Involve family in  performance of ADLs  - Assess for home care needs following discharge   - Consider OT consult to assist with ADL evaluation and planning for discharge  - Provide patient education as appropriate  Outcome: Not Progressing  Goal: Maintains/Returns to pre admission functional level  Description: INTERVENTIONS:  - Perform AM-PAC 6 Click Basic Mobility/ Daily Activity assessment daily.  - Set and communicate daily mobility goal to care team and patient/family/caregiver.   - Collaborate with rehabilitation services on mobility goals if consulted  - Out of bed for toileting  - Record patient progress and toleration of activity level   Outcome: Not Progressing     Problem: Nutrition/Hydration-ADULT  Goal: Nutrient/Hydration intake appropriate for improving, restoring or maintaining nutritional needs  Description: Monitor and assess patient's nutrition/hydration status for malnutrition. Collaborate with interdisciplinary team and initiate plan and interventions as ordered.  Monitor patient's weight and dietary intake as ordered or per policy. Utilize nutrition screening tool and intervene as necessary. Determine patient's food preferences and provide high-protein, high-caloric foods as appropriate.     INTERVENTIONS:  - Monitor oral intake, urinary output, labs, and treatment plans  - Assess nutrition and hydration status and recommend course of action  - Evaluate amount of meals eaten  - Assist patient with eating if necessary   - Allow adequate time for meals  - Recommend/ encourage appropriate diets, oral nutritional supplements, and vitamin/mineral supplements  - Order, calculate, and assess calorie counts as needed  - Recommend, monitor, and adjust tube feedings and TPN/PPN based on assessed needs  - Assess need for intravenous fluids  - Provide specific nutrition/hydration education as appropriate  - Include patient/family/caregiver in decisions related to nutrition  Outcome: Not Progressing

## 2024-03-05 NOTE — ASSESSMENT & PLAN NOTE
Seen on CT 3/4/24  Consider reactivation of TB vs septic emboli vs aspiration    Continue Unasyn for E. avium bacteremia  Holding immunosuppressants, excepting prednisone  Check DYANA when able  ID on board, appreciate recommendations

## 2024-03-05 NOTE — ASSESSMENT & PLAN NOTE
Initially admitted to [facility] on 2/29, developed septic shock on 3/3, intubated and transferred to [facility];  required pressors  E. avium bacteremia 1 in 2 sets from 2/29 were positive.   Repeat cultures show NGTD  Susceptibilities show pan-sensitive enterococcus   ID following, appreciate recommendations, currently on Unasyn

## 2024-03-05 NOTE — ASSESSMENT & PLAN NOTE
Pt presented in shock on Levo and Vaso  Multiple possible etiologies, including hemorrhagic shock in the setting of acute GI bleed, septic shock in the setting of Enterococcus bacteremia and cavitary PNA, and adrenal insufficiency in the setting of chronic steroid use and acute illness    Off pressors since 3/4/24 @ 20:17; continue to monitor with MAP goal >=65  Continue prednisone 50 mg QD

## 2024-03-05 NOTE — ASSESSMENT & PLAN NOTE
Initially treated with MMF, prednisone  Immunoprophylaxis with dapsone but developed methemoglobinemia and was transitioned to atovaquone  Given current shock state however MMF and atovaquone are on hold  Creatinine is 0.74 today  Will check an albumin to creatinine ratio  Currently on prednisone 50 mg daily-rheumatology notes in Care Everywhere reviewed patient was to start a steroid taper for janaury 13th with 10 mg decrease every 2 weeks would also consult rheumatology in the setting of the patient's critical illness and immunosuppression for now we will maintain on prednisone 50 mg daily  Discussed with ICU ok to challenge with diuretics

## 2024-03-05 NOTE — PLAN OF CARE
Problem: Prexisting or High Potential for Compromised Skin Integrity  Goal: Skin integrity is maintained or improved  Description: INTERVENTIONS:  - Identify patients at risk for skin breakdown  - Assess and monitor skin integrity  - Assess and monitor nutrition and hydration status  - Monitor labs   - Assess for incontinence   - Turn and reposition patient  - Assist with mobility/ambulation  - Relieve pressure over bony prominences  - Avoid friction and shearing  - Provide appropriate hygiene as needed including keeping skin clean and dry  - Evaluate need for skin moisturizer/barrier cream  - Collaborate with interdisciplinary team   - Patient/family teaching  - Consider wound care consult   Outcome: Progressing     Problem: SAFETY ADULT  Goal: Patient will remain free of falls  Description: INTERVENTIONS:  - Educate patient/family on patient safety including physical limitations  - Instruct patient to call for assistance with activity   - Consult OT/PT to assist with strengthening/mobility   - Keep Call bell within reach  - Keep bed low and locked with side rails adjusted as appropriate  - Keep care items and personal belongings within reach  - Initiate and maintain comfort rounds  - Make Fall Risk Sign visible to staff  - Offer Toileting every 2 Hours, in advance of need  - Initiate/Maintain bed alarm  - Obtain necessary fall risk management equipment  - Apply yellow socks and bracelet for high fall risk patients  - Consider moving patient to room near nurses station  Outcome: Progressing  Goal: Maintain or return to baseline ADL function  Description: INTERVENTIONS:  -  Assess patient's ability to carry out ADLs; assess patient's baseline for ADL function and identify physical deficits which impact ability to perform ADLs (bathing, care of mouth/teeth, toileting, grooming, dressing, etc.)  - Assess/evaluate cause of self-care deficits   - Assess range of motion  - Assess patient's mobility; develop plan if  impaired  - Assess patient's need for assistive devices and provide as appropriate  - Encourage maximum independence but intervene and supervise when necessary  - Involve family in performance of ADLs  - Assess for home care needs following discharge   - Consider OT consult to assist with ADL evaluation and planning for discharge  - Provide patient education as appropriate  Outcome: Progressing  Goal: Maintains/Returns to pre admission functional level  Description: INTERVENTIONS:  - Perform AM-PAC 6 Click Basic Mobility/ Daily Activity assessment daily.  - Set and communicate daily mobility goal to care team and patient/family/caregiver.   - Collaborate with rehabilitation services on mobility goals if consulted  - Perform Range of Motion 3 times a day.  - Reposition patient every 2 hours.  - Dangle patient 3 times a day  - Stand patient 3 times a day  - Ambulate patient 3 times a day  - Out of bed to chair 3 times a day   - Out of bed for meals 3 times a day  - Out of bed for toileting  - Record patient progress and toleration of activity level   Outcome: Progressing     Problem: DISCHARGE PLANNING  Goal: Discharge to home or other facility with appropriate resources  Description: INTERVENTIONS:  - Identify barriers to discharge w/patient and caregiver  - Arrange for needed discharge resources and transportation as appropriate  - Identify discharge learning needs (meds, wound care, etc.)  - Arrange for interpretive services to assist at discharge as needed  - Refer to Case Management Department for coordinating discharge planning if the patient needs post-hospital services based on physician/advanced practitioner order or complex needs related to functional status, cognitive ability, or social support system  Outcome: Progressing     Problem: Knowledge Deficit  Goal: Patient/family/caregiver demonstrates understanding of disease process, treatment plan, medications, and discharge instructions  Description:  Complete learning assessment and assess knowledge base.  Interventions:  - Provide teaching at level of understanding  - Provide teaching via preferred learning methods  Outcome: Progressing     Problem: RESPIRATORY - ADULT  Goal: Achieves optimal ventilation and oxygenation  Description: INTERVENTIONS:  - Assess for changes in respiratory status  - Assess for changes in mentation and behavior  - Position to facilitate oxygenation and minimize respiratory effort  - Oxygen administered by appropriate delivery if ordered  - Initiate smoking cessation education as indicated  - Encourage broncho-pulmonary hygiene including cough, deep breathe, Incentive Spirometry  - Assess the need for suctioning and aspirate as needed  - Assess and instruct to report SOB or any respiratory difficulty  - Respiratory Therapy support as indicated  Outcome: Progressing     Problem: HEMATOLOGIC - ADULT  Goal: Maintains hematologic stability  Description: INTERVENTIONS  - Assess for signs and symptoms of bleeding or hemorrhage  - Monitor labs  - Administer supportive blood products/factors as ordered and appropriate  Outcome: Progressing     Problem: Nutrition/Hydration-ADULT  Goal: Nutrient/Hydration intake appropriate for improving, restoring or maintaining nutritional needs  Description: Monitor and assess patient's nutrition/hydration status for malnutrition. Collaborate with interdisciplinary team and initiate plan and interventions as ordered.  Monitor patient's weight and dietary intake as ordered or per policy. Utilize nutrition screening tool and intervene as necessary. Determine patient's food preferences and provide high-protein, high-caloric foods as appropriate.     INTERVENTIONS:  - Monitor oral intake, urinary output, labs, and treatment plans  - Assess nutrition and hydration status and recommend course of action  - Evaluate amount of meals eaten  - Assist patient with eating if necessary   - Allow adequate time for meals  -  Recommend/ encourage appropriate diets, oral nutritional supplements, and vitamin/mineral supplements  - Order, calculate, and assess calorie counts as needed  - Recommend, monitor, and adjust tube feedings and TPN/PPN based on assessed needs  - Assess need for intravenous fluids  - Provide specific nutrition/hydration education as appropriate  - Include patient/family/caregiver in decisions related to nutrition  Outcome: Progressing

## 2024-03-06 NOTE — ASSESSMENT & PLAN NOTE
Seen on CT 3/4/24  Consider reactivation of TB vs septic emboli vs aspiration vs community acquired    Preliminary AFB negative  Sputum growing 2+ pseudomonas, possible colonization    Rule out reactivation of TB in this immunosuppressed patient; AFB x 3 ordered, preliminarily negative  Consider also septic emboli vs aspiration vs community acquired--DYANA when able  Continue Unasyn for tx of E avium bacteremia  Wean O2 as tolerated, Currently 2 L NC, satting 94-98%

## 2024-03-06 NOTE — ASSESSMENT & PLAN NOTE
E. avium bacteremia. Pt recently started on prednisone, atovaquone, hydroxychloroquine, and mycophenolate for lupus. Most likely gut translocation.     Repeat cultures show NGTD  Susceptibilities show pan-sensitive enterococcus    ID on board, appreciate recommendations  Narrowed to Unasyn 3 g Q6H (Day 3 of anticipated prolonged course, Day 7 total abx)  DYANA when able, especially in light of new splenic infarcts concerning for septic emboli

## 2024-03-06 NOTE — ASSESSMENT & PLAN NOTE
CXR 2/29/24: Right lung opacities, suspicious for pneumonia in the appropriate clinical setting.  CT abdomen pelvis 2/29/2024: LOWER CHEST: Mild right basilar patchy consolidation #2/5 suspicious for pneumonia.  Procal elevated and uptrending from 0.76 on 3/1/24 to 3.08 on 3/4/24  CT Chest 3/4/24 shows extensive right upper lobe cavitary pneumonia right basilar atelectasis and/or pneumonia with small bilateral pleural effusions    Sputum Cx 3/3 growing 2+ pseudomonas    Plan as above under cavitary PNA

## 2024-03-06 NOTE — PLAN OF CARE
Problem: Prexisting or High Potential for Compromised Skin Integrity  Goal: Skin integrity is maintained or improved  Description: INTERVENTIONS:  - Identify patients at risk for skin breakdown  - Assess and monitor skin integrity  - Assess and monitor nutrition and hydration status  - Monitor labs   - Assess for incontinence   - Turn and reposition patient  - Assist with mobility/ambulation  - Relieve pressure over bony prominences  - Avoid friction and shearing  - Provide appropriate hygiene as needed including keeping skin clean and dry  - Evaluate need for skin moisturizer/barrier cream  - Collaborate with interdisciplinary team   - Patient/family teaching  - Consider wound care consult   Outcome: Progressing     Problem: SAFETY ADULT  Goal: Patient will remain free of falls  Description: INTERVENTIONS:  - Educate patient/family on patient safety including physical limitations  - Instruct patient to call for assistance with activity   - Consult OT/PT to assist with strengthening/mobility   - Keep Call bell within reach  - Keep bed low and locked with side rails adjusted as appropriate  - Keep care items and personal belongings within reach  - Initiate and maintain comfort rounds  - Make Fall Risk Sign visible to staff  - Apply yellow socks and bracelet for high fall risk patients  - Consider moving patient to room near nurses station  Outcome: Progressing     Problem: RESPIRATORY - ADULT  Goal: Achieves optimal ventilation and oxygenation  Description: INTERVENTIONS:  - Assess for changes in respiratory status  - Assess for changes in mentation and behavior  - Position to facilitate oxygenation and minimize respiratory effort  - Oxygen administered by appropriate delivery if ordered  - Initiate smoking cessation education as indicated  - Encourage broncho-pulmonary hygiene including cough, deep breathe, Incentive Spirometry  - Assess the need for suctioning and aspirate as needed  - Assess and instruct to  report SOB or any respiratory difficulty  - Respiratory Therapy support as indicated  Outcome: Progressing     Problem: HEMATOLOGIC - ADULT  Goal: Maintains hematologic stability  Description: INTERVENTIONS  - Assess for signs and symptoms of bleeding or hemorrhage  - Monitor labs  - Administer supportive blood products/factors as ordered and appropriate  Outcome: Progressing     Problem: Nutrition/Hydration-ADULT  Goal: Nutrient/Hydration intake appropriate for improving, restoring or maintaining nutritional needs  Description: Monitor and assess patient's nutrition/hydration status for malnutrition. Collaborate with interdisciplinary team and initiate plan and interventions as ordered.  Monitor patient's weight and dietary intake as ordered or per policy. Utilize nutrition screening tool and intervene as necessary. Determine patient's food preferences and provide high-protein, high-caloric foods as appropriate.     INTERVENTIONS:  - Monitor oral intake, urinary output, labs, and treatment plans  - Assess nutrition and hydration status and recommend course of action  - Evaluate amount of meals eaten  - Assist patient with eating if necessary   - Allow adequate time for meals  - Recommend/ encourage appropriate diets, oral nutritional supplements, and vitamin/mineral supplements  - Order, calculate, and assess calorie counts as needed  - Recommend, monitor, and adjust tube feedings and TPN/PPN based on assessed needs  - Assess need for intravenous fluids  - Provide specific nutrition/hydration education as appropriate  - Include patient/family/caregiver in decisions related to nutrition  Outcome: Progressing

## 2024-03-06 NOTE — PROGRESS NOTES
Progress Note - Infectious Disease   Darlyn Nguyen 78 y.o. female MRN: [id number]  Unit/Bed#: ICU 02 Encounter: [id number]    Impression/Plan:  1. Septic shock. Developing over admission. Leukocytosis, hypotension, and lactic acidosis. Likely secondary to enterococcus avium bacteremia and RUL pneumonia with cavitary lesion. This is complicated by fact that patient is immunosuppressed in setting of recent SLE with lupus nephritis diagnosis. The patient has made some clinical improvement. She was extubated and her pressor support is currently off. She remains afebrile. Initial repeat blood cultures are negative >72 hours. New repeat blood cultures are negative >48hours.  -antibiotic as below  -monitor CBCD and BMP  -follow up repeat blood cultures  -monitor vitals  -supportive care     2. Enterococcus avium bacteremia. Single set positive from 2/29/2024. Suspect likely GI source given patient's recent GI bleed and pancolitis with heavy diarrhea. Recent urine and wound cultures with no growth of this pathogen. Patient completed CT A/P which noted scattered splenic hypodensities which is concerning for septic emboli. With this pathogen there is concern for vancomycin resistance. Patient has no intravascular devices or hardware. Initial repeat blood cultures are negative >72 hours. New repeat blood cultures are negative >48 hours. CT head with no acute findings. TTE was negative but she will require DYANA for further assessment. The patient has been transitioned to IV Unasyn. She appears to be tolerating the antibiotic without difficulty. I will continue the Unasyn for now.  -continue IV Unasyn  -check CBCD and BMP tomorrow  -follow up repeat blood cultures  -recommend DYANA when patient is medically stable  -monitor vitals     3. RUL pneumonia with cavitary lung lesions. Chest CT showed a RUL cavitary lesion, dense RUL consolidation, R basilar atelectasis, and small B/L pleural effusions. Consider aspiration played a role.  Consider possilibily of septic emboli in setting of bacteremia. With new immunosuppression will need to consider TB. AFB sputum cultures are pending. TB PCR is pending with the state lab. Sputum culture with preliminary growth of pseudomonas, however after antibiotic treatment this may be colonization. She must remain on airborne isolation for now.  -maintain airborne isolation for now  -follow up AFB sputum cultures  -follow up TB PCR  -monitor vitals  -monitor respiratory status  -O2 support per primary service      4. Pancolitis with heavy diarrhea. Patient had positive FOB at [facility] concerning for GI bleed. This may be source of her bacteremia above. Patient following with GI prior to transfer to [facility]. Recent stool PCR was negative. Giardia and O&P are were negative. Fecal calprotectin positive. Newest CT A/P showed persistent fluid throughout the colon, improvement to previous areas of colonic mucosal hyperemia, and colonic diverticulosis without diverticulitis. Suspect she may benefit from colonoscopy once medically stable. Gastroenterology is following.   -serial abdominal exams  -monitor GI symptoms  -monitor stool output   -anticipate eventual colonoscopy   -continue follow up with gastroenterology     5. Acute hypoxic respiratory failure. Likely secondary to RUL pnuemonia with cavitary lesion. Also consider role of sepsis with shock. Consider possibility of aspiration vs septic emboli in bacteremic patient with new splenic hypodensities. She did recently have B/L LE DVTs, however she's been on Eliquis so less concern for PE. Patient's respiratory status has improved and she has been extubated. Her O2 saturation is stable on 2L nasal cannula O2 this morning.   -antibiotic as above  -monitor CBCD and BMP  -monitor vitals  -monitor respiratory status  -O2 support per critical care team      6. SLE. With lupus nephritis. Diagnosed in 12/2023 at [facility]. She has been managed by rheumatology and nephrology. She was  started on prednisone, atovaquone, hydroxychloroquine, and mycophenolate. Atovaquone, hydroxychloroquine, and mycophenolate currently on hold. She was given a dose of Solu-Cortef at [facility]. Nephrology has assessed and patient is on prednisone. Steroid use is risk factor for infection.  -steroid per nephrology  -patient will eventually need to go back on mepron from PJP prophylaxis  -continue close follow up with nephrology  -consider rheumatology consult as needed     7. CKD stage 2. This can affect antibiotic dosing. Upon review of patient's available past medical records it appears her baseline creatinine is approximately 0.8. Also with lupus nephritis as above. Suspect patient is high risk for ARYAN.   -monitor creatinine  -dose adjust antibiotic for renal function as needed  -avoid nephrotoxins  -continue close follow up with nephrology     8. Sacral and buttock ulcers. Some areas more superficial and appearing as raw granular tissue. Other areas are likely stage 2 with some gray stringy slough and fibrous tissue. Wounds do not appear to tunnel or undermine. It was documented that patient had a gluteal skin care during recent hospitalization at [facility]. Suspect pressure and prolonged down time are contributing to skin integrity. Wound management is following.  -serial skin exams  -frequent turning and repositioning to offload pressure  -continue follow up with wound management     9. Type 2 diabetes mellitus without long term insulin use. Patient's last HbA1c was 5.9% on 3/3/2024. Elevated blood glucose is risk factor for wounds and infection. Recommend tight glycemic control.  -blood glucose management per primary service    Above plan was discussed in detail with patient at the bedside.    Antibiotics:  Unasyn 3  Antibiotics 7    Subjective:  Patient reports she's doing alright this morning. Has some buttock pain again today but tells me that for the moment she's comfortable. She has no fever, chills,  sweats, shakes; no nausea, vomiting, abdominal pain, or diarrhea; no difficulty breathing, shortness of breath, or chest pain. Is still coughing at times. No new symptoms.    Objective:  Vitals:  Temp:  [97.3 °F (36.3 °C)-98.5 °F (36.9 °C)] 98.3 °F (36.8 °C)  HR:  [] 90  Resp:  [11-30] 18  BP: (101-155)/() 138/67  SpO2:  [88 %-100 %] 97 %  Temp (24hrs), Av.1 °F (36.7 °C), Min:97.3 °F (36.3 °C), Max:98.5 °F (36.9 °C)  Current: Temperature: 98.3 °F (36.8 °C)    Physical Exam:   General Appearance:  Alert, interactive, nontoxic, in no acute distress. She appears comfortable semi-supine in bed leaning to her R.   Throat: Oropharynx moist without lesions.    Lungs:   Decreased to auscultation bilaterally; no wheezes, rhonchi or rales; respirations unlabored on nasal cannula O2. Loose wet cough once during my visit.   Heart:  Irregular, tachycardic; no murmur, rub or gallop.   Abdomen:   Soft, non-tender, non-distended, positive bowel sounds.     Extremities: No clubbing or cyanosis, mild peripheral edema. B/L venodynes on.   Skin: No new rashes noted on exposed skin.     Labs, Imaging, & Other studies:   All pertinent labs and imaging studies were personally reviewed  Results from last 7 days   Lab Units 24  0435 24  2318 24  1751 24  1539 24  0450 24  0455   WBC Thousand/uL 26.92*  --   --   --  26.92* 24.05*   HEMOGLOBIN g/dL 9.0* 6.6* 7.6* 8.5* 8.7* 8.8*   PLATELETS Thousands/uL 118*  --   --  186 199 205     Results from last 7 days   Lab Units 24  0435 24  0450 24  0455   POTASSIUM mmol/L 3.2* 3.8 3.4*   CHLORIDE mmol/L 113* 111* 109*   CO2 mmol/L 23 23 23   BUN mg/dL 24 27* 35*   CREATININE mg/dL 0.70 0.74 0.88   EGFR ml/min/1.73sq m 82 76 62   CALCIUM mg/dL 6.7* 7.2* 7.4*   AST U/L 49* 44* 80*   ALT U/L 74* 79* 93*   ALK PHOS U/L 60 75 61     Results from last 7 days   Lab Units 24  2255 24  1640 24  1404 24  1148  03/03/24  1019 03/02/24  0823 03/02/24  0700 02/29/24  2118 02/29/24  1226 02/29/24  1027   BLOOD CULTURE  No Growth at 48 hrs.  No Growth at 48 hrs.  --   --  No Growth at 48 hrs. No Growth at 48 hrs. No Growth at 72 hrs. No Growth at 72 hrs.  --  No Growth After 5 Days.  Enterococcus avium*  --    SPUTUM CULTURE   --   --  2+ Growth of Pseudomonas aeruginosa*  --   --   --   --   --   --   --    GRAM STAIN RESULT   --   --  3+ Polys*  1+ Budding yeast*  1+ Gram negative rods*  --   --   --   --   --  Gram positive cocci in pairs* Rare Gram negative rods*  Rare Gram positive cocci in pairs*  No polys seen*   URINE CULTURE   --   --   --   --   --   --   --  10,000-19,000 cfu/ml Pseudomonas aeruginosa*  --   --    WOUND CULTURE   --   --   --   --   --   --   --   --   --  3+ Growth of Escherichia coli*  3+ Growth of Proteus mirabilis*  3+ Growth of Pseudomonas aeruginosa MDR*  2+ Growth of   MRSA CULTURE ONLY   --  No Methicillin Resistant Staphlyococcus aureus (MRSA) isolated  --   --   --   --   --  No Methicillin Resistant Staphlyococcus aureus (MRSA) isolated  --   --    LEGIONELLA URINARY ANTIGEN   --   --   --   --   --   --   --  Negative  --   --      Results from last 7 days   Lab Units 03/04/24  0455 03/01/24  0542 02/29/24  1226   PROCALCITONIN ng/ml 3.08* 0.76* 0.61*

## 2024-03-06 NOTE — PROGRESS NOTES
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)  Assessment & Plan  Initially treated with MMF, prednisone  Immunoprophylaxis with dapsone but developed methemoglobinemia and was transitioned to atovaquone  Given current shock state however MMF and atovaquone are on hold  Creatinine is 0.74 today  Albumin/creatinine ratio 0.2  Currently on prednisone 50 mg daily discussed with Critical care will start tapering by 10 mg weekly  Monitor BP  Discussed with ICU ok to challenge with diuretics, good urine output    GI bleed  Assessment & Plan  Patient received 2 units packed red blood cells  Blood pressures are currently acceptable  Hemoglobin is 8.8, platelet count 205  Continue to monitor  And ongoing ICU and GI management  Concern for colitis with red blood and mucus in stool, GI following    * Bacteremia due to Enterococcus  Assessment & Plan  Now on unazyn, id following      Acute respiratory failure with hypoxia (HCC)  Assessment & Plan  With concerns for a pneumonia, in the ICU now exubated  Ruling out tb given immunocomprized state, and cavitary lesion    Sacral wound  Assessment & Plan  Ongoing wound care         SUBJECTIVE:    The patient was seen today  Sitting up resting comfortably  Still having loose stools being ruled out for TB    12 point review of systems was otherwise negative besides what is mentioned above.    Medications:    Current Facility-Administered Medications:     ampicillin-sulbactam (UNASYN) 3 g in sodium chloride 0.9 % 100 mL IVPB, 3 g, Intravenous, Q6H, AUGUSTA Lewis, Last Rate: 200 mL/hr at 03/06/24 1057, 3 g at 03/06/24 1057    atorvastatin (LIPITOR) tablet 40 mg, 40 mg, Oral, Daily, AUGUSTA Lewis, 40 mg at 03/06/24  0801    collagenase (SANTYL) ointment, , Topical, Daily, AUGUSTA Lewis, Given at 03/06/24 0802    furosemide (LASIX) injection 40 mg, 40 mg, Intravenous, BID (diuretic), Uche Blair MD, 40 mg at 03/06/24 0747    heparin (porcine) 25,000 units in 0.45% NaCl 250 mL infusion (premix), 3-30 Units/kg/hr (Order-Specific), Intravenous, Titrated, AUGUSTA Lewis, Stopped at 03/05/24 1521    heparin (porcine) injection 3,000 Units, 3,000 Units, Intravenous, Q6H PRN, AUGUSTA Lewis    heparin (porcine) injection 6,000 Units, 6,000 Units, Intravenous, Q6H PRN, AUGUSTA Lewis    insulin lispro (HumALOG/ADMELOG) 100 units/mL subcutaneous injection 2-12 Units, 2-12 Units, Subcutaneous, Q6H CRUZITO, 2 Units at 03/06/24 1129 **AND** Fingerstick Glucose (POCT), , , Q6H, AUGUSTA Lewis    levalbuterol (XOPENEX) inhalation solution 1.25 mg, 1.25 mg, Nebulization, Q8H PRN, AUGUSTA Lewis    multi-electrolyte (PLASMALYTE-A/ISOLYTE-S PH 7.4) IV solution, 125 mL/hr, Intravenous, Continuous, AUGUSTA Lewis, Last Rate: 125 mL/hr at 03/06/24 1119, 125 mL/hr at 03/06/24 1119    ondansetron (ZOFRAN) injection 4 mg, 4 mg, Intravenous, Q4H PRN, AUGUSTA Lewis    pantoprazole (PROTONIX) EC tablet 40 mg, 40 mg, Oral, BID AC, AUGUSTA Lewis, 40 mg at 03/06/24 0636    [START ON 3/7/2024] predniSONE tablet 40 mg, 40 mg, Oral, Daily, Uche Blair MD    OBJECTIVE:    Vitals:    03/06/24 1100 03/06/24 1101 03/06/24 1200 03/06/24 1300   BP: 126/58  134/64 137/76   BP Location:       Pulse: 92  98 100   Resp: 20  19 20   Temp:  (!) 97.4 °F (36.3 °C)     TempSrc:  Oral     SpO2: 98%  98% 97%   Weight:            Temp:  [97.3 °F (36.3 °C)-98.5 °F (36.9 °C)] 97.4 °F (36.3 °C)  HR:  [] 100  Resp:  [14-22] 20  BP: (101-155)/(45-77) 137/76  SpO2:  [89 %-99 %] 97 %     Body mass index is 30.27 kg/m².    Weight (last 2 days)       Date/Time  Weight    03/06/24 0600 80 (176.37)    03/05/24 0558 76.4 (168.43)            I/O last 3 completed shifts:  In: 5039.9 [I.V.:4246.6; Blood:350; IV Piggyback:443.3]  Out: 2620 [Urine:2620]    I/O this shift:  In: 814.6 [I.V.:664.6; IV Piggyback:150]  Out: 380 [Urine:380]      Physical exam:    General: Frail and elderly  Eyes: conjunctivae pink, anicteric sclerae  ENT: lips and mucous membranes moist, no exudates, normal external ears  Neck: ROM intact, no JVD  Chest: No respiratory distress, no accessory muscle use  CVS: normal rate, non pericardial friction rub  Abdomen: soft, non-tender, non-distended, normoactive bowel sounds  Extremities: no edema of both legs  Skin: no rash  Neuro: awake, alert, oriented, grossly intact  Psych:  Pleasant affect    Invasive Devices:   Urethral Catheter Latex;Straight-tip 16 Fr. (Active)   Reasons to continue Urinary Catheter  Stage III/IV sacral ulcers or open perineal wounds in incontinent patients 03/06/24 0400   Goal for Removal Voiding trial when ambulation improves 03/06/24 0400   Site Assessment Clean;Skin intact 03/06/24 0400   Peralta Care Done 03/06/24 0900   Collection Container Standard drainage bag 03/06/24 0400   Securement Method Securing device (Describe) 03/06/24 0400   Output (mL) 175 mL 03/06/24 1101     Lab Results:   Results from last 7 days   Lab Units 03/06/24  1120 03/06/24  0435 03/05/24  2318 03/05/24  1751 03/05/24  1539 03/05/24  0450 03/04/24  0455 03/03/24  2255 03/03/24  2250 03/03/24  1640 03/03/24  1404 03/03/24  1149 03/03/24  1148 03/03/24  1019 03/03/24  0404 03/02/24  0823 03/02/24  0700 03/02/24  0658 03/01/24  0542 02/29/24  2118 02/29/24  1804 02/29/24  1226   WBC Thousand/uL  --  26.92*  --   --   --  26.92* 24.05*  --   --  26.30*  --   --   --   --  18.38*  --   --    < > 12.97*  --   --  8.72   HEMOGLOBIN g/dL 8.9* 9.0* 6.6* 7.6* 8.5* 8.7* 8.8*  --    < > 6.2*  --   --   --   --  8.0*  --   --    < > 9.0*  --    < > 7.0*   HEMATOCRIT %   --  27.0*  --   --   --  27.5* 26.9*  --   --  20.3*  --   --   --   --  27.2*  --   --    < > 29.0*  --    < > 23.7*   PLATELETS Thousands/uL  --  118*  --   --  186 199 205  --   --  284  --   --   --   --  333  --   --    < > 325  --   --  315   POTASSIUM mmol/L  --  3.2*  --   --   --  3.8 3.4*  --   --  3.8  --  4.1  --   --  4.2  --   --    < > 4.5  --   --  4.8   CHLORIDE mmol/L  --  113*  --   --   --  111* 109*  --   --  108  --  106  --   --  106  --   --    < > 102  --   --  101   CO2 mmol/L  --  23  --   --   --  23 23  --   --  22  --  20*  --   --  22  --   --    < > 25  --   --  23   BUN mg/dL  --  24  --   --   --  27* 35*  --   --  35*  --  36*  --   --  36*  --   --    < > 42*  --   --  58*   CREATININE mg/dL  --  0.70  --   --   --  0.74 0.88  --   --  0.89  --  0.92  --   --  0.85  --   --    < > 0.87  --   --  1.10   CALCIUM mg/dL  --  6.7*  --   --   --  7.2* 7.4*  --   --  7.7*  --  7.7*  --   --  8.5  --   --    < > 8.7  --   --  8.6   MAGNESIUM mg/dL  --   --   --   --   --   --  1.9  --   --  1.9  --   --   --   --   --   --   --   --  2.0  --   --  2.0   PHOSPHORUS mg/dL  --   --   --   --   --   --  2.2*  --   --  2.4  --   --   --   --   --   --   --   --   --   --   --   --    ALK PHOS U/L  --  60  --   --   --  75 61  --   --  65  --  59  --   --  70  --   --    < > 65  --   --  59   ALT U/L  --  74*  --   --   --  79* 93*  --   --  109*  --  57*  --   --  18  --   --    < > 15  --   --  15   AST U/L  --  49*  --   --   --  44* 80*  --   --  156*  --  66*  --   --  13  --   --    < > 11*  --   --  13   BLOOD CULTURE   --   --   --   --   --   --   --  No Growth at 48 hrs.  No Growth at 48 hrs.  --   --   --   --  No Growth at 48 hrs. No Growth at 48 hrs.  --  No Growth at 72 hrs. No Growth at 72 hrs.  --   --   --   --  No Growth After 5 Days.  Enterococcus avium*   NITRITE UA   --   --   --   --   --   --   --   --   --   --  Negative  --   --   --   --   --   --   --   --   "Positive*  --   --    LEUKOCYTES UA   --   --   --   --   --   --   --   --   --   --  Negative  --   --   --   --   --   --   --   --  Negative  --   --    BLOOD UA   --   --   --   --   --   --   --   --   --   --  Moderate*  --   --   --   --   --   --   --   --  Trace-Intact*  --   --     < > = values in this interval not displayed.         Portions of the record may have been created with voice recognition software. Occasional wrong word or \"sound a like\" substitutions may have occurred due to the inherent limitations of voice recognition software. Read the chart carefully and recognize, using context, where substitutions have occurred.If you have any questions, please contact the dictating provider.      "

## 2024-03-06 NOTE — ASSESSMENT & PLAN NOTE
With concerns for a pneumonia, in the ICU now exubated  Ruling out tb given immunocomprized state, and cavitary lesion

## 2024-03-06 NOTE — PLAN OF CARE
Problem: Prexisting or High Potential for Compromised Skin Integrity  Goal: Skin integrity is maintained or improved  Description: INTERVENTIONS:  - Identify patients at risk for skin breakdown  - Assess and monitor skin integrity  - Assess and monitor nutrition and hydration status  - Monitor labs   - Assess for incontinence   - Turn and reposition patient  - Assist with mobility/ambulation  - Relieve pressure over bony prominences  - Avoid friction and shearing  - Provide appropriate hygiene as needed including keeping skin clean and dry  - Evaluate need for skin moisturizer/barrier cream  - Collaborate with interdisciplinary team   - Patient/family teaching  - Consider wound care consult   Outcome: Progressing     Problem: SAFETY ADULT  Goal: Patient will remain free of falls  Description: INTERVENTIONS:  - Educate patient/family on patient safety including physical limitations  - Instruct patient to call for assistance with activity   - Consult OT/PT to assist with strengthening/mobility   - Keep Call bell within reach  - Keep bed low and locked with side rails adjusted as appropriate  - Keep care items and personal belongings within reach  - Initiate and maintain comfort rounds  - Make Fall Risk Sign visible to staff  - Offer Toileting every 2 Hours, in advance of need  - Initiate/Maintain bed alarm  - Obtain necessary fall risk management equipment  - Apply yellow socks and bracelet for high fall risk patients  - Consider moving patient to room near nurses station  Outcome: Progressing  Goal: Maintain or return to baseline ADL function  Description: INTERVENTIONS:  -  Assess patient's ability to carry out ADLs; assess patient's baseline for ADL function and identify physical deficits which impact ability to perform ADLs (bathing, care of mouth/teeth, toileting, grooming, dressing, etc.)  - Assess/evaluate cause of self-care deficits   - Assess range of motion  - Assess patient's mobility; develop plan if  impaired  - Assess patient's need for assistive devices and provide as appropriate  - Encourage maximum independence but intervene and supervise when necessary  - Involve family in performance of ADLs  - Assess for home care needs following discharge   - Consider OT consult to assist with ADL evaluation and planning for discharge  - Provide patient education as appropriate  Outcome: Progressing  Goal: Maintains/Returns to pre admission functional level  Description: INTERVENTIONS:  - Perform AM-PAC 6 Click Basic Mobility/ Daily Activity assessment daily.  - Set and communicate daily mobility goal to care team and patient/family/caregiver.   - Collaborate with rehabilitation services on mobility goals if consulted  - Perform Range of Motion 3 times a day.  - Reposition patient every 2 hours.  - Dangle patient 3 times a day  - Stand patient 3 times a day  - Ambulate patient 3 times a day  - Out of bed to chair 3 times a day   - Out of bed for meals 3 times a day  - Out of bed for toileting  - Record patient progress and toleration of activity level   Outcome: Progressing     Problem: DISCHARGE PLANNING  Goal: Discharge to home or other facility with appropriate resources  Description: INTERVENTIONS:  - Identify barriers to discharge w/patient and caregiver  - Arrange for needed discharge resources and transportation as appropriate  - Identify discharge learning needs (meds, wound care, etc.)  - Arrange for interpretive services to assist at discharge as needed  - Refer to Case Management Department for coordinating discharge planning if the patient needs post-hospital services based on physician/advanced practitioner order or complex needs related to functional status, cognitive ability, or social support system  Outcome: Progressing     Problem: Knowledge Deficit  Goal: Patient/family/caregiver demonstrates understanding of disease process, treatment plan, medications, and discharge instructions  Description:  Complete learning assessment and assess knowledge base.  Interventions:  - Provide teaching at level of understanding  - Provide teaching via preferred learning methods  Outcome: Progressing     Problem: RESPIRATORY - ADULT  Goal: Achieves optimal ventilation and oxygenation  Description: INTERVENTIONS:  - Assess for changes in respiratory status  - Assess for changes in mentation and behavior  - Position to facilitate oxygenation and minimize respiratory effort  - Oxygen administered by appropriate delivery if ordered  - Initiate smoking cessation education as indicated  - Encourage broncho-pulmonary hygiene including cough, deep breathe, Incentive Spirometry  - Assess the need for suctioning and aspirate as needed  - Assess and instruct to report SOB or any respiratory difficulty  - Respiratory Therapy support as indicated  Outcome: Progressing     Problem: HEMATOLOGIC - ADULT  Goal: Maintains hematologic stability  Description: INTERVENTIONS  - Assess for signs and symptoms of bleeding or hemorrhage  - Monitor labs  - Administer supportive blood products/factors as ordered and appropriate  Outcome: Progressing     Problem: Nutrition/Hydration-ADULT  Goal: Nutrient/Hydration intake appropriate for improving, restoring or maintaining nutritional needs  Description: Monitor and assess patient's nutrition/hydration status for malnutrition. Collaborate with interdisciplinary team and initiate plan and interventions as ordered.  Monitor patient's weight and dietary intake as ordered or per policy. Utilize nutrition screening tool and intervene as necessary. Determine patient's food preferences and provide high-protein, high-caloric foods as appropriate.     INTERVENTIONS:  - Monitor oral intake, urinary output, labs, and treatment plans  - Assess nutrition and hydration status and recommend course of action  - Evaluate amount of meals eaten  - Assist patient with eating if necessary   - Allow adequate time for meals  -  Recommend/ encourage appropriate diets, oral nutritional supplements, and vitamin/mineral supplements  - Order, calculate, and assess calorie counts as needed  - Recommend, monitor, and adjust tube feedings and TPN/PPN based on assessed needs  - Assess need for intravenous fluids  - Provide specific nutrition/hydration education as appropriate  - Include patient/family/caregiver in decisions related to nutrition  Outcome: Progressing     Problem: SAFETY,RESTRAINT: NV/NON-SELF DESTRUCTIVE BEHAVIOR  Goal: Remains free of harm/injury (restraint for non violent/non self-detsructive behavior)  Description: INTERVENTIONS:  - Instruct patient/family regarding restraint use   - Assess and monitor physiologic and psychological status   - Provide interventions and comfort measures to meet assessed patient needs   - Identify and implement measures to help patient regain control  - Assess readiness for release of restraint   Outcome: Progressing  Goal: Returns to optimal restraint-free functioning  Description: INTERVENTIONS:  - Assess the patient's behavior and symptoms that indicate continued need for restraint  - Identify and implement measures to help patient regain control  - Assess readiness for release of restraint   Outcome: Progressing

## 2024-03-06 NOTE — ASSESSMENT & PLAN NOTE
Biopsy-proven at [facility]    Oliguria, with good UOP to Lasix challenge    BUN down-trending, now wnl  Cr down-trending and wnl  Give Lasix 40 mg IV x 2 doses today  Continue to monitor volume status and UOP  Nephrology on board, appreciate recommendations

## 2024-03-06 NOTE — PROGRESS NOTES
[facility]  Interval Progress Note: Critical Care  Name: Darlyn Nguyen I  MRN: [id number]  Unit/Bed#: ICU 01 I Date of Admission: 3/3/2024   Date of Service: 3/5/2024 I Hospital Day: 2    Interval Events:    Patient has had 2 bloody stools tonight with bright red blood mixed with maroon colored stool/clots. The patient was admitted with concern for GIB with evidence of colitis on CT imaging, receiving a unit of PRBC for a hgb drop on 3/3. Since then she has been stable without evidence of bleeding and a stable hgb. She was started on a heparin infusion today for a DVT and developed recurrent bleeding. The heparin infusion has been on hold but she is still passing bloody stool. She admits to some abdominal pain but denies dizziness or lightheadedness        Pertinent New Data:   blood pressure, pulse, respirations, and pulse oximetry    Physical Exam  Eyes:      Pupils: Pupils are equal, round, and reactive to light.   Skin:     General: Skin is warm and dry.      Coloration: Skin is pale.   HENT:      Head: Normocephalic.      Mouth/Throat:      Mouth: Mucous membranes are dry.   Cardiovascular:      Rate and Rhythm: Normal rate.   Abdominal:      Palpations: Abdomen is soft.      Tenderness: There is no abdominal tenderness.   Constitutional:       Appearance: She is ill-appearing.   Pulmonary:      Effort: Pulmonary effort is normal.      Breath sounds: Normal breath sounds.   Neurological:      General: No focal deficit present.      Motor: gross motor function is at baseline for patient.        I have personally reviewed pertinent lab results., CBC:   Lab Results   Component Value Date    WBC 26.92 (H) 03/05/2024    HGB 6.6 (L) 03/05/2024    HCT 27.5 (L) 03/05/2024    MCV 93 03/05/2024     03/05/2024    RBC 2.95 (L) 03/05/2024    MCH 29.5 03/05/2024    MCHC 31.6 03/05/2024    RDW 18.9 (H) 03/05/2024    MPV 11.0 03/05/2024   , PT/INR:   Lab Results   Component Value Date    INR  1.22 (H) 03/05/2024     chest CT scanI have personally reviewed pertinent reports.   and I have personally reviewed pertinent films in PACS      Assessment and Plan  Diagnosis: GIB, possible lower GIB in the setting of recent colitis and coagulopathy  Plan:   We will repeat her hgb now  Will transfuse for a hgb < 7  May need to refuse coagulopathy if still present ( her ptt was elevated at > 210 earlier today)  May need GI intervention if continues to bleed    Billing Level:  Critical Care Time Statement: Upon my evaluation, this patient had a high probability of imminent or life-threatening deterioration due to Active GIB, which required my direct attention, intervention, and personal management.  I spent a total of 40 minutes directly providing critical care services, including interpretation of complex medical databases, complex medical decision making (to support/prevent further life-threatening deterioration)., and interpretation of hemodynamic data. This time is exclusive of procedures, teaching, family meetings, and any prior time recorded by providers other than myself.      SIGNATURE: AUGUSTA Barber

## 2024-03-06 NOTE — PROGRESS NOTES
"Patient Name: Darlyn Nguyen  Patient MRN: [id number]  Date: 03/06/24  Service: [facility]    Subjective   Darlyn Nguyen is a 78 y.o. female who was admitted with Bacteremia due to Enterococcus. She has been extubated.  She is on respiratory precautions due to pulmonary process.  She is more alert than yesterday although still somnolent.  Several bloody bowel movements yesterday with resultant drop in hemoglobin and need for transfusion.  Heparin was discontinued      Vitals  Blood pressure 126/58, pulse 92, temperature (!) 97.4 °F (36.3 °C), temperature source Oral, resp. rate 20, weight 80 kg (176 lb 5.9 oz), SpO2 98%.    Physical Exam  Patient is somnolent.  She is overweight.  She has a dry cough  Abdomen is overweight without significant distention.  There is no pain elicited.  Bowel sounds present.    Laboratory Studies  Lab Results   Component Value Date    CREATININE 0.70 03/06/2024    BUN 24 03/06/2024    SODIUM 145 03/06/2024    K 3.2 (L) 03/06/2024     (H) 03/06/2024    CO2 23 03/06/2024    CALCIUM 6.7 (L) 03/06/2024    ALKPHOS 60 03/06/2024    AST 49 (H) 03/06/2024    ALT 74 (H) 03/06/2024     Lab Results   Component Value Date    WBC 26.92 (H) 03/06/2024    HGB 8.9 (L) 03/06/2024    HCT 27.0 (L) 03/06/2024     (L) 03/06/2024    MCV 92 03/06/2024     Lab Results   Component Value Date    PROTIME 15.5 (H) 03/06/2024    INR 1.20 (H) 03/06/2024     No results found for: \"CDIFF\"    Imaging and Other Studies  IMPRESSION: CAT scan 3/4     Extensive right upper lobe cavitary pneumonia. Right basilar atelectasis and/or pneumonia. Small bilateral pleural effusions.       Inhouse Medications       Current Facility-Administered Medications:     ampicillin-sulbactam (UNASYN) 3 g in sodium chloride 0.9 % 100 mL IVPB, 3 g, Intravenous, Q6H, 3 g at 03/06/24 1057    atorvastatin (LIPITOR) tablet 40 mg, 40 mg, Oral, Daily, 40 mg at 03/06/24 0801    collagenase (SANTYL) ointment, , Topical, " Daily, Given at 03/06/24 0802    furosemide (LASIX) injection 40 mg, 40 mg, Intravenous, BID (diuretic), 40 mg at 03/06/24 0747    heparin (porcine) 25,000 units in 0.45% NaCl 250 mL infusion (premix), 3-30 Units/kg/hr (Order-Specific), Intravenous, Titrated, Stopped at 03/05/24 1521    heparin (porcine) injection 3,000 Units, 3,000 Units, Intravenous, Q6H PRN    heparin (porcine) injection 6,000 Units, 6,000 Units, Intravenous, Q6H PRN    insulin lispro (HumALOG/ADMELOG) 100 units/mL subcutaneous injection 2-12 Units, 2-12 Units, Subcutaneous, Q6H CRUZITO, 2 Units at 03/06/24 1129 **AND** Fingerstick Glucose (POCT), , , Q6H    levalbuterol (XOPENEX) inhalation solution 1.25 mg, 1.25 mg, Nebulization, Q8H PRN    multi-electrolyte (PLASMALYTE-A/ISOLYTE-S PH 7.4) IV solution, 125 mL/hr, Intravenous, Continuous, 125 mL/hr at 03/06/24 1119    ondansetron (ZOFRAN) injection 4 mg, 4 mg, Intravenous, Q4H PRN    pantoprazole (PROTONIX) EC tablet 40 mg, 40 mg, Oral, BID AC, 40 mg at 03/06/24 0636    [START ON 3/7/2024] predniSONE tablet 40 mg, 40 mg, Oral, Daily      Assessment/Plan:  Assessment:  Situation has been discussed with ICU team, patient and the patient's .  It is difficult to implement long-term anticoagulation with ongoing bleeding concerns.  We will do a unprepped flexible sigmoidoscopy at the bedside to help further guide therapy.  Discussion has also been made about possible filter placement if anticoagulation is not tolerated.  Consent is signed by the patient's  but patient also is agreeable verbally.          Cristi Stevens MD

## 2024-03-06 NOTE — DISCHARGE INSTRUCTIONS
Embolization   AMBULATORY CARE:   What you need to know about embolization: Embolization is a procedure to create a clot, or block, in a blood vessel. This stops blood from flowing to the area. The procedure may be used to treat many conditions. It can help stop heavy bleeding (hemorrhage), or prevent an aneurysm from rupturing. An abnormal connection between arteries can be removed. Embolization can stop blood flow to a tumor, such as a uterine fibroid or a cancer tumor. Chemotherapy medicine may be given during an embolization to treat a cancer tumor. This is called chemoembolization.  How to prepare for the procedure: Embolization is sometimes done as an emergency procedure. This means you will not have time to prepare. For an embolization that is not an emergency, the following are general guidelines for how to prepare:  Tell your provider about all your allergies. This includes if you have ever had an allergic reaction to contrast liquid, anesthesia, or antibiotics. You may be told not to eat or drink anything after midnight the night before your procedure. Arrange to have someone drive you home. The person should stay with you to help you and watch for problems that may develop.     Give your provider a list of your medicines. Include all medicines and supplements you take. You may need to stop taking blood thinners or aspirin several days before your procedure. This will help decrease your risk for bleeding. Do not stop taking medicines unless your healthcare provider tells you to stop. Your provider will tell you which medicines to take or not take on the day of your procedure.     You may need blood tests to check how well your blood clots and to check your kidney function. Depending on the reason for this procedure, you may an MRI, ultrasound, x-ray, or CT scan. These pictures will help your healthcare provider examine the area to be worked on.     If you are a woman, tell your provider if you know or  think you might be pregnant. You may not be able to have certain tests because they may harm an unborn baby. Your provider may need to take extra precautions for other tests.     What will happen during the procedure:   You may be given general anesthesia to keep you asleep and pain-free. You may instead be given moderate sedation. This means you will be awake during the procedure, but you should not feel any pain. Your provider will put numbing medicine on your skin where the procedure will be done. A small incision will be made over an artery. A catheter (thin tube) will be guided into the artery. Contrast liquid will be used to help your healthcare provider see your arteries more easily.     Your provider will use a type of x-ray that gives a moving picture of the arteries. This will help him or her move the catheter into the right place. The catheter is moved up until it reaches the correct artery. Your provider will put medicine or a material into the artery to slow or stop blood from flowing. This may be a coil, foam, beads, a plug, or liquid. The liquid may also contain material that is larger than blood cells.      Your provider will remove the catheter. Pressure will be used to stop any bleeding that happens. The incision area does not need to be closed with stitches. It will be small and close on its own. It will be covered with a bandage to keep it from becoming infected.     What to expect after the procedure:   You may have pain for a few days. Depending on the reason you had this procedure, you may also have a headache or cramps. You may have pain, bleeding, or bruising where the catheter went into your leg. All of these symptoms are normal and should get better soon. You may be given pain medicine through your IV or a pump. A pump allows you to control when the pain medicine is given.     You should expect to stay in the hospital at least overnight. If you had this procedure to treat heavy bleeding,  it may take 24 hours to know if the bleeding stopped.     Healthcare providers will help you walk around after your procedure. This will help prevent blood clots. Do not get up until healthcare providers say it is okay. They may want you to lie in one position for a certain amount of time. When they say it is okay to walk, they will help you stand and walk safely.     Risks of embolization: You may bleed more than expected or develop an infection. The area being treated may be damaged during the procedure. Your artery may be damaged from the catheter, or you may develop a blood clot. Your kidneys may be damaged from the contrast liquid. The material being put into the artery may go to the wrong place. This can stop blood flow to healthy tissue. The procedure may not work, or it may not relieve your symptoms.  Call your doctor or specialist if:   You have a fever higher than 100.4°F (38°C).     You have a fever, pain, and nausea that last longer than 3 days.     You suddenly have severe abdominal pain.     You cannot urinate, or you urinate very little.     You have signs of an infection at the catheter site, such as red streaks, pain, or swelling.     You have new or worsening pain.     You have questions or concerns about your condition or care.     Medicines: You may need any of the following:  NSAIDs help decrease swelling and pain or fever. This medicine is available with or without a doctor's order. NSAIDs can cause stomach bleeding or kidney problems in certain people. If you take blood thinner medicine, always ask your healthcare provider if NSAIDs are safe for you. Always read the medicine label and follow directions.     Prescription pain medicine may be given. Ask your healthcare provider how to take this medicine safely. Some prescription pain medicines contain acetaminophen. Do not take other medicines that contain acetaminophen without talking to your healthcare provider. Too much acetaminophen may  cause liver damage. Prescription pain medicine may cause constipation. Ask your healthcare provider how to prevent or treat constipation.      Take your medicine as directed. Contact your healthcare provider if you think your medicine is not helping or if you have side effects. Tell him or her if you are allergic to any medicine. Keep a list of the medicines, vitamins, and herbs you take. Include the amounts, and when and why you take them. Bring the list or the pill bottles to follow-up visits. Carry your medicine list with you in case of an emergency.     Self-care:   Rest as needed. Rest and sleep will help your body heal.     Follow your healthcare provider's instructions for activity. He or she will tell you when it is okay to return to your normal activities and to start driving. He or she may want you to wait 1 to 2 weeks to return to work.     Care for the catheter site as directed. It is okay to shower after the procedure. You will only have a small cut in your skin from where the catheter went into your leg. Check the catheter site for signs of infection, including red streaks, pain, and swelling.     Treat symptoms of postembolization syndrome. This syndrome is common after an embolization procedure. It usually starts within 72 hours of the procedure and may last a few days. The main symptoms are fever, pain, and nausea. You will probably be able to manage your symptoms at home. Acetaminophen or an NSAID, such as ibuprofen, can reduce a fever and pain. You may need to eat lightly to manage nausea. Drink more liquids for the first week after the procedure to prevent dehydration.   WOUND CARE     Remove band aid/ dressing tomorrow. You may shower 24 hours after your procedure. Shower and wash groin area or wrist area gently with soap and water: beginning tomorrow. Rinse and pat Dry. Apply new water seal band aid. Repeat this process for 5 days.  If there is any drainage from the puncture site, you should  put on a clean bandage. No Powders, creams, lotions or antibiotic ointments for 5 days.  No tub baths, hot tubs or swimming for 5 days.      Follow up with your doctor or specialist as directed: You may need to have more tests to check if the procedure worked. Write down your questions so you remember to ask them during your visits.  © Copyright [employer] 2020 Information is for End User's use only and may not be sold, redistributed or otherwise used for commercial purposes. All illustrations and images included in CareNotes® are the copyrighted property of [employer] or "[employer]  The above information is an  only. It is not intended as medical advice for individual conditions or treatments. Talk to your doctor, nurse or pharmacist before following any medical regimen to see if it is safe and effective for you.   Embolization   AMBULATORY CARE:   What you need to know about embolization: Embolization is a procedure to create a clot, or block, in a blood vessel. This stops blood from flowing to the area. The procedure may be used to treat many conditions. It can help stop heavy bleeding (hemorrhage), or prevent an aneurysm from rupturing. An abnormal connection between arteries can be removed. Embolization can stop blood flow to a tumor, such as a uterine fibroid or a cancer tumor. Chemotherapy medicine may be given during an embolization to treat a cancer tumor. This is called chemoembolization.  How to prepare for the procedure: Embolization is sometimes done as an emergency procedure. This means you will not have time to prepare. For an embolization that is not an emergency, the following are general guidelines for how to prepare:  Tell your provider about all your allergies. This includes if you have ever had an allergic reaction to contrast liquid, anesthesia, or antibiotics. You may be told not to eat or drink anything after midnight the night before your procedure. Arrange to  have someone drive you home. The person should stay with you to help you and watch for problems that may develop.     Give your provider a list of your medicines. Include all medicines and supplements you take. You may need to stop taking blood thinners or aspirin several days before your procedure. This will help decrease your risk for bleeding. Do not stop taking medicines unless your healthcare provider tells you to stop. Your provider will tell you which medicines to take or not take on the day of your procedure.     You may need blood tests to check how well your blood clots and to check your kidney function. Depending on the reason for this procedure, you may an MRI, ultrasound, x-ray, or CT scan. These pictures will help your healthcare provider examine the area to be worked on.     If you are a woman, tell your provider if you know or think you might be pregnant. You may not be able to have certain tests because they may harm an unborn baby. Your provider may need to take extra precautions for other tests.     What will happen during the procedure:   You may be given general anesthesia to keep you asleep and pain-free. You may instead be given moderate sedation. This means you will be awake during the procedure, but you should not feel any pain. Your provider will put numbing medicine on your skin where the procedure will be done. A small incision will be made over an artery. A catheter (thin tube) will be guided into the artery. Contrast liquid will be used to help your healthcare provider see your arteries more easily.     Your provider will use a type of x-ray that gives a moving picture of the arteries. This will help him or her move the catheter into the right place. The catheter is moved up until it reaches the correct artery. Your provider will put medicine or a material into the artery to slow or stop blood from flowing. This may be a coil, foam, beads, a plug, or liquid. The liquid may also contain  material that is larger than blood cells.      Your provider will remove the catheter. Pressure will be used to stop any bleeding that happens. The incision area does not need to be closed with stitches. It will be small and close on its own. It will be covered with a bandage to keep it from becoming infected.     What to expect after the procedure:   You may have pain for a few days. Depending on the reason you had this procedure, you may also have a headache or cramps. You may have pain, bleeding, or bruising where the catheter went into your leg. All of these symptoms are normal and should get better soon. You may be given pain medicine through your IV or a pump. A pump allows you to control when the pain medicine is given.     You should expect to stay in the hospital at least overnight. If you had this procedure to treat heavy bleeding, it may take 24 hours to know if the bleeding stopped.     Healthcare providers will help you walk around after your procedure. This will help prevent blood clots. Do not get up until healthcare providers say it is okay. They may want you to lie in one position for a certain amount of time. When they say it is okay to walk, they will help you stand and walk safely.     Risks of embolization: You may bleed more than expected or develop an infection. The area being treated may be damaged during the procedure. Your artery may be damaged from the catheter, or you may develop a blood clot. Your kidneys may be damaged from the contrast liquid. The material being put into the artery may go to the wrong place. This can stop blood flow to healthy tissue. The procedure may not work, or it may not relieve your symptoms.  Call your doctor or specialist if:   You have a fever higher than 100.4°F (38°C).     You have a fever, pain, and nausea that last longer than 3 days.     You suddenly have severe abdominal pain.     You cannot urinate, or you urinate very little.     You have signs of an  infection at the catheter site, such as red streaks, pain, or swelling.     You have new or worsening pain.     You have questions or concerns about your condition or care.     Medicines: You may need any of the following:  NSAIDs help decrease swelling and pain or fever. This medicine is available with or without a doctor's order. NSAIDs can cause stomach bleeding or kidney problems in certain people. If you take blood thinner medicine, always ask your healthcare provider if NSAIDs are safe for you. Always read the medicine label and follow directions.     Prescription pain medicine may be given. Ask your healthcare provider how to take this medicine safely. Some prescription pain medicines contain acetaminophen. Do not take other medicines that contain acetaminophen without talking to your healthcare provider. Too much acetaminophen may cause liver damage. Prescription pain medicine may cause constipation. Ask your healthcare provider how to prevent or treat constipation.      Take your medicine as directed. Contact your healthcare provider if you think your medicine is not helping or if you have side effects. Tell him or her if you are allergic to any medicine. Keep a list of the medicines, vitamins, and herbs you take. Include the amounts, and when and why you take them. Bring the list or the pill bottles to follow-up visits. Carry your medicine list with you in case of an emergency.     Self-care:   Rest as needed. Rest and sleep will help your body heal.     Follow your healthcare provider's instructions for activity. He or she will tell you when it is okay to return to your normal activities and to start driving. He or she may want you to wait 1 to 2 weeks to return to work.     Care for the catheter site as directed. It is okay to shower after the procedure. You will only have a small cut in your skin from where the catheter went into your leg. Check the catheter site for signs of infection, including red  streaks, pain, and swelling.     Treat symptoms of postembolization syndrome. This syndrome is common after an embolization procedure. It usually starts within 72 hours of the procedure and may last a few days. The main symptoms are fever, pain, and nausea. You will probably be able to manage your symptoms at home. Acetaminophen or an NSAID, such as ibuprofen, can reduce a fever and pain. You may need to eat lightly to manage nausea. Drink more liquids for the first week after the procedure to prevent dehydration.   WOUND CARE     Remove band aid/ dressing tomorrow. You may shower 24 hours after your procedure. Shower and wash groin area or wrist area gently with soap and water: beginning tomorrow. Rinse and pat Dry. Apply new water seal band aid. Repeat this process for 5 days.  If there is any drainage from the puncture site, you should put on a clean bandage. No Powders, creams, lotions or antibiotic ointments for 5 days.  No tub baths, hot tubs or swimming for 5 days.      Follow up with your doctor or specialist as directed: You may need to have more tests to check if the procedure worked. Write down your questions so you remember to ask them during your visits.  © Copyright [employer] 2020 Information is for End User's use only and may not be sold, redistributed or otherwise used for commercial purposes. All illustrations and images included in CareNotes® are the copyrighted property of [employer] or [employer]  The above information is an  only. It is not intended as medical advice for individual conditions or treatments. Talk to your doctor, nurse or pharmacist before following any medical regimen to see if it is safe and effective for you.   Inferior Vena Cava Filter Placement     WHAT YOU NEED TO KNOW:   Inferior vena cava filter placement is surgery to place a filter into your inferior vena cava (IVC). The IVC is a large blood vessel that brings blood from your lower body  back to your heart. The filter is a small mesh strainer made of thin wires. It is placed in the center of the IVC to trap blood clots going to your heart or lungs.    Filter types: Permanent Filters are used for patients who can't have anticoagulation medications. The filters are left in place permanently.  Optional filters: Are filters that can be removed when a patient's risk for clotting is decreased.    DISCHARGE INSTRUCTIONS:     Wound care: Keep your wound clean and dry.Band aid may come off in 24 hours.     Self-care:   Limit activity: Do not lift, pull or push heavy objects for 24 hours. Slowly start to do more each day. Return to your daily activities as directed.   Resume your normal diet. Small sips of flat soda will help with nausea.    Contact Interventional Radiology at [phone] ([facility] PATIENTS: Contact Interventional Radiology at [phone]) ([facility] PATIENTS: Contact Interventional Radiology at [phone]) if:  You have a fever.   You have chills, a cough, or feel weak and achy.  Persistent nausea or vomiting.   Your wound is red, swollen, or draining pus.   You have questions or concerns about your condition.  Optional filters can and should  be removed when you no longer need it.  Please call Interventional Radiology to make your removal appointment.   Inferior Vena Cava Filter Placement     WHAT YOU NEED TO KNOW:   Inferior vena cava filter placement is surgery to place a filter into your inferior vena cava (IVC). The IVC is a large blood vessel that brings blood from your lower body back to your heart. The filter is a small mesh strainer made of thin wires. It is placed in the center of the IVC to trap blood clots going to your heart or lungs.    Filter types: Permanent Filters are used for patients who can't have anticoagulation medications. The filters are left in place permanently.  Optional filters: Are filters that can be removed when a patient's risk for clotting is  decreased.    DISCHARGE INSTRUCTIONS:     Wound care: Keep your wound clean and dry.Band aid may come off in 24 hours.     Self-care:   Limit activity: Do not lift, pull or push heavy objects for 24 hours. Slowly start to do more each day. Return to your daily activities as directed.   Resume your normal diet. Small sips of flat soda will help with nausea.    Contact Interventional Radiology at [phone] ([facility] PATIENTS: Contact Interventional Radiology at [phone]) ([facility] PATIENTS: Contact Interventional Radiology at [phone]) if:  You have a fever.   You have chills, a cough, or feel weak and achy.  Persistent nausea or vomiting.   Your wound is red, swollen, or draining pus.   You have questions or concerns about your condition.  Optional filters can and should  be removed when you no longer need it.  Please call Interventional Radiology to make your removal appointment.

## 2024-03-06 NOTE — ASSESSMENT & PLAN NOTE
Recent persistent diarrhea  FOBT positive @ [facility]  Small bloody bowel movement on presentation to [facility]  Acute drop in hemoglobin from 8.0 morning of 3/3/2024 to 6.2 approximately 12 hours later  PTT>210 at presentation  Recent CT A/P demonstrating colitis  No history of significant GI bleed per family  Family reports colonoscopy approximately 1 year ago without significant findings    Hep gtt held at presentation.  PTT= 31 morning of 3/5/24    With no further episodes of GIB and Hgb stable since transfusion of 2 U pRBC on 3/3, Hep restarted 3/5/24, followed by recurrence of multiple bloody bowel movements (2 during the day, 4 overnight 3/5-3/6/24), with resultant drop in Hgb from 8.5 to 6.6. Transfused 1 U pRBC. Repeat Hgb morning of 3/6 is 9.0.    Pt on home Eliquis for DVT found on 2/20/2024  Continue Protonix 40 mg IV Q12H  Bedside flex sig with GI later today  Consider restarting hep gtt without initial bolus, pending results of flex sig  Will need to consider IVC filter if bleeding on AC remains a concern  GI on board, appreciate recommendations

## 2024-03-06 NOTE — ASSESSMENT & PLAN NOTE
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

## 2024-03-06 NOTE — ASSESSMENT & PLAN NOTE
Failed bedside swallow with nursing  Speech consulted for formal swallow eval, appreciate recommendations

## 2024-03-06 NOTE — QUICK NOTE
Pt noted to be in SVT @ approx 15:38. EKG showing afib. Gave lopressor 5 mg IV x1, followed by Diltiazem 10 mg IV x1. Pt converted to NSR w HR 80s-90s. Started Diltiazem gtt at initial rate of 5 mg/h, as pt is not taking oral medications.

## 2024-03-06 NOTE — PROGRESS NOTES
Formerly [facility], [facility]  Progress Note  Name: Darlyn Nguyen I  MRN: [id number]  Unit/Bed#: ICU 02 I Date of Admission: 3/3/2024   Date of Service: 3/6/2024 I Hospital Day: 3    Assessment/Plan   * Bacteremia due to Enterococcus  Assessment & Plan  E. avium bacteremia. Pt recently started on prednisone, atovaquone, hydroxychloroquine, and mycophenolate for lupus. Most likely gut translocation.     Repeat cultures show NGTD  Susceptibilities show pan-sensitive enterococcus    ID on board, appreciate recommendations  Narrowed to Unasyn 3 g Q6H (Day 3 of anticipated prolonged course, Day 7 total abx)  DYANA when able, especially in light of new splenic infarcts concerning for septic emboli      Cavitary pneumonia  Assessment & Plan  Seen on CT 3/4/24  Consider reactivation of TB vs septic emboli vs aspiration vs community acquired    Preliminary AFB negative  Sputum growing 2+ pseudomonas, possible colonization    Rule out reactivation of TB in this immunosuppressed patient; AFB x 3 ordered, preliminarily negative  Consider also septic emboli vs aspiration vs community acquired--DYANA when able  Continue Unasyn for tx of E avium bacteremia  Wean O2 as tolerated, Currently 2 L NC, satting 94-98%    GI bleed  Assessment & Plan  Recent persistent diarrhea  FOBT positive @ [facility]  Small bloody bowel movement on presentation to [facility]  Acute drop in hemoglobin from 8.0 morning of 3/3/2024 to 6.2 approximately 12 hours later  PTT>210 at presentation  Recent CT A/P demonstrating colitis  No history of significant GI bleed per family  Family reports colonoscopy approximately 1 year ago without significant findings    Hep gtt held at presentation.  PTT= 31 morning of 3/5/24    With no further episodes of GIB and Hgb stable since transfusion of 2 U pRBC on 3/3, Hep restarted 3/5/24, followed by recurrence of multiple bloody bowel movements (2 during the day, 4 overnight 3/5-3/6/24), with resultant drop in Hgb  from 8.5 to 6.6. Transfused 1 U pRBC. Repeat Hgb morning of 3/6 is 9.0.    Pt on home Eliquis for DVT found on 2/20/2024  Continue Protonix 40 mg IV Q12H  Bedside flex sig with GI later today  Consider restarting hep gtt without initial bolus, pending results of flex sig  Will need to consider IVC filter if bleeding on AC remains a concern  GI on board, appreciate recommendations    Acute deep vein thrombosis (DVT) of both lower extremities ([facility])  Assessment & Plan  Discovered 2/20/24, started on Eliquis at home, started Hep gtt at [facility] but developed GI bleed, AC currently on hold    Flex sig later today with GI  Consider restarting Hep gtt without initial bolus, pending results of flex sig  Will need to consider IVC filter if unable to tolerate AC    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) ([facility])  Assessment & Plan  Biopsy-proven at [facility]    Oliguria, with good UOP to Lasix challenge    BUN down-trending, now wnl  Cr down-trending and wnl  Give Lasix 40 mg IV x 2 doses today  Continue to monitor volume status and UOP  Nephrology on board, appreciate recommendations    Diabetes mellitus ([facility])  Assessment & Plan  Lab Results   Component Value Date    HGBA1C 5.9 (H) 03/03/2024       Recent Labs     03/05/24  1140 03/05/24  1734 03/06/24  0031 03/06/24  0548   POCGLU 123 163* 173* 114         Blood Sugar Average: Last 72 hrs:  (P) [id number]    History of DM, well-controlled with last A1c 5.6 on 2/26, but now 5.9. Hyperglycemia in the setting of steroid use.    Start ISS Q6H while NPO  Fingersticks Q6H while NPO  Transition to mealtime when starting diet  Avoid hypoglycemia per protocol  Monitor BG and insulin requirements and adjust regimen as needed    Dysphagia  Assessment & Plan  Failed bedside swallow with nursing  Speech consulted for formal swallow eval, appreciate recommendations    Sacral wound  Assessment & Plan  Wound Care on board, appreciate  recommendations  Repositioning and wound care with nursing    Acute respiratory failure with hypoxia (HCC)  Assessment & Plan  CXR 2/29/24: Right lung opacities, suspicious for pneumonia in the appropriate clinical setting.  CT abdomen pelvis 2/29/2024: LOWER CHEST: Mild right basilar patchy consolidation #2/5 suspicious for pneumonia.  Procal elevated and uptrending from 0.76 on 3/1/24 to 3.08 on 3/4/24  CT Chest 3/4/24 shows extensive right upper lobe cavitary pneumonia right basilar atelectasis and/or pneumonia with small bilateral pleural effusions    Sputum Cx 3/3 growing 2+ pseudomonas    Plan as above under cavitary PNA    Lupus (HCC)  Assessment & Plan  Diagnosed in December,   Initially started on steroids, prednisone 60 mg daily, which patient had tapered to 30 mg daily prior to admission with the addition of atovaquone, hydroxychloroquine, and mycophenolate    Holding atovaquone, hydroxychloroquine, and mycophenolate in the setting of acute infection  Patient started on Solu-Cortef prior to discharge from [facility] for possible relative adrenal insufficiency contributing to shock state.      Continue prednisone 50 mg QD  Consider rheumatology consult       ----------------------------------------------------------------------------------------  HPI/24hr events: Heparin restarted yesterday. Pt w multiple bloody bowel movements starting yesterday afternoon, 2 during the day, 4 overnight, described as large, bright red, and loose/liquid, with resultant drop in Hgb from 8.5 to 6.6. Transfused 1 U pRBC. Repeat Hgb morning of 3/6 is 9.0. Respiratory status improving, weaned to 2L NC with sats mid-high 90s.    Patient appropriate for transfer out of the ICU today?: Patient does not meet criteria for ICU Follow-up Clinic; referral has not been made.   Disposition: Transfer to Med-Surg   Code Status: Level 2 - DNAR: but accepts endotracheal  "intubation  ---------------------------------------------------------------------------------------  SUBJECTIVE  This morning, Ms. Nguyen is seen asleep in bed, easy to wake, alert, engaged, disoriented to place and time but otherwise appropriate, in no acute distress. She states she feels \"fine\" this morning and denies any complaints, including fevers, chills, dizziness, lightheadedness, CP, SOB, difficulty breathing, abdominal pain, or sacral pain. She reports decreased appetite. Cough noted on exam, which pt states is non-productive.     Review of Systems  Review of systems was reviewed and negative unless stated above in HPI/24-hour events   ---------------------------------------------------------------------------------------  OBJECTIVE    Vitals   Vitals:    24 0400 24 0600 24 0700 24 0800   BP: 147/67 138/67 133/59 132/62   BP Location: Left arm      Pulse: 80 90 92 90   Resp:  18 18 18   Temp: 98.3 °F (36.8 °C)  97.9 °F (36.6 °C)    TempSrc: Axillary  Oral    SpO2: 98% 97% 98% 98%   Weight:  80 kg (176 lb 5.9 oz)       Temp (24hrs), Av.1 °F (36.7 °C), Min:97.3 °F (36.3 °C), Max:98.5 °F (36.9 °C)  Current: Temperature: 97.9 °F (36.6 °C)          Respiratory:  SpO2: SpO2: 98 %, SpO2 Activity: SpO2 Activity: At Rest, SpO2 Device: O2 Device: Nasal cannula, Capnography:    Nasal Cannula O2 Flow Rate (L/min): 2 L/min    Invasive/non-invasive ventilation settings   Respiratory      Lab Data (Last 4 hours)      None           O2/Vent Data (Last 4 hours)      None                    Physical Exam  Vitals and nursing note reviewed.   Constitutional:       General: She is not in acute distress.     Appearance: She is obese. She is not toxic-appearing or diaphoretic.   HENT:      Head: Normocephalic and atraumatic.   Eyes:      General: No scleral icterus.        Right eye: No discharge.         Left eye: No discharge.      Conjunctiva/sclera: Conjunctivae normal.   Neck:      Comments: RIMARISOL " CVC  Cardiovascular:      Rate and Rhythm: Normal rate and regular rhythm.      Pulses: Normal pulses.      Heart sounds: Normal heart sounds. No murmur heard.     No friction rub. No gallop.   Pulmonary:      Effort: Pulmonary effort is normal. No respiratory distress.      Breath sounds: No stridor. Rales (Diffuse R-sided rales) present. No wheezing or rhonchi.   Chest:      Chest wall: No tenderness.   Abdominal:      General: Bowel sounds are normal. There is no distension.      Palpations: Abdomen is soft.      Tenderness: There is no abdominal tenderness. There is no guarding or rebound.   Musculoskeletal:         General: Swelling (anasarca) present. No tenderness.      Right lower leg: Edema present.      Left lower leg: Edema present.   Skin:     General: Skin is warm and dry.      Findings: Bruising present.   Neurological:      Mental Status: She is alert. She is disoriented.   Psychiatric:         Mood and Affect: Mood normal.         Behavior: Behavior normal.         Laboratory and Diagnostics:  Results from last 7 days   Lab Units 03/06/24  0435 03/05/24  2318 03/05/24  1751 03/05/24  1539 03/05/24  0450 03/04/24  0455 03/03/24  2250 03/03/24  1640 03/03/24  0404 03/02/24  0658 03/01/24  0542 02/29/24  1804 02/29/24  1226   WBC Thousand/uL 26.92*  --   --   --  26.92* 24.05*  --  26.30* 18.38* 21.27* 12.97*  --  8.72   HEMOGLOBIN g/dL 9.0* 6.6* 7.6* 8.5* 8.7* 8.8* 8.2* 6.2* 8.0* 8.8* 9.0*   < > 7.0*   HEMATOCRIT % 27.0*  --   --   --  27.5* 26.9*  --  20.3* 27.2* 28.3* 29.0*   < > 23.7*   PLATELETS Thousands/uL 118*  --   --  186 199 205  --  284 333 332 325  --  315   NEUTROS PCT % 95*  --   --   --   --  88*  --  91* 91*  --   --   --   --    BANDS PCT %  --   --   --   --  2  --   --   --   --   --  11*  --  5   MONOS PCT % 1*  --   --   --   --  2*  --  2* 4  --   --   --   --    MONO PCT %  --   --   --   --  4  --   --   --   --  3* 6  --  5   EOS PCT % 0  --   --   --  0 0  --  0 0 0 0  --  0     < > = values in this interval not displayed.     Results from last 7 days   Lab Units 03/06/24  0435 03/05/24  0450 03/04/24  0455 03/03/24 1640 03/03/24  1149 03/03/24  0404 03/02/24  0658   SODIUM mmol/L 145 141 138 138 137 138 137   POTASSIUM mmol/L 3.2* 3.8 3.4* 3.8 4.1 4.2 4.6   CHLORIDE mmol/L 113* 111* 109* 108 106 106 104   CO2 mmol/L 23 23 23 22 20* 22 26   ANION GAP mmol/L 9 7 6 8 11 10 7   BUN mg/dL 24 27* 35* 35* 36* 36* 34*   CREATININE mg/dL 0.70 0.74 0.88 0.89 0.92 0.85 0.84   CALCIUM mg/dL 6.7* 7.2* 7.4* 7.7* 7.7* 8.5 8.8   GLUCOSE RANDOM mg/dL 126 125 224* 302* 420* 447* 315*   ALT U/L 74* 79* 93* 109* 57* 18 12   AST U/L 49* 44* 80* 156* 66* 13 8*   ALK PHOS U/L 60 75 61 65 59 70 75   ALBUMIN g/dL 2.0* 2.1* 2.3* 2.7* 1.9* 2.3* 2.6*   TOTAL BILIRUBIN mg/dL 0.87 0.67 0.83 0.83 0.61 0.61 0.51     Results from last 7 days   Lab Units 03/04/24  0455 03/03/24 1640 03/01/24  0542 02/29/24  1226   MAGNESIUM mg/dL 1.9 1.9 2.0 2.0   PHOSPHORUS mg/dL 2.2* 2.4  --   --       Results from last 7 days   Lab Units 03/06/24  0046 03/05/24  1802 03/05/24  1539 03/05/24  0646 03/04/24  0455 03/03/24  2250 03/03/24 1640 03/03/24  0823 03/01/24  2159 03/01/24  1438 02/29/24  1226   INR  1.20*  --   --  1.22* 1.49*  --  1.86*  --   --  1.20* 1.95*   PTT seconds 29 64* >210* 31  --  41* >210* >210*   < > 29 31    < > = values in this interval not displayed.          Results from last 7 days   Lab Units 03/04/24  0150 03/03/24  2250 03/03/24  1313 03/03/24  1019 02/29/24  1526 02/29/24  1226   LACTIC ACID mmol/L 2.1* 3.1* 4.5* 7.1* 1.8 4.1*     ABG:  Results from last 7 days   Lab Units 03/03/24  1401   PH ART  7.345*   PCO2 ART mm Hg 38.3   PO2 ART mm Hg 117.8   HCO3 ART mmol/L 20.4*   BASE EXC ART mmol/L -4.8   ABG SOURCE  Radial, Left     VBG:  Results from last 7 days   Lab Units 03/03/24 2250 03/03/24  1401   PH RADHA  7.255*  --    PCO2 RADHA mm Hg 49.7  --    PO2 RADHA mm Hg 20.8*  --    HCO3 RADHA mmol/L 21.6*  --     BASE EXC RADHA mmol/L -5.4  --    ABG SOURCE   --  Radial, Left     Results from last 7 days   Lab Units 03/04/24  0455 03/01/24  0542 02/29/24  1226   PROCALCITONIN ng/ml 3.08* 0.76* 0.61*       Micro  Results from last 7 days   Lab Units 03/03/24  2255 03/03/24  1640 03/03/24  1404 03/03/24  1148 03/03/24  1019 03/02/24  0823 03/02/24  0700 02/29/24  2118 02/29/24  1226 02/29/24  1027   BLOOD CULTURE  No Growth at 48 hrs.  No Growth at 48 hrs.  --   --  No Growth at 48 hrs. No Growth at 48 hrs. No Growth at 72 hrs. No Growth at 72 hrs.  --  No Growth After 5 Days.  Enterococcus avium*  --    SPUTUM CULTURE   --   --  2+ Growth of Pseudomonas aeruginosa*  --   --   --   --   --   --   --    GRAM STAIN RESULT   --   --  3+ Polys*  1+ Budding yeast*  1+ Gram negative rods*  --   --   --   --   --  Gram positive cocci in pairs* Rare Gram negative rods*  Rare Gram positive cocci in pairs*  No polys seen*   URINE CULTURE   --   --   --   --   --   --   --  10,000-19,000 cfu/ml Pseudomonas aeruginosa*  --   --    WOUND CULTURE   --   --   --   --   --   --   --   --   --  3+ Growth of Escherichia coli*  3+ Growth of Proteus mirabilis*  3+ Growth of Pseudomonas aeruginosa MDR*  2+ Growth of   MRSA CULTURE ONLY   --  No Methicillin Resistant Staphlyococcus aureus (MRSA) isolated  --   --   --   --   --  No Methicillin Resistant Staphlyococcus aureus (MRSA) isolated  --   --    LEGIONELLA URINARY ANTIGEN   --   --   --   --   --   --   --  Negative  --   --    STREP PNEUMONIAE ANTIGEN, URINE   --   --   --   --   --   --   --  Negative  --   --        EKG: tele reviewed. NSR 80s to 90s, no events  Imaging:  No new imaging    Intake and Output  I/O         03/04 0701 03/05 0700 03/05 0701 03/06 0700 03/06 0701  03/07 0700    P.O. 0      I.V. (mL/kg) 3123.2 (40.9) 2812 (35.1)     Blood  350     NG/GT 50      IV Piggyback 750 243.3     Total Intake(mL/kg) 3923.2 (51.4) 3405.3 (42.6)     Urine (mL/kg/hr) 631 (0.3)  2230 (1.2) 30 (0.3)    Stool  0     Total Output 631 2230 30    Net +3292.2 +1175.3 -30           Unmeasured Stool Occurrence  4 x             Height and Weights         Body mass index is 30.27 kg/m².  Weight (last 2 days)       Date/Time Weight    03/06/24 0600 80 (176.37)    03/05/24 0558 76.4 (168.43)              Nutrition       Diet Orders   (From admission, onward)                 Start     Ordered    03/03/24 1917  Diet NPO  Diet effective now        References:    Adult Nutrition Support Algorithm    RD Therapeutic Diet Order Protocol   Question Answer Comment   Diet Type NPO    RD to adjust diet per protocol? Yes        03/03/24 1924                      Active Medications  Scheduled Meds:  Current Facility-Administered Medications   Medication Dose Route Frequency Provider Last Rate    ampicillin-sulbactam  3 g Intravenous Q6H AUGUSTA Lewis 3 g (03/06/24 0546)    atorvastatin  40 mg Oral Daily AUGUSTA Lewis      collagenase   Topical Daily AUGUSTA Lewis      furosemide  40 mg Intravenous BID (diuretic) Uche Blair MD      heparin (porcine)  3-30 Units/kg/hr (Order-Specific) Intravenous Titrated AUGUSTA Lewis Stopped (03/05/24 1521)    heparin (porcine)  3,000 Units Intravenous Q6H PRN AUGUSTA Lewis      heparin (porcine)  6,000 Units Intravenous Q6H PRN AUGUSTA Lewis      insulin lispro  2-12 Units Subcutaneous Q6H [facility] AUGUSTA Lewis      levalbuterol  1.25 mg Nebulization Q8H PRN AUGUSTA Lewis      multi-electrolyte  125 mL/hr Intravenous Continuous AUGUSTA Lewis 125 mL/hr (03/05/24 0333)    ondansetron  4 mg Intravenous Q4H PRN AUGUSTA Lewis      pantoprazole  40 mg Oral BID AC AUGUSTA Lewis      potassium chloride  20 mEq Intravenous Once AUGUSTA Barber 20 mEq (03/06/24 0644)    predniSONE  50 mg Oral Daily AUGUSTA Lewis    "    Continuous Infusions:  heparin (porcine), 3-30 Units/kg/hr (Order-Specific), Last Rate: Stopped (03/05/24 1521)  multi-electrolyte, 125 mL/hr, Last Rate: 125 mL/hr (03/05/24 0333)      PRN Meds:   heparin (porcine), 3,000 Units, Q6H PRN  heparin (porcine), 6,000 Units, Q6H PRN  levalbuterol, 1.25 mg, Q8H PRN  ondansetron, 4 mg, Q4H PRN        Invasive Devices Review  Invasive Devices       Central Venous Catheter Line  Duration             CVC Central Lines 03/03/24 Triple 20cm 2 days              Drain  Duration             Urethral Catheter Latex;Straight-tip 16 Fr. 5 days                    Rationale for remaining devices: Can likely DC CVC later today if bleeding stops and pt remains hemodynamically stable. [place] for accurate I/Os and help with sacral wounds in pt w poor mobility.  ---------------------------------------------------------------------------------------  Advance Directive and Living Will: Yes          Uche Blair MD      Portions of the record may have been created with voice recognition software.  Occasional wrong word or \"sound a like\" substitutions may have occurred due to the inherent limitations of voice recognition software.  Read the chart carefully and recognize, using context, where substitutions have occurred      "

## 2024-03-06 NOTE — ASSESSMENT & PLAN NOTE
Patient received 2 units packed red blood cells  Blood pressures are currently acceptable  Hemoglobin is 8.8, platelet count 205  Continue to monitor  And ongoing ICU and GI management  Concern for colitis with red blood and mucus in stool, GI following

## 2024-03-06 NOTE — ASSESSMENT & PLAN NOTE
Discovered 2/20/24, started on Eliquis at home, started Hep gtt at [facility] but developed GI bleed, AC currently on hold    Flex sig later today with GI  Consider restarting Hep gtt without initial bolus, pending results of flex sig  Will need to consider IVC filter if unable to tolerate AC

## 2024-03-06 NOTE — ASSESSMENT & PLAN NOTE
Lab Results   Component Value Date    HGBA1C 5.9 (H) 03/03/2024       Recent Labs     03/05/24  1140 03/05/24  1734 03/06/24  0031 03/06/24  0548   POCGLU 123 163* 173* 114         Blood Sugar Average: Last 72 hrs:  (P) 190.3252675170569183    History of DM, well-controlled with last A1c 5.6 on 2/26, but now 5.9. Hyperglycemia in the setting of steroid use.    Start ISS Q6H while NPO  Fingersticks Q6H while NPO  Transition to mealtime when starting diet  Avoid hypoglycemia per protocol  Monitor BG and insulin requirements and adjust regimen as needed

## 2024-03-06 NOTE — TELEMEDICINE
e-Consult (IP[facility])  - Interventional Radiology  Darlyn Nguyen 78 y.o. female MRN: [id number]  Unit/Bed#: ICU 02 Encounter: [id number]          Interventional Radiology has been consulted to evaluate Darlyn Nguyen        Inpatient Consult to IR  Consult performed by: Faizan Bedoya MD  Consult ordered by: Uche Blair MD        03/06/24    Assessment/Recommendation:   Consult for placement of IVC filter.    78-year-old woman with acute lower extremity DVT.  Has GI bleed on heparin.  Underwent sigmoidoscopy this afternoon showing rectal ulcerations.  She has contraindication for anticoagulation at this time and we will plan on placing an optional IVC filter this afternoon.  Will reevaluate in 3 to 6 months for possibility of filter retrieval if the patient becomes a candidate for anticoagulation at that time or normal longer felt to need anticoagulation.  Given her age and comorbidities, filter may be permanent which I did explain to her .  I obtained consent with the patient's , Krystle, by telephone.  All questions answered.    5-10 minutes, >50% of the total time devoted to medical consultative verbal/EMR discussion between providers. Written report will be generated in the EMR.     Thank you for allowing Interventional Radiology to participate in the care of Darlyn Nguyen. Please don't hesitate to call or TigerText us with any questions.     Faizan Bedoya MD    Addendum: I was informed that patient went into SVT as we were calling for her.  Will need to hold on procedure for now.

## 2024-03-06 NOTE — ASSESSMENT & PLAN NOTE
Diagnosed in December,   Initially started on steroids, prednisone 60 mg daily, which patient had tapered to 30 mg daily prior to admission with the addition of atovaquone, hydroxychloroquine, and mycophenolate    Holding atovaquone, hydroxychloroquine, and mycophenolate in the setting of acute infection  Patient started on Solu-Cortef prior to discharge from [facility] for possible relative adrenal insufficiency contributing to shock state.      Continue prednisone 50 mg QD  Consider rheumatology consult

## 2024-03-07 PROBLEM — I48.91 A-FIB (HCC): Status: ACTIVE | Noted: 2024-01-01

## 2024-03-07 LAB
BACTERIA BLD CULT: NORMAL
BACTERIA BLD CULT: NORMAL

## 2024-03-07 NOTE — PHYSICAL THERAPY NOTE
PHYSICAL THERAPY NOTE          Patient Name: Darlyn Nguyen  Today's Date: 3/7/2024       03/07/24 1530   PT Last Visit   PT Visit Date 03/07/24   Note Type   Note type Cancelled Session;Evaluation   Cancel Reasons Patient off floor/test   Additional Comments PT consult received. Chart reviewed. Pt off floor to IR. Will continue to follow as appropriate.     Winston Londono

## 2024-03-07 NOTE — ASSESSMENT & PLAN NOTE
Biopsy-proven at [facility]    Oliguria, with good UOP to Lasix challenge    BUN down-trending, now wnl  Cr down-trending and wnl  Give Lasix 80 mg IV this AM and assess UOP for afternoon dosing  Continue to monitor volume status and UOP  Nephrology on board, appreciate recommendations

## 2024-03-07 NOTE — PLAN OF CARE
Problem: RESPIRATORY - ADULT  Goal: Achieves optimal ventilation and oxygenation  Description: INTERVENTIONS:  - Assess for changes in respiratory status  - Assess for changes in mentation and behavior  - Position to facilitate oxygenation and minimize respiratory effort  - Oxygen administered by appropriate delivery if ordered  - Initiate smoking cessation education as indicated  - Encourage broncho-pulmonary hygiene including cough, deep breathe, Incentive Spirometry  - Assess the need for suctioning and aspirate as needed  - Assess and instruct to report SOB or any respiratory difficulty  - Respiratory Therapy support as indicated  Outcome: Progressing     Problem: HEMATOLOGIC - ADULT  Goal: Maintains hematologic stability  Description: INTERVENTIONS  - Assess for signs and symptoms of bleeding or hemorrhage  - Monitor labs  - Administer supportive blood products/factors as ordered and appropriate  Outcome: Progressing

## 2024-03-07 NOTE — ASSESSMENT & PLAN NOTE
Lab Results   Component Value Date    HGBA1C 5.9 (H) 03/03/2024       Recent Labs     03/06/24  1128 03/06/24  1730 03/06/24  2356 03/07/24  0454   POCGLU 163* 158* 154* 179*         Blood Sugar Average: Last 72 hrs:  (P) 169.3198578261913241    History of DM, well-controlled with last A1c 5.6 on 2/26, but now 5.9. Hyperglycemia in the setting of steroid use.    ISS Q6H while NPO  Fingersticks Q6H while NPO  Transition to mealtime when starting diet  Avoid hypoglycemia per protocol  Monitor BG and insulin requirements and adjust regimen as needed

## 2024-03-07 NOTE — PROGRESS NOTES
Progress Note - Infectious Disease   Darlyn Nguyen 78 y.o. female MRN: [id number]  Unit/Bed#: ICU 02 Encounter: [id number]    Impression/Plan:  1. Septic shock. Developing over admission. Leukocytosis, hypotension, and lactic acidosis. Likely secondary to enterococcus avium bacteremia and RUL pneumonia with cavitary lesion. This is complicated by fact that patient is immunosuppressed in setting of recent SLE with lupus nephritis diagnosis. The patient has made some clinical improvement. She was extubated and her pressor support is currently off. She remains afebrile. Initial repeat blood cultures are negative >4 days. New repeat blood cultures are negative >72 hours.  -antibiotic as below  -monitor CBCD and BMP  -follow up repeat blood cultures  -monitor vitals  -supportive care     2. Enterococcus avium bacteremia. Single set positive from 2/29/2024. Suspect likely GI source given patient's recent GI bleed and pancolitis with heavy diarrhea. Recent urine and wound cultures with no growth of this pathogen. Patient completed CT A/P which noted scattered splenic hypodensities which is concerning for septic emboli. With this pathogen there is concern for vancomycin resistance. Patient has no intravascular devices or hardware. Initial repeat blood cultures are negative >4 days. New repeat blood cultures are negative >72 hours. CT head with no acute findings. TTE was negative but she will require DYANA for further assessment. The patient has been transitioned to IV Unasyn. She appears to be tolerating the antibiotic without difficulty. I will continue the Unasyn for now.  -continue IV Unasyn  -check CBCD and BMP tomorrow  -follow up repeat blood cultures  -recommend DYANA when patient is medically stable  -monitor vitals     3. RUL pneumonia with cavitary lung lesions. Chest CT showed a RUL cavitary lesion, dense RUL consolidation, R basilar atelectasis, and small B/L pleural effusions. Consider aspiration played a role.  Consider possilibily of septic emboli in setting of bacteremia. With new immunosuppression will need to consider TB. AFB sputum cultures are pending. TB PCR is pending with the state lab. Sputum culture with preliminary growth of pseudomonas, however after antibiotic treatment this may be colonization. Aspergillus is pending.   -antibiotic as above  -follow up AFB sputum cultures  -follow up TB PCR  -monitor vitals  -monitor respiratory status  -O2 support per primary service      4. Pancolitis with heavy diarrhea. Patient had positive FOB at [facility] concerning for GI bleed. This may be source of her bacteremia above. Patient following with GI prior to transfer to [facility]. Recent stool PCR was negative. Giardia and O&P are were negative. Fecal calprotectin positive. Newest CT A/P showed persistent fluid throughout the colon, improvement to previous areas of colonic mucosal hyperemia, and colonic diverticulosis without diverticulitis. Suspect she may benefit from colonoscopy once medically stable. Gastroenterology is following. She underwent flex sig yesterday which showed hemorrhoids, diverticulitis, and ulcerated mucosa.   -serial abdominal exams  -monitor GI symptoms  -monitor stool output   -anticipate eventual colonoscopy   -continue follow up with gastroenterology     5. Acute hypoxic respiratory failure. Likely secondary to RUL pnuemonia with cavitary lesion. Also consider role of sepsis with shock. Consider possibility of aspiration vs septic emboli in bacteremic patient with new splenic hypodensities. She did recently have B/L LE DVTs, however she's been on Eliquis so less concern for PE. Patient's respiratory status has improved and she has been extubated. Her O2 saturation is stable on room air this morning.   -antibiotic as above  -monitor CBCD and BMP  -monitor vitals  -monitor respiratory status     6. SLE. With lupus nephritis. Diagnosed in 12/2023 at [facility]. She has been managed by rheumatology and nephrology.  She was started on prednisone, atovaquone, hydroxychloroquine, and mycophenolate. Atovaquone, hydroxychloroquine, and mycophenolate currently on hold. She was given a dose of Solu-Cortef at [facility]. Nephrology has assessed and patient is on prednisone. Steroid use is risk factor for infection.  -steroid per nephrology  -patient will eventually need to go back on mepron from PJP prophylaxis  -continue close follow up with nephrology  -consider rheumatology consult as needed     7. CKD stage 2. This can affect antibiotic dosing. Upon review of patient's available past medical records it appears her baseline creatinine is approximately 0.8. Also with lupus nephritis as above. Suspect patient is high risk for ARYAN.   -monitor creatinine  -dose adjust antibiotic for renal function as needed  -avoid nephrotoxins  -continue close follow up with nephrology     8. Sacral and buttock ulcers. Some areas more superficial and appearing as raw granular tissue. Other areas are likely stage 2 with some gray stringy slough and fibrous tissue. Wounds do not appear to tunnel or undermine. It was documented that patient had a gluteal skin care during recent hospitalization at [facility]. Suspect pressure and prolonged down time are contributing to skin integrity. Wound management is following.  -serial skin exams  -frequent turning and repositioning to offload pressure  -continue follow up with wound management     9. Type 2 diabetes mellitus without long term insulin use. Patient's last HbA1c was 5.9% on 3/3/2024. Elevated blood glucose is risk factor for wounds and infection. Recommend tight glycemic control.  -blood glucose management per primary service    Above plan was discussed in detail with patient at the bedside.    Antibiotics:  Unasyn 4  Antibiotics 8    Subjective:  Patient reports she's feeling a bit better today. Has less pain in the buttock/sacral ulcers. She denies difficulty breathing but is still coughing at times. Is  very thirsty today. She has no fever, chills, sweats, shakes; no nausea, vomiting, abdominal pain. No new symptoms.    Objective:  Vitals:  Temp:  [97.4 °F (36.3 °C)-97.9 °F (36.6 °C)] 97.8 °F (36.6 °C)  HR:  [] 104  Resp:  [17-22] 20  BP: ()/(49-94) 135/68  SpO2:  [90 %-100 %] 97 %  Temp (24hrs), Av.7 °F (36.5 °C), Min:97.4 °F (36.3 °C), Max:97.9 °F (36.6 °C)  Current: Temperature: 97.8 °F (36.6 °C)    Physical Exam:   General Appearance:  Alert, interactive, nontoxic, no acute distress. She appears comfortable sitting up in bed drinking water.   Throat: Oropharynx moist without lesions.    Lungs:   Rhonchi to auscultation bilaterally; no wheezing; respirations unlabored on room air.   Heart:  Irregular, tachycardic; no murmur, rub or gallop.   Abdomen:   Soft, non-tender, non-distended, positive bowel sounds.     Extremities: No clubbing or cyanosis, +peripheral edema.   Skin: No new rashes noted on exposed skin.     Labs, Imaging, & Other studies:   All pertinent labs and imaging studies were personally reviewed  Results from last 7 days   Lab Units 24  2357 24  1736 24  1120 24  0435 24  1751 24  1539 24  0450   WBC Thousand/uL 25.90*  --   --   --  26.92*  --   --  26.92*   HEMOGLOBIN g/dL 8.9* 8.4* 8.6*   < > 9.0*   < > 8.5* 8.7*   PLATELETS Thousands/uL 107*  --   --   --  118*  --  186 199    < > = values in this interval not displayed.     Results from last 7 days   Lab Units 24  0435 24  0450   POTASSIUM mmol/L 3.5 3.2* 3.8   CHLORIDE mmol/L 114* 113* 111*   CO2 mmol/L 21 23 23   BUN mg/dL 22 24 27*   CREATININE mg/dL 0.69 0.70 0.74   EGFR ml/min/1.73sq m 82 82 76   CALCIUM mg/dL 7.0* 6.7* 7.2*   AST U/L 38 49* 44*   ALT U/L 79* 74* 79*   ALK PHOS U/L 83 60 75     Results from last 7 days   Lab Units 24  2255 24  1640 24  1404 24  1148 24  1019 24  0823 24  0700  02/29/24 2118 02/29/24  1226 02/29/24  1027   BLOOD CULTURE  No Growth at 72 hrs.  No Growth at 72 hrs.  --   --  No Growth at 72 hrs. No Growth at 72 hrs. No Growth After 4 Days. No Growth After 4 Days.  --  No Growth After 5 Days.  Enterococcus avium*  --    SPUTUM CULTURE   --   --  2+ Growth of Pseudomonas aeruginosa*  --   --   --   --   --   --   --    GRAM STAIN RESULT   --   --  3+ Polys*  1+ Budding yeast*  1+ Gram negative rods*  --   --   --   --   --  Gram positive cocci in pairs* Rare Gram negative rods*  Rare Gram positive cocci in pairs*  No polys seen*   URINE CULTURE   --   --   --   --   --   --   --  10,000-19,000 cfu/ml Pseudomonas aeruginosa*  --   --    WOUND CULTURE   --   --   --   --   --   --   --   --   --  3+ Growth of Escherichia coli*  3+ Growth of Proteus mirabilis*  3+ Growth of Pseudomonas aeruginosa MDR*  2+ Growth of   MRSA CULTURE ONLY   --  No Methicillin Resistant Staphlyococcus aureus (MRSA) isolated  --   --   --   --   --  No Methicillin Resistant Staphlyococcus aureus (MRSA) isolated  --   --    LEGIONELLA URINARY ANTIGEN   --   --   --   --   --   --   --  Negative  --   --

## 2024-03-07 NOTE — ASSESSMENT & PLAN NOTE
Diagnosed in December,   Initially started on steroids, prednisone 60 mg daily, which patient had tapered to 30 mg daily prior to admission with the addition of atovaquone, hydroxychloroquine, and mycophenolate    Holding atovaquone, hydroxychloroquine, and mycophenolate in the setting of acute infection  Patient started on Solu-Cortef prior to discharge from [facility] for possible relative adrenal insufficiency contributing to shock state.      Continue prednisone, taper 10 mg weekly, currently 40 mg Day 2

## 2024-03-07 NOTE — ASSESSMENT & PLAN NOTE
E. avium bacteremia. Pt recently started on prednisone, atovaquone, hydroxychloroquine, and mycophenolate for lupus. Most likely gut translocation.     Repeat cultures show NGTD  Susceptibilities show pan-sensitive enterococcus    ID on board, appreciate recommendations  Narrowed to Unasyn 3 g Q6H (Day 4 Unasyn of anticipated prolonged course, Day 8 total abx)  DYANA when able, especially in light of new splenic infarcts concerning for septic emboli

## 2024-03-07 NOTE — PROGRESS NOTES
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) ([facility])  Assessment & Plan  Initially treated with MMF, prednisone  Immunoprophylaxis with dapsone but developed methemoglobinemia and was transitioned to atovaquone  Given current shock state however MMF and atovaquone are on hold  Creatinine is 0.74 today  Albumin/creatinine ratio 0.2  On prednisone 50 mg daily tapering down now by 10 mg every week  Discussed with ICU ok to challenge with diuretics, good urine output    GI bleed  Assessment & Plan  Patient received 2 units packed red blood cells  Blood pressures are currently acceptable  GI following  Continue to monitor  And ongoing ICU and GI management  Concern for colitis with red blood and mucus in stool, GI following    * Bacteremia due to Enterococcus  Assessment & Plan  Now on unazyn, id following      Acute respiratory failure with hypoxia ([facility])  Assessment & Plan  With concerns for a pneumonia, in the ICU now exubated  Ruling out tb given immunocomprized state, and cavitary lesion    Sacral wound  Assessment & Plan  Ongoing wound care         Discussion:    Discussed with the ICU team.  There are no active issues being managed by nephrology at this point.  Renal function remained stable.  We will see the patient as needed please call with any questions or concerns    SUBJECTIVE:    Patient was seen today, she is sitting up, making urine, no chest pain or shortness of breath fevers or chills    12 point review of systems was otherwise negative besides what is mentioned above.    Medications:    Current Facility-Administered Medications:     ampicillin-sulbactam (UNASYN) 3 g in sodium chloride 0.9 % 100 mL IVPB, 3 g, Intravenous, Q6H, AUGUSTA Lewis, Last Rate:  200 mL/hr at 03/07/24 1123, 3 g at 03/07/24 1123    atorvastatin (LIPITOR) tablet 40 mg, 40 mg, Oral, Daily, AUGUSTA Lewis, 40 mg at 03/07/24 0818    collagenase (SANTYL) ointment, , Topical, Daily, AUGUSTA Lewis, Given at 03/07/24 0818    diltiazem (CARDIZEM) 125 mg in sodium chloride 0.9 % 125 mL infusion, 1-15 mg/hr, Intravenous, Titrated, Uche Blair MD, Last Rate: 15 mL/hr at 03/07/24 1105, 15 mg/hr at 03/07/24 1105    heparin (porcine) 25,000 units in 0.45% NaCl 250 mL infusion (premix), 3-30 Units/kg/hr (Order-Specific), Intravenous, Titrated, AUGUSTA Lewis, Stopped at 03/05/24 1521    heparin (porcine) injection 3,000 Units, 3,000 Units, Intravenous, Q6H PRN, AUGUSTA Lewis    heparin (porcine) injection 6,000 Units, 6,000 Units, Intravenous, Q6H PRN, AUGUSTA Lewis    insulin lispro (HumALOG/ADMELOG) 100 units/mL subcutaneous injection 2-12 Units, 2-12 Units, Subcutaneous, Q6H CRUZITO, 2 Units at 03/07/24 0500 **AND** Fingerstick Glucose (POCT), , , Q6H, AUGUSTA Lewis    levalbuterol (XOPENEX) inhalation solution 1.25 mg, 1.25 mg, Nebulization, Q8H PRN, AUGUSTA Lewis, 1.25 mg at 03/07/24 1128    OLANZapine (ZyPREXA ZYDIS) dispersible tablet 5 mg, 5 mg, Oral, HS, Hao Lantigua PA-C    ondansetron (ZOFRAN) injection 4 mg, 4 mg, Intravenous, Q4H PRN, AUGUSTA Lewis    pantoprazole (PROTONIX) EC tablet 40 mg, 40 mg, Oral, Daily Before Breakfast, Uche Blair MD, 40 mg at 03/07/24 0836    potassium chloride 20 mEq IVPB (premix), 20 mEq, Intravenous, Q2H, Uche Blair MD, Last Rate: 50 mL/hr at 03/07/24 1123, 20 mEq at 03/07/24 1123    predniSONE tablet 40 mg, 40 mg, Oral, Daily, Uche Blair MD, 40 mg at 03/07/24 0818    OBJECTIVE:    Vitals:    03/07/24 1129 03/07/24 1130 03/07/24 1200 03/07/24 1215   BP:  134/59 118/55 124/54   BP Location:   Right arm    Pulse:  94 80 80   Resp:  21 21 20  "  Temp:       TempSrc:       SpO2: 97% 97% 97% 96%   Weight:  80.3 kg (177 lb)     Height:  5' 4\" (1.626 m)          Temp:  [97.7 °F (36.5 °C)-97.8 °F (36.6 °C)] 97.7 °F (36.5 °C)  HR:  [] 80  Resp:  [17-22] 20  BP: ()/(49-94) 124/54  SpO2:  [85 %-100 %] 96 %     Body mass index is 30.38 kg/m².    Weight (last 2 days)       Date/Time Weight    03/07/24 1130 80.3 (177)    03/07/24 0548 80.6 (177.69)    03/06/24 0600 80 (176.37)    03/05/24 0558 76.4 (168.43)            I/O last 3 completed shifts:  In: 4863.1 [I.V.:4063.1; Blood:350; IV Piggyback:450]  Out: 2565 [Urine:2565]    I/O this shift:  In: 289.6 [I.V.:89.6; IV Piggyback:200]  Out: 850 [Urine:850]      Physical exam:    General: Frail and elderly  Eyes: conjunctivae pink, anicteric sclerae  ENT: lips and mucous membranes moist, no exudates, normal external ears  Neck: ROM intact, no JVD  Chest: No respiratory distress, no accessory muscle use  CVS: normal rate, non pericardial friction rub  Abdomen: soft, non-tender, non-distended, normoactive bowel sounds  Extremities: no edema of both legs  Skin: no rash  Neuro: awake, alert, oriented, grossly intact  Psych:  Pleasant affect    Invasive Devices:   Urethral Catheter Latex;Straight-tip 16 Fr. (Active)   Reasons to continue Urinary Catheter  Stage III/IV sacral ulcers or open perineal wounds in incontinent patients 03/07/24 0730   Goal for Removal Other (Comment) 03/07/24 0730   Site Assessment Clean;Skin intact 03/07/24 0730   Peralta Care Done 03/07/24 0843   Collection Container Standard drainage bag 03/07/24 0730   Securement Method Securing device (Describe) 03/07/24 0730   Output (mL) 300 mL 03/07/24 1123     Lab Results:   Results from last 7 days   Lab Units 03/07/24  1127 03/07/24  0418 03/06/24  2357 03/06/24  1736 03/06/24  1120 03/06/24  0435 03/05/24  1751 03/05/24  1539 03/05/24  0450 03/04/24  0455 03/03/24  2255 03/03/24  2250 03/03/24  1640 03/03/24  1404 03/03/24  1149 " 03/03/24  1148 03/03/24  1019 03/03/24  0404 03/02/24  0823 03/02/24  0700 03/02/24  0658 03/01/24  0542 02/29/24  2118   WBC Thousand/uL  --  25.90*  --   --   --  26.92*  --   --  26.92* 24.05*  --   --  26.30*  --   --   --   --    < >  --   --    < > 12.97*  --    HEMOGLOBIN g/dL 8.5* 8.9* 8.4* 8.6* 8.9* 9.0*   < > 8.5* 8.7* 8.8*  --    < > 6.2*  --   --   --   --    < >  --   --    < > 9.0*  --    HEMATOCRIT %  --  27.2*  --   --   --  27.0*  --   --  27.5* 26.9*  --   --  20.3*  --   --   --   --    < >  --   --    < > 29.0*  --    PLATELETS Thousands/uL  --  107*  --   --   --  118*  --  186 199 205  --   --  284  --   --   --   --    < >  --   --    < > 325  --    POTASSIUM mmol/L  --  3.5  --   --   --  3.2*  --   --  3.8 3.4*  --   --  3.8  --    < >  --   --    < >  --   --    < > 4.5  --    CHLORIDE mmol/L  --  114*  --   --   --  113*  --   --  111* 109*  --   --  108  --    < >  --   --    < >  --   --    < > 102  --    CO2 mmol/L  --  21  --   --   --  23  --   --  23 23  --   --  22  --    < >  --   --    < >  --   --    < > 25  --    BUN mg/dL  --  22  --   --   --  24  --   --  27* 35*  --   --  35*  --    < >  --   --    < >  --   --    < > 42*  --    CREATININE mg/dL  --  0.69  --   --   --  0.70  --   --  0.74 0.88  --   --  0.89  --    < >  --   --    < >  --   --    < > 0.87  --    CALCIUM mg/dL  --  7.0*  --   --   --  6.7*  --   --  7.2* 7.4*  --   --  7.7*  --    < >  --   --    < >  --   --    < > 8.7  --    MAGNESIUM mg/dL  --  2.3  --   --   --   --   --   --   --  1.9  --   --  1.9  --   --   --   --   --   --   --   --  2.0  --    PHOSPHORUS mg/dL  --   --   --   --   --   --   --   --   --  2.2*  --   --  2.4  --   --   --   --   --   --   --   --   --   --    ALK PHOS U/L  --  83  --   --   --  60  --   --  75 61  --   --  65  --    < >  --   --    < >  --   --    < > 65  --    ALT U/L  --  79*  --   --   --  74*  --   --  79* 93*  --   --  109*  --    < >  --   --    < >  --   --     "< > 15  --    AST U/L  --  38  --   --   --  49*  --   --  44* 80*  --   --  156*  --    < >  --   --    < >  --   --    < > 11*  --    BLOOD CULTURE   --   --   --   --   --   --   --   --   --   --  No Growth at 72 hrs.  No Growth at 72 hrs.  --   --   --   --  No Growth at 72 hrs. No Growth at 72 hrs.  --  No Growth After 4 Days. No Growth After 4 Days.  --   --   --    NITRITE UA   --   --   --   --   --   --   --   --   --   --   --   --   --  Negative  --   --   --   --   --   --   --   --  Positive*   LEUKOCYTES UA   --   --   --   --   --   --   --   --   --   --   --   --   --  Negative  --   --   --   --   --   --   --   --  Negative   BLOOD UA   --   --   --   --   --   --   --   --   --   --   --   --   --  Moderate*  --   --   --   --   --   --   --   --  Trace-Intact*    < > = values in this interval not displayed.       Results Reviewed       None            Portions of the record may have been created with voice recognition software. Occasional wrong word or \"sound a like\" substitutions may have occurred due to the inherent limitations of voice recognition software. Read the chart carefully and recognize, using context, where substitutions have occurred.If you have any questions, please contact the dictating provider.      "

## 2024-03-07 NOTE — PROGRESS NOTES
[facility]  Progress Note  Name: Darlyn Nguyen I  MRN: [id number]  Unit/Bed#: ICU 02 I Date of Admission: 3/3/2024   Date of Service: 3/7/2024 I Hospital Day: 4    Assessment/Plan   * Bacteremia due to Enterococcus  Assessment & Plan  E. avium bacteremia. Pt recently started on prednisone, atovaquone, hydroxychloroquine, and mycophenolate for lupus. Most likely gut translocation.     Repeat cultures show NGTD  Susceptibilities show pan-sensitive enterococcus    ID on board, appreciate recommendations  Narrowed to Unasyn 3 g Q6H (Day 4 Unasyn of anticipated prolonged course, Day 8 total abx)  DYANA when able, especially in light of new splenic infarcts concerning for septic emboli      Atrial fibrillation with rapid ventricular response (HCC)  Assessment & Plan  Pt noted to be in SVT @ approx 15:38 on 3/6/24. EKG showing afib. Gave lopressor 5 mg IV x1, followed by Diltiazem 10 mg IV x1. Intended to start Diltiazem gtt, however pt converted to NSR w HR 80s-90s.    Pt relapsed into Afib w RVR @ 18:42 on 3/6/24    Echo 2/20/24 unremarkable--showed grade 1 diastolic dysfunction and mild mitral regurg.    Chads-vasc=7 (high risk for CVA)  Has-bled=5 (high risk for significant bleeding event)    Continue diltiazem gtt for target HR<90. Transition to oral medications when taking PO.  Consider amiodarone  Consider Cardiology consult vs o/p Cardiology referral  Will need shared decision making with patient/family regarding long-term anticoagulation plan    Cavitary pneumonia  Assessment & Plan  Seen on CT 3/4/24  Consider reactivation of TB vs septic emboli vs aspiration vs community acquired    Preliminary AFB negative  Sputum growing 2+ pseudomonas, possible colonization--susceptibilities pending    Rule out reactivation of TB in this immunosuppressed patient; AFB x 3 ordered, preliminarily negative  Consider also septic emboli vs aspiration vs community acquired--DYANA when able  Continue Unasyn for  tx of E avium bacteremia  Weaned O2, now satting 90s on RA    GI bleed  Assessment & Plan  Recent persistent diarrhea  FOBT positive @ [facility]  Small bloody bowel movement on presentation to [facility] ICU  Acute drop in hemoglobin from 8.0 morning of 3/3/2024 to 6.2 approximately 12 hours later  PTT>210 at presentation  Recent CT A/P demonstrating colitis  No history of significant GI bleed per family  Family reports colonoscopy approximately 1 year ago without significant findings    Hep gtt held at presentation.  PTT= 31 morning of 3/5/24    With no further episodes of GIB and Hgb stable since transfusion of 2 U pRBC on 3/3, Hep restarted 3/5/24, followed by recurrence of multiple bloody bowel movements (2 during the day, 4 overnight 3/5-3/6/24), with resultant drop in Hgb from 8.5 to 6.6. Transfused 1 U pRBC. Repeat Hgb morning of 3/6 is 9.0, remains stable at 8.9 morning of 3/7.    Bedside flex sig with GI 3/6 shows ulcerated rectal mucosa, sigmoid diverticula, and hemorrhoids--cauterized    Pt on home Eliquis for DVT found on 2/20/2024    Change to Protonix 40 mg QD from BID--can D/C and resume home famotidine when taking PO  IVC filter with IR later today  GI on board, appreciate recommendations  If recurrent bleeding continues, suggest exam under anesthesia with colorectal surgery for treatment/ oversew vessel    Acute deep vein thrombosis (DVT) of both lower extremities (HCC)  Assessment & Plan  Discovered 2/20/24, started on Eliquis at home, started Hep gtt at [facility] but developed GI bleed, AC currently on hold    IVC filter with IR later today 3/7/24    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) ([facility])  Assessment & Plan  Biopsy-proven at [facility]    Oliguria, with good UOP to Lasix challenge    BUN down-trending, now wnl  Cr down-trending and wnl  Give Lasix 80 mg IV this AM and assess UOP for afternoon dosing  Continue to monitor volume status and UOP  Nephrology on board,  appreciate recommendations    Diabetes mellitus (HCC)  Assessment & Plan  Lab Results   Component Value Date    HGBA1C 5.9 (H) 03/03/2024       Recent Labs     03/06/24  1128 03/06/24  1730 03/06/24  2356 03/07/24  0454   POCGLU 163* 158* 154* 179*         Blood Sugar Average: Last 72 hrs:  (P) [id number]    History of DM, well-controlled with last A1c 5.6 on 2/26, but now 5.9. Hyperglycemia in the setting of steroid use.    ISS Q6H while NPO  Fingersticks Q6H while NPO  Transition to mealtime when starting diet  Avoid hypoglycemia per protocol  Monitor BG and insulin requirements and adjust regimen as needed    Dysphagia  Assessment & Plan  Failed bedside swallow with nursing  Speech consulted for formal swallow eval, appreciate recommendations    Sacral wound  Assessment & Plan  Wound Care on board, appreciate recommendations  Repositioning and wound care with nursing    Acute respiratory failure with hypoxia ([facility])  Assessment & Plan  CXR 2/29/24: Right lung opacities, suspicious for pneumonia in the appropriate clinical setting.  CT abdomen pelvis 2/29/2024: LOWER CHEST: Mild right basilar patchy consolidation #2/5 suspicious for pneumonia.  Procal elevated and uptrending from 0.76 on 3/1/24 to 3.08 on 3/4/24  CT Chest 3/4/24 shows extensive right upper lobe cavitary pneumonia right basilar atelectasis and/or pneumonia with small bilateral pleural effusions    Sputum Cx 3/3 growing 2+ pseudomonas--susceptibilities pending    Plan as above under cavitary PNA    Lupus (HCC)  Assessment & Plan  Diagnosed in December,   Initially started on steroids, prednisone 60 mg daily, which patient had tapered to 30 mg daily prior to admission with the addition of atovaquone, hydroxychloroquine, and mycophenolate    Holding atovaquone, hydroxychloroquine, and mycophenolate in the setting of acute infection  Patient started on Solu-Cortef prior to discharge from [facility] for possible relative adrenal insufficiency  contributing to shock state.      Continue prednisone, taper 10 mg weekly, currently 40 mg Day 2       ----------------------------------------------------------------------------------------  HPI/24hr events: Flex sig w GI yesterday showed ulcerated rectal mucosa, sigmoid diverticula, and hemorrhoids. No BRBPR since yesterday morning, Hgb stable today. SVT on tele yesterday, EKG demonstrates Afib w RVR, transiently returned to NSR with normal rate after diltiazem push, however relapsed to Afib w RVR yesterday evening. This morning, Afib w HR 90s-110s.    Patient appropriate for transfer out of the ICU today?: Patient does not meet criteria for ICU Follow-up Clinic; referral has not been made.   Disposition: Transfer to Med-Surg   Code Status: Level 2 - DNAR: but accepts endotracheal intubation  ---------------------------------------------------------------------------------------  SUBJECTIVE  This morning, Ms. Nguyen is seen lying up in bed, awake, alert, disoriented to place and time, but engaged, in no acute distress, but very much wishes to have a drink of water. Pt has been NPO due to failed bedside swallow eval. Small sips of water given at this time with patient relief, followed by brief coughing. Pt has no other complaints at this time and brief review of systems is negative--denies fever, chills, headache, dizziness, lightheadedness, chest pain, subjective palpitations, shortness of breath, difficulty breathing, abdominal pain, sacral pain.    Review of Systems  Review of systems was reviewed and negative unless stated above in HPI/24-hour events   ---------------------------------------------------------------------------------------  OBJECTIVE    Vitals   Vitals:    03/07/24 0600 03/07/24 0630 03/07/24 0700 03/07/24 0730   BP: 125/62 135/68 128/63 128/71   Pulse: 94 104 98 98   Resp: 20 20 21 20   Temp:   97.7 °F (36.5 °C)    TempSrc:   Oral    SpO2: 93% 97% 94% 94%   Weight:         Temp (24hrs),  Av.6 °F (36.4 °C), Min:97.4 °F (36.3 °C), Max:97.8 °F (36.6 °C)  Current: Temperature: 97.7 °F (36.5 °C)          Respiratory:  Room air, SpO2 94%      Invasive/non-invasive ventilation settings   Respiratory      Lab Data (Last 4 hours)      None           O2/Vent Data (Last 4 hours)      None                    Physical Exam  Vitals and nursing note reviewed.   Constitutional:       General: She is not in acute distress.     Appearance: She is obese. She is not ill-appearing, toxic-appearing or diaphoretic.   HENT:      Head: Normocephalic and atraumatic.   Eyes:      General: No scleral icterus.        Right eye: No discharge.         Left eye: No discharge.      Conjunctiva/sclera: Conjunctivae normal.   Cardiovascular:      Rate and Rhythm: Tachycardia present. Rhythm irregular.      Pulses: Normal pulses.      Heart sounds: Normal heart sounds. No murmur heard.     No friction rub. No gallop.   Pulmonary:      Effort: Pulmonary effort is normal.      Comments: Coarse upper airway breath sounds. Auscultation further limited by body habitus and positioning.  Abdominal:      General: Bowel sounds are normal. There is no distension.      Tenderness: There is no abdominal tenderness. There is no guarding or rebound.   Musculoskeletal:         General: Swelling present.      Right lower leg: Edema present.      Left lower leg: Edema present.      Comments: anasarca   Skin:     General: Skin is warm and dry.      Comments: Weeping serous fluid from dorsum of L hand   Neurological:      Mental Status: She is alert. She is disoriented.   Psychiatric:         Mood and Affect: Mood normal.         Behavior: Behavior normal.             Laboratory and Diagnostics:  Results from last 7 days   Lab Units 24  0418 24  2357 24  1736 24  1120 24  0435 24  2318 24  1751 24  1539 24  0450 24  0455 24  2250 24  1640 24  0404 24  0658  03/01/24  0542 02/29/24  1804 02/29/24  1226   WBC Thousand/uL 25.90*  --   --   --  26.92*  --   --   --  26.92* 24.05*  --  26.30* 18.38* 21.27* 12.97*  --  8.72   HEMOGLOBIN g/dL 8.9* 8.4* 8.6* 8.9* 9.0* 6.6* 7.6* 8.5* 8.7* 8.8*   < > 6.2* 8.0* 8.8* 9.0*   < > 7.0*   HEMATOCRIT % 27.2*  --   --   --  27.0*  --   --   --  27.5* 26.9*  --  20.3* 27.2* 28.3* 29.0*   < > 23.7*   PLATELETS Thousands/uL 107*  --   --   --  118*  --   --  186 199 205  --  284 333 332 325  --  315   NEUTROS PCT %  --   --   --   --  95*  --   --   --   --  88*  --  91* 91*  --   --   --   --    BANDS PCT %  --   --   --   --   --   --   --   --  2  --   --   --   --   --  11*  --  5   MONOS PCT %  --   --   --   --  1*  --   --   --   --  2*  --  2* 4  --   --   --   --    MONO PCT %  --   --   --   --   --   --   --   --  4  --   --   --   --  3* 6  --  5   EOS PCT %  --   --   --   --  0  --   --   --  0 0  --  0 0 0 0  --  0    < > = values in this interval not displayed.     Results from last 7 days   Lab Units 03/07/24  0418 03/06/24  0435 03/05/24  0450 03/04/24  0455 03/03/24  1640 03/03/24  1149 03/03/24  0404   SODIUM mmol/L 147 145 141 138 138 137 138   POTASSIUM mmol/L 3.5 3.2* 3.8 3.4* 3.8 4.1 4.2   CHLORIDE mmol/L 114* 113* 111* 109* 108 106 106   CO2 mmol/L 21 23 23 23 22 20* 22   ANION GAP mmol/L 12 9 7 6 8 11 10   BUN mg/dL 22 24 27* 35* 35* 36* 36*   CREATININE mg/dL 0.69 0.70 0.74 0.88 0.89 0.92 0.85   CALCIUM mg/dL 7.0* 6.7* 7.2* 7.4* 7.7* 7.7* 8.5   GLUCOSE RANDOM mg/dL 143* 126 125 224* 302* 420* 447*   ALT U/L 79* 74* 79* 93* 109* 57* 18   AST U/L 38 49* 44* 80* 156* 66* 13   ALK PHOS U/L 83 60 75 61 65 59 70   ALBUMIN g/dL 2.2* 2.0* 2.1* 2.3* 2.7* 1.9* 2.3*   TOTAL BILIRUBIN mg/dL 0.71 0.87 0.67 0.83 0.83 0.61 0.61     Results from last 7 days   Lab Units 03/07/24  0418 03/04/24  0455 03/03/24  1640 03/01/24  0542 02/29/24  1226   MAGNESIUM mg/dL 2.3 1.9 1.9 2.0 2.0   PHOSPHORUS mg/dL  --  2.2* 2.4  --   --        Results from last 7 days   Lab Units 03/06/24  0046 03/05/24  1802 03/05/24  1539 03/05/24  0646 03/04/24  0455 03/03/24  2250 03/03/24  1640 03/03/24  0823 03/01/24  2159 03/01/24  1438 02/29/24  1226   INR  1.20*  --   --  1.22* 1.49*  --  1.86*  --   --  1.20* 1.95*   PTT seconds 29 64* >210* 31  --  41* >210* >210*   < > 29 31    < > = values in this interval not displayed.          Results from last 7 days   Lab Units 03/04/24  0150 03/03/24  2250 03/03/24  1313 03/03/24  1019 02/29/24  1526 02/29/24  1226   LACTIC ACID mmol/L 2.1* 3.1* 4.5* 7.1* 1.8 4.1*     ABG:  Results from last 7 days   Lab Units 03/03/24  1401   PH ART  7.345*   PCO2 ART mm Hg 38.3   PO2 ART mm Hg 117.8   HCO3 ART mmol/L 20.4*   BASE EXC ART mmol/L -4.8   ABG SOURCE  Radial, Left     VBG:  Results from last 7 days   Lab Units 03/03/24  2250 03/03/24  1401   PH RADHA  7.255*  --    PCO2 RAHDA mm Hg 49.7  --    PO2 RADHA mm Hg 20.8*  --    HCO3 RADHA mmol/L 21.6*  --    BASE EXC RADHA mmol/L -5.4  --    ABG SOURCE   --  Radial, Left     Results from last 7 days   Lab Units 03/04/24  0455 03/01/24  0542 02/29/24  1226   PROCALCITONIN ng/ml 3.08* 0.76* 0.61*       Micro  Results from last 7 days   Lab Units 03/03/24  2255 03/03/24  1640 03/03/24  1404 03/03/24  1148 03/03/24  1019 03/02/24  0823 03/02/24  0700 02/29/24  2118 02/29/24  1226 02/29/24  1027   BLOOD CULTURE  No Growth at 72 hrs.  No Growth at 72 hrs.  --   --  No Growth at 72 hrs. No Growth at 72 hrs. No Growth After 4 Days. No Growth After 4 Days.  --  No Growth After 5 Days.  Enterococcus avium*  --    SPUTUM CULTURE   --   --  2+ Growth of Pseudomonas aeruginosa*  Few Colonies of Yeast species*  --   --   --   --   --   --   --    GRAM STAIN RESULT   --   --  3+ Polys*  1+ Budding yeast*  1+ Gram negative rods*  --   --   --   --   --  Gram positive cocci in pairs* Rare Gram negative rods*  Rare Gram positive cocci in pairs*  No polys seen*   URINE CULTURE   --   --   --   --    --   --   --  10,000-19,000 cfu/ml Pseudomonas aeruginosa*  --   --    WOUND CULTURE   --   --   --   --   --   --   --   --   --  3+ Growth of Escherichia coli*  3+ Growth of Proteus mirabilis*  3+ Growth of Pseudomonas aeruginosa MDR*  2+ Growth of   MRSA CULTURE ONLY   --  No Methicillin Resistant Staphlyococcus aureus (MRSA) isolated  --   --   --   --   --  No Methicillin Resistant Staphlyococcus aureus (MRSA) isolated  --   --    LEGIONELLA URINARY ANTIGEN   --   --   --   --   --   --   --  Negative  --   --    STREP PNEUMONIAE ANTIGEN, URINE   --   --   --   --   --   --   --  Negative  --   --        EKG: tele reviewed as noted above, in Afib, HR 80s-110s  Imaging:  No new imaging    Intake and Output  I/O         03/05 0701 03/06 0700 03/06 0701 03/07 0700 03/07 0701 03/08 0700    P.O.       I.V. (mL/kg) 2812 (35.1) 2813.1 (34.9)     Blood 350      NG/GT       IV Piggyback 243.3 350 100    Total Intake(mL/kg) 3405.3 (42.6) 3163.1 (39.2) 100 (1.2)    Urine (mL/kg/hr) 2230 (1.2) 1880 (1) 75 (0.6)    Stool 0 0     Total Output 2230 1880 75    Net +1175.3 +1283.1 +25           Unmeasured Stool Occurrence 4 x 1 x             Height and Weights         Body mass index is 30.5 kg/m².  Weight (last 2 days)       Date/Time Weight    03/07/24 0548 80.6 (177.69)    03/06/24 0600 80 (176.37)    03/05/24 0558 76.4 (168.43)              Nutrition       Diet Orders   (From admission, onward)                 Start     Ordered    03/07/24 0808  Diet NPO; Sips of clear liquids  Diet effective now        Comments: Sips of water   References:    Adult Nutrition Support Algorithm    RD Therapeutic Diet Order Protocol   Question Answer Comment   Diet Type NPO    NPO Except: Sips of clear liquids    RD to adjust diet per protocol? Yes        03/07/24 0807                      Active Medications  Scheduled Meds:  Current Facility-Administered Medications   Medication Dose Route Frequency Provider Last Rate     ampicillin-sulbactam  3 g Intravenous Q6H AUGUSTA Leiws Stopped (03/07/24 0601)    atorvastatin  40 mg Oral Daily AUGUSTA Lewis      collagenase   Topical Daily AUGUSTA Lewis      diltiazem  1-15 mg/hr Intravenous Titrated Uche Blair MD 15 mg/hr (03/07/24 0243)    heparin (porcine)  3-30 Units/kg/hr (Order-Specific) Intravenous Titrated AUGUSTA Lewis Stopped (03/05/24 1521)    heparin (porcine)  3,000 Units Intravenous Q6H PRN AUGUSTA Lewis      heparin (porcine)  6,000 Units Intravenous Q6H PRN AUGUSTA Lewis      insulin lispro  2-12 Units Subcutaneous Q6H CRUZITO AUGUSTA Lewis      levalbuterol  1.25 mg Nebulization Q8H PRN AUGUSTA Lewis      multi-electrolyte  100 mL/hr Intravenous Continuous Uche Blair  mL/hr (03/07/24 0709)    ondansetron  4 mg Intravenous Q4H PRN AUGUSTA Lewis      pantoprazole  40 mg Oral Daily Before Breakfast Uche Blair MD      potassium chloride  20 mEq Intravenous Q2H Uche Blair MD 20 mEq (03/07/24 0732)    predniSONE  40 mg Oral Daily Uche Blair MD      QUEtiapine  12.5 mg Oral HS Uche Blair MD       Continuous Infusions:  diltiazem, 1-15 mg/hr, Last Rate: 15 mg/hr (03/07/24 0243)  heparin (porcine), 3-30 Units/kg/hr (Order-Specific), Last Rate: Stopped (03/05/24 1521)  multi-electrolyte, 100 mL/hr, Last Rate: 100 mL/hr (03/07/24 0709)      PRN Meds:   heparin (porcine), 3,000 Units, Q6H PRN  heparin (porcine), 6,000 Units, Q6H PRN  levalbuterol, 1.25 mg, Q8H PRN  ondansetron, 4 mg, Q4H PRN        Invasive Devices Review  Invasive Devices       Central Venous Catheter Line  Duration             CVC Central Lines 03/03/24 Triple 20cm 3 days              Drain  Duration             Urethral Catheter Latex;Straight-tip 16 Fr. 6 days                    Rationale for remaining devices: D/C central line. Continue Peralta as part of sacral wound  "care plan  ---------------------------------------------------------------------------------------  Advance Directive and Living Will: Yes        Uche Blair MD      Portions of the record may have been created with voice recognition software.  Occasional wrong word or \"sound a like\" substitutions may have occurred due to the inherent limitations of voice recognition software.  Read the chart carefully and recognize, using context, where substitutions have occurred      "

## 2024-03-07 NOTE — BRIEF OP NOTE (RAD/CATH)
INTERVENTIONAL RADIOLOGY PROCEDURE NOTE    Date: 3/7/2024    Procedure: IR IVC FILTER PLACEMENT  Procedure Summary       Date:  Room / Location:     Anesthesia Start:  Anesthesia Stop:     Procedure:  Diagnosis:     Scheduled Providers:  Responsible Provider:     Anesthesia Type: Not recorded ASA Status: Not recorded            Preoperative diagnosis:   1. Acute respiratory failure with hypoxia (HCC)    2. Pneumonia of right lower lobe due to infectious organism    3. Pressure ulcers of skin of multiple topographic sites    4. DVT (deep venous thrombosis) (HCC)    5. Bacteremia due to Enterococcus    6. Septic shock (HCC)    7. Metabolic acidosis    8. GI bleed    9. Diarrhea of presumed infectious origin    10. Metabolic encephalopathy         Postoperative diagnosis: Same.    Surgeon: Kayla Loepz MD     Assistant: None. No qualified resident was available.    Blood loss: 0    Specimens: 0     Findings:     Infrarenal veronica IVC filter placement    Right IJ access next to central line    Complications: None immediate.    Anesthesia: local

## 2024-03-07 NOTE — ASSESSMENT & PLAN NOTE
CXR 2/29/24: Right lung opacities, suspicious for pneumonia in the appropriate clinical setting.  CT abdomen pelvis 2/29/2024: LOWER CHEST: Mild right basilar patchy consolidation #2/5 suspicious for pneumonia.  Procal elevated and uptrending from 0.76 on 3/1/24 to 3.08 on 3/4/24  CT Chest 3/4/24 shows extensive right upper lobe cavitary pneumonia right basilar atelectasis and/or pneumonia with small bilateral pleural effusions    Sputum Cx 3/3 growing 2+ pseudomonas--susceptibilities pending    Plan as above under cavitary PNA

## 2024-03-07 NOTE — ASSESSMENT & PLAN NOTE
Patient received 2 units packed red blood cells  Blood pressures are currently acceptable  GI following  Continue to monitor  And ongoing ICU and GI management  Concern for colitis with red blood and mucus in stool, GI following

## 2024-03-07 NOTE — SPEECH THERAPY NOTE
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)    Sepsis (HCC)    Acute deep vein thrombosis (DVT) of both lower extremities (HCC)    Dysphagia    Atrial fibrillation with rapid ventricular response (HCC)    Past Medical History  Past Medical History:   Diagnosis Date    Acute GI bleeding 03/03/2024    Diabetes mellitus (HCC)     Fibromyalgia     Hyperlipidemia     Hypertension     Shock (HCC) 03/03/2024     Past Surgical History  Past Surgical History:   Procedure Laterality Date    ACHILLES TENDON REPAIR Right     ADENOIDECTOMY      APPENDECTOMY      SKIN CANCER EXCISION      x 4 - back    TONSILLECTOMY      TOTAL ABDOMINAL HYSTERECTOMY      US GUIDED KIDNEY BIOPSY  12/26/2023          Bedside Swallow Evaluation:    Summary:  Pt presented w/ eyes closed. Nurse reported the pt was up and yelling for water earlier, was able to take her meds whole w/ water.  This eval,  she responded \"mm mm\" (no) and nodded head to all ?'s except water. Refused to open eyes, open mouth, stick out tongue, cough. Noted sensor is not reading well on earlobe. 100 then low 80's etc. Hands were quote cold, and edematous. Pt agreeable to water by straw several times. Unable to fully palpate swallow 2* redundant tissue. When asked if she swallowed pt yelled \"I did!\". Control and trasnfer appeared adequate. No immediate s/s however eval was limited. Noted pt was seen at [place] [id number] on 3/1 prior to transfer and placed on mechanical soft and thin. Given LUPUS dx and location of PNA, ? possibly bottom up aspiration. Could be combination of both, vs neither.  Per " CC note this am:  Change to Protonix 40 mg QD from BID--can D/C and resume home famotidine when taking PO  IVC filter with IR later today  GI on board, appreciate recommendations  If recurrent bleeding continues, suggest exam under anesthesia with colorectal surgery for treatment/ oversew vessel    Recommendations:  Diet: thin liquids for now, will attempt to f/u again. Also asked nurse to TT me if pt alerted and was agreeable to or asked for PO.   Liquid:thin  Meds:as tolerated  Supervision:full  Positioning:Upright  Strategies: slow rate, small sips  Pt to take PO/Meds only when fully alert and upright.   Oral care  Aspiration precautions  Reflux precautions  Therapy Prognosis: unknown at this time  Prognosis considerations:need to see w/ larger sample  Frequency: ? 2-5x/wk as able and indicated    Consider consult w/:  Rehab  GI following/GIB  Nutrition    Goal(s):  Dysphagia LTG  -Patient will demonstrate safe and effective oral intake (without overt s/s significant oral/pharyngeal dysphagia including s/s penetration or aspiration) for the highest appropriate diet level.     1.Pt will tolerate least restrictive diet w/out s/s aspiration or oral/pharyngeal difficulties.   2.Pt will will effectively manipulate/masticate and transfer purees/solids w/out s/s dysphagia/aspiration.   3.Pt will tolerate thin liquids w/out s/s aspiration.   -If indicated, patient will comply with a Video/Modified Barium Swallow study for more complete assessment of swallowing anatomy/physiology/aspiration risk and to assess efficacy of treatment techniques so as to best guide treatment plan     3/3/24 H&P/Admit info/ pertinent provider notes: (PMH noted above)  HX and PE limited by: patient intubated and sedated  Darlyn Nguyen is a 78 y.o. 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  Initial evaluation demonstrated evidence of colitis, pneumonia and a mild drop of her hemoglobin from prior.  The patient received 2 units PRBC on admission and was started on broad-spectrum antibiotics.  Patient's pneumonia workup was unrevealing.  Her sacral ulcer wound cultures were multi microbial including E. coli, Proteus mirabilis and Pseudomonas aeruginosa MDR.  Her urine cultures were also noted for low count Pseudomonas, 10,000-19,000 CFU.  Patient's diarrhea persisted throughout her hospitalization.  Blood cultures growing Enterococcus avium in 1 of 2 sets.  The patient was noted for poor oral intake and overall clinical deterioration.  Her antibiotic therapy initially consisted of ceftriaxone and Flagyl, broadened to cefepime, Flagyl and IV vancomycin with vancomycin then replaced with linezolid.    On 3/3/2024 the patient was noted to meet sepsis criteria with leukocytosis and tachycardia, lactic acid level at 7.1 consistent with septic shock. The patient was initiated on fluid resuscitation however developed hypotension following IV fluids per sepsis protocol with 30 mL/kg.  She was initiated on Levophed. The patient started to develop respiratory failure and worsening mental status and was intubated. Central line was placed and vasopressin was added.  Meropenem was also added to therapy.  Arrangements were made for transfer to [facility] ICU where she was accepted under critical care service Dr. Carey.  Pt arrived to the [place] ICU intubated, on Levophed 25, vasopression 0.04, and heparin drip. BP in the hypertensive range, so levo weaned to 15. Noted to have small bloody bowel movement upon arrival and bloody secretions suctioned from ETT. Hgb 6.2 from 8.0 this morning, so heparin drip held and GI consulted. PTT subsequently resulted supratherapeutic. Propofol initiated for  sedation.  Family seen at bedside. Report that patient had been independent and in good health as recently as October 2023, but has declined since that time. Was diagnosed with Lupus in December 2023. Has been predominantly confined to the couch in recent months, and has developed painful sacral ulcer, exacerbated by recent diarrhea. They note a family history of GI cancers in multiple family members, including colon cancer in a sister and gastric cancer in a nephew. They deny any prior history of significant GI bleed, though her spouse does believe she's had episodes hemorrhoidal bleeding in the past.   With respect to Enterococcus avium infection, pt's daughter and son-in-law keep chickens, but state that patient is not exposed to these or other birds.   Reviewed home medications with patient's spouse. While he does not recognize Eliquis as a home med, this is likely because this medication appears to have been started on 2/24/24 following discovery of acute BLE DVTs on 2/20/24.   History obtained from chart review and family (spouse, daughter, son-in-law).  -------------------------------------------------------------------------------------------------------------  Dispo: Admit to Critical Care   Code Status: Level 2 - DNAR: but accepts endotracheal intubation      Special Studies:  Noted TB negative.  3/4/ CT chest:  Extensive right upper lobe cavitary pneumonia. Right basilar atelectasis and/or pneumonia. Small bilateral pleural effusions.  Lines and tubes, as above.  Flex sig 3/6/24:  IMPRESSION:  Hemorrhoids  Diverticulosis in the sigmoid colon  Ulcerated mucosa in the rectum; induced coagulation with heater probe  RECOMMENDATION:  Hi suspicion that the cause for her lower gi bleed is from significant ulceration of the proximal anal/distal rectal area  If recurrent bleeding continues, suggest exam under anesthesia with colo rectal surgery for treatment/ oversew vessel.   EGD 12/19/23:  LVH:She was evaluated  "by GI and underwent EGD (12/19), showing antral gastritis, with biopsies negative for H. pylori.     Procalcitonin:    WBC:   25.90     Previous MBS:  none    Patient's goal: none stated    Did the pt report pain? No, no nonverbal indication  If yes, was nursing notified/was it addressed? N/a    Reason for consult:  R/o aspiration  Determine safest and least restrictive diet  Failed nursing dysphagia assessment  Change in mental status  h/o dysphagia ?    Precautions:  Contact  Fall    Food Allergies:  none   Current Diet:  Sips w/ meds   Premorbid diet:  3/1 ndd2 and thin at [place], regular prior to that per  note.    O2 requirement:  2 nc   Social/Prior living  Unclear at this time. ? Home w/ . Noted his hope was for her to return to [place] for rehab.    Voice/Speech:  No verbalizations. No dysphonia when stating \"I did!\"   Follows commands:  no   Cognitive status:  Eyes closed. Not cooperating.      Oral [facility] exam:  Dentition:natural from what I could tell.   Lips (VII):no gross asymmetry  Tongue (XII):unable to assess  Secretion management:appeared adequate    Esophageal stage:  H/o EGD  H/o GIB    Results d/w:  Pt, nursing       "

## 2024-03-07 NOTE — ASSESSMENT & PLAN NOTE
Discovered 2/20/24, started on Eliquis at home, started Hep gtt at [facility] but developed GI bleed, AC currently on hold    IVC filter with IR later today 3/7/24

## 2024-03-07 NOTE — ASSESSMENT & PLAN NOTE
Initially treated with MMF, prednisone  Immunoprophylaxis with dapsone but developed methemoglobinemia and was transitioned to atovaquone  Given current shock state however MMF and atovaquone are on hold  Creatinine is 0.74 today  Albumin/creatinine ratio 0.2  On prednisone 50 mg daily tapering down now by 10 mg every week  Discussed with ICU ok to challenge with diuretics, good urine output

## 2024-03-07 NOTE — PROGRESS NOTES
"Patient Name: Darlyn Nguyen  Patient MRN: [id number]  Date: 03/07/24  Service: [facility]    Subjective   Patient is not able to give much of a history.  She is sleeping during the exam.  Did discuss situation with her  at the bedside.  By report there was no additional GI bleeding since yesterday's procedure.    Vitals  Blood pressure 130/61, pulse 78, temperature 98 °F (36.7 °C), temperature source Oral, resp. rate 20, height 5' 4\" (1.626 m), weight 80.3 kg (177 lb), SpO2 96%.  Patient is somnolent.  I did examine her abdomen which is soft.  Bowel sounds are present and normal.  No pain was elicited with palpation.  Patient seems to be breathing more comfortably and no coughing was appreciated during my visit.    Laboratory Studies  Results from last 7 days   Lab Units 03/07/24  1127 03/07/24  0418 03/06/24  2357 03/06/24  1736 03/06/24  1120 03/06/24  0435 03/06/24  0046 03/05/24  2318 03/05/24  1751 03/05/24  1539 03/05/24  0646 03/05/24  0450 03/04/24  0455 03/03/24  2250 03/03/24  1640 03/03/24  0404 03/02/24  0658 03/01/24  1438   WBC Thousand/uL  --  25.90*  --   --   --  26.92*  --   --   --   --   --  26.92* 24.05*  --  26.30* 18.38* 21.27*  --    HEMOGLOBIN g/dL 8.5* 8.9* 8.4* 8.6* 8.9* 9.0*  --  6.6*   < > 8.5*  --  8.7* 8.8*   < > 6.2* 8.0* 8.8*  --    HEMATOCRIT %  --  27.2*  --   --   --  27.0*  --   --   --   --   --  27.5* 26.9*  --  20.3* 27.2* 28.3*  --    PLATELETS Thousands/uL  --  107*  --   --   --  118*  --   --   --  186  --  199 205  --  284 333 332  --    INR   --   --   --   --   --   --  1.20*  --   --   --  1.22*  --  1.49*  --  1.86*  --   --  1.20*    < > = values in this interval not displayed.     Results from last 7 days   Lab Units 03/07/24  0418 03/06/24  0435 03/05/24  0450 03/04/24  0455 03/03/24  1640   SODIUM mmol/L 147 145 141 138 138   POTASSIUM mmol/L 3.5 3.2* 3.8 3.4* 3.8   CHLORIDE mmol/L 114* 113* 111* 109* 108   CO2 mmol/L 21 23 23 23 22   BUN " mg/dL 22 24 27* 35* 35*   CREATININE mg/dL 0.69 0.70 0.74 0.88 0.89   CALCIUM mg/dL 7.0* 6.7* 7.2* 7.4* 7.7*   ALBUMIN g/dL 2.2* 2.0* 2.1* 2.3* 2.7*   TOTAL BILIRUBIN mg/dL 0.71 0.87 0.67 0.83 0.83   ALK PHOS U/L 83 60 75 61 65   ALT U/L 79* 74* 79* 93* 109*   AST U/L 38 49* 44* 80* 156*         Imaging and Other Studies      Inhouse Medications     Current Facility-Administered Medications:     ampicillin-sulbactam (UNASYN) 3 g in sodium chloride 0.9 % 100 mL IVPB, 3 g, Intravenous, Q6H, 3 g at 03/07/24 1123    atorvastatin (LIPITOR) tablet 40 mg, 40 mg, Oral, Daily, 40 mg at 03/07/24 0818    carvedilol (COREG) tablet 6.25 mg, 6.25 mg, Oral, BID With Meals    collagenase (SANTYL) ointment, , Topical, Daily, Given at 03/07/24 0818    diltiazem (CARDIZEM) 125 mg in sodium chloride 0.9 % 125 mL infusion, 1-15 mg/hr, Intravenous, Titrated, 15 mg/hr at 03/07/24 1105    escitalopram (LEXAPRO) tablet 10 mg, 10 mg, Oral, Daily    famotidine (PEPCID) tablet 20 mg, 20 mg, Oral, Daily    insulin lispro (HumALOG/ADMELOG) 100 units/mL subcutaneous injection 2-12 Units, 2-12 Units, Subcutaneous, Q6H CRUZITO, 2 Units at 03/07/24 0500 **AND** Fingerstick Glucose (POCT), , , Q6H    levalbuterol (XOPENEX) inhalation solution 1.25 mg, 1.25 mg, Nebulization, Q8H PRN, 1.25 mg at 03/07/24 1128    mirtazapine (REMERON) tablet 7.5 mg, 7.5 mg, Oral, HS    OLANZapine (ZyPREXA ZYDIS) dispersible tablet 5 mg, 5 mg, Oral, HS    ondansetron (ZOFRAN) injection 4 mg, 4 mg, Intravenous, Q4H PRN    pantoprazole (PROTONIX) EC tablet 40 mg, 40 mg, Oral, Daily Before Breakfast, 40 mg at 03/07/24 0836    predniSONE tablet 40 mg, 40 mg, Oral, Daily, 40 mg at 03/07/24 0818      Assessment/Plan:    Patient with ongoing pneumonia.  History of Enterococcus bacteremia, would wonder if the ulcer identified in the distal rectum could be a source of her bacteremia.  After further discussion patient may have been having some trouble with constipation and therefore  stecural ulcer is possible  Patient's diet has been poor throughout her stay in the ICU appreciate bedside swallow evaluation but difficult to assess if patient is not cooperative.  In regards to the ulcer identified yesterday, this would be at risk of rebleeding as I suspect that she will not heal very quickly with her other underlying problems.  If significant bleeding were to occur direct pressure with gauze pad inserted in the rectum may tamponade the area alternatively would be exam under anesthesia as I do not think endoscopic treatment for this type of problem would be highly successful          Cristi Stevens MD

## 2024-03-07 NOTE — ASSESSMENT & PLAN NOTE
Pt noted to be in SVT @ approx 15:38 on 3/6/24. EKG showing afib. Gave lopressor 5 mg IV x1, followed by Diltiazem 10 mg IV x1. Intended to start Diltiazem gtt, however pt converted to NSR w HR 80s-90s.    Pt relapsed into Afib w RVR @ 18:42 on 3/6/24    Echo 2/20/24 unremarkable--showed grade 1 diastolic dysfunction and mild mitral regurg.    Chads-vasc=7 (high risk for CVA)  Has-bled=5 (high risk for significant bleeding event)    Continue diltiazem gtt for target HR<90. Transition to oral medications when taking PO.  Consider amiodarone  Consider Cardiology consult vs o/p Cardiology referral  Will need shared decision making with patient/family regarding long-term anticoagulation plan

## 2024-03-07 NOTE — ASSESSMENT & PLAN NOTE
Seen on CT 3/4/24  Consider reactivation of TB vs septic emboli vs aspiration vs community acquired    Preliminary AFB negative  Sputum growing 2+ pseudomonas, possible colonization--susceptibilities pending    Rule out reactivation of TB in this immunosuppressed patient; AFB x 3 ordered, preliminarily negative  Consider also septic emboli vs aspiration vs community acquired--DYANA when able  Continue Unasyn for tx of E avium bacteremia  Weaned O2, now satting 90s on RA

## 2024-03-07 NOTE — ASSESSMENT & PLAN NOTE
Recent persistent diarrhea  FOBT positive @ [facility]  Small bloody bowel movement on presentation to [facility] ICU  Acute drop in hemoglobin from 8.0 morning of 3/3/2024 to 6.2 approximately 12 hours later  PTT>210 at presentation  Recent CT A/P demonstrating colitis  No history of significant GI bleed per family  Family reports colonoscopy approximately 1 year ago without significant findings    Hep gtt held at presentation.  PTT= 31 morning of 3/5/24    With no further episodes of GIB and Hgb stable since transfusion of 2 U pRBC on 3/3, Hep restarted 3/5/24, followed by recurrence of multiple bloody bowel movements (2 during the day, 4 overnight 3/5-3/6/24), with resultant drop in Hgb from 8.5 to 6.6. Transfused 1 U pRBC. Repeat Hgb morning of 3/6 is 9.0, remains stable at 8.9 morning of 3/7.    Bedside flex sig with GI 3/6 shows ulcerated rectal mucosa, sigmoid diverticula, and hemorrhoids--cauterized    Pt on home Eliquis for DVT found on 2/20/2024    Change to Protonix 40 mg QD from BID--can D/C and resume home famotidine when taking PO  IVC filter with IR later today  GI on board, appreciate recommendations  If recurrent bleeding continues, suggest exam under anesthesia with colorectal surgery for treatment/ oversew vessel

## 2024-03-08 ENCOUNTER — TELEPHONE (OUTPATIENT)
Dept: RADIOLOGY | Facility: HOSPITAL | Age: 79
End: 2024-03-08

## 2024-03-08 LAB
BACTERIA BLD CULT: NORMAL
BACTERIA BLD CULT: NORMAL
BACTERIA SPT RESP CULT: ABNORMAL
BACTERIA SPT RESP CULT: ABNORMAL
GRAM STN SPEC: ABNORMAL

## 2024-03-08 NOTE — ASSESSMENT & PLAN NOTE
Lab Results   Component Value Date    HGBA1C 5.9 (H) 03/03/2024       Recent Labs     03/07/24  2301 03/07/24  2343 03/07/24  2347 03/08/24  0724   POCGLU 101 269* 234* 114         Blood Sugar Average: Last 72 hrs:  (P) 148    History of DM, well-controlled with last A1c 5.6 on 2/26, but now 5.9. Hyperglycemia in the setting of steroid use.    ISS with meals and at bedtime. Decreased from Algorithm 3 to Algorithm 2 in light of hypoglycemia x1  Fingersticks with meals and at bedtime  Avoid hypoglycemia per protocol  Monitor BG and insulin requirements and adjust regimen as needed

## 2024-03-08 NOTE — ASSESSMENT & PLAN NOTE
CXR 2/29/24: Right lung opacities, suspicious for pneumonia in the appropriate clinical setting.  CT abdomen pelvis 2/29/2024: LOWER CHEST: Mild right basilar patchy consolidation #2/5 suspicious for pneumonia.  Procal elevated and uptrending from 0.76 on 3/1/24 to 3.08 on 3/4/24  CT Chest 3/4/24 shows extensive right upper lobe cavitary pneumonia right basilar atelectasis and/or pneumonia with small bilateral pleural effusions    Sputum Cx 3/3 growing 2+ pseudomonas--susceptible to Zosyn, meropenem, levofloxacin, and tobramycin. Intermediate susceptibility to cefepime.    Plan as above under cavitary PNA

## 2024-03-08 NOTE — ASSESSMENT & PLAN NOTE
Failed bedside swallow with nursing  Speech recommends thin liquids only at this time, aspiration precautions, can attempt oral meds when alert  Morning of 3/8, pt unable to take oral meds per nursing

## 2024-03-08 NOTE — OCCUPATIONAL THERAPY NOTE
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)    Sepsis (HCC)    Acute deep vein thrombosis (DVT) of both lower extremities (HCC)    Dysphagia    Atrial fibrillation with rapid ventricular response (HCC)    Past Medical History  Past Medical History:   Diagnosis Date    Acute GI bleeding 03/03/2024    Diabetes mellitus (HCC)     Fibromyalgia     Hyperlipidemia     Hypertension     Shock (HCC) 03/03/2024     Past Surgical History  Past Surgical History:   Procedure Laterality Date    ACHILLES TENDON REPAIR Right     ADENOIDECTOMY      APPENDECTOMY      IR IVC FILTER PLACEMENT OPTIONAL/TEMPORARY  3/7/2024    SKIN CANCER EXCISION      x 4 - back    TONSILLECTOMY      TOTAL ABDOMINAL HYSTERECTOMY      US GUIDED KIDNEY BIOPSY  12/26/2023 03/08/24 1540   OT Last Visit   OT Visit Date 03/08/24   Note Type   Note type Evaluation   Pain Assessment   Pain Assessment Tool 0-10   Pain Score No Pain   Pain Rating: FLACC (Rest) - Face 0   Pain Rating: FLACC (Rest) - Legs 0   Pain Rating: FLACC (Rest) - Activity 0   Pain Rating: FLACC (Rest) - Cry 0   Pain Rating: FLACC (Activity) - Face 1   Pain Rating: FLACC (Activity) - Legs 1   Pain Rating: FLACC (Activity) - Activity 1   Pain Rating: FLACC (Activity) - Cry 1   Pain Rating: FLACC (Activity) - Consolability 0   Score: FLACC (Activity) 4   Restrictions/Precautions   Weight Bearing Precautions Per Order No   Other Precautions Fall Risk;O2;Telemetry;Multiple lines;Chair Alarm;Bed Alarm;Cognitive;Contact/isolation  (MDRO; rule TB; sacral wound)   Home Living   Type of Home House   Home Layout Two level;Stairs to enter  "with rails  (2-3 EUGENIO with railing; BSC for 1st level; stair glide to 2nd level)   Bathroom Shower/Tub Walk-in shower   Bathroom Toilet Raised  (Commode)   Bathroom Equipment Commode;Grab bars in shower;Shower chair   Home Equipment Walker;Stair glide  (2 RW)   Additional Comments Information obtain from    Prior Function   Level of [place] Independent with ADLs;Needs assistance with functional mobility;Independent with IADLS   Lives With Spouse   Receives Help From Family   IADLs Family/Friend/Other provides meals;Family/Friend/Other provides medication management;Family/Friend/Other provides transportation   Falls in the last 6 months 0   Vocational Retired   Lifestyle   Autonomy Per husbands report(prior to hospitalization) PTA pt was independent with ADL/IADLs, transfers, and functional mobility - with use of RW. Per  pt is (-) falls, (-) home alone.   Reciprocal Relationships spouse   Service to Others homemaker   Intrinsic Gratification watching tv   General   Family/Caregiver Present Yes   Additional General Comments spouse   Subjective   Subjective \"This is [place].\"   ADL   Where Assessed Edge of bed   Eating Assistance 2  Maximal Assistance   Grooming Assistance 3  Moderate Assistance   UB Bathing Assistance 2  Maximal Assistance   LB Bathing Assistance 2  Maximal Assistance   UB Dressing Assistance 2  Maximal Assistance   LB Dressing Assistance 2  Maximal Assistance   Toileting Assistance  2  Maximal Assistance   Bed Mobility   Supine to Sit 2  Maximal assistance   Additional items Assist x 2;HOB elevated;Bedrails;Increased time required;Verbal cues;LE management   Sit to Supine 2  Maximal assistance   Additional items Assist x 2;Increased time required;Verbal cues;LE management   Transfers   Sit to Stand Unable to assess   Additional Comments Unable to assess transfer at this time 2* to poor sitting tolereance and demonstration of LE strength   Functional Mobility   Additional Comments " Recommend hoylift for OOB with nursing   Balance   Static Sitting Poor +   Dynamic Sitting Poor   Activity Tolerance   Activity Tolerance Patient limited by fatigue   Medical Staff Made Aware nsg   RUE Assessment   RUE Assessment X  (PROM = WFL poor shoulder AROM (i.e shoulder = 10-15 degrees); elbow-distal=WFLs  Simultaneous filing. User may not have seen previous data.)   RUE Strength   RUE Overall Strength   (shr=2-/5, elbow-distal=3-/5)   LUE Assessment   LUE Assessment X  (PROM = WFL poor shoulder AROM (i.e shoulder = 10-15 degrees); elbow-distal=WFLs)   LUE Strength   LUE Overall Strength   (shr=2-/5, elbow-distal=3-/5)   Hand Function   Gross Motor Coordination Functional   Fine Motor Coordination Functional   Sensation   Light Touch No apparent deficits   Proprioception   Proprioception No apparent deficits   Vision-Basic Assessment   Current Vision No visual deficits   Vision - Complex Assessment   Acuity Able to read clock/calendar on wall without difficulty   Psychosocial   Psychosocial (WDL) X   Patient Behaviors/Mood Calm;Flat affect   Perception   Inattention/Neglect Appears intact   Cognition   Overall Cognitive Status Impaired   Arousal/Participation Alert   Attention Difficulty attending to directions   Orientation Level Oriented to person;Disoriented to place;Disoriented to time;Disoriented to situation   Memory Decreased recall of recent events   Following Commands Follows one step commands with increased time or repetition   Assessment   Limitation Decreased ADL status;Decreased UE ROM;Decreased UE strength;Decreased Safe judgement during ADL;Decreased cognition;Decreased endurance;Decreased high-level ADLs   Prognosis Fair   Assessment Pt is a 78 y.o female who was admitted to ICU on 3/3/2024 (transferred from [facility]) due to being in respiratory distress; pt noted with VDRF; extubated on 3/4/24. Pt noted with sepsis/shock 2* to enterococcal bacteremia, GI bleed (FOBT (+) at [facility]), b/l LE DVT, sacral  "wounds, pneumonia, afib with RVR, and lupus. S/p PICC line placement on 3/8/2024. Pt with PMH of lupus, fibromyalgia, HTN, and HLD, and R achilles tendon repair. Per husbands report PTA pt was independent with ADL/IADLs, transfers, and functional mobility - with use of RW. Per  pt is (-) falls, (-) home alone. During OT initial evaluation pt demonstrated deficits with ADL status, b/l UE ROM/strength, transfer safety, functional balance, activity tolerance (currently poor = 5-10mins), cognition, and functional mobility. Pt would benefit from continued IP OT services for the above stated deficits. Will continue to see. The patient's raw score on the AM-PAC Daily Activity Inpatient Short Form is 9. A raw score of less than 19 suggests the patient may benefit from discharge to post-acute rehabilitation services. Please refer to the recommendation of the Occupational Therapist for safe discharge planning.   Goals   Patient Goals \"get better\"   STG Time Frame   (1-7 days)   Short Term Goal #1 Pt will demonstrate mod A with her bed mobility to faciliate EOB ADLs.   Short Term Goal #2 Pt will demonstrate improved activity tolerance to good(20-30mins) and sitting tolerance(on EOB) to 10-15mins to assist with ADLs.   Short Term Goal  Pt will tolerate continued functional mobility/cognitive assessment and appropriate goals/discharge recommendations will be established.   LTG Time Frame   (7-14 days)   Long Term Goal #1 Pt will demonstrate supervision with simple ADL task(i.e.grooming, self-feeding).   Long Term Goal #2 Pt will demonstrate improved functional balance by 1 grade to assist with ADLs.   Long Term Goal Pt will demonstrate improved b/l UE strength by 1 grade to improve functional use of UE.   Plan   Treatment Interventions ADL retraining;Functional transfer training;UE strengthening/ROM;Endurance training;Cognitive reorientation;Patient/family training;Equipment evaluation/education;Compensatory technique " education;Continued evaluation   Goal Expiration Date 03/22/24   OT Treatment Day 0   OT Frequency   (2-5xwk)   Discharge Recommendation   Rehab Resource Intensity Level, OT I (Maximum Resource Intensity)   AM-PAC Daily Activity Inpatient   Lower Body Dressing 1   Bathing 1   Toileting 1   Upper Body Dressing 2   Grooming 2   Eating 2   Daily Activity Raw Score 9   Turning Head Towards Sound 3   Follow Simple Instructions 3   Low Function Daily Activity Raw Score 15   Low Function Daily Activity Standardized Score  26.28   AM-PAC Applied Cognition Inpatient   Following a Speech/Presentation 2   Understanding Ordinary Conversation 2   Taking Medications 2   Remembering Where Things Are Placed or Put Away 2   Remembering List of 4-5 Errands 1   Taking Care of Complicated Tasks 1   Applied Cognition Raw Score 10   Applied Cognition Standardized Score 24.98   Leighton Gandhi

## 2024-03-08 NOTE — TELEPHONE ENCOUNTER
IR Clinic Follow-Up:    Patient clinical visit in 2-4 months.    Schedule visit for the first available IR Physician: No                *If no, schedule for:   IR Physician: Jessica    Type of Visit: Virtual Brief Visit - Telephone    Additional Details:     This is follow-up for IVC filter placement March 7, 2024    We can start with a chart review to see if patient is candidate for filter retrieval and then if needed arrange a clinic phone visit

## 2024-03-08 NOTE — PLAN OF CARE
Problem: INFECTION - ADULT  Goal: Absence or prevention of progression during hospitalization  Description: INTERVENTIONS:  Picc procedure and maintenance  - Assess and monitor for signs and symptoms of infection  - Monitor lab/diagnostic results  - Monitor all insertion sites, i.e. indwelling lines, tubes, and drains  - Monitor endotracheal if appropriate and nasal secretions for changes in amount and color  - [place] appropriate cooling/warming therapies per order  - Administer medications as ordered  - Instruct and encourage patient and family to use good hand hygiene technique  - Identify and instruct in appropriate isolation precautions for identified infection/condition  Outcome: Progressing

## 2024-03-08 NOTE — PROGRESS NOTES
[facility]  Progress Note  Name: Darlyn Nguyen I  MRN: [id number]  Unit/Bed#: ICU 02 I Date of Admission: 3/3/2024   Date of Service: 3/8/2024 I Hospital Day: 5    Assessment/Plan   * Bacteremia due to Enterococcus  Assessment & Plan  E. avium bacteremia. Pt recently started on prednisone, atovaquone, hydroxychloroquine, and mycophenolate for lupus. Ulcerated rectal mucosa possible point of entry.     Repeat cultures show NGTD  Susceptibilities show pan-sensitive enterococcus    ID on board, appreciate recommendations  Narrowed to Unasyn 3 g Q6H (Day 4 Unasyn of anticipated prolonged course, Day 8 total abx)  Can hold off DYANA, per ID, as unlikely to     Atrial fibrillation with rapid ventricular response (HCC)  Assessment & Plan  Pt noted to be in SVT @ approx 15:38 on 3/6/24. EKG showing afib. Gave lopressor 5 mg IV x1, followed by Diltiazem 10 mg IV x1. Intended to start Diltiazem gtt, however pt converted to NSR w HR 80s-90s.    Pt relapsed into Afib w RVR @ 18:42 on 3/6/24    Echo 2/20/24 unremarkable--showed grade 1 diastolic dysfunction and mild mitral regurg.    Chads-vasc=7 (high risk for CVA)  Has-bled=5 (high risk for significant bleeding event)    Continue diltiazem gtt for target HR<90. Transition to oral medications when taking PO.  Consider amiodarone  Consider Cardiology consult vs o/p Cardiology referral  Will need shared decision making with patient/family regarding long-term anticoagulation plan    Cavitary pneumonia  Assessment & Plan  Seen on CT 3/4/24  Consider reactivation of TB vs septic emboli vs aspiration vs community acquired    Preliminary AFB negative    Sputum Cx 3/3 growing 2+ pseudomonas--susceptible to Zosyn, meropenem, levofloxacin, and tobramycin. Intermediate susceptibility to cefepime.    ID on board, appreciate recommendations  Rule out reactivation of TB in this immunosuppressed patient; AFB x 3 ordered, preliminarily  negative  Consider also septic emboli vs aspiration vs community acquired  Continue Unasyn for tx of E avium bacteremia  Start chest physio  Change abx to Zosyn if respiratory status fails to improve  Wean O2 as able for SpO2>90%  Follow-up MTB PCR sent [facility] state lab  Will likely need repeat imaging outpatient  Serum galactomannan ordered to r/o chronic aspergillosis    GI bleed  Assessment & Plan  Recent persistent diarrhea  FOBT positive @ [facility]  Small bloody bowel movement on presentation to [facility] ICU  Acute drop in hemoglobin from 8.0 morning of 3/3/2024 to 6.2 approximately 12 hours later  PTT>210 at presentation  Recent CT A/P demonstrating colitis  No history of significant GI bleed per family  Family reports colonoscopy approximately 1 year ago without significant findings    Hep gtt held at presentation.  PTT= 31 morning of 3/5/24    With no further episodes of GIB and Hgb stable since transfusion of 2 U pRBC on 3/3, Hep restarted 3/5/24, followed by recurrence of multiple bloody bowel movements (2 during the day, 4 overnight 3/5-3/6/24), with resultant drop in Hgb from 8.5 to 6.6. Transfused 1 U pRBC. Hgb minimally down-trending from 8.9 on 3/7 to 8.3 on 3/8.    Bedside flex sig with GI 3/6 shows ulcerated rectal mucosa, sigmoid diverticula, and hemorrhoids--cauterized    Pt on home Eliquis for DVT found on 2/20/2024    S/p IVC filter w IR on 3/7/24    -Continue Protonix 40 mg QD--can transition to PO when pt able to tolerate pills  -GI on board, appreciate recommendations  -If significant bleeding were to occur, GI recommends direct pressure with gauze pad inserted in the rectum may tamponade the area alternatively would be exam under anesthesia as I do not think endoscopic treatment for this type of problem would be highly successful.    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) ([facility])  Assessment & Plan  Biopsy-proven at [facility]    Oliguria, with good UOP to Lasix challenge    BUN stable  Cr stable  Give Lasix 40 mg IV BID today  Continue to monitor volume status and UOP  Nephrology on board, appreciate recommendations    Diabetes mellitus ([facility])  Assessment & Plan  Lab Results   Component Value Date    HGBA1C 5.9 (H) 03/03/2024       Recent Labs     03/07/24  2301 03/07/24  2343 03/07/24  2347 03/08/24  0724   POCGLU 101 269* 234* 114         Blood Sugar Average: Last 72 hrs:  (P) 148    History of DM, well-controlled with last A1c 5.6 on 2/26, but now 5.9. Hyperglycemia in the setting of steroid use.    ISS with meals and at bedtime. Decreased from Algorithm 3 to Algorithm 2 in light of hypoglycemia x1  Fingersticks with meals and at bedtime  Avoid hypoglycemia per protocol  Monitor BG and insulin requirements and adjust regimen as needed    Dysphagia  Assessment & Plan  Failed bedside swallow with nursing  Speech recommends thin liquids only at this time, aspiration precautions, can attempt oral meds when alert  Morning of 3/8, pt unable to take oral meds per nursing    Sacral wound  Assessment & Plan  Wound Care on board, appreciate recommendations  Repositioning and wound care with nursing    Acute respiratory failure with hypoxia ([facility])  Assessment & Plan  CXR 2/29/24: Right lung opacities, suspicious for pneumonia in the appropriate clinical setting.  CT abdomen pelvis 2/29/2024: LOWER CHEST: Mild right basilar patchy consolidation #2/5 suspicious for pneumonia.  Procal elevated and uptrending from 0.76 on 3/1/24 to 3.08 on 3/4/24  CT Chest 3/4/24 shows extensive right upper lobe cavitary pneumonia right basilar atelectasis and/or pneumonia with small bilateral pleural effusions    Sputum Cx 3/3 growing 2+ pseudomonas--susceptible to  Zosyn, meropenem, levofloxacin, and tobramycin. Intermediate susceptibility to cefepime.    Plan as above under cavitary PNA    Lupus (HCC)  Assessment & Plan  Diagnosed in December,   Initially started on steroids, prednisone 60 mg daily, which patient had tapered to 30 mg daily prior to admission with the addition of atovaquone, hydroxychloroquine, and mycophenolate    Holding atovaquone, hydroxychloroquine, and mycophenolate in the setting of acute infection  Patient started on Solu-Cortef prior to discharge from [facility] for possible relative adrenal insufficiency contributing to shock state.      Continue prednisone, taper 10 mg weekly, currently 40 mg Day 3       ----------------------------------------------------------------------------------------  HPI/24hr events: Relapsed into Afib w RVR @ 04:40 briefly, then converted to NSR, then relapsed again to Afib w RVR @ 05:30.     Patient appropriate for transfer out of the ICU today?: No  Disposition: Continue Stepdown Level 1 level of care   Code Status: Level 2 - DNAR: but accepts endotracheal intubation  ---------------------------------------------------------------------------------------  SUBJECTIVE  This morning, Ms. Nguyen is seen lying in bed, awake, alert, engaged with exam, in no acute distress. She reports feeling well at this time and endorses no complaints.  Denies dizziness, lightheadedness, headache, chest pain, palpitations, shortness of breath, abdominal pain, sacral pain. She continues to report decreased appetite. BM this morning, large, brown.    Review of Systems  Review of systems was reviewed and negative unless stated above in HPI/24-hour events   ---------------------------------------------------------------------------------------  OBJECTIVE    Vitals   Vitals:    03/08/24 0520 03/08/24 0600 03/08/24 0700 03/08/24 0726   BP: 138/79 121/55 142/61    Pulse: (!) 108 96 94    Resp: 20 19 18    Temp:    98.7 °F (37.1 °C)   TempSrc:     Axillary   SpO2: 98% 98% 98%    Weight:  78.4 kg (172 lb 13.5 oz)     Height:         Temp (24hrs), Av.1 °F (36.7 °C), Min:97.5 °F (36.4 °C), Max:98.7 °F (37.1 °C)  Current: Temperature: 98.7 °F (37.1 °C)          Respiratory:  SpO2: SpO2: 98 %, SpO2 Activity: SpO2 Activity: At Rest, SpO2 Device: O2 Device: Nasal cannula, Capnography:    Nasal Cannula O2 Flow Rate (L/min): 2 L/min    Invasive/non-invasive ventilation settings   Respiratory      Lab Data (Last 4 hours)      None           O2/Vent Data (Last 4 hours)      None                    Physical Exam  Vitals and nursing note reviewed.   Constitutional:       General: She is not in acute distress.     Appearance: She is ill-appearing. She is not toxic-appearing or diaphoretic.   HENT:      Head: Normocephalic and atraumatic.   Eyes:      General: No scleral icterus.        Right eye: No discharge.         Left eye: No discharge.      Conjunctiva/sclera: Conjunctivae normal.   Cardiovascular:      Rate and Rhythm: Tachycardia present. Rhythm irregular.      Pulses: Normal pulses.      Heart sounds: Normal heart sounds. No murmur heard.     No friction rub. No gallop.   Pulmonary:      Effort: Pulmonary effort is normal.      Comments: Coarse upper airway sounds transmitted throughout the chest on auscultation. Satting 98% on 2 L.  Abdominal:      General: Bowel sounds are normal. There is no distension.      Palpations: Abdomen is soft.      Tenderness: There is no abdominal tenderness. There is no guarding or rebound.   Musculoskeletal:         General: Swelling present.      Right lower leg: Edema (1+ pitting) present.      Left lower leg: Edema (1+ pitting) present.      Comments: Anasarca, 3+ pitting in RUE, trace to 1+ in LUE   Skin:     General: Skin is warm and dry.   Neurological:      Mental Status: She is alert. She is disoriented.      Comments: Oriented to self, PA, hospital, and current president. But not to city ([place]) or year ()    Psychiatric:         Mood and Affect: Mood normal.         Behavior: Behavior normal.             Laboratory and Diagnostics:  Results from last 7 days   Lab Units 03/08/24  0505 03/07/24  2344 03/07/24  1824 03/07/24  1127 03/07/24  0418 03/06/24  2357 03/06/24  1736 03/06/24  1120 03/06/24  0435 03/05/24  1751 03/05/24  1539 03/05/24  0450 03/04/24  0455 03/03/24  2250 03/03/24  1640 03/03/24  0404 03/02/24  0658   WBC Thousand/uL 17.24*  --   --   --  25.90*  --   --   --  26.92*  --   --  26.92* 24.05*  --  26.30* 18.38* 21.27*   HEMOGLOBIN g/dL 8.3* 8.0* 7.6* 8.5* 8.9* 8.4* 8.6*   < > 9.0*   < > 8.5* 8.7* 8.8*   < > 6.2* 8.0* 8.8*   HEMATOCRIT % 25.5*  --   --   --  27.2*  --   --   --  27.0*  --   --  27.5* 26.9*  --  20.3* 27.2* 28.3*   PLATELETS Thousands/uL 89*  --   --   --  107*  --   --   --  118*  --  186 199 205  --  284 333 332   NEUTROS PCT %  --   --   --   --   --   --   --   --  95*  --   --   --  88*  --  91* 91*  --    BANDS PCT %  --   --   --   --   --   --   --   --   --   --   --  2  --   --   --   --   --    MONOS PCT %  --   --   --   --   --   --   --   --  1*  --   --   --  2*  --  2* 4  --    MONO PCT %  --   --   --   --   --   --   --   --   --   --   --  4  --   --   --   --  3*   EOS PCT %  --   --   --   --   --   --   --   --  0  --   --  0 0  --  0 0 0    < > = values in this interval not displayed.     Results from last 7 days   Lab Units 03/08/24  0505 03/07/24  0418 03/06/24  0435 03/05/24  0450 03/04/24  0455 03/03/24  1640 03/03/24  1149 03/03/24  0404   SODIUM mmol/L 145 147 145 141 138 138 137 138   POTASSIUM mmol/L 3.9 3.5 3.2* 3.8 3.4* 3.8 4.1 4.2   CHLORIDE mmol/L 111* 114* 113* 111* 109* 108 106 106   CO2 mmol/L 21 21 23 23 23 22 20* 22   ANION GAP mmol/L 13 12 9 7 6 8 11 10   BUN mg/dL 22 22 24 27* 35* 35* 36* 36*   CREATININE mg/dL 0.71 0.69 0.70 0.74 0.88 0.89 0.92 0.85   CALCIUM mg/dL 7.5* 7.0* 6.7* 7.2* 7.4* 7.7* 7.7* 8.5   GLUCOSE RANDOM mg/dL 200* 143* 126 125  224* 302* 420* 447*   ALT U/L  --  79* 74* 79* 93* 109* 57* 18   AST U/L  --  38 49* 44* 80* 156* 66* 13   ALK PHOS U/L  --  83 60 75 61 65 59 70   ALBUMIN g/dL  --  2.2* 2.0* 2.1* 2.3* 2.7* 1.9* 2.3*   TOTAL BILIRUBIN mg/dL  --  0.71 0.87 0.67 0.83 0.83 0.61 0.61     Results from last 7 days   Lab Units 03/07/24  0418 03/04/24  0455 03/03/24  1640   MAGNESIUM mg/dL 2.3 1.9 1.9   PHOSPHORUS mg/dL  --  2.2* 2.4      Results from last 7 days   Lab Units 03/06/24  0046 03/05/24  1802 03/05/24  1539 03/05/24  0646 03/04/24  0455 03/03/24  2250 03/03/24  1640 03/03/24  0823 03/01/24  2159 03/01/24  1438   INR  1.20*  --   --  1.22* 1.49*  --  1.86*  --   --  1.20*   PTT seconds 29 64* >210* 31  --  41* >210* >210*   < > 29    < > = values in this interval not displayed.          Results from last 7 days   Lab Units 03/04/24  0150 03/03/24  2250 03/03/24  1313 03/03/24  1019   LACTIC ACID mmol/L 2.1* 3.1* 4.5* 7.1*     ABG:  Results from last 7 days   Lab Units 03/03/24  1401   PH ART  7.345*   PCO2 ART mm Hg 38.3   PO2 ART mm Hg 117.8   HCO3 ART mmol/L 20.4*   BASE EXC ART mmol/L -4.8   ABG SOURCE  Radial, Left     VBG:  Results from last 7 days   Lab Units 03/03/24  2250 03/03/24  1401   PH RADHA  7.255*  --    PCO2 RADHA mm Hg 49.7  --    PO2 RADHA mm Hg 20.8*  --    HCO3 RADHA mmol/L 21.6*  --    BASE EXC RADHA mmol/L -5.4  --    ABG SOURCE   --  Radial, Left     Results from last 7 days   Lab Units 03/04/24  0455   PROCALCITONIN ng/ml 3.08*       Micro  Results from last 7 days   Lab Units 03/03/24  2255 03/03/24  1640 03/03/24  1404 03/03/24  1148 03/03/24  1019 03/02/24  0823 03/02/24  0700   BLOOD CULTURE  No Growth After 4 Days.  No Growth After 4 Days.  --   --  No Growth After 4 Days. No Growth After 4 Days. No Growth After 5 Days. No Growth After 5 Days.   SPUTUM CULTURE   --   --  2+ Growth of Pseudomonas aeruginosa*  Few Colonies of Candida albicans*  --   --   --   --    GRAM STAIN RESULT   --   --  3+ Polys*  1+  "Budding yeast*  1+ Gram negative rods*  --   --   --   --    MRSA CULTURE ONLY   --  No Methicillin Resistant Staphlyococcus aureus (MRSA) isolated  --   --   --   --   --        EKG: tele reviewed: Briefly back into afib w RVR at 04:40, converted to NSR, then relapsed into Afib w RVR at 05:30, ongoing at this time.  Imaging: I have personally reviewed pertinent reports.   and I have personally reviewed pertinent films in PACS    Intake and Output  I/O         03/06 0701 03/07 0700 03/07 0701 03/08 0700 03/08 0701 03/09 0700    I.V. (mL/kg) 2813.1 (34.9) 289.7 (3.7)     Blood       IV Piggyback 350 430     Total Intake(mL/kg) 3163.1 (39.2) 719.7 (9.2)     Urine (mL/kg/hr) 1880 (1) 1735 (0.9)     Stool 0 0     Total Output 1880 1735     Net +1283.1 -1015.3            Unmeasured Stool Occurrence 1 x 1 x             Height and Weights   Height: 5' 4\" (162.6 cm)  IBW (Ideal Body Weight): 54.7 kg  Body mass index is 29.67 kg/m².  Weight (last 2 days)       Date/Time Weight    03/08/24 0600 78.4 (172.84)    03/07/24 1130 80.3 (177)    03/07/24 0548 80.6 (177.69)    03/06/24 0600 80 (176.37)              Nutrition       Diet Orders   (From admission, onward)                 Start     Ordered    03/07/24 1516  Diet Clear Liquid  Diet effective now        References:    Adult Nutrition Support Algorithm    RD Therapeutic Diet Order Protocol   Question Answer Comment   Diet Type Clear Liquid    RD to adjust diet per protocol? Yes        03/07/24 1516                      Active Medications  Scheduled Meds:  Current Facility-Administered Medications   Medication Dose Route Frequency Provider Last Rate    ampicillin-sulbactam  3 g Intravenous Q6H AUGUSTA Lewis Stopped (03/08/24 0615)    atorvastatin  40 mg Oral Daily AUGUSTA Lewis      carvedilol  6.25 mg Oral BID With Meals Uche Blair MD      collagenase   Topical Daily Jose Zeb Sonday, CRNP      diltiazem  1-15 mg/hr Intravenous " "Titrated Uche Blair MD 12.5 mg/hr (03/08/24 0802)    escitalopram  10 mg Oral Daily Uche Blair MD      famotidine  20 mg Oral Daily Uche Blair MD      furosemide  40 mg Intravenous BID (diuretic) Uche Blair MD      insulin lispro  1-5 Units Subcutaneous TID AC Uche Blair MD      insulin lispro  1-5 Units Subcutaneous HS Uche Blair MD      levalbuterol  1.25 mg Nebulization Q8H PRN AUGUSTA Lewsi      mirtazapine  7.5 mg Oral HS Uche Blair MD      OLANZapine  5 mg Oral HS Hao Lantigua PA-C      ondansetron  4 mg Intravenous Q4H PRN AUGUSTA Lweis      pantoprazole  40 mg Oral Daily Before Breakfast Uche Blair MD      predniSONE  40 mg Oral Daily Uche Blair MD       Continuous Infusions:  diltiazem, 1-15 mg/hr, Last Rate: 12.5 mg/hr (03/08/24 0802)      PRN Meds:   levalbuterol, 1.25 mg, Q8H PRN  ondansetron, 4 mg, Q4H PRN        Invasive Devices Review  Invasive Devices       Central Venous Catheter Line  Duration             CVC Central Lines 03/03/24 Triple 20cm 4 days              Drain  Duration             Urethral Catheter Latex;Straight-tip 16 Fr. 7 days                    Rationale for remaining devices: Will D/C CVC after placement of PICC for access and need for prolonged course of Abx  ---------------------------------------------------------------------------------------  Advance Directive and Living Will: Yes          Uche Blair MD      Portions of the record may have been created with voice recognition software.  Occasional wrong word or \"sound a like\" substitutions may have occurred due to the inherent limitations of voice recognition software.  Read the chart carefully and recognize, using context, where substitutions have occurred     "

## 2024-03-08 NOTE — Clinical Note
Pt is a 78 y.o female who was admitted to ICU on 3/3/2024 (transferred from [facility]) due to being in respiratory distress; pt noted with VDRF; extubated on 3/4/24. Pt noted with sepsis/shock 2* to enterococcal bacteremia, GI bleed (FOBT (+) at [facility]), b/l LE DVT, sacral wounds, pneumonia, afib with RVR, and lupus. S/p PICC line placement on 3/8/2024. Pt

## 2024-03-08 NOTE — CONSULTS
Consult - Cardiology   Darlyn Nguyen 78 y.o. female MRN: [id number]  Unit/Bed#: ICU 02 Encounter: [id number]        Reason For Consult:  New onset Afib                 Assessment:    New onset of paroxysmal atrial fibrillation with rapid ventricular response  DVT of both lower extremities  Diagnosed on venous duplex 2/20/2024  Anasarca due to iatrogenic fluid overload  Pleural effusion, left  Pericardial effusion  Dx in Dec 2023. 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  Either way, at this point she is not a good candidate for chronic AC for DVT/PE. She has a high CHADSVASC 5 and high HAS-BLED 5. I discussed with patient and her  about what these scores mean. At this point, we will accept the risk of stroke in order to avoid further hemorrhage. Further discussion about long-term AC can happen as an outpatient if/when she recovers from her current acute illness.  She was just started on a CLD last night. She is on diltiazem gtt (this morning her rate of infusions was increased from 5 mg /hr -->15mg/hr) and her telemetry shows afib with HR 70s. If she can reliably take PO today would transition the IV diltiazem to PO diltiazem 60 mg q6h.  Optimize nutrition; goal K > 4 and Mag > 2.  She is presently being diuresed with IV Lasix 40 mg  BID. She examines hypervolemic so we can continue the dose for now and re-evaluate her fluid status daily as her own PO intake hopefully improves.   She has an appt to establish care with Dr. Winn on 4/5/2024.      History Of Present Illness:  Darlyn Nguyen is an 78 year old with PMH as below who presented to [facility] on 3/3 as a transfer from [facility] for shock. She has had mulitple back to back admissions:    11/4-11/6 at [facility] for syncope dc to home  12/3-12/5 at [facility] for syncope, colitis dc to home with [facility]  12/16-12/28 at [facility] for vomiting, generalized weakness, FTT, Lupus, pleural effusion, pericardial effusion dc to home with [facility]  2/9-2/16 at [facility] for syncope, fatigue dc to [facility] 5th floor SNF  2/19-2/20 at [facility] for acute anemia, pancolitis, acute respiratory failure from Methemoglobinemia secondary to dapsone, transferred to [facility] and stayed there from 2/20-2/24, dc back to [facility] 5th floor SNF  2/29-3/3 at [facility] for melena, bacteremia, anemia, septic shock, transferred to [facility] 3/3 and has been here for 4 days so far.    Patient was downgraded from the critical care service to the internal medicine service since her shock has resolved. She has infectious disease consult due to her bacteremia and cavitary pneumonia.  She has pulmonology consult due to the cavitary pneumonia.  She has GI consult due to stercoral ulcer (source of the bacteremia) & GI bleed.  She has nephrology consult due to glomerulonephritis.  She has speech therapy consult due to dysphagia from encephalopathy, movement d/o ?Parkinson.    She had been off of the Eliquis/substitute heparin gtt during this latest admission due to her shock and anemia from bleeding stercoral ulcer. According to GI, the ulcer has risk of rebleeding since he suspects that it will not heal very quickly with her other underlying problems. It is difficult to implement long-term anticoagulation for DVT (as well as now for stroke ppx  "with new Afib) with ongoing bleeding concerns. Intrarenal IVC filter was placed 3/7.    On arrival to [facility] on 3/3, it was noted that patient had shock from multiple possible etiologies including hemorrhagic shock in the setting of acute GI bleed, septic shock in the setting of Enterococcus bacteremia, and adrenal insufficiency in the setting of chronic steroid use and acute illness.  Patient did receive IV fluid resuscitation as well as pressors and Solu-Cortef during this hospitalization.  She has been receiving IV fluid maintenance given her dysphagia and n.p.o. status. On 3/5 did start intermittent IV lasix dosing for anasarca. Since then her weights have peaked and are beginning to decrease. She is intermittently using 2L O2 via nasal cannula for hypoxia. 3/4 CT chest identified small bilateral pleural effusions and 3/7 CXR identified left moderate sized pleural effusion. She is being treated also for cavitary pneumonia on the right which is still an ongoing problem.    Patient at the time of my encounter denies chest pain, fluttering or skipped heart beats, palpitations or lightheadedness at this time. She mostly answers yes or no . Otherwise she says, \"C'mon let's go.\"  says she has been asking to go home.  reports she is not usually this confused.  reports she has had problems with syncope last year. She never complained to him about lightheadedness or dizziness at home.  reports she never had problems with swelling in her limbs before these hospitalizations and being laid up in bed.    Rhythm History: sinus rhythm, RBBB, paroxsymal atrial fibrillation    Primary Cardiologist: Dr. Benjie Goldsmith with [facility] (last visit was 08/29/2019) --> she has an appt to establish care with Dr. Winn at the [place] cardiology office on 4/5/2024    ROS:  Review of Systems   Constitutional:  Negative for diaphoresis.   Respiratory:  Positive for cough.    Cardiovascular:  Negative " for chest pain and palpitations.        +swelling   Gastrointestinal:  Negative for nausea.   Neurological:  Positive for syncope. Negative for dizziness and light-headedness.   Psychiatric/Behavioral:  Positive for confusion.         Past Medical History:        Past Medical History:   Diagnosis Date    Acute GI bleeding 03/03/2024    Diabetes mellitus (HCC)     Fibromyalgia     Hyperlipidemia     Hypertension     Shock (HCC) 03/03/2024      Past Surgical History:   Procedure Laterality Date    ACHILLES TENDON REPAIR Right     ADENOIDECTOMY      APPENDECTOMY      SKIN CANCER EXCISION      x 4 - back    TONSILLECTOMY      TOTAL ABDOMINAL HYSTERECTOMY      US GUIDED KIDNEY BIOPSY  12/26/2023       Family History:     Family History   Problem Relation Age of Onset    Cancer Mother     Cervical cancer Sister     Breast cancer Sister     Breast cancer Sister         Social History:       Smoker: Never  Alcohol: None  Drugs: Never  Living situation: [facility] 5th floor STR, previously in the fall of 2023 she lived with her  at home    Allergy:        Allergies   Allergen Reactions    Rosuvastatin Other (See Comments)     Per SNF    Simvastatin Other (See Comments)     Per SNF    Myalgias       Medications:       Prior to Admission medications    Medication Sig Start Date End Date Taking? Authorizing Provider   Acetaminophen 500 MG Take 2 capsules by mouth every 6 (six) hours as needed for mild pain   Yes Historical Provider, MD   amLODIPine (NORVASC) 5 mg tablet Take 5 mg by mouth daily   Yes Historical Provider, MD   aspirin (ECOTRIN LOW STRENGTH) 81 mg EC tablet Take 81 mg by mouth   Yes Historical Provider, MD   atorvastatin (LIPITOR) 40 mg tablet Take 40 mg by mouth daily   Yes Historical Provider, MD   atovaquone (MEPRON) 750 mg/5 mL suspension Take 750 mg by mouth 2 (two) times a day   Yes Historical Provider, MD   carvedilol (Coreg) 6.25 mg tablet Take 6.25 mg by mouth 2 (two) times a day with meals   Yes  Historical Provider, MD   Cholecalciferol 50 MCG (2000 UT) CAPS Take 1 capsule by mouth daily   Yes Historical Provider, MD   co-enzyme Q-10 100 mg capsule Take 100 mg by mouth daily   Yes Historical Provider, MD   Cyanocobalamin 1000 MCG CAPS Take 1 capsule by mouth daily   Yes Historical Provider, MD   escitalopram (LEXAPRO) 10 mg tablet Take 10 mg by mouth daily   Yes Historical Provider, MD   famotidine (PEPCID) 20 mg tablet Take 20 mg by mouth daily   Yes Historical Provider, MD   FISH OIL-CANOLA OIL-VIT D3 PO Use   Yes Historical Provider, MD   Hydroxychloroquine Sulfate 300 MG TABS Take 1 tablet by mouth daily   Yes Historical Provider, MD   losartan (COZAAR) 100 MG tablet Take 100 mg by mouth daily   Yes Historical Provider, MD   mirtazapine (REMERON) 7.5 MG tablet Take 7.5 mg by mouth daily at bedtime   Yes Historical Provider, MD   mycophenolate (CELLCEPT) 500 mg tablet Take 2 tabs twice daily as instructed 1/10/24  Yes Historical Provider, MD   pantoprazole (PROTONIX) 40 mg tablet Take 40 mg by mouth 2 (two) times a day   Yes Historical Provider, MD   predniSONE 10 mg tablet Take 60 mg by mouth daily   Yes Historical Provider, MD   apixaban (ELIQUIS) 5 mg Take 5 mg by mouth 2 (two) times a day  Patient not taking: Reported on 3/3/2024    Historical Provider, MD   gabapentin (NEURONTIN) 100 mg capsule  12/5/21   Historical Provider, MD   insulin lispro (HumALOG/ADMELOG) 100 units/mL injection Inject 2-10 Units under the skin 3 (three) times a day with meals  Patient not taking: Reported on 3/3/2024    Historical Provider, MD   insulin lispro (HumALOG/ADMELOG) 100 units/mL injection Inject 2-10 Units under the skin daily at bedtime  Patient not taking: Reported on 3/3/2024    Historical Provider, MD   methocarbamol (ROBAXIN) 500 mg tablet Take 500 mg by mouth every 12 (twelve) hours as needed for muscle spasms    Historical Provider, MD   Nutritional Supplements (Al) PACK Take 1 each by mouth 2 (two)  times a day  Patient not taking: Reported on 3/3/2024    Historical Provider, MD   oxyCODONE (ROXICODONE) 5 immediate release tablet Take 2.5 mg by mouth every 6 (six) hours as needed for moderate pain  Patient not taking: Reported on 3/3/2024    Historical Provider, MD   TURMERIC PO Take 1 capsule by mouth daily  Patient not taking: Reported on 2/29/2024    Historical Provider, MD       Vitals:    03/08/24 1055 03/08/24 1100 03/08/24 1200 03/08/24 1333   BP: 130/61  131/63 126/60   Pulse: 74 74 78    Resp: 20 (!) 26 22    Temp:       TempSrc:       SpO2: 92% (!) 86% 98%    Weight:       Height:           Exam:  Physical Exam  Vitals and nursing note reviewed.   Constitutional:       Comments: She is awake propped up in bed,  at the bedside. She follows me with her eyes but and answers my questions occasionally with a yes or no.    HENT:      Head: Normocephalic and atraumatic.      Nose: No congestion.      Mouth/Throat:      Mouth: Mucous membranes are dry.   Eyes:      Conjunctiva/sclera: Conjunctivae normal.   Neck:      Comments: Unable to see the jugular vein her neck is thick   Cardiovascular:      Comments: Normal s 1 & s2, irregularly irregular rhythm with hr during my encounter 75 bpm. No murmur  Pulmonary:      Comments: Very poor cough effort. Diffuse rhonchi and crackles.   She is on o2 via nasal cannula  Musculoskeletal:      Comments: All extremities with pitting edema anasarca   Skin:     General: Skin is warm and dry.      Coloration: Skin is pale.      Findings: Bruising present.   Neurological:      Mental Status: She is oriented to person, place, and time.   Psychiatric:      Comments: Cognitive impairment                  DATA:      Imaging: I personally reviewed the CXR image from 3/7  I reviewed the cat scan chest radiology report from 3/5    ECG:  EKG from 3/6/24 shows Afib RVR with  bpm. Known RBBB      Holter: None    Telemetry:   Afib - this morning hr up to 150 bpm since then  hr came down now 75 bpm          Echocardiogram:  03/07/2024 TTE pending    02/20/2024 TTE  Left Ventricle Left ventricular cavity size is normal. Wall thickness is normal. The left ventricular ejection fraction is 65%. Systolic function is vigorous. Wall motion is normal. Diastolic function is mildly abnormal, consistent with grade I (abnormal) relaxation.   Right Ventricle Right ventricular cavity size is normal. Systolic function is normal. Wall thickness is normal.   Left Atrium The atrium is normal in size.   Right Atrium The atrium is normal in size.   Atrial Septum No patent foramen ovale detected,with provocation with cough, using agitated saline contrast and with valsalva, using agitated saline contrast.   Aortic Valve The aortic valve was not well visualized. The leaflets are not thickened. The leaflets are not calcified. The leaflets exhibit normal mobility. There is no evidence of regurgitation. The aortic valve has no significant stenosis.   Mitral Valve Mitral valve structure is normal.  There is mild regurgitation. There is no evidence of stenosis.   Tricuspid Valve Tricuspid valve structure is normal. There is trace regurgitation. There is no evidence of stenosis.   Pulmonic Valve Pulmonic valve structure is normal. There is no evidence of regurgitation. There is no evidence of stenosis.   Ascending Aorta The aortic root is normal in size.   IVC/SVC The inferior vena cava is not well visualized.   Pericardium There is no pericardial effusion. The pericardium is normal in appearance.       Ischemic Testing:  Stress test: MIBI 2017 : IMPRESSIONS    Myocardial perfusion imaging is normal.     Exaggeration of the baseline RBBB ST abnormality with pharmacologic     stress.   Normal left ventricular systolic function. LV EF 89%.        Catheterization: None      Weights:    Wt Readings from Last 10 Encounters:   03/08/24 78.4 kg (172 lb 13.5 oz)   03/03/24 70.1 kg (154 lb 8.7 oz)   02/20/24 63 kg (138 lb  14.2 oz)   11/26/23 66.2 kg (146 lb)   10/13/22 77.1 kg (170 lb)   04/14/22 78.9 kg (174 lb)   02/17/22 78.9 kg (174 lb)            Lab Studies:               Results from last 7 days   Lab Units 03/08/24  0505 03/07/24  2344 03/07/24  1824 03/07/24  1127 03/07/24  0418 03/06/24  1120 03/06/24  0435   WBC Thousand/uL 17.24*  --   --   --  25.90*  --  26.92*   HEMOGLOBIN g/dL 8.3* 8.0* 7.6*   < > 8.9*   < > 9.0*   HEMATOCRIT % 25.5*  --   --   --  27.2*  --  27.0*   PLATELETS Thousands/uL 89*  --   --   --  107*  --  118*    < > = values in this interval not displayed.   ,   Results from last 7 days   Lab Units 03/08/24  0505 03/07/24  0418 03/06/24  0435 03/05/24  0450   POTASSIUM mmol/L 3.9 3.5 3.2* 3.8   CHLORIDE mmol/L 111* 114* 113* 111*   CO2 mmol/L 21 21 23 23   BUN mg/dL 22 22 24 27*   CREATININE mg/dL 0.71 0.69 0.70 0.74   CALCIUM mg/dL 7.5* 7.0* 6.7* 7.2*   ALK PHOS U/L  --  83 60 75   ALT U/L  --  79* 74* 79*   AST U/L  --  38 49* 44*         2/21/2023  LIPID PANEL  Cholesterol  <200 mg/dL 197   Triglycerides  <150 mg/dL 102   Cholesterol, HDL, Direct  >40 mg/dL 61   Cholesterol, Non-HDL  <160 mg/dL 136   LDL Cholesterol  <130 mg/dL 116   CHOL/HDL Ratio 3.23               Tiffani Cadena PA-C

## 2024-03-08 NOTE — SPEECH THERAPY NOTE
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)    Sepsis (HCC)    Acute deep vein thrombosis (DVT) of both lower extremities (HCC)    Dysphagia    Atrial fibrillation with rapid ventricular response (HCC)       Past Medical History  Past Medical History:   Diagnosis Date    Acute GI bleeding 03/03/2024    Diabetes mellitus (HCC)     Fibromyalgia     Hyperlipidemia     Hypertension     Shock (HCC) 03/03/2024        Past Surgical History  Past Surgical History:   Procedure Laterality Date    ACHILLES TENDON REPAIR Right     ADENOIDECTOMY      APPENDECTOMY      SKIN CANCER EXCISION      x 4 - back    TONSILLECTOMY      TOTAL ABDOMINAL HYSTERECTOMY      US GUIDED KIDNEY BIOPSY  12/26/2023         Subjective:  Pt was alert and positioned upright. Spouse at bedside. \"Let's go \"  Objective:  Pt was seen for f/u dysphagia therapy. New to this ST. 2 liters [facility] Nursing reported coughing this am with medications crushed with puree and increased difficulty swallowing. Min oral care with suction completed prior to administration of trials. Pt accepted ice chips, thin liquids and puree.  Bolus control, formation,and transfer were WNL. Swallows appeared prompt. Delayed cough with successive sips of thin. No overt s/s of aspiration with single sips. Verbal cues and straw pinch to ensure small sips w/ slow rate. No other overt s/s of aspiration. Pt will intermittently extend head benefits from verbal cues to keep head at midline. Noted intermittent facial grimacing questioned pt if she felt pain w/ swallow, pt denied.     Assessment:  Pt agreeable " to trials. Alertness appears increased since initial eval. Cough present w/ successive sips of thin. Intermittently followed verbal commands for slow rate/small sips. Benefits from verbal cues to keep head at midline.     Plan/Recommendations:  Continue clear liquids , medications crushed  Slow rate, Small sips, cue to keep head at midline  Ensure good oral care  ST continue to f/u as indicated.

## 2024-03-08 NOTE — PROGRESS NOTES
Progress Note - Infectious Disease   Darlyn Nguyen 78 y.o. female MRN: [id number]  Unit/Bed#: ICU 02 Encounter: [id number]      Impression/Plan:  1.  Septic shock, developed over admission.  Patient over admission at outside hospital progressed with tachypnea, tachycardia and hypoxia and subsequently hypotension requiring pressors.  Suspect that this is multifactorial given #2, 3, 4 and 5.  Patient's clinical picture is also further complicated by immunosuppression due to #7.  Imaging and susceptibilities reviewed.  Continue Unasyn 3 g every 6 hours  Continue to trend fever curve/vitals  Repeat CBC tomorrow to monitor treatment response  Repeat chemistry tomorrow for drug dosing  Follow-up repeat blood cultures  Follow-up 2D echo, no plans for DYANA  Follow-up AFB specimens, TB PCR  Ongoing follow-up by GI/nephrology  Recommend chest PT and speech evaluations  Additional supportive cares per primary  Additional interventions pending clinical course     2.  Enterococcus avium bacteremia.  1 of 2 blood cultures from 2/29 positive.  Likely GI source given #5.  Concern for more complicated bacteremia given #3 and possibly #4.  Prior 2D echo technically difficult study.  Susceptibilities reviewed.  Patient without other localizing complaints and does not have any intravascular devices.  Continue Unasyn for now given #5 and 4  Anticipate 6-week course of treatment given #3  Follow-up current 2D echo  No plans for transesophageal echo  Trend fever curve/WBC  AFB specimens as below  Would likely need repeat CT chest imaging outpatient for #4     3.  Splenic infarcts on CT. has developed on CT imaging since last image this month.  Suspect this is related to problem #2.  Concern for complicated bacteremia.  Antibiotics as above  Follow-up 2D echo  No plans for DYANA  Trend fever curve/WBC     4.  Right upper lobe cavitary lung lesion/pneumonia.  Patient with cavitary lesion present with surrounding inflammatory changes.  Most  likely aspiration pneumonia based on review of patient's imaging progression on presentation.  Could consider septic emboli but less consistent based on appearance on CT. AFB smears x 3 negative and PCR pending.  Patient without any risk factors for TB.  Delayed sputum cultures obtained with Pseudomonas of uncertain significance, intermediate susceptibility to cefepime which patient was on and despite such clinically improved.  Patient's respiratory status appears stable/improved.  Follow-up 3 AFB cultures  Follow-up MTB PCR sent to state lab  Antibiotics as above  Will likely need repeat imaging outpatient  If respiratory status declines adjust antibiotics to include pseudomonal coverage  Serum galactomannan ordered for completeness     5.  Pancolitis now with recurrent lower GI bleeding.  Patient has been having this ongoing issue with recurrent admissions.  Presented from facility with concern for GI bleed.  Possibly additional autoimmune process.  Developed bleeding again on 3/5.  Flex sig with ulcerated mucosa and bleeding.  GI evaluation noted  Antibiotics as above  Monitor for further bleeding     6.  Acute hypoxic respiratory failure, intubated.  Declined at outside hospital likely due to the above.  Extubated on 3/4.  Monitor respiratory status     7.  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  Likely risk factor for the above.  Ongoing follow-up by nephrology.  Eventual return to [facility] for PJP prophylaxis.  Consider discussion of case with rheumatology.  Antibiotics as above     8.  CKD stage II.  This can impact antibiotic dosing.  Unasyn dosing as above for now.  Repeat chemistry tomorrow.  Will further dose adjust as needed     9.  Sacral and buttock ulcers.  Evaluated wounds at bedside previously.  Likely complication of the above.  No undermining or concerning changes for progressing wound infection.  Wound cultures reviewed.  Continue local care.     10.  Type 2 diabetes.  Hemoglobin A1c of 5.9.   Glucose management per primary.     11.  Transaminitis.  Mild elevation noted.  Possibly volume related.  Will continue to monitor LFTs while on Unasyn.    12.  Thrombocytopenia.  Noted with mild decline since 3/5.  Suspect this is related more so to problem #5.  Could consider antibiotic side effect as well.  Monitor CBC for now  Continue Unasyn for now  Transfusional support per primary  Monitor for bleeding    Above plan discussed briefly with the patient's  at bedside  Above plan discussed in detail with critical care service who is aware of plans for ongoing Unasyn and monitoring cell counts otherwise.    ID service will reevaluate this patient again on Monday.  Please contact ID attending on call with questions in the interim.    Antibiotics:  Unasyn 5  Antibiotics 9    24 Hour events:  Yesterday and overnight notes reviewed no acute events noted    Subjective:  Patient seen at bedside and she remains confused.   is present at bedside and he reports that she seems more awake but again is uncertain why she has been here or how long her details thus far.  No further bleeding noted overnight.  Patient does not localize any pain symptoms.  We discussed ongoing antibiotic.    Objective:  Vitals:  Temp:  [97.5 °F (36.4 °C)-98.7 °F (37.1 °C)] 98.7 °F (37.1 °C)  HR:  [] 94  Resp:  [17-25] 18  BP: (104-150)/(42-79) 142/61  SpO2:  [85 %-98 %] 98 %  Temp (24hrs), Av.1 °F (36.7 °C), Min:97.5 °F (36.4 °C), Max:98.7 °F (37.1 °C)  Current: Temperature: 98.7 °F (37.1 °C)    Physical Exam:   General Appearance:  Patient is awake but she does not interact significantly on exam.  She is confused.  Does not appear in any acute respiratory distress.   Throat: Oropharynx moist without lesions.    Lungs:   Decreased breath sounds throughout, no wheezes or rales.   Heart:  RRR; no murmur, rub or gallop noted   Abdomen:   Soft, non-tender, non-distended, positive bowel sounds.     Extremities: No clubbing,  cyanosis; anasarca noted in all of her extremities   Skin: No new rashes or lesions. No new draining wounds noted.       Labs, Imaging, & Other studies:   All pertinent labs and imaging studies in PACS were personally reviewed as below.  Results from last 7 days   Lab Units 03/08/24  0505 03/07/24  2344 03/07/24  1824 03/07/24  1127 03/07/24  0418 03/06/24  1120 03/06/24  0435   WBC Thousand/uL 17.24*  --   --   --  25.90*  --  26.92*   HEMOGLOBIN g/dL 8.3* 8.0* 7.6*   < > 8.9*   < > 9.0*   PLATELETS Thousands/uL 89*  --   --   --  107*  --  118*    < > = values in this interval not displayed.     Results from last 7 days   Lab Units 03/08/24  0505 03/07/24  0418 03/06/24  0435 03/05/24  0450   POTASSIUM mmol/L 3.9 3.5 3.2* 3.8   CHLORIDE mmol/L 111* 114* 113* 111*   CO2 mmol/L 21 21 23 23   BUN mg/dL 22 22 24 27*   CREATININE mg/dL 0.71 0.69 0.70 0.74   EGFR ml/min/1.73sq m 80 82 82 76   CALCIUM mg/dL 7.5* 7.0* 6.7* 7.2*   AST U/L  --  38 49* 44*   ALT U/L  --  79* 74* 79*   ALK PHOS U/L  --  83 60 75     Results from last 7 days   Lab Units 03/03/24  2255 03/03/24  1640 03/03/24  1404 03/03/24  1148 03/03/24  1019 03/02/24  0823 03/02/24  0700   BLOOD CULTURE  No Growth After 4 Days.  No Growth After 4 Days.  --   --  No Growth After 4 Days. No Growth After 4 Days. No Growth After 5 Days. No Growth After 5 Days.   SPUTUM CULTURE   --   --  2+ Growth of Pseudomonas aeruginosa*  Few Colonies of Candida albicans*  --   --   --   --    GRAM STAIN RESULT   --   --  3+ Polys*  1+ Budding yeast*  1+ Gram negative rods*  --   --   --   --    MRSA CULTURE ONLY   --  No Methicillin Resistant Staphlyococcus aureus (MRSA) isolated  --   --   --   --   --        Lab interpretation/comments: Chemistry unremarkable.  Platelets slowly declining    Imaging interpretation/comments: No new imaging overnight    Culture data: No new cultures

## 2024-03-08 NOTE — ASSESSMENT & PLAN NOTE
E. avium bacteremia. Pt recently started on prednisone, atovaquone, hydroxychloroquine, and mycophenolate for lupus. Ulcerated rectal mucosa possible point of entry.     Repeat cultures show NGTD  Susceptibilities show pan-sensitive enterococcus    ID on board, appreciate recommendations  Narrowed to Unasyn 3 g Q6H (Day 4 Unasyn of anticipated prolonged course, Day 8 total abx)  Can hold off DYANA, per ID, as unlikely to

## 2024-03-08 NOTE — ASSESSMENT & PLAN NOTE
Diagnosed in December,   Initially started on steroids, prednisone 60 mg daily, which patient had tapered to 30 mg daily prior to admission with the addition of atovaquone, hydroxychloroquine, and mycophenolate    Holding atovaquone, hydroxychloroquine, and mycophenolate in the setting of acute infection  Patient started on Solu-Cortef prior to discharge from [facility] for possible relative adrenal insufficiency contributing to shock state.      Continue prednisone, taper 10 mg weekly, currently 40 mg Day 3

## 2024-03-08 NOTE — ASSESSMENT & PLAN NOTE
Discovered 2/20/24, started on Eliquis at home, started Hep gtt at [facility] but developed GI bleed, AC currently on hold    S/p IVC filter with IR 3/7/24

## 2024-03-08 NOTE — PROCEDURES
Insert Complex Venous Access Line    Date/Time: 3/8/2024 12:48 PM    Performed by: Zully Gandhi RN  Authorized by: Uche Blair MD    Patient location:  Bedside  Consent:     Consent obtained:  Written    Consent given by:  Spouse (pt's  Krystle Nguyen)  Universal protocol:     Procedure explained and questions answered to patient or proxy's satisfaction: yes      Relevant documents present and verified: yes      Test results available and properly labeled: yes      Radiology Images displayed and confirmed.  If images not available, report reviewed: yes      Required blood products, implants, devices, and special equipment available: yes      Site/side marked: yes      Immediately prior to procedure, a time out was called: yes      Patient identity confirmed:  Verbally with patient, arm band, provided demographic data and hospital-assigned identification number  Pre-procedure details:     Hand hygiene: Hand hygiene performed prior to insertion      Sterile barrier technique: All elements of maximal sterile technique followed      Skin preparation:  ChloraPrep    Skin preparation agent: Skin preparation agent completely dried prior to procedure    Indications:     PICC line indications: long term antibiotics    Anesthesia (see MAR for exact dosages):     Anesthesia method:  Local infiltration (3ml)    Local anesthetic:  Lidocaine 1% w/o epi  Procedure details:     Location:  Basilic    Vessel type: vein      Laterality:  Right    Approach: percutaneous technique used      Patient position:  Flat    Procedural supplies:  Single lumen    Catheter size:  4 Fr    Landmarks identified: yes      Ultrasound guidance: yes      Ultrasound image availability:  Not saved    Sterile ultrasound techniques: Sterile gel and sterile probe covers were used      Number of attempts:  1    Successful placement: yes      Vessel of catheter tip end:  Sherlock 3CG confirmed (sherlock 3cg procedure record confirmed placement,  sent to medical records, picc okay to be utilized)    Total catheter length (cm):  37    Catheter out on skin (cm):  0    Max flow rate:  999ml/hr    Arm circumference:  37cm  Post-procedure details:     Post-procedure:  Dressing applied and securement device placed    Assessment:  Blood return through all ports and free fluid flow (sherlock 3cg)    Post-procedure complications: none      Patient tolerance of procedure:  Tolerated well, no immediate complications  Comments:      ID [facility] confirmed pt cleared for picc insertion.  RUE +4 edema present from antecubital to hand including hand and large dark purple/red ecchymotic area noted antecubital area not new present prior to insertion.  Lot# [id number] 2025-03-31

## 2024-03-08 NOTE — CASE MANAGEMENT
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) ([facility]) 2024    Diabetes mellitus ([facility]) 2024    GI bleed 2024    Bacteremia due to Enterococcus 2024    Pressure ulcers of skin of multiple topographic sites 2024    Sacral wound 2024    Diarrhea of presumed infectious origin 2024    Cavitary pneumonia 2024    Positive fecal occult blood test 2024    DVT (deep venous thrombosis) ([facility]) 2024    Acute on chronic anemia 2024    Lupus ([facility]) 2024    Acute respiratory failure with hypoxia ([facility]) 2024    Hypertension 2024    Pancolitis ([facility]) 2024    Metabolic encephalopathy 2024    Hypomagnesemia 2024    High serum lactic acid 2024      LOS (days): 5  Geometric Mean LOS (GMLOS) (days): 5.1  Days to GMLOS:0.3     OBJECTIVE:  Risk of Unplanned Readmission Score: 31.18         Current admission status: Inpatient   Preferred Pharmacy:   [id number] - [place] - [address]  [address]  [place] [address]  Phone: [phone] Fax: [phone]    Primary Care Provider: Yaa Hedrick MD    Primary Insurance: MEDICARE  [facility]    DISCHARGE DETAILS:    CM contacted family/caregiver?: Yes  Were Treatment Team discharge recommendations reviewed with patient/caregiver?: Yes  Did  patient/caregiver verbalize understanding of patient care needs?: Yes     Contacts  Patient Contacts: Krystle Nguyen  Relationship to Patient:: Family  Reason/Outcome: Discharge Planning    Treatment Team Recommendation: Short Term Rehab  Discharge Destination Plan:: Short Term Rehab     Additional Comments: UR Communication to Payor   team following for DC planning. Dispo- return to  Rehab and Nursing ([facility]) when medically stable. Pending therapy consult. Probable PICC placement for long term IV abx. IVC filter placed 3/7/24.

## 2024-03-08 NOTE — PLAN OF CARE
Problem: OCCUPATIONAL THERAPY ADULT  Goal: Performs self-care activities at highest level of function for planned discharge setting.  See evaluation for individualized goals.  Description: Treatment Interventions: ADL retraining, Functional transfer training, UE strengthening/ROM, Endurance training, Cognitive reorientation, Patient/family training, Equipment evaluation/education, Compensatory technique education, Continued evaluation          See flowsheet documentation for full assessment, interventions and recommendations.   Note: Limitation: Decreased ADL status, Decreased UE ROM, Decreased UE strength, Decreased Safe judgement during ADL, Decreased cognition, Decreased endurance, Decreased high-level ADLs  Prognosis: Fair  Assessment: Pt is a 78 y.o female who was admitted to ICU on 3/3/2024 (transferred from [facility]) due to being in respiratory distress; pt noted with VDRF; extubated on 3/4/24. Pt noted with sepsis/shock 2* to enterococcal bacteremia, GI bleed (FOBT (+) at [facility]), b/l LE DVT, sacral wounds, pneumonia, afib with RVR, and lupus. S/p PICC line placement on 3/8/2024. Pt with PMH of lupus, fibromyalgia, HTN, and HLD, and R achilles tendon repair. Per husbands report PTA pt was independent with ADL/IADLs, transfers, and functional mobility - with use of RW. Per  pt is (-) falls, (-) home alone. During OT initial evaluation pt demonstrated deficits with ADL status, b/l UE ROM/strength, transfer safety, functional balance, activity tolerance (currently poor = 5-10mins), cognition, and functional mobility. Pt would benefit from continued IP OT services for the above stated deficits. Will continue to see. The patient's raw score on the AM-PAC Daily Activity Inpatient Short Form is 9. A raw score of less than 19 suggests the patient may benefit from discharge to post-acute rehabilitation services. Please refer to the recommendation of the Occupational Therapist for safe discharge planning.     Rehab  Resource Intensity Level, OT: I (Maximum Resource Intensity)

## 2024-03-08 NOTE — ASSESSMENT & PLAN NOTE
Seen on CT 3/4/24  Consider reactivation of TB vs septic emboli vs aspiration vs community acquired    Preliminary AFB negative    Sputum Cx 3/3 growing 2+ pseudomonas--susceptible to Zosyn, meropenem, levofloxacin, and tobramycin. Intermediate susceptibility to cefepime.    ID on board, appreciate recommendations  Rule out reactivation of TB in this immunosuppressed patient; AFB x 3 ordered, preliminarily negative  Consider also septic emboli vs aspiration vs community acquired  Continue Unasyn for tx of E avium bacteremia  Start chest physio  Change abx to Zosyn if respiratory status fails to improve  Wean O2 as able for SpO2>90%  Follow-up MTB PCR sent to state lab  Will likely need repeat imaging outpatient  Serum galactomannan ordered to r/o chronic aspergillosis

## 2024-03-08 NOTE — ASSESSMENT & PLAN NOTE
Recent persistent diarrhea  FOBT positive @ [facility]  Small bloody bowel movement on presentation to [facility]  Acute drop in hemoglobin from 8.0 morning of 3/3/2024 to 6.2 approximately 12 hours later  PTT>210 at presentation  Recent CT A/P demonstrating colitis  No history of significant GI bleed per family  Family reports colonoscopy approximately 1 year ago without significant findings    Hep gtt held at presentation.  PTT= 31 morning of 3/5/24    With no further episodes of GIB and Hgb stable since transfusion of 2 U pRBC on 3/3, Hep restarted 3/5/24, followed by recurrence of multiple bloody bowel movements (2 during the day, 4 overnight 3/5-3/6/24), with resultant drop in Hgb from 8.5 to 6.6. Transfused 1 U pRBC. Hgb minimally down-trending from 8.9 on 3/7 to 8.3 on 3/8.    Bedside flex sig with GI 3/6 shows ulcerated rectal mucosa, sigmoid diverticula, and hemorrhoids--cauterized    Pt on home Eliquis for DVT found on 2/20/2024    S/p IVC filter w IR on 3/7/24    -Continue Protonix 40 mg QD--can transition to PO when pt able to tolerate pills  -GI on board, appreciate recommendations  -If significant bleeding were to occur, GI recommends direct pressure with gauze pad inserted in the rectum may tamponade the area alternatively would be exam under anesthesia as I do not think endoscopic treatment for this type of problem would be highly successful.

## 2024-03-09 PROBLEM — I48.91 NEW ONSET A-FIB (HCC): Status: ACTIVE | Noted: 2024-03-09

## 2024-03-09 NOTE — PROGRESS NOTES
[facility] - Inpatient Progress Note    PATIENT INFORMATION      Darlyn Nguyen 78 y.o. female MRN: [id number]  Unit/Bed#: ICU 02 Encounter: [id number]    ASSESSMENT & PLAN   78-year-old female patient with history of lupus, chronic anemia, hypertension, subacute vs chronic diarrhea, recently diagnosed DVT on anticoagulation, ambulatory dysfunction, hx CVA with R sided weakness, PMR, T2DM, Htn, and depression who presented 2/29 with diarrhea, generalized weakness to Mi on 2/29. Initial evaluation demonstrated evidence of colitis on CT scan as well as pneumonia with mild drop in hemoglobin.  She received 2 units PRBC on presentation and was initiated on broad-spectrum antibiotics.  Ultimately infectious workup demonstrated 1 out of 2 blood cultures positive for Enterococcus avium, sacral wound cultures with E. coli Proteus mirabilis and Pseudomonas MDR.  On 3/3 patient met sepsis criteria with significant increase in lactate as well as tachycardia and leukocytosis concerning for septic shock and patient was initiated on fluid resuscitation.  However, after resuscitation she developed significant hypotension leading to initiation of vasopressors and unfortunately progressed to respiratory failure with worsening mental status leading to intubation.  Patient then transferred to [facility] ICU for further management.  Flexible sigmoidoscopy was performed for further evaluation of bloody stools in the setting of anemia significant for rectal ulcer which was treated with bipolar cautery and recommendation for colorectal surgery evaluation should patient rebleed.  She ultimately underwent IVC filter placement on 3/7 with IR due to acute DVT and inability to continue anticoagulation.  She was relatively stable, however, overnight on 3/8 into 3/9 she developed recurrent hematochezia with approximately 300 cc output.  Transient hypotension noted likely in setting of vasovagal  reaction.  Repeat hemoglobin at this time significant for drop from 8 down to 5.8 with a recheck of 6.5.  She received 1 unit PRBC and improved to 8.9.  She has maintained her blood counts since that time.    Hematochezia  Anemia  Rectal Ulcer  Acute Hypoxic Respiratory Failure  Patient with episode of bloody bowel movement overnight with drop in hemoglobin from 8 down to 5.8. Recheck was 6.5 and improved to 8.9 with 1u PRBC. Continues to have some blood in bowel movements.  Previous flex sig on 3/6 significant for rectal ulcer that was treated with bipolar cautery for visible vessel.  Unclear cause of underlying ulcer, but family does endorse family history of colon cancer.  Patient and spouse at bedside reports only intermittent rectal bleeding attributed to hemorrhoids previously.  He does endorse that patient dealt with intermittent diarrhea and constipation going days without having a bowel movement and then having significant liquid stool.  Suspect potential component of overflow diarrhea and possibly stercoral ulcer in setting of constipation.    Currently vital signs remained stable and hemoglobin has plateaued after responding appropriately  Mental and respiratory status remains tenuous in the setting of infection as detailed above  From GI perspective patient can be resumed on a normal diet for now with monitoring of vital signs and stool output  If patient drops hemoglobin again or has ongoing significant bloody output may need to consider repeat flexible sigmoidoscopy versus colonoscopy pending patient's overall clinical status  Discussed with patient's significant other and nephew at bedside who are agreeable with plan  Continue to monitor stool output   Monitor hemoglobin every 8 hours and transfuse for hemoglobin less than 7  Continue daily PPI  GI will continue to follow      SUBJECTIVE     Patient seen and evaluated at bedside. Patient is lethargic, but arousable. Did not sleep well overnight given  bleeding episode. Denies pain or acute complaint at this time    MEDICATIONS & ALLERGIES       Medications:   Medications Prior to Admission   Medication    Acetaminophen 500 MG    amLODIPine (NORVASC) 5 mg tablet    aspirin (ECOTRIN LOW STRENGTH) 81 mg EC tablet    atorvastatin (LIPITOR) 40 mg tablet    atovaquone (MEPRON) 750 mg/5 mL suspension    carvedilol (Coreg) 6.25 mg tablet    Cholecalciferol 50 MCG (2000 UT) CAPS    co-enzyme Q-10 100 mg capsule    Cyanocobalamin 1000 MCG CAPS    escitalopram (LEXAPRO) 10 mg tablet    famotidine (PEPCID) 20 mg tablet    FISH OIL-CANOLA OIL-VIT D3 PO    Hydroxychloroquine Sulfate 300 MG TABS    losartan (COZAAR) 100 MG tablet    mirtazapine (REMERON) 7.5 MG tablet    mycophenolate (CELLCEPT) 500 mg tablet    pantoprazole (PROTONIX) 40 mg tablet    predniSONE 10 mg tablet    apixaban (ELIQUIS) 5 mg    gabapentin (NEURONTIN) 100 mg capsule    insulin lispro (HumALOG/ADMELOG) 100 units/mL injection    insulin lispro (HumALOG/ADMELOG) 100 units/mL injection    methocarbamol (ROBAXIN) 500 mg tablet    Nutritional Supplements (Al) PACK    oxyCODONE (ROXICODONE) 5 immediate release tablet    TURMERIC PO     Current Facility-Administered Medications   Medication Dose Route Frequency    albumin human (FLEXBUMIN) 5 % injection 25 g  25 g Intravenous Once    ampicillin-sulbactam (UNASYN) 3 g in sodium chloride 0.9 % 100 mL IVPB  3 g Intravenous Q6H    atorvastatin (LIPITOR) tablet 40 mg  40 mg Oral Daily    carvedilol (COREG) tablet 6.25 mg  6.25 mg Oral BID With Meals    collagenase (SANTYL) ointment   Topical Daily    diltiazem (CARDIZEM) tablet 30 mg  30 mg Oral Q6H CRUZITO    escitalopram (LEXAPRO) tablet 10 mg  10 mg Oral Daily    famotidine (PEPCID) tablet 20 mg  20 mg Oral Daily    insulin lispro (HumALOG/ADMELOG) 100 units/mL subcutaneous injection 1-5 Units  1-5 Units Subcutaneous TID AC    insulin lispro (HumALOG/ADMELOG) 100 units/mL subcutaneous injection 1-5 Units  1-5  "Units Subcutaneous HS    levalbuterol (XOPENEX) inhalation solution 1.25 mg  1.25 mg Nebulization Q8H PRN    OLANZapine (ZyPREXA ZYDIS) dispersible tablet 5 mg  5 mg Oral HS    omeprazole (PRILOSEC) suspension 2 mg/mL  20 mg Oral Daily    ondansetron (ZOFRAN) injection 4 mg  4 mg Intravenous Q4H PRN    predniSONE tablet 40 mg  40 mg Oral Daily       Allergies:   Allergies   Allergen Reactions    Rosuvastatin Other (See Comments)     Per SNF    Simvastatin Other (See Comments)     Per SNF    Myalgias       PHYSICAL EXAM     Objective   Blood pressure 139/74, pulse 58, temperature (!) 96.7 °F (35.9 °C), temperature source Temporal, resp. rate 16, height 5' 4\" (1.626 m), weight 81.6 kg (179 lb 14.3 oz), SpO2 100%. Body mass index is 30.88 kg/m².    Intake/Output Summary (Last 24 hours) at 3/9/2024 0800  Last data filed at 3/9/2024 0630  Gross per 24 hour   Intake 440 ml   Output 955 ml   Net -515 ml       General Appearance:   Alert, cooperative, ill-appearing   HEENT:   Normocephalic, atraumatic, anicteric     Neck:   Supple, symmetrical, trachea midline   Lungs:   Equal chest rise, respirations unlabored    Heart:   Regular rate and rhythm   Abdomen:   Soft, non-tender, non-distended; normal bowel sounds; no masses, no organomegaly    Rectal:   Deferred    Extremities:   No cyanosis, clubbing, diffuse edema   Neuro:   Moves all 4 extremities, AAOx2 only   Skin:   No jaundice, rashes, or lesions, B/L UE ecchymosis noted     ADDITIONAL DATA     Lab Results:     Results from last 7 days   Lab Units 03/09/24  0408 03/06/24  1120 03/06/24  0435   WBC Thousand/uL 14.03*   < > 26.92*   HEMOGLOBIN g/dL 8.8*   < > 9.0*   HEMATOCRIT % 26.6*   < > 27.0*   PLATELETS Thousands/uL 57*   < > 118*   NEUTROS PCT %  --   --  95*   LYMPHS PCT %  --   --  3*   MONOS PCT %  --   --  1*   EOS PCT %  --   --  0    < > = values in this interval not displayed.     Results from last 7 days   Lab Units 03/09/24  0408   POTASSIUM mmol/L 3.5 "   CHLORIDE mmol/L 112*   CO2 mmol/L 25   BUN mg/dL 23   CREATININE mg/dL 0.76   CALCIUM mg/dL 7.5*   ALK PHOS U/L 74   ALT U/L 42   AST U/L 15     Results from last 7 days   Lab Units 03/09/24  0003   INR  1.14       Imaging:    IR IVC filter placement optional/temporary    Result Date: 3/8/2024  Narrative: Inferior venacavogram. Filter placement. Clinical History: Acute deep venous thrombosis. GI bleeding precluding anticoagulation. Contrast: 19    ml Omnipaque 350 Fluoroscopy time:  1.6   minutes Anesthesia: local only Procedure: Consent was previously obtained by my partner from family. We performed the procedure next to the previously placed right neck nontunneled catheter. The patient's right neck was prepped and draped in the usual sterile fashion and local anesthesia was obtained with the 1% lidocaine solution. The right internal jugular vein was evaluated as a potential access site. The vein is patent, compressible, and  free of thrombus. A micropuncture needle was used to aspirate the vein through which a 0.018 wire was advanced centrally. A static ultrasound image was recorded. A 5 German exchange dilator was placed and a 0.035 amplatz wire was passed under fluoroscopic guidance. A   murtaza   filter delivery sheath was situated within the inferior vena cava and an inferior venacavogram was performed. The delivery sheath was advanced to the appropriate position. Under fluoroscopic guidance a   MURTAZA  filter was deployed in the infrarenal inferior vena cava.  Final imaging was performed.  After deployment, the filter was in good position. The sheath was removed and pressure was applied to obtain hemostasis.   There was no bleeding or hematoma. A new CHG dressing was applied to the previously placed catheter. The patient tolerated the procedure well and left the department in stable condition.     Impression: Impression: 1. Inferior venacavogram revealing patent inferior vena cava correlating with prior CT  imaging. 2. Successful deployment of an infrarenal  MURTAZA   filter. 3. IR will arrange for clinical follow-up in several months to determine if the patient is a candidate for filter removal Workstation performed: [id number]     Echo follow up/limited w/ contrast if indicated    Result Date: 3/8/2024  Narrative:   Left Ventricle: Left ventricular cavity size is normal. Wall thickness is increased. The left ventricular ejection fraction is 65% by visual estimation. Systolic function is normal. Wall motion is normal. There is probable diastolic dysfunction.  Diastolic staging precluded by the presence of arrhythmia.   Right Ventricle: Right ventricular cavity size is normal. Systolic function is normal.   Left Atrium: The atrium is dilated.   Right Atrium: The atrium is dilated.   Aortic Valve: There is aortic valve sclerosis.   Mitral Valve: There is mild regurgitation.   Tricuspid Valve: There is mild to moderate regurgitation. Pulmonary artery systolic pressures are estimated at 44 mmHg.   Pulmonic Valve: There is mild regurgitation.   Aorta: The aortic root is normal in size. The ascending aorta is ectatic at 3.7 cm.   Compared to report from February 20, 2024, there is mild to moderate tricuspid regurgitation with mild pulmonary hypertension.     XR chest portable ICU    Result Date: 3/7/2024  Narrative: XR CHEST PORTABLE ICU INDICATION: hypoxia. COMPARISON: Portable chest from March 3, 2024. CT of the chest from March 4, 2024. FINDINGS: Tip of the right-sided IJ catheter in superior vena cava. Persistent consolidation of portions of the right upper lobe, with central lucency, corresponding to air-filled cyst or cavity on recent CT. Subsegmental atelectasis in both lower lobes. Small to moderate right pleural effusion with intrafissural and subpulmonic components. No evidence of pneumothorax. Normal cardiomediastinal silhouette. Bones are unremarkable for age. Upper abdomen unremarkable. Persistent elevation of  the right hemidiaphragm.     Impression: Right upper lobe consolidation, most likely pneumonia, not significantly changed since 3/3/2024 and 3/4/2024. Moderate sized right pleural effusion. Workstation performed: [id number]     Flexible Sigmoidoscopy    Result Date: 3/6/2024  Narrative: Table formatting from the original result was not included. [facility] [address] [facility] [id number] [phone] DATE OF SERVICE: 3/06/24 PHYSICIAN(S): Attending: No Staff Documented Fellow: No Staff Documented INDICATION: GI bleed POST-OP DIAGNOSIS: See the impression below. HISTORY: Hx of diarrhea and rectal pain BOWEL PREPARATION:  PREPROCEDURE: Informed consent was obtained for the procedure from pt and , including sedation. Risks including but not limited to bleeding, infection, perforation, adverse drug reaction and aspiration were explained in detail. Also explained about less than 100% sensitivity with the exam and other alternatives. The patient was placed in the left lateral decubitus position. Procedure: Colonoscopy DETAILS OF PROCEDURE: Patient was taken to the procedure room where a time out was performed to confirm correct patient and correct procedure. The patient underwent no sedation. The patient's blood pressure, heart rate, level of consciousness, oxygen and respirations were monitored throughout the procedure. A digital rectal exam was performed. A perianal exam was performed. The scope was introduced through the anus and advanced to the sigmoid colon. The quality of bowel preparation was evaluated using the [place] Bowel Preparation Scale with scores of: right colon = not assessed, transverse colon = not assessed, left colon = 1. Bowel prep was not adequate. The patient experienced no blood loss. The procedure was not difficult. The patient tolerated the procedure well. There were no apparent adverse events. The total BBPS score was 1. Scope passed to the 30cm sneha  but not beyond this due to angulation. ANESTHESIA INFORMATION: ASA: ASA status cannot be found on the log. Anesthesia Type: Anesthesia type cannot be found on the log. MEDICATIONS: Totals unavailable because the procedure time range is not set FINDINGS: External hemorrhoids Diverticula in the sigmoid colon Ulcerated mucosa in the rectum; induced coagulation with with heater probe. The area of ulceration is in the distal rectum at the dentate line region. Suggestion of a small visible vessel is seen. Heater probe used on this area. Red/maroon blood seen in the colon. +pain with digital exam EVENTS: Procedure Events Event Event Time SPECIMENS: * Cannot find log * EQUIPMENT: Scope not documented     Impression: Hemorrhoids Diverticulosis in the sigmoid colon Ulcerated mucosa in the rectum; induced coagulation with heater probe RECOMMENDATION: Hi suspicion that the cause for her lower gi bleed is from significant ulceration of the proximal anal/distal rectal area If recurrent bleeding continues, suggest exam under anesthesia with colo rectal surgery for treatment/ oversew vessel.  Cristi Stevens MD    CT chest wo contrast    Result Date: 3/4/2024  Narrative: CT CHEST WITHOUT IV CONTRAST INDICATION: Pneumonia. COMPARISON: CT 2/19/2024. TECHNIQUE: CT examination of the chest was performed without intravenous contrast. Multiplanar 2D reformatted images were created from the source data. This examination, like all CT scans performed in the [facility], was performed utilizing techniques to minimize radiation dose exposure, including the use of iterative reconstruction and automated exposure control. Radiation dose length product (DLP) for this visit: 657 mGy-cm FINDINGS: LUNGS: Endotracheal tube tip is 1.3 cm above the jenaro. Extensive dense consolidation in the right upper lobe with air bronchograms and a 2.2 cm cavitary component. There is a 2.2 cm cavitary component in the right middle lobe consolidation.  Right basilar consolidation posteriorly compatible with atelectasis and/or pneumonia. Minimal left basilar atelectasis. There is no tracheal or endobronchial lesion. PLEURA: Small bilateral effusions. HEART/GREAT VESSELS: Normal heart size. Mild coronary artery calcifications. No thoracic aortic aneurysm. Right internal jugular central venous catheter terminates in the superior vena cava. MEDIASTINUM AND GUILHERME: Unremarkable. CHEST WALL AND LOWER NECK: Right internal jugular central venous catheter. 1.2 cm right thyroid nodule. Incidental discovery of one or more thyroid nodule(s) measuring less than 1.5 cm and without suspicious features is noted in this patient who is above  35 years old; according to guidelines published in the February 2015 white paper on incidental thyroid nodules in the Journal of the American College of Radiology (JACR), no further evaluation is recommended. VISUALIZED STRUCTURES IN THE UPPER ABDOMEN: Orogastric tube terminates in the stomach. Cholelithiasis with no evidence of acute cholecystitis. OSSEOUS STRUCTURES: Spinal degenerative changes are noted. No acute fracture or destructive osseous lesion.     Impression: Extensive right upper lobe cavitary pneumonia. Right basilar atelectasis and/or pneumonia. Small bilateral pleural effusions. Lines and tubes, as above. Workstation performed: [id number]     CT head wo contrast    Result Date: 3/4/2024  Narrative: CT BRAIN - WITHOUT CONTRAST INDICATION:   Acute respiratory failure. COMPARISON:  None. TECHNIQUE:  CT examination of the brain was performed.  Multiplanar 2D reformatted images were created from the source data. Radiation dose length product (DLP) for this visit:  904 mGy-cm .  This examination, like all CT scans performed in the [facility], was performed utilizing techniques to minimize radiation dose exposure, including the use of iterative reconstruction and automated exposure control. IMAGE QUALITY:  Diagnostic.  FINDINGS: PARENCHYMA: Decreased attenuation is noted in periventricular and subcortical white matter demonstrating an appearance that is statistically most likely to represent mild microangiopathic change. Old lacunar infarcts in the left periventricular white matter and left basal ganglia. No CT signs of acute infarction.  No intracranial mass, mass effect or midline shift.  No acute parenchymal hemorrhage. VENTRICLES AND EXTRA-AXIAL SPACES:  Normal for the patient's age. VISUALIZED ORBITS: Normal visualized orbits. PARANASAL SINUSES: Mild mucosal thickening of the visualized paranasal sinuses. Partially imaged orogastric and endotracheal tubes in the oropharynx. CALVARIUM AND EXTRACRANIAL SOFT TISSUES:  Normal.     Impression: No acute intracranial abnormality. Workstation performed: [id number]     XR chest portable ICU    Result Date: 3/4/2024  Narrative: XR CHEST PORTABLE ICU, XR CHEST 1 VIEW INDICATION: post-intubation. COMPARISON: Chest radiograph February 29, 2024 FINDINGS: Chest radiograph from March 3, 2023 at 13:04 and 16:55 are dictated in the combined report On the 2nd of the 2 radiographs, the tip of the endotracheal tube is 2.6 cm above the jenaro. Tip of enteric tube projects over the stomach. Tip of right internal jugular central venous catheter projects over the superior cavoatrial junction. Tip of esophageal temperature probe is at the level of the thoracic inlet. On the 1st radiograph, there is a veiled opacity throughout the right upper lobe. The opacity persist is decreased on the 2nd radiograph. There are air bronchograms within the opacity. No definite pleural effusion Normal-sized cardiomediastinal silhouette. No pneumothorax.     Impression: New right upper lobe atelectasis and probable pneumonia. Suctioning of the endotracheal tube is to be considered. The study was marked in EPIC for immediate notification. Workstation performed: [id number]     X-ray chest 1 view    Result Date:  3/4/2024  Narrative: XR CHEST PORTABLE ICU, XR CHEST 1 VIEW INDICATION: post-intubation. COMPARISON: Chest radiograph February 29, 2024 FINDINGS: Chest radiograph from March 3, 2023 at 13:04 and 16:55 are dictated in the combined report On the 2nd of the 2 radiographs, the tip of the endotracheal tube is 2.6 cm above the jenaro. Tip of enteric tube projects over the stomach. Tip of right internal jugular central venous catheter projects over the superior cavoatrial junction. Tip of esophageal temperature probe is at the level of the thoracic inlet. On the 1st radiograph, there is a veiled opacity throughout the right upper lobe. The opacity persist is decreased on the 2nd radiograph. There are air bronchograms within the opacity. No definite pleural effusion Normal-sized cardiomediastinal silhouette. No pneumothorax.     Impression: New right upper lobe atelectasis and probable pneumonia. Suctioning of the endotracheal tube is to be considered. The study was marked in EPIC for immediate notification. Workstation performed: [id number]     XR chest 1 view portable    Result Date: 2/29/2024  Narrative: XR CHEST PORTABLE INDICATION: DECREASED BREATH SOUNDS. Clinical suspicion for acute heart failure. COMPARISON: Chest radiographs 2/19/2024; CT chest, abdomen and pelvis with contrast 2/19/2024 FINDINGS: Monitoring leads and clips project over the chest. Right perihilar airspace opacification and opacity in the right midlung. No pneumothorax or pleural effusion. Normal cardiomediastinal silhouette. Bones are unremarkable for age. Normal upper abdomen.     Impression: Right lung opacities, suspicious for pneumonia in the appropriate clinical setting. Resident: RUFINO DAVID I, the attending radiologist, have reviewed the images and agree with the final report above. Workstation performed: [id number]     CT abdomen pelvis with contrast    Result Date: 2/29/2024  Narrative: CT ABDOMEN AND PELVIS WITH IV CONTRAST  "INDICATION: aucte anemia, abdominal pain , blood per rectum . recent colitis.  \"78-year-old female presents to the emergency department from rehab center with positive Hemoccult study and acute anemia per nursing staff.\"  \"Patient is complaining of generalized fatigue and nonspecific abdominal discomfort and lower back pain.\" COMPARISON: CT chest abdomen pelvis 2/19/2024. TECHNIQUE: CT examination of the abdomen and pelvis was performed. Multiplanar 2D reformatted images were created from the source data. This examination, like all CT scans performed in the  Network, was performed utilizing techniques to minimize radiation dose exposure, including the use of iterative reconstruction and automated exposure control. Radiation dose length product (DLP) for this visit: 487.99 mGy-cm IV Contrast: 100 mL of iohexol (OMNIPAQUE) Enteric Contrast: Not administered. FINDINGS: ABDOMEN LOWER CHEST: Mild right basilar patchy consolidation #2/5 suspicious for pneumonia. Unchanged cardiomegaly. LIVER/BILIARY TREE: Unremarkable. GALLBLADDER: Cholelithiasis without findings of acute cholecystitis. SPLEEN: Numerous peripheral splenic hypodensities measuring up to 16 mm #2/37 suspicious for splenic infarcts. PANCREAS: Unremarkable. ADRENAL GLANDS: Unremarkable. KIDNEYS/URETERS: No hydronephrosis. Bilateral renal parapelvic and cortical cysts. STOMACH AND BOWEL: Persistent fluid throughout the length of the colon. Previously seen areas of colonic mucosal hyperemia are improved. Colonic diverticulosis without diverticulitis. APPENDIX: No findings to suggest appendicitis. ABDOMINOPELVIC CAVITY: No ascites. No pneumoperitoneum. No lymphadenopathy. VESSELS: Unremarkable for patient's age. PELVIS REPRODUCTIVE ORGANS: Hysterectomy. URINARY BLADDER: Unremarkable. ABDOMINAL WALL/INGUINAL REGIONS: Unremarkable. BONES: No acute fracture or suspicious osseous lesion. Spinal degenerative changes.     Impression: Patchy right " basilar consolidation suspicious for pneumonia. Persistent fluid throughout the colon similar in appearance to 2/19/2024 with improved areas of mucosal hyperemia and thickening in keeping with an improving colitis. The study was marked in EPIC for immediate notification. Workstation performed: [id number]      VAS VENOUS DUPLEX - LOWER LIMB BILATERAL    Result Date: 2/20/2024  Narrative:  [facility] REPORT CLINICAL: Indications: Physician wants to determine patency of the venous system secondary to hypoxia and leg edema. Operative History: No cardiovascular surgeries noted Risk Factors The patient has history of HTN, Diabetes (NIDDM (oral meds)) and Hyperlipidemia.  FINDINGS:  Right          Thrombus           CFV            Acute              FV Prox        Acute - Occlusive  FV Mid         Acute - Occlusive  FV Dist        Acute - Occlusive  PFV            Acute              Popliteal      Acute - Occlusive  PostTibial     Acute - Occlusive  Peroneal       Acute - Occlusive  Gastrocnemius  Acute - Occlusive   Left           Thrombus  Peroneal       Acute        CONCLUSION: Impression: RIGHT LOWER LIMB: There is evidence of acute non-occlusive deep vein thrombosis noted in the common femoral and deep femoral veins and occlusive deep vein thrombosis in the femoral, popliteal, gastrocnemius, posterior tibial and peroneal veins. No evidence of superficial thrombophlebitis noted. Doppler evaluation shows a normal response to augmentation maneuvers.. Popliteal, posterior tibial and anterior tibial arterial Doppler waveform's are biphasic/monophasic.  LEFT LOWER LIMB: There is evidence of acute non-occlusive deep vein thrombosis noted in the peroneal veins. No evidence of superficial thrombophlebitis noted. Doppler evaluation shows a normal response to augmentation maneuvers. Popliteal, posterior tibial and anterior tibial arterial Doppler waveform's are triphasic/biphasic.  Technical findings were given to   Carlos Sánchez  SIGNATURE: Electronically Signed by: JACK PATEL DO on 2024-02-20 03:08:24 PM    Echo complete w/ contrast if indicated    Result Date: 2/20/2024  Narrative:   Left Ventricle: Left ventricular cavity size is normal. Wall thickness is normal. The left ventricular ejection fraction is 65%. Systolic function is vigorous. Wall motion is normal. Diastolic function is mildly abnormal, consistent with grade I (abnormal) relaxation.   Right Ventricle: Right ventricular cavity size is normal. Systolic function is normal.   Atrial Septum: No patent foramen ovale detected,with provocation with cough, using agitated saline contrast and with valsalva, using agitated saline contrast.   Mitral Valve: There is mild regurgitation.   This was a technically difficult study     XR chest 1 view portable    Result Date: 2/20/2024  Narrative: XR CHEST PORTABLE INDICATION: sob. COMPARISON: December 29, 2010 FINDINGS: Clear lungs. No pneumothorax or pleural effusion. Normal cardiomediastinal silhouette. Bones are unremarkable for age. Normal upper abdomen.     Impression: No acute consolidation or congestion Hypoinflated lungs Workstation performed: [id number]     CT chest abdomen pelvis w contrast    Result Date: 2/19/2024  Narrative: CT CHEST, ABDOMEN AND PELVIS WITH IV CONTRAST INDICATION: Sepsis Severe anemia hypotension hypoxia. COMPARISON: None. TECHNIQUE: CT examination of the chest, abdomen and pelvis was performed. Multiplanar 2D reformatted images were created from the source data. This examination, like all CT scans performed in the [facility], was performed utilizing techniques to minimize radiation dose exposure, including the use of iterative reconstruction and automated exposure control. Radiation dose length product (DLP) for this visit: 616.4 mGy-cm IV Contrast: 100 mL of iohexol (OMNIPAQUE) Enteric Contrast: Not administered. FINDINGS: CHEST LUNGS: No evidence of consolidation. No  suspicious pulmonary nodule. Central airways are patent. PLEURA: Unremarkable. HEART/GREAT VESSELS: Heart is unremarkable for patient's age. No thoracic aortic aneurysm. MEDIASTINUM AND GUILHERME: Unremarkable. CHEST WALL AND LOWER NECK: Unremarkable. ABDOMEN LIVER/BILIARY TREE: Unremarkable. GALLBLADDER: Cholelithiasis without findings of acute cholecystitis. SPLEEN: Unremarkable. PANCREAS: Unremarkable. ADRENAL GLANDS: Unremarkable. KIDNEYS/URETERS: Bilateral parapelvic and renal cortical cysts. No collecting system dilatation. Nephrograms are symmetric. STOMACH AND BOWEL: Mucosal hyperenhancement throughout the entire colon, suspicious for colitis. Scattered colonic diverticula with no evidence of acute diverticulitis. APPENDIX: No findings to suggest appendicitis. ABDOMINOPELVIC CAVITY: No ascites. No pneumoperitoneum. No lymphadenopathy. VESSELS: Unremarkable for patient's age. PELVIS REPRODUCTIVE ORGANS: Post hysterectomy. URINARY BLADDER: Few locules of air in the urinary bladder. Recommend correlation with any recent instrumentation ABDOMINAL WALL/INGUINAL REGIONS: Unremarkable. BONES: No acute fracture or suspicious osseous lesion. Spinal degenerative changes.     Impression: Findings suspicious for mild pancolitis. Few gas locules in the urinary bladder. This could be related to recent instrumentation, but could also be related to infection. Recommend clinical correlation and suggest urinalysis to exclude infection. The study was marked in EPIC for immediate notification. Workstation performed: [id number]     MRI thoracic spine w wo contrast    Result Date: 2/14/2024  Narrative: Clinical history: Lower extremity weakness. Comment: MRI of the thoracic spine was performed with and without administration of intravenous contrast. 15 cc Clariscan was administered intravenously. COMPARISON: CT thoracic spine dated 11/4/2023.   FINDINGS: Normal thoracic kyphosis. Mild grade 1 anterolisthesis at T2-T3. The remainder of  the thoracic vertebral bodies maintain normal height and alignment. There is no bone marrow edema. The thoracic spinal cord is normal in volume and signal intensity. Within the limitations of motion artifact, there is no abnormal intrathecal enhancement. There are multilevel degenerative changes. Multilevel facet hypertrophy. Loss of disc height and endplate irregularity, most prominent at T7-T8, T8-T9 and T10-T11. T1-T2: Disc bulge with posterior osteophytic ridging and bilateral foraminal components. There is bilateral foraminal stenosis. T2-T3: Anterolisthesis with disc uncovering and disc bulge. There is right foraminal stenosis. T3-T4: No focal disc herniation, central or foraminal stenosis. T4-T5: Trace disc bulge. No high-grade central or foraminal stenosis. T5-T6: Disc bulge. No central or foraminal stenosis. T6-T7: Disc bulge. No central or foraminal stenosis. T7-T8: Disc bulge. No central or foraminal stenosis. T8-T9: Disc bulge, asymmetrically more prominent on the right. There is mild central canal and right foraminal stenosis. T9-T10: No focal disc herniation, central or foraminal stenosis. T10-T11: Disc bulge, central canal and bilateral foraminal stenosis. T11-T12: Disc bulge, central canal and bilateral foraminal stenosis. T12-L1: Disc bulge, central canal and bilateral foraminal stenosis. Bilateral parapelvic cysts.    Impression: IMPRESSION: 1. No thoracic spinal cord signal abnormality. 2. Multilevel degenerative changes. Workstation:[id number]    CTA chest pe study    Result Date: 2/13/2024  Narrative: History: Hypoxia   Exam: CT of the chest with IV contrast (PE protocol)   Technique: Axial CT of the chest performed with 90 cc of Omnipaque 350 intravenous contrast with particular attention to the pulmonary arteries. Coronal reformations were performed as well.   Comparison: None.   Findings: Thoracic inlet: 2 dominant nodules in the right lobe of the thyroid largest measuring 12 mm. Pulmonary  Arteries: Normal.   Lungs/Pleura: Bibasilar scarring. Mediastinum/Lymph nodes/Heart: Cardiomegaly. Coronary artery calcification. Chest Wall: Normal.   Upper abdomen/Other: Gallstones. Hepatic steatosis. Visualized spleen pancreas adrenals appear unremarkable Bones: No acute osseous abnormality.      Impression: Impression:  No evidence of pulmonary embolism or other acute abnormality in the chest.   Workstation:[id number]    MRI lumbar spine w wo contrast    Result Date: 2/13/2024  Narrative: History: Lower extremity weakness Procedure : MR lumbar spine 2/13/2024 Technique::  MRI of the lumbar spine was performed emphasizing T1, STIR and T2 contrast information. Sagittal and axial images were repeated following the IV administration of 13 cc  Gadolinium Comparison: Lumbar spine MRI 3/13/2017 Findings: For the purposes of this dictation, the lumbar vertebrae are labeled from a caudal to cranial direction, the first vertebra with lumbar morphology is labeled as L5. Alignment:There is straightening of the normal lordotic curvature and there is a levoscoliosis centered at the L2-3 level. Vertebral body bone marrow:Bone marrow is heterogeneous. Endplate changes are present without abnormal enhancement CSF and distal spinal cord are normal. Spinal cord and CSF are normal conus medullaris terminates at the L1-2 level Multilevel degenerative disease is present including the lower thoracic spine. At the T10-11 level there is mild disc bulge and osteophytic spurring. There is facet arthritis with foraminal narrowing. T11-12: There is left paracentral disc osteophyte with superimposed on a more diffuse disc bulge. There is foraminal narrowing on the left greater than the right. T12-L1: Disc bulge is present there is mild central stenosis from facet arthritis. The foramina are patent.  L1-2: There is mild loss of the disc space height there is minimal disc bulge. There is facet arthritis with foraminal narrowing on the right  left foramen is patent. L2-3: This may be an autofusion. There is asymmetric fusion of the disc space on the right. There is loss of the disc space height there is disc osteophyte and foraminal narrowing on the right. Left foramen is patent there is mild facet arthritis and minimal central stenosis L3-4: There is mild disc osteophyte. There is mild central stenosis. There is mild foraminal narrowing bilaterally. L4-5: There is disc bulge and osteophytic spurring. There is facet arthritis. There is severe foraminal narrowing on the left. The right foramen is mildly narrowed far laterally. L5-S1: There is disc osteophyte. There is foraminal narrowing bilaterally. There is mild central stenosis. The paraspinal soft tissues: T2 signal change is present around the kidney on the left this could be related to medical renal disease. Renal cysts are present bilaterally.    Impression: Impression: Multilevel degenerative findings are present. Findings are most significant at the L4-5 level with severe foraminal narrowing on the left. Additional degenerative findings are described. There is no evidence of severe central stenosis. When compared to the previous imaging findings have progressed. Workstation:[id number]    XR chest portable    Result Date: 2/12/2024  Narrative: History: Shortness of breath. Portable AP erect examination of the chest was obtained. The lung fields are clear. The heart is not enlarged. The mediastinal structures are normal. There is no change from 2/9/2024.    Impression: IMPRESSION: Normal chest examination. Workstation:[id number]    MRI brain w wo contrast    Result Date: 2/11/2024  Narrative: Clinical information: Rule out stroke, assess for structural changes Technique: MRI of the brain was performed utilizing sagittal T1, axial diffusion, axial gradient echo, axial FLAIR, axial T2, axial T1, enhanced sagittal, coronal and axial T1-weighted images. 15 cc Clariscan administered intravenously.  Comparison: CT head 2/9/2024. MRI of the brain with and without contrast 1/15/2024, 12/9/2023. Findings Brain parenchyma: There is parenchymal volume loss. No acute infarction. Old lacunar infarction in the left putamen. A few punctate foci of T1 shortening along the inferior aspect of the infarction are slightly decreased from prior imaging, and may reflect laminar necrosis versus resolving hemorrhage. A few small foci of T2 FLAIR hyperintensity within the periventricular and subcortical white matter as well as the central karly, likely sequelae of microangiopathy. No pathologic intracranial enhancement. Ventricular system, basal cisterns and extra-axial spaces: Ventricles and sulci are mildly prominent, commensurate with parenchymal volume. Major vascular structures: Major skull base flow voids are preserved. Major dural venous sinuses demonstrate normal enhancement on postcontrast images. Intracranial hemorrhage: No acute intracranial hemorrhage. Midline shift: None. Skull base & Calvarium: Bone marrow demonstrates normal signal. Normal alignment at the craniocervical junction. Paranasal sinuses and mastoid air cells: Scattered mild mucosal thickening in  the paranasal sinuses. Nonspecific right lateral mastoid tip fluid, unchanged from prior exam. Visualized orbits: Normal. Sella: Unremarkable.    Impression: Impression: No acute infarction or acute intracranial hemorrhage. Chronic lacunar infarction in the left putamen. A few punctate foci of T1 shortening along the inferior aspect are slightly decreased in the interval, and may reflect resolving petechial hemorrhage or laminar necrosis. Workstation:[id number]    CT head wo contrast    Result Date: 2/9/2024  Narrative: Clinical information: Neuro deficit. Technique: Unenhanced CT scan of the brain was performed by department technique. Images were reviewed in soft tissue and bone algorithms with 2.5 mm axial sections, sagittal and coronal reformations. Comparison:  MRI dated 1/15/2024. Findings Brain Parenchyma: No large acute territorial infarction. Chronic small infarction in the left basal ganglia. Cerebral white matter hypoattenuation is nonspecific and likely related to microangiopathy. Ventricular system, basal cisterns and extra-axial spaces: Prominent, compatible with cerebral volume loss. Intracranial hemorrhage: None. Midline shift: None. Skull base & Calvarium: No depressed calvarial fracture. Atherosclerotic vascular calcifications are noted at the skull base. Paranasal sinuses and mastoid air cells: Mucosal thickening in the ethmoid air cells. Visualized orbits: Unremarkable. Sella: Partially empty sella.    Impression: Impression: 1.  No acute intracranial hemorrhage or large acute territorial infarction. 2.  Cerebral volume loss and sequela of chronic microvascular ischemia. Workstation:[id number]    XR chest pa & lateral    Result Date: 2/9/2024  Narrative: Chest AP and lateral Indication: Syncope Two-view study is compared to 12/3/2023 and 12/17/2023. Heart is normal in size. Lungs are clear. No pleural effusions are seen.    Impression: Impression: No active disease. Workstation:[id number]      EKG, Pathology, and Other Studies Reviewed on Admission:   EKG: Reviewed      Counseling / Coordination of Care Time: 30 total mins spent n consult. Greater than 50% of total time spent on patient counseling and coordination of care.    Kailey Mccord DO  Gastroenterology Fellow  [facility]  Division of Gastroenterology and Hepatology  Available on [employer]  ...............................................................................................................................................  ** Please Note: This note is constructed using a voice recognition dictation system. **

## 2024-03-09 NOTE — PROGRESS NOTES
[facility]  Progress Note  Name: Darlyn Nguyen I  MRN: [id number]  Unit/Bed#: ICU 02 I Date of Admission: 3/3/2024   Date of Service: 3/9/2024 I Hospital Day: 6    Assessment/Plan   * Bacteremia due to Enterococcus  Assessment & Plan  Initially admitted to [facility] on 2/29, developed septic shock on 3/3, intubated and transferred to [facility];  required pressors  E. avium bacteremia 1 in 2 sets from 2/29 were positive.   Repeat cultures show NGTD  Susceptibilities show pan-sensitive enterococcus   ID following, appreciate recommendations, currently on Unasyn    Sepsis (Formerly [facility])  Assessment & Plan  Initially admitted to [facility] on 2/29, developed septic shock on 3/3, criteria for sepsis , was intubated and transferred to [facility]; required pressors; eventually developed bloody stool, required a flex sigmoidoscopy that was noted for rectal ulcer and underwent cauterization  Transferred out of ICU on 3/9/24  On antibiotics per infectious disease for enterococcal bacteremia  Continue to monitor vital signs  Patient developed bloody stool again on 3/8/2024 with hemoglobin dropped to 5.8, hemoglobin improved to 8 status post 1 unit of red blood cells  GI following    GI bleed  Assessment & Plan  Patient was initially on heparin due to acute DVT, developed bloody stools, underwent flexible sigmoidoscopy revealing rectal ulcer, status post cauterization  GI following; started again with bloody movements on 3/8, hemoglobin dropped to 5.8, but improved after 1 unit of red blood cells to 8.9  No interventions at this time as per GI, continue to monitor hemoglobin every 8 hours for now  Hemodynamically stable at this time        Atrial fibrillation with rapid ventricular response (HCC)  Assessment & Plan  Cardiology following  -not a candidate for chronic AC for DVT/PE : CHADSVASC score is 5 and high HAS-BLED 5.   -Plan per cardiology to continue p.o. 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) ([facility])  Assessment & Plan  Monitor renal indices    Diabetes mellitus ([facility])  Assessment & Plan  Lab Results   Component Value Date    HGBA1C 5.9 (H) 03/03/2024       Recent Labs     03/04/24  1229 03/04/24  1821 03/04/24  2318 03/05/24  0507   POCGLU 152* 149* 153* 107       Continue sliding scale      Sacral wound  Assessment & Plan  Continue local wound care    Cavitary pneumonia  Assessment & Plan  AFB of the sputum preliminary is negative  ID involved  Continue Unasyn for tx of E avium bacteremia  Serum galactomannan ordered to r/o chronic aspergillosis.    Acute respiratory failure with hypoxia ([facility])  Assessment & Plan  Extubated on 3/4  Doing well  Continue pulmonary toileting  Currently on 2 L nasal cannula and satting in high 90s    Lupus ([facility])  Assessment & Plan  Recently changed to prednisone 50mg daily.  Remains immunosuppressed, continue prednisone at 50 mg daily  Continue to hold Mepron, Plaquenil, and CellCept.  These medications are on hold secondary to resolving septic shock, cavitary pneumonia, and bacteremia.                 VTE Pharmacologic Prophylaxis: VTE Score: 8  holding due to GI bleed    Mobility:   Basic Mobility Inpatient Raw Score: 7  JH-HLM Goal: 2: Bed activities/Dependent transfer  JH-HLM Achieved: 2: Bed activities/Dependent transfer  HLM Goal achieved. Continue to encourage appropriate mobility.    Patient Centered Rounds: I performed bedside rounds with nursing staff today.   Discussions with Specialists or Other Care Team Provider: reviewed GI recommendations    Education and  Discussions with Family / Patient: Updated  ( and nephew) at bedside.    Total Time Spent on Date of Encounter in care of patient: 35 mins. This time was spent on one or more of the following: performing physical exam; counseling and coordination of care; obtaining or reviewing history; documenting in the medical record; reviewing/ordering tests, medications or procedures; communicating with other healthcare professionals and discussing with patient's family/caregivers.    Current Length of Stay: 6 day(s)  Current Patient Status: Inpatient   Certification Statement: The patient will continue to require additional inpatient hospital stay due to active GI bleed  Discharge Plan: Anticipate discharge in 48-72 hrs to pending workup    Code Status: Level 2 - DNAR: but accepts endotracheal intubation    Subjective:   Patient seen and examined at bedside.   and nephew at bedside.  She continues to have bloody bowel movements; no new complaints.    Objective:     Vitals:   Temp (24hrs), Av.6 °F (35.9 °C), Min:95.4 °F (35.2 °C), Max:98.4 °F (36.9 °C)    Temp:  [95.4 °F (35.2 °C)-98.4 °F (36.9 °C)] 97.9 °F (36.6 °C)  HR:  [48-74] 58  Resp:  [14-29] 23  BP: ()/(44-79) 142/70  SpO2:  [94 %-100 %] 99 %  Body mass index is 30.88 kg/m².     Input and Output Summary (last 24 hours):     Intake/Output Summary (Last 24 hours) at 3/9/2024 1722  Last data filed at 3/9/2024 1401  Gross per 24 hour   Intake 320 ml   Output 810 ml   Net -490 ml       Physical Exam:   Physical Exam  Vitals and nursing note reviewed.   Constitutional:       General: She is not in acute distress.     Appearance: She is ill-appearing. She is not toxic-appearing or diaphoretic.   HENT:      Head: Normocephalic and atraumatic.   Cardiovascular:      Rate and Rhythm: Tachycardia present. Rhythm irregular.      Pulses: Normal pulses.      Heart sounds: Normal heart sounds. No murmur heard.     No friction rub. No gallop.    Pulmonary:      Effort: Pulmonary effort is normal.      Comments: Coarse breath sound bilaterally  Abdominal:      General: Bowel sounds are normal. There is no distension.      Palpations: Abdomen is soft.      Tenderness: There is no abdominal tenderness. There is no guarding or rebound.   Musculoskeletal:         General: Swelling present.      Right lower leg: Edema (1+ pitting) present.      Left lower leg: Edema (1+ pitting) present.   Skin:     General: Skin is warm and dry.   Neurological:      Mental Status: She is alert. She is disoriented.   Psychiatric:         Mood and Affect: Mood normal.         Behavior: Behavior normal.          Additional Data:     Labs:  Results from last 7 days   Lab Units 03/09/24  1305 03/09/24  0408 03/06/24  1120 03/06/24  0435 03/05/24  1539 03/05/24  0450   WBC Thousand/uL  --  14.03*   < > 26.92*  --  26.92*   HEMOGLOBIN g/dL 8.7* 8.8*   < > 9.0*   < > 8.7*   HEMATOCRIT %  --  26.6*   < > 27.0*  --  27.5*   PLATELETS Thousands/uL  --  57*   < > 118*   < > 199   BANDS PCT %  --   --   --   --   --  2   NEUTROS PCT %  --   --   --  95*  --   --    LYMPHS PCT %  --   --   --  3*  --   --    LYMPHO PCT %  --   --   --   --   --  6*   MONOS PCT %  --   --   --  1*  --   --    MONO PCT %  --   --   --   --   --  4   EOS PCT %  --   --   --  0  --  0    < > = values in this interval not displayed.     Results from last 7 days   Lab Units 03/09/24  0408   SODIUM mmol/L 143   POTASSIUM mmol/L 3.5   CHLORIDE mmol/L 112*   CO2 mmol/L 25   BUN mg/dL 23   CREATININE mg/dL 0.76   ANION GAP mmol/L 6   CALCIUM mg/dL 7.5*   ALBUMIN g/dL 1.9*   TOTAL BILIRUBIN mg/dL 0.72   ALK PHOS U/L 74   ALT U/L 42   AST U/L 15   GLUCOSE RANDOM mg/dL 243*     Results from last 7 days   Lab Units 03/09/24  0003   INR  1.14     Results from last 7 days   Lab Units 03/09/24  1555 03/09/24  1056 03/09/24  0720 03/09/24  0632 03/09/24  0020 03/08/24  2125 03/08/24  1940 03/08/24  1622 03/08/24  1044  03/08/24  0814 03/08/24  0724 03/07/24  2347   POC GLUCOSE mg/dl 150* 247* 191* 207* 290* 272* 272* 207* 84 127 114 234*     Results from last 7 days   Lab Units 03/03/24  0404   HEMOGLOBIN A1C % 5.9*     Results from last 7 days   Lab Units 03/04/24  0455 03/04/24  0150 03/03/24  2250 03/03/24  1313 03/03/24  1019   LACTIC ACID mmol/L  --  2.1* 3.1* 4.5* 7.1*   PROCALCITONIN ng/ml 3.08*  --   --   --   --        Lines/Drains:  Invasive Devices       Peripherally Inserted Central Catheter Line  Duration             PICC Line 03/08/24 Right Basilic 1 day              Peripheral Intravenous Line  Duration             Peripheral IV 03/09/24 Left;Upper Arm <1 day              Drain  Duration             Urethral Catheter Latex;Straight-tip 16 Fr. 8 days                  Urinary Catheter:  Goal for removal:  Continue to monitor ARLEN's           Imaging: Reviewed radiology reports from this admission including: chest xray    Recent Cultures (last 7 days):   Results from last 7 days   Lab Units 03/03/24  2255 03/03/24  1404 03/03/24  1148 03/03/24  1019   BLOOD CULTURE  No Growth After 5 Days.  No Growth After 5 Days.  --  No Growth After 5 Days. No Growth After 5 Days.   SPUTUM CULTURE   --  2+ Growth of Pseudomonas aeruginosa*  Few Colonies of Candida albicans*  --   --    GRAM STAIN RESULT   --  3+ Polys*  1+ Budding yeast*  1+ Gram negative rods*  --   --        Last 24 Hours Medication List:   Current Facility-Administered Medications   Medication Dose Route Frequency Provider Last Rate    acetaminophen  650 mg Oral Q6H PRN Trang Arthur MD      Albumin 5%  25 g Intravenous Once AUGUSTA Hutchison      ampicillin-sulbactam  3 g Intravenous Q6H AUGUSTA Ibarra 3 g (03/09/24 1716)    atorvastatin  40 mg Oral Daily AUGUSTA Ibarra      carvedilol  6.25 mg Oral BID With Meals AUGUSTA Ibarra      collagenase   Topical Daily AUGUSTA Ibarra      diltiazem  30 mg Oral Q6H [facility]na Bedoya  DO      escitalopram  10 mg Oral Daily Edward C Ceeek, CRNP      famotidine  20 mg Oral Daily Edward C Ceeek, CRNP      insulin lispro  1-5 Units Subcutaneous TID AC Edward C Ceeek, CRNP      insulin lispro  1-5 Units Subcutaneous HS Edward DASHAWN Mikeek, CRNP      levalbuterol  1.25 mg Nebulization Q8H PRN Edchriss Mikeek, CRNP      OLANZapine  5 mg Oral HS Edchriss Mikeek, CRNP      omeprazole (PRILOSEC) suspension 2 mg/mL  20 mg Oral Daily Edward C Ceeek, CRNP      ondansetron  4 mg Intravenous Q4H PRN Edchriss Mikeek, CRNP      predniSONE  40 mg Oral Daily Isaías Nichols, AUGUSTA          Today, Patient Was Seen By: Trang Arthur MD    **Please Note: This note may have been constructed using a voice recognition system.**

## 2024-03-09 NOTE — ASSESSMENT & PLAN NOTE
Patient was initially on heparin due to acute DVT, developed bloody stools, underwent flexible sigmoidoscopy revealing rectal ulcer, status post cauterization  GI following; started again with bloody movements on 3/8, hemoglobin dropped to 5.8, but improved after 1 unit of red blood cells to 8.9  No interventions at this time as per GI, continue to monitor hemoglobin every 8 hours for now  Hemodynamically stable at this time

## 2024-03-09 NOTE — PLAN OF CARE
Problem: Prexisting or High Potential for Compromised Skin Integrity  Goal: Skin integrity is maintained or improved  Description: INTERVENTIONS:  - Identify patients at risk for skin breakdown  - Assess and monitor skin integrity  - Assess and monitor nutrition and hydration status  - Monitor labs   - Assess for incontinence   - Turn and reposition patient  - Assist with mobility/ambulation  - Relieve pressure over bony prominences  - Avoid friction and shearing  - Provide appropriate hygiene as needed including keeping skin clean and dry  - Evaluate need for skin moisturizer/barrier cream  - Collaborate with interdisciplinary team   - Patient/family teaching  - Consider wound care consult   Outcome: Progressing     Problem: SAFETY ADULT  Goal: Patient will remain free of falls  Description: INTERVENTIONS:  - Educate patient/family on patient safety including physical limitations  - Instruct patient to call for assistance with activity   - Consult OT/PT to assist with strengthening/mobility   - Keep Call bell within reach  - Keep bed low and locked with side rails adjusted as appropriate  - Keep care items and personal belongings within reach  - Initiate and maintain comfort rounds  - Make Fall Risk Sign visible to staff  - Apply yellow socks and bracelet for high fall risk patients  - Consider moving patient to room near nurses station  Outcome: Progressing  Goal: Maintain or return to baseline ADL function  Description: INTERVENTIONS:  -  Assess patient's ability to carry out ADLs; assess patient's baseline for ADL function and identify physical deficits which impact ability to perform ADLs (bathing, care of mouth/teeth, toileting, grooming, dressing, etc.)  - Assess/evaluate cause of self-care deficits   - Assess range of motion  - Assess patient's mobility; develop plan if impaired  - Assess patient's need for assistive devices and provide as appropriate  - Encourage maximum independence but intervene and  supervise when necessary  - Involve family in performance of ADLs  - Assess for home care needs following discharge   - Consider OT consult to assist with ADL evaluation and planning for discharge  - Provide patient education as appropriate  Outcome: Progressing  Goal: Maintains/Returns to pre admission functional level  Description: INTERVENTIONS:  - Perform AM-PAC 6 Click Basic Mobility/ Daily Activity assessment daily.  - Set and communicate daily mobility goal to care team and patient/family/caregiver.   - Collaborate with rehabilitation services on mobility goals if consulted  - Out of bed for toileting  - Record patient progress and toleration of activity level   Outcome: Progressing     Problem: DISCHARGE PLANNING  Goal: Discharge to home or other facility with appropriate resources  Description: INTERVENTIONS:  - Identify barriers to discharge w/patient and caregiver  - Arrange for needed discharge resources and transportation as appropriate  - Identify discharge learning needs (meds, wound care, etc.)  - Arrange for interpretive services to assist at discharge as needed  - Refer to Case Management Department for coordinating discharge planning if the patient needs post-hospital services based on physician/advanced practitioner order or complex needs related to functional status, cognitive ability, or social support system  Outcome: Progressing     Problem: Knowledge Deficit  Goal: Patient/family/caregiver demonstrates understanding of disease process, treatment plan, medications, and discharge instructions  Description: Complete learning assessment and assess knowledge base.  Interventions:  - Provide teaching at level of understanding  - Provide teaching via preferred learning methods  Outcome: Progressing     Problem: INFECTION - ADULT  Goal: Absence or prevention of progression during hospitalization  Description: INTERVENTIONS:  - Assess and monitor for signs and symptoms of infection  - Monitor  lab/diagnostic results  - Monitor all insertion sites, i.e. indwelling lines, tubes, and drains  - Monitor endotracheal if appropriate and nasal secretions for changes in amount and color  - [place] appropriate cooling/warming therapies per order  - Administer medications as ordered  - Instruct and encourage patient and family to use good hand hygiene technique  - Identify and instruct in appropriate isolation precautions for identified infection/condition  Outcome: Progressing     Problem: RESPIRATORY - ADULT  Goal: Achieves optimal ventilation and oxygenation  Description: INTERVENTIONS:  - Assess for changes in respiratory status  - Assess for changes in mentation and behavior  - Position to facilitate oxygenation and minimize respiratory effort  - Oxygen administered by appropriate delivery if ordered  - Initiate smoking cessation education as indicated  - Encourage broncho-pulmonary hygiene including cough, deep breathe, Incentive Spirometry  - Assess the need for suctioning and aspirate as needed  - Assess and instruct to report SOB or any respiratory difficulty  - Respiratory Therapy support as indicated  Outcome: Progressing     Problem: HEMATOLOGIC - ADULT  Goal: Maintains hematologic stability  Description: INTERVENTIONS  - Assess for signs and symptoms of bleeding or hemorrhage  - Monitor labs  - Administer supportive blood products/factors as ordered and appropriate  Outcome: Progressing     Problem: Nutrition/Hydration-ADULT  Goal: Nutrient/Hydration intake appropriate for improving, restoring or maintaining nutritional needs  Description: Monitor and assess patient's nutrition/hydration status for malnutrition. Collaborate with interdisciplinary team and initiate plan and interventions as ordered.  Monitor patient's weight and dietary intake as ordered or per policy. Utilize nutrition screening tool and intervene as necessary. Determine patient's food preferences and provide high-protein,  high-caloric foods as appropriate.     INTERVENTIONS:  - Monitor oral intake, urinary output, labs, and treatment plans  - Assess nutrition and hydration status and recommend course of action  - Evaluate amount of meals eaten  - Assist patient with eating if necessary   - Allow adequate time for meals  - Recommend/ encourage appropriate diets, oral nutritional supplements, and vitamin/mineral supplements  - Order, calculate, and assess calorie counts as needed  - Recommend, monitor, and adjust tube feedings and TPN/PPN based on assessed needs  - Assess need for intravenous fluids  - Provide specific nutrition/hydration education as appropriate  - Include patient/family/caregiver in decisions related to nutrition  Outcome: Progressing     Problem: SAFETY,RESTRAINT: NV/NON-SELF DESTRUCTIVE BEHAVIOR  Goal: Remains free of harm/injury (restraint for non violent/non self-detsructive behavior)  Description: INTERVENTIONS:  - Instruct patient/family regarding restraint use   - Assess and monitor physiologic and psychological status   - Provide interventions and comfort measures to meet assessed patient needs   - Identify and implement measures to help patient regain control  - Assess readiness for release of restraint   Outcome: Progressing  Goal: Returns to optimal restraint-free functioning  Description: INTERVENTIONS:  - Assess the patient's behavior and symptoms that indicate continued need for restraint  - Identify and implement measures to help patient regain control  - Assess readiness for release of restraint   Outcome: Progressing

## 2024-03-09 NOTE — ASSESSMENT & PLAN NOTE
Cardiology following  -not a candidate for chronic AC for DVT/PE : CHADSVASC score is 5 and high HAS-BLED 5.   -Plan per cardiology to continue p.o. diltiazem; start amiodarone infusion if recurrent rapid atrial fibrillation   -Continue telemetry  -status post IVC filter on 3/7

## 2024-03-09 NOTE — CONSULTS
Please see initial consult note completed 3/1 by Ginger Dhillon PA-C and refer to progress note from today in regard to updated care plan.

## 2024-03-09 NOTE — QUICK NOTE
Progress Note -   Triage Asssessment     Darlyn Nguyen 78 y.o. female MRN: [id number]    Assessment & Plan     Patient is known to the critical care service, was signed out of ICU care on 3/8.  When patient was ready to be downgraded to [facility], patient had a large approximately 300 cc liquid dark red bowel movement.  Patient had systolic blood pressure of 90.  Stat hemoglobin was sent with a noted hemoglobin drop from 8.3-->6.5.    1 unit of blood ordered, patient's blood pressure now systolic greater than 100 prior to giving blood.     No further bowel movements at this time    Interventions and plan discussed with mary MAYER.  If patient remains hemodynamically stable and responds to unit of blood, can remain on internal medicine service.    If patient further decompensates or becomes hypotensive, will reach out to GI and accept the patient back to critical care.       Addendum 5 AM    Patient has had stable vital signs, no further episodes of hypotension and hemoglobin responded appropriately from 6.5 --> 8.8.  Continue to monitor and trend hemoglobin, patient had approximately 3 bowel movements overnight and total, would reconsult GI today.    Discussed with mary MAYER

## 2024-03-10 PROBLEM — R60.1 ANASARCA: Status: ACTIVE | Noted: 2024-01-01

## 2024-03-10 PROBLEM — D69.6 THROMBOCYTOPENIA (HCC): Status: ACTIVE | Noted: 2024-03-10

## 2024-03-10 NOTE — QUICK NOTE
Gastroenterology Quick Note    Patient continues to have blood in bowel movements. This is not unexpected given her rectal ulcer. However, her Hgb and vital signs remain stable. Patient will likely need repeat flexible sigmoidoscopy this week, but timing will be determined on overall clinical stability as well as hgb trend. For now, will empirically make patient NPO at midnight. GI team to evaluate in AM and based on clinical status make decision in regard to timing of flex sig. Please reach out to on-call provider with additional questions or concerns in the meantime.     Kailey Mccord DO  Gastroenterology Fellow  [facility]  Division of Gastroenterology and Hepatology  Available on [employer]

## 2024-03-10 NOTE — PLAN OF CARE
Problem: Prexisting or High Potential for Compromised Skin Integrity  Goal: Skin integrity is maintained or improved  Description: INTERVENTIONS:  - Identify patients at risk for skin breakdown  - Assess and monitor skin integrity  - Assess and monitor nutrition and hydration status  - Monitor labs   - Assess for incontinence   - Turn and reposition patient  - Assist with mobility/ambulation  - Relieve pressure over bony prominences  - Avoid friction and shearing  - Provide appropriate hygiene as needed including keeping skin clean and dry  - Evaluate need for skin moisturizer/barrier cream  - Collaborate with interdisciplinary team   - Patient/family teaching  - Consider wound care consult   Outcome: Not Progressing     Problem: SAFETY ADULT  Goal: Maintain or return to baseline ADL function  Description: INTERVENTIONS:  -  Assess patient's ability to carry out ADLs; assess patient's baseline for ADL function and identify physical deficits which impact ability to perform ADLs (bathing, care of mouth/teeth, toileting, grooming, dressing, etc.)  - Assess/evaluate cause of self-care deficits   - Assess range of motion  - Assess patient's mobility; develop plan if impaired  - Assess patient's need for assistive devices and provide as appropriate  - Encourage maximum independence but intervene and supervise when necessary  - Involve family in performance of ADLs  - Assess for home care needs following discharge   - Consider OT consult to assist with ADL evaluation and planning for discharge  - Provide patient education as appropriate  Outcome: Not Progressing  Goal: Maintains/Returns to pre admission functional level  Description: INTERVENTIONS:  - Perform AM-PAC 6 Click Basic Mobility/ Daily Activity assessment daily.  - Set and communicate daily mobility goal to care team and patient/family/caregiver.   - Collaborate with rehabilitation services on mobility goals if consulted  - Perform Range of Motion 3 times a  day.  - Reposition patient every 2 hours.  - Record patient progress and toleration of activity level   Outcome: Not Progressing     Problem: Nutrition/Hydration-ADULT  Goal: Nutrient/Hydration intake appropriate for improving, restoring or maintaining nutritional needs  Description: Monitor and assess patient's nutrition/hydration status for malnutrition. Collaborate with interdisciplinary team and initiate plan and interventions as ordered.  Monitor patient's weight and dietary intake as ordered or per policy. Utilize nutrition screening tool and intervene as necessary. Determine patient's food preferences and provide high-protein, high-caloric foods as appropriate.     INTERVENTIONS:  - Monitor oral intake, urinary output, labs, and treatment plans  - Assess nutrition and hydration status and recommend course of action  - Evaluate amount of meals eaten  - Assist patient with eating if necessary   - Allow adequate time for meals  - Recommend/ encourage appropriate diets, oral nutritional supplements, and vitamin/mineral supplements  - Order, calculate, and assess calorie counts as needed  - Recommend, monitor, and adjust tube feedings and TPN/PPN based on assessed needs  - Assess need for intravenous fluids  - Provide specific nutrition/hydration education as appropriate  - Include patient/family/caregiver in decisions related to nutrition  Outcome: Not Progressing

## 2024-03-10 NOTE — ASSESSMENT & PLAN NOTE
Patient was initially on heparin due to acute DVT, developed bloody stools, underwent flexible sigmoidoscopy revealing rectal ulcer, status post cauterization  GI following;  She continues to have blood in bowel movements  Hemoglobin stable  Will likely need repeat flexible sigmoidoscopy  N.p.o. past midnight   continue to monitor hemoglobin every 8 hours for now

## 2024-03-10 NOTE — ASSESSMENT & PLAN NOTE
Patient with significant anasarca likely component of iatrogenic fluid overload, decreased albumin  Transfuse albumin, will trial furosemide 40 mg IV x 2 today  Due to echo with ejection fraction 65%  Monitor volume status

## 2024-03-10 NOTE — ASSESSMENT & PLAN NOTE
Initially admitted to [facility] on 2/29, developed septic shock on 3/3, criteria for sepsis , was intubated and transferred to [facility]; required pressors; eventually developed bloody stool, required a flex sigmoidoscopy that was noted for rectal ulcer and underwent cauterization  Transferred out of ICU on 3/9/24  On antibiotics per infectious disease for enterococcal bacteremia  Continue to monitor vital signs  Patient developed bloody stool again on 3/8/2024 with hemoglobin dropped to 5.8, hemoglobin improved to 8 status post 1 unit of red blood cells  GI following

## 2024-03-10 NOTE — ASSESSMENT & PLAN NOTE
Cardiology following  not a candidate for chronic AC for DVT/PE : CHADSVASC score is 5 and high HAS-BLED 5.   Cardiology recommending to continue with diltiazem, can start amiodarone infusion if recurrent rapid atrial fibrillation  Telemetry monitoring

## 2024-03-10 NOTE — PROGRESS NOTES
[facility]  Progress Note  Name: Darlyn Nguyen I  MRN: [id number]  Unit/Bed#: ICU 02 I Date of Admission: 3/3/2024   Date of Service: 3/10/2024 I Hospital Day: 7    Assessment/Plan   * Sepsis (HCC)  Assessment & Plan  Initially admitted to [facility] on 2/29, developed septic shock on 3/3, criteria for sepsis , was intubated and transferred to [facility]; required pressors; eventually developed bloody stool, required a flex sigmoidoscopy that was noted for rectal ulcer and underwent cauterization  Transferred out of ICU on 3/9/24  On antibiotics per infectious disease for enterococcal bacteremia  Continue to monitor vital signs  Patient developed bloody stool again on 3/8/2024 with hemoglobin dropped to 5.8, hemoglobin improved to 8 status post 1 unit of red blood cells  GI following    Bacteremia due to Enterococcus  Assessment & Plan  Initially admitted to [facility] on 2/29, developed septic shock on 3/3, intubated and transferred to [facility];  required pressors  E. avium bacteremia 1 in 2 sets from 2/29 were positive.   Repeat cultures show NGTD  Susceptibilities show pan-sensitive enterococcus   ID following, appreciate recommendations, currently on Unasyn    GI bleed  Assessment & Plan  Patient was initially on heparin due to acute DVT, developed bloody stools, underwent flexible sigmoidoscopy revealing rectal ulcer, status post cauterization  GI following;  She continues to have blood in bowel movements  Hemoglobin stable  Will likely need repeat flexible sigmoidoscopy  N.p.o. past midnight   continue to monitor hemoglobin every 8 hours for now        Thrombocytopenia (HCC)  Assessment & Plan  Possibly related to sepsis, bacteremia versus medication  Monitor CBC closely    Anasarca  Assessment & Plan  Patient with significant anasarca likely component of iatrogenic fluid overload, decreased albumin  Transfuse albumin, will trial furosemide 40 mg IV x 2 today  Due  to echo with ejection fraction 65%  Monitor volume status    Atrial fibrillation with rapid ventricular response (HCC)  Assessment & Plan  Cardiology following  not a candidate for chronic AC for DVT/PE : CHADSVASC score is 5 and high HAS-BLED 5.   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) ([facility])  Assessment & Plan  Monitor renal indices    Diabetes mellitus ([facility])  Assessment & Plan  Lab Results   Component Value Date    HGBA1C 5.9 (H) 03/03/2024       Recent Labs     03/10/24  0010 03/10/24  0607 03/10/24  0713 03/10/24  1140   POCGLU 267* 161* 151* 150*         Continue sliding scale, monitor Accu-Cheks      Sacral wound  Assessment & Plan  Continue local wound care    Cavitary pneumonia  Assessment & Plan  AFB of the sputum preliminary is negative  ID involved  Continue Unasyn for tx of E avium bacteremia  Serum galactomannan ordered to r/o chronic aspergillosis.    Acute respiratory failure with hypoxia ([facility])  Assessment & Plan  Extubated on 3/4  Doing well  Continue pulmonary toileting  Wean oxygen as tolerated    Lupus ([facility])  Assessment & Plan  Recently changed to prednisone 50mg daily.  Remains immunosuppressed, continue prednisone at 50 mg daily  Continue to hold Mepron, Plaquenil, and CellCept.  These medications are on hold secondary to resolving septic shock, cavitary pneumonia, and bacteremia.               Mobility:  Basic Mobility Inpatient Raw Score: 7  -HLM Goal: 2: Bed activities/Dependent transfer  -HLM Achieved: 2: Bed activities/Dependent transfer  HLM Goal achieved. Continue to encourage  appropriate mobility.    VTE Pharmacologic Prophylaxis:   Pharmacologic: Contraindicated to GI bleed    Patient Centered Rounds: I have performed bedside rounds with nursing staff today.    Education and Discussions with Family / Patient: Updated patient's spouse at bedside    Time Spent for Care:   More than 50% of total time spent on counseling and coordination of care as described above.    Current Length of Stay: 7 day(s)    Current Patient Status: Inpatient   Certification Statement: The patient will continue to require additional inpatient hospital stay due to sepsis, bacteremia, GI bleed    Discharge Plan / Estimated Discharge Date: TBD    Code Status: Level 2 - DNAR: but accepts endotracheal intubation      Subjective:   Patient seen and examined at bedside, currently resting comfortably, as per RN patient was agitated and confused overnight    Objective:     Vitals:   Temp (24hrs), Av.8 °F (36.6 °C), Min:97.6 °F (36.4 °C), Max:97.9 °F (36.6 °C)    Temp:  [97.6 °F (36.4 °C)-97.9 °F (36.6 °C)] 97.6 °F (36.4 °C)  HR:  [62-84] 64  Resp:  [15-22] 15  BP: (139-160)/(56-80) 151/75  SpO2:  [91 %-100 %] 100 %  Body mass index is 31.67 kg/m².     Input and Output Summary (last 24 hours):       Intake/Output Summary (Last 24 hours) at 3/10/2024 1332  Last data filed at 3/10/2024 1105  Gross per 24 hour   Intake 300 ml   Output 395 ml   Net -95 ml       Physical Exam:    Constitutional: Patient is in no acute distress  HEENT:  Normocephalic, atraumatic  Cardiovascular: Normal S1S2, RRR, No murmurs/rubs/gallops appreciated.  Pulmonary: Decreased breath sounds bilaterally  Abdominal: Soft, Bowel sounds present, Non-tender, Non-distended  Extremities: Significant bilateral lower extremity nonpitting edema and swelling of right upper extremity  Neurological: Resting on my exam, was awake and alert earlier and agitated overnight  Skin: Sacral ulcers  Additional Data:     Labs:    Results from last 7 days   Lab Units  03/10/24  0805 03/10/24  0443 03/06/24  1120 03/06/24  0435   WBC Thousand/uL  --  15.99*   < > 26.92*   HEMOGLOBIN g/dL 8.1* 7.8*   < > 9.0*   HEMATOCRIT %  --  23.8*   < > 27.0*   PLATELETS Thousands/uL  --  85*   < > 118*   NEUTROS PCT %  --   --   --  95*   LYMPHS PCT %  --   --   --  3*   MONOS PCT %  --   --   --  1*   EOS PCT %  --   --   --  0    < > = values in this interval not displayed.     Results from last 7 days   Lab Units 03/10/24  0443 03/09/24  0408   POTASSIUM mmol/L 3.1* 3.5   CHLORIDE mmol/L 113* 112*   CO2 mmol/L 25 25   BUN mg/dL 25 23   CREATININE mg/dL 0.92 0.76   CALCIUM mg/dL 7.5* 7.5*   ALK PHOS U/L  --  74   ALT U/L  --  42   AST U/L  --  15     Results from last 7 days   Lab Units 03/09/24  0003   INR  1.14        I Have Reviewed All Lab Data Listed Above.    Invasive Devices       Peripherally Inserted Central Catheter Line  Duration             PICC Line 03/08/24 Right Basilic 1 day              Peripheral Intravenous Line  Duration             Peripheral IV 03/09/24 Left;Upper Arm 1 day              Drain  Duration             Urethral Catheter Latex;Straight-tip 16 Fr. 9 days                         Recent Cultures (last 7 days):     Results from last 7 days   Lab Units 03/03/24  2255 03/03/24  1404   BLOOD CULTURE  No Growth After 5 Days.  No Growth After 5 Days.  --    SPUTUM CULTURE   --  2+ Growth of Pseudomonas aeruginosa*  Few Colonies of Candida albicans*   GRAM STAIN RESULT   --  3+ Polys*  1+ Budding yeast*  1+ Gram negative rods*       Last 24 Hours Medication List:   Current Facility-Administered Medications   Medication Dose Route Frequency Provider Last Rate    acetaminophen  650 mg Oral Q6H PRN Trang Arthur MD      Albumin 5%  25 g Intravenous Once AUGUSTA Hutchison      ampicillin-sulbactam  3 g Intravenous Q6H AUGUSTA Ibarra 3 g (03/10/24 1212)    atorvastatin  40 mg Oral Daily AUGUSTA Ibarra      carvedilol  6.25 mg Oral BID With Meals  Isaías Mikeek, CRNP      collagenase   Topical Daily Edchriss Mikeek, JAKENP      diltiazem  30 mg Oral Q6H CRUZITO Lubna De Diosel, DO      escitalopram  10 mg Oral Daily Edward DASHAWN Mikeek, CRNP      famotidine  20 mg Oral Daily Edchriss Mikeek, CRNP      furosemide  40 mg Intravenous BID (diuretic) Freddy Pedraza MD      insulin lispro  1-5 Units Subcutaneous TID AC Isaías Mikeek, CRNP      insulin lispro  1-5 Units Subcutaneous HS Isaías Mikeek, CRNP      levalbuterol  1.25 mg Nebulization Q8H PRN Isaías Mikeek, JAKENP      OLANZapine  5 mg Oral HS Isaías Mikeek, CRNP      omeprazole (PRILOSEC) suspension 2 mg/mL  20 mg Oral Daily Isaías Mikeek, CRNP      ondansetron  4 mg Intravenous Q4H PRN Isaías Mikeek, JAKENP      predniSONE  40 mg Oral Daily Isaías Nichols, AUGUSTA          Today, Patient Was Seen By: Freddy Pedraza MD

## 2024-03-10 NOTE — ASSESSMENT & PLAN NOTE
Lab Results   Component Value Date    HGBA1C 5.9 (H) 03/03/2024       Recent Labs     03/10/24  0010 03/10/24  0607 03/10/24  0713 03/10/24  1140   POCGLU 267* 161* 151* 150*         Continue sliding scale, monitor Accu-Cheks

## 2024-03-10 NOTE — ASSESSMENT & PLAN NOTE
Initially admitted to [facility] on 2/29, developed septic shock on 3/3, intubated and transferred to [facility];  required pressors  E. avium bacteremia 1 in 2 sets from 2/29 were positive.   Repeat cultures show NGTD  Susceptibilities show pan-sensitive enterococcus   ID following, appreciate recommendations, currently on Unasyn

## 2024-03-11 PROBLEM — G93.41 ACUTE METABOLIC ENCEPHALOPATHY: Status: ACTIVE | Noted: 2024-03-11

## 2024-03-11 PROBLEM — E43 SEVERE PROTEIN-CALORIE MALNUTRITION (HCC): Status: ACTIVE | Noted: 2024-03-11

## 2024-03-11 NOTE — PROGRESS NOTES
Progress Note - Infectious Disease   Darlyn Nguyen 78 y.o. female MRN: [id number]  Unit/Bed#: [place] [address] -01 Encounter: [id number]    Impression/Plan:  1. Septic shock. Developing over admission. Leukocytosis, hypotension, and lactic acidosis. Likely secondary to enterococcus avium bacteremia and RUL pneumonia with cavitary lesion. This is complicated by fact that patient is immunosuppressed in setting of recent SLE with lupus nephritis diagnosis. The patient has made some clinical improvement. She was extubated and her pressor support is currently off. She remains afebrile. All repeat blood cultures are negative >5 days. She has transitioned out of ICU to med/surg care.  -antibiotic as below  -monitor CBCD and BMP  -monitor vitals  -supportive care     2. Enterococcus avium bacteremia. Single set positive from 2/29/2024. Suspect likely GI source given patient's recent GI bleed and pancolitis with heavy diarrhea. Recent urine and wound cultures with no growth of this pathogen. Patient completed CT A/P which noted scattered splenic hypodensities which is concerning for septic emboli. With this pathogen there is concern for vancomycin resistance. Patient has no intravascular devices or hardware. All repeat blood cultures are negative >5 days. CT head with no acute findings. TTE was negative. The patient has been transitioned to IV Unasyn. She appears to be tolerating the antibiotic without difficulty. I will continue the Unasyn for now in anticipation of 6 weeks of IV antibiotic treatment.  -continue IV Unasyn through 4/12/2024 for 6 weeks of IV antibiotic treatment after cleared blood cultures  -weekly CBCD and BMP while on IV antibiotic  -monitor vitals  -PICC to be removed after patient's final dose of IV antibiotic on 4/12/2024  -anticipate surveillance blood cultures 2 weeks after finishing IV antibiotic treatment, draw on 4/26/2024  -patient will require outpatient ID follow up after discharge     3. RUL  pneumonia with cavitary lung lesions. Chest CT showed a RUL cavitary lesion, dense RUL consolidation, R basilar atelectasis, and small B/L pleural effusions. Consider aspiration played a role. Consider possilibily of septic emboli in setting of bacteremia. With new immunosuppression will need to consider TB. AFB sputum cultures are negative x3. TB PCR is pending with the state lab. Sputum culture with preliminary growth of pseudomonas, however after antibiotic treatment this may be colonization. Aspergillus is pending.   -antibiotic as above  -follow up aspergillus   -follow up TB PCR  -monitor vitals  -monitor respiratory status  -O2 support per primary service      4. Pancolitis with heavy diarrhea. Patient had positive FOB at [facility] concerning for GI bleed. This may be source of her bacteremia above. Patient following with GI prior to transfer to [facility]. Recent stool PCR was negative. Giardia and O&P are were negative. Fecal calprotectin positive. Newest CT A/P showed persistent fluid throughout the colon, improvement to previous areas of colonic mucosal hyperemia, and colonic diverticulosis without diverticulitis. Suspect she may benefit from colonoscopy once medically stable. Gastroenterology is following. She underwent flex sig which showed hemorrhoids, diverticulitis, and ulcerated mucosa. Unfortunately she's had ongoing BRBPR. Hemoglobin was 8.8 this morning but has now dropped further to 6.7. She is NPO now as she will likely require repeat flex sig later today.  -serial abdominal exams  -monitor GI symptoms  -monitor stool output   -anticipate eventual colonoscopy   -continue follow up with gastroenterology     5. Acute hypoxic respiratory failure. Likely secondary to RUL pnuemonia with cavitary lesion. Also consider role of sepsis with shock. Consider possibility of aspiration vs septic emboli in bacteremic patient with new splenic hypodensities. She did recently have B/L LE DVTs, however she's been on Eliquis  so less concern for PE. Patient's respiratory status has improved and she has been extubated. Her O2 saturation is stable on room air this morning.   -antibiotic as above  -monitor CBCD and BMP  -monitor vitals  -monitor respiratory status     6. SLE. With lupus nephritis. Diagnosed in 12/2023 at [facility]. She has been managed by rheumatology and nephrology. She was started on prednisone, atovaquone, hydroxychloroquine, and mycophenolate. Atovaquone, hydroxychloroquine, and mycophenolate currently on hold. She was given a dose of Solu-Cortef at [facility]. Nephrology has assessed and patient is on prednisone. Steroid use is risk factor for infection.  -steroid per nephrology  -patient will eventually need to go back on mepron from PJP prophylaxis  -continue close follow up with nephrology  -consider rheumatology consult as needed     7. CKD stage 2. This can affect antibiotic dosing. Upon review of patient's available past medical records it appears her baseline creatinine is approximately 0.8. Also with lupus nephritis as above. Suspect patient is high risk for ARYAN. Her creatinine has imprved to 0.86 today.  -monitor creatinine  -dose adjust antibiotic for renal function as needed  -avoid nephrotoxins  -continue close follow up with nephrology     8. Sacral and buttock ulcers. Some areas more superficial and appearing as raw granular tissue. Other areas are likely stage 2 with some gray stringy slough and fibrous tissue. Wounds do not appear to tunnel or undermine. It was documented that patient had a gluteal skin care during recent hospitalization at [facility]. Suspect pressure and prolonged down time are contributing to skin integrity. Wound management is following. Wounds have been receiving Santyl.  -serial skin exams  -frequent turning and repositioning to offload pressure  -wound management per wound management team  -continue follow up with wound management     9. Type 2 diabetes mellitus without long term insulin  use. Patient's last HbA1c was 5.9% on 3/3/2024. Elevated blood glucose is risk factor for wounds and infection. Recommend tight glycemic control.  -blood glucose management per primary service    10. Encephalopathy. Suspect secondary to blood loss as hemoglobin is dropping this morning. Patient did not reorient during my visit and was attempting to get out of bed. I notified clinical staff and PCA went to bedside. Bed alarm on.  -antibiotic as above  -check CBCD again tomorrow  -transfusion/volume support per primary service  -serial neuro exams  -monitor mood and mental status  -consider 1:1 observation if patient continues to attempt to get out of bed    Above plan was discussed in detail with SLIM, rapid response was called due to ongoing blood loss.  Above plan was discussed in detail with wound management team.    Antibiotics:  Unasyn 8  Antibiotics 12    Subjective:  Unable to perform ROS, patient confused this morning, does not respond to my questions but is talking about leaving and wanting to go to the window.     Objective:  Vitals:  Temp:  [93.7 °F (34.3 °C)-98.4 °F (36.9 °C)] 98.4 °F (36.9 °C)  HR:  [54-84] 84  Resp:  [14-19] 16  BP: (111-173)/(47-88) 162/88  SpO2:  [92 %-100 %] 97 %  Temp (24hrs), Av.3 °F (35.7 °C), Min:93.7 °F (34.3 °C), Max:98.4 °F (36.9 °C)  Current: Temperature: 98.4 °F (36.9 °C)    Physical Exam:   General Appearance:  Confused, restless. Is not responding appropriately to questions, speaking off topic. +LUE tremor.   Throat: Oropharynx moist without lesions.    Lungs:   Clear to auscultation bilaterally; no wheezes, rhonchi or rales; respirations unlabored on room air.   Heart:  Tachycardic; no murmur, rub or gallop.   Abdomen:   Soft, no facial grimace with abdominal palpation, non-distended, positive bowel sounds.     Extremities: No clubbing or cyanosis, + peripheral edema.   Skin: No new rashes noted on exposed skin.     Labs, Imaging, & Other studies:   All pertinent labs  and imaging studies were personally reviewed  Results from last 7 days   Lab Units 03/11/24  0304 03/11/24  0009 03/10/24  1551 03/10/24  0805 03/10/24  0443 03/09/24  1305 03/09/24  0408   WBC Thousand/uL 13.21*  --   --   --  15.99*  --  14.03*   HEMOGLOBIN g/dL 8.8* 9.2* 6.3*   < > 7.8*   < > 8.8*   PLATELETS Thousands/uL 66*  --   --   --  85*  --  57*    < > = values in this interval not displayed.     Results from last 7 days   Lab Units 03/11/24  0304 03/10/24  0443 03/09/24  0408 03/08/24  0505 03/07/24  0418   POTASSIUM mmol/L 3.2*   < > 3.5   < > 3.5   CHLORIDE mmol/L 114*   < > 112*   < > 114*   CO2 mmol/L 26   < > 25   < > 21   BUN mg/dL 24   < > 23   < > 22   CREATININE mg/dL 0.86   < > 0.76   < > 0.69   EGFR ml/min/1.73sq m 64   < > 74   < > 82   CALCIUM mg/dL 7.5*   < > 7.5*   < > 7.0*   AST U/L 14  --  15  --  38   ALT U/L 30  --  42  --  79*   ALK PHOS U/L 55  --  74  --  83    < > = values in this interval not displayed.

## 2024-03-11 NOTE — NUTRITION
03/11/24 1344   Biochemical Data,Medical Tests, and Procedures   Biochemical Data/Medical Tests/Procedures Lab values reviewed;Meds reviewed   Labs (Comment) Na 148 K 3.8 Glucose POC: 119-201   Meds (Comment) reviewed   Other Diagnostic/Procedures (Comment) s/p Rapid response today, intubated transfered back to ICU, plan for flex sig today   Speech Therapy Recommendations (Comment) 3/8: clear liquid, SLP not able to see pt today- was NPO for frequent bloody BM   Nutrition-Focused Physical Exam   Nutrition-Focused Physical Exam Findings RN skin assessment reviewed;Edema;Wound   Nutrition-Focused Physical Exam Findings +2 generalized, BUE, BLE edema, unsteageable pressure injury to perineum, stage 2 groin, DTI left buttock, anasarca   Adequacy of Intake   Nutrition Modality NPO   Feeding Route   PO NPO   Current PO Intake   Current Diet Order NPO   Estimated Calorie Intake 0-25%   Estimated Protein Intake  0-25%   Estimated Fluid Intake 0-25%   Estimated calorie intake compared to estimated need not meeting estimated needs x 8 days   PES Statement   Problem Continue previous diagnosis   PES Statement 2   Problem Continue previous diagnosis   Recommendations/Interventions   Malnutrition/BMI Present Yes   Adult Malnutrition type Acute illness   Adult Degree of Malnutrition Other severe protein calorie malnutrition   Malnutrition Characteristics Fluid accumulation;Inadequate energy   360 Statement Severe calorie-protein malnutrition in context to acute illness r/t GI bleed, dysphagia, inadequate PO intake as evidance by energy intake less than 50% compared to estimated needs >5 days, +2 generalized edema; Treated with TBD (diet +oral supplements vs nutrition support)   Summary GI bleed after restarting heparin. Underwent sigmoidoscopy this afternoon showing rectal ulcerations, was having bloody BM, diet advanced to pureed 3/8-3/10 when was made NPO due to GI bleed, drop in hemoglobin, Pt with confusion today, rapid  respnse called, pt intubated, transfered to ICU, plan for flex sig today. Pt with not meeting estimated needs x8 days, multiple pressure injuries, wt gain due to fluid (pt with generalized edema) 70.1kg 3/3-> 83.7kg 3/10   Interventions/Recommendations Initiate diet;Initiate EN   Recommendations to Provider If pt not able to extubate/ resume PO diet recommend to initiating Vital AF 1.2cal at 20 ml/hr and advancing 10ml q8hr or as tolerated to goal rate 50ml/hr (will provide 1440kcal, 90g protein, 973ml free water) free water flushes per md; Advance PO diet as medically able, texture per slp   Education Assessment   Education Patient/caregiver not appropriate for education at this time   Patient Nutrition Goals   Goal Nutrition via appropriate route   Goal Status Extended;Not met   Timeframe to complete goal by next f/u   Nutrition Complexity Risk   Nutrition complexity level High risk   Follow up date 03/15/24

## 2024-03-11 NOTE — WOUND OSTOMY CARE
Progress Note - Wound   Darlyn Nguyen 78 y.o. female MRN: [id number]  Unit/Bed#: ICU 02 Encounter: [id number]      Attempted to see patient x 2 today--unstable, now transferred to ICU status post rapid response and intubation. NPO.    Discussed with primary RN, patient having active GI bleed with frequent bloody stools.  Not appropriate for fecal management system or fecal pouch at this time.  On low air-loss mattress in ICU.      If unable to maintain santyl/silicone foam dressing to sacrum, may apply Triad paste to all open areas TID and as needed with incontinence care.      Wound care team to follow.      Liz CHRISTENSENN, RN, CWON

## 2024-03-11 NOTE — RAPID RESPONSE
Rapid Response Note  Darlyn Nguyen 78 y.o. female MRN: [id number]  Unit/Bed#: ICU 02 Encounter: [id number]    Rapid Response Notification(s):   Response called date/time:  3/11/2024 10:46 AM  Response team arrival date/time:  3/11/2024 10:48 AM  Response end date/time:  3/11/2024 11:00 AM  Level of care:  ICU  Rapid response location:  [facility]  Primary reason for rapid response call:  Acute change in neuro status    Rapid Response Intervention(s):   Airway:  Endotracheal intubation  Breathing:  Assisted ventilation  Circulation:  None  Fluids administered:  Blood       Assessment:   Obtunded mental status--etiology unclear, but suspect hypoxemia vs acute blood loss anemia. iSTAT and BMP c/w primary respiratory alkalosis with metabolic compensation.     Plan:   Transfer to ICU for emergent intubation  Rapid infusion of remainder of 2 U pRBC  Post-transfusion Hgb  TSH and free T4 to r/o myxedema coma  Flex sig w GI  CTH when stabilized      Rapid Response Outcome:   Transfer:  Transfer to ICU  Primary service notified of transfer: Yes    Code Status: Level 2 (DNAR but accepts intubation)      Family notified of transfer: yes  Family member contacted: yes, spouse present outside pt room     Background/Situation:   Darlyn Nguyen is a 78 y.o. 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 2/29/2024 due to persistent diarrhea, found also to have enterococcal bacteremia, pneumonia, and FOBT positive stools. Transferred from [facility] to [place] on 3/3/24 after clinical deterioration due to shock requiring initiation of pressors and intubation. Lower GI bleed with marked drop in Hgb noted, pt received blood transfusion, heparin drip discontinued, was quickly able to wean off pressors and extubate. Pt had recurrence of LGIB  when heparin was reinitiated, and so was discontinued. Flexible sigmoidoscopy with GI found ulcerated rectal mucosa, which was cauterized. IVC filter placed with IR due to presence of acute DVTs first noted on 2/20/24. Pt also developed new-onset paroxysmal atrial fibrillation with RVR, requiring Cardizem gtt while unable to take PO meds, but then successfully maintained with Cardizem 30 mg q6h PO. Pt noted to be delirious with waxing and waning mental status since extubation on 3/4/24.  Pt transferred to [facility] service on 3/8/24 for ongoing treatment of bacteremia, GI bleed, as well as anasarca, dysphagia.  Morning of 3/11/24, Rapid Response called at 10:46 for acute alteration of mental status--pt was noted to be unresponsive, including to sternal rub. Vital signs stable/normal, with the exception of SpO2, which could not be obtained. AM labs had been notable for Hgb 6.7, K of 3.2--for which 40 mEq had been given--leukocytosis of 13.21 (down-trending, on Day 8 of Unasyn). Per nursing, pt had multiple large bloody bowel movements overnight and this morning. At time of rapid response, pt was receiving first of 2 units pRBC. She was unresponsive to voice and physical stimuli, including sternal rub. Pulses were palpable, normal rate, regular rhythm, normal Respiratory rate, unlabored breathing, good air entry with diffuse expiratory wheezing. iSTAT showed glucose of 275, pH of 7.447, pCO2 low at 25.8, pO2 low at 54. Bicarb of 22 on STAT BMP.  Due to obtunded mental status, pt transferred to ICU for emergent intubation to protect airway.  Review of Systems   Unable to perform ROS: Mental status change       Objective:   Vitals:    03/11/24 1143 03/11/24 1145 03/11/24 1200 03/11/24 1245   BP:  144/97 150/69 118/72   Pulse: 70 72 68 78   Resp: 16 16 17 16   Temp: 97.8 °F (36.6 °C) 98.2 °F (36.8 °C) 98.2 °F (36.8 °C) 97.9 °F (36.6 °C)   TempSrc: Axillary      SpO2: 100% 100% 100% 97%   Weight:       Height:      "    Physical Exam  Vitals reviewed.   Constitutional:       Appearance: She is obese. She is ill-appearing.      Comments: unresponsive   HENT:      Head: Normocephalic and atraumatic.   Cardiovascular:      Rate and Rhythm: Normal rate and regular rhythm.      Pulses: Normal pulses.      Heart sounds: Normal heart sounds. No murmur heard.     No friction rub. No gallop.   Pulmonary:      Effort: Pulmonary effort is normal. No respiratory distress.      Breath sounds: No stridor. Wheezing (expiratory, diffuse) present. No rhonchi or rales.   Abdominal:      General: Bowel sounds are normal. There is no distension.      Palpations: Abdomen is soft.      Tenderness: There is no abdominal tenderness. There is no guarding or rebound.   Musculoskeletal:         General: Swelling (anasarca) present.      Right lower leg: Edema present.      Left lower leg: Edema present.   Skin:     Coloration: Skin is pale. Skin is not jaundiced.      Findings: Bruising present.      Comments: Cool to touch. Dusky fingertips   Neurological:      Comments: unresponsive         Portions of the record may have been created with voice recognition software.  Occasional wrong word or \"sound a like\" substitutions may have occurred due to the inherent limitations of voice recognition software.  Read the chart carefully and recognize, using context, where substitutions have occurred.    Uche Blair MD      "

## 2024-03-11 NOTE — ASSESSMENT & PLAN NOTE
Initially on heparin, developed GI bleeding  AC/DVT prophylaxis on hold  s/p retrievable IVC filter placed 3/7

## 2024-03-11 NOTE — ASSESSMENT & PLAN NOTE
Cardiology following  not a candidate for chronic AC for DVT/PE : CHADSVASC score is 5 and high HAS-BLED 5.   Cardiology recommending to continue with diltiazem, can start amiodarone infusion if recurrent rapid atrial fibrillation  Continue monitoring telemetry  Continue Cardizem/carvedilol with hold parameters

## 2024-03-11 NOTE — PLAN OF CARE
Problem: Prexisting or High Potential for Compromised Skin Integrity  Goal: Skin integrity is maintained or improved  Description: INTERVENTIONS:  - Identify patients at risk for skin breakdown  - Assess and monitor skin integrity  - Assess and monitor nutrition and hydration status  - Monitor labs   - Assess for incontinence   - Turn and reposition patient  - Assist with mobility/ambulation  - Relieve pressure over bony prominences  - Avoid friction and shearing  - Provide appropriate hygiene as needed including keeping skin clean and dry  - Evaluate need for skin moisturizer/barrier cream  - Collaborate with interdisciplinary team   - Patient/family teaching  - Consider wound care consult   Outcome: Progressing     Problem: SAFETY ADULT  Goal: Patient will remain free of falls  Description: INTERVENTIONS:  - Educate patient/family on patient safety including physical limitations  - Instruct patient to call for assistance with activity   - Consult OT/PT to assist with strengthening/mobility   - Keep Call bell within reach  - Keep bed low and locked with side rails adjusted as appropriate  - Keep care items and personal belongings within reach  - Initiate and maintain comfort rounds  - Make Fall Risk Sign visible to staff  - Offer Toileting every 2 Hours, in advance of need  - Initiate/Maintain bed alarm  - Obtain necessary fall risk management equipment.  - Apply yellow socks and bracelet for high fall risk patients  - Consider moving patient to room near nurses station  Outcome: Progressing  Goal: Maintain or return to baseline ADL function  Description: INTERVENTIONS:  -  Assess patient's ability to carry out ADLs; assess patient's baseline for ADL function and identify physical deficits which impact ability to perform ADLs (bathing, care of mouth/teeth, toileting, grooming, dressing, etc.)  - Assess/evaluate cause of self-care deficits   - Assess range of motion  - Assess patient's mobility; develop plan if  impaired  - Assess patient's need for assistive devices and provide as appropriate  - Encourage maximum independence but intervene and supervise when necessary  - Involve family in performance of ADLs  - Assess for home care needs following discharge   - Consider OT consult to assist with ADL evaluation and planning for discharge  - Provide patient education as appropriate  Outcome: Progressing  Goal: Maintains/Returns to pre admission functional level  Description: INTERVENTIONS:  - Perform AM-PAC 6 Click Basic Mobility/ Daily Activity assessment daily.  - Set and communicate daily mobility goal to care team and patient/family/caregiver.   - Collaborate with rehabilitation services on mobility goals if consulted  - Perform Range of Motion 2 times a day.  - Reposition patient every 2 hours.  - Dangle patient 2 times a day  - Stand patient 2 times a day  - Ambulate patient 2 times a day  - Out of bed to chair 2 times a day   - Out of bed for meals 2 times a day  - Out of bed for toileting  - Record patient progress and toleration of activity level   Outcome: Progressing     Problem: DISCHARGE PLANNING  Goal: Discharge to home or other facility with appropriate resources  Description: INTERVENTIONS:  - Identify barriers to discharge w/patient and caregiver  - Arrange for needed discharge resources and transportation as appropriate  - Identify discharge learning needs (meds, wound care, etc.)  - Arrange for interpretive services to assist at discharge as needed  - Refer to Case Management Department for coordinating discharge planning if the patient needs post-hospital services based on physician/advanced practitioner order or complex needs related to functional status, cognitive ability, or social support system  Outcome: Progressing     Problem: Knowledge Deficit  Goal: Patient/family/caregiver demonstrates understanding of disease process, treatment plan, medications, and discharge instructions  Description:  Complete learning assessment and assess knowledge base.  Interventions:  - Provide teaching at level of understanding  - Provide teaching via preferred learning methods  Outcome: Progressing     Problem: Nutrition/Hydration-ADULT  Goal: Nutrient/Hydration intake appropriate for improving, restoring or maintaining nutritional needs  Description: Monitor and assess patient's nutrition/hydration status for malnutrition. Collaborate with interdisciplinary team and initiate plan and interventions as ordered.  Monitor patient's weight and dietary intake as ordered or per policy. Utilize nutrition screening tool and intervene as necessary. Determine patient's food preferences and provide high-protein, high-caloric foods as appropriate.     INTERVENTIONS:  - Monitor oral intake, urinary output, labs, and treatment plans  - Assess nutrition and hydration status and recommend course of action  - Evaluate amount of meals eaten  - Assist patient with eating if necessary   - Allow adequate time for meals  - Recommend/ encourage appropriate diets, oral nutritional supplements, and vitamin/mineral supplements  - Order, calculate, and assess calorie counts as needed  - Recommend, monitor, and adjust tube feedings and TPN/PPN based on assessed needs  - Assess need for intravenous fluids  - Provide specific nutrition/hydration education as appropriate  - Include patient/family/caregiver in decisions related to nutrition  Outcome: Progressing     Problem: INFECTION - ADULT  Goal: Absence or prevention of progression during hospitalization  Description: INTERVENTIONS:  - Assess and monitor for signs and symptoms of infection  - Monitor lab/diagnostic results  - Monitor all insertion sites, i.e. indwelling lines, tubes, and drains  - Monitor endotracheal if appropriate and nasal secretions for changes in amount and color  - Mathias appropriate cooling/warming therapies per order  - Administer medications as ordered  - Instruct and  encourage patient and family to use good hand hygiene technique  - Identify and instruct in appropriate isolation precautions for identified infection/condition  Outcome: Progressing

## 2024-03-11 NOTE — PROCEDURES
Arterial Line Insertion    Date/Time: 3/11/2024 5:26 PM    Performed by: Madelaine Vizcaino RT  Authorized by: Az Ashley MD    Patient location:  Bedside and ICU  Consent:     Consent obtained:  Emergent situation  Universal protocol:     Patient identity confirmed:  Hospital-assigned identification number  Indications:     Indications: hemodynamic monitoring, multiple ABGs and continuous blood pressure monitoring    Pre-procedure details:     Skin preparation:  Chlorhexidine    Preparation: Patient was prepped and draped in sterile fashion    Anesthesia (see MAR for exact dosages):     Anesthesia method:  None  Procedure details:     Location / Tip of Catheter:  Radial    Laterality:  Right    Needle gauge:  20 G    Placement technique:  Percutaneous    Number of attempts:  1    Successful placement: yes      Transducer: waveform confirmed and dampened amplitude    Post-procedure details:     Post-procedure:  Secured with tape, sterile dressing applied and sutured    Patient tolerance of procedure:  Tolerated well, no immediate complications

## 2024-03-11 NOTE — PHYSICAL THERAPY NOTE
Physical Therapy Cancellation Note               03/11/24 0900   PT Last Visit   PT Visit Date 03/11/24   Note Type   Note type Cancelled Session   Cancel Reasons Medical status   Additional Comments spoke to Annalisa OBRIEN. Pt having multiple large bloody bowel movements. Not appropriate for PT intervention at this time.        Brittany Wisdom, PT

## 2024-03-11 NOTE — PROGRESS NOTES
Pastoral Care Progress Note    3/11/2024  Patient: Darlyn Nguyen : 1945  Admission Date & Time: 3/3/2024 1556  MRN: [id number] [facility]: [id number]    Patient was a rapid response, provided support for family patient was transfer to ICU. Will continue to follow patient and support family.

## 2024-03-11 NOTE — PROGRESS NOTES
"  Cardiology         Progress Note - Cardiology   Darlyn Nguyen 78 y.o. female MRN: [id number]  Unit/Bed#: [place] [address] -01 Encounter: [id number]          Assessment/Recommendations/Discussion:   Paroxysmal atrial fibrillation, now back in sinus rhythm  Lower extremity DVT  Volume overload, likely secondary to fluid resuscitation  Septic shock  Enterococcus AVM bacteremia  Splenic infarction on CT  Right upper lobe cavitary lesion/pneumonia  Pancolitis with diarrhea, rectal bleeding  Diabetes  Dyslipidemia      Maintaining sinus rhythm, continue p.o. diltiazem.  Continue telemetry monitoring.  Start amiodarone infusion if any recurrent atrial fibrillation  Anticoagulation on hold due to acute GI bleed  Lasix held due to acute anemia, hemoglobin down to 6.3 yesterday          Subjective: Patient seen and examined, encephalopathic                Physical Exam:  GEN:  NAD encephalopathic, not very responsive  HEENT:  MMM, NCAT, pink conjunctiva, EOMI, nonicteric sclera  CV:  NO JVD/HJR, RR, NO M/R/G, +S1/S2, NO PARASTERNAL HEAVE/THRILL, ++ LE EDEMA, NO HEPATIC SYSTOLIC PULSATION, WARM EXTREMITIES  RESP:  CTAB/L anteriorly  ABD:  SOFT, NT, NO GROSS ORGANOMEGALY        Vitals:   /87   Pulse 65   Temp 98.6 °F (37 °C) (Bladder)   Resp 18   Ht 5' 4\" (1.626 m)   Wt 83.7 kg (184 lb 8.4 oz)   SpO2 100%   BMI 31.67 kg/m²   Vitals:    03/09/24 0554 03/10/24 0600   Weight: 81.6 kg (179 lb 14.3 oz) 83.7 kg (184 lb 8.4 oz)       Intake/Output Summary (Last 24 hours) at 3/11/2024 1023  Last data filed at 3/11/2024 0901  Gross per 24 hour   Intake 550 ml   Output 550 ml   Net 0 ml       TELEMETRY: Sinus rhythm  Lab Results:  Results from last 7 days   Lab Units 03/11/24  0933 03/11/24  0304   WBC Thousand/uL  --  13.21*   HEMOGLOBIN g/dL 6.7* 8.8*   HEMATOCRIT % 20.5* 26.4*   PLATELETS Thousands/uL  --  66*     Results from last 7 days   Lab Units 03/11/24  0304   POTASSIUM mmol/L 3.2*   CHLORIDE mmol/L 114*   CO2 mmol/L " 26   BUN mg/dL 24   CREATININE mg/dL 0.86   CALCIUM mg/dL 7.5*   ALK PHOS U/L 55   ALT U/L 30   AST U/L 14     Results from last 7 days   Lab Units 03/11/24  0304   POTASSIUM mmol/L 3.2*   CHLORIDE mmol/L 114*   CO2 mmol/L 26   BUN mg/dL 24   CREATININE mg/dL 0.86   CALCIUM mg/dL 7.5*           Medications:    Current Facility-Administered Medications:     acetaminophen (TYLENOL) tablet 650 mg, 650 mg, Oral, Q6H PRN, Shantal Miller PA-C, 650 mg at 03/10/24 0841    albumin human (FLEXBUMIN) 5 % injection 25 g, 25 g, Intravenous, Once, Shantal Miller PA-C    ampicillin-sulbactam (UNASYN) 3 g in sodium chloride 0.9 % 100 mL IVPB, 3 g, Intravenous, Q6H, AUGUTSA Ibarra, Stopped at 03/11/24 0901    atorvastatin (LIPITOR) tablet 40 mg, 40 mg, Oral, Daily, AUGUSTA Ibarra, 40 mg at 03/10/24 1036    carvedilol (COREG) tablet 6.25 mg, 6.25 mg, Oral, BID With Meals, AUGUSTA Ibarra, 6.25 mg at 03/10/24 1557    collagenase (SANTYL) ointment, , Topical, Daily, AUGUSTA Ibarra, Given at 03/11/24 0928    diltiazem (CARDIZEM) tablet 30 mg, 30 mg, Oral, Q6H CRUZITO, Shantal Miller PA-C, 30 mg at 03/11/24 0712    escitalopram (LEXAPRO) tablet 10 mg, 10 mg, Oral, Daily, AUGUSTA Ibarra, 10 mg at 03/10/24 1036    famotidine (PEPCID) tablet 20 mg, 20 mg, Oral, Daily, AUGUSTA Ibarra, 20 mg at 03/11/24 0925    insulin lispro (HumALOG/ADMELOG) 100 units/mL subcutaneous injection 1-5 Units, 1-5 Units, Subcutaneous, TID AC, 3 Units at 03/10/24 1557 **AND** Fingerstick Glucose (POCT), , , TID AC, AUGUSTA Ibarra    insulin lispro (HumALOG/ADMELOG) 100 units/mL subcutaneous injection 1-5 Units, 1-5 Units, Subcutaneous, HS, AUGUSTA Ibarra, 2 Units at 03/10/24 2228    levalbuterol (XOPENEX) inhalation solution 1.25 mg, 1.25 mg, Nebulization, Q8H PRN, AUGUSTA Ibarra, 1.25 mg at 03/07/24 1128    OLANZapine (ZyPREXA ZYDIS) dispersible tablet 5 mg, 5 mg, Oral, HS, Edward C  AUGUSTA Nichols, 5 mg at 03/10/24 2227    omeprazole (PRILOSEC) suspension 2 mg/mL, 20 mg, Oral, Daily, AUGUSTA Ibarra, 20 mg at 03/11/24 0954    ondansetron (ZOFRAN) injection 4 mg, 4 mg, Intravenous, Q4H PRN, AUGUSTA Ibarra    predniSONE tablet 40 mg, 40 mg, Oral, Daily, AUGUSTA Ibarra, 40 mg at 03/11/24 0925    This note was completed in part utilizing M-Modal Fluency Direct Software.  Grammatical errors, random word insertions, spelling mistakes, and incomplete sentences may be an occasional consequence of this system secondary to software limitations, ambient noise, and hardware issues.  If you have any questions or concerns about the content, text, or information contained within the body of this dictation, please contact the provider for clarification.

## 2024-03-11 NOTE — ASSESSMENT & PLAN NOTE
Initially admitted to [facility] on 2/29, developed septic shock on 3/3, criteria for sepsis , was intubated and transferred to [facility]; required pressors; eventually developed bloody stool, required a flex sigmoidoscopy that was noted for rectal ulcer and underwent cauterization  Transferred out of ICU on 3/9/24  On antibiotics per infectious disease for enterococcal bacteremia

## 2024-03-11 NOTE — MALNUTRITION/BMI
This medical record reflects one or more clinical indicators suggestive of malnutrition and/or morbid obesity.    Malnutrition Findings:   Adult Malnutrition type: Acute illness  Adult Degree of Malnutrition: Other severe protein calorie malnutrition  Malnutrition Characteristics: Fluid accumulation, Inadequate energy                  360 Statement: Severe calorie-protein malnutrition in context to acute illness r/t GI bleed, dysphagia, inadequate PO intake as evidance by energy intake less than 50% compared to estimated needs >5 days, +2 generalized edema; Treated with TBD (diet +oral supplements vs nutrition support)    BMI Findings:           Body mass index is 31.67 kg/m².     See Nutrition note dated 3/11/24 for additional details.  Completed nutrition assessment is viewable in the nutrition documentation.

## 2024-03-11 NOTE — ASSESSMENT & PLAN NOTE
Patient with significant anasarca likely component of iatrogenic fluid overload, decreased albumin  Preserved EF on echocardiogram  Diuresed with albumin and Lasix yesterday, will hold today given acute anemia  Monitor volume status

## 2024-03-11 NOTE — ASSESSMENT & PLAN NOTE
Imaging on admission with extensive right upper lobe cavitary pneumonia  AFB of the sputum preliminary is negative  Infectious diseases following  Continue Unasyn for tx of E avium bacteremia  Follow-up additional serologies-serum galactomannan ordered to r/o chronic aspergillosis.

## 2024-03-11 NOTE — ASSESSMENT & PLAN NOTE
Extubated on 3/4  Still requiring 2 L nasal cannula  Titrate oxygen to maintain saturations greater than 90%

## 2024-03-11 NOTE — PHYSICAL THERAPY NOTE
PHYSICAL THERAPY NOTE          Patient Name: Darlyn Nguyen  Today's Date: 3/11/2024       03/11/24 1206   PT Last Visit   PT Visit Date 03/11/24   Note Type   Note type Cancelled Session   Cancel Reasons Intubated/sedated   Additional Comments Now intubated.  Will d/c PT orders and request new orders when medically appropriate.     Brigid Dimas, PT    [Normal Growth] : growth [Normal Development] : development [No Elimination Concerns] : elimination [No Feeding Concerns] : feeding [No Skin Concerns] : skin [Normal Sleep Pattern] : sleep [School] : school [Development and Mental Health] : development and mental health [Nutrition and Physical Activity] : nutrition and physical activity [Oral Health] : oral health [Safety] : safety [No Medications] : ~He/She~ is not on any medications [Patient] : patient [Full Activity without restrictions including Physical Education & Athletics] : Full Activity without restrictions including Physical Education & Athletics [I have examined the above-named student and completed the preparticipation physical evaluation. The athlete does not present apparent clinical contraindications to practice and participate in sport(s) as outlined above. A copy of the physical exam is on r] : I have examined the above-named student and completed the preparticipation physical evaluation. The athlete does not present apparent clinical contraindications to practice and participate in sport(s) as outlined above. A copy of the physical exam is on record in my office and can be made available to the school at the request of the parents. If conditions arise after the athlete has been cleared for participation, the physician may rescind the clearance until the problem is resolved and the potential consequences are completely explained to the athlete (and parents/guardians). [] : The components of the vaccine(s) to be administered today are listed in the plan of care. The disease(s) for which the vaccine(s) are intended to prevent and the risks have been discussed with the caretaker.  The risks are also included in the appropriate vaccination information statements which have been provided to the patient's caregiver.  The caregiver has given consent to vaccinate. [FreeTextEntry4] : overweight [FreeTextEntry1] : 5 y/o M- Doing well Normal Exam, except for being overweight- Discussion regarding the influence that being overweight  has on future medical problems- Dyslipidemia/HTN/Diabetes, as well as psychological effects, such as depression, self esteem issues and social isolation. Mishra goal should be targeted to <85% BMI for age and sex. A balanced weight reduction diet focused on healthy lifestyle practices was recommended. Behavior modification such as regarding proper eating habits- Increase proteins and decrease carbs, keeping a food diary, eating more slowly. do not eat on the go or in front of TV,choosing healthy snacks and making healthier choices- portion control, do not eat family style, try to avoid second helpings and having an activity when feeling the need to eat out of boredom or depression/anxiety and increasing physical activity on a daily basis. Flu vaccine given. Form for blood work given. The patient should participate in 60 minutes or more of physical activity a day. As a -aged child, most physical activity will be unstructured; outdoor play is particularly helpful. Encourage physical activity with playground time in addition to discouraging sedentary time (television use). Encouraged parents to consider physical activity levels when they make choices among options for  and after-school programs.  Next CP in 1 year.

## 2024-03-11 NOTE — SPEECH THERAPY NOTE
Speech Language/Pathology    Pt having multiple bloody bms.NPO. egd later. Will check status afterthat and f/u if indicated.

## 2024-03-11 NOTE — ASSESSMENT & PLAN NOTE
Patient confused this morning and unable to answer orientation questions  Suspect toxic metabolic in setting of GI bleed acute blood loss anemia  Monitor mental status  Delirium precautions

## 2024-03-11 NOTE — PROCEDURES
Intubation    Date/Time: 3/11/2024 11:33 AM    Performed by: Uche Blair MD  Authorized by: Uche Blair MD    Patient location:  Bedside (In ICU)  Other Assisting Provider: Yes (comment) (Az Ashley MD)    Consent:     Consent obtained:  Verbal and emergent situation    Consent given by:  Spouse    Alternatives discussed:  No treatment  Universal protocol:     Patient identity confirmed:  Arm band  Pre-procedure details:     Patient status:  Unresponsive    Pretreatment medications:  Etomidate    Paralytics:  Succinylcholine  Indications:     Indications for intubation: airway protection and hypoxemia    Procedure details:     Preoxygenation:  Bag valve mask    CPR in progress: no      Intubation method:  Oral    Oral intubation technique:  Glidescope    Laryngoscope blade:  Mac 3    Tube size (mm):  7.5    Tube type:  Cuffed    Number of attempts:  1    Cricoid pressure: no      Tube visualized through cords: yes    Placement assessment:     ETT to lip:  23    Tube secured with:  ETT ventura    Breath sounds:  Equal    Placement verification: CXR verification, ETCO2 detector and fiberoptic scope      CXR findings:  ETT in proper place (ETT tip 1 cm above the jenaro. Retracting 2 cm.)  Post-procedure details:     Patient tolerance of procedure:  Tolerated well, no immediate complications

## 2024-03-11 NOTE — ASSESSMENT & PLAN NOTE
Initially admitted to [facility] on 2/29, developed septic shock on 3/3, intubated and transferred to [facility];  required pressors  E. avium bacteremia 1 in 2 sets from 2/29 were positive.   Repeat cultures show NGTD  Susceptibilities show pan-sensitive enterococcus   ID following, appreciate recommendations, currently on Unasyn

## 2024-03-11 NOTE — PROGRESS NOTES
[facility]  Progress Note  Name: Darlyn Nguyen I  MRN: [id number]  Unit/Bed#: [place] [address] -01 I Date of Admission: 3/3/2024   Date of Service: 3/11/2024 I Hospital Day: 8    Addendum: Rapid response due to unresponsiveness.  Critical care team at the bedside plan to transfer to critical care for intubation and stabilization prior to procedure.  Confirmed level 2 DNR but excepts intubation with spouse.    Assessment/Plan   Acute metabolic encephalopathy  Assessment & Plan  Patient confused this morning and unable to answer orientation questions  Suspect toxic metabolic in setting of GI bleed acute blood loss anemia  Monitor mental status  Delirium precautions    Thrombocytopenia (HCC)  Assessment & Plan  Possibly related to sepsis, bacteremia versus medication  Monitor CBC closely    Anasarca  Assessment & Plan  Patient with significant anasarca likely component of iatrogenic fluid overload, decreased albumin  Preserved EF on echocardiogram  Diuresed with albumin and Lasix yesterday, will hold today given acute anemia  Monitor volume status    Atrial fibrillation with rapid ventricular response (HCC)  Assessment & Plan  Cardiology following  not a candidate for chronic AC for DVT/PE : CHADSVASC score is 5 and high HAS-BLED 5.   Cardiology recommending to continue with diltiazem, can start amiodarone infusion if recurrent rapid atrial fibrillation  Continue monitoring telemetry  Continue Cardizem/carvedilol with hold parameters    Dysphagia  Assessment & Plan  Appreciate diet per speech therapy recommendations    Acute deep vein thrombosis (DVT) of both lower extremities (HCC)  Assessment & Plan  Initially on heparin, developed GI bleeding  AC/DVT prophylaxis on hold  s/p retrievable IVC filter placed 3/7     GI bleed  Assessment & Plan  Patient was initially on heparin due to acute DVT, developed bloody stools, underwent flexible sigmoidoscopy revealing rectal ulcer, status post  cauterization  GI following;  Persistent bloody bowel movements with passing of clots overnight.  Patient appears ill and toxic at the bedside.  Stat hemoglobin obtained which shows hemoglobin 6.7 down from 9 yesterday  Ordered stat 2 units packed red blood cells  Discussed with GI about expediting procedure.  Discussed case with critical care about monitoring in critical care unit concern for decompensation  Plan for flex sig today  Keep n.p.o.        Diabetes mellitus (HCC)  Assessment & Plan  Sliding scale insulin      Bacteremia due to Enterococcus  Assessment & Plan  Initially admitted to [facility] on 2/29, developed septic shock on 3/3, intubated and transferred to [facility];  required pressors  E. avium bacteremia 1 in 2 sets from 2/29 were positive.   Repeat cultures show NGTD  Susceptibilities show pan-sensitive enterococcus   ID following, appreciate recommendations, currently on Unasyn    Sacral wound  Assessment & Plan  Continue local wound care    Cavitary pneumonia  Assessment & Plan  Imaging on admission with extensive right upper lobe cavitary pneumonia  AFB of the sputum preliminary is negative  Infectious diseases following  Continue Unasyn for tx of E avium bacteremia  Follow-up additional serologies-serum galactomannan ordered to r/o chronic aspergillosis.    Acute respiratory failure with hypoxia (HCC)  Assessment & Plan  Extubated on 3/4  Still requiring 2 L nasal cannula  Titrate oxygen to maintain saturations greater than 90%    Lupus (HCC)  Assessment & Plan  Recently changed to prednisone 50mg daily.  Remains immunosuppressed, continue prednisone at 50 mg daily  Continue to hold Mepron, Plaquenil, and CellCept.  These medications are on hold secondary to resolving septic shock, cavitary pneumonia, and bacteremia.      * Sepsis (HCC)  Assessment & Plan  Initially admitted to [facility] on 2/29, developed septic shock on 3/3, criteria for sepsis , was intubated and transferred  to [facility]; required pressors; eventually developed bloody stool, required a flex sigmoidoscopy that was noted for rectal ulcer and underwent cauterization  Transferred out of ICU on 3/9/24  On antibiotics per infectious disease for enterococcal bacteremia           VTE Pharmacologic Prophylaxis: on hold     Patient Centered Rounds:  Patient care rounds were performed with nursing    Discussions with Specialists or Other Care Team Provider: GI/Critical Care     Education and Discussions with Family / Patient: Family at the bedside     Time Spent for Care: I have spent a total time of 55 minutes on 24 in caring for this patient including Diagnostic results, Risks and benefits of tx options, Instructions for management, Patient and family education, Importance of tx compliance, Impressions, Counseling / Coordination of care, Documenting in the medical record, Reviewing / ordering tests, medicine, procedures  , and Communicating with other healthcare professionals .      Current Length of Stay: 8 day(s)    Current Patient Status: Inpatient   Certification Statement: The patient will continue to require additional inpatient hospital stay due to need for blood, IV medications, procedures     Discharge Plan: Pending progression, 72 hours plus     Code Status: Level 2 - DNAR: but accepts endotracheal intubation      Subjective:   Patient seen and evaluated at bedside. Ill this morning per nursing. Large voluminous blood bowel movements with clots overnight. 3 episodes. Spouse reports she looks ill.     Objective:     Vitals:   Temp (24hrs), Av.4 °F (35.8 °C), Min:93.7 °F (34.3 °C), Max:98.6 °F (37 °C)    Temp:  [93.7 °F (34.3 °C)-98.6 °F (37 °C)] 98.6 °F (37 °C)  HR:  [54-87] 65  Resp:  [14-19] 18  BP: (111-173)/(47-88) 121/87  SpO2:  [92 %-100 %] 100 %  Body mass index is 31.67 kg/m².     Input and Output Summary (last 24 hours):       Intake/Output Summary (Last 24 hours) at 3/11/2024 1003  Last data  filed at 3/11/2024 0901  Gross per 24 hour   Intake 550 ml   Output 550 ml   Net 0 ml       Physical Exam:     Physical Exam  Vitals reviewed.   Constitutional:       General: She is not in acute distress.     Appearance: She is well-developed. She is ill-appearing and toxic-appearing. She is not diaphoretic.      Comments: Lethargic    HENT:      Head: Normocephalic and atraumatic.      Mouth/Throat:      Mouth: Mucous membranes are dry.   Eyes:      General: No scleral icterus.     Extraocular Movements: Extraocular movements intact.   Cardiovascular:      Rate and Rhythm: Normal rate and regular rhythm.      Heart sounds: Normal heart sounds.   Pulmonary:      Effort: Pulmonary effort is normal. No respiratory distress.      Comments: Coarse breath sounds   Abdominal:      General: There is no distension.      Palpations: Abdomen is soft.      Tenderness: There is no abdominal tenderness. There is no guarding or rebound.   Musculoskeletal:         General: No tenderness or deformity.      Right lower leg: Edema present.      Left lower leg: Edema present.   Skin:     General: Skin is warm and dry.   Neurological:      General: No focal deficit present.      Mental Status: She is disoriented.   Psychiatric:      Comments: Confused          Additional Data:     Labs: I have reviewed pertinent results     Results from last 7 days   Lab Units 03/11/24  0933 03/11/24  0304 03/05/24  1539 03/05/24  0450   WBC Thousand/uL  --  13.21*   < > 26.92*   HEMOGLOBIN g/dL 6.7* 8.8*   < > 8.7*   HEMATOCRIT % 20.5* 26.4*   < > 27.5*   PLATELETS Thousands/uL  --  66*   < > 199   BANDS PCT %  --   --   --  2   NEUTROS PCT %  --  83*   < >  --    LYMPHS PCT %  --  10*   < >  --    LYMPHO PCT %  --   --   --  6*   MONOS PCT %  --  3*   < >  --    MONO PCT %  --   --   --  4   EOS PCT %  --  0   < > 0    < > = values in this interval not displayed.     Results from last 7 days   Lab Units 03/11/24  0304   SODIUM mmol/L 146   POTASSIUM  mmol/L 3.2*   CHLORIDE mmol/L 114*   CO2 mmol/L 26   BUN mg/dL 24   CREATININE mg/dL 0.86   ANION GAP mmol/L 6   CALCIUM mg/dL 7.5*   ALBUMIN g/dL 2.3*   TOTAL BILIRUBIN mg/dL 0.64   ALK PHOS U/L 55   ALT U/L 30   AST U/L 14   GLUCOSE RANDOM mg/dL 265*     Results from last 7 days   Lab Units 03/09/24  0003   INR  1.14     Results from last 7 days   Lab Units 03/11/24  0709 03/10/24  2139 03/10/24  1544 03/10/24  1140 03/10/24  0713 03/10/24  0607 03/10/24  0010 03/09/24  2138 03/09/24  2035 03/09/24  1555 03/09/24  1056 03/09/24  0720   POC GLUCOSE mg/dl 240* 266* 302* 150* 151* 161* 267* 207* 252* 150* 247* 191*         Results from last 7 days   Lab Units 03/11/24  0304   PROCALCITONIN ng/ml 0.30*         Imaging: I have reviewed pertinent imaging       Recent Cultures (last 7 days):           Last 24 Hours Medication List:   Current Facility-Administered Medications   Medication Dose Route Frequency Provider Last Rate    acetaminophen  650 mg Oral Q6H PRN Shantal Miller PA-C      Albumin 5%  25 g Intravenous Once Shantal Miller PA-C      ampicillin-sulbactam  3 g Intravenous Q6H AUGUSTA Ibarra Stopped (03/11/24 0901)    atorvastatin  40 mg Oral Daily AUGUSTA Ibarra      carvedilol  6.25 mg Oral BID With Meals AUGUSTA Ibarra      collagenase   Topical Daily AUGUSTA Ibarra      diltiazem  30 mg Oral Q6H [facility] Shantal Miller PA-C      escitalopram  10 mg Oral Daily AUGUSTA Ibarra      famotidine  20 mg Oral Daily AUGUSTA Ibarra      insulin lispro  1-5 Units Subcutaneous TID AC AUGUSTA Ibarra      insulin lispro  1-5 Units Subcutaneous HS AUGUSTA Ibarra      levalbuterol  1.25 mg Nebulization Q8H PRN AUGUSTA Ibarra      OLANZapine  5 mg Oral HS AUGUSTA Ibarra      omeprazole (PRILOSEC) suspension 2 mg/mL  20 mg Oral Daily AUGUSTA Ibarra      ondansetron  4 mg Intravenous Q4H PRN AUGUSTA Ibarra      predniSONE   40 mg Oral Daily AUGUSTA Ibarra          Today, Patient Was Seen By: Alexandru Quintana DO    ** Please Note: Dictation voice to text software may have been used in the creation of this document. **

## 2024-03-11 NOTE — PLAN OF CARE
Problem: Prexisting or High Potential for Compromised Skin Integrity  Goal: Skin integrity is maintained or improved  Description: INTERVENTIONS:  - Identify patients at risk for skin breakdown  - Assess and monitor skin integrity  - Assess and monitor nutrition and hydration status  - Monitor labs   - Assess for incontinence   - Turn and reposition patient  - Assist with mobility/ambulation  - Relieve pressure over bony prominences  - Avoid friction and shearing  - Provide appropriate hygiene as needed including keeping skin clean and dry  - Evaluate need for skin moisturizer/barrier cream  - Collaborate with interdisciplinary team   - Patient/family teaching  - Consider wound care consult   3/11/2024 0826 by Senia Ferguson  Outcome: Progressing  3/11/2024 0056 by Senia Ferguson  Outcome: Progressing     Problem: SAFETY ADULT  Goal: Patient will remain free of falls  Description: INTERVENTIONS:  - Educate patient/family on patient safety including physical limitations  - Instruct patient to call for assistance with activity   - Consult OT/PT to assist with strengthening/mobility   - Keep Call bell within reach  - Keep bed low and locked with side rails adjusted as appropriate  - Keep care items and personal belongings within reach  - Initiate and maintain comfort rounds  - Make Fall Risk Sign visible to staff  - Offer Toileting every 2 Hours, in advance of need  - Initiate/Maintain bed alarm  - Obtain necessary fall risk management equipment.  - Apply yellow socks and bracelet for high fall risk patients  - Consider moving patient to room near nurses station  3/11/2024 0826 by Senia Ferguson  Outcome: Progressing  3/11/2024 0056 by Senia Ferguson  Outcome: Progressing  Goal: Maintain or return to baseline ADL function  Description: INTERVENTIONS:  -  Assess patient's ability to carry out ADLs; assess patient's baseline for ADL function and identify physical deficits which impact ability to perform ADLs (bathing,  care of mouth/teeth, toileting, grooming, dressing, etc.)  - Assess/evaluate cause of self-care deficits   - Assess range of motion  - Assess patient's mobility; develop plan if impaired  - Assess patient's need for assistive devices and provide as appropriate  - Encourage maximum independence but intervene and supervise when necessary  - Involve family in performance of ADLs  - Assess for home care needs following discharge   - Consider OT consult to assist with ADL evaluation and planning for discharge  - Provide patient education as appropriate  3/11/2024 0826 by Senia Ferguson  Outcome: Progressing  3/11/2024 0056 by Senia Ferguson  Outcome: Progressing  Goal: Maintains/Returns to pre admission functional level  Description: INTERVENTIONS:  - Perform AM-PAC 6 Click Basic Mobility/ Daily Activity assessment daily.  - Set and communicate daily mobility goal to care team and patient/family/caregiver.   - Collaborate with rehabilitation services on mobility goals if consulted  - Perform Range of Motion 2 times a day.  - Reposition patient every 2 hours.  - Dangle patient 2 times a day  - Stand patient 2 times a day  - Ambulate patient 2 times a day  - Out of bed to chair 2 times a day   - Out of bed for meals 2 times a day  - Out of bed for toileting  - Record patient progress and toleration of activity level   3/11/2024 0826 by Senia Ferguson  Outcome: Progressing  3/11/2024 0056 by Senia Ferguson  Outcome: Progressing     Problem: DISCHARGE PLANNING  Goal: Discharge to home or other facility with appropriate resources  Description: INTERVENTIONS:  - Identify barriers to discharge w/patient and caregiver  - Arrange for needed discharge resources and transportation as appropriate  - Identify discharge learning needs (meds, wound care, etc.)  - Arrange for interpretive services to assist at discharge as needed  - Refer to Case Management Department for coordinating discharge planning if the patient needs  post-hospital services based on physician/advanced practitioner order or complex needs related to functional status, cognitive ability, or social support system  3/11/2024 0826 by Senia Ferguson  Outcome: Progressing  3/11/2024 0056 by Senia Ferguson  Outcome: Progressing     Problem: Knowledge Deficit  Goal: Patient/family/caregiver demonstrates understanding of disease process, treatment plan, medications, and discharge instructions  Description: Complete learning assessment and assess knowledge base.  Interventions:  - Provide teaching at level of understanding  - Provide teaching via preferred learning methods  3/11/2024 0826 by Senia Ferguson  Outcome: Progressing  3/11/2024 0056 by Senia Ferguson  Outcome: Progressing     Problem: Nutrition/Hydration-ADULT  Goal: Nutrient/Hydration intake appropriate for improving, restoring or maintaining nutritional needs  Description: Monitor and assess patient's nutrition/hydration status for malnutrition. Collaborate with interdisciplinary team and initiate plan and interventions as ordered.  Monitor patient's weight and dietary intake as ordered or per policy. Utilize nutrition screening tool and intervene as necessary. Determine patient's food preferences and provide high-protein, high-caloric foods as appropriate.     INTERVENTIONS:  - Monitor oral intake, urinary output, labs, and treatment plans  - Assess nutrition and hydration status and recommend course of action  - Evaluate amount of meals eaten  - Assist patient with eating if necessary   - Allow adequate time for meals  - Recommend/ encourage appropriate diets, oral nutritional supplements, and vitamin/mineral supplements  - Order, calculate, and assess calorie counts as needed  - Recommend, monitor, and adjust tube feedings and TPN/PPN based on assessed needs  - Assess need for intravenous fluids  - Provide specific nutrition/hydration education as appropriate  - Include patient/family/caregiver in decisions  related to nutrition  3/11/2024 0826 by Senia Ferguson  Outcome: Progressing  3/11/2024 0056 by Senia Ferguson  Outcome: Progressing     Problem: INFECTION - ADULT  Goal: Absence or prevention of progression during hospitalization  Description: INTERVENTIONS:  - Assess and monitor for signs and symptoms of infection  - Monitor lab/diagnostic results  - Monitor all insertion sites, i.e. indwelling lines, tubes, and drains  - Monitor endotracheal if appropriate and nasal secretions for changes in amount and color  - [place] appropriate cooling/warming therapies per order  - Administer medications as ordered  - Instruct and encourage patient and family to use good hand hygiene technique  - Identify and instruct in appropriate isolation precautions for identified infection/condition  3/11/2024 0826 by Senia Ferguson  Outcome: Progressing  3/11/2024 0056 by Senia Ferguson  Outcome: Progressing

## 2024-03-11 NOTE — ASSESSMENT & PLAN NOTE
Patient was initially on heparin due to acute DVT, developed bloody stools, underwent flexible sigmoidoscopy revealing rectal ulcer, status post cauterization  GI following;  Persistent bloody bowel movements with passing of clots overnight.  Patient appears ill and toxic at the bedside.  Stat hemoglobin obtained which shows hemoglobin 6.7 down from 9 yesterday  Ordered stat 2 units packed red blood cells  Discussed with GI about expediting procedure.  Discussed case with critical care about monitoring in critical care unit concern for decompensation  Plan for flex sig today  Keep n.p.o.

## 2024-03-12 PROBLEM — R41.82 ALTERED MENTAL STATUS: Status: ACTIVE | Noted: 2024-03-12

## 2024-03-12 NOTE — ASSESSMENT & PLAN NOTE
Possibly related to volume vs medication vs sepsis/bacteremia  Monitor CBC closely  Transfuse FFP for plt<10, <20 if actively bleeding

## 2024-03-12 NOTE — PROCEDURES
Central Line Insertion    Date/Time: 3/11/2024 9:37 PM    Performed by: AUGUSTA Perez  Authorized by: AUGUSTA Perez    Patient location:  ICU  Other Assisting Provider: No    Consent:     Consent obtained:  Written    Consent given by:  Spouse    Risks discussed:  Arterial puncture, bleeding, incorrect placement, infection, nerve damage and pneumothorax    Alternatives discussed:  No treatment, delayed treatment and alternative treatment  Universal protocol:     Procedure explained and questions answered to patient or proxy's satisfaction: yes      Relevant documents present and verified: yes      Test results available and properly labeled: yes      Radiology Images displayed and confirmed.  If images not available, report reviewed: yes      Required blood products, implants, devices, and special equipment available: yes      Site/side marked: yes      Immediately prior to procedure, a time out was called: yes      Patient identity confirmed:  Hospital-assigned identification number and arm band  Pre-procedure details:     Hand hygiene: Hand hygiene performed prior to insertion      Sterile barrier technique: All elements of maximal sterile technique followed      Skin preparation:  2% chlorhexidine and ChloraPrep    Skin preparation agent: Skin preparation agent completely dried prior to procedure    Indications:     Central line indications: medications requiring central line    Anesthesia (see MAR for exact dosages):     Anesthesia method:  Local infiltration    Local anesthetic:  Lidocaine 1% w/o epi  Procedure details:     Location:  Left internal jugular    Vessel type: vein      Patient position:  Reverse Trendelenburg    Catheter type:  Triple lumen 20cm    Catheter size:  7 Fr    Landmarks identified: yes      Ultrasound guidance: yes      Ultrasound image availability:  Still images obtained and images available in PACS    Sterile ultrasound techniques: Sterile gel and sterile probe covers were  used      Manometry confirmation: yes      Number of attempts:  1    Successful placement: yes      Catheter tip vessel location: atriocaval junction    Post-procedure details:     Post-procedure:  Dressing applied and line sutured    Assessment:  Blood return through all ports, no pneumothorax on x-ray, free fluid flow and placement verified by x-ray    Post-procedure complications: none      Patient tolerance of procedure:  Tolerated well, no immediate complications

## 2024-03-12 NOTE — ASSESSMENT & PLAN NOTE
Diagnosed in December,   Initially started on steroids, prednisone 60 mg daily, which patient had tapered to 30 mg daily prior to admission with the addition of atovaquone, hydroxychloroquine, and mycophenolate    Holding atovaquone, hydroxychloroquine, and mycophenolate in the setting of acute infection  Patient started on Solu-Cortef prior to discharge from [facility] for possible relative adrenal insufficiency contributing to shock state.      Continue prednisone, had planned to taper 10 mg weekly, currently 40 mg, will hold off further dose reduction at this time in the setting of clinical deterioration with concern for possible relative adrenal insufficiency, though random cortisol of 10.9 collected 3/11 is reassuring

## 2024-03-12 NOTE — PLAN OF CARE
Problem: Prexisting or High Potential for Compromised Skin Integrity  Goal: Skin integrity is maintained or improved  Description: INTERVENTIONS:  - Identify patients at risk for skin breakdown  - Assess and monitor skin integrity  - Assess and monitor nutrition and hydration status  - Monitor labs   - Assess for incontinence   - Turn and reposition patient  - Assist with mobility/ambulation  - Relieve pressure over bony prominences  - Avoid friction and shearing  - Provide appropriate hygiene as needed including keeping skin clean and dry  - Evaluate need for skin moisturizer/barrier cream  - Collaborate with interdisciplinary team   - Patient/family teaching  - Consider wound care consult   Outcome: Progressing     Problem: SAFETY ADULT  Goal: Patient will remain free of falls  Description: INTERVENTIONS:  - Educate patient/family on patient safety including physical limitations  - Instruct patient to call for assistance with activity   - Consult OT/PT to assist with strengthening/mobility   - Keep Call bell within reach  - Keep bed low and locked with side rails adjusted as appropriate  - Keep care items and personal belongings within reach  - Initiate and maintain comfort rounds  - Make Fall Risk Sign visible to staff  - Offer Toileting every 2 Hours, in advance of need  - Initiate/Maintain bed alarm  - Obtain necessary fall risk management equipment  - Apply yellow socks and bracelet for high fall risk patients  - Consider moving patient to room near nurses station  Outcome: Progressing  Goal: Maintain or return to baseline ADL function  Description: INTERVENTIONS:  -  Assess patient's ability to carry out ADLs; assess patient's baseline for ADL function and identify physical deficits which impact ability to perform ADLs (bathing, care of mouth/teeth, toileting, grooming, dressing, etc.)  - Assess/evaluate cause of self-care deficits   - Assess range of motion  - Assess patient's mobility; develop plan if  impaired  - Assess patient's need for assistive devices and provide as appropriate  - Encourage maximum independence but intervene and supervise when necessary  - Involve family in performance of ADLs  - Assess for home care needs following discharge   - Consider OT consult to assist with ADL evaluation and planning for discharge  - Provide patient education as appropriate  Outcome: Progressing  Goal: Maintains/Returns to pre admission functional level  Description: INTERVENTIONS:  - Perform AM-PAC 6 Click Basic Mobility/ Daily Activity assessment daily.  - Set and communicate daily mobility goal to care team and patient/family/caregiver.   - Collaborate with rehabilitation services on mobility goals if consulted  - Perform Range of Motion 3 times a day.  - Reposition patient every 3 hours.  - Dangle patient 3 times a day  - Stand patient 3 times a day  - Ambulate patient 3 times a day  - Out of bed to chair 3 times a day   - Out of bed for meals 3 times a day  - Out of bed for toileting  - Record patient progress and toleration of activity level   Outcome: Progressing     Problem: DISCHARGE PLANNING  Goal: Discharge to home or other facility with appropriate resources  Description: INTERVENTIONS:  - Identify barriers to discharge w/patient and caregiver  - Arrange for needed discharge resources and transportation as appropriate  - Identify discharge learning needs (meds, wound care, etc.)  - Arrange for interpretive services to assist at discharge as needed  - Refer to Case Management Department for coordinating discharge planning if the patient needs post-hospital services based on physician/advanced practitioner order or complex needs related to functional status, cognitive ability, or social support system  Outcome: Progressing     Problem: Knowledge Deficit  Goal: Patient/family/caregiver demonstrates understanding of disease process, treatment plan, medications, and discharge instructions  Description:  Complete learning assessment and assess knowledge base.  Interventions:  - Provide teaching at level of understanding  - Provide teaching via preferred learning methods  Outcome: Progressing     Problem: Nutrition/Hydration-ADULT  Goal: Nutrient/Hydration intake appropriate for improving, restoring or maintaining nutritional needs  Description: Monitor and assess patient's nutrition/hydration status for malnutrition. Collaborate with interdisciplinary team and initiate plan and interventions as ordered.  Monitor patient's weight and dietary intake as ordered or per policy. Utilize nutrition screening tool and intervene as necessary. Determine patient's food preferences and provide high-protein, high-caloric foods as appropriate.     INTERVENTIONS:  - Monitor oral intake, urinary output, labs, and treatment plans  - Assess nutrition and hydration status and recommend course of action  - Evaluate amount of meals eaten  - Assist patient with eating if necessary   - Allow adequate time for meals  - Recommend/ encourage appropriate diets, oral nutritional supplements, and vitamin/mineral supplements  - Order, calculate, and assess calorie counts as needed  - Recommend, monitor, and adjust tube feedings and TPN/PPN based on assessed needs  - Assess need for intravenous fluids  - Provide specific nutrition/hydration education as appropriate  - Include patient/family/caregiver in decisions related to nutrition  Outcome: Progressing     Problem: INFECTION - ADULT  Goal: Absence or prevention of progression during hospitalization  Description: INTERVENTIONS:  - Assess and monitor for signs and symptoms of infection  - Monitor lab/diagnostic results  - Monitor all insertion sites, i.e. indwelling lines, tubes, and drains  - Monitor endotracheal if appropriate and nasal secretions for changes in amount and color  - Roland appropriate cooling/warming therapies per order  - Administer medications as ordered  - Instruct and  encourage patient and family to use good hand hygiene technique  - Identify and instruct in appropriate isolation precautions for identified infection/condition  Outcome: Progressing     Problem: SAFETY,RESTRAINT: NV/NON-SELF DESTRUCTIVE BEHAVIOR  Goal: Remains free of harm/injury (restraint for non violent/non self-detsructive behavior)  Description: INTERVENTIONS:  - Instruct patient/family regarding restraint use   - Assess and monitor physiologic and psychological status   - Provide interventions and comfort measures to meet assessed patient needs   - Identify and implement measures to help patient regain control  - Assess readiness for release of restraint   Outcome: Progressing  Goal: Returns to optimal restraint-free functioning  Description: INTERVENTIONS:  - Assess the patient's behavior and symptoms that indicate continued need for restraint  - Identify and implement measures to help patient regain control  - Assess readiness for release of restraint   Outcome: Progressing

## 2024-03-12 NOTE — PROGRESS NOTES
Pastoral Care Progress Note    3/12/2024  Patient: Darlyn Nguyen : 1945  Admission Date & Time: 3/3/2024 1556  MRN: [id number] [facility]: [id number]    Continue to follow patient and support family, provided listening support and pastoral care presence.

## 2024-03-12 NOTE — PROGRESS NOTES
Progress Note - Infectious Disease   Darlyn Nguyen 78 y.o. female MRN: [id number]  Unit/Bed#: ICU 02 Encounter: [id number]    Impression/Plan:  1. Septic shock. Developing over admission. Leukocytosis, hypotension, and lactic acidosis. Likely secondary to enterococcus avium bacteremia and RUL pneumonia with cavitary lesion. This is complicated by fact that patient is immunosuppressed in setting of recent SLE with lupus nephritis diagnosis. The patient initially had clinical improvement. She was extubated and no longer needed pressor support. She was transitioned out of ICU to med/surg care. Yesterday patient had significant BRBPR and became nonresponsive. She was intubated and transferred back to ICU. Flex sig performed which showed significant bleeding in transverse colon. This morning patient remains intubated. She requires pressor support.   -antibiotic as below  -check CBCD and BMP tomorrow  -monitor vitals  -supportive care     2. Enterococcus avium bacteremia. Single set positive from 2/29/2024. Suspect likely GI source given patient's recent GI bleed and pancolitis with heavy diarrhea. Recent urine and wound cultures with no growth of this pathogen. Patient completed CT A/P which noted scattered splenic hypodensities which is concerning for septic emboli. With this pathogen there is concern for vancomycin resistance. Patient has no intravascular devices or hardware. All repeat blood cultures are negative >5 days. CT head with no acute findings. TTE was negative. The patient has been transitioned to IV Unasyn. She appears to be tolerating the antibiotic without difficulty. I will continue the Unasyn for now in anticipation of 6 weeks of IV antibiotic treatment.  -continue IV Unasyn through 4/12/2024 for 6 weeks of IV antibiotic treatment after cleared blood cultures  -weekly CBCD and BMP while on IV antibiotic  -monitor vitals  -PICC to be removed after patient's final dose of IV antibiotic on  4/12/2024  -anticipate surveillance blood cultures 2 weeks after finishing IV antibiotic treatment, draw on 4/26/2024  -patient will require outpatient ID follow up after discharge     3. RUL pneumonia with cavitary lung lesions. Chest CT showed a RUL cavitary lesion, dense RUL consolidation, R basilar atelectasis, and small B/L pleural effusions. Consider aspiration played a role. Consider possilibily of septic emboli in setting of bacteremia. With new immunosuppression will need to consider TB. AFB sputum cultures are negative x3. TB PCR is pending with the state lab. Sputum culture with growth of pseudomonas, however after antibiotic treatment this may be colonization. Aspergillus was negative. I personally reviewed her repeat CXR which showed stable RUL cavity.  -antibiotic as above  -follow up TB PCR  -monitor vitals  -monitor respiratory status  -mechanical ventilation support per primary service      4. Pancolitis with heavy diarrhea and recurrent GI bleed. This is likely source of her bacteremia above. Recent stool PCR was negative. Giardia and O&P are were negative. Fecal calprotectin positive. Newest CT A/P showed persistent fluid throughout the colon, improvement to previous areas of colonic mucosal hyperemia, and colonic diverticulosis without diverticulitis. Suspect she may benefit from colonoscopy once medically stable. Gastroenterology is following. She underwent flex sig which showed hemorrhoids, diverticulitis, and ulcerated mucosa. Unfortunately she's had worsening BRBPR yesterday with significant drop in Hemoglobin. Repeat flex sig noted significant amount of pancolonic fresh blood and clotting and an ulcer in the transverse colon with oozing hemorrhage. Hemostasis was achieved with endoclip. She is also noted to have prolapsed rectum with multiple nonbleeding ulcerse and severe diverticula.  -serial abdominal exams  -monitor GI symptoms  -monitor stool output   -continue follow up with  gastroenterology     5. Acute hypoxic respiratory failure. Likely secondary to RUL pnuemonia with cavitary lesion. Also consider role of sepsis with shock. Consider possibility of aspiration vs septic emboli in bacteremic patient with new splenic hypodensities. She did recently have B/L LE DVTs, however she's been on Eliquis so less concern for PE. Patient's respiratory status did improve and she was extubated. Yesterday patient experienced recurrent GI bleed and became nonresponsive. She has been reintubated.  -antibiotic as above  -monitor CBCD and BMP  -monitor vitals  -monitor respiratory status  -mechanical ventilation support per critical care     6. SLE. With lupus nephritis. Diagnosed in 12/2023 at [facility]. She has been managed by rheumatology and nephrology. She was started on prednisone, atovaquone, hydroxychloroquine, and mycophenolate. Atovaquone, hydroxychloroquine, and mycophenolate currently on hold. She was given a dose of Solu-Cortef at [facility]. Nephrology has assessed and patient is on prednisone. Steroid use is risk factor for infection.  -steroid per nephrology  -patient will eventually need to go back on mepron from PJP prophylaxis  -continue close follow up with nephrology  -consider rheumatology consult as needed     7. CKD stage 2. This can affect antibiotic dosing. Upon review of patient's available past medical records it appears her baseline creatinine is approximately 0.8. Also with lupus nephritis as above. Suspect patient is high risk for ARYAN. Her creatinine is 1.18 today.  -monitor creatinine  -dose adjust antibiotic for renal function as needed  -avoid nephrotoxins  -continue close follow up with nephrology     8. Sacral and buttock ulcers. Some areas more superficial and appearing as raw granular tissue. Other areas are likely stage 2 with some gray stringy slough and fibrous tissue. Wounds do not appear to tunnel or undermine. It was documented that patient had a gluteal skin  care during recent hospitalization at [facility]. Suspect pressure and prolonged down time are contributing to skin integrity. Wound management is following. Wounds are currently being treatment with Santyl.  -serial skin exams  -frequent turning and repositioning to offload pressure  -wound management per wound management team  -continue follow up with wound management     9. Type 2 diabetes mellitus without long term insulin use. Patient's last HbA1c was 5.9% on 3/3/2024. Elevated blood glucose is risk factor for wounds and infection. Recommend tight glycemic control.  -blood glucose management per primary service     Above plan was discussed in detail with patient's  at the bedside.  Above plan was discussed in detail with the critical care team who agree with need for ongoing IV antibiotic treatment.    Antibiotics:  Unasyn 9  Antibiotics 13    Subjective:  Unable to perform ROS, patient intubated and sedated. A line and central line placed. She is again requiring pressor support. Patient did not have any additional bloody bowel movements overnight.     Objective:  Vitals:  Temp:  [95 °F (35 °C)-98.6 °F (37 °C)] 98.6 °F (37 °C)  HR:  [] 100  Resp:  [15-28] 20  BP: ()/(53-97) 86/53  SpO2:  [90 %-100 %] 94 %  Temp (24hrs), Av.4 °F (36.3 °C), Min:95 °F (35 °C), Max:98.6 °F (37 °C)  Current: Temperature: 98.6 °F (37 °C)    Physical Exam:   General Appearance:  Intubated, sedated.   Throat: ETT intact. OG tube intact.    Lungs:   Decreased mechanical breath sounds to auscultation bilaterally;  respirations unlabored on mechanical ventilation.   Heart:  Tachycardic; no murmur, rub or gallop.   Abdomen:   Soft, no facial grimace with abdominal palpation, non-distended, positive bowel sounds.     Extremities: No clubbing or cyanosis, worsening anasarca/peripheral edema.   Skin: No new rashes noted on exposed skin.     Labs, Imaging, & Other studies:   All pertinent labs and imaging studies were  personally reviewed  Results from last 7 days   Lab Units 03/12/24 0439 03/11/24 1711 03/11/24  1706 03/11/24  0933 03/11/24  0304   WBC Thousand/uL 18.58* 16.43*  --   --  13.21*   HEMOGLOBIN g/dL 13.6 12.4  --    < > 8.8*   I STAT HEMOGLOBIN g/dl  --   --  10.2*   < >  --    PLATELETS Thousands/uL 65* 56*  --   --  66*    < > = values in this interval not displayed.     Results from last 7 days   Lab Units 03/12/24 0436 03/11/24 1711 03/11/24  1706 03/11/24  1201 03/11/24  1055 03/11/24  0304 03/10/24  0443 03/09/24  0408   POTASSIUM mmol/L 4.0   < >  --  3.8  --  3.2*   < > 3.5   CHLORIDE mmol/L 117*   < >  --  117*  --  114*   < > 112*   CO2 mmol/L 16*   < >  --  22  --  26   < > 25   CO2, I-STAT mmol/L  --   --  20*  --    < >  --   --   --    BUN mg/dL 27*   < >  --  25  --  24   < > 23   CREATININE mg/dL 1.18   < >  --  0.98  --  0.86   < > 0.76   EGFR ml/min/1.73sq m 43   < >  --  54  --  64   < > 74   GLUCOSE, ISTAT mg/dl  --   --  275*  --    < >  --   --   --    CALCIUM mg/dL 7.5*   < >  --  7.4*  --  7.5*   < > 7.5*   AST U/L  --   --   --  16  --  14  --  15   ALT U/L  --   --   --  28  --  30  --  42   ALK PHOS U/L  --   --   --  53  --  55  --  74    < > = values in this interval not displayed.

## 2024-03-12 NOTE — ASSESSMENT & PLAN NOTE
Maintaining sinus rhythm, continue p.o. diltiazem.    Start amiodarone infusion if any recurrent atrial fibrillation  Anticoagulation on hold due to acute GI bleed

## 2024-03-12 NOTE — ASSESSMENT & PLAN NOTE
Patient was initially on heparin due to acute DVT, developed bloody stools, underwent flexible sigmoidoscopy revealing ulcerated rectal mucosa, status post cauterization  Persistent bloody bowel movements with passing of clots overnight 3/10 into 3/11. Hemoglobin 6.7 down from 9 the day prior  Transfused 2 units packed red blood cells  Repeat Flex sig w GI on 3/11: multiple ulcers throughout colon, biopsies taken; one oozing ulcer in transverse colon, clipped with hemostasis; severe diverticula; prolapsed rectum; rectal ulcers.    Ddx includes inflammatory bowel disease, ischemic colitis, and infectious etiologies    -F/u pathology results   -If pt develops rebleeding, please notify GI fellow on call and consider CTA   -Trend Hg q8 hrs, maintain active T&S, transfuse for Hg < 7.0

## 2024-03-12 NOTE — ASSESSMENT & PLAN NOTE
Biopsy-proven at [facility]    Oliguria, with good UOP to Lasix challenge    BUN stable  Cr stable  Give Lasix 80 mg IV this morning; 40 mg IV scheduled this afternoon, but will increase if UOP inadequate in AM  Continue to monitor volume status and UOP  Will re-engage nephrology if not responsive to diuretic

## 2024-03-12 NOTE — ASSESSMENT & PLAN NOTE
E. avium bacteremia. Pt recently started on prednisone, atovaquone, hydroxychloroquine, and mycophenolate for lupus. Colonic/rectal ulcerations possible point of entry.     Repeat cultures show NGTD  Susceptibilities show pan-sensitive enterococcus    ID on board, appreciate recommendations  Continue Unasyn 3 g Q6H (Day 9 Unasyn of anticipated 6-wk course)  Can hold off DYANA, per ID, as unlikely to

## 2024-03-12 NOTE — ASSESSMENT & PLAN NOTE
Lab Results   Component Value Date    HGBA1C 5.9 (H) 03/03/2024       Recent Labs     03/11/24  1755 03/11/24  2204 03/12/24  0028 03/12/24  0535   POCGLU 210* 132 240* 198*         Blood Sugar Average: Last 72 hrs:  (P) [id number]    History of DM, well-controlled with last A1c 5.6 on 2/26, but now 5.9. Hyperglycemia in the setting of steroid use.    ISS and finger sticks q6h while NPO  Algorithm 3, increased from 2 for persistent hyperglycemia. Had previously been decreased from 3 to 2 for one episode of hypoglycemia.  Avoid hypoglycemia per protocol  Monitor BG and insulin requirements and adjust regimen as needed

## 2024-03-12 NOTE — ASSESSMENT & PLAN NOTE
Malnutrition Findings:   Adult Malnutrition type: Acute illness  Adult Degree of Malnutrition: Other severe protein calorie malnutrition  Malnutrition Characteristics: Fluid accumulation, Inadequate energy                  360 Statement: Severe calorie-protein malnutrition in context to acute illness r/t GI bleed, dysphagia, inadequate PO intake as evidance by energy intake less than 50% compared to estimated needs >5 days, +2 generalized edema; Treated with TBD (diet +oral supplements vs nutrition support)    BMI Findings:           Body mass index is 31.45 kg/m².

## 2024-03-12 NOTE — ASSESSMENT & PLAN NOTE
Pt intubated to protect airway, given obtunded mental status, however hypoxemia also noted on iSTAT during rapid response  Ddx includes volume overload vs aspiration PNA/RUL cavitary lung lesion    Pt currently intubated, morning ABG demonstrates alkalemia with primary respiratory alkalosis. Current settings 16/400/6/50. Pt breathing at rate of 18-21. Can consider decreasing volume--6 ml/kg ideal body weight = 324 ml    Attempt to diurese-- Lasix 80 mg IV ordered this morning, followed by 40 mg IV this afternoon  Will re-engage Nephro if pt remains oliguric despite diuretic challenge

## 2024-03-12 NOTE — ASSESSMENT & PLAN NOTE
Rapid response called for unresponsive patient on morning of 3/11/24. Earlier that morning, patient described as confused and unable to answer orientation questions, which had been consistent throughout this admission.  Etiology unclear.   At time of rapid response, findings significant for Hgb 6.7 in the setting of acute GIB and hypoxemia on iSTAT. Vitals were normal and other lab values were unremarkable.  TSH 1.329, free T4 low at 0.38  Unable to assess mental status at this time, as pt is now intubated and sedated

## 2024-03-12 NOTE — ASSESSMENT & PLAN NOTE
Patient with significant anasarca likely component of iatrogenic fluid overload, decreased albumin, oliguria  Preserved EF on echocardiogram  Did not respond to Lasix 40 IV last night  Trial Lasix 80 mg IV this AM  Monitor volume status and UOP--currently oliguric

## 2024-03-12 NOTE — PROGRESS NOTES
[facility] - Progress Note  Darlyn Nguyen 78 y.o. female MRN: [id number]  Unit/Bed#: ICU 02 Encounter: [id number]      ASSESSMENT & PLAN:    77 y/o female w hx of SLE, chronic anemia, HTN, chronic diarrhea, DVT previously on AC now s/p IVC filter, ambulatory dysfx, T2DM, CVA w R sided weakness, depression initially presented with diarrhe and CT scan showing colitis. Hospital course with enterococcus bacteremia, sacral wound cx w E coli + proteus mirabilis + MDR pseudomonas. Developed septic shock, required ICU level of care. S/p flex sig 3/6/24 for bloody stools noting rectal ulcerations, treated w cautery. Developed repeat bleeds over the weekend, mental status deterioration requiring ICU stay again, and colonoscopy w bleeding colonic ulcer s/p endoclip w hemostasis.     LGIB in s/o Colonic Ulcerations  Etiology of ulcerations in colon unclear, distributed from IC valve to transverse colon. Mucosa was normal past this until rectal abnormalities as noted below  Culprit lesion appears to have been successfully treated given stability of Hg  F/u pathology from ulcer biopsies obtained 3/11/24   Trend Hg q12 hrs  Maintain 2 large bore IVs  Maintain active T&S  Transfuse for Hg < 7.0    Rectal Prolapse  Rectal Ulcerations  These lesions do not appear to have been bleeding on 3/11/24 colonoscopy  Outpt colorectal surgery f/u  ______________________________________________________________________    SUBJECTIVE:     During the day on 3/11/24 with significant bright red bloody BMs, worsening mental status. Hg dropped to 6.7, increased to 12s after administration of only 2 U pRBCs (possibly initial drop was lab error). Subsequently transferred to ICU, intubated and sedated, underwent bedside colonoscopy (see report 3/11/24). Multiple nonbleeding rectal ulcers and colonic ulcers, one ulcer with adherent clot that began bleeding intraprocedurally. Hemostasis achieved with endoclip. Hg has remained  "stable overnight.     Scheduled Meds:  Current Facility-Administered Medications   Medication Dose Route Frequency Provider Last Rate    acetaminophen  650 mg Oral Q6H PRN AUGUSTA Barber      Albumin 25%  25 g Intravenous 4x Daily Sienna Perez DO      Albumin 5%  25 g Intravenous Once AUGUSTA Barber      ampicillin-sulbactam  3 g Intravenous Q6H TameraJAKE EdwardNP 3 g (03/12/24 1033)    atorvastatin  40 mg Oral Daily AUGUSTA Barber      carvedilol  6.25 mg Oral BID With Meals AUGUSTA Barber      collagenase   Topical Daily Tamera Roberts, AUGUSTA      dexmedetomidine  0.1-0.7 mcg/kg/hr Intravenous Titrated AUGUSTA Perez Stopped (03/12/24 1007)    diltiazem  30 mg Oral Q6H CRUZITO AUGUSTA Barber      escitalopram  10 mg Oral Daily Tamera Roberts, AUGUSTA      famotidine  20 mg Oral Daily Tamera oRberts, AUGUSTA      insulin lispro  1-6 Units Subcutaneous Q6H [facility] Uche Blair MD      levalbuterol  1.25 mg Nebulization Q8H PRN AUGUSTA Barber      norepinephrine  1-30 mcg/min Intravenous Titrated Uche Blair MD Stopped (03/12/24 1201)    omeprazole (PRILOSEC) suspension 2 mg/mL  20 mg Oral Daily AUGUSTA Barber      ondansetron  4 mg Intravenous Q4H PRN Tamera Roberts, JAKENP      predniSONE  40 mg Oral Daily AUGUSTA Barber       Continuous Infusions:dexmedetomidine, 0.1-0.7 mcg/kg/hr, Last Rate: Stopped (03/12/24 1007)  norepinephrine, 1-30 mcg/min, Last Rate: Stopped (03/12/24 1201)      PRN Meds:.  acetaminophen    levalbuterol    ondansetron    OBJECTIVE:     Objective   Blood pressure (!) 145/46, pulse 80, temperature 99.7 °F (37.6 °C), resp. rate 16, height 5' 4\" (1.626 m), weight 83.1 kg (183 lb 3.2 oz), SpO2 99%. Body mass index is 31.45 kg/m².    Intake/Output Summary (Last 24 hours) at 3/12/2024 1332  Last data filed at 3/12/2024 1304  Gross per 24 hour   Intake 932.72 ml   Output 190 ml   Net 742.72 ml       PHYSICAL EXAM:   General Appearance: Intubated, " sedated  Abdomen: Soft, non-tender, non-distended; no masses or no organomegaly    Invasive Devices       Peripherally Inserted Central Catheter Line  Duration             PICC Line 03/08/24 Right Basilic 3 days              Central Venous Catheter Line  Duration             CVC Central Lines 03/11/24 Triple 20cm <1 day              Arterial Line  Duration             Arterial Line 03/11/24 Right Radial <1 day              Drain  Duration             Urethral Catheter Latex;Straight-tip 16 Fr. 11 days              Airway  Duration             ETT  Cuffed 7.5 mm 1 day                    LAB RESULTS:      Lab Units 03/12/24  0835 03/12/24  0436 03/11/24  1711 03/11/24  1706 03/11/24  1201 03/11/24  1055 03/11/24  0304 03/10/24  0443 03/08/24  0505 03/07/24  0418 03/05/24  0450 03/04/24  0455 03/03/24  1640 02/20/24  2118 02/20/24  0459 01/10/24  1350 01/10/24  1340   SODIUM mmol/L 147 145 146  --  148*  --  146 146   < > 147   < > 138 138   < > 137   < >  --    POTASSIUM mmol/L 3.8 4.0 3.3*  --  3.8  --  3.2* 3.1*   < > 3.5   < > 3.4* 3.8   < > 3.5   < >  --    CHLORIDE mmol/L 117* 117* 115*  --  117*  --  114* 113*   < > 114*   < > 109* 108   < > 103   < >  --    CO2 mmol/L 22 16* 22  --  22  --  26 25   < > 21   < > 23 22   < > 26   < >  --    CO2, I-STAT mmol/L  --   --   --  20*  --    < >  --   --   --   --   --   --   --   --   --   --   --    BUN mg/dL 27* 27* 25  --  25  --  24 25   < > 22   < > 35* 35*   < > 23   < >  --    CREATININE mg/dL 1.25 1.18 1.06  --  0.98  --  0.86 0.92   < > 0.69   < > 0.88 0.89   < > 0.99   < >  --    GLUCOSE RANDOM mg/dL 188* 209* 276*  --  275*  --  265* 234*   < > 143*   < > 224* 302*   < > 221*   < >  --    CALCIUM mg/dL 7.5* 7.5* 7.3*  --  7.4*  --  7.5* 7.5*   < > 7.0*   < > 7.4* 7.7*   < > 8.6   < >  --    XMAGNESIUM   --   --   --   --   --   --   --   --   --   --   --   --   --    < >  --   --   --    MAGNESIUM mg/dL  --  1.9  --   --   --   --   --  1.9  --  2.3  --   1.9 1.9   < > 1.7*   < >  --    PHOSPHORUS mg/dL  --  4.4*  --   --   --   --   --   --   --   --   --  2.2* 2.4  --  3.2  --  3.2    < > = values in this interval not displayed.            Lab Units 03/11/24  1201 03/11/24  0304 03/09/24  0408 03/07/24  0418 03/06/24  0435 02/22/24  0413 02/21/24  0428   TOTAL PROTEIN g/dL 3.3* 3.5* 3.3* 3.8* 3.3*   < > 4.5*   ALBUMIN g/dL 2.1* 2.3* 1.9* 2.2* 2.0*   < > 2.6*   TOTAL BILIRUBIN mg/dL 0.67 0.64 0.72 0.71 0.87   < > 1.3*   BILI DIR mg/dL  --   --   --   --   --   --  0.3*   BILIRUBIN DIRECT mg/dL 0.18  --   --   --   --   --   --    AST U/L 16 14 15 38 49*   < > 11   ALT U/L 28 30 42 79* 74*   < > 19   ALK PHOS U/L 53 55 74 83 60   < > 65    < > = values in this interval not displayed.           Lab Units 03/12/24  1118 03/12/24  0439 03/11/24  1711 03/11/24  1706 03/11/24  1431 03/11/24  1055 03/11/24  0933 03/11/24  0304 03/10/24  0805 03/10/24  0443 03/09/24  1305 03/09/24  0408   WBC Thousand/uL  --  18.58* 16.43*  --   --   --   --  13.21*  --  15.99*  --  14.03*   HEMOGLOBIN g/dL 12.0 13.6 12.4  --  12.5  --  6.7* 8.8*   < > 7.8*   < > 8.8*   I STAT HEMOGLOBIN g/dl  --   --   --  10.2*  --  8.5*  --   --   --   --   --   --    HEMATOCRIT %  --  39.5 35.8  --   --   --  20.5* 26.4*  --  23.8*  --  26.6*   HEMATOCRIT, ISTAT %  --   --   --  30*  --  25*  --   --   --   --   --   --    PLATELETS Thousands/uL  --  65* 56*  --   --   --   --  66*  --  85*  --  57*   MCV fL  --  87 90  --   --   --   --  93  --  94  --  93    < > = values in this interval not displayed.       Lab Results   Component Value Date    IRON 155 02/26/2024    TIBC <210 (L) 02/26/2024    FERRITIN 2,238 (H) 02/26/2024       Lab Results   Component Value Date    INR 1.25 (H) 03/12/2024    INR 1.33 (H) 03/11/2024    INR 1.14 03/09/2024    PROTIME 15.9 (H) 03/12/2024    PROTIME 16.7 (H) 03/11/2024    PROTIME 14.8 (H) 03/09/2024       RADIOLOGY RESULTS:   Procedure: Echo follow up/limited w/  contrast if indicated    Result Date: 3/12/2024  Narrative:   Left Ventricle: Left ventricular cavity size is normal. Wall thickness is mildly increased. The left ventricular ejection fraction is 50% by visual estimation. Systolic function is mildly reduced. Although no diagnostic regional wall motion abnormality was identified, this possibility cannot be completely excluded on the basis of this study.   Aortic Valve: There is mild regurgitation.   Tricuspid Valve: There is mild regurgitation. The right ventricular systolic pressure is mildly elevated. The estimated right ventricular systolic pressure is 37.00 mmHg.   Pulmonic Valve: There is mild regurgitation.     Procedure: CT head wo contrast    Result Date: 3/12/2024  Narrative: CT BRAIN - WITHOUT CONTRAST INDICATION:   Patient presents with acute change in mental status, unresponsive. COMPARISON: CT head 3/4/2024 TECHNIQUE:  CT examination of the brain was performed.  Multiplanar 2D reformatted images were created from the source data. Radiation dose length product (DLP) for this visit:  872 mGy-cm .  This examination, like all CT scans performed in the [facility], was performed utilizing techniques to minimize radiation dose exposure, including the use of iterative reconstruction and automated exposure control. IMAGE QUALITY:  Diagnostic. FINDINGS: PARENCHYMA: Decreased attenuation is noted in periventricular and subcortical white matter demonstrating an appearance that is statistically most likely to represent mild microangiopathic change. Chronic lacunar infarction in the left basal ganglia. No CT signs of acute infarction.  No intracranial mass, mass effect or midline shift.  No acute parenchymal hemorrhage. Atherosclerotic calcifications of the cavernous segment of the internal carotid artery are moderate. VENTRICLES AND EXTRA-AXIAL SPACES: Ventricles and extra-axial CSF spaces are prominent commensurate with the degree of volume loss.   No hydrocephalus.  No acute extra-axial hemorrhage. VISUALIZED ORBITS: Normal visualized orbits. PARANASAL SINUSES: Near complete near complete opacification left mastoid air cells. Trace right mastoid air cell effusion. No bony destruction. No obvious nasopharyngeal mass lesion. Bilateral mastoid air cell effusions. CALVARIUM AND EXTRACRANIAL SOFT TISSUES:  Normal.     Impression: 1. Stable head CT. No acute intracranial hemorrhage, mass effect or edema. 2. Mild, chronic microangiopathy and chronic left lentiform nucleus lacunar infarction are stable. Resident: JON Ashton I, the attending radiologist, have reviewed the images and agree with the final report above. Workstation performed: [id number]     Procedure: XR chest portable ICU    Result Date: 3/12/2024  Narrative: XR CHEST PORTABLE ICU INDICATION: Central Line placement. COMPARISON: Chest radiograph 3/11/2024 FINDINGS: Interval retraction of endotracheal tube with the tip now appropriately positioned 3.5 cm above the jenaro. New left IJ approach central venous catheter with tip in the mid SVC. Right upper extremity PICC with tip in the lower SVC. Enteric catheter courses below the diaphragm with tip out of the field-of-view. External monitoring leads and clips project over the chest. Mild improvement in right lung opacities with unchanged cavitary lesion in the upper lung. No new focal finding in the chest. No pneumothorax or pleural effusion. Normal cardiomediastinal silhouette. Bones are unremarkable for age. Normal upper abdomen.     Impression: 1.  New left IJ central venous catheter tip in the mid SVC. 2.  Interval retraction of the endotracheal tube with tip now appropriately positioned 3.5 cm above the jenaro. 3.  Mild improvement in right lung opacities with unchanged cavitary lesion. Workstation performed: [id number]     Procedure: XR chest portable ICU    Result Date: 3/12/2024  Narrative: XR CHEST PORTABLE ICU INDICATION: acute decompensation  in intubated patient. COMPARISON: Chest radiograph 3/11/2024 FINDINGS: Endotracheal tube tip is less than 1 cm above the jenaro but this is retracted on subsequent imaging. Enteric catheter courses below the diaphragm with tip out of the field-of-view. External monitoring leads and clips project over the chest. Unchanged cavitary lesion in the right upper lung with hazy opacity in the lower lobe. No new focal opacities. No pneumothorax or pleural effusion. Normal cardiomediastinal silhouette. Bones are unremarkable for age. Normal upper abdomen.     Impression: Endotracheal tube tip less than 1 cm above the jenaro. This has been retracted on a subsequent exam. No significant change in right lung opacities with cavitary lesion. Workstation performed: [id number]     Procedure: Flexible Sigmoidoscopy    Addendum Date: 3/12/2024 Addendum:   [facility] [address] [address] [phone] DATE OF SERVICE: 3/11/24 PHYSICIAN(S): Attending: Diana M Jaiyeola, MD Fellow: Bryce Medellin MD INDICATION: GI bleed POST-OP DIAGNOSIS: See the impression below. HISTORY: Prior colonoscopy: Less than 3 years ago. It is being repeated at an interval of less than 3 years because: This colonoscopy is being performed for a diagnostic indication BOWEL PREPARATION:  PREPROCEDURE: Informed consent was obtained for the procedure, including sedation. Risks including but not limited to bleeding, infection, perforation, adverse drug reaction and aspiration were explained in detail. Also explained about less than 100% sensitivity with the exam and other alternatives. The patient was placed in the left lateral decubitus position. Procedure: Colonoscopy DETAILS OF PROCEDURE: Patient was taken to the procedure room where a time out was performed to confirm correct patient and correct procedure. The patient underwent monitored anesthesia care, which was administered by an anesthesia professional. The  patient's blood pressure, heart rate, level of consciousness, oxygen, respirations and ETCO2 were monitored throughout the procedure. A digital rectal exam was performed. The scope was introduced through the anus and advanced to the descending colon. Retroflexion was performed in the rectum. Retroflexion was not performed due to altered anatomy. The quality of bowel preparation was evaluated using the [place] Bowel Preparation Scale with scores of: right colon = 1, transverse colon = 1, left colon = 1. Bowel prep was not adequate. The patient experienced no blood loss. The procedure was not difficult. The patient tolerated the procedure well. There were no apparent adverse events. The total BBPS score was 3. ANESTHESIA INFORMATION: ASA: ASA status not filed in the log. Anesthesia Type: Anesthesia type not filed in the log. MEDICATIONS: No administrations occurring from 1520 to 1623 on 03/11/24 FINDINGS: Multiple ulcers in the ileocecal valve, cecum, ascending colon, hepatic flexure and transverse colon with no hemorrhage; performed cold forceps biopsy. Multiple scattered ulcerations from IC valve to transverse colon. Biopsied representative samples (r/o ischemic colitis, infectious etiologies of ulcerations, IBD). Significant amount of pancolonic fresh blood and blood clotting, which obscured visualization Ulcer in the transverse colon with oozing hemorrhage and adherent clot; placed 1 clip successfully (clip is MRI conditional); hemostasis achieved. Single ulcer with adherent clot, clot was suctioned and subsequently ulcer began bleeding (oozing hemorrhage). Achieved hemostasis with endoclip. Severe diverticula in the descending colon, sigmoid colon and rectosigmoid; no bleeding was identified Prolapsed rectum Ulcer in the rectum with hematin covered flat spot. Multiple nonbleeding rectal ulcers with flat pigmented spots visualized in prolapsed rectum Deep perianal sacral ulcers EVENTS: Procedure Events Event Event  Time ENDO CECUM REACHED 3/11/2024  3:52 PM SPECIMENS: ID Type Source Tests Collected by Time Destination 1 : r/o cmv, random ulcer bx's Tissue Colon TISSUE EXAM Bryce Medellin MD 3/11/2024  3:54 PM  EQUIPMENT: Colonoscope - IMPRESSION: Multiple ulcers in the ileocecal valve, cecum, ascending colon, hepatic flexure and transverse colon; performed cold forceps biopsy Significant amount of pancolonic fresh blood and blood clotting, which obscured visualization Ulcer in the transverse colon with oozing hemorrhage and adherent clot; placed 1 clip successfully; hemostasis achieved Diverticulosis of severe severity in the descending colon, sigmoid colon and rectosigmoid Ulcer in the rectum with hematin covered flat spot RECOMMENDATION:  Await pathology results  If pt develops rebleeding, please notify GI fellow on call and consider CTA Trend Hg q8 hrs, maintain active T&S, transfuse for Hg < 7.0    Diana M Jaiyeola, MD     Result Date: 3/12/2024  Narrative: Table formatting from the original result was not included. [facility] [address] [address] [phone] DATE OF SERVICE: 3/11/24 PHYSICIAN(S): Attending: Diana M Jaiyeola, MD Fellow: Bryce Medellin MD INDICATION: GI bleed POST-OP DIAGNOSIS: See the impression below. HISTORY: Prior colonoscopy: Less than 3 years ago. It is being repeated at an interval of less than 3 years because: This colonoscopy is being performed for a diagnostic indication BOWEL PREPARATION:  PREPROCEDURE: Informed consent was obtained for the procedure, including sedation. Risks including but not limited to bleeding, infection, perforation, adverse drug reaction and aspiration were explained in detail. Also explained about less than 100% sensitivity with the exam and other alternatives. The patient was placed in the left lateral decubitus position. Procedure: Colonoscopy DETAILS OF PROCEDURE: Patient was taken to the procedure room where a time  out was performed to confirm correct patient and correct procedure. The patient underwent monitored anesthesia care, which was administered by an anesthesia professional. The patient's blood pressure, heart rate, level of consciousness, oxygen, respirations and ETCO2 were monitored throughout the procedure. A digital rectal exam was performed. The scope was introduced through the anus and advanced to the descending colon. Retroflexion was performed in the rectum. Retroflexion was not performed due to altered anatomy. The quality of bowel preparation was evaluated using the [place] Bowel Preparation Scale with scores of: right colon = 1, transverse colon = 1, left colon = 1. Bowel prep was not adequate. The patient experienced no blood loss. The procedure was not difficult. The patient tolerated the procedure well. There were no apparent adverse events. The total BBPS score was 3. ANESTHESIA INFORMATION: ASA: ASA status not filed in the log. Anesthesia Type: Anesthesia type not filed in the log. MEDICATIONS: No administrations occurring from 1520 to 1623 on 03/11/24 FINDINGS: Multiple ulcers in the ileocecal valve, cecum, ascending colon, hepatic flexure and transverse colon with no hemorrhage; performed cold forceps biopsy. Multiple scattered ulcerations from IC valve to transverse colon. Biopsied representative samples (r/o ischemic colitis, infectious etiologies of ulcerations, IBD). Significant amount of pancolonic fresh blood and blood clotting, which obscured visualization Ulcer in the transverse colon with oozing hemorrhage and adherent clot; placed 1 clip successfully (clip is MRI conditional); hemostasis achieved. Single ulcer with adherent clot, clot was suctioned and subsequently ulcer began bleeding (oozing hemorrhage). Achieved hemostasis with endoclip. Severe diverticula in the descending colon, sigmoid colon and rectosigmoid; no bleeding was identified Prolapsed rectum Ulcer in the rectum with hematin  covered flat spot. Multiple nonbleeding rectal ulcers with flat pigmented spots visualized in prolapsed rectum Deep perianal sacral ulcers EVENTS: Procedure Events Event Event Time ENDO CECUM REACHED 3/11/2024  3:52 PM SPECIMENS: ID Type Source Tests Collected by Time Destination 1 : r/o cmv, random ulcer bx's Tissue Colon TISSUE EXAM Bryce Medellin MD 3/11/2024  3:54 PM  EQUIPMENT: Colonoscope -     Impression: Multiple ulcers in the ileocecal valve, cecum, ascending colon, hepatic flexure and transverse colon; performed cold forceps biopsy Significant amount of pancolonic fresh blood and blood clotting, which obscured visualization Ulcer in the transverse colon with oozing hemorrhage and adherent clot; placed 1 clip successfully; hemostasis achieved Diverticulosis of severe severity in the descending colon, sigmoid colon and rectosigmoid Ulcer in the rectum with hematin covered flat spot RECOMMENDATION:  Await pathology results  If pt develops rebleeding, please notify GI fellow on call and consider CTA Trend Hg q8 hrs, maintain active T&S, transfuse for Hg < 7.0    Diana M Jaiyeola, MD     Procedure: XR chest portable ICU    Result Date: 3/11/2024  Narrative: XR CHEST PORTABLE ICU INDICATION: s/p ETT. COMPARISON: 3/7/2024. FINDINGS: Endotracheal tube in satisfactory position 2 cm above the jenaro. Right-sided PICC line noted. Right IJ catheter has been removed. Reidentified right lung consolidation with cystic cavity in the right upper lobe. The right upper lobe surrounding the cavity has improved. No pneumothorax or pleural effusion. Normal cardiomediastinal silhouette. Bones are unremarkable for age. Normal upper abdomen.     Impression: Reidentified right lung consolidation with cystic cavity in the right upper lobe. The right upper lobe surrounding the cavity demonstrates improved aeration. Workstation performed: [id number]     Narrative/Impressions - 3 day look back     Bryce Medellin M.D.  PGY-5  Gastroenterology Fellow  [facility]  Available on [employer]  [email or web address]

## 2024-03-12 NOTE — PROGRESS NOTES
03/12/24 1200   Clinical Encounter Type   Visited With Family;Health care provider   Routine Visit Follow-up   Continue Visiting Yes

## 2024-03-12 NOTE — ASSESSMENT & PLAN NOTE
Seen on CT 3/4/24  Consider reactivation of TB vs septic emboli vs aspiration    Preliminary AFB negative x3    Sputum Cx 3/3 growing 2+ pseudomonas--susceptible to Zosyn, meropenem, levofloxacin, and tobramycin. Intermediate susceptibility to cefepime.    ID on board, appreciate recommendations  Rule out reactivation of TB in this immunosuppressed patient; AFB x 3 ordered, preliminarily negative  Consider also aspiration vs septic emboli  Continue Unasyn for tx of E avium bacteremia  Continue chest physio  Follow-up MTB PCR [facility]  Will likely need repeat imaging outpatient

## 2024-03-12 NOTE — ASSESSMENT & PLAN NOTE
Failed bedside swallow with nursing  Speech eval on 3/8: Cough present w/ successive sips of thin. Intermittently followed verbal commands for slow rate/small sips. Benefits from verbal cues to keep head at midline. Continue clear liquids , medications crushed, Slow rate, Small sips, cue to keep head at midline    Pt intubated 3/11. Will reassess when extubated.

## 2024-03-12 NOTE — OCCUPATIONAL THERAPY NOTE
Occupational Therapy         Patient Name: Darlyn Nguyen  Today's Date: 3/12/2024    Pt currently intubated. Will continue when pt is appropriate for tx intervention. Leighton Gandhi

## 2024-03-12 NOTE — PROGRESS NOTES
[facility]  Progress Note  Name: Darlyn Nguyen I  MRN: [id number]  Unit/Bed#: ICU 02 I Date of Admission: 3/3/2024   Date of Service: 3/12/2024 I Hospital Day: 9    Assessment/Plan   * Acute metabolic encephalopathy  Assessment & Plan  Rapid response called for unresponsive patient on morning of 3/11/24. Earlier that morning, patient described as confused and unable to answer orientation questions, which had been consistent throughout this admission.  Etiology unclear.   At time of rapid response, findings significant for Hgb 6.7 in the setting of acute GIB and hypoxemia on iSTAT. Vitals were normal and other lab values were unremarkable.  TSH 1.329, free T4 low at 0.38  Unable to assess mental status at this time, as pt is now intubated and sedated    GI bleed  Assessment & Plan  Patient was initially on heparin due to acute DVT, developed bloody stools, underwent flexible sigmoidoscopy revealing ulcerated rectal mucosa, status post cauterization  Persistent bloody bowel movements with passing of clots overnight 3/10 into 3/11. Hemoglobin 6.7 down from 9 the day prior  Transfused 2 units packed red blood cells  Repeat Flex sig w GI on 3/11: multiple ulcers throughout colon, biopsies taken; one oozing ulcer in transverse colon, clipped with hemostasis; severe diverticula; prolapsed rectum; rectal ulcers.    Ddx includes inflammatory bowel disease, ischemic colitis, and infectious etiologies    -F/u pathology results   -If pt develops rebleeding, please notify GI fellow on call and consider CTA   -Trend Hg q8 hrs, maintain active T&S, transfuse for Hg < 7.0    Anasarca  Assessment & Plan  Patient with significant anasarca likely component of iatrogenic fluid overload, decreased albumin, oliguria  Preserved EF on echocardiogram  Did not respond to Lasix 40 IV last night  Trial Lasix 80 mg IV this AM  Monitor volume status and UOP--currently oliguric    Bacteremia due to  Enterococcus  Assessment & Plan  E. avium bacteremia. Pt recently started on prednisone, atovaquone, hydroxychloroquine, and mycophenolate for lupus. Colonic/rectal ulcerations possible point of entry.     Repeat cultures show NGTD  Susceptibilities show pan-sensitive enterococcus    ID on board, appreciate recommendations  Continue Unasyn 3 g Q6H (Day 9 Unasyn of anticipated 6-wk course)  Can hold off DYANA, per ID, as unlikely to     Cavitary pneumonia  Assessment & Plan  Seen on CT 3/4/24  Consider reactivation of TB vs septic emboli vs aspiration    Preliminary AFB negative x3    Sputum Cx 3/3 growing 2+ pseudomonas--susceptible to Zosyn, meropenem, levofloxacin, and tobramycin. Intermediate susceptibility to cefepime.    ID on board, appreciate recommendations  Rule out reactivation of TB in this immunosuppressed patient; AFB x 3 ordered, preliminarily negative  Consider also aspiration vs septic emboli  Continue Unasyn for tx of E avium bacteremia  Continue chest physio  Follow-up MTB PCR sent to state lab  Will likely need repeat imaging outpatient    Acute respiratory failure with hypoxia (HCC)  Assessment & Plan  Pt intubated to protect airway, given obtunded mental status, however hypoxemia also noted on iSTAT during rapid response  Ddx includes volume overload vs aspiration PNA/RUL cavitary lung lesion    Pt currently intubated, morning ABG demonstrates alkalemia with primary respiratory alkalosis. Current settings 16/400/6/50. Pt breathing at rate of 18-21. Can consider decreasing volume--6 ml/kg ideal body weight = 324 ml    Attempt to diurese-- Lasix 80 mg IV ordered this morning, followed by 40 mg IV this afternoon  Will re-engage Nephro if pt remains oliguric despite diuretic challenge    Acute deep vein thrombosis (DVT) of both lower extremities (HCC)  Assessment & Plan  Discovered 2/20/24, started on Eliquis at home, started Hep gtt at [facility] but developed GI bleed, AC currently on  hold    S/p IVC filter with IR 3/7/24    New onset a-fib (HCC)  Assessment & Plan  Maintaining sinus rhythm, continue p.o. diltiazem.    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) ([facility])  Assessment & Plan  Biopsy-proven at [facility]    Oliguria, with good UOP to Lasix challenge    BUN stable  Cr stable  Give Lasix 80 mg IV this morning; 40 mg IV scheduled this afternoon, but will increase if UOP inadequate in AM  Continue to monitor volume status and UOP  Will re-engage nephrology if not responsive to diuretic    Diabetes mellitus ([facility])  Assessment & Plan  Lab Results   Component Value Date    HGBA1C 5.9 (H) 03/03/2024       Recent Labs     03/11/24  1755 03/11/24  2204 03/12/24  0028 03/12/24  0535   POCGLU 210* 132 240* 198*         Blood Sugar Average: Last 72 hrs:  (P) [id number]    History of DM, well-controlled with last A1c 5.6 on 2/26, but now 5.9. Hyperglycemia in the setting of steroid use.    ISS and finger sticks q6h while NPO  Algorithm 3, increased from 2 for persistent hyperglycemia. Had previously been decreased from 3 to 2 for one episode of hypoglycemia.  Avoid hypoglycemia per protocol  Monitor BG and insulin requirements and adjust regimen as needed    Dysphagia  Assessment & Plan  Failed bedside swallow with nursing  Speech eval on 3/8: Cough present w/ successive sips of thin. Intermittently followed verbal commands for slow rate/small sips. Benefits from verbal cues to keep head at midline. Continue clear liquids , medications crushed, Slow rate, Small sips, cue to keep head at midline    Pt intubated 3/11. Will reassess when extubated.    Severe protein-calorie malnutrition (HCC)  Assessment & Plan  Malnutrition Findings:   Adult Malnutrition type: Acute illness  Adult Degree of Malnutrition: Other severe protein calorie  malnutrition  Malnutrition Characteristics: Fluid accumulation, Inadequate energy                  360 Statement: Severe calorie-protein malnutrition in context to acute illness r/t GI bleed, dysphagia, inadequate PO intake as evidance by energy intake less than 50% compared to estimated needs >5 days, +2 generalized edema; Treated with TBD (diet +oral supplements vs nutrition support)    BMI Findings:           Body mass index is 31.45 kg/m².       Thrombocytopenia (HCC)  Assessment & Plan  Possibly related to volume vs medication vs sepsis/bacteremia  Monitor CBC closely  Transfuse FFP for plt<10, <20 if actively bleeding    Sacral wound  Assessment & Plan  Wound Care on board, appreciate recommendations  Repositioning and wound care with nursing    Lupus (HCC)  Assessment & Plan  Diagnosed in December,   Initially started on steroids, prednisone 60 mg daily, which patient had tapered to 30 mg daily prior to admission with the addition of atovaquone, hydroxychloroquine, and mycophenolate    Holding atovaquone, hydroxychloroquine, and mycophenolate in the setting of acute infection  Patient started on Solu-Cortef prior to discharge from [facility] for possible relative adrenal insufficiency contributing to shock state.      Continue prednisone, had planned to taper 10 mg weekly, currently 40 mg, will hold off further dose reduction at this time in the setting of clinical deterioration with concern for possible relative adrenal insufficiency, though random cortisol of 10.9 collected 3/11 is reassuring       ----------------------------------------------------------------------------------------  HPI/24hr events: Rapid response yesterday morning for unresponsive mental status in the setting of multiple bloody bowel movements. Pt transferred to ICU for intubation to protect airway. Flex sig w GI showed ulcerations throughout colon, clipped one oozing ulcer in transverse colon, biopsies taken. Became hypotensive  yesterday evening and started on Levo w improvement. UOP remained oliguric despite Lasix 40 mg IV yesterday evening.    Patient appropriate for transfer out of the ICU today?: No  Disposition: Continue Critical Care   Code Status: Level 2 - DNAR: but accepts endotracheal intubation  ---------------------------------------------------------------------------------------  SUBJECTIVE  This morning, Ms. Nguyen is seen in bed, intubated and sedated, RASS -5.     Review of Systems  Review of systems was unable to be performed secondary to unresponsive status  ---------------------------------------------------------------------------------------  OBJECTIVE    Vitals   Vitals:    24 0451 24 0500 24 0600 24 0743   BP: (!) 86/53      BP Location:       Pulse: 89 98 100    Resp:  (!) 24 20    Temp:  97.9 °F (36.6 °C) 98.6 °F (37 °C)    TempSrc:  Bladder Bladder    SpO2:  96% 94% 94%   Weight:   83.1 kg (183 lb 3.2 oz)    Height:         Temp (24hrs), Av.4 °F (36.3 °C), Min:95 °F (35 °C), Max:98.6 °F (37 °C)  Current: Temperature: 98.6 °F (37 °C)  Arterial Line BP: 92/56  Arterial Line MAP (mmHg): 68 mmHg    Respiratory:  SpO2: SpO2: 94 %, SpO2 Activity: SpO2 Activity: At Rest, SpO2 Device: O2 Device: Ventilator, Capnography:      Invasive/non-invasive ventilation settings: 16 bpm, 400 ml, 6 cm H2O, 50%   Respiratory      Lab Data (Last 4 hours)         0539            pH, Arterial       7.498             pCO2, Arterial       23.9             pO2, Arterial       87.8             HCO3, Arterial       18.1             Base Excess, Arterial       -3.3                    O2/Vent Data             Most Recent        Patient safety screen outcome:   Failed                      Physical Exam  Vitals and nursing note reviewed.   Constitutional:       Appearance: She is obese. She is ill-appearing. She is not toxic-appearing or diaphoretic.      Comments: RASS -5, sedated   HENT:      Head: Normocephalic  and atraumatic.   Eyes:      General: No scleral icterus.        Right eye: No discharge.         Left eye: No discharge.      Conjunctiva/sclera: Conjunctivae normal.      Pupils: Pupils are equal, round, and reactive to light.   Neck:      Comments: CVC [facility]  Cardiovascular:      Rate and Rhythm: Normal rate and regular rhythm.      Heart sounds: No murmur heard.     No friction rub. No gallop.      Comments: Faint heart sounds. S1/S2 auscultated. No m/r/g appreciated. 1+ radial pulses  Pulmonary:      Breath sounds: Wheezing (scattered wheezes) and rales (Scant rales appreciated Right superior anterior chest) present.      Comments: Intubated. Taking spontaneous breaths over vent rate.  Abdominal:      General: There is no distension.      Palpations: Abdomen is soft.      Tenderness: There is no guarding or rebound.      Comments: Hypoactive bowel sounds   Musculoskeletal:         General: Swelling (anasarca of the dependent soft tissues) present.      Right lower leg: Edema present.      Left lower leg: Edema present.   Skin:     General: Skin is warm.      Coloration: Skin is not jaundiced.      Findings: Bruising present.      Comments: Weeping serous fluid from LUE   Neurological:      Comments: Unresponsive. Pupils constricted, equal, round, and reactive to light.             Laboratory and Diagnostics:  Results from last 7 days   Lab Units 03/12/24  0439 03/11/24  1711 03/11/24  1706 03/11/24  1431 03/11/24  1055 03/11/24  0933 03/11/24  0304 03/10/24  0805 03/10/24  0443 03/09/24  1305 03/09/24  0408 03/09/24  0358 03/09/24  0003 03/08/24  2328 03/08/24  0505 03/06/24  1120 03/06/24  0435   WBC Thousand/uL 18.58* 16.43*  --   --   --   --  13.21*  --  15.99*  --  14.03*  --  13.00*  --  17.24*   < > 26.92*   HEMOGLOBIN g/dL 13.6 12.4  --  12.5  --  6.7* 8.8*   < > 7.8*   < > 8.8*   < > 6.5*   < > 8.3*   < > 9.0*   I STAT HEMOGLOBIN g/dl  --   --  10.2*  --  8.5*  --   --   --   --   --   --   --   --   --    --   --   --    HEMATOCRIT % 39.5 35.8  --   --   --  20.5* 26.4*  --  23.8*  --  26.6*  --  20.4*  --  25.5*   < > 27.0*   HEMATOCRIT, ISTAT %  --   --  30*  --  25*  --   --   --   --   --   --   --   --   --   --   --   --    PLATELETS Thousands/uL 65* 56*  --   --   --   --  66*  --  85*  --  57*  --  61*  --  89*   < > 118*   NEUTROS PCT %  --   --   --   --   --   --  83*  --   --   --   --   --   --   --   --   --  95*   MONOS PCT %  --   --   --   --   --   --  3*  --   --   --   --   --   --   --   --   --  1*   EOS PCT %  --   --   --   --   --   --  0  --   --   --   --   --   --   --   --   --  0    < > = values in this interval not displayed.     Results from last 7 days   Lab Units 03/12/24  0436 03/11/24  1711 03/11/24  1706 03/11/24  1201 03/11/24  1055 03/11/24  0304 03/10/24  0443 03/09/24  0408 03/08/24  0505 03/07/24  0418 03/06/24  0435   SODIUM mmol/L 145 146  --  148*  --  146 146 143 145 147 145   POTASSIUM mmol/L 4.0 3.3*  --  3.8  --  3.2* 3.1* 3.5 3.9 3.5 3.2*   CHLORIDE mmol/L 117* 115*  --  117*  --  114* 113* 112* 111* 114* 113*   CO2 mmol/L 16* 22  --  22  --  26 25 25 21 21 23   CO2, I-STAT mmol/L  --   --  20*  --  19*  --   --   --   --   --   --    ANION GAP mmol/L 12 9  --  9  --  6 8 6 13 12 9   BUN mg/dL 27* 25  --  25  --  24 25 23 22 22 24   CREATININE mg/dL 1.18 1.06  --  0.98  --  0.86 0.92 0.76 0.71 0.69 0.70   CALCIUM mg/dL 7.5* 7.3*  --  7.4*  --  7.5* 7.5* 7.5* 7.5* 7.0* 6.7*   GLUCOSE RANDOM mg/dL 209* 276*  --  275*  --  265* 234* 243* 200* 143* 126   ALT U/L  --   --   --  28  --  30  --  42  --  79* 74*   AST U/L  --   --   --  16  --  14  --  15  --  38 49*   ALK PHOS U/L  --   --   --  53  --  55  --  74  --  83 60   ALBUMIN g/dL  --   --   --  2.1*  --  2.3*  --  1.9*  --  2.2* 2.0*   TOTAL BILIRUBIN mg/dL  --   --   --  0.67  --  0.64  --  0.72  --  0.71 0.87     Results from last 7 days   Lab Units 03/12/24  0436 03/10/24  0443 03/07/24  0418   MAGNESIUM mg/dL  "1.9 1.9 2.3   PHOSPHORUS mg/dL 4.4*  --   --       Results from last 7 days   Lab Units 03/12/24  0436 03/11/24  1201 03/09/24  0003 03/06/24  0046 03/05/24  1802 03/05/24  1539   INR  1.25* 1.33* 1.14 1.20*  --   --    PTT seconds  --   --   --  29 64* >210*          Results from last 7 days   Lab Units 03/12/24  0540   LACTIC ACID mmol/L 3.0*     ABG:  Results from last 7 days   Lab Units 03/12/24  0539   PH ART  7.498*   PCO2 ART mm Hg 23.9*   PO2 ART mm Hg 87.8   HCO3 ART mmol/L 18.1*   BASE EXC ART mmol/L -3.3   ABG SOURCE  Line, Arterial     VBG:  Results from last 7 days   Lab Units 03/12/24  0539 03/11/24  1711   PH RADHA   --  7.306   PCO2 RADHA mm Hg  --  41.9*   PO2 RADHA mm Hg  --  54.4*   HCO3 RADHA mmol/L  --  20.4*   BASE EXC RADHA mmol/L  --  -5.6   ABG SOURCE  Line, Arterial  --      Results from last 7 days   Lab Units 03/11/24  0304   PROCALCITONIN ng/ml 0.30*       Micro        EKG: tele reviewed. No events. Currently NSR HR 83. HR range 50s-100s, predominantly 60s-80s.  Imaging: I have personally reviewed pertinent reports.   and I have personally reviewed pertinent films in PACS    Intake and Output  I/O         03/10 0701 03/11 0700 03/11 0701 03/12 0700 03/12 0701 03/13 0700    I.V. (mL/kg)  632.7 (7.6)     Blood 350 825     IV Piggyback 100 400     Total Intake(mL/kg) 450 (5.4) 1857.7 (22.4)     Urine (mL/kg/hr) 450 (0.2) 250 (0.1)     Stool       Total Output 450 250     Net 0 +1607.7                    Height and Weights   Height: 5' 4\" (162.6 cm)  IBW (Ideal Body Weight): 54.7 kg  Body mass index is 31.45 kg/m².  Weight (last 2 days)       Date/Time Weight    03/12/24 0600 83.1 (183.2)    03/10/24 0600 83.7 (184.53)              Nutrition       Diet Orders   (From admission, onward)                 Start     Ordered    03/11/24 2151  Diet NPO  Diet effective midnight        References:    Adult Nutrition Support Algorithm    RD Therapeutic Diet Order Protocol   Question Answer Comment   Diet " Type NPO    RD to adjust diet per protocol? Yes        03/11/24 2150                      Active Medications  Scheduled Meds:  Current Facility-Administered Medications   Medication Dose Route Frequency Provider Last Rate    acetaminophen  650 mg Oral Q6H PRN AUGUSTA Barber      Albumin 5%  25 g Intravenous Once AUGUSTA Barber      ampicillin-sulbactam  3 g Intravenous Q6H AUGUSTA Barber 200 mL/hr at 03/12/24 0515    atorvastatin  40 mg Oral Daily AUGUSTA Barber      carvedilol  6.25 mg Oral BID With Meals AUGUSTA Barber      collagenase   Topical Daily AUGUSTA Barber      dexmedetomidine  0.1-0.7 mcg/kg/hr Intravenous Titrated AUGUSTA Perez 0.2 mcg/kg/hr (03/12/24 0330)    diltiazem  30 mg Oral Q6H CRUZITO AUGUSTA Barber      escitalopram  10 mg Oral Daily AUGUSTA Barber      famotidine  20 mg Oral Daily AUGUSTA Barber      furosemide  80 mg Intravenous Once Uche Blair MD      Followed by    furosemide  40 mg Intravenous Once Uche Blair MD      insulin lispro  1-6 Units Subcutaneous Q6H CRUZITO Uche Blair MD      levalbuterol  1.25 mg Nebulization Q8H PRN AUGUSTA Barber      norepinephrine  1-30 mcg/min Intravenous Titrated Uche Blair MD 15 mcg/min (03/12/24 0550)    omeprazole (PRILOSEC) suspension 2 mg/mL  20 mg Oral Daily AUGUSTA Barber      ondansetron  4 mg Intravenous Q4H PRN AUGUSTA Barber      predniSONE  40 mg Oral Daily AUGUSTA Barber       Continuous Infusions:  dexmedetomidine, 0.1-0.7 mcg/kg/hr, Last Rate: 0.2 mcg/kg/hr (03/12/24 0330)  norepinephrine, 1-30 mcg/min, Last Rate: 15 mcg/min (03/12/24 0550)      PRN Meds:   acetaminophen, 650 mg, Q6H PRN  levalbuterol, 1.25 mg, Q8H PRN  ondansetron, 4 mg, Q4H PRN        Invasive Devices Review  Invasive Devices       Peripherally Inserted Central Catheter Line  Duration             PICC Line 03/08/24 Right Basilic 3 days              Central Venous Catheter Line   "Duration             CVC Central Lines 03/11/24 Triple 20cm <1 day              Arterial Line  Duration             Arterial Line 03/11/24 Right Radial <1 day              Drain  Duration             Urethral Catheter Latex;Straight-tip 16 Fr. 11 days              Airway  Duration             ETT  Cuffed 7.5 mm <1 day                    Rationale for remaining devices: ETT for airway protection--anticipate weaning sedation for SBT later today. Peralta as part of wound care plan for sacral and buttock ulcers. A-line for accurate pressures and frequent labs in critically ill patient. CVC for pressors/labs/additional meds. PICC for long-term abx.  ---------------------------------------------------------------------------------------  Advance Directive and Living Will: Yes    Power of :    POLST:    ---------------------------------------------------------------------------------------        Uche Blair MD      Portions of the record may have been created with voice recognition software.  Occasional wrong word or \"sound a like\" substitutions may have occurred due to the inherent limitations of voice recognition software.  Read the chart carefully and recognize, using context, where substitutions have occurred      "

## 2024-03-13 PROBLEM — E87.0 HYPERNATREMIA: Status: ACTIVE | Noted: 2024-03-13

## 2024-03-13 NOTE — ASSESSMENT & PLAN NOTE
K 2.8 in the setting of high-dose Lasix IV; received 120 mg total yesterday  Mg 1.8    Gave Mg 2 g IV   Give K 80 mg IV now  Repeat lab for K at 1300  Anticipate need for additional K this afternoon

## 2024-03-13 NOTE — ASSESSMENT & PLAN NOTE
Discovered 2/20/24, started on Eliquis at home, started Hep gtt at [facility] but developed GI bleed, AC currently on hold    S/p IVC filter with IR 3/7/24  Will discuss resuming anticoagulation when LGIB resolves and hgb stabilizes. Will need trial on hep gtt prior to transition to oral agent, given recurrence of bleeds.

## 2024-03-13 NOTE — ASSESSMENT & PLAN NOTE
Pt intubated to protect airway, given obtunded mental status, however hypoxemia also noted on iSTAT during rapid response  Ddx includes volume overload vs aspiration PNA/RUL cavitary lung lesion    Pt currently intubated, morning ABG demonstrates alkalemia with primary respiratory alkalosis.     Current settings 16/320/6/50. Pt breathing at rate of 16-18.     Adequate UOP following metolazone 5 mg PO and Lasix 80 mg IV-->0.8 ml/kg/h diurese  Repeat metolazone and lasix this AM

## 2024-03-13 NOTE — CONSULTS
Rheumatology e-Consult ([facility])   Darlyn Nguyen 78 y.o. female MRN: [id number]  Unit/Bed#: ICU 02 Encounter: [id number]      Reason for Consult / Principal Problem: lupus nephritis, GIB  Inpatient consult to Rheumatology  Consult performed by: Keshav Pisano DO  Consult ordered by: Sienna Perez DO        03/13/24    ASSESSMENT: 78 y.o. female whom we are asked to evaluate for the above    SLE can potentially cause a vasculitis with GI involvement but this is fairly uncommon. However, of the GI manifestations, mesenteric vasculitis is one of the more common manifestations. Typically involved is the SMA and its territories, which is what seems to be affected in this patient. Ideally to confirm this would do a CTA of the abdomen but with her tenuous state I'm sure that's not possible right now.    Given complements decreased below her apparent baseline and proteinuria, she does seem to have active lupus, but it's questionable whether her GIB could be a manifestation of that. Would be difficult to commit to this dx without more objective evidence. Fortunately there was pathology obtained from c-scope    Overall I can't say with certainty that this rheumatologic in nature but I do think we need to empirically treat this as though it is a vasculitic manifestation of her SLE.    It's unclear if the skin lesions on her labia (images in media) are true aphthous ulcers, from what I can tell it looks more like a dermatitis. The fact that it also involves the insides of the thighs in the inguinal area supports this    RECOMMENDATIONS:  -Recommend SLE activity labs (C3, C4, UA, UPCR, dsDNA Ab)  -Recommend to pulse with solumedrol 1 g daily x3 days if suspicion for active infectious cause lower  -Await pathology report, should hopefully lead to more definitive dx   -If stabilizes and is able to get this done, would recommend CTA abdomen/pelvis to eval for vasculitis  -Will discuss steroid-sparing therapy (likely cytoxan) if  recovers enough to tolerate    Treatment recommendations communicated via Tiger[employer] this afternoon to the primary team    Guarded prognosis given multiple comorbidities (new-onset afib unable to use AC due to GIB, lupus nephritis, acute respiratory failure s/p intubation and cavitary PNA, multiple sacral pressure ulcers)    11-20 minutes, >50% of the total time devoted to medical consultative verbal/EMR discussion between providers. Written report will be generated in the EMR.    Keshav Pisano DO, MIESHA Pisano DO, MIESHA TEMPLE Rheumatology    ----------------------------------      HISTORY OF PRESENT ILLNESS:  Briefly, 77 yo F who follows [facility] for recently dxd class 3 LN biopsy confirmed on hydroxychloroquine/MMF/prednisone was admitted at the end of Feb with enterococcal bacteremia; MMF and hydroxychloroquine were held and prednisone continued. She deteriorated and was transferred to [place] ten days ago for intubation and pressors. Has had persistent GIB since arrival. Colonoscopy showed rectal ulceration and patchy deep ulcerations in the right colon. Also has oligura and persistent proteinuria.    LABS:  Relevant labs reviewed    RADIOLOGY:  Relevant images reviewed

## 2024-03-13 NOTE — CASE MANAGEMENT
Case Management Discharge Planning Note    Patient name Darlyn Nguyen  Location ICU ICU  MRN [id number]  : 1945 Date 3/13/2024       Current Admission Date: 3/3/2024  Current Admission Diagnosis:Acute metabolic encephalopathy   Patient Active Problem List    Diagnosis Date Noted    Hypernatremia 2024    Acute metabolic encephalopathy 2024    Severe protein-calorie malnutrition (HCC) 2024    Anasarca 03/10/2024    Thrombocytopenia (HCC) 03/10/2024    New onset a-fib ([facility]) 2024    Atrial fibrillation with rapid ventricular response ([facility]) 2024    Sepsis ([facility]) 2024    Acute deep vein thrombosis (DVT) of both lower extremities ([facility]) 2024    Dysphagia 2024    CKD (chronic kidney disease), stage II 2024    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) ([facility]) 2024    Hypokalemia 2024    Diabetes mellitus ([facility]) 2024    GI bleed 2024    Bacteremia due to Enterococcus 2024    Pressure ulcers of skin of multiple topographic sites 2024    Sacral wound 2024    Diarrhea of presumed infectious origin 2024    Cavitary pneumonia 2024    Positive fecal occult blood test 2024    DVT (deep venous thrombosis) ([facility]) 2024    Acute on chronic anemia 2024    Lupus ([facility]) 2024    Acute respiratory failure with hypoxia ([facility]) 2024    Hypertension 2024    Pancolitis ([facility]) 2024    Metabolic encephalopathy 2024    Hypomagnesemia 2024    High serum lactic acid 2024      LOS (days): 10  Geometric Mean LOS (GMLOS) (days): 5.1  Days to GMLOS:-4.8     OBJECTIVE:  Risk of Unplanned Readmission Score: 40.54     Current admission status: Inpatient   Preferred Pharmacy:   [id number] - [place] - [address]  [address]  [place] [address]  Phone: [phone] Fax: [phone]    Primary Care  Provider: Yaa Hedrick MD    Primary Insurance: MEDICARE  Secondary Insurance: [facility]    DISCHARGE DETAILS:     Additional Comments: Patient remains in ICU and is currently intubated. Dispo: pending medical imrprovement. CM team following.

## 2024-03-13 NOTE — H&P
Consultation - General Surgery  : [facility] Surgery Resident role on [employer]  Darlyn Nguyen 78 y.o. female MRN: [id number]  Unit/Bed#: ICU 02 Encounter: [id number]        Assessment:  78 y.o. year old female with LGIB and ulcerated colonic lesions    Plan:  No indication for acute surgical intervention  Avoid hypotension / vasopressors if at all possible  Aggressively resuscitate with 1:1:1 transfusion   Discussed transfusion recommendation in-person with ICU team    HPI:  Darlyn Nguyen is a 78 y.o. female with a history of lupus on steroids, poor functional status, hypertension, hyperlipidemia, diabetes.  Patient has had several admissions in the past month, first 1 was for GI bleed which resolved with conservative management.  She was found to be bacteremic with Enterococcus avium and was transferred to [facility].  Most recent admission was to [facility] on February 29 with colitis, pneumonia, lower GI bleed.  She suffered respiratory failure and was intubated, transferred to [facility] on the third.  She was found to have DVT throughout her course and was started on Eliquis, which was held during her GI bleed.  She was extubated on March 4, reintubated on March 11.  She required multiple units of blood, totaling 7 units of packed red blood cells before today.  Over the past 24 hours she dropped her hemoglobin from 12 to 6 and suffered hypotension requiring Levophed up to 10.  Today she was scoped from below and found to have patchy ulcerations throughout the right and transverse colon as well as rectum.  Today she received 5 red, 2 FFP, 2 cryo, 1 unit of platelets.  Her blood pressure normalized and she came off Levophed.  General surgery was consulted due to patchy ulcerations in the colon.    Discussed with team, no indication for surgical intervention at this time.  Primary focus should be on medical/nonsurgical management of her GI bleed.  Avoiding hypotension and avoiding  vasopressors to optimize colonic perfusion will be to her benefit. Recommend 1:1:1 transfusion to avoid iatrogenic coagulopathy.     Physical Exam  Constitutional:       General: She is not in acute distress.     Appearance: Normal appearance.   HENT:      Head: Normocephalic and atraumatic.      Right Ear: External ear normal.      Left Ear: External ear normal.      Nose: Nose normal.      Mouth/Throat:      Mouth: Mucous membranes are moist.      Pharynx: Oropharynx is clear.   Eyes:      General:         Right eye: No discharge.         Left eye: No discharge.      Extraocular Movements: Extraocular movements intact.      Conjunctiva/sclera: Conjunctivae normal.      Pupils: Pupils are equal, round, and reactive to light.   Cardiovascular:      Rate and Rhythm: Normal rate.      Pulses: Normal pulses.      Heart sounds: Normal heart sounds.   Pulmonary:      Effort: Pulmonary effort is normal. No respiratory distress.   Abdominal:      General: Abdomen is flat. There is no distension.      Palpations: Abdomen is soft.      Tenderness: There is no abdominal tenderness. There is no guarding or rebound.   Musculoskeletal:         General: No swelling, tenderness or signs of injury.      Cervical back: Normal range of motion and neck supple. No rigidity or tenderness.   Skin:     Coloration: Skin is not jaundiced.      Findings: No lesion.   Neurological:      General: No focal deficit present.      Mental Status: She is alert and oriented to person, place, and time. Mental status is at baseline.   Psychiatric:         Mood and Affect: Mood normal.         Behavior: Behavior normal.            Review of Systems   Unable to perform ROS: Intubated        Objective         Intake/Output Summary (Last 24 hours) at 3/13/2024 1809  Last data filed at 3/13/2024 1800  Gross per 24 hour   Intake 4053.94 ml   Output 3130 ml   Net 923.94 ml       First Vitals:   Blood Pressure: 120/55 (03/03/24 1615)  Pulse: 82 (03/03/24  "1615)  Temperature: 98.2 °F (36.8 °C) (03/03/24 1630)  Temp Source: Axillary (03/04/24 2016)  Respirations: (!) 27 (03/03/24 1615)  Height: 5' 4\" (162.6 cm) (03/07/24 1130)  Weight - Scale: 76.4 kg (168 lb 6.9 oz) (03/05/24 0558)  SpO2: 96 % (03/03/24 1615)    Current Vitals:   Blood Pressure: 137/62 (03/13/24 1731)  Pulse: 62 (03/13/24 1800)  Temperature: (!) 96.1 °F (35.6 °C) (03/13/24 1800)  Temp Source: Bladder (03/13/24 1730)  Respirations: 16 (03/13/24 1800)  Height: 5' 4\" (162.6 cm) (03/12/24 1040)  Weight - Scale: 83.9 kg (184 lb 15.5 oz) (03/13/24 0600)  SpO2: 99 % (03/13/24 1800)    Invasive Devices       Peripherally Inserted Central Catheter Line  Duration             PICC Line 03/08/24 Right Basilic 5 days              Central Venous Catheter Line  Duration             CVC Central Lines 03/11/24 Triple 20cm 1 day              Arterial Line  Duration             Arterial Line 03/11/24 Right Radial 2 days              Drain  Duration             NG/OG/Enteral Tube Orogastric Center mouth -- days    Urethral Catheter Latex;Straight-tip 16 Fr. 12 days              Airway  Duration             ETT  Cuffed 7.5 mm 2 days                    Imaging: I have personally reviewed pertinent reports.      IR IVC filter placement optional/temporary    Result Date: 3/8/2024  Impression: Impression: 1. Inferior venacavogram revealing patent inferior vena cava correlating with prior CT imaging. 2. Successful deployment of an infrarenal  MURTAZA   filter. 3. IR will arrange for clinical follow-up in several months to determine if the patient is a candidate for filter removal Workstation performed: [id number]     XR chest portable ICU    Result Date: 3/7/2024  Impression: Right upper lobe consolidation, most likely pneumonia, not significantly changed since 3/3/2024 and 3/4/2024. Moderate sized right pleural effusion. Workstation performed: [id number]     Flexible Sigmoidoscopy    Result Date: 3/6/2024  Impression: " Hemorrhoids Diverticulosis in the sigmoid colon Ulcerated mucosa in the rectum; induced coagulation with heater probe RECOMMENDATION: Hi suspicion that the cause for her lower gi bleed is from significant ulceration of the proximal anal/distal rectal area If recurrent bleeding continues, suggest exam under anesthesia with colo rectal surgery for treatment/ oversew vessel.  Cristi Stevens MD    CT chest wo contrast    Result Date: 3/4/2024  Impression: Extensive right upper lobe cavitary pneumonia. Right basilar atelectasis and/or pneumonia. Small bilateral pleural effusions. Lines and tubes, as above. Workstation performed: [id number]     CT head wo contrast    Result Date: 3/4/2024  Impression: No acute intracranial abnormality. Workstation performed: [id number]     XR chest portable ICU    Result Date: 3/4/2024  Impression: New right upper lobe atelectasis and probable pneumonia. Suctioning of the endotracheal tube is to be considered. The study was marked in EPIC for immediate notification. Workstation performed: [id number]     X-ray chest 1 view    Result Date: 3/4/2024  Impression: New right upper lobe atelectasis and probable pneumonia. Suctioning of the endotracheal tube is to be considered. The study was marked in EPIC for immediate notification. Workstation performed: [id number]       EKG, Pathology, and Other Studies: I have personally reviewed pertinent reports.      Historical Information   Past Medical History:   Diagnosis Date    Acute GI bleeding 03/03/2024    Diabetes mellitus (HCC)     Fibromyalgia     Hyperlipidemia     Hypertension     Shock (HCC) 03/03/2024     Past Surgical History:   Procedure Laterality Date    ACHILLES TENDON REPAIR Right     ADENOIDECTOMY      APPENDECTOMY      IR IVC FILTER PLACEMENT OPTIONAL/TEMPORARY  3/7/2024    SKIN CANCER EXCISION      x 4 - back    TONSILLECTOMY      TOTAL ABDOMINAL HYSTERECTOMY      US GUIDED KIDNEY BIOPSY  12/26/2023     Social History   Social  History     Substance and Sexual Activity   Alcohol Use Not Currently     Social History     Substance and Sexual Activity   Drug Use Never     Social History     Tobacco Use   Smoking Status Never   Smokeless Tobacco Never     Family History   Problem Relation Age of Onset    Cancer Mother     Cervical cancer Sister     Breast cancer Sister     Breast cancer Sister        Meds/Allergies   all current active meds have been reviewed, current meds:   Current Facility-Administered Medications   Medication Dose Route Frequency    acetaminophen (TYLENOL) tablet 650 mg  650 mg Oral Q6H PRN    albumin human (FLEXBUMIN) 25 % injection 25 g  25 g Intravenous 4x Daily    ampicillin-sulbactam (UNASYN) 3 g in sodium chloride 0.9 % 100 mL IVPB  3 g Intravenous Q6H    atorvastatin (LIPITOR) tablet 40 mg  40 mg Oral Daily    carvedilol (COREG) tablet 6.25 mg  6.25 mg Oral BID With Meals    dexmedeTOMIDine (Precedex) 400 mcg in sodium chloride 0.9% 100 mL  0.1-0.7 mcg/kg/hr Intravenous Titrated    dextrose 5 % infusion  100 mL/hr Intravenous Continuous    diltiazem (CARDIZEM) tablet 30 mg  30 mg Oral Q6H CRUZITO    escitalopram (LEXAPRO) tablet 10 mg  10 mg Oral Daily    famotidine (PEPCID) tablet 20 mg  20 mg Oral Daily    furosemide (LASIX) 500 mg infusion 50 mL  10 mg/hr Intravenous Continuous    insulin regular (HumuLIN R,NovoLIN R) 1 Units/mL in sodium chloride 0.9 % 100 mL infusion  0.3-21 Units/hr Intravenous Titrated    levalbuterol (XOPENEX) inhalation solution 1.25 mg  1.25 mg Nebulization Q8H PRN    methylPREDNISolone sodium succinate (Solu-MEDROL) 1,000 mg in sodium chloride 0.9 % 250 mL IVPB  1,000 mg Intravenous Daily    omeprazole (PRILOSEC) suspension 2 mg/mL  20 mg Oral Daily    ondansetron (ZOFRAN) injection 4 mg  4 mg Intravenous Q4H PRN    potassium chloride 40 mEq IVPB (premix)  40 mEq Intravenous Once    propofol (DIPRIVAN) 1000 mg in 100 mL infusion (premix)  5-50 mcg/kg/min Intravenous Titrated    propofol  (DIPRIVAN) 200 MG/20ML bolus injection 50 mg  50 mg Intravenous Once    valACYclovir (VALTREX) tablet 1,000 mg  1,000 mg Oral Q8H CRUZITO    and PTA meds:   Prior to Admission Medications   Prescriptions Last Dose Informant Patient Reported? Taking?   Acetaminophen 500 MG Past Month  Yes Yes   Sig: Take 2 capsules by mouth every 6 (six) hours as needed for mild pain   Cholecalciferol 50 MCG (2000 UT) CAPS Past Week  Yes Yes   Sig: Take 1 capsule by mouth daily   Cyanocobalamin 1000 MCG CAPS Past Week  Yes Yes   Sig: Take 1 capsule by mouth daily   FISH OIL-CANOLA OIL-VIT D3 PO Past Week Self Yes Yes   Sig: Use   Hydroxychloroquine Sulfate 300 MG TABS Past Week  Yes Yes   Sig: Take 1 tablet by mouth daily   Nutritional Supplements (Al) PACK Not Taking  Yes No   Sig: Take 1 each by mouth 2 (two) times a day   Patient not taking: Reported on 3/3/2024   TURMERIC PO Not Taking Self Yes No   Sig: Take 1 capsule by mouth daily   Patient not taking: Reported on 2/29/2024   amLODIPine (NORVASC) 5 mg tablet 3/3/2024  Yes Yes   Sig: Take 5 mg by mouth daily   apixaban (ELIQUIS) 5 mg Not Taking  Yes No   Sig: Take 5 mg by mouth 2 (two) times a day   Patient not taking: Reported on 3/3/2024   aspirin (ECOTRIN LOW STRENGTH) 81 mg EC tablet Past Week Self Yes Yes   Sig: Take 81 mg by mouth   atorvastatin (LIPITOR) 40 mg tablet Past Week  Yes Yes   Sig: Take 40 mg by mouth daily   atovaquone (MEPRON) 750 mg/5 mL suspension Past Week  Yes Yes   Sig: Take 750 mg by mouth 2 (two) times a day   carvedilol (Coreg) 6.25 mg tablet Past Week  Yes Yes   Sig: Take 6.25 mg by mouth 2 (two) times a day with meals   co-enzyme Q-10 100 mg capsule Past Week  Yes Yes   Sig: Take 100 mg by mouth daily   escitalopram (LEXAPRO) 10 mg tablet Past Week  Yes Yes   Sig: Take 10 mg by mouth daily   famotidine (PEPCID) 20 mg tablet Past Week  Yes Yes   Sig: Take 20 mg by mouth daily   gabapentin (NEURONTIN) 100 mg capsule Unknown Self Yes No   Patient not  taking: Reported on 2/29/2024   insulin lispro (HumALOG/ADMELOG) 100 units/mL injection Not Taking  Yes No   Sig: Inject 2-10 Units under the skin 3 (three) times a day with meals   Patient not taking: Reported on 3/3/2024   insulin lispro (HumALOG/ADMELOG) 100 units/mL injection Not Taking  Yes No   Sig: Inject 2-10 Units under the skin daily at bedtime   Patient not taking: Reported on 3/3/2024   losartan (COZAAR) 100 MG tablet Past Week  Yes Yes   Sig: Take 100 mg by mouth daily   methocarbamol (ROBAXIN) 500 mg tablet Unknown  Yes No   Sig: Take 500 mg by mouth every 12 (twelve) hours as needed for muscle spasms   mirtazapine (REMERON) 7.5 MG tablet Past Week  Yes Yes   Sig: Take 7.5 mg by mouth daily at bedtime   mycophenolate (CELLCEPT) 500 mg tablet Past Week  Yes Yes   Sig: Take 2 tabs twice daily as instructed   oxyCODONE (ROXICODONE) 5 immediate release tablet Not Taking  Yes No   Sig: Take 2.5 mg by mouth every 6 (six) hours as needed for moderate pain   Patient not taking: Reported on 3/3/2024   pantoprazole (PROTONIX) 40 mg tablet Past Week  Yes Yes   Sig: Take 40 mg by mouth 2 (two) times a day   predniSONE 10 mg tablet Past Week  Yes Yes   Sig: Take 60 mg by mouth daily      Facility-Administered Medications: None     Allergies   Allergen Reactions    Rosuvastatin Other (See Comments)     Per SNF    Simvastatin Other (See Comments)     Per SNF    Myalgias       Lab Results: I have personally reviewed pertinent lab results.  , CBC:   Lab Results   Component Value Date    WBC 9.47 03/13/2024    HGB 6.6 (L) 03/13/2024    HCT 20.5 (L) 03/13/2024    MCV 92 03/13/2024    PLT 43 (L) 03/13/2024    RBC 2.24 (L) 03/13/2024    MCH 30.4 03/13/2024    MCHC 33.2 03/13/2024    RDW 18.3 (H) 03/13/2024    MPV 13.3 (H) 03/13/2024    NRBC 38 (H) 03/13/2024   , CMP:   Lab Results   Component Value Date    SODIUM 149 (H) 03/13/2024    K 3.6 03/13/2024     (H) 03/13/2024    CO2 21 03/13/2024    BUN 24 03/13/2024     CREATININE 1.16 03/13/2024    CALCIUM 8.2 (L) 03/13/2024     (H) 03/13/2024     (H) 03/13/2024    ALKPHOS 60 03/13/2024    EGFR 44 03/13/2024       Counseling / Coordination of Care  Total floor / unit time spent today 25 minutes.  Greater than 50% of total time was spent with the patient and / or family counseling and / or coordination of care.    Inpatient consult to Acute Care Surgery  Consult performed by: Aristides Ferreira MD  Consult ordered by: OLE Osuna MD  3/13/2024 6:09 PM

## 2024-03-13 NOTE — PROGRESS NOTES
[facility]  Progress Note  Name: Darlyn Nguyen I  MRN: [id number]  Unit/Bed#: ICU 02 I Date of Admission: 3/3/2024   Date of Service: 3/13/2024 I Hospital Day: 10    Assessment/Plan   * Acute metabolic encephalopathy  Assessment & Plan  Rapid response called for unresponsive patient on morning of 3/11/24. Earlier that morning, patient described as confused and unable to answer orientation questions, which had been consistent throughout this admission.  Etiology unclear.   At time of rapid response, findings significant for Hgb 6.7 in the setting of acute GIB and hypoxemia on iSTAT. Vitals were normal and other lab values were unremarkable.    TSH wnl at 1.329, free T4 low at 0.38    Pt mentation improving off sedation. Opens eyes to voice, tracks, follows commands, nods or shakes head when asked questions.    GI bleed  Assessment & Plan  Patient was initially on heparin due to acute DVT, developed bloody stools, underwent flexible sigmoidoscopy revealing ulcerated rectal mucosa, status post cauterization  Persistent bloody bowel movements with passing of clots overnight 3/10 into 3/11. Hemoglobin 6.7 down from 9 the day prior  Transfused 2 units packed red blood cells  Repeat Flex sig w GI on 3/11: multiple ulcers throughout colon, biopsies taken; one oozing ulcer in transverse colon, clipped with hemostasis; severe diverticula; prolapsed rectum; rectal ulcers.  Ddx includes inflammatory bowel disease, ischemic colitis, and infectious etiologies    Pt w BRBPR again overnight, associated with marked drop in Hgb to 6.8 this AM  -Overnight team discussed w GI, weighing C-scope and/or CTA  -Hold TF for possible procedure later today  -Transfuse 1 U pRBC, in process  -Check Hgb q8h  -F/u pathology results   -maintain active T&S, transfuse for Hg < 7.0    Anasarca  Assessment & Plan  Patient with significant anasarca likely component of iatrogenic fluid overload, decreased albumin,  oliguria  Preserved EF on echocardiogram  Did not respond to Lasix 40 IV last night  Repeat metolazone and Lasix 80 mg IV this AM  Monitor volume status and UOP    Bacteremia due to Enterococcus  Assessment & Plan  E. avium bacteremia. Pt recently started on prednisone, atovaquone, hydroxychloroquine, and mycophenolate for lupus. Colonic/rectal ulcerations possible point of entry.     Repeat cultures show NGTD  Susceptibilities show pan-sensitive enterococcus    ID on board, appreciate recommendations  Continue Unasyn 3 g Q6H (Day 10 Unasyn of anticipated 6-wk course)  Can hold off DYANA, per ID, as unlikely to     Cavitary pneumonia  Assessment & Plan  Seen on CT 3/4/24  Consider reactivation of TB vs septic emboli vs aspiration    Preliminary AFB negative x3    Sputum Cx 3/3 growing 2+ pseudomonas--susceptible to Zosyn, meropenem, levofloxacin, and tobramycin. Intermediate susceptibility to cefepime.    ID on board, appreciate recommendations  Rule out reactivation of TB in this immunosuppressed patient; AFB x 3 ordered, preliminarily negative  Consider also aspiration vs septic emboli  Continue Unasyn for tx of E avium bacteremia  Continue chest physio  Follow-up MTB PCR sent to state lab  Will likely need repeat imaging outpatient    Acute respiratory failure with hypoxia (HCC)  Assessment & Plan  Pt intubated to protect airway, given obtunded mental status, however hypoxemia also noted on iSTAT during rapid response  Ddx includes volume overload vs aspiration PNA/RUL cavitary lung lesion    Pt currently intubated, morning ABG demonstrates alkalemia with primary respiratory alkalosis.     Current settings 16/320/6/50. Pt breathing at rate of 16-18.     Adequate UOP following metolazone 5 mg PO and Lasix 80 mg IV-->0.8 ml/kg/h diurese  Repeat metolazone and lasix this AM    Acute deep vein thrombosis (DVT) of both lower extremities (HCC)  Assessment & Plan  Discovered 2/20/24, started on Eliquis at  home, started Hep gtt at [facility] but developed GI bleed, AC currently on hold    S/p IVC filter with IR 3/7/24  Will discuss resuming anticoagulation when LGIB resolves and hgb stabilizes. Will need trial on hep gtt prior to transition to oral agent, given recurrence of bleeds.    New onset a-fib (HCC)  Assessment & Plan  Maintaining sinus rhythm, continue p.o. diltiazem.    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) ([facility])  Assessment & Plan  Biopsy-proven at [facility]    Persistent oliguria, with adequate UOP to metolazone 5 and Lasix 80 IV    UA 3/12/24 shows moderate blood (30-50 RBCs on micro), moderate leukocytes (30-50 on micro), 1+ protein, 1+ ketones, and increased specific gravity; not specific, but c/w lupus nephritis    BUN stable  Cr stable  Repeat metolazone and Lasix this morning; f/u AM UOP and BMP to determine PM diuretics  Continue to monitor volume status and UOP    Diabetes mellitus (HCC)  Assessment & Plan  Lab Results   Component Value Date    HGBA1C 5.9 (H) 03/03/2024       Recent Labs     03/12/24  0833 03/12/24  1117 03/12/24  1752 03/12/24  2343   POCGLU 172* 175* 154* 180*       Blood Sugar Average: Last 72 hrs:  (P) [id number]    History of DM, well-controlled with last A1c 5.6 on 2/26, but now 5.9. Hyperglycemia in the setting of steroid use.    ISS and finger sticks q6h while NPO  Algorithm 3, increased from 2 for persistent hyperglycemia. Had previously been decreased from 3 to 2 for one episode of hypoglycemia.  Avoid hypoglycemia per protocol  Monitor BG and insulin requirements and adjust regimen as needed    Dysphagia  Assessment & Plan  Failed bedside swallow with nursing  Speech eval on 3/8: Cough present w/ successive sips of thin. Intermittently followed verbal commands for slow rate/small sips. Benefits from verbal cues to keep  head at midline. Continue clear liquids , medications crushed, Slow rate, Small sips, cue to keep head at midline    Pt intubated 3/11. Will reassess when extubated.    Hypernatremia  Assessment & Plan  Na 150 on AM BMP, 152 corrected for glucose, up from 147 yesterday  Suspect d/t loop diuretic. Received total of Lasix 120 mg IV yesterday  Check U osm, U sodium, U Cr.    Severe protein-calorie malnutrition (HCC)  Assessment & Plan  Malnutrition Findings:   Adult Malnutrition type: Acute illness  Adult Degree of Malnutrition: Other severe protein calorie malnutrition  Malnutrition Characteristics: Fluid accumulation, Inadequate energy                  360 Statement: Severe calorie-protein malnutrition in context to acute illness r/t GI bleed, dysphagia, inadequate PO intake as evidance by energy intake less than 50% compared to estimated needs >5 days, +2 generalized edema; Treated with TBD (diet +oral supplements vs nutrition support)    BMI Findings:           Body mass index is 31.75 kg/m².   Started tube feeds 3/12  Holding TF 3/13 for possible procedure w GI    Thrombocytopenia (HCC)  Assessment & Plan  Possibly related to hepatic congestion vs medication vs sepsis/bacteremia  Peripheral blood smear shows normochromic normocytic anemia with nRBCs and polychromasia, no schistocytes, mild thrombocytopenia, mild leukocytosis with neutrophilia.  Monitor CBC closely  Transfuse FFP for plt<10, <20 if actively bleeding    Hypokalemia  Assessment & Plan  K 2.8 in the setting of high-dose Lasix IV; received 120 mg total yesterday  Mg 1.8    Gave Mg 2 g IV   Give K 80 mg IV now  Repeat lab for K at 1300  Anticipate need for additional K this afternoon    Sacral wound  Assessment & Plan  Wound Care on board, appreciate recommendations  Repositioning and wound care with nursing    Lupus (HCC)  Assessment & Plan  Diagnosed in December,   Initially started on steroids, prednisone 60 mg daily, which patient had tapered to 30  mg daily prior to admission with the addition of atovaquone, hydroxychloroquine, and mycophenolate    Holding atovaquone, hydroxychloroquine, and mycophenolate in the setting of acute infection    Continue prednisone, had planned to taper 10 mg weekly, currently 40 mg Day 7, will hold off further dose reduction at this time in the setting of critical illness with concern for possible relative adrenal insufficiency, though random cortisol of 10.9 collected 3/11 is reassuring    CRP elevated at 39.9, ESR wnl <1, C3 and C4 both low. Though non-specific, c/w lupus.       ----------------------------------------------------------------------------------------  HPI/24hr events: Yesterday, pt waking off sedation but remains drowsy, not following commands. Tolerated SBT. Overnight, restarted Precedex for HTN 2/2 agitation/discomfort. Pt remained off pressors, still with wide pulse pressure. Hgb noted to be dropping overnight 12>8.6>7.9>6.8. One BM described as brown, followed later in the night by passage of thin bloody BM. O/N team discussed w JO, who recommended pt be NPO for possible C-scope.    Patient appropriate for transfer out of the ICU today?: No  Disposition: Continue Critical Care   Code Status: Level 2 - DNAR: but accepts endotracheal intubation  ---------------------------------------------------------------------------------------  SUBJECTIVE  This morning, Ms. Nguyen is seen asleep in bed, intubated, easy to wake with sustained attention, tracking, following commands, nodding or shaking head appropriately to answer yes/no questions, in no acute distress. She denies any pain at this time.    Review of Systems  Review of systems was unable to be performed secondary to intubation  ---------------------------------------------------------------------------------------  OBJECTIVE    Vitals   Vitals:    03/13/24 0800 03/13/24 0830 03/13/24 0845 03/13/24 0900   BP:       Pulse: 58 68 64 64   Resp: 17 16 17 16    Temp: (!) 96.8 °F (36 °C) (!) 96.8 °F (36 °C) (!) 96.8 °F (36 °C) (!) 96.8 °F (36 °C)   TempSrc: Bladder Bladder Bladder    SpO2: 100% 100% 100% 100%   Weight:       Height:         Temp (24hrs), Av °F (36.7 °C), Min:96.8 °F (36 °C), Max:99.7 °F (37.6 °C)  Current: Temperature: (!) 96.8 °F (36 °C)  Arterial Line BP: 142/50  Arterial Line MAP (mmHg): 78 mmHg    Respiratory:  SpO2: SpO2: 100 %, SpO2 Activity: SpO2 Activity: At Rest, SpO2 Device: O2 Device: Ventilator, Capnography:    Nasal Cannula O2 Flow Rate (L/min): 2 L/min    Invasive/non-invasive ventilation settings   Respiratory      Lab Data (Last 4 hours)         0630            pH, Arterial       7.411             pCO2, Arterial       33.9             pO2, Arterial       161.7             HCO3, Arterial       21.1             Base Excess, Arterial       -3.2                    O2/Vent Data       None                    Physical Exam  Vitals and nursing note reviewed.   Constitutional:       General: She is not in acute distress.     Appearance: She is obese. She is ill-appearing. She is not toxic-appearing or diaphoretic.   HENT:      Head: Normocephalic and atraumatic.   Eyes:      General: No scleral icterus.        Right eye: No discharge.         Left eye: No discharge.      Conjunctiva/sclera: Conjunctivae normal.   Neck:      Comments: J CVC. Bandage from prior RIJ CVC  Cardiovascular:      Rate and Rhythm: Normal rate and regular rhythm.      Pulses: Normal pulses.      Heart sounds: Normal heart sounds. No murmur heard.     No friction rub. No gallop.   Pulmonary:      Breath sounds: Wheezing and rales present.      Comments: intubated  Abdominal:      General: There is no distension.      Palpations: Abdomen is soft.      Tenderness: There is no abdominal tenderness. There is no guarding or rebound.      Comments: Hypoactive bowel sounds   Musculoskeletal:         General: Swelling present. No tenderness.      Right lower leg: Edema  present.      Left lower leg: Edema present.   Skin:     General: Skin is warm and dry.      Coloration: Skin is not jaundiced.      Findings: Bruising present.   Neurological:      Mental Status: She is alert.      Comments: Alert, following commands, answering yes/no questions by nodding or shaking head             Laboratory and Diagnostics:  Results from last 7 days   Lab Units 03/13/24  0351 03/12/24  2203 03/12/24  2002 03/12/24  1118 03/12/24  0439 03/11/24  1711 03/11/24  1706 03/11/24  1431 03/11/24  1055 03/11/24  0933 03/11/24  0304 03/10/24  0805 03/10/24  0443 03/09/24  1305 03/09/24  0408   0000   WBC Thousand/uL 9.47 11.99*  --   --  18.58* 16.43*  --   --   --   --  13.21*  --  15.99*  --  14.03*  --    HEMOGLOBIN g/dL 6.8* 7.9* 8.6* 12.0 13.6 12.4  --    < >  --  6.7* 8.8*   < > 7.8*   < > 8.8*  --    I STAT HEMOGLOBIN g/dl  --   --   --   --   --   --  10.2*  --  8.5*  --   --   --   --   --   --    < >   HEMATOCRIT % 20.5* 23.8*  --   --  39.5 35.8  --   --   --  20.5* 26.4*  --  23.8*  --  26.6*  --    HEMATOCRIT, ISTAT %  --   --   --   --   --   --  30*  --  25*  --   --   --   --   --   --   --    PLATELETS Thousands/uL 43* 54*  --   --  65* 56*  --   --   --   --  66*  --  85*  --  57*  --    NEUTROS PCT %  --  83*  --   --   --   --   --   --   --   --  83*  --   --   --   --   --    BANDS PCT %  --   --   --   --   --   --   --   --   --   --   --   --  12*  --   --   --    MONOS PCT %  --  4  --   --   --   --   --   --   --   --  3*  --   --   --   --   --    MONO PCT % 3*  --   --   --   --   --   --   --   --   --   --   --  3*  --   --   --    EOS PCT % 0 0  --   --   --   --   --   --   --   --  0  --  0  --   --   --     < > = values in this interval not displayed.     Results from last 7 days   Lab Units 03/13/24  0610 03/13/24  0351 03/12/24  1754 03/12/24  0835 03/12/24  0436 03/11/24  1711 03/11/24  1706 03/11/24  1201 03/11/24  1055 03/11/24  0304 03/10/24  0443 03/09/24  0408  03/08/24  0505 03/07/24  0418   SODIUM mmol/L 150*  --  149* 147 145 146  --  148*  --  146   < > 143   < > 147   POTASSIUM mmol/L 2.8*  --  3.6 3.8 4.0 3.3*  --  3.8  --  3.2*   < > 3.5   < > 3.5   CHLORIDE mmol/L 118*  --  118* 117* 117* 115*  --  117*  --  114*   < > 112*   < > 114*   CO2 mmol/L 22  --  22 22 16* 22  --  22  --  26   < > 25   < > 21   CO2, I-STAT mmol/L  --   --   --   --   --   --  20*  --    < >  --   --   --   --   --    ANION GAP mmol/L 10  --  9 8 12 9  --  9  --  6   < > 6   < > 12   BUN mg/dL 27*  --  27* 27* 27* 25  --  25  --  24   < > 23   < > 22   CREATININE mg/dL 1.26  --  1.27 1.25 1.18 1.06  --  0.98  --  0.86   < > 0.76   < > 0.69   CALCIUM mg/dL 7.8*  --  7.5* 7.5* 7.5* 7.3*  --  7.4*  --  7.5*   < > 7.5*   < > 7.0*   GLUCOSE RANDOM mg/dL 231*  --  174* 188* 209* 276*  --  275*  --  265*   < > 243*   < > 143*   ALT U/L  --  385*  --   --   --   --   --  28  --  30  --  42  --  79*   AST U/L  --  515*  --   --   --   --   --  16  --  14  --  15  --  38   ALK PHOS U/L  --  60  --   --   --   --   --  53  --  55  --  74  --  83   ALBUMIN g/dL  --  2.9*  --   --   --   --   --  2.1*  --  2.3*  --  1.9*  --  2.2*   TOTAL BILIRUBIN mg/dL  --  0.82  --   --   --   --   --  0.67  --  0.64  --  0.72  --  0.71    < > = values in this interval not displayed.     Results from last 7 days   Lab Units 03/13/24  0351 03/12/24  0436 03/10/24  0443 03/07/24  0418   MAGNESIUM mg/dL 1.8* 1.9 1.9 2.3   PHOSPHORUS mg/dL 3.9 4.4*  --   --       Results from last 7 days   Lab Units 03/13/24  0610 03/13/24  0425 03/12/24  0436 03/11/24  1201 03/09/24  0003   INR   --  1.65* 1.25* 1.33* 1.14   PTT seconds 40*  --   --   --   --           Results from last 7 days   Lab Units 03/12/24  0945 03/12/24  0540   LACTIC ACID mmol/L 2.3* 3.0*     ABG:  Results from last 7 days   Lab Units 03/13/24  0630   PH ART  7.411   PCO2 ART mm Hg 33.9*   PO2 ART mm Hg 161.7*   HCO3 ART mmol/L 21.1*   BASE EXC ART mmol/L -3.2  "  ABG SOURCE  Line, Arterial     VBG:  Results from last 7 days   Lab Units 03/13/24  0630 03/12/24  0539 03/11/24  1711   PH RADHA   --   --  7.306   PCO2 RADHA mm Hg  --   --  41.9*   PO2 RADHA mm Hg  --   --  54.4*   HCO3 RADHA mmol/L  --   --  20.4*   BASE EXC RADHA mmol/L  --   --  -5.6   ABG SOURCE  Line, Arterial   < >  --     < > = values in this interval not displayed.     Results from last 7 days   Lab Units 03/11/24  0304   PROCALCITONIN ng/ml 0.30*       Micro        EKG: Tele: NSR 60s-80s  Imaging:  No new imaging    Intake and Output  I/O         03/11 0701 03/12 0700 03/12 0701 03/13 0700 03/13 0701 03/14 0700    I.V. (mL/kg) 632.7 (7.6) 367.9 (4.4) 30 (0.4)    Blood 825      NG/GT  35     IV Piggyback 400 1078.3 26.3    Total Intake(mL/kg) 1857.7 (22.4) 1481.2 (17.7) 56.3 (0.7)    Urine (mL/kg/hr) 250 (0.1) 980 (0.5) 225 (1.5)    Stool  0 0    Total Output 250 980 225    Net +1607.7 +501.2 -168.8           Unmeasured Stool Occurrence  2 x 2 x            Height and Weights   Height: 5' 4\" (162.6 cm)  IBW (Ideal Body Weight): 54.7 kg  Body mass index is 31.75 kg/m².  Weight (last 2 days)       Date/Time Weight    03/13/24 0600 83.9 (184.97)    03/12/24 1040 83.1 (183.2)    03/12/24 0600 83.1 (183.2)              Nutrition       Diet Orders   (From admission, onward)                 Start     Ordered    03/12/24 1434  Diet Enteral/Parenteral; Tube Feeding No Oral Diet; Vital AF 1.2; Continuous; 50; 100; Water; Every 6 hours  Diet effective midnight        References:    Adult Nutrition Support Algorithm    RD Therapeutic Diet Order Protocol   Question Answer Comment   Diet Type Enteral/Parenteral    Enteral/Parenteral Tube Feeding No Oral Diet    Tube Feeding Formula: Vital AF 1.2    Bolus/Cyclic/Continuous Continuous    Tube Feeding Goal Rate (mL/hr): 50    Tube Feeding flush (mL): 100    Water Flush type: Water    Water flush frequency: Every 6 hours    RD to adjust diet per protocol? Yes       Placed in " "\"And\" Linked Group    03/12/24 1436                      Active Medications  Scheduled Meds:  Current Facility-Administered Medications   Medication Dose Route Frequency Provider Last Rate    acetaminophen  650 mg Oral Q6H PRN AUGUSTA Barber      Albumin 25%  25 g Intravenous 4x Daily Sienna Perez DO 0 g (03/12/24 2245)    ampicillin-sulbactam  3 g Intravenous Q6H AUGUSTA Barber 3 g (03/13/24 0908)    atorvastatin  40 mg Oral Daily AUGUSTA Barber      carvedilol  6.25 mg Oral BID With Meals AUGUSTA Barber      collagenase   Topical Daily AUGUSTA Barber      dexmedetomidine  0.1-0.7 mcg/kg/hr Intravenous Titrated AUGUSTA Perez Stopped (03/13/24 0444)    diltiazem  30 mg Oral Q6H [facility] AUGUSTA Barber      escitalopram  10 mg Oral Daily AUGUSTA Barber      famotidine  20 mg Oral Daily AUGUSTA Barber      insulin lispro  1-6 Units Subcutaneous Q6H [facility] Uche Blair MD      levalbuterol  1.25 mg Nebulization Q8H PRN AUGUSTA Barber      omeprazole (PRILOSEC) suspension 2 mg/mL  20 mg Oral Daily AUGUSTA Barber      ondansetron  4 mg Intravenous Q4H PRN AUGUSTA Barber      potassium chloride  40 mEq Intravenous Q4H Uche Blair MD 40 mEq (03/13/24 0733)    predniSONE  40 mg Oral Daily AUGUSTA Barber       Continuous Infusions:  dexmedetomidine, 0.1-0.7 mcg/kg/hr, Last Rate: Stopped (03/13/24 0444)      PRN Meds:   acetaminophen, 650 mg, Q6H PRN  levalbuterol, 1.25 mg, Q8H PRN  ondansetron, 4 mg, Q4H PRN        Invasive Devices Review  Invasive Devices       Peripherally Inserted Central Catheter Line  Duration             PICC Line 03/08/24 Right Basilic 4 days              Central Venous Catheter Line  Duration             CVC Central Lines 03/11/24 Triple 20cm 1 day              Arterial Line  Duration             Arterial Line 03/11/24 Right Radial 1 day              Drain  Duration             NG/OG/Enteral Tube Orogastric Center mouth " "-- days    Urethral Catheter Latex;Straight-tip 16 Fr. 12 days              Airway  Duration             ETT  Cuffed 7.5 mm 1 day                    Rationale for remaining devices: PICC for long-term abx, meds, labs. CVC for pressors, labs, additional meds-will D/C once pressures are stable, with caveat that we may continue while active bleeding remains a concern. A-line for accurate pressure monitoring. ETT to protect airway, plan for SBT and possible extubation later today. OGT for PO meds and Tube Feeds while intubated.  ---------------------------------------------------------------------------------------  Advance Directive and Living Will: Yes    Power of :    POLST:        Uche Blair MD      Portions of the record may have been created with voice recognition software.  Occasional wrong word or \"sound a like\" substitutions may have occurred due to the inherent limitations of voice recognition software.  Read the chart carefully and recognize, using context, where substitutions have occurred     "

## 2024-03-13 NOTE — PROGRESS NOTES
Progress Note - Infectious Disease   Darlyn Nguyen 78 y.o. female MRN: [id number]  Unit/Bed#: ICU 02 Encounter: [id number]    Impression/Plan:  1. Septic shock. Developing over admission. Leukocytosis, hypotension, and lactic acidosis. Likely secondary to enterococcus avium bacteremia and RUL pneumonia with cavitary lesion. This is complicated by fact that patient is immunosuppressed in setting of recent SLE with lupus nephritis diagnosis. The patient initially had clinical improvement. She was extubated and no longer needed pressor support. She was transitioned out of ICU to med/surg care. Patient then had significant BRBPR and became nonresponsive. She was intubated and transferred back to ICU. Flex sig performed which showed significant bleeding in transverse colon. This morning patient remains intubated. Pressor support is on hold. She is having active BRBPR again today.  -antibiotic as below  -check CBCD and BMP tomorrow  -monitor vitals  -supportive care     2. Enterococcus avium bacteremia. Single set positive from 2/29/2024. Suspect likely GI source given patient's recent GI bleed and pancolitis with heavy diarrhea. Recent urine and wound cultures with no growth of this pathogen. Patient completed CT A/P which noted scattered splenic hypodensities which is concerning for septic emboli. With this pathogen there is concern for vancomycin resistance. Patient has no intravascular devices or hardware. All repeat blood cultures are negative >5 days. CT head with no acute findings. TTE was negative. The patient has been transitioned to IV Unasyn. She appears to be tolerating the antibiotic without difficulty. I will continue the Unasyn for now in anticipation of 6 weeks of IV antibiotic treatment.  -continue IV Unasyn through 4/12/2024 for 6 weeks of IV antibiotic treatment after cleared blood cultures  -weekly CBCD and BMP while on IV antibiotic  -monitor vitals  -PICC to be removed after patient's final dose of IV  antibiotic on 4/12/2024  -anticipate surveillance blood cultures 2 weeks after finishing IV antibiotic treatment, draw on 4/26/2024  -patient will require outpatient ID follow up after discharge     3. RUL pneumonia with cavitary lung lesions. Chest CT showed a RUL cavitary lesion, dense RUL consolidation, R basilar atelectasis, and small B/L pleural effusions. Consider aspiration played a role. Consider possilibily of septic emboli in setting of bacteremia. With new immunosuppression will need to consider TB. AFB sputum cultures are negative x3 so far. TB PCR is pending with the state lab. Sputum culture with growth of pseudomonas, however after antibiotic treatment this may be colonization. Aspergillus was negative. Repeat CXR which showed stable RUL cavity.  -antibiotic as above  -follow up AFBs  -follow up TB PCR  -monitor vitals  -monitor respiratory status  -mechanical ventilation support per primary service      4. Pancolitis with heavy diarrhea and recurrent GI bleed. This is likely source of her bacteremia above. Recent stool PCR was negative. Giardia and O&P are were negative. Fecal calprotectin positive. Newest CT A/P showed persistent fluid throughout the colon, improvement to previous areas of colonic mucosal hyperemia, and colonic diverticulosis without diverticulitis. Suspect she may benefit from colonoscopy once medically stable. Gastroenterology is following. She underwent flex sig which showed hemorrhoids, diverticulitis, and ulcerated mucosa. Unfortunately she's had worsening BRBPR with significant drop in Hemoglobin. Repeat flex sig noted significant amount of pancolonic fresh blood and clotting and an ulcer in the transverse colon with oozing hemorrhage. Hemostasis was achieved with endoclip. She is also noted to have prolapsed rectum with multiple nonbleeding ulcerse and severe diverticula.  -serial abdominal exams  -monitor GI symptoms  -monitor stool output   -continue follow up with  gastroenterology     5. Acute hypoxic respiratory failure. Likely secondary to RUL pnuemonia with cavitary lesion. Also consider role of sepsis with shock. Consider possibility of aspiration vs septic emboli in bacteremic patient with new splenic hypodensities. She did recently have B/L LE DVTs, however she's been on Eliquis so less concern for PE. Patient's respiratory status did improve and she was extubated. Patient then experienced recurrent GI bleed and became nonresponsive. She has been reintubated.  -antibiotic as above  -monitor CBCD and BMP  -monitor vitals  -monitor respiratory status  -mechanical ventilation support per critical care     6. SLE. With lupus nephritis. Diagnosed in 12/2023 at [facility]. She has been managed by rheumatology and nephrology. She was started on prednisone, atovaquone, hydroxychloroquine, and mycophenolate. Atovaquone, hydroxychloroquine, and mycophenolate currently on hold. She was given a dose of Solu-Cortef at [facility]. Nephrology has assessed and patient is on prednisone. Steroid use is risk factor for infection.  -steroid per nephrology  -patient will eventually need to go back on mepron from PJP prophylaxis  -continue close follow up with nephrology  -consider rheumatology consult as needed     7. CKD stage 2. This can affect antibiotic dosing. Upon review of patient's available past medical records it appears her baseline creatinine is approximately 0.8. Also with lupus nephritis as above. Suspect patient is high risk for ARYAN. Her creatinine is 1.26 today.  -monitor creatinine  -dose adjust antibiotic for renal function as needed  -avoid nephrotoxins  -continue close follow up with nephrology     8. Sacral and buttock ulcers. Initial presentation showed some areas appearing more superficial with raw granular tissue, other areas were likely stage 2 with some gray stringy slough and fibrous tissue. It was documented that patient had a gluteal skin care during recent  hospitalization at [facility]. Suspect pressure and prolonged down time are contributing to skin integrity. Wound management is following and Santyl was being applied. Now with some areas tunneling. Also with new scattered rash with some superficial tissue loss along the medial posterior thighs. Consider this may be related to her lupus. Consider this may be viral process. She will be started on PO valtrex which can be crushed and given through OG.  -start PO Valtrex, 1g q8  -serial skin exams  -frequent turning and repositioning to offload pressure  -wound management per wound management team  -continue follow up with wound management     9. Type 2 diabetes mellitus without long term insulin use. Patient's last HbA1c was 5.9% on 3/3/2024. Elevated blood glucose is risk factor for wounds and infection. Recommend tight glycemic control.  -blood glucose management per primary service    Above plan was discussed in detail with the critical care team who is aware of new antiviral treatment start.    Antibiotics:  Unasyn 10  Antibiotics 14    Subjective:  Unable to perform ROS, patient remains intubated on precedex gtt. Required Julio Cesar Hugger overnight due to hypothermia. Is now having some BRBPR again. Wound management has noted new rash/skin breakdown in the posterior medial thighs.     Objective:  Vitals:  Temp:  [96.8 °F (36 °C)-99.7 °F (37.6 °C)] 96.8 °F (36 °C)  HR:  [58-90] 58  Resp:  [14-23] 16  BP: ()/(46-57) 161/55  SpO2:  [94 %-100 %] 99 %  Temp (24hrs), Av.4 °F (36.9 °C), Min:96.8 °F (36 °C), Max:99.7 °F (37.6 °C)  Current: Temperature: (!) 96.8 °F (36 °C)    Physical Exam:   General Appearance:  Intubated, sedated. On mechanical ventilation and Julio Cesar hugger.   Throat: ETT intact, OG tube intact.    Lungs:   Coarse mechanical breath sounds to auscultation bilaterally; no wheezing; respirations unlabored on mechanical ventilation.   Heart:  Bradycardic, irregular; no murmur, rub or gallop.   Abdomen:   Soft,  no facial grimace with abdominal palpation, non-distended, hypoactive bowel sounds.     Extremities: No clubbing or cyanosis, + peripheral edema and anasarca, seems slightly less today.   Skin: See media tab for new photos of B/L medial posterior thigh rash and buttock/sacral ulcerations.     Labs, Imaging, & Other studies:   All pertinent labs and imaging studies were personally reviewed  Results from last 7 days   Lab Units 03/13/24  0351 03/12/24  2203 03/12/24  2002 03/12/24  1118 03/12/24  0439   WBC Thousand/uL 9.47 11.99*  --   --  18.58*   HEMOGLOBIN g/dL 6.8* 7.9* 8.6*   < > 13.6   PLATELETS Thousands/uL 43* 54*  --   --  65*    < > = values in this interval not displayed.     Results from last 7 days   Lab Units 03/13/24  0610 03/13/24  0351 03/11/24  1711 03/11/24  1706 03/11/24  1201 03/11/24  1055 03/11/24  0304   POTASSIUM mmol/L 2.8*  --    < >  --  3.8  --  3.2*   CHLORIDE mmol/L 118*  --    < >  --  117*  --  114*   CO2 mmol/L 22  --    < >  --  22  --  26   CO2, I-STAT mmol/L  --   --   --  20*  --    < >  --    BUN mg/dL 27*  --    < >  --  25  --  24   CREATININE mg/dL 1.26  --    < >  --  0.98  --  0.86   EGFR ml/min/1.73sq m 40  --    < >  --  54  --  64   GLUCOSE, ISTAT mg/dl  --   --   --  275*  --    < >  --    CALCIUM mg/dL 7.8*  --    < >  --  7.4*  --  7.5*   AST U/L  --  515*  --   --  16  --  14   ALT U/L  --  385*  --   --  28  --  30   ALK PHOS U/L  --  60  --   --  53  --  55    < > = values in this interval not displayed.

## 2024-03-13 NOTE — ASSESSMENT & PLAN NOTE
Rapid response called for unresponsive patient on morning of 3/11/24. Earlier that morning, patient described as confused and unable to answer orientation questions, which had been consistent throughout this admission.  Etiology unclear.   At time of rapid response, findings significant for Hgb 6.7 in the setting of acute GIB and hypoxemia on iSTAT. Vitals were normal and other lab values were unremarkable.    TSH wnl at 1.329, free T4 low at 0.38    Pt mentation improving off sedation. Opens eyes to voice, tracks, follows commands, nods or shakes head when asked questions.

## 2024-03-13 NOTE — ASSESSMENT & PLAN NOTE
Biopsy-proven at [facility]    Persistent oliguria, with adequate UOP to metolazone 5 and Lasix 80 IV    UA 3/12/24 shows moderate blood (30-50 RBCs on micro), moderate leukocytes (30-50 on micro), 1+ protein, 1+ ketones, and increased specific gravity; not specific, but c/w lupus nephritis    BUN stable  Cr stable  Repeat metolazone and Lasix this morning; f/u AM UOP and BMP to determine PM diuretics  Continue to monitor volume status and UOP

## 2024-03-13 NOTE — ASSESSMENT & PLAN NOTE
Na 150 on AM BMP, 152 corrected for glucose, up from 147 yesterday  Suspect d/t loop diuretic. Received total of Lasix 120 mg IV yesterday  Check U osm, U sodium, U Cr.

## 2024-03-13 NOTE — CONSULTS
NEPHROLOGY HOSPITAL PROGRESS NOTE   Darlyn Nguyen 78 y.o. female MRN: [id number]  Unit/Bed#: ICU 02 Encounter: [id number]  Reason for Consult: low urine output    This is a progress note for continuation of care.  Please see original consult note done by Holli Moreno on 3/3/24.   We were reconsulted today for asssistance with volume and electrolyte management.     ASSESSMENT and PLAN:  Acute kidney injury:  Admission creatinine 1.10 on February 29, 2024.  Creatinine has risen to around 1.2-1.3 the past 2 days.  She was oliguric and meets urine output criteria for ARYAN.   Fortunately, she has responded to the Lasix challenge.   Currently appears fluid overloaded.   Continue Lasix drip at 10 mg/hr.     Chronic kidney disease, stage II  Baseline creatinine 0.7 to 0.9.   Etiology is lupus nephritis.   Follows with Dr. Calvo of [facility]     Lupus nephritis, class III:  Treated with MMF and prednisone.  Was on dapsone for prophylaxis but developed methemoglobinemia and now on atovaquone.  Immunosuppression is currently on hold.    Hypernatremia:  Na up to 150 today.   Once allowed to have TF restarted, would restart FWF at 400 cc q4 hours.   Hold off on Metolazone.     Hypokalemia:  K 2.8.   Likely due to Lasix drip and Metolazone.   K replacement ordered by ICU.     Anasarca  Likely iatrogenic from IVF.   Continue Lasix drip at 10 mg/hr.   Can stop albumin in the next 24 hours.     Hypertension:  BP is on the low side.   On diltiazem and carvedilol for atrial fibrillation.  Losartan on hold.    Ventilator dependent respiratory failure: Per critical care.   Enterococcus avium bacteremia: on Ampicillin.   Acute lower GI bleeding: For C-scope today.   DVT  Encephalopathy: Per critical care.    DISPOSITION:  Continue Lasix drip at 10 mg/hr.   Hold off on further Metolazone.   Agree with K replacement.   Start D5W at 100 cc/hr.   Once allowed to have TF restarted, would restart FWF at 400 cc q4 hours.   May consider stopping albumin  in the next 24 hours.     The highlighted and/or bolded points in my assessment, plan, and disposition were discussed with the primary team and they agree with those points and the plan.    SUBJECTIVE / 24H INTERVAL HISTORY:  Chart reviewed:  79 yo patient with history of hypertension, lupus, anemia, diarrhea, DVT, ambulatory dysfunction presented to [facility] on 2/29/24 with diarrhea.     Based on records, she was admitted to [facility] from 12/16/23 to 12/28/23 with serositis due to lupus.  Patient was seen by nephrology in the inpatient setting and had a renal biopsy done which showed class III lupus nephritis.  The patient was apparently started on MMF and prednisone by her rheumatologist during a January 2024 appointment. She was seen by Dr. Calvo of [facility] via telemed on 1/16/24.     Now admitted to [facility] on 2/29/24 with diarrhea and transferred to [facility] on 3/3/24 due to respiratory failure, GI bleeding, bacteremia and higher level of care.  Nephrology was consulted for lupus nephritis and signed off on March 7, 2024. During that time, renal function was stable with a creatinine of 0.69.  Since then, the patient has deteriorated and has required reintubation.  She was noted to be oliguric but responded to furosemide challenge.  She is currently on a Lasix drip and received metolazone.  She is now nonoliguric.  She has ongoing GI bleeding and is scheduled for a colonoscopy this afternoon.  She remains on the mechanical ventilator with stable oxygen requirements.  We are now being asked for reconsultation in light of her electrolyte issues.     OBJECTIVE:  Current Weight: Weight - Scale: 83.9 kg (184 lb 15.5 oz)  Vitals:    03/13/24 1316 03/13/24 1400 03/13/24 1427 03/13/24 1457   BP: (!) 119/43  (!) 117/38 (!) 102/43   Pulse: 61 74  74   Resp: 17 18  (!) 11   Temp: 97.9 °F (36.6 °C) 97.5 °F (36.4 °C)  97.5 °F (36.4 °C)   TempSrc:       SpO2:  100%     Weight:       Height:           Intake/Output Summary (Last  24 hours) at 3/13/2024 1508  Last data filed at 3/13/2024 1400  Gross per 24 hour   Intake 2823.8 ml   Output 2465 ml   Net 358.8 ml     General: critically ill appearing on the mechanical ventilator, comfortable.   Skin: warm, dry, good turgor.   Eyes: closed  ENT: ETT in place.   Neck: supple.  Chest/Lungs: equal chest expansion, coarse breath sounds.   CVS: distinct heart sounds, normal rate, regular rhythm, no rub  Abdomen: soft, non-distended, normoactive bowel sounds  Extremities: (+) UE and LE edema.  : (+) stevenson catheter.   Neuro: sedated on the mechanical ventilator.   Psych: could not evaluate.     Medications:    Current Facility-Administered Medications:     acetaminophen (TYLENOL) tablet 650 mg, 650 mg, Oral, Q6H PRN, AUGUSTA Barber, 650 mg at 03/10/24 0841    albumin human (FLEXBUMIN) 25 % injection 25 g, 25 g, Intravenous, 4x Daily, Sienna Perez DO, Stopped at 03/13/24 1200    ampicillin-sulbactam (UNASYN) 3 g in sodium chloride 0.9 % 100 mL IVPB, 3 g, Intravenous, Q6H, AUGUSTA Barber, Stopped at 03/13/24 1000    atorvastatin (LIPITOR) tablet 40 mg, 40 mg, Oral, Daily, AUGUSTA Barber, 40 mg at 03/13/24 0804    carvedilol (COREG) tablet 6.25 mg, 6.25 mg, Oral, BID With Meals, AUGUSTA Barber, 6.25 mg at 03/13/24 0733    collagenase (SANTYL) ointment, , Topical, Daily, UAGUSTA Barber, 1 Application at 03/13/24 0804    dexmedeTOMIDine (Precedex) 400 mcg in sodium chloride 0.9% 100 mL, 0.1-0.7 mcg/kg/hr, Intravenous, Titrated, AUGUSTA Perez, Stopped at 03/13/24 0444    diltiazem (CARDIZEM) tablet 30 mg, 30 mg, Oral, Q6H CRUZITO, AUGUSTA Barber, 30 mg at 03/13/24 1117    escitalopram (LEXAPRO) tablet 10 mg, 10 mg, Oral, Daily, AUGUSTA Barber, 10 mg at 03/13/24 0804    famotidine (PEPCID) tablet 20 mg, 20 mg, Oral, Daily, AUGUSTA Barber, 20 mg at 03/13/24 0804    fentanyl citrate (PF) 100 MCG/2ML **ADS Override Pull**, , , ,     furosemide (LASIX) 500  mg infusion 50 mL, 10 mg/hr, Intravenous, Continuous, Uche Blair MD, Last Rate: 1 mL/hr at 03/13/24 1051, 10 mg/hr at 03/13/24 1051    insulin regular (HumuLIN R,NovoLIN R) 1 Units/mL in sodium chloride 0.9 % 100 mL infusion, 0.3-21 Units/hr, Intravenous, Titrated, Uche Blair MD, Last Rate: 6 mL/hr at 03/13/24 1415, 6 Units/hr at 03/13/24 1415    levalbuterol (XOPENEX) inhalation solution 1.25 mg, 1.25 mg, Nebulization, Q8H PRN, AUGUSTA Barber, 1.25 mg at 03/07/24 1128    methylPREDNISolone sodium succinate (Solu-MEDROL) 1,000 mg in sodium chloride 0.9 % 250 mL IVPB, 1,000 mg, Intravenous, Daily, Uche Blair MD, Stopped at 03/13/24 1200    NOREPINEPHRINE 4 MG  ML NSS (CMPD ORDER) infusion **ADS Override Pull**, , , ,     omeprazole (PRILOSEC) suspension 2 mg/mL, 20 mg, Oral, Daily, AUGUSTA Barber, 20 mg at 03/13/24 0804    ondansetron (ZOFRAN) injection 4 mg, 4 mg, Intravenous, Q4H PRN, AUGUSTA Barber    propofol (DIPRIVAN) 1000 mg in 100 mL infusion (premix) **ADS Override Pull**, , , ,     valACYclovir (VALTREX) tablet 1,000 mg, 1,000 mg, Oral, Q8H [facility], AUGUSTA Francis, 1,000 mg at 03/13/24 1402    Laboratory Results:  Results from last 7 days   Lab Units 03/13/24  1203 03/13/24  0610 03/13/24  0351 03/12/24  2203 03/12/24  2002 03/12/24  1754 03/12/24  1118 03/12/24  0835 03/12/24  0439 03/12/24  0436 03/11/24  1711 03/11/24  1706 03/11/24  1431 03/11/24  1201 03/11/24  1055 03/11/24  0933 03/11/24  0304 03/11/24  0304 03/10/24  0805 03/10/24  0443 03/09/24  1305 03/09/24  0408 03/07/24  1127 03/07/24  0418   WBC Thousand/uL  --   --  9.47 11.99*  --   --   --   --  18.58*  --  16.43*  --   --   --   --   --   --  13.21*  --  15.99*  --  14.03*   < > 25.90*   HEMOGLOBIN g/dL 6.6*  --  6.8* 7.9* 8.6*  --  12.0  --  13.6  --  12.4  --    < >  --   --  6.7*  --  8.8*   < > 7.8*   < > 8.8*   < > 8.9*   I STAT HEMOGLOBIN g/dl  --   --   --   --   --   --   --   --   --    --   --  10.2*  --   --  8.5*  --    < >  --   --   --   --   --   --   --    HEMATOCRIT %  --   --  20.5* 23.8*  --   --   --   --  39.5  --  35.8  --   --   --   --  20.5*  --  26.4*  --  23.8*  --  26.6*   < > 27.2*   HEMATOCRIT, ISTAT %  --   --   --   --   --   --   --   --   --   --   --  30*  --   --  25*  --   --   --   --   --   --   --   --   --    PLATELETS Thousands/uL  --   --  43* 54*  --   --   --   --  65*  --  56*  --   --   --   --   --   --  66*  --  85*  --  57*   < > 107*   POTASSIUM mmol/L  --  2.8*  --   --   --  3.6  --  3.8  --  4.0 3.3*  --   --  3.8  --   --   --  3.2*  --  3.1*  --  3.5   < > 3.5   CHLORIDE mmol/L  --  118*  --   --   --  118*  --  117*  --  117* 115*  --   --  117*  --   --   --  114*  --  113*  --  112*   < > 114*   CO2 mmol/L  --  22  --   --   --  22  --  22  --  16* 22  --   --  22  --   --   --  26  --  25  --  25   < > 21   CO2, I-STAT mmol/L  --   --   --   --   --   --   --   --   --   --   --  20*  --   --  19*  --   --   --   --   --   --   --   --   --    BUN mg/dL  --  27*  --   --   --  27*  --  27*  --  27* 25  --   --  25  --   --   --  24  --  25  --  23   < > 22   CREATININE mg/dL  --  1.26  --   --   --  1.27  --  1.25  --  1.18 1.06  --   --  0.98  --   --   --  0.86  --  0.92  --  0.76   < > 0.69   CALCIUM mg/dL  --  7.8*  --   --   --  7.5*  --  7.5*  --  7.5* 7.3*  --   --  7.4*  --   --   --  7.5*  --  7.5*  --  7.5*   < > 7.0*   MAGNESIUM mg/dL  --   --  1.8*  --   --   --   --   --   --  1.9  --   --   --   --   --   --   --   --   --  1.9  --   --   --  2.3   PHOSPHORUS mg/dL  --  4.0 3.9  --   --   --   --   --   --  4.4*  --   --   --   --   --   --   --   --   --   --   --   --   --   --    GLUCOSE, ISTAT mg/dl  --   --   --   --   --   --   --   --   --   --   --  275*  --   --  275*  --   --   --   --   --   --   --   --   --     < > = values in this interval not displayed.

## 2024-03-13 NOTE — ASSESSMENT & PLAN NOTE
Malnutrition Findings:   Adult Malnutrition type: Acute illness  Adult Degree of Malnutrition: Other severe protein calorie malnutrition  Malnutrition Characteristics: Fluid accumulation, Inadequate energy                  360 Statement: Severe calorie-protein malnutrition in context to acute illness r/t GI bleed, dysphagia, inadequate PO intake as evidance by energy intake less than 50% compared to estimated needs >5 days, +2 generalized edema; Treated with TBD (diet +oral supplements vs nutrition support)    BMI Findings:           Body mass index is 31.75 kg/m².   Started tube feeds 3/12  Holding TF 3/13 for possible procedure w GI

## 2024-03-13 NOTE — ASSESSMENT & PLAN NOTE
Patient was initially on heparin due to acute DVT, developed bloody stools, underwent flexible sigmoidoscopy revealing ulcerated rectal mucosa, status post cauterization  Persistent bloody bowel movements with passing of clots overnight 3/10 into 3/11. Hemoglobin 6.7 down from 9 the day prior  Transfused 2 units packed red blood cells  Repeat Flex sig w GI on 3/11: multiple ulcers throughout colon, biopsies taken; one oozing ulcer in transverse colon, clipped with hemostasis; severe diverticula; prolapsed rectum; rectal ulcers.  Ddx includes inflammatory bowel disease, ischemic colitis, and infectious etiologies    Pt w BRBPR again overnight, associated with marked drop in Hgb to 6.8 this AM  -Overnight team discussed w GI, weighing C-scope and/or CTA  -Hold TF for possible procedure later today  -Transfuse 1 U pRBC, in process  -Check Hgb q8h  -F/u pathology results   -maintain active T&S, transfuse for Hg < 7.0

## 2024-03-13 NOTE — ASSESSMENT & PLAN NOTE
Lab Results   Component Value Date    HGBA1C 5.9 (H) 03/03/2024       Recent Labs     03/12/24  0833 03/12/24  1117 03/12/24  1752 03/12/24  2343   POCGLU 172* 175* 154* 180*       Blood Sugar Average: Last 72 hrs:  (P) [id number]    History of DM, well-controlled with last A1c 5.6 on 2/26, but now 5.9. Hyperglycemia in the setting of steroid use.    ISS and finger sticks q6h while NPO  Algorithm 3, increased from 2 for persistent hyperglycemia. Had previously been decreased from 3 to 2 for one episode of hypoglycemia.  Avoid hypoglycemia per protocol  Monitor BG and insulin requirements and adjust regimen as needed

## 2024-03-13 NOTE — QUICK NOTE
3/12: I notified GI team that the patient had a decrease in Hgb from 12 to 8.6. Repeat CBC showed global downtrend with Hgb 7.9. BP stable, still off pressors. I notified their team that the patient had a BM tonight around 2100; stool was soft and brown, no clots, no rupesh blood. Pt did not have any other obvious signs of bleeding, although she has petechiae and oozing skin wounds.     Of note, pt had undergone flex sig with GI that found ulcers throughout colon with 1x oozing hemorrhage clipped on 3/11. Pt has received 4u PRBC total transfused between 3/09 and 3/11 where Hgb went from 6.3 to 12.5 with possible component of hemoconcentration from intravascular volume depletion.     Per GI recs tonight, no indication for PRBC transfusion or further imaging at this time. They recommended continuing serial H/H and monitoring for bloody bowel movements.

## 2024-03-13 NOTE — WOUND OSTOMY CARE
Progress Note - Wound   Darlyn Nguyen 78 y.o. female MRN: [id number]  Unit/Bed#: ICU 02 Encounter: [id number]        Assessment:   Patient seen for wound care follow-up.  She is intubated and restrained.  On low air-loss mattress with ATR positioning system in place.  Dependent for all cares.  Peralta catheter in place, incontinent of frequent liquid stools due to GI bleed--patient is not a candidate for fecal management system and has wounds immediately adjacent to anus (unable to secure a fecal pouch).  Currently NPO, nutrition team following.  Bilateral heels intact, pink, blanchable with preventative foam dressings in place.  Breast folds intact.    Present on admission evolving deep tissue pressure injury to mid/right sacrum--previously mid sacrum and right buttock wound have converged.  Wound bed 30% purple, 70% brown with increased depth. Scant serosanguinous drainage. Radha-wound intact with blanchable erythema.  Measuring larger with increased slough in base.    Present on admission evolving deep tissue pressure injury to left buttock--70% purple, 30% beefy red wound bed with scant serosanguinous drainage.  Radha-wound intact with blanchable erythema.  Measuring larger.  Present on admission wound to perineum/radha-rectal area of unclear etiology--pale pink with yellow fibrinous tissue, full thickness tissue loss with small serous drainage.    MASD to groin--groin folds intact.    Labia and medial thighs with unclear etiology--Labia erythematous with scattered ulcerations.  Scattered ulcerations with pink and yellow tissue extending from radha-rectal area to medial thighs.  Small serous drainage from some areas, no drainage from others.  Radha-wound intact.  Wounds possibly herpetic with atypical presentation.  ID physician at bedside to evaluate wounds at this time.      Deep tissue pressure injuries can be stage 3, 4, or unstageable when fully evolved.  See flowsheet for wound details.     Overall buttock and  sacral wounds with signs of deterioration.  Patient is critically ill with recurring active GI bleed and anemia requiring multiple transfusions.  Patient coagulopathic with thrombocytopenia.  Requiring pressors.  Documented severe calorie-protein malnutrition.      Active GI bleed is requiring frequent dressing changes, at times every hour to sacral wounds.  Not appropriate use for Santyl.  At this time, would recommend changing sacral wound care to wet-to-dry dressing twice daily and as needed.  Will re-evaluate appropriateness of Santyl later this week.  Depending on goals of care, sacral wound may benefit from surgical debridement when patient is more stable.  Discussed recommendations to hold Santyl for short time until GI bleed is resolved with Dr. Ashley via tiger text.      Wound Care Plan:   1-Hydraguard lotion to bilateral heels twice daily and as needed.  2-Elevate heels off of bed/chair surface to offload pressure--offloading heel boots.  3-Offloading air cushion in chair when out of bed.  4-Moisturize skin daily with skin nourishing lotion.  5-Turn/reposition every 2 hours while in bed using positioning wedges; and weight shift frequently while in chair for pressure re-distribution on skin.   6-Low air-loss mattress--Place order for P500 if transferred out of ICU.  7-Breast folds--cleanse with soap and water, pat dry.  Apply Interdry in single layer to skin fold, ensure approximately 2 inches of fabric is visible outside of fold.  Remove Interdry daily for bathing and re-apply.  Change Interdry sheet every 3 days or if visibly soiled.  DO NOT USE CREAMS OR POWDERS IN AREAS WHERE INTERDRY IS IN USE.  8-Perineum/radha-rectal and medial thigh wounds--cleanse with soap and water, pat dry.  Dust open areas with stomahesive powder then apply zinc oxide protect paste (Calazime) three times daily and as needed with incontinence care.  9-Buttocks/sacrum--cleanse with normal saline, pat dry.  Apply normal saline  moistened gauze to open areas.  Cover with ABD.  Change dressing twice daily and as needed with soilage.     Wound care team to follow.  Plan of care reviewed with primary RN.    Wound 02/29/24 Pressure Injury Sacrum Right (Active)   Wound Image   03/13/24 0924   Pressure Injury Stage DTPI 03/13/24 0924   Wound Length (cm) 7 cm 03/13/24 0924   Wound Width (cm) 7 cm 03/13/24 0924   Wound Depth (cm) 0.5 cm 03/13/24 0924   Wound Surface Area (cm^2) 49 cm^2 03/13/24 0924   Wound Volume (cm^3) 24.5 cm^3 03/13/24 0924   Calculated Wound Volume (cm^3) 24.5 cm^3 03/13/24 0924   Change in Wound Size % -1976.27 03/13/24 0924       Wound 02/29/24 MASD Perineum (Active)   Wound Image   03/13/24 0926   Wound Length (cm) 6.7 cm 03/13/24 0926   Wound Width (cm) 6.8 cm 03/13/24 0926   Wound Depth (cm) 0.2 cm 03/13/24 0926   Wound Surface Area (cm^2) 45.56 cm^2 03/13/24 0926   Wound Volume (cm^3) 9.112 cm^3 03/13/24 0926   Calculated Wound Volume (cm^3) 9.11 cm^3 03/13/24 0926   Change in Wound Size % -264.4 03/13/24 0926   Drainage Amount Small 03/13/24 0926   Drainage Description Serous 03/13/24 0926   Non-staged Wound Description Full thickness 03/13/24 0926       Wound 02/29/24 MASD Groin Left (Active)   Wound Image   03/13/24 0933   Wound Length (cm) 16 cm 03/13/24 0933   Wound Width (cm) 14 cm 03/13/24 0933   Wound Depth (cm) 0.1 cm 03/13/24 0933   Wound Surface Area (cm^2) 224 cm^2 03/13/24 0933   Wound Volume (cm^3) 22.4 cm^3 03/13/24 0933   Calculated Wound Volume (cm^3) 22.4 cm^3 03/13/24 0933   Change in Wound Size % -71872.33 03/13/24 0933   Drainage Amount None 03/13/24 0933       Wound 03/01/24 Pressure Injury Buttocks Left (Active)   Wound Image   03/13/24 0925   Pressure Injury Stage DTPI 03/13/24 0925   Wound Length (cm) 6 cm 03/13/24 0925   Wound Width (cm) 3.4 cm 03/13/24 0925   Wound Depth (cm) 0.1 cm 03/13/24 0925   Wound Surface Area (cm^2) 20.4 cm^2 03/13/24 0925   Wound Volume (cm^3) 2.04 cm^3 03/13/24 0925    Calculated Wound Volume (cm^3) 2.04 cm^3 03/13/24 0925   Change in Wound Size % 43.33 03/13/24 0925         Liz CHRISTENSENN, RN, CWON

## 2024-03-13 NOTE — PLAN OF CARE
Problem: Prexisting or High Potential for Compromised Skin Integrity  Goal: Skin integrity is maintained or improved  Description: INTERVENTIONS:  - Identify patients at risk for skin breakdown  - Assess and monitor skin integrity  - Assess and monitor nutrition and hydration status  - Monitor labs   - Assess for incontinence   - Turn and reposition patient  - Assist with mobility/ambulation  - Relieve pressure over bony prominences  - Avoid friction and shearing  - Provide appropriate hygiene as needed including keeping skin clean and dry  - Evaluate need for skin moisturizer/barrier cream  - Collaborate with interdisciplinary team   - Patient/family teaching  - Consider wound care consult   Outcome: Progressing     Problem: SAFETY ADULT  Goal: Patient will remain free of falls  Description: INTERVENTIONS:  - Educate patient/family on patient safety including physical limitations  - Instruct patient to call for assistance with activity   - Consult OT/PT to assist with strengthening/mobility   - Keep Call bell within reach  - Keep bed low and locked with side rails adjusted as appropriate  - Keep care items and personal belongings within reach  - Initiate and maintain comfort rounds  - Make Fall Risk Sign visible to staff  - Offer Toileting every 2 Hours, in advance of need  - Initiate/Maintain bed alarm  - Obtain necessary fall risk management equipment  - Apply yellow socks and bracelet for high fall risk patients  - Consider moving patient to room near nurses station  Outcome: Progressing  Goal: Maintain or return to baseline ADL function  Description: INTERVENTIONS:  -  Assess patient's ability to carry out ADLs; assess patient's baseline for ADL function and identify physical deficits which impact ability to perform ADLs (bathing, care of mouth/teeth, toileting, grooming, dressing, etc.)  - Assess/evaluate cause of self-care deficits   - Assess range of motion  - Assess patient's mobility; develop plan if  impaired  - Assess patient's need for assistive devices and provide as appropriate  - Encourage maximum independence but intervene and supervise when necessary  - Involve family in performance of ADLs  - Assess for home care needs following discharge   - Consider OT consult to assist with ADL evaluation and planning for discharge  - Provide patient education as appropriate  Outcome: Progressing  Goal: Maintains/Returns to pre admission functional level  Description: INTERVENTIONS:  - Perform AM-PAC 6 Click Basic Mobility/ Daily Activity assessment daily.  - Set and communicate daily mobility goal to care team and patient/family/caregiver.   - Collaborate with rehabilitation services on mobility goals if consulted  - Perform Range of Motion 3 times a day.  - Reposition patient every 3 hours.  - Dangle patient 3 times a day  - Stand patient 3 times a day  - Ambulate patient 3 times a day  - Out of bed to chair 3 times a day   - Out of bed for meals 3 times a day  - Out of bed for toileting  - Record patient progress and toleration of activity level   Outcome: Progressing     Problem: DISCHARGE PLANNING  Goal: Discharge to home or other facility with appropriate resources  Description: INTERVENTIONS:  - Identify barriers to discharge w/patient and caregiver  - Arrange for needed discharge resources and transportation as appropriate  - Identify discharge learning needs (meds, wound care, etc.)  - Arrange for interpretive services to assist at discharge as needed  - Refer to Case Management Department for coordinating discharge planning if the patient needs post-hospital services based on physician/advanced practitioner order or complex needs related to functional status, cognitive ability, or social support system  Outcome: Progressing     Problem: Knowledge Deficit  Goal: Patient/family/caregiver demonstrates understanding of disease process, treatment plan, medications, and discharge instructions  Description:  Complete learning assessment and assess knowledge base.  Interventions:  - Provide teaching at level of understanding  - Provide teaching via preferred learning methods  Outcome: Progressing     Problem: Nutrition/Hydration-ADULT  Goal: Nutrient/Hydration intake appropriate for improving, restoring or maintaining nutritional needs  Description: Monitor and assess patient's nutrition/hydration status for malnutrition. Collaborate with interdisciplinary team and initiate plan and interventions as ordered.  Monitor patient's weight and dietary intake as ordered or per policy. Utilize nutrition screening tool and intervene as necessary. Determine patient's food preferences and provide high-protein, high-caloric foods as appropriate.     INTERVENTIONS:  - Monitor oral intake, urinary output, labs, and treatment plans  - Assess nutrition and hydration status and recommend course of action  - Evaluate amount of meals eaten  - Assist patient with eating if necessary   - Allow adequate time for meals  - Recommend/ encourage appropriate diets, oral nutritional supplements, and vitamin/mineral supplements  - Order, calculate, and assess calorie counts as needed  - Recommend, monitor, and adjust tube feedings and TPN/PPN based on assessed needs  - Assess need for intravenous fluids  - Provide specific nutrition/hydration education as appropriate  - Include patient/family/caregiver in decisions related to nutrition  Outcome: Progressing     Problem: INFECTION - ADULT  Goal: Absence or prevention of progression during hospitalization  Description: INTERVENTIONS:  - Assess and monitor for signs and symptoms of infection  - Monitor lab/diagnostic results  - Monitor all insertion sites, i.e. indwelling lines, tubes, and drains  - Monitor endotracheal if appropriate and nasal secretions for changes in amount and color  - Rico appropriate cooling/warming therapies per order  - Administer medications as ordered  - Instruct and  encourage patient and family to use good hand hygiene technique  - Identify and instruct in appropriate isolation precautions for identified infection/condition  Outcome: Progressing     Problem: SAFETY,RESTRAINT: NV/NON-SELF DESTRUCTIVE BEHAVIOR  Goal: Remains free of harm/injury (restraint for non violent/non self-detsructive behavior)  Description: INTERVENTIONS:  - Instruct patient/family regarding restraint use   - Assess and monitor physiologic and psychological status   - Provide interventions and comfort measures to meet assessed patient needs   - Identify and implement measures to help patient regain control  - Assess readiness for release of restraint   Outcome: Progressing  Goal: Returns to optimal restraint-free functioning  Description: INTERVENTIONS:  - Assess the patient's behavior and symptoms that indicate continued need for restraint  - Identify and implement measures to help patient regain control  - Assess readiness for release of restraint   Outcome: Progressing

## 2024-03-13 NOTE — ASSESSMENT & PLAN NOTE
E. avium bacteremia. Pt recently started on prednisone, atovaquone, hydroxychloroquine, and mycophenolate for lupus. Colonic/rectal ulcerations possible point of entry.     Repeat cultures show NGTD  Susceptibilities show pan-sensitive enterococcus    ID on board, appreciate recommendations  Continue Unasyn 3 g Q6H (Day 10 Unasyn of anticipated 6-wk course)  Can hold off DYANA, per ID, as unlikely to

## 2024-03-13 NOTE — PLAN OF CARE
Problem: SAFETY ADULT  Goal: Patient will remain free of falls  Description: INTERVENTIONS:  - Educate patient/family on patient safety including physical limitations  - Instruct patient to call for assistance with activity   - Consult OT/PT to assist with strengthening/mobility   - Keep Call bell within reach  - Keep bed low and locked with side rails adjusted as appropriate  - Keep care items and personal belongings within reach  - Initiate and maintain comfort rounds  - Make Fall Risk Sign visible to staff  - Offer Toileting in advance of need  - Initiate/Maintain bed alarm  - Obtain necessary fall risk management equipment  - Apply yellow socks and bracelet for high fall risk patients  - Consider moving patient to room near nurses station  Outcome: Progressing  Goal: Maintain or return to baseline ADL function  Description: INTERVENTIONS:  -  Assess patient's ability to carry out ADLs; assess patient's baseline for ADL function and identify physical deficits which impact ability to perform ADLs (bathing, care of mouth/teeth, toileting, grooming, dressing, etc.)  - Assess/evaluate cause of self-care deficits   - Assess range of motion  - Assess patient's mobility; develop plan if impaired  - Assess patient's need for assistive devices and provide as appropriate  - Encourage maximum independence but intervene and supervise when necessary  - Involve family in performance of ADLs  - Assess for home care needs following discharge   - Consider OT consult to assist with ADL evaluation and planning for discharge  - Provide patient education as appropriate  Outcome: Progressing  Goal: Maintains/Returns to pre admission functional level  Description: INTERVENTIONS:  - Perform AM-PAC 6 Click Basic Mobility/ Daily Activity assessment daily.  - Set and communicate daily mobility goal to care team and patient/family/caregiver.   - Collaborate with rehabilitation services on mobility goals if consulted  - Out of bed for  toileting  - Record patient progress and toleration of activity level   Outcome: Progressing     Problem: DISCHARGE PLANNING  Goal: Discharge to home or other facility with appropriate resources  Description: INTERVENTIONS:  - Identify barriers to discharge w/patient and caregiver  - Arrange for needed discharge resources and transportation as appropriate  - Identify discharge learning needs (meds, wound care, etc.)  - Arrange for interpretive services to assist at discharge as needed  - Refer to Case Management Department for coordinating discharge planning if the patient needs post-hospital services based on physician/advanced practitioner order or complex needs related to functional status, cognitive ability, or social support system  Outcome: Progressing     Problem: Nutrition/Hydration-ADULT  Goal: Nutrient/Hydration intake appropriate for improving, restoring or maintaining nutritional needs  Description: Monitor and assess patient's nutrition/hydration status for malnutrition. Collaborate with interdisciplinary team and initiate plan and interventions as ordered.  Monitor patient's weight and dietary intake as ordered or per policy. Utilize nutrition screening tool and intervene as necessary. Determine patient's food preferences and provide high-protein, high-caloric foods as appropriate.     INTERVENTIONS:  - Monitor oral intake, urinary output, labs, and treatment plans  - Assess nutrition and hydration status and recommend course of action  - Evaluate amount of meals eaten  - Assist patient with eating if necessary   - Allow adequate time for meals  - Recommend/ encourage appropriate diets, oral nutritional supplements, and vitamin/mineral supplements  - Order, calculate, and assess calorie counts as needed  - Recommend, monitor, and adjust tube feedings and TPN/PPN based on assessed needs  - Assess need for intravenous fluids  - Provide specific nutrition/hydration education as appropriate  - Include  patient/family/caregiver in decisions related to nutrition  Outcome: Progressing     Problem: SAFETY,RESTRAINT: NV/NON-SELF DESTRUCTIVE BEHAVIOR  Goal: Remains free of harm/injury (restraint for non violent/non self-detsructive behavior)  Description: INTERVENTIONS:  - Instruct patient/family regarding restraint use   - Assess and monitor physiologic and psychological status   - Provide interventions and comfort measures to meet assessed patient needs   - Identify and implement measures to help patient regain control  - Assess readiness for release of restraint   Outcome: Progressing  Goal: Returns to optimal restraint-free functioning  Description: INTERVENTIONS:  - Assess the patient's behavior and symptoms that indicate continued need for restraint  - Identify and implement measures to help patient regain control  - Assess readiness for release of restraint   Outcome: Progressing     Problem: Prexisting or High Potential for Compromised Skin Integrity  Goal: Skin integrity is maintained or improved  Description: INTERVENTIONS:  - Identify patients at risk for skin breakdown  - Assess and monitor skin integrity  - Assess and monitor nutrition and hydration status  - Monitor labs   - Assess for incontinence   - Turn and reposition patient  - Assist with mobility/ambulation  - Relieve pressure over bony prominences  - Avoid friction and shearing  - Provide appropriate hygiene as needed including keeping skin clean and dry  - Evaluate need for skin moisturizer/barrier cream  - Collaborate with interdisciplinary team   - Patient/family teaching  - Consider wound care consult   Outcome: Not Progressing

## 2024-03-13 NOTE — ASSESSMENT & PLAN NOTE
Continue omeprazole 20 mg daily which helps her with gastric distress. Wound Care on board, appreciate recommendations  Repositioning and wound care with nursing

## 2024-03-13 NOTE — ASSESSMENT & PLAN NOTE
Possibly related to hepatic congestion vs medication vs sepsis/bacteremia  Peripheral blood smear shows normochromic normocytic anemia with nRBCs and polychromasia, no schistocytes, mild thrombocytopenia, mild leukocytosis with neutrophilia.  Monitor CBC closely  Transfuse FFP for plt<10, <20 if actively bleeding

## 2024-03-13 NOTE — ASSESSMENT & PLAN NOTE
Patient with significant anasarca likely component of iatrogenic fluid overload, decreased albumin, oliguria  Preserved EF on echocardiogram  Did not respond to Lasix 40 IV last night  Repeat metolazone and Lasix 80 mg IV this AM  Monitor volume status and UOP

## 2024-03-13 NOTE — PLAN OF CARE
Problem: Prexisting or High Potential for Compromised Skin Integrity  Goal: Skin integrity is maintained or improved  Description: INTERVENTIONS:  - Identify patients at risk for skin breakdown  - Assess and monitor skin integrity  - Assess and monitor nutrition and hydration status  - Monitor labs   - Assess for incontinence   - Turn and reposition patient  - Assist with mobility/ambulation  - Relieve pressure over bony prominences  - Avoid friction and shearing  - Provide appropriate hygiene as needed including keeping skin clean and dry  - Evaluate need for skin moisturizer/barrier cream  - Collaborate with interdisciplinary team   - Patient/family teaching  - Consider wound care consult   Outcome: Progressing     Problem: SAFETY ADULT  Goal: Patient will remain free of falls  Description: INTERVENTIONS:  - Educate patient/family on patient safety including physical limitations  - Instruct patient to call for assistance with activity   - Consult OT/PT to assist with strengthening/mobility   - Keep Call bell within reach  - Keep bed low and locked with side rails adjusted as appropriate  - Keep care items and personal belongings within reach  - Initiate and maintain comfort rounds  - Make Fall Risk Sign visible to staff  - Initiate/Maintain bed alarm  - Obtain necessary fall risk management equipment  - Apply yellow socks and bracelet for high fall risk patients  - Consider moving patient to room near nurses station  Outcome: Progressing  Goal: Maintain or return to baseline ADL function  Description: INTERVENTIONS:  -  Assess patient's ability to carry out ADLs; assess patient's baseline for ADL function and identify physical deficits which impact ability to perform ADLs (bathing, care of mouth/teeth, toileting, grooming, dressing, etc.)  - Assess/evaluate cause of self-care deficits   - Assess range of motion  - Assess patient's mobility; develop plan if impaired  - Assess patient's need for assistive devices  and provide as appropriate  - Encourage maximum independence but intervene and supervise when necessary  - Involve family in performance of ADLs  - Assess for home care needs following discharge   - Consider OT consult to assist with ADL evaluation and planning for discharge  - Provide patient education as appropriate  Outcome: Progressing  Goal: Maintains/Returns to pre admission functional level  Description: INTERVENTIONS:  - Perform AM-PAC 6 Click Basic Mobility/ Daily Activity assessment daily.  - Set and communicate daily mobility goal to care team and patient/family/caregiver.   - Collaborate with rehabilitation services on mobility goals if consulted  - Perform Range of Motion 3 times a day.  - Reposition patient every 2 hours.  - Dangle patient 3 times a day  - Stand patient 3 times a day  - Ambulate patient 3 times a day  - Out of bed to chair 3 times a day   - Out of bed for meals 3 times a day  - Out of bed for toileting  - Record patient progress and toleration of activity level   Outcome: Progressing     Problem: DISCHARGE PLANNING  Goal: Discharge to home or other facility with appropriate resources  Description: INTERVENTIONS:  - Identify barriers to discharge w/patient and caregiver  - Arrange for needed discharge resources and transportation as appropriate  - Identify discharge learning needs (meds, wound care, etc.)  - Arrange for interpretive services to assist at discharge as needed  - Refer to Case Management Department for coordinating discharge planning if the patient needs post-hospital services based on physician/advanced practitioner order or complex needs related to functional status, cognitive ability, or social support system  Outcome: Progressing     Problem: Knowledge Deficit  Goal: Patient/family/caregiver demonstrates understanding of disease process, treatment plan, medications, and discharge instructions  Description: Complete learning assessment and assess knowledge  base.  Interventions:  - Provide teaching at level of understanding  - Provide teaching via preferred learning methods  Outcome: Progressing     Problem: Nutrition/Hydration-ADULT  Goal: Nutrient/Hydration intake appropriate for improving, restoring or maintaining nutritional needs  Description: Monitor and assess patient's nutrition/hydration status for malnutrition. Collaborate with interdisciplinary team and initiate plan and interventions as ordered.  Monitor patient's weight and dietary intake as ordered or per policy. Utilize nutrition screening tool and intervene as necessary. Determine patient's food preferences and provide high-protein, high-caloric foods as appropriate.     INTERVENTIONS:  - Monitor oral intake, urinary output, labs, and treatment plans  - Assess nutrition and hydration status and recommend course of action  - Evaluate amount of meals eaten  - Assist patient with eating if necessary   - Allow adequate time for meals  - Recommend/ encourage appropriate diets, oral nutritional supplements, and vitamin/mineral supplements  - Order, calculate, and assess calorie counts as needed  - Recommend, monitor, and adjust tube feedings and TPN/PPN based on assessed needs  - Assess need for intravenous fluids  - Provide specific nutrition/hydration education as appropriate  - Include patient/family/caregiver in decisions related to nutrition  Outcome: Progressing     Problem: INFECTION - ADULT  Goal: Absence or prevention of progression during hospitalization  Description: INTERVENTIONS:  - Assess and monitor for signs and symptoms of infection  - Monitor lab/diagnostic results  - Monitor all insertion sites, i.e. indwelling lines, tubes, and drains  - Monitor endotracheal if appropriate and nasal secretions for changes in amount and color  - [place] appropriate cooling/warming therapies per order  - Administer medications as ordered  - Instruct and encourage patient and family to use good hand hygiene  technique  - Identify and instruct in appropriate isolation precautions for identified infection/condition  Outcome: Progressing     Problem: SAFETY,RESTRAINT: NV/NON-SELF DESTRUCTIVE BEHAVIOR  Goal: Remains free of harm/injury (restraint for non violent/non self-detsructive behavior)  Description: INTERVENTIONS:  - Instruct patient/family regarding restraint use   - Assess and monitor physiologic and psychological status   - Provide interventions and comfort measures to meet assessed patient needs   - Identify and implement measures to help patient regain control  - Assess readiness for release of restraint   Outcome: Progressing  Goal: Returns to optimal restraint-free functioning  Description: INTERVENTIONS:  - Assess the patient's behavior and symptoms that indicate continued need for restraint  - Identify and implement measures to help patient regain control  - Assess readiness for release of restraint   Outcome: Progressing

## 2024-03-14 ENCOUNTER — TELEPHONE (OUTPATIENT)
Dept: HEMATOLOGY ONCOLOGY | Facility: CLINIC | Age: 79
End: 2024-03-14

## 2024-03-14 PROBLEM — N76.6 GENITAL LABIAL ULCER: Status: ACTIVE | Noted: 2024-03-14

## 2024-03-14 NOTE — TELEPHONE ENCOUNTER
Appointment Change  Cancel, Reschedule, Change to Virtual      Who are you speaking with? Spouse   If it is not the patient, is the caller listed on the communication consent form? Yes   Which provider is the appointment scheduled with? AUGUSTA Mcdonald   When was the original appointment scheduled?    Please list date and time 3/19   At which location is the appointment scheduled to take place? [place]   Was the appointment rescheduled?     Was the appointment changed from an in person visit to a virtual visit?    If so, please list the details of the change. 4/19 2:30pm   What is the reason for the appointment change? Hospitalized ICU       Was [facility] transport scheduled? No   Does [facility] transport need to be scheduled for the new visit (if applicable) No   Does the patient need an infusion appointment rescheduled? No   Does the patient have an upcoming infusion appointment scheduled? If so, when? No   Is the patient undergoing chemotherapy? No   For appointments cancelled with less than 24 hours:  Was the no-show policy reviewed? Yes

## 2024-03-14 NOTE — ASSESSMENT & PLAN NOTE
Diagnosed in December,   Initially started on steroids, prednisone 60 mg daily, which patient had tapered to 30 mg daily prior to admission with the addition of atovaquone, hydroxychloroquine, and mycophenolate    Holding atovaquone, hydroxychloroquine, and mycophenolate in the setting of acute infection    random cortisol 10.9 collected 3/11     Started Solumedrol 1 g QD for planned 3-day course, to consider steroid-sparing agents pending response and work-up  CRP elevated at 39.9, ESR wnl <1, C3 and C4 both low. Though non-specific, c/w lupus.  U protien:Cr ordered  Anti-ds DNA Ab ordered  Rheumatology on board, appreciate recommendations

## 2024-03-14 NOTE — PROGRESS NOTES
[facility] - Progress Note  Darlyn Nguyen 78 y.o. female MRN: [id number]  Unit/Bed#: ICU 02 Encounter: [id number]      ASSESSMENT & PLAN:    77 y/o female w hx of SLE, chronic anemia, HTN, chronic diarrhea, DVT previously on AC now s/p IVC filter, ambulatory dysfx, T2DM, CVA w R sided weakness, depression initially presented with diarrhe and CT scan showing colitis. Hospital course with enterococcus bacteremia, sacral wound cx w E coli + proteus mirabilis + MDR pseudomonas. Developed septic shock, required ICU level of care. S/p flex sig 3/6/24 for bloody stools noting rectal ulcerations, treated w cautery. Developed repeat bleeds over the weekend, mental status deterioration requiring ICU stay again, and colonoscopy 3/11/24 w bleeding colonic ulcer s/p endoclip w hemostasis. Repeat colonoscopy 3/13/24 with additional bleed and worsening diffuse ulcerations.      LGIB in s/o Colonic Ulcerations  Q12 Hg   Appreciate ID recs in s/o positive CMV biopsies  CMV PCR  Valgancyclovir  +/- CTA given etiology of ulcerations hopefully identified     Rectal Prolapse  Rectal Ulcerations  These lesions do not appear to have been bleeding on 3/11/24 colonoscopy or 3/13/24 colonoscopy  Outpt colorectal surgery f/u  ______________________________________________________________________    SUBJECTIVE:     S/p repeat colonoscopy 3/13/24 w worsening diffuse ulcerations from transverse colon to cecum w one additional bleeding ulcer at hepatic flexure (previously treated bleeding ulcer without recurrence adjacent to new source). Treated with endoclips. Hemostasis achieved, Hg stabilized and no new reported bloody BMs. Abd exam unchanged.     Path from 3/11/24 colonoscopy returned as positive for CMV.     Scheduled Meds:  Current Facility-Administered Medications   Medication Dose Route Frequency Provider Last Rate    acetaminophen  650 mg Oral Q6H PRN AUGUSTA Barber      ampicillin-sulbactam  3 g  Intravenous Q6H AUGUSTA Barber Stopped (03/14/24 1200)    atorvastatin  40 mg Oral Daily AUGUSTA Barber      carvedilol  6.25 mg Oral BID With Meals AUGUSTA Barber      dextrose 5 % with KCl 20 mEq/L  50 mL/hr Intravenous Continuous Uche Blair MD 50 mL/hr (03/14/24 1106)    Followed by    [START ON 3/15/2024] dextrose 5 % with KCl 20 mEq/L  50 mL/hr Intravenous Continuous Uche Blair MD      diltiazem  30 mg Oral Q6H [facility] AUGUSTA Barber      escitalopram  10 mg Oral Daily AUGUSTA Barber      famotidine  20 mg Oral Daily AUGUSTA Barber      fentaNYL  50 mcg Intravenous Q2H PRN AUGUSTA Stahl      [START ON 3/15/2024] furosemide  10 mg/hr Intravenous Continuous Uche Blair MD      ganciclovir  2.5 mg/kg (Adjusted) Intravenous Q24H Ana Laura Dalton MD      insulin regular (HumuLIN R,NovoLIN R) 1 Units/mL in sodium chloride 0.9 % 100 mL infusion  0.3-21 Units/hr Intravenous Titrated Uche Blair MD 1.5 Units/hr (03/14/24 1207)    levalbuterol  1.25 mg Nebulization Q8H PRN AUGUSTA Barber      methylPREDNISolone sodium succinate  1,000 mg Intravenous Daily Uche Blair MD Stopped (03/14/24 1000)    omeprazole (PRILOSEC) suspension 2 mg/mL  20 mg Oral Daily AUGUSTA Barber      ondansetron  4 mg Intravenous Q4H PRN AUGUSTA Barber      propofol  5-50 mcg/kg/min Intravenous Titrated Uche Blair MD Stopped (03/13/24 1600)    sodium chloride  100 mL/hr Intravenous Continuous Uche Blair MD       Continuous Infusions:dextrose 5 % with KCl 20 mEq/L, 50 mL/hr, Last Rate: 50 mL/hr (03/14/24 1106)   Followed by  [START ON 3/15/2024] dextrose 5 % with KCl 20 mEq/L, 50 mL/hr  [START ON 3/15/2024] furosemide, 10 mg/hr  insulin regular (HumuLIN R,NovoLIN R) 1 Units/mL in sodium chloride 0.9 % 100 mL infusion, 0.3-21 Units/hr, Last Rate: 1.5 Units/hr (03/14/24 1207)  propofol, 5-50 mcg/kg/min, Last Rate: Stopped (03/13/24 1600)  sodium chloride, 100  "mL/hr      PRN Meds:.  acetaminophen    fentaNYL    levalbuterol    ondansetron    OBJECTIVE:     Objective   Blood pressure 134/61, pulse 86, temperature 98.2 °F (36.8 °C), temperature source Bladder, resp. rate (!) 26, height 5' 4\" (1.626 m), weight 83.6 kg (184 lb 4.9 oz), SpO2 97%. Body mass index is 31.64 kg/m².    Intake/Output Summary (Last 24 hours) at 3/14/2024 1302  Last data filed at 3/14/2024 1200  Gross per 24 hour   Intake 5814.15 ml   Output 3200 ml   Net 2614.15 ml       PHYSICAL EXAM:   General Appearance: Intubated and sedated  Abdomen: Soft, non-tender, non-distended; no masses or no organomegaly    Invasive Devices       Peripherally Inserted Central Catheter Line  Duration             PICC Line 03/08/24 Right Basilic 5 days              Central Venous Catheter Line  Duration             CVC Central Lines 03/11/24 Triple 20cm 2 days              Arterial Line  Duration             Arterial Line 03/11/24 Right Radial 2 days              Drain  Duration             NG/OG/Enteral Tube Orogastric Center mouth -- days    Urethral Catheter Latex;Straight-tip 16 Fr. 13 days              Airway  Duration             ETT  Cuffed 7.5 mm 3 days                    LAB RESULTS:      Lab Units 03/14/24  1128 03/14/24  0432 03/13/24  2008 03/13/24  1631 03/13/24  0610 03/13/24  0351 03/12/24  0835 03/12/24  0436 03/11/24  0304 03/10/24  0443 03/08/24  0505 03/07/24  0418 03/05/24  0450 03/04/24  0455   SODIUM mmol/L 145 147 148* 149* 150*  --    < > 145   < > 146   < > 147   < > 138   POTASSIUM mmol/L 4.0 3.4* 4.5 3.6 2.8*  --    < > 4.0   < > 3.1*   < > 3.5   < > 3.4*   CHLORIDE mmol/L 116* 115* 117* 118* 118*  --    < > 117*   < > 113*   < > 114*   < > 109*   CO2 mmol/L 22 21 21 21 22  --    < > 16*   < > 25   < > 21   < > 23   CO2, I-STAT   --   --   --   --   --   --   --   --    < >  --   --   --   --   --    BUN mg/dL 23 24 24 24 27*  --    < > 27*   < > 25   < > 22   < > 35*   CREATININE mg/dL 1.15 1.19 " 1.18 1.16 1.26  --    < > 1.18   < > 0.92   < > 0.69   < > 0.88   GLUCOSE RANDOM mg/dL 134 135 149* 159* 231*  --    < > 209*   < > 234*   < > 143*   < > 224*   CALCIUM mg/dL 8.1* 8.2* 8.5 8.2* 7.8*  --    < > 7.5*   < > 7.5*   < > 7.0*   < > 7.4*   MAGNESIUM mg/dL  --  1.9  --   --   --  1.8*  --  1.9  --  1.9  --  2.3  --  1.9   PHOSPHORUS mg/dL  --  3.5  --   --  4.0 3.9  --  4.4*  --   --   --   --   --  2.2*    < > = values in this interval not displayed.            Lab Units 03/14/24  0432 03/13/24  0351 03/11/24  1201 03/11/24  0304 03/09/24  0408 02/22/24  0413 02/21/24  0428   TOTAL PROTEIN g/dL 4.6* 3.7* 3.3* 3.5* 3.3*   < > 4.5*   ALBUMIN g/dL 3.6 2.9* 2.1* 2.3* 1.9*   < > 2.6*   TOTAL BILIRUBIN mg/dL 2.95* 0.82 0.67 0.64 0.72   < > 1.3*   BILI DIR mg/dL  --   --   --   --   --   --  0.3*   BILIRUBIN DIRECT mg/dL 1.66* 0.40* 0.18  --   --   --   --    AST U/L 174* 515* 16 14 15   < > 11   ALT U/L 157* 385* 28 30 42   < > 19   ALK PHOS U/L 194* 60 53 55 74   < > 65    < > = values in this interval not displayed.           Lab Units 03/14/24  1128 03/14/24  0432 03/13/24 2008 03/13/24  1757 03/13/24  1203 03/13/24  0351 03/12/24  2203 03/12/24  1118 03/12/24  0439   WBC Thousand/uL  --  7.35  --  6.25  --  9.47 11.99*  --  18.58*   HEMOGLOBIN g/dL 12.8 12.4 12.0 12.7 6.6* 6.8* 7.9*   < > 13.6   HEMATOCRIT %  --  35.3  --  36.8  --  20.5* 23.8*  --  39.5   PLATELETS Thousands/uL  --  68*  --  23*  --  43* 54*  --  65*   MCV fL  --  84  --  87  --  92 92  --  87    < > = values in this interval not displayed.       Lab Results   Component Value Date    IRON 155 02/26/2024    TIBC <210 (L) 02/26/2024    FERRITIN 2,238 (H) 02/26/2024       Lab Results   Component Value Date    INR 1.22 (H) 03/14/2024    INR 1.65 (H) 03/13/2024    INR 1.25 (H) 03/12/2024    PROTIME 15.7 (H) 03/14/2024    PROTIME 19.7 (H) 03/13/2024    PROTIME 15.9 (H) 03/12/2024       RADIOLOGY RESULTS:   Procedure: Colonoscopy    Result Date:  3/13/2024  Narrative: Table formatting from the original result was not included. [facility] [address] [facility] [address] [phone] DATE OF SERVICE: 3/13/24 PHYSICIAN(S): Attending: Diana M Jaiyeola, MD Fellow: Bryce Medellin MD INDICATION: GI bleed POST-OP DIAGNOSIS: See the impression below. HISTORY: Prior colonoscopy: Less than 3 years ago. It is being repeated at an interval of less than 3 years because: This colonoscopy is being performed for a diagnostic indication BOWEL PREPARATION:  PREPROCEDURE: Informed consent was obtained for the procedure, including sedation. Risks including but not limited to bleeding, infection, perforation, adverse drug reaction and aspiration were explained in detail. Also explained about less than 100% sensitivity with the exam and other alternatives. The patient was placed in the left lateral decubitus position. Procedure: Colonoscopy DETAILS OF PROCEDURE: Patient was taken to the procedure room where a time out was performed to confirm correct patient and correct procedure. The patient underwent monitored anesthesia care, which was administered by an anesthesia professional. The patient's blood pressure, heart rate, level of consciousness, oxygen, respirations, ECG and ETCO2 were monitored throughout the procedure. A digital rectal exam was performed. The scope was introduced through the anus and advanced to the cecum. Retroflexion was performed in the rectum. The quality of bowel preparation was evaluated using the [place] Bowel Preparation Scale with scores of: right colon = 2, transverse colon = 2, left colon = 2. The total BBPS score was 6. Bowel prep was adequate. The patient experienced no blood loss. The procedure was not difficult. The patient tolerated the procedure well. There were no apparent adverse events. ANESTHESIA INFORMATION: ASA: ASA status not filed in the log. Anesthesia Type: Anesthesia type not filed in the log.  MEDICATIONS: No administrations occurring from 1442 to 1557 on 03/13/24 FINDINGS: Multiple ulcers in the ileocecal valve, cecum, ascending colon, hepatic flexure and transverse colon. Multiple ulcers with no active bleeding in the cecum and ascending colon, but most ulcers had adherent clot and pigmented flat spots Ulcer in the hepatic flexure with oozing hemorrhage; placed 2 clips successfully (clips are MRI conditional); hemostasis achieved Ulcer in the hepatic flexure. Previously treated ulcer at hepatic flexure visualized with no rebleeding and previously deployed clip in place Diverticula in the transverse colon, splenic flexure, descending colon and sigmoid colon Abnormal mucosa in the rectum. Previously treated ulcerated lesion in prolapsed rectum revisualized w no active bleeding EVENTS: Procedure Events Event Event Time ENDO CECUM REACHED 3/13/2024  3:10 PM ENDO SCOPE OUT TIME 3/13/2024  3:48 PM SPECIMENS: * No specimens in log * EQUIPMENT: Colonoscope -     Impression: Multiple ulcers in the ileocecal valve, cecum, ascending colon, hepatic flexure and transverse colon Ulcer in the hepatic flexure with oozing hemorrhage; placed 2 clips successfully; hemostasis achieved Ulcer in the hepatic flexure with previously placed clip in place Diverticulosis in the transverse colon, splenic flexure, descending colon and sigmoid colon Abnormal ulcerated mucosa in the rectum RECOMMENDATION:  No further screening colonoscopies necessary  Age greater than 65   Transfuse 2 additional U pRBCs, recheck Hg q8 hrs CT angiography now Surgery consult If pt develops recurrent bleed, please reach out to on call GI team and on call surgery team, but likely pt may warrant CTA    Diana M Jaiyeola, MD     Procedure: Echo follow up/limited w/ contrast if indicated    Result Date: 3/12/2024  Narrative:   Left Ventricle: Left ventricular cavity size is normal. Wall thickness is mildly increased. The left ventricular ejection fraction is  50% by visual estimation. Systolic function is mildly reduced. Although no diagnostic regional wall motion abnormality was identified, this possibility cannot be completely excluded on the basis of this study.   Aortic Valve: There is mild regurgitation.   Tricuspid Valve: There is mild regurgitation. The right ventricular systolic pressure is mildly elevated. The estimated right ventricular systolic pressure is 37.00 mmHg.   Pulmonic Valve: There is mild regurgitation.     Procedure: CT head wo contrast    Result Date: 3/12/2024  Narrative: CT BRAIN - WITHOUT CONTRAST INDICATION:   Patient presents with acute change in mental status, unresponsive. COMPARISON: CT head 3/4/2024 TECHNIQUE:  CT examination of the brain was performed.  Multiplanar 2D reformatted images were created from the source data. Radiation dose length product (DLP) for this visit:  872 mGy-cm .  This examination, like all CT scans performed in the [facility], was performed utilizing techniques to minimize radiation dose exposure, including the use of iterative reconstruction and automated exposure control. IMAGE QUALITY:  Diagnostic. FINDINGS: PARENCHYMA: Decreased attenuation is noted in periventricular and subcortical white matter demonstrating an appearance that is statistically most likely to represent mild microangiopathic change. Chronic lacunar infarction in the left basal ganglia. No CT signs of acute infarction.  No intracranial mass, mass effect or midline shift.  No acute parenchymal hemorrhage. Atherosclerotic calcifications of the cavernous segment of the internal carotid artery are moderate. VENTRICLES AND EXTRA-AXIAL SPACES: Ventricles and extra-axial CSF spaces are prominent commensurate with the degree of volume loss.  No hydrocephalus.  No acute extra-axial hemorrhage. VISUALIZED ORBITS: Normal visualized orbits. PARANASAL SINUSES: Near complete near complete opacification left mastoid air cells. Trace right  mastoid air cell effusion. No bony destruction. No obvious nasopharyngeal mass lesion. Bilateral mastoid air cell effusions. CALVARIUM AND EXTRACRANIAL SOFT TISSUES:  Normal.     Impression: 1. Stable head CT. No acute intracranial hemorrhage, mass effect or edema. 2. Mild, chronic microangiopathy and chronic left lentiform nucleus lacunar infarction are stable. Resident: JON Ashton I, the attending radiologist, have reviewed the images and agree with the final report above. Workstation performed: [id number]     Procedure: XR chest portable ICU    Result Date: 3/12/2024  Narrative: XR CHEST PORTABLE ICU INDICATION: Central Line placement. COMPARISON: Chest radiograph 3/11/2024 FINDINGS: Interval retraction of endotracheal tube with the tip now appropriately positioned 3.5 cm above the jenaro. New left IJ approach central venous catheter with tip in the mid SVC. Right upper extremity PICC with tip in the lower SVC. Enteric catheter courses below the diaphragm with tip out of the field-of-view. External monitoring leads and clips project over the chest. Mild improvement in right lung opacities with unchanged cavitary lesion in the upper lung. No new focal finding in the chest. No pneumothorax or pleural effusion. Normal cardiomediastinal silhouette. Bones are unremarkable for age. Normal upper abdomen.     Impression: 1.  New left IJ central venous catheter tip in the mid SVC. 2.  Interval retraction of the endotracheal tube with tip now appropriately positioned 3.5 cm above the jenaro. 3.  Mild improvement in right lung opacities with unchanged cavitary lesion. Workstation performed: [id number]     Procedure: XR chest portable ICU    Result Date: 3/12/2024  Narrative: XR CHEST PORTABLE ICU INDICATION: acute decompensation in intubated patient. COMPARISON: Chest radiograph 3/11/2024 FINDINGS: Endotracheal tube tip is less than 1 cm above the jenaro but this is retracted on subsequent imaging. Enteric catheter  courses below the diaphragm with tip out of the field-of-view. External monitoring leads and clips project over the chest. Unchanged cavitary lesion in the right upper lung with hazy opacity in the lower lobe. No new focal opacities. No pneumothorax or pleural effusion. Normal cardiomediastinal silhouette. Bones are unremarkable for age. Normal upper abdomen.     Impression: Endotracheal tube tip less than 1 cm above the jenaro. This has been retracted on a subsequent exam. No significant change in right lung opacities with cavitary lesion. Workstation performed: [id number]     Procedure: Flexible Sigmoidoscopy    Addendum Date: 3/12/2024 Addendum:   [facility] [address] [facility] [address] [phone] DATE OF SERVICE: 3/11/24 PHYSICIAN(S): Attending: Diana M Jaiyeola, MD Fellow: Bryce Medellin MD INDICATION: GI bleed POST-OP DIAGNOSIS: See the impression below. HISTORY: Prior colonoscopy: Less than 3 years ago. It is being repeated at an interval of less than 3 years because: This colonoscopy is being performed for a diagnostic indication BOWEL PREPARATION:  PREPROCEDURE: Informed consent was obtained for the procedure, including sedation. Risks including but not limited to bleeding, infection, perforation, adverse drug reaction and aspiration were explained in detail. Also explained about less than 100% sensitivity with the exam and other alternatives. The patient was placed in the left lateral decubitus position. Procedure: Colonoscopy DETAILS OF PROCEDURE: Patient was taken to the procedure room where a time out was performed to confirm correct patient and correct procedure. The patient underwent monitored anesthesia care, which was administered by an anesthesia professional. The patient's blood pressure, heart rate, level of consciousness, oxygen, respirations and ETCO2 were monitored throughout the procedure. A digital rectal exam was performed. The scope was  introduced through the anus and advanced to the descending colon. Retroflexion was performed in the rectum. Retroflexion was not performed due to altered anatomy. The quality of bowel preparation was evaluated using the [place] Bowel Preparation Scale with scores of: right colon = 1, transverse colon = 1, left colon = 1. Bowel prep was not adequate. The patient experienced no blood loss. The procedure was not difficult. The patient tolerated the procedure well. There were no apparent adverse events. The total BBPS score was 3. ANESTHESIA INFORMATION: ASA: ASA status not filed in the log. Anesthesia Type: Anesthesia type not filed in the log. MEDICATIONS: No administrations occurring from 1520 to 1623 on 03/11/24 FINDINGS: Multiple ulcers in the ileocecal valve, cecum, ascending colon, hepatic flexure and transverse colon with no hemorrhage; performed cold forceps biopsy. Multiple scattered ulcerations from IC valve to transverse colon. Biopsied representative samples (r/o ischemic colitis, infectious etiologies of ulcerations, IBD). Significant amount of pancolonic fresh blood and blood clotting, which obscured visualization Ulcer in the transverse colon with oozing hemorrhage and adherent clot; placed 1 clip successfully (clip is MRI conditional); hemostasis achieved. Single ulcer with adherent clot, clot was suctioned and subsequently ulcer began bleeding (oozing hemorrhage). Achieved hemostasis with endoclip. Severe diverticula in the descending colon, sigmoid colon and rectosigmoid; no bleeding was identified Prolapsed rectum Ulcer in the rectum with hematin covered flat spot. Multiple nonbleeding rectal ulcers with flat pigmented spots visualized in prolapsed rectum Deep perianal sacral ulcers EVENTS: Procedure Events Event Event Time ENDO CECUM REACHED 3/11/2024  3:52 PM SPECIMENS: ID Type Source Tests Collected by Time Destination 1 : r/o cmv, random ulcer bx's Tissue Colon TISSUE EXAM Bryce Medellin MD  3/11/2024  3:54 PM  EQUIPMENT: Colonoscope - IMPRESSION: Multiple ulcers in the ileocecal valve, cecum, ascending colon, hepatic flexure and transverse colon; performed cold forceps biopsy Significant amount of pancolonic fresh blood and blood clotting, which obscured visualization Ulcer in the transverse colon with oozing hemorrhage and adherent clot; placed 1 clip successfully; hemostasis achieved Diverticulosis of severe severity in the descending colon, sigmoid colon and rectosigmoid Ulcer in the rectum with hematin covered flat spot RECOMMENDATION:  Await pathology results  If pt develops rebleeding, please notify GI fellow on call and consider CTA Trend Hg q8 hrs, maintain active T&S, transfuse for Hg < 7.0    Diana M Jaiyeola, MD     Result Date: 3/12/2024  Narrative: Table formatting from the original result was not included. [facility] [address] [facility] [address] [phone] DATE OF SERVICE: 3/11/24 PHYSICIAN(S): Attending: Diana M Jaiyeola, MD Fellow: Bryce Medellin MD INDICATION: GI bleed POST-OP DIAGNOSIS: See the impression below. HISTORY: Prior colonoscopy: Less than 3 years ago. It is being repeated at an interval of less than 3 years because: This colonoscopy is being performed for a diagnostic indication BOWEL PREPARATION:  PREPROCEDURE: Informed consent was obtained for the procedure, including sedation. Risks including but not limited to bleeding, infection, perforation, adverse drug reaction and aspiration were explained in detail. Also explained about less than 100% sensitivity with the exam and other alternatives. The patient was placed in the left lateral decubitus position. Procedure: Colonoscopy DETAILS OF PROCEDURE: Patient was taken to the procedure room where a time out was performed to confirm correct patient and correct procedure. The patient underwent monitored anesthesia care, which was administered by an anesthesia professional. The  patient's blood pressure, heart rate, level of consciousness, oxygen, respirations and ETCO2 were monitored throughout the procedure. A digital rectal exam was performed. The scope was introduced through the anus and advanced to the descending colon. Retroflexion was performed in the rectum. Retroflexion was not performed due to altered anatomy. The quality of bowel preparation was evaluated using the [place] Bowel Preparation Scale with scores of: right colon = 1, transverse colon = 1, left colon = 1. Bowel prep was not adequate. The patient experienced no blood loss. The procedure was not difficult. The patient tolerated the procedure well. There were no apparent adverse events. The total BBPS score was 3. ANESTHESIA INFORMATION: ASA: ASA status not filed in the log. Anesthesia Type: Anesthesia type not filed in the log. MEDICATIONS: No administrations occurring from 1520 to 1623 on 03/11/24 FINDINGS: Multiple ulcers in the ileocecal valve, cecum, ascending colon, hepatic flexure and transverse colon with no hemorrhage; performed cold forceps biopsy. Multiple scattered ulcerations from IC valve to transverse colon. Biopsied representative samples (r/o ischemic colitis, infectious etiologies of ulcerations, IBD). Significant amount of pancolonic fresh blood and blood clotting, which obscured visualization Ulcer in the transverse colon with oozing hemorrhage and adherent clot; placed 1 clip successfully (clip is MRI conditional); hemostasis achieved. Single ulcer with adherent clot, clot was suctioned and subsequently ulcer began bleeding (oozing hemorrhage). Achieved hemostasis with endoclip. Severe diverticula in the descending colon, sigmoid colon and rectosigmoid; no bleeding was identified Prolapsed rectum Ulcer in the rectum with hematin covered flat spot. Multiple nonbleeding rectal ulcers with flat pigmented spots visualized in prolapsed rectum Deep perianal sacral ulcers EVENTS: Procedure Events Event Event  Time ENDO CECUM REACHED 3/11/2024  3:52 PM SPECIMENS: ID Type Source Tests Collected by Time Destination 1 : r/o cmv, random ulcer bx's Tissue Colon TISSUE EXAM Bryce Medellin MD 3/11/2024  3:54 PM  EQUIPMENT: Colonoscope -     Impression: Multiple ulcers in the ileocecal valve, cecum, ascending colon, hepatic flexure and transverse colon; performed cold forceps biopsy Significant amount of pancolonic fresh blood and blood clotting, which obscured visualization Ulcer in the transverse colon with oozing hemorrhage and adherent clot; placed 1 clip successfully; hemostasis achieved Diverticulosis of severe severity in the descending colon, sigmoid colon and rectosigmoid Ulcer in the rectum with hematin covered flat spot RECOMMENDATION:  Await pathology results  If pt develops rebleeding, please notify GI fellow on call and consider CTA Trend Hg q8 hrs, maintain active T&S, transfuse for Hg < 7.0    Diana M Jaiyeola, MD     Narrative/Impressions - 3 day look back     Bryce Medellin M.D.  PGY-5 Gastroenterology Fellow  [facility]  Available on [email or web address]  [email or web address]

## 2024-03-14 NOTE — ASSESSMENT & PLAN NOTE
Wound Care on board, appreciate recommendations  Repositioning and wound care with nursing  To discuss surgical debridement when pt more stable

## 2024-03-14 NOTE — PLAN OF CARE
Problem: SAFETY ADULT  Goal: Patient will remain free of falls  Description: INTERVENTIONS:  - Educate patient/family on patient safety including physical limitations  - Instruct patient to call for assistance with activity   - Consult OT/PT to assist with strengthening/mobility   - Keep Call bell within reach  - Keep bed low and locked with side rails adjusted as appropriate  - Keep care items and personal belongings within reach  - Initiate and maintain comfort rounds  - Make Fall Risk Sign visible to staff  - Offer Toileting in advance of need  - Initiate/Maintain bed alarm  - Obtain necessary fall risk management equipment  - Apply yellow socks and bracelet for high fall risk patients  - Consider moving patient to room near nurses station  Outcome: Progressing     Problem: Nutrition/Hydration-ADULT  Goal: Nutrient/Hydration intake appropriate for improving, restoring or maintaining nutritional needs  Description: Monitor and assess patient's nutrition/hydration status for malnutrition. Collaborate with interdisciplinary team and initiate plan and interventions as ordered.  Monitor patient's weight and dietary intake as ordered or per policy. Utilize nutrition screening tool and intervene as necessary. Determine patient's food preferences and provide high-protein, high-caloric foods as appropriate.     INTERVENTIONS:  - Monitor oral intake, urinary output, labs, and treatment plans  - Assess nutrition and hydration status and recommend course of action  - Evaluate amount of meals eaten  - Assist patient with eating if necessary   - Allow adequate time for meals  - Recommend/ encourage appropriate diets, oral nutritional supplements, and vitamin/mineral supplements  - Order, calculate, and assess calorie counts as needed  - Recommend, monitor, and adjust tube feedings and TPN/PPN based on assessed needs  - Assess need for intravenous fluids  - Provide specific nutrition/hydration education as appropriate  -  Include patient/family/caregiver in decisions related to nutrition  Outcome: Progressing     Problem: SAFETY,RESTRAINT: NV/NON-SELF DESTRUCTIVE BEHAVIOR  Goal: Remains free of harm/injury (restraint for non violent/non self-detsructive behavior)  Description: INTERVENTIONS:  - Instruct patient/family regarding restraint use   - Assess and monitor physiologic and psychological status   - Provide interventions and comfort measures to meet assessed patient needs   - Identify and implement measures to help patient regain control  - Assess readiness for release of restraint   Outcome: Progressing     Problem: Prexisting or High Potential for Compromised Skin Integrity  Goal: Skin integrity is maintained or improved  Description: INTERVENTIONS:  - Identify patients at risk for skin breakdown  - Assess and monitor skin integrity  - Assess and monitor nutrition and hydration status  - Monitor labs   - Assess for incontinence   - Turn and reposition patient  - Assist with mobility/ambulation  - Relieve pressure over bony prominences  - Avoid friction and shearing  - Provide appropriate hygiene as needed including keeping skin clean and dry  - Evaluate need for skin moisturizer/barrier cream  - Collaborate with interdisciplinary team   - Patient/family teaching  - Consider wound care consult   Outcome: Not Progressing     Problem: SAFETY ADULT  Goal: Maintain or return to baseline ADL function  Description: INTERVENTIONS:  -  Assess patient's ability to carry out ADLs; assess patient's baseline for ADL function and identify physical deficits which impact ability to perform ADLs (bathing, care of mouth/teeth, toileting, grooming, dressing, etc.)  - Assess/evaluate cause of self-care deficits   - Assess range of motion  - Assess patient's mobility; develop plan if impaired  - Assess patient's need for assistive devices and provide as appropriate  - Encourage maximum independence but intervene and supervise when necessary  -  Involve family in performance of ADLs  - Assess for home care needs following discharge   - Consider OT consult to assist with ADL evaluation and planning for discharge  - Provide patient education as appropriate  Outcome: Not Progressing  Goal: Maintains/Returns to pre admission functional level  Description: INTERVENTIONS:  - Perform AM-PAC 6 Click Basic Mobility/ Daily Activity assessment daily.  - Set and communicate daily mobility goal to care team and patient/family/caregiver.   - Record patient progress and toleration of activity level   Outcome: Not Progressing     Problem: DISCHARGE PLANNING  Goal: Discharge to home or other facility with appropriate resources  Description: INTERVENTIONS:  - Identify barriers to discharge w/patient and caregiver  - Arrange for needed discharge resources and transportation as appropriate  - Identify discharge learning needs (meds, wound care, etc.)  - Arrange for interpretive services to assist at discharge as needed  - Refer to Case Management Department for coordinating discharge planning if the patient needs post-hospital services based on physician/advanced practitioner order or complex needs related to functional status, cognitive ability, or social support system  Outcome: Not Progressing     Problem: INFECTION - ADULT  Goal: Absence or prevention of progression during hospitalization  Description: INTERVENTIONS:  - Assess and monitor for signs and symptoms of infection  - Monitor lab/diagnostic results  - Monitor all insertion sites, i.e. indwelling lines, tubes, and drains  - Monitor endotracheal if appropriate and nasal secretions for changes in amount and color  - [place] appropriate cooling/warming therapies per order  - Administer medications as ordered  - Instruct and encourage patient and family to use good hand hygiene technique  - Identify and instruct in appropriate isolation precautions for identified infection/condition  Outcome: Not Progressing

## 2024-03-14 NOTE — ASSESSMENT & PLAN NOTE
Malnutrition Findings:   Adult Malnutrition type: Acute illness  Adult Degree of Malnutrition: Other severe protein calorie malnutrition  Malnutrition Characteristics: Fluid accumulation, Inadequate energy                  360 Statement: Severe calorie-protein malnutrition in context to acute illness r/t GI bleed, dysphagia, inadequate PO intake as evidance by energy intake less than 50% compared to estimated needs >5 days, +2 generalized edema; Treated with TBD (diet +oral supplements vs nutrition support)    BMI Findings:           Body mass index is 31.64 kg/m².   Started tube feeds 3/12  Held TF 3/13 for colonoscopy  Anticipate resuming TF 3/14, as pt not actively bleeding and no further procedures presently planned

## 2024-03-14 NOTE — PROGRESS NOTES
[facility]  Progress Note  Name: Darlyn Nguyen I  MRN: [id number]  Unit/Bed#: ICU 02 I Date of Admission: 3/3/2024   Date of Service: 3/14/2024 I Hospital Day: 11    Assessment/Plan   * GI bleed  Assessment & Plan  Persitent LGIB  Patient was initially on heparin due to acute DVT  3/6 flexible sigmoidoscopy revealing ulcerated rectal mucosa, status post cauterization  Repeat Flex sig w GI on 3/11: multiple ulcers throughout colon, biopsies taken; one oozing ulcer in transverse colon, clipped with hemostasis; severe diverticula; prolapsed rectum; rectal ulcers.  Colonoscopy 3/13: Multiple ulcers in the IC valve, cecum, ascending colon, hepatic flexure and transverse colon; Ulcer in the hepatic flexure with oozing hemorrhage; placed 2 clips successfully; hemostasis achieved    Ddx includes SLE vasculitis, ischemic colitis, and infectious etiologies    Pt w BRBPR 3/13, with drop in Hgb to 6.6,   3/13: transfused 5 U pRBC, 2 U FFP, 1 U Plt, 2 U cryo  Total blood products this admission: RBC x12, FFP x2, plt x1, cryo x2  -Monitor Hgb q8h  -F/u pathology results--prelim shows active cryptitis and crypt abscess w focal ulceration, no dysplasia or carcinoma.  -maintain active T&S, transfuse for Hg < 7.0 w FFP and plts    Acute metabolic encephalopathy  Assessment & Plan  Rapid response called for unresponsive patient on morning of 3/11/24. Earlier that morning, patient described as confused and unable to answer orientation questions, which had been consistent throughout this admission.  Etiology unclear.   At time of rapid response, findings significant for Hgb 6.7 in the setting of acute GIB and hypoxemia on iSTAT. Vitals were normal and other lab values were unremarkable.    TSH wnl at 1.329, free T4 low at 0.38  Repeat in 1-2 weeks    Pt mentation improving overall off sedation. This morning, opening eyes, nodding and shaking head appropriately to questions, but not following commands such as  wiggling fingers or tracking finger with eyes.    Anasarca  Assessment & Plan  Patient with significant anasarca likely component of iatrogenic fluid overload, hypoalbuminemia, oliguria  Preserved EF on echocardiogram  Continue Lasix gtt  Discontinue albumin  Monitor volume status and UOP    Bacteremia due to Enterococcus  Assessment & Plan  E. avium bacteremia. Pt recently started on prednisone, atovaquone, hydroxychloroquine, and mycophenolate for lupus. Colonic/rectal ulcerations possible point of entry.     Repeat cultures show NGTD  Susceptibilities show pan-sensitive enterococcus    ID on board, appreciate recommendations  Continue Unasyn 3 g Q6H (Day 11 Unasyn of anticipated 6-wk course)  Can hold off DYANA, per ID, as unlikely to     Cavitary pneumonia  Assessment & Plan  Seen on CT 3/4/24  Consider reactivation of TB vs septic emboli vs aspiration    AFB x3 NGTD  MTB PCR negative    Sputum Cx 3/3 growing 2+ pseudomonas--susceptible to Zosyn, meropenem, levofloxacin, and tobramycin. Intermediate susceptibility to cefepime.    ID on board, appreciate recommendations  Continue Unasyn x6 weeks for tx of E avium bacteremia  Continue chest physio  Will likely need repeat imaging outpatient    Acute respiratory failure with hypoxia (HCC)  Assessment & Plan  Pt intubated to protect airway, given obtunded mental status, however hypoxemia also noted on iSTAT during rapid response  Ddx includes volume overload vs aspiration PNA/RUL cavitary lung lesion    Pt intubated, Current settings CMV 16/320/6/50. Pt breathing at rate of 18.    SBT this morning, possible extubation later today    Acute deep vein thrombosis (DVT) of both lower extremities (HCC)  Assessment & Plan  Discovered 2/20/24, started on Eliquis at home, started Hep gtt at [employer] but developed GI bleed, AC currently on hold    S/p IVC filter with IR 3/7/24  Will discuss resuming anticoagulation when LGIB resolves and hgb stabilizes. Will need  trial on hep gtt prior to transition to oral agent, given recurrence of bleeds.    New onset a-fib (HCC)  Assessment & Plan  Maintaining sinus rhythm, continue p.o. diltiazem.    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) ([facility])  Assessment & Plan  Biopsy-proven at [facility]    Persistent oliguria, with good diuresis on Lasix gtt    UA 3/12/24 shows moderate blood (30-50 RBCs on micro), moderate leukocytes (30-50 on micro), 1+ protein, 1+ ketones, and increased specific gravity; not specific, but c/w lupus nephritis    Check U protein:Cr  BUN stable  Cr stable  Continue Lasix gtt  Monitor volume status,  UOP, renal indices    Diabetes mellitus (HCC)  Assessment & Plan  Lab Results   Component Value Date    HGBA1C 5.9 (H) 03/03/2024       Recent Labs     03/14/24  0004 03/14/24  0223 03/14/24  0411 03/14/24  0559   POCGLU 125 132 127 133         Blood Sugar Average: Last 72 hrs:  (P) 178.24    History of DM, well-controlled with last A1c 5.6 on 2/26, but now 5.9. Hyperglycemia in the setting of steroid use.    Started insulin gtt while on pulse-dose steroids and tube feeds  Avoid hypoglycemia per protocol  Monitor BG and titrate insulin gtt accordingly    Dysphagia  Assessment & Plan  Failed bedside swallow with nursing  Speech eval on 3/8: Cough present w/ successive sips of thin. Intermittently followed verbal commands for slow rate/small sips. Benefits from verbal cues to keep head at midline. Continue clear liquids , medications crushed, Slow rate, Small sips, cue to keep head at midline    Pt intubated 3/11. Will reassess when extubated.    Punctate ulcers of labia and medial thighs  Assessment & Plan  Labia erythematous with scattered ulcerations. Scattered ulcerations with pink and yellow tissue extending from radha-rectal area to medial thighs.    Concern for possible  atypical herpetic lesions in immunosuppressed patient    Started Valtrex 3/13/24  Consider viral culture and/or biopsy     Hypernatremia  Assessment & Plan  Na 147, improved from 152 yesterday  Suspect d/t loop diuretic, currently on Lasix gtt    Nephrology on board, appreciate recommendations  Changed to D5 with Kcl from D5W @ 100 ml/h    Severe protein-calorie malnutrition (HCC)  Assessment & Plan  Malnutrition Findings:   Adult Malnutrition type: Acute illness  Adult Degree of Malnutrition: Other severe protein calorie malnutrition  Malnutrition Characteristics: Fluid accumulation, Inadequate energy                  360 Statement: Severe calorie-protein malnutrition in context to acute illness r/t GI bleed, dysphagia, inadequate PO intake as evidance by energy intake less than 50% compared to estimated needs >5 days, +2 generalized edema; Treated with TBD (diet +oral supplements vs nutrition support)    BMI Findings:           Body mass index is 31.64 kg/m².   Started tube feeds 3/12  Held TF 3/13 for colonoscopy  Anticipate resuming TF 3/14, as pt not actively bleeding and no further procedures presently planned    Thrombocytopenia (HCC)  Assessment & Plan  Possibly related to hepatic congestion vs medication vs sepsis/bacteremia plus bleeding  3/10 Peripheral blood smear shows normochromic normocytic anemia with nRBCs and polychromasia, no schistocytes, mild thrombocytopenia, mild leukocytosis with neutrophilia.  3/13 Repeat smear positive for schistocytes, c/w hemolysis. Ddx includes DIC, SLE, and 2/2 transfusion   Monitor CBC closely  Transfuse PLT if <20, <50 if actively bleeding    Hypokalemia  Assessment & Plan  K 3.4 this morning  Mg 1.9  On Lasix gtt    Give Mg 1 g IV   Give K 40 mg IV x2  Keep K>=4 and Mg>=2    Sacral wound  Assessment & Plan  Wound Care on board, appreciate recommendations  Repositioning and wound care with nursing  To discuss surgical debridement when pt more stable    Lupus  (HCC)  Assessment & Plan  Diagnosed in December,   Initially started on steroids, prednisone 60 mg daily, which patient had tapered to 30 mg daily prior to admission with the addition of atovaquone, hydroxychloroquine, and mycophenolate    Holding atovaquone, hydroxychloroquine, and mycophenolate in the setting of acute infection    random cortisol 10.9 collected 3/11     Started Solumedrol 1 g QD for planned 3-day course, to consider steroid-sparing agents pending response and work-up  CRP elevated at 39.9, ESR wnl <1, C3 and C4 both low. Though non-specific, c/w lupus.  U protien:Cr ordered  Anti-ds DNA Ab ordered  Rheumatology on board, appreciate recommendations       ----------------------------------------------------------------------------------------  HPI/24hr events: Yesterday, persistent GI bleeding with drop in Hgb to 6.6; transfused 5 U pRBC, 2 U FFP, 2 U cryo, 1 U plt. Bedside C-scope w GI showed bleeding ulcer at hepatic flexure, clipped x2 w hemostasis, and numerous other ulcers, particularly at IC valve. Pt briefly required pressors during procedure for hypotension following administration of propofol and fentanyl for procedural anesthesia.     Patient appropriate for transfer out of the ICU today?: No  Disposition: Continue Critical Care   Code Status: Level 2 - DNAR: but accepts endotracheal intubation  ---------------------------------------------------------------------------------------  SUBJECTIVE  This morning, Ms. Nguyen is seen lying in bed, intubated, eyes open, engaged but appears fatigued and attention is variable. Nods and shakes her head appropriately, as when being asked if she is in pain, knows where she is, remembers her care team, or has abdominal tenderness to palpation, but does not follow commands, such as squeezing fingers of provider, wiggling her fingers, or tracking provider finger with her eyes.    Review of Systems  Review of systems was unable to be performed secondary to  intubation  ---------------------------------------------------------------------------------------  OBJECTIVE    Vitals   Vitals:    24 0500 24 0555 24 0600 24 0700   BP:       BP Location:       Pulse: 56  58 58   Resp: 19  18 16   Temp: (!) 97.2 °F (36.2 °C)  (!) 96.8 °F (36 °C) (!) 97.2 °F (36.2 °C)   TempSrc: Bladder  Bladder    SpO2: 100%  100% 96%   Weight:  83.6 kg (184 lb 4.9 oz)     Height:         Temp (24hrs), Av.9 °F (36.1 °C), Min:95.4 °F (35.2 °C), Max:98.2 °F (36.8 °C)  Current: Temperature: (!) 97.2 °F (36.2 °C)  Arterial Line BP: 116/50  Arterial Line MAP (mmHg): 74 mmHg    Respiratory:  SpO2: SpO2: 96 %, SpO2 Activity: SpO2 Activity: At Rest, SpO2 Device: O2 Device: Ventilator, Capnography:    Nasal Cannula O2 Flow Rate (L/min): 2 L/min    Invasive/non-invasive ventilation settings : (S) CMV 16/320/6/50  Respiratory      Lab Data (Last 4 hours)      None           O2/Vent Data (Last 4 hours)         0737          Patient safety screen outcome: --  Comment: in progress                       Physical Exam  Vitals and nursing note reviewed.   Constitutional:       General: She is not in acute distress.     Appearance: She is obese. She is ill-appearing. She is not toxic-appearing or diaphoretic.      Comments: Alert but appears fatigued. Attention variable.   HENT:      Head: Normocephalic and atraumatic.   Eyes:      General: No scleral icterus.        Right eye: No discharge.         Left eye: No discharge.      Conjunctiva/sclera: Conjunctivae normal.      Pupils: Pupils are equal, round, and reactive to light.   Cardiovascular:      Rate and Rhythm: Normal rate and regular rhythm.      Pulses: Normal pulses.      Heart sounds: Normal heart sounds. No murmur heard.     No friction rub. No gallop.   Pulmonary:      Comments: Intubated. Coarse upper airway breath sounds  Abdominal:      General: There is no distension.      Palpations: Abdomen is soft.       Tenderness: There is no abdominal tenderness. There is no guarding or rebound.      Comments: Hypoactive bowel sounds   Musculoskeletal:         General: Swelling present.      Right lower leg: Edema present.      Left lower leg: Edema present.   Skin:     General: Skin is warm and dry.      Findings: Bruising and lesion present.   Neurological:      Mental Status: She is alert.      Comments: Nods and shakes head to questions, but not following commands.             Laboratory and Diagnostics:  Results from last 7 days   Lab Units 03/14/24  0432 03/13/24 2008 03/13/24  1757 03/13/24  1203 03/13/24  0351 03/12/24  2203 03/12/24 2002 03/12/24  1118 03/12/24  0439 03/11/24  1711 03/11/24  1706 03/11/24  0933 03/11/24  0304 03/10/24  0805 03/10/24  0443   WBC Thousand/uL 7.35  --  6.25  --  9.47 11.99*  --   --  18.58* 16.43*  --   --  13.21*  --  15.99*   HEMOGLOBIN g/dL 12.4 12.0 12.7 6.6* 6.8* 7.9* 8.6*   < > 13.6 12.4  --    < > 8.8*   < > 7.8*   I STAT HEMOGLOBIN g/dl  --   --   --   --   --   --   --   --   --   --  10.2*   < >  --   --   --    HEMATOCRIT % 35.3  --  36.8  --  20.5* 23.8*  --   --  39.5 35.8  --    < > 26.4*  --  23.8*   HEMATOCRIT, ISTAT %  --   --   --   --   --   --   --   --   --   --  30*   < >  --   --   --    PLATELETS Thousands/uL 68*  --  23*  --  43* 54*  --   --  65* 56*  --   --  66*  --  85*   NEUTROS PCT % 88*  --   --   --   --  83*  --   --   --   --   --   --  83*  --   --    BANDS PCT %  --   --   --   --   --   --   --   --   --   --   --   --   --   --  12*   MONOS PCT % 5  --   --   --   --  4  --   --   --   --   --   --  3*  --   --    MONO PCT %  --   --   --   --  3*  --   --   --   --   --   --   --   --   --  3*   EOS PCT % 0  --   --   --  0 0  --   --   --   --   --   --  0  --  0    < > = values in this interval not displayed.     Results from last 7 days   Lab Units 03/14/24  0432 03/13/24 2008 03/13/24  1631 03/13/24  0610 03/13/24  0351 03/12/24  1754  03/12/24  0835 03/12/24  0436 03/11/24  1706 03/11/24  1201 03/11/24  1055 03/11/24  0304 03/10/24  0443 03/09/24  0408   SODIUM mmol/L 147 148* 149* 150*  --  149* 147 145   < > 148*  --  146   < > 143   POTASSIUM mmol/L 3.4* 4.5 3.6 2.8*  --  3.6 3.8 4.0   < > 3.8  --  3.2*   < > 3.5   CHLORIDE mmol/L 115* 117* 118* 118*  --  118* 117* 117*   < > 117*  --  114*   < > 112*   CO2 mmol/L 21 21 21 22  --  22 22 16*   < > 22  --  26   < > 25   CO2, I-STAT   --   --   --   --   --   --   --   --    < >  --    < >  --   --   --    ANION GAP mmol/L 11 10 10 10  --  9 8 12   < > 9  --  6   < > 6   BUN mg/dL 24 24 24 27*  --  27* 27* 27*   < > 25  --  24   < > 23   CREATININE mg/dL 1.19 1.18 1.16 1.26  --  1.27 1.25 1.18   < > 0.98  --  0.86   < > 0.76   CALCIUM mg/dL 8.2* 8.5 8.2* 7.8*  --  7.5* 7.5* 7.5*   < > 7.4*  --  7.5*   < > 7.5*   GLUCOSE RANDOM mg/dL 135 149* 159* 231*  --  174* 188* 209*   < > 275*  --  265*   < > 243*   ALT U/L 157*  --   --   --  385*  --   --   --   --  28  --  30  --  42   AST U/L 174*  --   --   --  515*  --   --   --   --  16  --  14  --  15   ALK PHOS U/L 194*  --   --   --  60  --   --   --   --  53  --  55  --  74   ALBUMIN g/dL 3.6  --   --   --  2.9*  --   --   --   --  2.1*  --  2.3*  --  1.9*   TOTAL BILIRUBIN mg/dL 2.95*  --   --   --  0.82  --   --   --   --  0.67  --  0.64  --  0.72    < > = values in this interval not displayed.     Results from last 7 days   Lab Units 03/14/24  0432 03/13/24  0610 03/13/24  0351 03/12/24  0436 03/10/24  0443   MAGNESIUM mg/dL 1.9  --  1.8* 1.9 1.9   PHOSPHORUS mg/dL 3.5 4.0 3.9 4.4*  --       Results from last 7 days   Lab Units 03/13/24  0610 03/13/24  0425 03/12/24  0436 03/11/24  1201 03/09/24  0003   INR   --  1.65* 1.25* 1.33* 1.14   PTT seconds 40*  --   --   --   --           Results from last 7 days   Lab Units 03/12/24  0945 03/12/24  0540   LACTIC ACID mmol/L 2.3* 3.0*     ABG:  Results from last 7 days   Lab Units 03/13/24  0630   PH  "ART  7.411   PCO2 ART mm Hg 33.9*   PO2 ART mm Hg 161.7*   HCO3 ART mmol/L 21.1*   BASE EXC ART mmol/L -3.2   ABG SOURCE  Line, Arterial     VBG:  Results from last 7 days   Lab Units 03/13/24  0630 03/12/24  0539 03/11/24  1711   PH RADHA   --   --  7.306   PCO2 RADHA mm Hg  --   --  41.9*   PO2 RADHA mm Hg  --   --  54.4*   HCO3 RADHA mmol/L  --   --  20.4*   BASE EXC RADHA mmol/L  --   --  -5.6   ABG SOURCE  Line, Arterial   < >  --     < > = values in this interval not displayed.     Results from last 7 days   Lab Units 03/11/24  0304   PROCALCITONIN ng/ml 0.30*       Micro        EKG: tele: currently NSR 80, predominantly NSR 50s-60s overnight. Rare ectopic beats.  Imaging: I have personally reviewed pertinent reports.   and I have personally reviewed pertinent films in PACS    Intake and Output  I/O         03/12 0701 03/13 0700 03/13 0701 03/14 0700 03/14 0701  03/15 0700    I.V. (mL/kg) 367.9 (4.4) 1368.9 (16.4)     Blood  2825.4     NG/GT 35      IV Piggyback 1078.3 1283.3     Total Intake(mL/kg) 1481.2 (17.7) 5477.6 (65.5)     Urine (mL/kg/hr) 980 (0.5) 3400 (1.7)     Stool 0 200     Total Output 980 3600     Net +501.2 +1877.6            Unmeasured Stool Occurrence 2 x 6 x             Height and Weights   Height: 5' 4\" (162.6 cm)  IBW (Ideal Body Weight): 54.7 kg  Body mass index is 31.64 kg/m².  Weight (last 2 days)       Date/Time Weight    03/14/24 0555 83.6 (184.3)    03/13/24 0600 83.9 (184.97)    03/12/24 1040 83.1 (183.2)    03/12/24 0600 83.1 (183.2)              Nutrition       Diet Orders   (From admission, onward)                 Start     Ordered    03/13/24 1538  Diet Enteral/Parenteral; Tube Feeding No Oral Diet; Vital AF 1.2; Continuous; 50; 400; Water; Every 4 hours  Diet effective midnight        References:    Adult Nutrition Support Algorithm    RD Therapeutic Diet Order Protocol   Question Answer Comment   Diet Type Enteral/Parenteral    Enteral/Parenteral Tube Feeding No Oral Diet    Tube " "Feeding Formula: Vital AF 1.2    Bolus/Cyclic/Continuous Continuous    Tube Feeding Goal Rate (mL/hr): 50    Tube Feeding flush (mL): 400    Water Flush type: Water    Water flush frequency: Every 4 hours    RD to adjust diet per protocol? Yes       Placed in \"And\" Linked Group    03/13/24 1538                      Active Medications  Scheduled Meds:  Current Facility-Administered Medications   Medication Dose Route Frequency Provider Last Rate    acetaminophen  650 mg Oral Q6H PRN AUGUSTA Barber      ampicillin-sulbactam  3 g Intravenous Q6H AUGUSTA Barber 3 g (03/14/24 0423)    atorvastatin  40 mg Oral Daily AUGUSTA Barber      carvedilol  6.25 mg Oral BID With Meals AUGUSTA Barber      dextrose 5 % with KCl 20 mEq/L  100 mL/hr Intravenous Continuous Sienna Perez DO      diltiazem  30 mg Oral Q6H [facility] AUGUSTA Barber      escitalopram  10 mg Oral Daily AUGUSTA Barber      famotidine  20 mg Oral Daily AUGUSTA Barber      fentaNYL  50 mcg Intravenous Q2H PRN AUGUSTA Stahl      furosemide  10 mg/hr Intravenous Continuous Uche Blair MD 10 mg/hr (03/14/24 0655)    insulin regular (HumuLIN R,NovoLIN R) 1 Units/mL in sodium chloride 0.9 % 100 mL infusion  0.3-21 Units/hr Intravenous Titrated Uche Blair MD 1.5 Units/hr (03/14/24 0600)    levalbuterol  1.25 mg Nebulization Q8H PRN AUGUSTA Barber      magnesium sulfate  1 g Intravenous Once Uche Blair MD      methylPREDNISolone sodium succinate  1,000 mg Intravenous Daily Uche Blair MD 1,000 mg (03/14/24 0819)    omeprazole (PRILOSEC) suspension 2 mg/mL  20 mg Oral Daily AUGUSTA Barber      ondansetron  4 mg Intravenous Q4H PRN AUGUSTA Barber      potassium chloride  40 mEq Intravenous Once Uche Blair MD 40 mEq (03/14/24 0713)    propofol  5-50 mcg/kg/min Intravenous Titrated Uche Blair MD Stopped (03/13/24 1600)    valACYclovir  1,000 mg Oral Q8H CRUZITO AUGUSTA Francis  " "     Continuous Infusions:  dextrose 5 % with KCl 20 mEq/L, 100 mL/hr  furosemide, 10 mg/hr, Last Rate: 10 mg/hr (03/14/24 0655)  insulin regular (HumuLIN R,NovoLIN R) 1 Units/mL in sodium chloride 0.9 % 100 mL infusion, 0.3-21 Units/hr, Last Rate: 1.5 Units/hr (03/14/24 0600)  propofol, 5-50 mcg/kg/min, Last Rate: Stopped (03/13/24 1600)      PRN Meds:   acetaminophen, 650 mg, Q6H PRN  fentaNYL, 50 mcg, Q2H PRN  levalbuterol, 1.25 mg, Q8H PRN  ondansetron, 4 mg, Q4H PRN        Invasive Devices Review  Invasive Devices       Peripherally Inserted Central Catheter Line  Duration             PICC Line 03/08/24 Right Basilic 5 days              Central Venous Catheter Line  Duration             CVC Central Lines 03/11/24 Triple 20cm 2 days              Arterial Line  Duration             Arterial Line 03/11/24 Right Radial 2 days              Drain  Duration             NG/OG/Enteral Tube Orogastric Center mouth -- days    Urethral Catheter Latex;Straight-tip 16 Fr. 13 days              Airway  Duration             ETT  Cuffed 7.5 mm 2 days                    Rationale for remaining devices: SBT this morning, may extubate later today. Given tenuous clinical course, will likely continue CVC today, anticipate removal when Hgb stable and hemodynamics stable. PICC for long-term abx. A-line for accurate pressure monitoring and frequent labs.  ---------------------------------------------------------------------------------------  Advance Directive and Living Will: Yes        Uche Blair MD      Portions of the record may have been created with voice recognition software.  Occasional wrong word or \"sound a like\" substitutions may have occurred due to the inherent limitations of voice recognition software.  Read the chart carefully and recognize, using context, where substitutions have occurred     "

## 2024-03-14 NOTE — ASSESSMENT & PLAN NOTE
Na 147, improved from 152 yesterday  Suspect d/t loop diuretic, currently on Lasix gtt    Nephrology on board, appreciate recommendations  Changed to D5 with Kcl from D5W @ 100 ml/h

## 2024-03-14 NOTE — ASSESSMENT & PLAN NOTE
Pt intubated to protect airway, given obtunded mental status, however hypoxemia also noted on iSTAT during rapid response  Ddx includes volume overload vs aspiration PNA/RUL cavitary lung lesion    Pt intubated, Current settings CMV 16/320/6/50. Pt breathing at rate of 18.    SBT this morning, possible extubation later today

## 2024-03-14 NOTE — PROGRESS NOTES
Progress Note - Infectious Disease   Darlyn Nguyen 78 y.o. female MRN: [id number]  Unit/Bed#: ICU 02 Encounter: [id number]    Impression/Plan:  1. Septic shock. Developing over admission. Leukocytosis, hypotension, and lactic acidosis. Likely secondary to enterococcus avium bacteremia and RUL pneumonia with cavitary lesion. This is complicated by fact that patient is immunosuppressed in setting of recent SLE with lupus nephritis diagnosis. The patient initially had clinical improvement. She was extubated and transitioned out of ICU to med/surg care. Patient then had significant BRBPR and became nonresponsive. She was intubated and transferred back to ICU. Flex sig performed which showed significant bleeding in transverse colon. This morning patient remains intubated. Pressor support is on hold. She is having active BRBPR again today. Remains on the nancy hugger again today.  -antibiotic as below  -check CBCD and BMP tomorrow  -monitor vitals  -supportive care     2. Enterococcus avium bacteremia. Single set positive from 2/29/2024. Suspect likely GI source given patient's recent GI bleed and pancolitis with heavy diarrhea. Patient completed CT A/P which noted scattered splenic hypodensities which is concerning for septic emboli. With this pathogen there is concern for vancomycin resistance. Patient has no intravascular devices or hardware. All repeat blood cultures are negative >5 days. CT head with no acute findings. TTE was negative. The patient has been transitioned to IV Unasyn. She appears to be tolerating the antibiotic without difficulty. I will continue the Unasyn for now in anticipation of 6 weeks of IV antibiotic treatment.  -continue IV Unasyn through 4/12/2024 for 6 weeks of IV antibiotic treatment after cleared blood cultures  -weekly CBCD and BMP while on IV antibiotic  -monitor vitals  -PICC to be removed after patient's final dose of IV antibiotic on 4/12/2024  -anticipate surveillance blood cultures 2  weeks after finishing IV antibiotic treatment, draw on 4/26/2024  -patient will require outpatient ID follow up after discharge     3. RUL pneumonia with cavitary lung lesions. Chest CT showed a RUL cavitary lesion, dense RUL consolidation, R basilar atelectasis, and small B/L pleural effusions. Consider aspiration played a role. Consider possilibily of septic emboli in setting of bacteremia. With new immunosuppression will need to consider TB. AFB sputum cultures are negative x2 so far. Direct TB PCR was negative. Sputum culture with growth of pseudomonas, however after antibiotic treatment this may be colonization. Aspergillus was negative. Her most recent CXR from 3/11/2024 showed stable RUL cavity and mild improvement in the RUL.  -antibiotic as above  -follow up AFBs  -monitor vitals  -monitor respiratory status  -mechanical ventilation support per primary service      4. Pancolitis with heavy diarrhea and recurrent GI bleed. This is likely source of her bacteremia above. Infectious stool workup was negative. Newest CT A/P showed persistent fluid throughout the colon, improvement to previous areas of colonic mucosal hyperemia, and colonic diverticulosis without diverticulitis. Suspect she may benefit from colonoscopy once medically stable. Gastroenterology is following. She underwent flex sig which showed hemorrhoids, diverticulitis, and ulcerated mucosa. Unfortunately she had worsening BRBPR with significant drop in Hemoglobin. Repeat flex sig noted significant amount of pancolonic fresh blood and clotting and an ulcer in the transverse colon with oozing hemorrhage. Hemostasis was achieved with endoclip. She is also noted to have prolapsed rectum with multiple nonbleeding ulcerse and severe diverticula. Patient now with ongoing bleeding. General surgery has been consulted, no plans for surgical intervention at this time. This morning patient's colon biopsy pathology is showing CMV highlights in rare positive  cells. She will need to start Ganciclovir now.  -start IV Ganciclovir    -serial abdominal exams  -monitor GI symptoms  -monitor stool output   -continue follow up with gastroenterology  -continue follow up with general surgery     5. Acute hypoxic respiratory failure. Likely secondary to RUL pnuemonia with cavitary lesion. Also consider role of sepsis with shock. Consider possibility of aspiration vs septic emboli in bacteremic patient with new splenic hypodensities. She did recently have B/L LE DVTs, however she's been on Eliquis so less concern for PE. Patient's respiratory status did improve and she was extubated. Patient then experienced recurrent GI bleed and became nonresponsive. She has been reintubated this morning.  -antibiotic as above  -monitor CBCD and BMP  -monitor vitals  -monitor respiratory status  -mechanical ventilation support per critical care     6. SLE. With lupus nephritis. Diagnosed in 12/2023 at [facility]. She has been managed by rheumatology and nephrology. She was started on prednisone, atovaquone, hydroxychloroquine, and mycophenolate. Atovaquone, hydroxychloroquine, and mycophenolate currently on hold. She was given a dose of Solu-Cortef at [facility]. Nephrology has assessed and patient has been on prednisone. Steroid use is risk factor for infection. Rheumatology has been consulted and they have recommend pulse solumedrol.  -steroid management per nephrology/rheumatology  -patient will eventually need to go back on mepron from PJP prophylaxis  -continue close follow up with nephrology  -continue close follow up with rheumatology     7. CKD stage 2. This can affect antibiotic dosing. Upon review of patient's available past medical records it appears her baseline creatinine is approximately 0.8. Also with lupus nephritis as above. Suspect patient is high risk for ARYAN. Her creatinine is 1.19 today.  -monitor creatinine  -dose adjust antibiotic for renal function as needed  -avoid  nephrotoxins  -continue close follow up with nephrology     8. Sacral and buttock ulcers. It was documented that patient had a gluteal skin care during recent hospitalization at [facility]. Initial presentation showed some areas appearing more superficial with raw granular tissue, other areas were likely stage 2 with some gray stringy slough and fibrous tissue. Suspect pressure and prolonged down time are contributing to skin integrity. Wound management is following. Initial treatment was started with Santyl but now getting wet-to-dry BID and PRN. New scattered rash with some superficial tissue loss noted along the medial posterior thighs on 3/13/2024. Consider this may be related to her lupus. Consider this may be viral process. She has been started on PO valtrex.  -continue valtrex  -serial skin exams  -frequent turning and repositioning to offload pressure  -wound management per wound management team  -continue follow up with wound management     9. Type 2 diabetes mellitus without long term insulin use. Patient's last HbA1c was 5.9% on 3/3/2024. Elevated blood glucose is risk factor for wounds and infection. Recommend tight glycemic control.  -blood glucose management per primary service     Above plan was discussed in detail with the critical care team who is aware of our start of antiviral treatment.     Antibiotics:  Unasyn 11  Antibiotics 15    Subjective:  Unable to perform ROS, patient remains intubated. She is not sedated this morning and did become agitated during my exam this morning and dropped her O2 saturation. Does still have rectal bleeding, has been assessed by general surgery.     Objective:  Vitals:  Temp:  [95.4 °F (35.2 °C)-98.2 °F (36.8 °C)] 96.8 °F (36 °C)  HR:  [54-76] 58  Resp:  [6-28] 18  BP: (102-161)/(38-62) 134/61  SpO2:  [95 %-100 %] 100 %  Temp (24hrs), Av.9 °F (36.1 °C), Min:95.4 °F (35.2 °C), Max:98.2 °F (36.8 °C)  Current: Temperature: (!) 96.8 °F (36 °C)    Physical Exam:   General  Appearance:  Intubated, opens eyes to verbal and tactile stimuli. Julio Cesar hugger intact.   Throat: ETT intact, OG tube intact.    Lungs:   RUL coarse, mechanical breath sounds throughout to auscultation bilaterally; no wheezing; respirations unlabored on mechanical ventilation, did seem to bite down towards end of my exam, O2 saturations dropped in to 70s.   Heart:  Irregular, bradycardic; no murmur, rub or gallop.   Abdomen:   Soft, no facial grimace with abdominal palpation, non-distended, hypoactive bowel sounds.     Extremities: No clubbing or cyanosis, off loading boots intact. Generalized anasarca. L shin with erythematous streak, no weeping or open lesion.   Skin: No new rashes noted on exposed skin.     Labs, Imaging, & Other studies:   All pertinent labs and imaging studies were personally reviewed  Results from last 7 days   Lab Units 03/14/24  0432 03/13/24  2008 03/13/24  1757 03/13/24  1203 03/13/24  0351   WBC Thousand/uL 7.35  --  6.25  --  9.47   HEMOGLOBIN g/dL 12.4 12.0 12.7   < > 6.8*   PLATELETS Thousands/uL 68*  --  23*  --  43*    < > = values in this interval not displayed.     Results from last 7 days   Lab Units 03/14/24  0432 03/13/24  0610 03/13/24  0351 03/11/24  1711 03/11/24  1706 03/11/24  1201   POTASSIUM mmol/L 3.4*   < >  --    < >  --  3.8   CHLORIDE mmol/L 115*   < >  --    < >  --  117*   CO2 mmol/L 21   < >  --    < >  --  22   CO2, I-STAT mmol/L  --   --   --   --  20*  --    BUN mg/dL 24   < >  --    < >  --  25   CREATININE mg/dL 1.19   < >  --    < >  --  0.98   EGFR ml/min/1.73sq m 43   < >  --    < >  --  54   GLUCOSE, ISTAT mg/dl  --   --   --   --  275*  --    CALCIUM mg/dL 8.2*   < >  --    < >  --  7.4*   AST U/L 174*  --  515*  --   --  16   ALT U/L 157*  --  385*  --   --  28   ALK PHOS U/L 194*  --  60  --   --  53    < > = values in this interval not displayed.

## 2024-03-14 NOTE — ASSESSMENT & PLAN NOTE
Seen on CT 3/4/24  Consider reactivation of TB vs septic emboli vs aspiration    AFB x3 NGTD  MTB PCR negative    Sputum Cx 3/3 growing 2+ pseudomonas--susceptible to Zosyn, meropenem, levofloxacin, and tobramycin. Intermediate susceptibility to cefepime.    ID on board, appreciate recommendations  Continue Unasyn x6 weeks for tx of E avium bacteremia  Continue chest physio  Will likely need repeat imaging outpatient

## 2024-03-14 NOTE — RESPIRATORY THERAPY NOTE
RT Ventilator Management Note  Darlyn Nguyen 78 y.o. female MRN: [id number]  Unit/Bed#: ICU 02 Encounter: [id number]      Daily Screen         3/13/2024  1930 3/14/2024  0737          Patient safety screen outcome:: Failed Passed      Not Ready for Weaning due to:: Underline problem not resolved --      Spont breathing trial outcome:: -- Passed      Name of Medical Team Notified:: -- NP/Physican      Preparing to extubate/ Notify Nurse: -- Yes      Extubation order obtained: -- Yes      Consider Cuff Test: -- No (comment)      RSBI: -- 35            Pt successfully extubated to 4 lpm nasal cannula     Physical Exam:   Assessment Type: Assess only  General Appearance: Sleeping  Respiratory Pattern: Assisted

## 2024-03-14 NOTE — PROGRESS NOTES
Progress Note - General Surgery  : [facility]    Darlyn Nguyen 78 y.o. female MRN: [id number]  Unit/Bed#: ICU 02 Encounter: [id number]      Assessment:  78 y.o. female with LGIB and patchy / ulcerated colon s/p multiple transfusions & colonoscopy yesterday    Non-bloody BM x1 overnight      Plan:  Continue supportive care  Consider RUQ US for workup of T bili elevation  There is no indication for operative intervention  In this 78-year-old patient with poor functional status on steroids, any operative intervention would carry a very high rate of morbidity, a significant likelihood of mortality, and should be avoided if possible. She is a poor surgical candidate.  If rebleeds, recommend repeat colonoscopy per GI  If rebleeds, recommend 1:1:1 transfusion  Other care per ICU      Subjective: N/A, sedated/intubated      Objective:     Physical Exam:  GEN: NAD   Ab: Soft, NT/ND, benign abdominal exam  Lung: Breathing synchronously with vent  CV: RRR   Extrem: Globally edematous  Neuro: Asleep      I/O         03/12 0701 03/13 0700 03/13 0701 03/14 0700    I.V. (mL/kg) 367.9 (4.4) 518.7 (6.2)    Blood  2825.4    NG/GT 35     IV Piggyback 1078.3 865.8    Total Intake(mL/kg) 1481.2 (17.7) 4210 (50.2)    Urine (mL/kg/hr) 980 (0.5) 2450 (1.8)    Stool 0 200    Total Output 980 2650    Net +501.2 +1560          Unmeasured Stool Occurrence 2 x 6 x            Lab, Imaging and other studies: I have personally reviewed pertinent reports.  , CBC with diff:   Lab Results   Component Value Date    WBC 6.25 03/13/2024    HGB 12.0 03/13/2024    HCT 36.8 03/13/2024    MCV 87 03/13/2024    PLT 23 (L) 03/13/2024    RBC 4.25 03/13/2024    MCH 29.9 03/13/2024    MCHC 34.5 03/13/2024    RDW 15.4 (H) 03/13/2024    MPV 13.3 (H) 03/13/2024    NRBC 38 (H) 03/13/2024   , BMP/CMP:   Lab Results   Component Value Date    SODIUM 148 (H) 03/13/2024    K 4.5 03/13/2024     (H) 03/13/2024    CO2 21  03/13/2024    BUN 24 03/13/2024    CREATININE 1.18 03/13/2024    CALCIUM 8.5 03/13/2024     (H) 03/13/2024     (H) 03/13/2024    ALKPHOS 60 03/13/2024    EGFR 43 03/13/2024         Aristides Ferreira MD  3/13/2024 10:53 PM

## 2024-03-14 NOTE — ASSESSMENT & PLAN NOTE
K 3.4 this morning  Mg 1.9  On Lasix gtt    Give Mg 1 g IV   Give K 40 mg IV x2  Keep K>=4 and Mg>=2

## 2024-03-14 NOTE — ASSESSMENT & PLAN NOTE
Biopsy-proven at [facility]    Persistent oliguria, with good diuresis on Lasix gtt    UA 3/12/24 shows moderate blood (30-50 RBCs on micro), moderate leukocytes (30-50 on micro), 1+ protein, 1+ ketones, and increased specific gravity; not specific, but c/w lupus nephritis    Check U protein:Cr  BUN stable  Cr stable  Continue Lasix gtt  Monitor volume status,  UOP, renal indices

## 2024-03-14 NOTE — ASSESSMENT & PLAN NOTE
Labia erythematous with scattered ulcerations. Scattered ulcerations with pink and yellow tissue extending from radha-rectal area to medial thighs.    Concern for possible atypical herpetic lesions in immunosuppressed patient    Started Valtrex 3/13/24  Consider viral culture and/or biopsy

## 2024-03-14 NOTE — PROGRESS NOTES
NEPHROLOGY HOSPITAL PROGRESS NOTE   Darlyn Nguyen 78 y.o. female MRN: [id number]  Unit/Bed#: ICU 02 Encounter: [id number]  Reason for Consult: ARYAN, lupus nephritis    ASSESSMENT and PLAN:  Acute kidney injury:  Admission creatinine 1.10 on February 29, 2024.  Peak SCr was 1.27 on 3/12/24 but met urine output criteria for ARYAN.   Fortunately, she has responded to the Lasix challenge.   Renal function stable today. SCr 1.19.   Currently appears fluid overloaded.   Continue Lasix drip at 10 mg/hr.      Chronic kidney disease, stage II  Baseline creatinine 0.7 to 0.9.   Etiology is lupus nephritis.   Follows with Dr. Calvo of [facility].      Lupus nephritis, class III:  Renal biopsy done at [facility] in December 2023.   Treated with MMF and prednisone.  Was on dapsone for prophylaxis but developed methemoglobinemia and was changed to atovaquone.  Currently, MMF is being held.   Receiving Solumedrol.      Hypernatremia:  Due to diuresis and inadequate free water intake.   Improved with D5W.      Hypokalemia:  Resolved. K up to 4.0 today.   Likely due to Lasix drip and Metolazone.      Anasarca  Likely iatrogenic from IVF.   Continue Lasix drip at 10 mg/hr.   Recommend stopping albumin.      Hypertension:  BP is acceptable.   On diltiazem and carvedilol for atrial fibrillation.  Losartan on hold.     Ventilator dependent respiratory failure: Per critical care.     Enterococcus avium bacteremia: on Ampicillin.     Acute lower GI bleeding:   S/p C-scope on 3/13/24.   Found to have multiple ulcerations. ? Vasculitis. Plan for CTA.     DVT  Encephalopathy: Per critical care.    DISPOSITION:  Continue Lasix drip at 10 mg/hr.   Stop albumin.   While patient is not getting FWF via GT, place on D5W + 20 meq KCL at 80 cc/hr.   I am told that the patient is going to need a CTA.  May proceed with CTA from renal standpoint.  Recommend stopping furosemide drip for 8 hours before proceeding with CTA and restart Furosemide drip after 8 hours.    Before CTA, would give half normal saline (or saline) at 100 cc/hr 2 hours before and 6 hours after CTA.     The highlighted and/or bolded points in my assessment, plan, and disposition were discussed with the primary team and they agree with those points and the plan.    SUBJECTIVE / 24H INTERVAL HISTORY:  Had C-scope yesterday and found to have multiple ulcerations in the lower GI tract.   Remains on the vent - PEEP 6 and FiO2 50%.   GI bleeding appears to be better.   UO 3.4 liters yesterday.     OBJECTIVE:  Current Weight: Weight - Scale: 83.6 kg (184 lb 4.9 oz)  Vitals:    03/14/24 0700 03/14/24 0800 03/14/24 0900 03/14/24 1000   BP:       BP Location:       Pulse: 58 58 64 62   Resp: 16 13 14 14   Temp: (!) 97.2 °F (36.2 °C) (!) 97.2 °F (36.2 °C) 97.5 °F (36.4 °C) 97.5 °F (36.4 °C)   TempSrc:       SpO2: 96% 98% 97% 99%   Weight:       Height:           Intake/Output Summary (Last 24 hours) at 3/14/2024 1156  Last data filed at 3/14/2024 1000  Gross per 24 hour   Intake 6364.33 ml   Output 3525 ml   Net 2839.33 ml     General: critically ill appearing on the mechanical ventilator, comfortable.   Skin: warm, dry, good turgor.   Eyes: closed  ENT: ETT in place.   Neck: supple.  Chest/Lungs: equal chest expansion, coarse breath sounds.   CVS: distinct heart sounds, normal rate, regular rhythm, no rub  Abdomen: soft, non-distended, normoactive bowel sounds  Extremities: (+) anasarca  : (+) stevenson catheter.   Neuro: unable to eval on the mechanical ventilator.   Psych: could not evaluate.     Medications:    Current Facility-Administered Medications:     acetaminophen (TYLENOL) tablet 650 mg, 650 mg, Oral, Q6H PRN, AUGUSTA Barber, 650 mg at 03/10/24 0841    ampicillin-sulbactam (UNASYN) 3 g in sodium chloride 0.9 % 100 mL IVPB, 3 g, Intravenous, Q6H, AUGUSTA Barber, Last Rate: 200 mL/hr at 03/14/24 0955, 3 g at 03/14/24 0955    atorvastatin (LIPITOR) tablet 40 mg, 40 mg, Oral, Daily, Tamera Roberts  AUGUSTA, 40 mg at 03/14/24 0814    carvedilol (COREG) tablet 6.25 mg, 6.25 mg, Oral, BID With Meals, AUGUSTA Barber, 6.25 mg at 03/14/24 0815    dextrose 5 % with KCl 20 mEq/L infusion (premix), 50 mL/hr, Intravenous, Continuous, Last Rate: 50 mL/hr at 03/14/24 1106, 50 mL/hr at 03/14/24 1106 **FOLLOWED BY** [START ON 3/15/2024] dextrose 5 % with KCl 20 mEq/L infusion (premix), 50 mL/hr, Intravenous, Continuous, Uche Blair MD    diltiazem (CARDIZEM) tablet 30 mg, 30 mg, Oral, Q6H CRUZITO, AUGUSTA Barber, 30 mg at 03/14/24 1128    escitalopram (LEXAPRO) tablet 10 mg, 10 mg, Oral, Daily, AUGUSTA Barber, 10 mg at 03/14/24 0814    famotidine (PEPCID) tablet 20 mg, 20 mg, Oral, Daily, AUGUSTA Barber, 20 mg at 03/14/24 0815    fentaNYL injection 50 mcg, 50 mcg, Intravenous, Q2H PRN, AUGUSTA Stahl, 50 mcg at 03/14/24 0631    [START ON 3/15/2024] furosemide (LASIX) 500 mg infusion 50 mL, 10 mg/hr, Intravenous, Continuous, Uche Blair MD    insulin regular (HumuLIN R,NovoLIN R) 1 Units/mL in sodium chloride 0.9 % 100 mL infusion, 0.3-21 Units/hr, Intravenous, Titrated, Uche Blair MD, Last Rate: 3 mL/hr at 03/14/24 1002, 3 Units/hr at 03/14/24 1002    levalbuterol (XOPENEX) inhalation solution 1.25 mg, 1.25 mg, Nebulization, Q8H PRN, AUGUSTA Barber, 1.25 mg at 03/07/24 1128    methylPREDNISolone sodium succinate (Solu-MEDROL) 1,000 mg in sodium chloride 0.9 % 250 mL IVPB, 1,000 mg, Intravenous, Daily, Uche Blair MD, Stopped at 03/14/24 1000    omeprazole (PRILOSEC) suspension 2 mg/mL, 20 mg, Oral, Daily, AUGUSTA Barber, 20 mg at 03/14/24 0815    ondansetron (ZOFRAN) injection 4 mg, 4 mg, Intravenous, Q4H PRN, AUGUSTA Barber    propofol (DIPRIVAN) 1000 mg in 100 mL infusion (premix), 5-50 mcg/kg/min, Intravenous, Titrated, Uche Blair MD, Stopped at 03/13/24 1600    sodium chloride 0.9 % infusion, 100 mL/hr, Intravenous, Continuous, Uche Blair MD    valACYclovir  (VALTREX) tablet 1,000 mg, 1,000 mg, Oral, Q8H Kylee EAST CRNP, 1,000 mg at 03/14/24 0548    Laboratory Results:  Results from last 7 days   Lab Units 03/14/24  1128 03/14/24  0432 03/13/24 2008 03/13/24  1757 03/13/24  1631 03/13/24  1203 03/13/24  0610 03/13/24  0351 03/12/24  2203 03/12/24 2002 03/12/24  1754 03/12/24  1118 03/12/24  0835 03/12/24  0439 03/12/24  0436 03/11/24  1711 03/11/24  1706 03/11/24  1201 03/11/24  1055 03/11/24  0933 03/11/24  0304 03/10/24  0805 03/10/24  0443   WBC Thousand/uL  --  7.35  --  6.25  --   --   --  9.47 11.99*  --   --   --   --  18.58*  --  16.43*  --   --   --   --  13.21*  --  15.99*   HEMOGLOBIN g/dL 12.8 12.4 12.0 12.7  --  6.6*  --  6.8* 7.9*   < >  --    < >  --  13.6  --  12.4  --    < >  --    < > 8.8*   < > 7.8*   I STAT HEMOGLOBIN g/dl  --   --   --   --   --   --   --   --   --   --   --   --   --   --   --   --  10.2*  --  8.5*   < >  --   --   --    HEMATOCRIT %  --  35.3  --  36.8  --   --   --  20.5* 23.8*  --   --   --   --  39.5  --  35.8  --   --   --    < > 26.4*  --  23.8*   HEMATOCRIT, ISTAT %  --   --   --   --   --   --   --   --   --   --   --   --   --   --   --   --  30*  --  25*   < >  --   --   --    PLATELETS Thousands/uL  --  68*  --  23*  --   --   --  43* 54*  --   --   --   --  65*  --  56*  --   --   --   --  66*  --  85*   POTASSIUM mmol/L  --  3.4* 4.5  --  3.6  --  2.8*  --   --   --  3.6  --  3.8  --  4.0 3.3*  --    < >  --   --  3.2*  --  3.1*   CHLORIDE mmol/L  --  115* 117*  --  118*  --  118*  --   --   --  118*  --  117*  --  117* 115*  --    < >  --   --  114*  --  113*   CO2 mmol/L  --  21 21  --  21  --  22  --   --   --  22  --  22  --  16* 22  --    < >  --   --  26  --  25   CO2, I-STAT mmol/L  --   --   --   --   --   --   --   --   --   --   --   --   --   --   --   --  20*  --  19*   < >  --   --   --    BUN mg/dL  --  24 24  --  24  --  27*  --   --   --  27*  --  27*  --  27* 25  --    < >  --   --   24  --  25   CREATININE mg/dL  --  1.19 1.18  --  1.16  --  1.26  --   --   --  1.27  --  1.25  --  1.18 1.06  --    < >  --   --  0.86  --  0.92   CALCIUM mg/dL  --  8.2* 8.5  --  8.2*  --  7.8*  --   --   --  7.5*  --  7.5*  --  7.5* 7.3*  --    < >  --   --  7.5*  --  7.5*   MAGNESIUM mg/dL  --  1.9  --   --   --   --   --  1.8*  --   --   --   --   --   --  1.9  --   --   --   --   --   --   --  1.9   PHOSPHORUS mg/dL  --  3.5  --   --   --   --  4.0 3.9  --   --   --   --   --   --  4.4*  --   --   --   --   --   --   --   --    GLUCOSE, ISTAT mg/dl  --   --   --   --   --   --   --   --   --   --   --   --   --   --   --   --  275*  --  275*  --   --   --   --     < > = values in this interval not displayed.

## 2024-03-14 NOTE — OCCUPATIONAL THERAPY NOTE
Occupational Therapy Cancel Note:    Patient Name: Darlyn Nguyen  Today's Date: 3/14/2024    Chart reviewed. Pt remains intubated/sedated at this time. Will D/C OT. Please re-consult as medically appropriate.    Shagufta Morrison, OTR/L

## 2024-03-14 NOTE — ASSESSMENT & PLAN NOTE
Patient with significant anasarca likely component of iatrogenic fluid overload, hypoalbuminemia, oliguria  Preserved EF on echocardiogram  Continue Lasix gtt  Discontinue albumin  Monitor volume status and UOP

## 2024-03-14 NOTE — ASSESSMENT & PLAN NOTE
Possibly related to hepatic congestion vs medication vs sepsis/bacteremia plus bleeding  3/10 Peripheral blood smear shows normochromic normocytic anemia with nRBCs and polychromasia, no schistocytes, mild thrombocytopenia, mild leukocytosis with neutrophilia.  3/13 Repeat smear positive for schistocytes, c/w hemolysis. Ddx includes DIC, SLE, and 2/2 transfusion   Monitor CBC closely  Transfuse PLT if <20, <50 if actively bleeding

## 2024-03-14 NOTE — QUICK NOTE
Patient seen at bedside, just had a green/brown BM. Per nursing team there were some streaks of blood. Normotensive or slightly hypertensive off pressors. Making good urine (though on lasix drip). Last hgb 12 at 8pm from 6 earlier today. Abdomen soft, NT/ND (intubated but not on any sedative/analgesic drips).    No plans for operative intervention. Continue medical management of LGIB per ICU/GI.

## 2024-03-14 NOTE — ASSESSMENT & PLAN NOTE
E. avium bacteremia. Pt recently started on prednisone, atovaquone, hydroxychloroquine, and mycophenolate for lupus. Colonic/rectal ulcerations possible point of entry.     Repeat cultures show NGTD  Susceptibilities show pan-sensitive enterococcus    ID on board, appreciate recommendations  Continue Unasyn 3 g Q6H (Day 11 Unasyn of anticipated 6-wk course)  Can hold off DYANA, per ID, as unlikely to

## 2024-03-14 NOTE — ASSESSMENT & PLAN NOTE
Rapid response called for unresponsive patient on morning of 3/11/24. Earlier that morning, patient described as confused and unable to answer orientation questions, which had been consistent throughout this admission.  Etiology unclear.   At time of rapid response, findings significant for Hgb 6.7 in the setting of acute GIB and hypoxemia on iSTAT. Vitals were normal and other lab values were unremarkable.    TSH wnl at 1.329, free T4 low at 0.38  Repeat in 1-2 weeks    Pt mentation improving overall off sedation. This morning, opening eyes, nodding and shaking head appropriately to questions, but not following commands such as wiggling fingers or tracking finger with eyes.

## 2024-03-14 NOTE — ASSESSMENT & PLAN NOTE
Lab Results   Component Value Date    HGBA1C 5.9 (H) 03/03/2024       Recent Labs     03/14/24  0004 03/14/24  0223 03/14/24  0411 03/14/24  0559   POCGLU 125 132 127 133         Blood Sugar Average: Last 72 hrs:  (P) 178.24    History of DM, well-controlled with last A1c 5.6 on 2/26, but now 5.9. Hyperglycemia in the setting of steroid use.    Started insulin gtt while on pulse-dose steroids and tube feeds  Avoid hypoglycemia per protocol  Monitor BG and titrate insulin gtt accordingly

## 2024-03-14 NOTE — ASSESSMENT & PLAN NOTE
Persitent LGIB  Patient was initially on heparin due to acute DVT  3/6 flexible sigmoidoscopy revealing ulcerated rectal mucosa, status post cauterization  Repeat Flex sig w GI on 3/11: multiple ulcers throughout colon, biopsies taken; one oozing ulcer in transverse colon, clipped with hemostasis; severe diverticula; prolapsed rectum; rectal ulcers.  Colonoscopy 3/13: Multiple ulcers in the IC valve, cecum, ascending colon, hepatic flexure and transverse colon; Ulcer in the hepatic flexure with oozing hemorrhage; placed 2 clips successfully; hemostasis achieved    Ddx includes SLE vasculitis, ischemic colitis, and infectious etiologies    Pt w BRBPR 3/13, with drop in Hgb to 6.6,   3/13: transfused 5 U pRBC, 2 U FFP, 1 U Plt, 2 U cryo  Total blood products this admission: RBC x12, FFP x2, plt x1, cryo x2  -Monitor Hgb q8h  -F/u pathology results--prelim shows active cryptitis and crypt abscess w focal ulceration, no dysplasia or carcinoma.  -maintain active T&S, transfuse for Hg < 7.0 w FFP and plts

## 2024-03-15 ENCOUNTER — ANESTHESIA (INPATIENT)
Dept: RADIOLOGY | Facility: HOSPITAL | Age: 79
DRG: 871 | End: 2024-03-15
Payer: MEDICARE

## 2024-03-15 ENCOUNTER — ANESTHESIA EVENT (INPATIENT)
Dept: RADIOLOGY | Facility: HOSPITAL | Age: 79
DRG: 871 | End: 2024-03-15
Payer: MEDICARE

## 2024-03-15 PROBLEM — Z51.5 COMFORT MEASURES ONLY STATUS: Status: ACTIVE | Noted: 2024-01-01

## 2024-03-15 PROBLEM — I27.20 PULMONARY HYPERTENSION (HCC): Status: ACTIVE | Noted: 2024-01-01

## 2024-03-15 PROBLEM — Z79.52 STEROID DEPENDENT FOR ADRENAL SUPRESSION: Status: ACTIVE | Noted: 2024-03-15

## 2024-03-15 RX ORDER — PROPOFOL 10 MG/ML
INJECTION, EMULSION INTRAVENOUS AS NEEDED
Status: DISCONTINUED | OUTPATIENT
Start: 2024-03-15 | End: 2024-03-15

## 2024-03-15 RX ORDER — ONDANSETRON 2 MG/ML
INJECTION INTRAMUSCULAR; INTRAVENOUS AS NEEDED
Status: DISCONTINUED | OUTPATIENT
Start: 2024-03-15 | End: 2024-03-15

## 2024-03-15 RX ORDER — ROCURONIUM BROMIDE 10 MG/ML
INJECTION, SOLUTION INTRAVENOUS AS NEEDED
Status: DISCONTINUED | OUTPATIENT
Start: 2024-03-15 | End: 2024-03-15

## 2024-03-15 RX ORDER — LIDOCAINE HCL/PF 100 MG/5ML
SYRINGE (ML) INJECTION AS NEEDED
Status: DISCONTINUED | OUTPATIENT
Start: 2024-03-15 | End: 2024-03-15

## 2024-03-15 RX ORDER — FENTANYL CITRATE 50 UG/ML
INJECTION, SOLUTION INTRAMUSCULAR; INTRAVENOUS AS NEEDED
Status: DISCONTINUED | OUTPATIENT
Start: 2024-03-15 | End: 2024-03-15

## 2024-03-15 RX ORDER — SUCCINYLCHOLINE/SOD CL,ISO/PF 100 MG/5ML
SYRINGE (ML) INTRAVENOUS AS NEEDED
Status: DISCONTINUED | OUTPATIENT
Start: 2024-03-15 | End: 2024-03-15

## 2024-03-15 RX ORDER — CALCIUM CHLORIDE 100 MG/ML
INJECTION INTRAVENOUS; INTRAVENTRICULAR AS NEEDED
Status: DISCONTINUED | OUTPATIENT
Start: 2024-03-15 | End: 2024-03-15

## 2024-03-15 RX ORDER — SODIUM CHLORIDE, SODIUM LACTATE, POTASSIUM CHLORIDE, CALCIUM CHLORIDE 600; 310; 30; 20 MG/100ML; MG/100ML; MG/100ML; MG/100ML
INJECTION, SOLUTION INTRAVENOUS CONTINUOUS PRN
Status: DISCONTINUED | OUTPATIENT
Start: 2024-03-15 | End: 2024-03-15

## 2024-03-15 RX ADMIN — CALCIUM CHLORIDE 1 G: 100 INJECTION INTRAVENOUS; INTRAVENTRICULAR at 10:56

## 2024-03-15 RX ADMIN — SODIUM CHLORIDE, SODIUM LACTATE, POTASSIUM CHLORIDE, AND CALCIUM CHLORIDE: .6; .31; .03; .02 INJECTION, SOLUTION INTRAVENOUS at 10:16

## 2024-03-15 RX ADMIN — Medication 100 MG: at 10:41

## 2024-03-15 RX ADMIN — NOREPINEPHRINE BITARTRATE 20 MCG: 1 INJECTION, SOLUTION, CONCENTRATE INTRAVENOUS at 10:47

## 2024-03-15 RX ADMIN — LIDOCAINE HYDROCHLORIDE 100 MG: 20 INJECTION INTRAVENOUS at 10:41

## 2024-03-15 RX ADMIN — NOREPINEPHRINE BITARTRATE 10 MCG: 1 INJECTION, SOLUTION, CONCENTRATE INTRAVENOUS at 10:41

## 2024-03-15 RX ADMIN — SUGAMMADEX 200 MG: 100 INJECTION, SOLUTION INTRAVENOUS at 11:47

## 2024-03-15 RX ADMIN — NOREPINEPHRINE BITARTRATE 20 MCG: 1 INJECTION, SOLUTION, CONCENTRATE INTRAVENOUS at 10:45

## 2024-03-15 RX ADMIN — FENTANYL CITRATE 50 MCG: 50 INJECTION INTRAMUSCULAR; INTRAVENOUS at 10:41

## 2024-03-15 RX ADMIN — PROPOFOL 150 MG: 10 INJECTION, EMULSION INTRAVENOUS at 10:41

## 2024-03-15 RX ADMIN — SUGAMMADEX 200 MG: 100 INJECTION, SOLUTION INTRAVENOUS at 11:39

## 2024-03-15 RX ADMIN — NOREPINEPHRINE BITARTRATE 10 MCG: 1 INJECTION, SOLUTION, CONCENTRATE INTRAVENOUS at 11:16

## 2024-03-15 RX ADMIN — ROCURONIUM BROMIDE 50 MG: 10 INJECTION, SOLUTION INTRAVENOUS at 10:41

## 2024-03-15 RX ADMIN — ONDANSETRON 4 MG: 2 INJECTION INTRAMUSCULAR; INTRAVENOUS at 11:37

## 2024-03-15 RX ADMIN — NOREPINEPHRINE BITARTRATE 20 MCG: 1 INJECTION, SOLUTION, CONCENTRATE INTRAVENOUS at 10:43

## 2024-03-15 NOTE — ASSESSMENT & PLAN NOTE
Labia erythematous with scattered ulcerations. Scattered ulcerations with pink and yellow tissue extending from radha-rectal area to medial thighs.    Suspect CMV based on colon Bx    Level 4 CMO  D/C ganciclovir, started 3/14/24

## 2024-03-15 NOTE — ASSESSMENT & PLAN NOTE
Malnutrition Findings:   Adult Malnutrition type: Acute illness  Adult Degree of Malnutrition: Other severe protein calorie malnutrition  Malnutrition Characteristics: Fluid accumulation, Inadequate energy                  360 Statement: Severe calorie-protein malnutrition in context to acute illness r/t GI bleed, dysphagia, inadequate PO intake as evidance by energy intake less than 50% compared to estimated needs >5 days, +2 generalized edema; Treated with TBD (diet +oral supplements vs nutrition support)    BMI Findings:           Body mass index is 31.64 kg/m².      Extubated 3/14. Currently has NGT for bowel prep. Can consider initiation of TF if NGT tolerated and pt has inadequate oral intake when oral diet resumed, pending GOC.

## 2024-03-15 NOTE — ASSESSMENT & PLAN NOTE
Malnutrition Findings:   Adult Malnutrition type: Acute illness  Adult Degree of Malnutrition: Other severe protein calorie malnutrition  Malnutrition Characteristics: Fluid accumulation, Inadequate energy                  360 Statement: Severe calorie-protein malnutrition in context to acute illness r/t GI bleed, dysphagia, inadequate PO intake as evidance by energy intake less than 50% compared to estimated needs >5 days, +2 generalized edema; Treated with TBD (diet +oral supplements vs nutrition support)    BMI Findings:           Body mass index is 31.64 kg/m².      Level 4 CMO  Pleasure feed

## 2024-03-15 NOTE — ASSESSMENT & PLAN NOTE
Lab Results   Component Value Date    HGBA1C 5.9 (H) 03/03/2024       Recent Labs     03/15/24  0205 03/15/24  0407 03/15/24  0619 03/15/24  0756   POCGLU 117 118 150* 156*         Blood Sugar Average: Last 72 hrs:  (P) 153.64731    History of DM, well-controlled with last A1c 5.6 on 2/26, but now 5.9. Hyperglycemia in the setting of steroid use.    Started insulin gtt while on pulse-dose steroids and D5W  Avoid hypoglycemia per protocol  Monitor BG and titrate insulin gtt accordingly

## 2024-03-15 NOTE — BRIEF OP NOTE (RAD/CATH)
INTERVENTIONAL RADIOLOGY PROCEDURE NOTE    Date: 3/15/2024    Procedure: IR EMBOLIZATION (SPECIFY VESSEL OR SITE)     Preoperative diagnosis:   1. Acute respiratory failure with hypoxia (HCC)    2. Pneumonia of right lower lobe due to infectious organism    3. Pressure ulcers of skin of multiple topographic sites    4. DVT (deep venous thrombosis) (HCC)    5. Bacteremia due to Enterococcus    6. Septic shock (HCC)    7. Metabolic acidosis    8. GI bleed    9. Diarrhea of presumed infectious origin    10. Metabolic encephalopathy    11. New onset a-fib (HCC)    12. Pancolitis (HCC)    13. Lupus (HCC)    14. 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 Anasarca    16. Hypernatremia    17. Oliguria    18. Gastrointestinal hemorrhage, unspecified gastrointestinal hemorrhage type         Postoperative diagnosis: Same.    Surgeon: Taz Mercado MD     Assistant: None. No qualified resident was available.    Blood loss: minimal    Specimens: none    Findings: No bleed identified in the region of the hepatic flexure. Multiple vessels were injected supplying the ascending colon and proximal transverse colon.    Complications: None immediate.    Anesthesia: local and general anesthesia

## 2024-03-15 NOTE — ASSESSMENT & PLAN NOTE
Lab Results   Component Value Date    HGBA1C 5.9 (H) 03/03/2024       Recent Labs     03/15/24  0756 03/15/24  0957 03/15/24  1235 03/15/24  1434   POCGLU 156* 154* 162* 161*         Blood Sugar Average: Last 72 hrs:  (P) 154.2248976620556604    Level 4 CMO    D/C D5W and insulin gtt

## 2024-03-15 NOTE — CONSULTS
e-Consult (IPC)  - Interventional Radiology  Darlyn Nguyen 78 y.o. female MRN: [id number]  Unit/Bed#: ICU 02 Encounter: [id number]          Interventional Radiology has been consulted to evaluate Darlyn Nguyen    We were consulted by Dr. Hammond concerning this patient with an active lower GI bleed.    Inpatient Consult to IR  Consult performed by: Taz Mercado MD  Consult ordered by: Uche Blair MD        03/15/24    Assessment/Recommendation:   79 yo female with lower GI bleed s/p multiple endoscopies who presented with BRBPR this morning. Her high volume CT scan was reviewed showing active extravasation in the colon at the hepatic flexure.     The procedure risks and benefits were discussed with her  and informed consent obtained. The risks of bowel infarction and renal failure were included in the discussion. Will proceed with emergent arteriogram and embolization with anesthesia.    11-20 minutes, >50% of the total time devoted to medical consultative verbal/EMR discussion between providers. Written report will be generated in the EMR.     Thank you for allowing Interventional Radiology to participate in the care of Darlyn Nguyen. Please don't hesitate to call or TigerText us with any questions.     Taz Mercado MD

## 2024-03-15 NOTE — ASSESSMENT & PLAN NOTE
Persitent LGIB  Patient was initially on heparin due to acute DVT  3/6 flexible sigmoidoscopy revealing ulcerated rectal mucosa, status post cauterization  Repeat Flex sig w GI on 3/11: multiple ulcers throughout colon, biopsies taken; one oozing ulcer in transverse colon, clipped with hemostasis; severe diverticula; prolapsed rectum; rectal ulcers.  Colonoscopy 3/13: Multiple ulcers in the IC valve, cecum, ascending colon, hepatic flexure and transverse colon; Ulcer in the hepatic flexure with oozing hemorrhage; placed 2 clips successfully; hemostasis achieved  3/15: Pt bleeding overnight in early AM hours with large drop in Hgb, transfused 2 U pRBC and 1 U FFP and GI updated. Added transfusion of 1 U FFP, 1 U cryo, 2 U plt. CT bleeding scan showed active bleed in R colon near hepatic flexure.   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

## 2024-03-15 NOTE — PROGRESS NOTES
[facility] - Progress Note  Darlyn Nguyen 78 y.o. female MRN: [id number]  Unit/Bed#: ICU 02 Encounter: [id number]      ASSESSMENT & PLAN:    79 y/o female w hx of SLE, chronic anemia, HTN, chronic diarrhea, DVT previously on AC now s/p IVC filter, ambulatory dysfx, T2DM, CVA w R sided weakness, depression initially presented with diarrhea and CT scan showing colitis.     Hospital course with enterococcus bacteremia, sacral wound cx w E coli + proteus mirabilis + MDR pseudomonas. Developed septic shock, required ICU level of care. S/p flex sig 3/6/24 for bloody stools noting rectal ulcerations, treated w cautery. Developed repeat bleeds over the weekend, mental status deterioration requiring ICU stay again, and colonoscopy 3/11/24 w bleeding colonic ulcer s/p endoclip w hemostasis. Repeat colonoscopy 3/13/24 with additional bleed and worsening diffuse ulcerations. Recurrent bleeding overnight 3/14/24 to 3/15/24, w CT bleeding scan showing active extravasation at hepatic flexure.     Recurrent LGIB  Imaging w recurrent bleeding at hepatic flexure, c/w prior sites of bleeding on pt's colonoscopies 3/11/24 and 3/13/24. 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     Scheduled Meds:  Current Facility-Administered Medications   Medication Dose  Route Frequency Provider Last Rate    acetaminophen  650 mg Oral Q6H PRN AUGUSTA Barber      ampicillin-sulbactam  3 g Intravenous Q6H AUGUSTA Barber 3 g (03/15/24 1005)    atorvastatin  40 mg Oral Daily AUGUSTA Barber      carvedilol  6.25 mg Oral BID With Meals AUGUSTA Barber      dextrose 5 % with KCl 20 mEq/L  100 mL/hr Intravenous Continuous Uche Blair  mL/hr (03/15/24 0813)    diltiazem  30 mg Oral Q6H [facility] AUGUSTA Barber      escitalopram  10 mg Oral Daily AUGUSTA Barber      famotidine  20 mg Oral Daily AUGUSTA Barber      fentaNYL  50 mcg Intravenous Q2H PRN AUGUSTA Stahl      ganciclovir  2.5 mg/kg (Adjusted) Intravenous Q24H Ana Laura Dalton MD Stopped (03/14/24 1800)    insulin regular (HumuLIN R,NovoLIN R) 1 Units/mL in sodium chloride 0.9 % 100 mL infusion  0.3-21 Units/hr Intravenous Titrated Uche Blair MD 1 Units/hr (03/15/24 1014)    levalbuterol  1.25 mg Nebulization Q8H PRN AUGUSTA Barber      methylPREDNISolone sodium succinate  1,000 mg Intravenous Daily Uche Blair MD 1,000 mg (03/15/24 0957)    omeprazole (PRILOSEC) suspension 2 mg/mL  20 mg Oral Daily AUGUSTA Barber      ondansetron  4 mg Intravenous Q4H PRN AUGUSTA Barber       Facility-Administered Medications Ordered in Other Encounters   Medication Dose Route Frequency Provider Last Rate    calcium chloride   Intravenous PRN Sarah Vann CRNA      fentaNYL   Intravenous PRN Molly Pizano MD      lactated ringers   Intravenous Continuous PRN Molly Pizano MD      lidocaine (cardiac)   Intravenous PRN Molly Pizano MD      norepinephrine (LEVOPHED) 4 mg (STANDARD CONCENTRATION) IV in sodium chloride 0.9% 250 mL   Intravenous Continuous PRN Molly Pizano MD 3 mcg/min (03/15/24 1046)    propofol   Intravenous PRN Molly Pizano MD      ROCuronium   Intravenous PRN Molly Pizano MD      Succinylcholine Chloride   Intravenous PRN Molly Pizano MD       Continuous  "Infusions:dextrose 5 % with KCl 20 mEq/L, 100 mL/hr, Last Rate: 100 mL/hr (03/15/24 0813)  insulin regular (HumuLIN R,NovoLIN R) 1 Units/mL in sodium chloride 0.9 % 100 mL infusion, 0.3-21 Units/hr, Last Rate: 1 Units/hr (03/15/24 1014)      PRN Meds:.  acetaminophen    fentaNYL    levalbuterol    ondansetron    OBJECTIVE:     Objective   Blood pressure (!) 216/86, pulse (!) 116, temperature (!) 95 °F (35 °C), resp. rate (!) 26, height 5' 4\" (1.626 m), weight 83.6 kg (184 lb 4.9 oz), SpO2 98%. Body mass index is 31.64 kg/m².    Intake/Output Summary (Last 24 hours) at 3/15/2024 1100  Last data filed at 3/15/2024 0844  Gross per 24 hour   Intake 7832.6 ml   Output 4585 ml   Net 3247.6 ml       PHYSICAL EXAM:   General Appearance: Lethargic but not sedated, opening eyes  Abdomen: Soft, non-tender, non-distended; no masses or no organomegaly    Invasive Devices       Peripherally Inserted Central Catheter Line  Duration             PICC Line 03/08/24 Right Basilic 6 days              Central Venous Catheter Line  Duration             CVC Central Lines 03/11/24 Triple 20cm 3 days              Arterial Line  Duration             Arterial Line 03/11/24 Right Radial 3 days              Drain  Duration             Urethral Catheter Latex;Straight-tip 16 Fr. 14 days    NG/OG/Enteral Tube Nasogastric Left nare <1 day    NG/OG/Enteral Tube Nasogastric Right nare <1 day                    LAB RESULTS:      Lab Units 03/15/24  0456 03/14/24  1804 03/14/24  1128 03/14/24  0432 03/13/24  2008 03/13/24  1631 03/13/24  0610 03/13/24  0351 03/12/24  0835 03/12/24  0436 03/11/24  0304 03/10/24  0443   SODIUM mmol/L 148* 146 145 147 148*   < > 150*  --    < > 145   < > 146   POTASSIUM mmol/L 3.7 3.5 4.0 3.4* 4.5   < > 2.8*  --    < > 4.0   < > 3.1*   CHLORIDE mmol/L 118* 116* 116* 115* 117*   < > 118*  --    < > 117*   < > 113*   CO2 mmol/L 19* 22 22 21 21   < > 22  --    < > 16*   < > 25   CO2, I-STAT   --   --   --   --   --   --   " --   --   --   --    < >  --    BUN mg/dL 25 24 23 24 24   < > 27*  --    < > 27*   < > 25   CREATININE mg/dL 1.14 1.07 1.15 1.19 1.18   < > 1.26  --    < > 1.18   < > 0.92   GLUCOSE RANDOM mg/dL 164* 90 134 135 149*   < > 231*  --    < > 209*   < > 234*   CALCIUM mg/dL 7.2* 8.3* 8.1* 8.2* 8.5   < > 7.8*  --    < > 7.5*   < > 7.5*   MAGNESIUM mg/dL 1.7*  --   --  1.9  --   --   --  1.8*  --  1.9  --  1.9   PHOSPHORUS mg/dL 4.6*  --   --  3.5  --   --  4.0 3.9  --  4.4*  --   --     < > = values in this interval not displayed.            Lab Units 03/15/24  0456 03/14/24  0432 03/13/24  0351 03/11/24  1201 03/11/24  0304 02/22/24  0413 02/21/24  0428   TOTAL PROTEIN g/dL 3.2* 4.6* 3.7* 3.3* 3.5*   < > 4.5*   ALBUMIN g/dL 2.3* 3.6 2.9* 2.1* 2.3*   < > 2.6*   TOTAL BILIRUBIN mg/dL 1.63* 2.95* 0.82 0.67 0.64   < > 1.3*   BILI DIR mg/dL  --   --   --   --   --   --  0.3*   BILIRUBIN DIRECT mg/dL 0.72* 1.66* 0.40* 0.18  --   --   --    AST U/L 53* 174* 515* 16 14   < > 11   ALT U/L 99* 157* 385* 28 30   < > 19   ALK PHOS U/L 123* 194* 60 53 55   < > 65    < > = values in this interval not displayed.           Lab Units 03/15/24  0719 03/15/24  0456 03/15/24  0052 03/14/24  1804 03/14/24  1128 03/14/24  0432 03/13/24 2008 03/13/24  1757 03/13/24  1203 03/13/24  0351   WBC Thousand/uL 7.35 9.22  --   --   --  7.35  --  6.25  --  9.47   HEMOGLOBIN g/dL 10.2* 12.7 10.4* 14.0 12.8 12.4   < > 12.7   < > 6.8*   HEMATOCRIT % 29.0* 35.3  --   --   --  35.3  --  36.8  --  20.5*   PLATELETS Thousands/uL 92* 44*  --   --   --  68*  --  23*  --  43*   MCV fL 84 84  --   --   --  84  --  87  --  92    < > = values in this interval not displayed.       Lab Results   Component Value Date    IRON 155 02/26/2024    TIBC <210 (L) 02/26/2024    FERRITIN 2,238 (H) 02/26/2024       Lab Results   Component Value Date    INR 1.39 (H) 03/15/2024    INR 1.22 (H) 03/14/2024    INR 1.65 (H) 03/13/2024    PROTIME 17.2 (H) 03/15/2024    PROTIME 15.7  (H) 03/14/2024    PROTIME 19.7 (H) 03/13/2024       RADIOLOGY RESULTS:   Procedure: CT high volume bleeding scan abdomen pelvis    Result Date: 3/15/2024  Narrative: CT ABDOMEN AND PELVIS - WITHOUT AND WITH IV CONTRAST INDICATION:   GIB. COMPARISON: None. TECHNIQUE: CT examination of the abdomen and pelvis was performed both prior to and after the administration of intravenous contrast. Multiplanar 2D reformatted images were created from the source data. Radiation dose length product (DLP) for this visit: 4250 mGy-cm . This examination, like all CT scans performed in the [facility], was performed utilizing techniques to minimize radiation dose exposure, including the use of iterative reconstruction and automated exposure control. IV Contrast: 100 mL of iohexol (OMNIPAQUE) Enteric Contrast: Not administered. FINDINGS: ABDOMEN BOWEL: Focal area of extravasation seen in the right colon near the hepatic flexure proximal to the area of the previously placed surgical clips, seen in image 71 series 5 which gets more pronounced on the portal venous phase images.. Enhancement of the gastric mucosa seen in the body and fundus. LOWER CHEST: Small right effusion seen there is minimal left effusion seen and compressive atelectasis seen right lower lobe, right middle lobe Cardiomegaly seen Again noted is the necrotizing/cavitary pneumonia right upper lobe LIVER/BILIARY TREE: There is development of large heterogeneous poorly defined area in the dome of the liver, measuring 4.3 cm., Image 25 series 6 additional similar poorly defined hypodense areas seen in the segment 5 in image 50 series 6 measuring 1.8 cm, segment 6 image 60 series 6 measuring 1.2 cm, segment 7 image 41 series 6 there is a poorly defined have ill-defined and irregular walls with central areas of low-attenuation. These likely represent phlegmon changes and possibly evolving abscesses GALLBLADDER: Cholelithiasis seen SPLEEN: Wedge-shaped  hypodense area seen in the spleen suggest splenic infarct PANCREAS: Unremarkable. ADRENAL GLANDS: Unremarkable. KIDNEYS/URETERS: Mild dilatation of the right pelvocalyceal system seen and mild dilatation left pelvicalyceal system. There is no evidence of ureteric obstruction in the nephrograms in both kidneys perinephric fluid seen on the right. Thickening of the pararenal fascia APPENDIX: No findings to suggest appendicitis. ABDOMINOPELVIC CAVITY: Tightly seen with fluid in the paracolic gutters, mesenteric leaves. Perisplenic fluid seen Perihepatic fluid seen VESSELS: Patent SMA. Celiac trunk is patent though small caliber Hepatic artery is patent Splenic artery is also of small caliber Left gastric is patent Renal arteries are patent Iliac vessels are patent IVC appears unremarkable with the IVC filter in place PELVIS REPRODUCTIVE ORGANS: Uterus is surgically absent No adnexal mass seen URINARY BLADDER: Bladder is nondistended ABDOMINAL WALL/INGUINAL REGIONS: Anasarca seen BONES: No acute fracture or suspicious osseous lesion.     Impression: Active bleeding in the right colon, (image 79 series 6, image 74 series 5) Mucosal enhancement in the gastric mucosa involving the body, fundus and antrum Visualization of a scattered poorly defined hypodense areas in the liver parenchyma largest in segment 8 of the liver measuring 4.3 cm x 4.9 cm. These are probably sequela of hepatic necrosis, phlegmons. Hypodensities in the spleen due to splenic infarct, persist Ascites I personally discussed this study with  Uche Blair on 3/15/2024 7:05 AM. 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 COMPARISON: 2/29/2024. 3/4/2024 CT chest. TECHNIQUE: CT examination of the chest, abdomen and pelvis was performed both prior to and  after the administration of intravenous contrast. The noncontrast portion of this examination was performed utilizing low radiation dose technique. Thin section angiographic arterial phase post contrast technique was used in order to evaluate for aortic dissection. 3D reformatted images and volume rendering were performed on an independent workstation. Additionally, axial, sagittal, and coronal 2D reformatted images were created from the source data and submitted for interpretation. Radiation dose length product (DLP) for this visit: 2112 mGy-cm . This examination, like all CT scans performed in the [facility], was performed utilizing techniques to minimize radiation dose exposure, including the use of iterative reconstruction and automated exposure control. IV Contrast: 100 mL of iohexol (OMNIPAQUE) Enteric Contrast: Not administered. FINDINGS: AORTA: No aortic dissection or intramural hematoma. No aortic aneurysm. Mild atherosclerotic changes. No flow limiting atherosclerotic stenosis of aorta or major aortic branch vessel in the chest, abdomen or pelvis. Patent SMA without significant stenosis. Infrarenal IVC filter in situ. CHEST CHEST LUNGS: Consolidation in the posterior right upper lobe with a large cavitation that increased since prior study and demonstrates air-fluid level, 6.7 x 4.5 cm. Compressive atelectasis in the inferior lower lobes, right greater than left. Mild atelectatic  changes in the right middle lobe. Atelectatic changes in the posterior right upper lobe, infiltrate cannot be excluded. PLEURA: Small pleural effusions, right greater than left. HEART/GREAT VESSELS: Moderate cardiomegaly. No thoracic aortic aneurysm. Right PICC and left IJ approach catheter terminating in the SVC. Nonocclusive pulmonary emboli in the right lower lobar, right middle lobar and left lower basal segmental branches. MEDIASTINUM AND GUILHERME: Unremarkable. CHEST WALL AND LOWER NECK: Hypodense right thyroid  nodules, the largest 1.2 cm. ABDOMEN LIVER/BILIARY TREE: Unremarkable. GALLBLADDER: Cholelithiasis without findings of acute cholecystitis. SPLEEN: Hypodense foci in the spleen, possibly due to early arterial phase but indeterminate without portal venous phase. PANCREAS: Unremarkable. ADRENAL GLANDS: Unremarkable. KIDNEYS/URETERS: Mild fullness of bilateral collecting systems. STOMACH AND BOWEL: Scattered colonic diverticula with no evidence of acute diverticulitis. Liquid content throughout the colon compatible with diarrhea. New endoscopy clips in the hepatic flexure of the colon. Mild mucosal hyperenhancement in the right colon and small bowel of uncertain clinical significance as the postcontrast phase is early portal venous. APPENDIX: Surgically absent.. ABDOMINOPELVIC CAVITY: Small volume low-density ascites. No drainable collection. PELVIS REPRODUCTIVE ORGANS: Post hysterectomy. URINARY BLADDER: Decompressed around Peralta catheter. ABDOMINAL WALL/INGUINAL REGIONS: Anasarca. BONES: No acute fracture or suspicious osseous lesion. Spinal degenerative changes.     Impression: No radiographic findings to suggest SMA vasculitis. Patency and normal caliber of the aorta and major branches including celiac trunk, SMA, and HEIDI. Mild atherosclerotic disease. Mild wall hyperenhancement in the right colon and small bowel, of uncertain clinical significance at this contrast phase.. No significant wall thickening, abscess, perforation. New endoscopy clips in the right hepatic flexure. Pleural effusions, ascites, anasarca, suggestive of third spacing or fluid overload. Increased cavitary process in the right lung, as detailed above. Incidentally noted age-indeterminate nonocclusive pulmonary emboli. Low clot burden. Other findings, as per the body of the report. I personally discussed this study with Akua Peralta on 3/14/2024 8:26 PM. Workstation performed: [id number]     Procedure: Colonoscopy    Result Date:  3/13/2024  Narrative: Table formatting from the original result was not included. [facility] [address] [facility] [address] [phone] DATE OF SERVICE: 3/13/24 PHYSICIAN(S): Attending: Diana M Jaiyeola, MD Fellow: Bryce Medellin MD INDICATION: GI bleed POST-OP DIAGNOSIS: See the impression below. HISTORY: Prior colonoscopy: Less than 3 years ago. It is being repeated at an interval of less than 3 years because: This colonoscopy is being performed for a diagnostic indication BOWEL PREPARATION:  PREPROCEDURE: Informed consent was obtained for the procedure, including sedation. Risks including but not limited to bleeding, infection, perforation, adverse drug reaction and aspiration were explained in detail. Also explained about less than 100% sensitivity with the exam and other alternatives. The patient was placed in the left lateral decubitus position. Procedure: Colonoscopy DETAILS OF PROCEDURE: Patient was taken to the procedure room where a time out was performed to confirm correct patient and correct procedure. The patient underwent monitored anesthesia care, which was administered by an anesthesia professional. The patient's blood pressure, heart rate, level of consciousness, oxygen, respirations, ECG and ETCO2 were monitored throughout the procedure. A digital rectal exam was performed. The scope was introduced through the anus and advanced to the cecum. Retroflexion was performed in the rectum. The quality of bowel preparation was evaluated using the [place] Bowel Preparation Scale with scores of: right colon = 2, transverse colon = 2, left colon = 2. The total BBPS score was 6. Bowel prep was adequate. The patient experienced no blood loss. The procedure was not difficult. The patient tolerated the procedure well. There were no apparent adverse events. ANESTHESIA INFORMATION: ASA: ASA status not filed in the log. Anesthesia Type: Anesthesia type not filed in the log.  MEDICATIONS: No administrations occurring from 1442 to 1557 on 03/13/24 FINDINGS: Multiple ulcers in the ileocecal valve, cecum, ascending colon, hepatic flexure and transverse colon. Multiple ulcers with no active bleeding in the cecum and ascending colon, but most ulcers had adherent clot and pigmented flat spots Ulcer in the hepatic flexure with oozing hemorrhage; placed 2 clips successfully (clips are MRI conditional); hemostasis achieved Ulcer in the hepatic flexure. Previously treated ulcer at hepatic flexure visualized with no rebleeding and previously deployed clip in place Diverticula in the transverse colon, splenic flexure, descending colon and sigmoid colon Abnormal mucosa in the rectum. Previously treated ulcerated lesion in prolapsed rectum revisualized w no active bleeding EVENTS: Procedure Events Event Event Time ENDO CECUM REACHED 3/13/2024  3:10 PM ENDO SCOPE OUT TIME 3/13/2024  3:48 PM SPECIMENS: * No specimens in log * EQUIPMENT: Colonoscope -     Impression: Multiple ulcers in the ileocecal valve, cecum, ascending colon, hepatic flexure and transverse colon Ulcer in the hepatic flexure with oozing hemorrhage; placed 2 clips successfully; hemostasis achieved Ulcer in the hepatic flexure with previously placed clip in place Diverticulosis in the transverse colon, splenic flexure, descending colon and sigmoid colon Abnormal ulcerated mucosa in the rectum RECOMMENDATION:  No further screening colonoscopies necessary  Age greater than 65   Transfuse 2 additional U pRBCs, recheck Hg q8 hrs CT angiography now Surgery consult If pt develops recurrent bleed, please reach out to on call GI team and on call surgery team, but likely pt may warrant CTA    Diana M Jaiyeola, MD     Narrative/Impressions - 3 day look back     Bryce Medellin M.D.  PGY-5 Gastroenterology Fellow  [facility]  Available on Vid[employer]  [email or web address]

## 2024-03-15 NOTE — ASSESSMENT & PLAN NOTE
Rapid response called for unresponsive patient on morning of 3/11/24. Earlier that morning, patient described as confused and unable to answer orientation questions, which had been consistent throughout this admission.  Etiology unclear.   At time of rapid response, findings significant for Hgb 6.7 in the setting of acute GIB and hypoxemia on iSTAT. Vitals were normal and other lab values were unremarkable.    TSH wnl at 1.329, free T4 low at 0.38  Repeat in 1-2 weeks    Pt mentation has improved

## 2024-03-15 NOTE — ASSESSMENT & PLAN NOTE
Code status updated to level 4 comfort measures only  Continue palliative meds  D/c-Morphine as needed for pain or dyspnea, change to dilaudid given low GFR  Lorazepam as needed for anxiety  Haldol as needed for agitation or nausea  Zofran as needed for nausea  Continue family support, offer  support  Comfort cart provided   Transfer to SLIM

## 2024-03-15 NOTE — PROGRESS NOTES
Pastoral Care Progress Note    3/15/2024  Patient: Darlyn Nguyen : 1945  Admission Date & Time: 3/3/2024 1556  MRN: [id number] [facility]: [id number]      Continue to follow patient and support family provided emotional and listening support for . Will continue to follow patient and support family.

## 2024-03-15 NOTE — PROGRESS NOTES
Addendum:    During the day patient had multiple events:    -I discussed with IR and the patient went down for attempt of embolization of SMA branch, I spoke to anesthesia and at that time the patient was more lethargic so she required intubation by them.  I discussed the procedure results with Dr. Jones, he could not identify active bleeding so did not do any embolization, according to his evaluation with IV contrast there is no evidence of vasculitis.  -Patient continues to have some bleeding, her hemoglobin dropped to 7.1, remains stable hemodynamically, we decided to transfuse unit of PRBC.    -Discussed with GI, will do prep and monitor bleeding, if continues then they will perform colonoscopy.  Patient also was seen by surgery and deemed not a candidate for surgical intervention in the OR.  -Patient came back from the procedure intubated on mechanical ventilation, she was awake and appeared comfortable as baseline before the procedure, we decided to keep off sedation for now and use as needed medications if tolerated.  Will continue assist volume control and will check ABG to evaluate ventilation.  My plan was to keep intubated until establishing stability from GI bleed standpoint in case she will end up needing colonoscopy or further intervention as below.  Updated the family and they agreed with the plan.  -I spoke to the family about her deteriorating condition and persistent bleeding and explained to them that she might get worse and they should be prepared for further discussions about goals of care.  She is currently DNR and I confirmed that with the daughter and .  -I reached out to rheumatology to start Cytoxan as a last resort to control her lupus immune phenomenon, and we spoke and he agreed that this is most likely autoimmune mediated process due to lupus and the neck step will be Cytoxan but at that time patient was having worsening condition.  -Patient had sudden onset of hypoxia and PEA  lasted for 10 seconds with bradycardia on telemetry, although she is DNR but she was given a dose of atropine and epinephrine, the family was by bedside as well, she had response with tachycardia and increased blood pressure with palpable pulse, appeared obtunded with agonal breathing, we continue to bag her through the ET tube and her hypoxia persisted for several minutes in the 60-70%.  Then started to improve.  Her pupils became dilated but equal and reactive.  On exam she had bilateral breath sounds and we did a stat chest x-ray that did not show pneumothorax and ET tube was in position  I suspect that this could be progression of her PE since she has been off anticoagulation and I believe it is part of the hypercoagulopathy that she has due to lupus.  Unfortunately we cannot provide any further treatment at this time.  I had discussion with the family by bedside and explained to them the situation, they verbalized understanding, I offered comfort care at this point and they agreed.  They declined autopsy as well.  -Family was very grateful for all the care patient had and they were very content with the decision of comfort care/withdrawal of care.  Will start the process now.      Added critical care time throughout the day including discussion with other specialties and multiple evaluations and ACP discussion with the family: 52 minutes

## 2024-03-15 NOTE — ANESTHESIA POSTPROCEDURE EVALUATION
"Post-Op Assessment Note    CV Status:  Stable    Pain management: adequate       Mental Status:  Awake   Hydration Status:  Stable   PONV Controlled:  Controlled   Airway Patency:  Patent     Post Op Vitals Reviewed: Yes    No anethesia notable event occurred.    Staff: Anesthesiologist             BP (!) 216/86   Pulse (!) 116   Temp (!) 95 °F (35 °C)   Resp (!) 26   Ht 5' 4\" (1.626 m)   Wt 83.6 kg (184 lb 4.9 oz)   SpO2 98%   BMI 31.64 kg/m²     BP      Temp      Pulse     Resp      SpO2        "

## 2024-03-15 NOTE — ASSESSMENT & PLAN NOTE
Diagnosed in December,   Initially started on steroids, prednisone 60 mg daily, which patient had tapered to 30 mg daily prior to admission with the addition of atovaquone, hydroxychloroquine, and mycophenolate  Held atovaquone, hydroxychloroquine, and mycophenolate in the setting of acute infection    Level 4 CMO

## 2024-03-15 NOTE — PROGRESS NOTES
NEPHROLOGY HOSPITAL PROGRESS NOTE   Darlyn Nguyen 78 y.o. female MRN: [id number]  Unit/Bed#: ICU 02 Encounter: [id number]  Reason for Consult: ARYAN, lupus nephritis    ASSESSMENT and PLAN:  Acute kidney injury:  Admission creatinine 1.10 on February 29, 2024.  Peak SCr was 1.27 on 3/12/24 but met urine output criteria for ARYAN.   Fortunately, she has responded to the Lasix challenge.   Renal function stable today. SCr 1.14.   She remains fluid overloaded but has active bleeding.   May restart Lasix drip at 10 mg/hr later today (earliest is 2 pm) if bleeding resolved.      Chronic kidney disease, stage II  Baseline creatinine 0.7 to 0.9.   Etiology is lupus nephritis.   Follows with Dr. Calvo of [facility].      Lupus nephritis, class III:  Renal biopsy done at [facility] in December 2023.   Treated with MMF and prednisone.  Was on dapsone for prophylaxis but developed methemoglobinemia and was changed to atovaquone.  Currently, MMF is being held.   Receiving Solumedrol 1000 mg IV x 5 doses now.      Hypernatremia:  Due to diuresis and inadequate free water intake.   Improved with D5W.      Hypokalemia:  Na 148.   Likely due to Lasix drip and Metolazone.   Continue D5W at 100 cc/hr.      Anasarca  Likely iatrogenic from IVF.   Lasix drip on hold.      Hypertension:  BP is acceptable.   On diltiazem and carvedilol for atrial fibrillation.  Losartan on hold.     S/p ventilator dependent respiratory failure    Enterococcus avium bacteremia: on Unasyn. .     Anemia  Currently with active bleeding.   Getting PRBC.     Acute lower GI bleeding:   S/p C-scope on 3/13/24.   Found to have multiple ulcerations. No vasculitis on CTA.   Currently with active bleeding.   For C-scope again today.     DVT  Encephalopathy: Per critical care.    DISPOSITION:  Stable renal function.  May consider restarting Lasix drip at 10 mg/hr later this afternoon at around 2 pm if hemodynamically stable and GI bleed resolved.   Continue D5W at 100 cc/hr    The  highlighted and/or bolded points in my assessment, plan, and disposition were discussed with the primary team and they agree with those points and the plan.    SUBJECTIVE / 24H INTERVAL HISTORY:  Lasix drip held yesterday for CTA and got IVF.   No radiograph findings to suggest SMA vasculitis noted.  Patient was restarted on the Lasix drip overnight.  However, she developed another episode of GI bleed early this morning at around 4 AM.  A CT high-volume bleeding scan was done which revealed bleeding on the right colon  Currently extubated.  Required multiple PRBC transfusion earlier today due to active bleeding.  GI is aware and planning to do a repeat colonoscopy.  Urine output yesterday 2.2 L.  Currently nonoliguric.    OBJECTIVE:  Current Weight: Weight - Scale: 83.6 kg (184 lb 4.9 oz)  Vitals:    03/15/24 0845 03/15/24 0900 03/15/24 0915 03/15/24 0930   BP:       Pulse: (!) 118 (!) 122 (!) 128 (!) 116   Resp: 19 20 22 (!) 26   Temp:       TempSrc:       SpO2: 91% 94% (!) 83% 98%   Weight:       Height:           Intake/Output Summary (Last 24 hours) at 3/15/2024 0950  Last data filed at 3/15/2024 0844  Gross per 24 hour   Intake 8839.27 ml   Output 4785 ml   Net 4054.27 ml     General: ill appearing, conscious, cooperative, comfortable.   Skin: warm, dry  Eyes: pink conj  ENT: moist mucous membranes.   Neck: supple.  Chest/Lungs: equal chest expansion, decreased in bases.   CVS: distinct heart sounds, normal rate, regular rhythm  Abdomen: soft, non-distended, normoactive bowel sounds  Extremities: (+) anasarca  : (+) stevenson catheter. Clear urine.   Neuro: awake, alert,   Psych: could not evaluate.     Medications:    Current Facility-Administered Medications:     acetaminophen (TYLENOL) tablet 650 mg, 650 mg, Oral, Q6H PRN, AUGUSTA Barber, 650 mg at 03/10/24 0841    ampicillin-sulbactam (UNASYN) 3 g in sodium chloride 0.9 % 100 mL IVPB, 3 g, Intravenous, Q6H, AUGUSTA Barber, Stopped at 03/15/24  0620    atorvastatin (LIPITOR) tablet 40 mg, 40 mg, Oral, Daily, AUGUSTA Barber, 40 mg at 24 0814    carvedilol (COREG) tablet 6.25 mg, 6.25 mg, Oral, BID With Meals, AUGUSTA Barber, 6.25 mg at 24 0815    [] dextrose 5 % with KCl 20 mEq/L infusion (premix), 50 mL/hr, Intravenous, Continuous, Stopped at 24 1703 **FOLLOWED BY** dextrose 5 % with KCl 20 mEq/L infusion (premix), 100 mL/hr, Intravenous, Continuous, Uche Blair MD, Last Rate: 100 mL/hr at 03/15/24 0813, 100 mL/hr at 03/15/24 0813    diltiazem (CARDIZEM) tablet 30 mg, 30 mg, Oral, Q6H CRUZITO, AUGUSTA Barber, 30 mg at 24 1128    escitalopram (LEXAPRO) tablet 10 mg, 10 mg, Oral, Daily, AUGUSTA Barber, 10 mg at 24 0814    famotidine (PEPCID) tablet 20 mg, 20 mg, Oral, Daily, AUUGSTA Barber, 20 mg at 24 0815    fentaNYL injection 50 mcg, 50 mcg, Intravenous, Q2H PRN, AUGUSTA Stahl, 50 mcg at 24 0631    ganciclovir (CYTOVENE) 170 mg in sodium chloride 0.9 % 100 mL IVPB, 2.5 mg/kg (Adjusted), Intravenous, Q24H, Ana Laura Dalton MD, Stopped at 24 1800    insulin regular (HumuLIN R,NovoLIN R) 1 Units/mL in sodium chloride 0.9 % 100 mL infusion, 0.3-21 Units/hr, Intravenous, Titrated, Uche Blair MD, Last Rate: 1 mL/hr at 03/15/24 0620, 1 Units/hr at 03/15/24 0620    levalbuterol (XOPENEX) inhalation solution 1.25 mg, 1.25 mg, Nebulization, Q8H PRN, AUGUSTA Barber, 1.25 mg at 03/15/24 0712    methylPREDNISolone sodium succinate (Solu-MEDROL) 1,000 mg in sodium chloride 0.9 % 250 mL IVPB, 1,000 mg, Intravenous, Daily, Uche Blair MD, Stopped at 24 1000    omeprazole (PRILOSEC) suspension 2 mg/mL, 20 mg, Oral, Daily, AUGUSTA Barber, 20 mg at 24 0815    ondansetron (ZOFRAN) injection 4 mg, 4 mg, Intravenous, Q4H PRN, AUGUSTA Barber    potassium chloride 40 mEq IVPB (premix), 40 mEq, Intravenous, Once, Uche Blair MD, Last Rate: 50 mL/hr at  03/15/24 0828, 40 mEq at 03/15/24 0828    Laboratory Results:  Results from last 7 days   Lab Units 03/15/24  0719 03/15/24  0456 03/15/24  0052 03/14/24  1804 03/14/24  1128 03/14/24  0432 03/13/24 2008 03/13/24  1757 03/13/24  1631 03/13/24  1203 03/13/24  0610 03/13/24  0351 03/12/24  2203 03/12/24  0835 03/12/24  0439 03/12/24  0436 03/11/24  1711 03/11/24  1706 03/11/24  1201 03/11/24  1055 03/10/24  0805 03/10/24  0443   WBC Thousand/uL 7.35 9.22  --   --   --  7.35  --  6.25  --   --   --  9.47 11.99*  --  18.58*  --    < >  --   --   --    < > 15.99*   HEMOGLOBIN g/dL 10.2* 12.7 10.4* 14.0 12.8 12.4 12.0 12.7  --    < >  --  6.8* 7.9*   < > 13.6  --    < >  --    < >  --    < > 7.8*   I STAT HEMOGLOBIN g/dl  --   --   --   --   --   --   --   --   --   --   --   --   --   --   --   --   --  10.2*  --  8.5*   < >  --    HEMATOCRIT % 29.0* 35.3  --   --   --  35.3  --  36.8  --   --   --  20.5* 23.8*  --  39.5  --    < >  --   --   --    < > 23.8*   HEMATOCRIT, ISTAT %  --   --   --   --   --   --   --   --   --   --   --   --   --   --   --   --   --  30*  --  25*   < >  --    PLATELETS Thousands/uL 92* 44*  --   --   --  68*  --  23*  --   --   --  43* 54*  --  65*  --    < >  --   --   --    < > 85*   POTASSIUM mmol/L  --  3.7  --  3.5 4.0 3.4* 4.5  --  3.6  --  2.8*  --   --    < >  --  4.0   < >  --    < >  --    < > 3.1*   CHLORIDE mmol/L  --  118*  --  116* 116* 115* 117*  --  118*  --  118*  --   --    < >  --  117*   < >  --    < >  --    < > 113*   CO2 mmol/L  --  19*  --  22 22 21 21  --  21  --  22  --   --    < >  --  16*   < >  --    < >  --    < > 25   CO2, I-STAT mmol/L  --   --   --   --   --   --   --   --   --   --   --   --   --   --   --   --   --  20*  --  19*   < >  --    BUN mg/dL  --  25  --  24 23 24 24  --  24  --  27*  --   --    < >  --  27*   < >  --    < >  --    < > 25   CREATININE mg/dL  --  1.14  --  1.07 1.15 1.19 1.18  --  1.16  --  1.26  --   --    < >  --  1.18   < >  --     < >  --    < > 0.92   CALCIUM mg/dL  --  7.2*  --  8.3* 8.1* 8.2* 8.5  --  8.2*  --  7.8*  --   --    < >  --  7.5*   < >  --    < >  --    < > 7.5*   MAGNESIUM mg/dL  --  1.7*  --   --   --  1.9  --   --   --   --   --  1.8*  --   --   --  1.9  --   --   --   --   --  1.9   PHOSPHORUS mg/dL  --  4.6*  --   --   --  3.5  --   --   --   --  4.0 3.9  --   --   --  4.4*  --   --   --   --   --   --    GLUCOSE, ISTAT mg/dl  --   --   --   --   --   --   --   --   --   --   --   --   --   --   --   --   --  275*  --  275*  --   --     < > = values in this interval not displayed.

## 2024-03-15 NOTE — ASSESSMENT & PLAN NOTE
Seen on CT 3/4/24  Consider septic emboli vs aspiration    AFB x3 NGTD  MTB PCR negative    Level 4 CMO

## 2024-03-15 NOTE — ANESTHESIA PREPROCEDURE EVALUATION
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 Wall thickness is mildly increased. The left ventricular ejection fraction is 50% by visual estimation. Systolic function is mildly reduced. Although no diagnostic regional wall motion abnormality was identified, this possibility cannot be completely excluded on the basis of this study.    Aortic Valve: There is mild regurgitation.    Tricuspid Valve: There is mild regurgitation. The right ventricular systolic pressure is mildly elevated. The estimated right ventricular systolic pressure is 37.00 mmHg.    Pulmonic Valve: There is mild regurgitation.    Physical Exam    Airway    Mallampati score: IV  TM Distance: <3 FB       Dental   Comment: Unable to assess adequately     Cardiovascular      Pulmonary  Comment: Coarse throughout with crackles at bases     Other Findings  post-pubertal.      Anesthesia Plan  ASA Score- 4 Emergent    Anesthesia Type- general with ASA Monitors.         Additional Monitors: arterial line and central venous line.    Airway Plan: ETT.    Comment:  consented, patient to remain DNR, intubation OK, iv antiarrythmics, pressors and blook ok. Art line and left triple lumen iin plce .       Plan Factors-Exercise tolerance (METS): <4 METS.    Chart reviewed.  Imaging results  reviewed. Existing labs reviewed.     Patient is not a current smoker.      Obstructive sleep apnea risk education given perioperatively.        Induction- intravenous.    Postoperative Plan- Plan for postoperative opioid use. Planned trial extubation    Informed Consent- Anesthetic plan and risks discussed with spouse.

## 2024-03-15 NOTE — ASSESSMENT & PLAN NOTE
E. avium bacteremia. Pt recently started on prednisone, atovaquone, hydroxychloroquine, and mycophenolate for lupus. Colonic/rectal ulcerations possible point of entry.     Repeat cultures show NGTD  Susceptibilities show pan-sensitive enterococcus    Level 4 CMO  D/C abx

## 2024-03-15 NOTE — ASSESSMENT & PLAN NOTE
Discovered 2/20/24, started on Eliquis at home, started Hep gtt at [facility] but developed GI bleed, AC held for recurrent, active LGIB  S/p IVC filter with IR 3/7/24  Small non-occlusive PEs seen on CTA of 3/14/24

## 2024-03-15 NOTE — ASSESSMENT & PLAN NOTE
Persitent LGIB  Patient was initially on heparin due to acute DVT  3/6 flexible sigmoidoscopy revealing ulcerated rectal mucosa, status post cauterization  Repeat Flex sig w GI on 3/11: multiple ulcers throughout colon, biopsies taken; one oozing ulcer in transverse colon, clipped with hemostasis; severe diverticula; prolapsed rectum; rectal ulcers.  Colonoscopy 3/13: Multiple ulcers in the IC valve, cecum, ascending colon, hepatic flexure and transverse colon; Ulcer in the hepatic flexure with oozing hemorrhage; placed 2 clips successfully; hemostasis achieved  Pathology on colon biopsy shows rare cells positive for CMV  3/15: Pt bleeding overnight in early AM hours with large drop in Hgb, transfused 2 U pRBC and 1 U FFP and GI updated. Added transfusion of 1 U FFP, 1 U cryo, 2 U plt. CT bleeding scan showed active bleed in R colon near hepatic flexure.   Taken to IR 3/15 for possible embolization, however unable to identify a source of bleeding, and so no intervention available.   Had planned for rapid bowel prep and Hgb monitoring to assess whether ongoing acute bleed, however pt abruptly desatted, followed by bradycardia and hypotension-->gave epi followed by atropine-->Pt asystolic for 5-10 seconds, chest compressions were not initiated, as pt was level 2 DNR-->pt had ROSC, with resultant improvement in HR and BP. Family was present at bedside during this episode, and subsequently elected to withdraw medical care and make patient CMO.    Level 4 - Comfort measures only

## 2024-03-15 NOTE — QUICK NOTE
3/15 0027: Patient with bloody bowel movement overnight, informed by nursing. Stat hg placed, 2U pRBCs put on hold. Discussed with GI, who agreed with conservative management as pt is HD stable.    3/15 0115: Hg dropped 4 points, transfusing 2U pRBCs. BP dropped slightly to 118/52. Discussed with GI again, who recommend rechecking Hg at 0700, if Hg drops on recheck then they will scope. They recommend considering ct bleeding scan if recurrent bloody bm or HD changes.

## 2024-03-15 NOTE — ASSESSMENT & PLAN NOTE
Biopsy-proven at [facility]    Persistent oliguria, with good diuresis on Lasix gtt    UA 3/12/24 shows moderate blood (30-50 RBCs on micro), moderate leukocytes (30-50 on micro), 1+ protein, 1+ ketones, and increased specific gravity; not specific, but c/w lupus nephritis    U protein:Cr elevated  BUN stable  Cr stable  Resume Lasix gtt @14:00  Monitor volume status,  UOP, renal indices

## 2024-03-15 NOTE — OCCUPATIONAL THERAPY NOTE
Occupational Therapy         Patient Name: Darlyn Nguyen  Today's Date: 3/15/2024      Pt is a s/p IR embolization today. Will continue when medically appropriate. Leighton Gandhi

## 2024-03-15 NOTE — ASSESSMENT & PLAN NOTE
Recurrence of Afib morning of 3/15, on p.o. diltiazem.    Consider amiodarone infusion  Anticoagulation on hold due to acute GI bleed

## 2024-03-15 NOTE — RESPIRATORY THERAPY NOTE
RT Ventilator Management Note  Darlyn Nguyen 78 y.o. female MRN: [id number]  Unit/Bed#: ICU 02 Encounter: [id number]      Daily Screen         3/14/2024  0737 3/15/2024  1556          Patient safety screen outcome:: Passed Failed      Not Ready for Weaning due to:: -- Underline problem not resolved      Spont breathing trial outcome:: Passed Failed      Spont breathing trial reason failed: -- --      Name of Medical Team Notified:: NP/Physican --      Preparing to extubate/ Notify Nurse: Yes --      Extubation order obtained: Yes --      Consider Cuff Test: No (comment) --      RSBI: 35 --                Physical Exam:          Resp Comments: initiated vest therapy

## 2024-03-15 NOTE — ASSESSMENT & PLAN NOTE
Biopsy-proven at [facility]    Persistent oliguria, with good diuresis on Lasix gtt    UA 3/12/24 shows moderate blood (30-50 RBCs on micro), moderate leukocytes (30-50 on micro), 1+ protein, 1+ ketones, and increased specific gravity; not specific, but c/w lupus nephritis    U protein:Cr elevated  BUN stable  Cr stable  Level 4 CMO

## 2024-03-15 NOTE — DISCHARGE SUMMARY
[facility]  Discharge- Darlyn Nguyen 1945, 78 y.o. female MRN: [id number]  Unit/Bed#: ICU 02 Encounter: [id number]  Primary Care Provider: Yaa Hedrick MD   Date and time admitted to hospital: 3/3/2024  3:56 PM    Comfort measures only status  Assessment & Plan  Following return from IR, had planned for rapid bowel prep and Hgb monitoring to assess whether ongoing acute bleed, with possible repeat C-scope and/or angiogram w IR, however, pt abruptly desatted, followed by bradycardia and hypotension-->gave epi followed by atropine-->Pt asystolic for 5-10 seconds, chest compressions were not initiated, as pt was level 2 DNR-->pt had ROSC, with resultant improvement in HR and BP. Family was present at bedside during this episode, and subsequently elected to withdraw medical care and make patient CMO.    Code status updated to level 4 comfort measures only  Morphine as needed for pain or dyspnea  Lorazepam as needed for anxiety  Haldol as needed for agitation or nausea  Zofran as needed for nausea    Acute metabolic encephalopathy  Assessment & Plan  Rapid response called for unresponsive patient on morning of 3/11/24. Earlier that morning, patient described as confused and unable to answer orientation questions, which had been consistent throughout this admission.  Etiology unclear.   At time of rapid response, findings significant for Hgb 6.7 in the setting of acute GIB and hypoxemia on iSTAT. Vitals were normal and other lab values were unremarkable.    TSH wnl at 1.329, free T4 low at 0.38    Pt mentation improved in ICU, though remained disoriented, with waxing and waning mood and level of attention and engagement    Acute respiratory failure with hypoxia (HCC)  Assessment & Plan  Pt arrived intubated 3/3, extubated 3/4  Pt intubated 3/11 to protect airway, extubated 3/14, satting well on 3 L NC.  Intubated 3/15 for IR procedure, returned to floor intubated. ETT placement  confirmed w CXR.  Pt became acutely hypoxemic at 15:30, desatting initially to 70s and then becoming bradycardic and hypotensive as she continued to desat to a lanette in 60s. Pushed epi 1 mg IV without improvement. Pushed atropine 1 mg IV. Pt briefly asystolic for 5-10 seconds before return of spontaneous circulation. Respiratory therapy at bedside began manually bagging patient, with improvement in SpO2 to 70s. STAT CXR showed ETT appropriately positioned, no large pneumothorax, no acute changes seen to account for new hypoxia. Family, present at bedside, elected to update code status to Level 4, comfort measures only.    PE suspected as most likely cause of acute decompensation--pt has known bilateral DVT and suspected hypercoagulability, not on anticoagulation due to recurrent, active GI bleeding, s/p IVC filter. Small, non-occlusive PEs had been noted on CTA 3/14/24.    * GI bleed  Assessment & Plan  Persitent LGIB  Patient was initially on heparin due to acute DVT  3/6 flexible sigmoidoscopy revealing ulcerated rectal mucosa, status post cauterization  Repeat Flex sig w GI on 3/11: multiple ulcers throughout colon, biopsies taken; one oozing ulcer in transverse colon, clipped with hemostasis; severe diverticula; prolapsed rectum; rectal ulcers.  Colonoscopy 3/13: Multiple ulcers in the IC valve, cecum, ascending colon, hepatic flexure and transverse colon; Ulcer in the hepatic flexure with oozing hemorrhage; placed 2 clips successfully; hemostasis achieved  Pathology on colon biopsy shows rare cells positive for CMV  3/15: Pt bleeding overnight in early AM hours with large drop in Hgb, transfused 2 U pRBC and 1 U FFP and GI updated. Added transfusion of 1 U FFP, 1 U cryo, 2 U plt. CT bleeding scan showed active bleed in R colon near hepatic flexure.   Taken to IR 3/15 for possible embolization, however unable to identify a source of bleeding, and so no intervention available.   Had planned for rapid bowel prep  and Hgb monitoring to assess whether ongoing acute bleed, however pt abruptly desatted, followed by bradycardia and hypotension-->gave epi followed by atropine-->Pt asystolic for 5-10 seconds, chest compressions were not initiated, as pt was level 2 DNR-->pt had ROSC, with resultant improvement in HR and BP. Family was present at bedside during this episode, and subsequently elected to withdraw medical care and make patient CMO.    Level 4 - Comfort measures only    Anasarca  Assessment & Plan  Patient with significant anasarca likely component of iatrogenic fluid overload, hypoalbuminemia, oliguria  Preserved EF on echocardiogram  Level 4 CMO    Bacteremia due to Enterococcus  Assessment & Plan  E. avium bacteremia. Pt recently started on prednisone, atovaquone, hydroxychloroquine, and mycophenolate for lupus. Colonic/rectal ulcerations possible point of entry.     Repeat cultures show NGTD  Susceptibilities show pan-sensitive enterococcus    Level 4 CMO  D/C abx    Cavitary pneumonia  Assessment & Plan  Seen on CT 3/4/24  Consider septic emboli vs aspiration    AFB x3 NGTD  MTB PCR negative    Level 4 CMO    Acute deep vein thrombosis (DVT) of both lower extremities (HCC)  Assessment & Plan  Discovered 2/20/24, started on Eliquis at home, started Hep gtt at [facility] but developed GI bleed, AC held for recurrent, active LGIB  S/p IVC filter with IR 3/7/24  Small non-occlusive PEs seen on CTA of 3/14/24    New onset a-fib ([facility])  Assessment & Plan  Recurrence of Afib morning of 3/15, converted to NSR following administration of p.o. diltiazem.      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) ([facility])  Assessment & Plan  Biopsy-proven at [facility]    Persistent oliguria, with good diuresis on Lasix gtt    UA 3/12/24 shows moderate blood (30-50 RBCs on micro), moderate leukocytes (30-50 on micro), 1+ protein, 1+ ketones, and increased specific gravity; not specific, but c/w lupus nephritis    U  protein:Cr elevated  BUN stable  Cr stable  Level 4 CMO    Diabetes mellitus (HCC)  Assessment & Plan  Lab Results   Component Value Date    HGBA1C 5.9 (H) 03/03/2024       Recent Labs     03/15/24  0756 03/15/24  0957 03/15/24  1235 03/15/24  1434   POCGLU 156* 154* 162* 161*         Blood Sugar Average: Last 72 hrs:  (P) [id number]    Level 4 CMO    D/C D5W and insulin gtt      Dysphagia  Assessment & Plan  Failed bedside swallow with nursing  Speech eval on 3/8: Cough present w/ successive sips of thin. Intermittently followed verbal commands for slow rate/small sips. Benefits from verbal cues to keep head at midline. Continue clear liquids , medications crushed, Slow rate, Small sips, cue to keep head at midline    Level 4 CMO  Regular Diet -- Pleasure feed    Punctate ulcers of labia and medial thighs  Assessment & Plan  Labia erythematous with scattered ulcerations. Scattered ulcerations with pink and yellow tissue extending from radha-rectal area to medial thighs.    Suspect CMV based on colon Bx    Level 4 CMO  D/C ganciclovir, started 3/14/24    Hypernatremia  Assessment & Plan  Na 148, increased from 147  Suspect d/t loop diuretic, Lasix gtt held after IV contrast    Level 4 CMO  Will not resume lasix gtt    Severe protein-calorie malnutrition (HCC)  Assessment & Plan  Malnutrition Findings:   Adult Malnutrition type: Acute illness  Adult Degree of Malnutrition: Other severe protein calorie malnutrition  Malnutrition Characteristics: Fluid accumulation, Inadequate energy                  360 Statement: Severe calorie-protein malnutrition in context to acute illness r/t GI bleed, dysphagia, inadequate PO intake as evidance by energy intake less than 50% compared to estimated needs >5 days, +2 generalized edema; Treated with TBD (diet +oral supplements vs nutrition support)    BMI Findings:           Body mass index is 31.64 kg/m².      Level 4 CMO  Pleasure feed    Thrombocytopenia  (HCC)  Assessment & Plan  Possibly related to hepatic congestion vs medication vs sepsis/bacteremia plus bleeding  3/10 Peripheral blood smear shows normochromic normocytic anemia with nRBCs and polychromasia, no schistocytes, mild thrombocytopenia, mild leukocytosis with neutrophilia.  3/13 Repeat smear positive for schistocytes, c/w hemolysis. Ddx includes DIC, SLE, and 2/2 transfusion   Monitor CBC closely  Transfuse PLT if <20, <50 if actively bleeding  Transfused plt 2 U this AM    Hypokalemia  Assessment & Plan  K 3.7  Mg 1.7    Gave Mg 2 g IV   Gave K 40 mg IV  Level 4 CMO  D/C lab monitoring    CKD (chronic kidney disease), stage II  Assessment & Plan  Lab Results   Component Value Date    EGFR 45 03/15/2024    EGFR 49 03/14/2024    EGFR 45 03/14/2024    CREATININE 1.14 03/15/2024    CREATININE 1.07 03/14/2024    CREATININE 1.15 03/14/2024       Sacral wound  Assessment & Plan  Repositioning and prn medications for patient comfort    Lupus (HCC)  Assessment & Plan  Diagnosed in December,   Initially started on steroids, prednisone 60 mg daily, which patient had tapered to 30 mg daily prior to admission with the addition of atovaquone, hydroxychloroquine, and mycophenolate  Held atovaquone, hydroxychloroquine, and mycophenolate in the setting of acute infection    Level 4 CMO    Discharge Summary - [facility]    Patient Information: Darlyn Nguyen 78 y.o. 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)    New onset a-fib (HCC)    Diabetes mellitus (HCC)    Dysphagia    Lupus (HCC)    Sacral wound    CKD (chronic kidney disease), stage II    Hypokalemia    Thrombocytopenia (HCC)    Severe protein-calorie malnutrition (HCC)    Hypernatremia    Punctate ulcers of labia and medial thighs    Pulmonary hypertension (HCC)    Steroid dependent for adrenal supression  Resolved Problems:    Shock (HCC)    Lactic acidosis    Hyperglycemia      Consultations During Hospital Stay:  Gastroenterology  General surgery  Nephrology  Infectious disease  Rheumatology  Interventional radiology    Procedures Performed:     Extubation 3/4/24  Flexible sigmoidoscopy 3/6/2024  IVC filter placement 3/7/24  Intubation 3/11/24  Colonoscopy 3/11/2024  Radial A-line placement 3/11/2024  Central line insertion 3/11/2024  Colonoscopy 3/13/2024  Extubation 3/14/2024  Intubation 3/15/2024 for IR procedure  IR angiogram, embolization planned, however no active bleed identified for intervention    Significant Findings:     CT high volume bleeding scan A/P 3/15/24: Active bleeding in the right colon, mucosal enhancement in the gastric mucosa involving the body, fundus and antrum. Visualization of a scattered poorly defined hypodense areas in the liver parenchyma largest in segment 8 of the liver measuring 4.3 cm x 4.9 cm. These are probably sequela of hepatic necrosis, phlegmons. Hypodensities in the spleen due to splenic infarct, persist. Ascites.  CTA C/A/P 3/14/24: No radiographic findings to suggest SMA vasculitis. Patency and normal caliber of the aorta and major branches including celiac trunk, SMA, and HEIDI. Mild wall hyperenhancement in the right colon and small bowel, of uncertain clinical significance at this contrast phase.. No significant wall thickening, abscess, perforation. New endoscopy clips in the right hepatic flexure. Pleural  effusions, ascites, anasarca, suggestive of third spacing or fluid overload. Increased cavitary process in the right lung, as detailed above. Incidentally noted age-indeterminate nonocclusive pulmonary emboli. Low clot burden.  CTH 3/12/24: 1. Stable head CT. No acute intracranial hemorrhage, mass effect or edema. 2. Mild, chronic microangiopathy and chronic left lentiform nucleus lacunar infarction are stable.  IR IVC filter placement optional/temporary 3/8/2024: 1. Inferior venacavogram revealing patent inferior vena cava correlating with prior CT imaging. 2. Successful deployment of an infrarenal  MURTAZA   filter.  XR chest portable ICU 3/7/2024: Right upper lobe consolidation, most likely pneumonia, not significantly changed since 3/3/2024 and 3/4/2024. Moderate sized right pleural effusion.   Flexible Sigmoidoscopy 3/6/2024: Hemorrhoids Diverticulosis in the sigmoid colon Ulcerated mucosa in the rectum; induced coagulation with heater probe.  CT chest wo contrast 3/4/2024: Extensive right upper lobe cavitary pneumonia. Right basilar atelectasis and/or pneumonia. Small bilateral pleural effusions.  CT head wo contrast 3/4/2024: No acute intracranial abnormality.   XR chest portable ICU 3/4/2024: New right upper lobe atelectasis and probable pneumonia. Suctioning of the endotracheal tube is to be considered. The study was marked in EPIC for immediate notification.   X-ray chest 1 view 3/4/2024: New right upper lobe atelectasis and probable pneumonia. Suctioning of the endotracheal tube is to be considered.  Tissue path with rare CMV-positive cells  Oliguria/ARYAN  Hypernatremia  Hypokalemia  Transaminitis  Hyperbilirubinema  Leukocytosis  Acute blood loss anemia  Thrombocytopenia  ***    Incidental Findings:   Non-occlusive pulmonary emboli on CTA 3/14/23     Test Results Pending at Discharge (will require follow up):   HSV type I, 2 DNA PCR     Outpatient Tests Requested:  None    Complications:  None    Hospital  Course:     Darlyn Nguyen is an 78 y.o. 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 Transferred from [facility] to [place] on 3/3/24 after clinical deterioration due to shock requiring initiation of pressors and intubation. Lower GI bleed with marked drop in Hgb noted, pt received blood transfusion, heparin drip discontinued, was quickly able to wean off pressors and extubate on 3/4. Bacteremia treated with Unasyn, with planned 6 week course. Pt had recurrence of LGIB when heparin was reinitiated, and so was discontinued. Flexible sigmoidoscopy with GI found ulcerated rectal mucosa, which was cauterized. IVC filter placed with IR due to presence of acute DVTs, first noted on 2/20/24. Pt also developed new-onset paroxysmal atrial fibrillation with RVR on 3/6, requiring Cardizem gtt while unable to take PO meds, but then successfully maintained with Cardizem PO. Pt noted to be delirious with waxing and waning mental status since extubation on 3/4/24.  [facility] for ongoing treatment of bacteremia, GI bleed, as well as anasarca, dysphagia.  Morning of 3/11/24, Rapid Response called for acute alteration of mental status--pt was noted to be unresponsive, including to sternal rub. Per nursing, pt had multiple large bloody bowel movements overnight and that morning. Due to obtunded mental status, pt transferred to ICU for emergent intubation to protect airway. Recurrence of lower GI bleed, colonoscopy 3/11 identified multiple ulcers in the ascending and transverse colon, with one actively bleeding ulcer near the hepatic flexure, clipped with hemostasis. Recurrence of  lower GI bleed, colonoscopy 3/13/24 showed new bleeding ulcer at hepatic flexure, clipped with hemostasis. Increased steroids from prednisone 40 mg QD to Solumedrol 1 g QD for suspected autoimmune vasculitis. Colon biopsies found to have rare cells positive for CMV, so was started on ganciclovir. Extubated 3/14/24, doing well on 3 L via NC. Recurrence of lower GI bleed morning 3/15/24, taken to IR for embolization, however, no active bleed identified for intervention. Returned from procedure intubated. Following return from IR, had planned for rapid bowel prep via NGT with ongoing Hgb monitoring to assess whether ongoing acute bleed, with possible repeat C-scope and/or angiogram w IR, however, pt abruptly desatted, followed by bradycardia and hypotension-->gave epi followed by atropine-->pt asystolic for 5-10 seconds, chest compressions were not initiated, as pt was level 2 DNR-->pt had ROSC, with resultant improvement in HR and BP. Family was present at bedside during this episode, and subsequently elected to withdraw medical care and make patient CMO.   ***    Condition at Discharge: {condition:95121}     Discharge Day Visit / Exam:     * Please refer to separate progress for these details *    Discharge instructions/Information to patient and family:   See after visit summary for information provided to patient and family.      Provisions for Follow-Up Care:  See after visit summary for information related to follow-up care and any pertinent home health orders.      Disposition: {Discharge Disposition:28063}    Planned Readmission: ***    Discharge Statement:  I spent *** minutes discharging the patient. This time was spent on the day of discharge. I had direct contact with the patient on the day of discharge. Greater than 50% of the total time was spent examining patient, answering all patient questions, arranging and discussing plan of care with patient as well as directly providing post-discharge instructions.   Additional time then spent on discharge activities.    Discharge Medications:  See after visit summary for reconciled discharge medications provided to patient and family.    Discharge Diet: {diet:70244}  Activity restrictions: {plan; activity restriction:60174}    Uche Blair MD  3/15/2024  5:22 PM

## 2024-03-15 NOTE — ASSESSMENT & PLAN NOTE
Labia erythematous with scattered ulcerations. Scattered ulcerations with pink and yellow tissue extending from radha-rectal area to medial thighs.    Concern for possible atypical herpetic lesions in immunosuppressed patient    Suspect CMV based on colon Bx  Started ganciclovir 3/14/24  Discontinued Valtrex   HSV in process

## 2024-03-15 NOTE — PROGRESS NOTES
Progress Note - Infectious Disease   Darlyn Nguyen 78 y.o. female MRN: [id number]  Unit/Bed#: ICU 02 Encounter: [id number]      Impression/Plan:  1.  Enterococcus avium bacteremia.  1 of 2 blood cultures from 2/29 positive.  Likely GI source given #4.  Concern for more complicated bacteremia given #2 and possibly #3.  Prior 2D echo technically difficult study.  Susceptibilities reviewed.  Patient without other localizing complaints previously and does not have any intravascular devices.  Continue Unasyn for now given #3 and #4  Anticipate 6-week course of treatment given #2  Could consider DYANA if remains intubated  Trend fever curve/WBC  Follow-up AFB specimens as below  Would likely need repeat CT chest imaging outpatient for #3     2.  Splenic infarcts on CT. has developed on CT imaging since last image this month. Suspect this is related to problem #1.  Concern for complicated bacteremia.  Antibiotics as above  Trend fever curve/WBC     3.  Right upper lobe cavitary lung lesion/pneumonia.  Patient with cavitary lesion present with surrounding inflammatory changes.  Most likely aspiration pneumonia based on review of patient's imaging progression on presentation.  Could consider septic emboli but less consistent based on appearance on CT. AFB smears x 3 negative and PCR negative.  Patient without any risk factors for TB.  Delayed sputum cultures obtained with Pseudomonas of uncertain significance, intermediate susceptibility to cefepime which patient was on and despite such clinically improved.  Patient reintubated in the setting of #4.  Repeat CT on 3/14 with worsening changes but this may be due to recurrent aspiration in the setting of #4.  Follow-up 3 AFB cultures for completeness  Antibiotics as above for now  Agree with obtaining bronchoscopy and cultures while intubated  Will likely need repeat imaging outpatient  Tentative plans for prolonged antibiotic as above     4.  Pancolitis with recurrent lower GI  bleeding.  Patient has been having this ongoing issue with recurrent admissions.  Presented from facility with concern for GI bleed.  Possibly additional autoimmune process.  Recurrent episodes of bleeding now in the last week.  Biopsy specimens with some cells staining positive for CMV.  Lower suspicion for primary CMV colitis.  This may represent reactivation along with positive PCR in the setting of acute illness.  Will treat with empiric antiviral however given patient's continued bleeding and immunosuppressed status.  Ongoing follow-up by GI  Antibiotics as above  Continue ganciclovir  Will repeat CMV PCR in 1 week  Follow-up HSV swab  Transfusional support per primary  Ongoing follow-up by rheumatology  No contraindication from ID standpoint for further immunosuppression  Additional supportive care as per primary     5.  Acute hypoxic respiratory failure, intubated.  Patient was extubated on 3/4 and now reintubated on 3/11 given decline in mental status and GI bleed.  Vent management per ICU     6.  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  Likely risk factor for the above.  Ongoing follow-up by nephrology.  Eventual return [place] [facility] [place] PJP prophylaxis.  Rheumatology consulted given the above.  Antibiotics as above     7.  CKD stage II.  This can impact antibiotic dosing.  Unasyn dosing as above for now.  Ganciclovir dose adjusted. Repeat chemistry tomorrow.  Will further dose adjust as needed     8.  Sacral and buttock ulcers.  Evaluated wounds at bedside.  Likely complication of the above. Patient now also with developing punctate ulcerations.  Possibly viral.  Continue antiviral as above.     9.  Type 2 diabetes.  Hemoglobin A1c of 5.9.  Glucose management per primary.     10.  Transaminitis and abnormal CT imaging of the liver.  Elevation noted, currently downtrending.  Likely from recent hypotension.  Repeat CT on 3/15 with abnormal areas of enhancement.  Suspect these represent  ischemia/necrosis.  Continue antibiotics as above  Monitor LFTs  May need repeat imaging in a week  Primary to discuss with IR     11.  Thrombocytopenia.  Suspect ongoing decline is due to #4 and active bleeding.  Lower suspicion for antibiotic effect.  Monitor CBC for now  Continue Unasyn for now  Transfusional support per primary  Monitor while on ganciclovir    Above plan discussed briefly with the patient's  and with nursing at bedside  Above plan discussed in detail with critical care attending who is in agreement with ongoing IV antibiotic and IV antiviral with plans to review imaging further with IR.    ID consult service will reevaluate this patient again over the weekend as needed.  Please contact ID attending on call with questions in the interim.    Antibiotics:  Unasyn 12  Antibiotics 16    Ganciclovir 2    24 Hour events:  Yesterday and overnight notes reviewed and patient with continued episodes of bleeding overnight.  Further CT imaging was obtained and patient was taken to IR later today for possible embolization.    Subjective:  Patient awake in room.  Intermittently answers questions.  Overall appears unwell.  Reviewed case and imaging with critical care attending.    Objective:  Vitals:  Temp:  [95 °F (35 °C)-98.6 °F (37 °C)] 95 °F (35 °C)  HR:  [62-93] 93  Resp:  [13-41] 34  BP: (104-216)/(54-86) 216/86  SpO2:  [92 %-100 %] 92 %  Temp (24hrs), Av.3 °F (36.3 °C), Min:95 °F (35 °C), Max:98.6 °F (37 °C)  Current: Temperature: (!) 95 °F (35 °C)    Physical Exam:   General Appearance:  Alert, intermittently answers questions.  Does not appear in any acute distress but is chronically ill-appearing.   Throat: Oropharynx moist without lesions.    Lungs:   Rhonchi heard throughout, no wheezes or rales.   Heart:  RRR; no murmur, rub or gallop noted   Abdomen:   Soft, non-tender, non-distended, positive bowel sounds.     Extremities: No clubbing, cyanosis; anasarca noted in all of her extremities    Skin: No new rashes or lesions. No new draining wounds noted.       Labs, Imaging, & Other studies:   All pertinent labs and imaging studies in PACS were personally reviewed as below.  Results from last 7 days   Lab Units 03/15/24  0719 03/15/24  0456 03/15/24  0052 03/14/24  1128 03/14/24  0432   WBC Thousand/uL 7.35 9.22  --   --  7.35   HEMOGLOBIN g/dL 10.2* 12.7 10.4*   < > 12.4   PLATELETS Thousands/uL 92* 44*  --   --  68*    < > = values in this interval not displayed.     Results from last 7 days   Lab Units 03/15/24  0456 03/14/24  1128 03/14/24  0432 03/13/24  0610 03/13/24  0351 03/11/24  1711 03/11/24  1706   POTASSIUM mmol/L 3.7   < > 3.4*   < >  --    < >  --    CHLORIDE mmol/L 118*   < > 115*   < >  --    < >  --    CO2 mmol/L 19*   < > 21   < >  --    < >  --    CO2, I-STAT mmol/L  --   --   --   --   --   --  20*   BUN mg/dL 25   < > 24   < >  --    < >  --    CREATININE mg/dL 1.14   < > 1.19   < >  --    < >  --    EGFR ml/min/1.73sq m 45   < > 43   < >  --    < >  --    GLUCOSE, ISTAT mg/dl  --   --   --   --   --   --  275*   CALCIUM mg/dL 7.2*   < > 8.2*   < >  --    < >  --    AST U/L 53*  --  174*  --  515*  --   --    ALT U/L 99*  --  157*  --  385*  --   --    ALK PHOS U/L 123*  --  194*  --  60  --   --     < > = values in this interval not displayed.           Lab interpretation/comments: LFTs downtrending.  Hemoglobin continues to downtrend.  Platelets increased likely transfusion    Imaging interpretation/comments: CTA of the chest/abdomen/pelvis reviewed and there is no changes concerning for SMA vasculitis.  Cavitary lung lesion in the patient's right lung appears to be increased in size with an air-fluid level present patient also with nonocclusive pulmonary emboli present as well.    CT high-volume study showing active bleeding in the right colon and there are some scattered hypodense areas in the liver concerning for necrosis/ischemia.  Splenic infarct still seen    Culture data:  CMV titer noted to be 1500.

## 2024-03-15 NOTE — PROGRESS NOTES
03/15/24 1100   Clinical Encounter Type   Visited With Family   Routine Visit Follow-up   Continue Visiting Yes

## 2024-03-15 NOTE — ASSESSMENT & PLAN NOTE
Pt intubated to protect airway, given obtunded mental status, however hypoxemia also noted on iSTAT during rapid response  Ddx includes volume overload vs aspiration PNA/RUL cavitary lung lesion    Pt intubated 3/11 to protect airway, extubated 3/14, satting well on 3 L NC.

## 2024-03-15 NOTE — ASSESSMENT & PLAN NOTE
Na 148, increased from 147  Suspect d/t loop diuretic, Lasix gtt currently on hold    Nephrology on board, appreciate recommendations  Continue D5W with Kcl @ 100 ml/h

## 2024-03-15 NOTE — PLAN OF CARE
Problem: Prexisting or High Potential for Compromised Skin Integrity  Goal: Skin integrity is maintained or improved  Description: INTERVENTIONS:  - Identify patients at risk for skin breakdown  - Assess and monitor skin integrity  - Assess and monitor nutrition and hydration status  - Monitor labs   - Assess for incontinence   - Turn and reposition patient  - Assist with mobility/ambulation  - Relieve pressure over bony prominences  - Avoid friction and shearing  - Provide appropriate hygiene as needed including keeping skin clean and dry  - Evaluate need for skin moisturizer/barrier cream  - Collaborate with interdisciplinary team   - Patient/family teaching  - Consider wound care consult   Outcome: Not Progressing     Problem: INFECTION - ADULT  Goal: Absence or prevention of progression during hospitalization  Description: INTERVENTIONS:  - Assess and monitor for signs and symptoms of infection  - Monitor lab/diagnostic results  - Monitor all insertion sites, i.e. indwelling lines, tubes, and drains  - Monitor endotracheal if appropriate and nasal secretions for changes in amount and color  - [place] appropriate cooling/warming therapies per order  - Administer medications as ordered  - Instruct and encourage patient and family to use good hand hygiene technique  - Identify and instruct in appropriate isolation precautions for identified infection/condition  Outcome: Not Progressing     Problem: Nutrition/Hydration-ADULT  Goal: Nutrient/Hydration intake appropriate for improving, restoring or maintaining nutritional needs  Description: Monitor and assess patient's nutrition/hydration status for malnutrition. Collaborate with interdisciplinary team and initiate plan and interventions as ordered.  Monitor patient's weight and dietary intake as ordered or per policy. Utilize nutrition screening tool and intervene as necessary. Determine patient's food preferences and provide high-protein, high-caloric foods as  appropriate.     INTERVENTIONS:  - Monitor oral intake, urinary output, labs, and treatment plans  - Assess nutrition and hydration status and recommend course of action  - Evaluate amount of meals eaten  - Assist patient with eating if necessary   - Allow adequate time for meals  - Recommend/ encourage appropriate diets, oral nutritional supplements, and vitamin/mineral supplements  - Order, calculate, and assess calorie counts as needed  - Recommend, monitor, and adjust tube feedings and TPN/PPN based on assessed needs  - Assess need for intravenous fluids  - Provide specific nutrition/hydration education as appropriate  - Include patient/family/caregiver in decisions related to nutrition  Outcome: Not Progressing

## 2024-03-15 NOTE — ASSESSMENT & PLAN NOTE
Rapid response called for unresponsive patient on morning of 3/11/24. Earlier that morning, patient described as confused and unable to answer orientation questions, which had been consistent throughout this admission.  Etiology unclear.   At time of rapid response, findings significant for Hgb 6.7 in the setting of acute GIB and hypoxemia on iSTAT. Vitals were normal and other lab values were unremarkable.    TSH wnl at 1.329, free T4 low at 0.38    Pt mentation improved in ICU, though remained disoriented, with waxing and waning mood and level of attention and engagement

## 2024-03-15 NOTE — ASSESSMENT & PLAN NOTE
Diagnosed in December,   Initially started on steroids, prednisone 60 mg daily, which patient had tapered to 30 mg daily prior to admission with the addition of atovaquone, hydroxychloroquine, and mycophenolate    Holding atovaquone, hydroxychloroquine, and mycophenolate in the setting of acute infection    random cortisol 10.9 collected 3/11     Started Solumedrol 1 g QD for planned 3-day course, to consider steroid-sparing agents pending response and work-up  CRP elevated at 39.9, ESR wnl <1, C3 and C4 both low. Though non-specific, c/w lupus.  U protien:Cr elevated  Anti-ds DNA Ab in process  Rheumatology on board, appreciate recommendations

## 2024-03-15 NOTE — PROGRESS NOTES
Formerly [facility], later [facility]  Progress Note  Name: Darlyn Nguyen I  MRN: [id number]  Unit/Bed#: ICU 02 I Date of Admission: 3/3/2024   Date of Service: 3/15/2024 I Hospital Day: 12    Assessment/Plan   * GI bleed  Assessment & Plan  Persitent LGIB  Patient was initially on heparin due to acute DVT  3/6 flexible sigmoidoscopy revealing ulcerated rectal mucosa, status post cauterization  Repeat Flex sig w GI on 3/11: multiple ulcers throughout colon, biopsies taken; one oozing ulcer in transverse colon, clipped with hemostasis; severe diverticula; prolapsed rectum; rectal ulcers.  Colonoscopy 3/13: Multiple ulcers in the IC valve, cecum, ascending colon, hepatic flexure and transverse colon; Ulcer in the hepatic flexure with oozing hemorrhage; placed 2 clips successfully; hemostasis achieved  3/15: Pt bleeding overnight in early AM hours with large drop in Hgb, transfused 2 U pRBC and 1 U FFP and GI updated. Added transfusion of 1 U FFP, 1 U cryo, 2 U plt. CT bleeding scan showed active bleed in R colon near hepatic flexure.   Pathology on colon biopsy shows rare cells positive for CMV    Ddx includes SLE vasculitis, ischemic colitis, and infectious etiologies    -Started ganciclovir 3/14/24  -Pt w BRBPR 3/15, with drop in Hgb to 10.2 after 2 U pRBC   -3/15: transfuse 2 U pRBC, 2 U FFP, 2 U Plt, 1 U cryo  -C-scope w GI later today  -Total blood products this admission: RBC x14, FFP x4, plt x3, cryo x3  -Monitor Hgb q8h  -maintain active T&S, transfuse for Hg < 7.0 w FFP and plts  CMV PCR in process  CMV IgG and IgM in process    Acute metabolic encephalopathy  Assessment & Plan  Rapid response called for unresponsive patient on morning of 3/11/24. Earlier that morning, patient described as confused and unable to answer orientation questions, which had been consistent throughout this admission.  Etiology unclear.   At time of rapid response, findings significant for Hgb 6.7 in the setting of acute GIB and  hypoxemia on iSTAT. Vitals were normal and other lab values were unremarkable.    TSH wnl at 1.329, free T4 low at 0.38  Repeat in 1-2 weeks    Pt mentation has improved    Anasarca  Assessment & Plan  Patient with significant anasarca likely component of iatrogenic fluid overload, hypoalbuminemia, oliguria  Preserved EF on echocardiogram  Resume Lasix gtt this afternoon  Monitor volume status and UOP    Bacteremia due to Enterococcus  Assessment & Plan  E. avium bacteremia. Pt recently started on prednisone, atovaquone, hydroxychloroquine, and mycophenolate for lupus. Colonic/rectal ulcerations possible point of entry.     Repeat cultures show NGTD  Susceptibilities show pan-sensitive enterococcus    ID on board, appreciate recommendations  Continue Unasyn 3 g Q6H (Day 11 Unasyn of anticipated 6-wk course)  Can hold off DYANA, per ID, as unlikely to     Cavitary pneumonia  Assessment & Plan  Seen on CT 3/4/24  Consider septic emboli vs aspiration    AFB x3 NGTD  MTB PCR negative    Sputum Cx 3/3 growing 2+ pseudomonas--susceptible to Zosyn, meropenem, levofloxacin, and tobramycin. Intermediate susceptibility to cefepime.    ID on board, appreciate recommendations  Continue Unasyn x6 weeks for tx of E avium bacteremia  Continue chest physio  Will likely need repeat imaging outpatient    Acute respiratory failure with hypoxia (HCC)  Assessment & Plan  Pt intubated to protect airway, given obtunded mental status, however hypoxemia also noted on iSTAT during rapid response  Ddx includes volume overload vs aspiration PNA/RUL cavitary lung lesion    Pt intubated 3/11 to protect airway, extubated 3/14, satting well on 3 L NC.    Acute deep vein thrombosis (DVT) of both lower extremities (HCC)  Assessment & Plan  Discovered 2/20/24, started on Eliquis at home, started Hep gtt at [facility] but developed GI bleed, AC currently on hold    S/p IVC filter with IR 3/7/24  Will discuss resuming anticoagulation when  LGIB resolves and hgb stabilizes. Will need trial on hep gtt prior to transition to oral agent, given recurrence of bleeds.    New onset a-fib (HCC)  Assessment & Plan  Recurrence of Afib morning of 3/15, on p.o. diltiazem.    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) ([facility])  Assessment & Plan  Biopsy-proven at [facility]    Persistent oliguria, with good diuresis on Lasix gtt    UA 3/12/24 shows moderate blood (30-50 RBCs on micro), moderate leukocytes (30-50 on micro), 1+ protein, 1+ ketones, and increased specific gravity; not specific, but c/w lupus nephritis    U protein:Cr elevated  BUN stable  Cr stable  Resume Lasix gtt @14:00  Monitor volume status,  UOP, renal indices    Diabetes mellitus (HCC)  Assessment & Plan  Lab Results   Component Value Date    HGBA1C 5.9 (H) 03/03/2024       Recent Labs     03/15/24  0205 03/15/24  0407 03/15/24  0619 03/15/24  0756   POCGLU 117 118 150* 156*         Blood Sugar Average: Last 72 hrs:  (P) 153.60164    History of DM, well-controlled with last A1c 5.6 on 2/26, but now 5.9. Hyperglycemia in the setting of steroid use.    Started insulin gtt while on pulse-dose steroids and D5W  Avoid hypoglycemia per protocol  Monitor BG and titrate insulin gtt accordingly    Dysphagia  Assessment & Plan  Failed bedside swallow with nursing  Speech eval on 3/8: Cough present w/ successive sips of thin. Intermittently followed verbal commands for slow rate/small sips. Benefits from verbal cues to keep head at midline. Continue clear liquids , medications crushed, Slow rate, Small sips, cue to keep head at midline    Pt intubated 3/11. Extubated 3/14. Will request re-evaluation by Speech    Punctate ulcers of labia and medial thighs  Assessment & Plan  Labia erythematous with scattered ulcerations. Scattered ulcerations with pink and yellow tissue extending from radha-rectal area  to medial thighs.    Concern for possible atypical herpetic lesions in immunosuppressed patient    Suspect CMV based on colon Bx  Started ganciclovir 3/14/24  Discontinued Valtrex   HSV in process    Hypernatremia  Assessment & Plan  Na 148, increased from 147  Suspect d/t loop diuretic, Lasix gtt currently on hold    Nephrology on board, appreciate recommendations  Continue D5W with Kcl @ 100 ml/h    Severe protein-calorie malnutrition (HCC)  Assessment & Plan  Malnutrition Findings:   Adult Malnutrition type: Acute illness  Adult Degree of Malnutrition: Other severe protein calorie malnutrition  Malnutrition Characteristics: Fluid accumulation, Inadequate energy                  360 Statement: Severe calorie-protein malnutrition in context to acute illness r/t GI bleed, dysphagia, inadequate PO intake as evidance by energy intake less than 50% compared to estimated needs >5 days, +2 generalized edema; Treated with TBD (diet +oral supplements vs nutrition support)    BMI Findings:           Body mass index is 31.64 kg/m².      Extubated 3/14. Currently has NGT for bowel prep. Can consider initiation of TF if NGT tolerated and pt has inadequate oral intake when oral diet resumed, pending GOC.    Thrombocytopenia (HCC)  Assessment & Plan  Possibly related to hepatic congestion vs medication vs sepsis/bacteremia plus bleeding  3/10 Peripheral blood smear shows normochromic normocytic anemia with nRBCs and polychromasia, no schistocytes, mild thrombocytopenia, mild leukocytosis with neutrophilia.  3/13 Repeat smear positive for schistocytes, c/w hemolysis. Ddx includes DIC, SLE, and 2/2 transfusion   Monitor CBC closely  Transfuse PLT if <20, <50 if actively bleeding  Transfuse plt 2 U this AM    Hypokalemia  Assessment & Plan  K 3.7  Mg 1.7    Give Mg 2 g IV   Give K 40 mg IV  Keep K>=4 and Mg>=2    Sacral wound  Assessment & Plan  Wound Care on board, appreciate recommendations  Repositioning and wound care with  nursing  To discuss surgical debridement when pt more stable    Lupus (HCC)  Assessment & Plan  Diagnosed in December,   Initially started on steroids, prednisone 60 mg daily, which patient had tapered to 30 mg daily prior to admission with the addition of atovaquone, hydroxychloroquine, and mycophenolate    Holding atovaquone, hydroxychloroquine, and mycophenolate in the setting of acute infection    random cortisol 10.9 collected 3/11     Started Solumedrol 1 g QD for planned 3-day course, to consider steroid-sparing agents pending response and work-up  CRP elevated at 39.9, ESR wnl <1, C3 and C4 both low. Though non-specific, c/w lupus.  U protien:Cr elevated  Anti-ds DNA Ab in process  Rheumatology on board, appreciate recommendations       ----------------------------------------------------------------------------------------  HPI/24hr events: Pt passing large volumes dark and BRBPR starting overnight. Associated with drop in Hgb between 3-4 g, received 2 U pRBC and 1 U FFP w rise in Hgb, however still bleeding this morning and Hgb dropped another 2 g. Relapsed into Afib w RVR early this AM.    Patient appropriate for transfer out of the ICU today?: No  Disposition: Continue Critical Care   Code Status: Level 2 - DNAR: but accepts endotracheal intubation  ---------------------------------------------------------------------------------------  SUBJECTIVE  This morning, Ms. Nguyen is seen sitting up in bed, NGT in place on GoLytely bowel prep, putting out large volumes bloody liquid per rectum. She is awake and responsive, following commands. Disoriented.    Review of Systems  Review of systems was reviewed and negative unless stated above in HPI/24-hour events   ---------------------------------------------------------------------------------------  OBJECTIVE    Vitals   Vitals:    03/15/24 0747 03/15/24 0748 03/15/24 0800 03/15/24 0807   BP: (!) 206/69 (!) 201/63  (!) 216/86   Pulse: 80 80 82 93   Resp: 18 19  18 (!) 34   Temp: (!) 95.2 °F (35.1 °C) (!) 95.2 °F (35.1 °C)  (!) 95 °F (35 °C)   TempSrc:       SpO2:   93% 92%   Weight:       Height:         Temp (24hrs), Av.3 °F (36.3 °C), Min:95 °F (35 °C), Max:98.6 °F (37 °C)  Current: Temperature: (!) 95 °F (35 °C)  Arterial Line BP: 194/72  Arterial Line MAP (mmHg): 110 mmHg    Respiratory:  SpO2: SpO2: 92 %, SpO2 Activity: SpO2 Activity: At Rest, SpO2 Device: O2 Device: Nasal cannula, Capnography:    Nasal Cannula O2 Flow Rate (L/min): 3 L/min    Invasive/non-invasive ventilation settings   Respiratory      Lab Data (Last 4 hours)      None           O2/Vent Data (Last 4 hours)      None                    Physical Exam  Vitals and nursing note reviewed.   Constitutional:       General: She is not in acute distress.     Appearance: She is obese. She is ill-appearing. She is not toxic-appearing or diaphoretic.      Comments: fatigued   HENT:      Head: Normocephalic and atraumatic.      Nose:      Comments: NGT  Eyes:      General: No scleral icterus.        Right eye: No discharge.         Left eye: No discharge.      Conjunctiva/sclera: Conjunctivae normal.   Cardiovascular:      Rate and Rhythm: Tachycardia present. Rhythm irregular.      Pulses: Normal pulses.      Heart sounds: Normal heart sounds. No murmur heard.     No friction rub. No gallop.   Pulmonary:      Effort: Pulmonary effort is normal.   Abdominal:      General: Bowel sounds are normal. There is no distension.      Palpations: Abdomen is soft.      Tenderness: There is no abdominal tenderness. There is no guarding or rebound.   Musculoskeletal:         General: Swelling present.      Right lower leg: Edema present.      Left lower leg: Edema present.   Skin:     General: Skin is warm and dry.      Coloration: Skin is not jaundiced or pale.      Findings: Bruising and lesion present.   Neurological:      Mental Status: She is disoriented.             Laboratory and Diagnostics:  Results from last 7  days   Lab Units 03/15/24  0719 03/15/24  0456 03/15/24  0052 03/14/24  1804 03/14/24  1128 03/14/24  0432 03/13/24 2008 03/13/24  1757 03/13/24  1203 03/13/24  0351 03/12/24  2203 03/12/24  1118 03/12/24  0439 03/11/24  0933 03/11/24  0304 03/10/24  0805 03/10/24  0443   WBC Thousand/uL 7.35 9.22  --   --   --  7.35  --  6.25  --  9.47 11.99*  --  18.58*   < > 13.21*  --  15.99*   HEMOGLOBIN g/dL 10.2* 12.7 10.4* 14.0 12.8 12.4 12.0 12.7   < > 6.8* 7.9*   < > 13.6   < > 8.8*   < > 7.8*   I STAT HEMOGLOBIN   --   --   --   --   --   --   --   --   --   --   --   --   --    < >  --   --   --    HEMATOCRIT % 29.0* 35.3  --   --   --  35.3  --  36.8  --  20.5* 23.8*  --  39.5   < > 26.4*  --  23.8*   HEMATOCRIT, ISTAT   --   --   --   --   --   --   --   --   --   --   --   --   --    < >  --   --   --    PLATELETS Thousands/uL 92* 44*  --   --   --  68*  --  23*  --  43* 54*  --  65*   < > 66*  --  85*   NEUTROS PCT %  --   --   --   --   --  88*  --   --   --   --  83*  --   --   --  83*  --   --    BANDS PCT %  --   --   --   --   --   --   --   --   --   --   --   --   --   --   --   --  12*   MONOS PCT %  --   --   --   --   --  5  --   --   --   --  4  --   --   --  3*  --   --    MONO PCT %  --   --   --   --   --   --   --   --   --  3*  --   --   --   --   --   --  3*   EOS PCT %  --   --   --   --   --  0  --   --   --  0 0  --   --   --  0  --  0    < > = values in this interval not displayed.     Results from last 7 days   Lab Units 03/15/24  0456 03/14/24  1804 03/14/24  1128 03/14/24  0432 03/13/24  2008 03/13/24  1631 03/13/24  0610 03/13/24  0351 03/11/24  1706 03/11/24  1201 03/11/24  1055 03/11/24  0304 03/10/24  0443 03/09/24  0408   SODIUM mmol/L 148* 146 145 147 148* 149* 150*  --    < > 148*  --  146   < > 143   POTASSIUM mmol/L 3.7 3.5 4.0 3.4* 4.5 3.6 2.8*  --    < > 3.8  --  3.2*   < > 3.5   CHLORIDE mmol/L 118* 116* 116* 115* 117* 118* 118*  --    < > 117*  --  114*   < > 112*   CO2 mmol/L  19* 22 22 21 21 21 22  --    < > 22  --  26   < > 25   CO2, I-STAT   --   --   --   --   --   --   --   --    < >  --    < >  --   --   --    ANION GAP mmol/L 11 8 7 11 10 10 10  --    < > 9  --  6   < > 6   BUN mg/dL 25 24 23 24 24 24 27*  --    < > 25  --  24   < > 23   CREATININE mg/dL 1.14 1.07 1.15 1.19 1.18 1.16 1.26  --    < > 0.98  --  0.86   < > 0.76   CALCIUM mg/dL 7.2* 8.3* 8.1* 8.2* 8.5 8.2* 7.8*  --    < > 7.4*  --  7.5*   < > 7.5*   GLUCOSE RANDOM mg/dL 164* 90 134 135 149* 159* 231*  --    < > 275*  --  265*   < > 243*   ALT U/L 99*  --   --  157*  --   --   --  385*  --  28  --  30  --  42   AST U/L 53*  --   --  174*  --   --   --  515*  --  16  --  14  --  15   ALK PHOS U/L 123*  --   --  194*  --   --   --  60  --  53  --  55  --  74   ALBUMIN g/dL 2.3*  --   --  3.6  --   --   --  2.9*  --  2.1*  --  2.3*  --  1.9*   TOTAL BILIRUBIN mg/dL 1.63*  --   --  2.95*  --   --   --  0.82  --  0.67  --  0.64  --  0.72    < > = values in this interval not displayed.     Results from last 7 days   Lab Units 03/15/24  0456 03/14/24  0432 03/13/24  0610 03/13/24  0351 03/12/24  0436 03/10/24  0443   MAGNESIUM mg/dL 1.7* 1.9  --  1.8* 1.9 1.9   PHOSPHORUS mg/dL 4.6* 3.5 4.0 3.9 4.4*  --       Results from last 7 days   Lab Units 03/15/24  0456 03/14/24  1128 03/13/24  0610 03/13/24  0425 03/12/24  0436 03/11/24  1201 03/09/24  0003   INR  1.39* 1.22*  --  1.65* 1.25* 1.33* 1.14   PTT seconds 32 30 40*  --   --   --   --           Results from last 7 days   Lab Units 03/12/24  0945 03/12/24  0540   LACTIC ACID mmol/L 2.3* 3.0*     ABG:  Results from last 7 days   Lab Units 03/13/24  0630   PH ART  7.411   PCO2 ART mm Hg 33.9*   PO2 ART mm Hg 161.7*   HCO3 ART mmol/L 21.1*   BASE EXC ART mmol/L -3.2   ABG SOURCE  Line, Arterial     VBG:  Results from last 7 days   Lab Units 03/13/24  0630 03/12/24  0539 03/11/24  1711   PH RADHA   --   --  7.306   PCO2 RADHA mm Hg  --   --  41.9*   PO2 RADHA mm Hg  --   --  54.4*  "  HCO3 RADHA mmol/L  --   --  20.4*   BASE EXC RADHA mmol/L  --   --  -5.6   ABG SOURCE  Line, Arterial   < >  --     < > = values in this interval not displayed.     Results from last 7 days   Lab Units 03/11/24  0304   PROCALCITONIN ng/ml 0.30*       Micro        EKG: Afib starting early this AM, HR 100s-130s  Imaging: I have personally reviewed pertinent reports.   and I have personally reviewed pertinent films in PACS    Intake and Output  I/O         03/13 0701 03/14 0700 03/14 0701  03/15 0700 03/15 0701  03/16 0700    I.V. (mL/kg) 1368.9 (16.4) 2028.1 (24.3)     Blood 2825.4 870.8 798.3    NG/GT  2400 2000    IV Piggyback 1283.3 1108.3 100    Total Intake(mL/kg) 5477.6 (65.5) 6407.2 (76.6) 2898.3 (34.7)    Urine (mL/kg/hr) 3400 (1.7) 2185 (1.1)     Stool 200 1000 1850    Total Output 3600 3185 1850    Net +1877.6 +3222.2 +1048.3           Unmeasured Stool Occurrence 6 x 2 x             Height and Weights   Height: 5' 4\" (162.6 cm)  IBW (Ideal Body Weight): 54.7 kg  Body mass index is 31.64 kg/m².  Weight (last 2 days)       Date/Time Weight    03/14/24 0555 83.6 (184.3)    03/13/24 0600 83.9 (184.97)              Nutrition       Diet Orders   (From admission, onward)                 Start     Ordered    03/15/24 0847  Diet NPO  Diet effective midnight        References:    Adult Nutrition Support Algorithm    RD Therapeutic Diet Order Protocol   Question Answer Comment   Diet Type NPO    RD to adjust diet per protocol? Yes       Placed in \"And\" Linked Group    03/15/24 0846                      Active Medications  Scheduled Meds:  Current Facility-Administered Medications   Medication Dose Route Frequency Provider Last Rate    acetaminophen  650 mg Oral Q6H PRN AUGUSTA Barber      ampicillin-sulbactam  3 g Intravenous Q6H AUGUSTA Barber Stopped (03/15/24 0620)    atorvastatin  40 mg Oral Daily AUGUSTA Barber      carvedilol  6.25 mg Oral BID With Meals AUGUSTA Barber      dextrose 5 % " with KCl 20 mEq/L  100 mL/hr Intravenous Continuous Uche Blair  mL/hr (03/15/24 0813)    diltiazem  30 mg Oral Q6H CRUIZTO AUGUSTA Barber      escitalopram  10 mg Oral Daily AUGUSTA Barber      famotidine  20 mg Oral Daily AUGUSTA Barber      fentaNYL  50 mcg Intravenous Q2H PRN AUGUSTA Stahl      ganciclovir  2.5 mg/kg (Adjusted) Intravenous Q24H Ana Laura Dalton MD Stopped (03/14/24 1800)    insulin regular (HumuLIN R,NovoLIN R) 1 Units/mL in sodium chloride 0.9 % 100 mL infusion  0.3-21 Units/hr Intravenous Titrated Uche Blair MD 1 Units/hr (03/15/24 0620)    levalbuterol  1.25 mg Nebulization Q8H PRN AUGUSTA Barber      methylPREDNISolone sodium succinate  1,000 mg Intravenous Daily Uche Blair MD Stopped (03/14/24 1000)    omeprazole (PRILOSEC) suspension 2 mg/mL  20 mg Oral Daily AUGUSTA Barber      ondansetron  4 mg Intravenous Q4H PRN AUGUSTA Barber      potassium chloride  40 mEq Intravenous Once Uche Blair MD 40 mEq (03/15/24 0828)     Continuous Infusions:  dextrose 5 % with KCl 20 mEq/L, 100 mL/hr, Last Rate: 100 mL/hr (03/15/24 0813)  insulin regular (HumuLIN R,NovoLIN R) 1 Units/mL in sodium chloride 0.9 % 100 mL infusion, 0.3-21 Units/hr, Last Rate: 1 Units/hr (03/15/24 0620)      PRN Meds:   acetaminophen, 650 mg, Q6H PRN  fentaNYL, 50 mcg, Q2H PRN  levalbuterol, 1.25 mg, Q8H PRN  ondansetron, 4 mg, Q4H PRN        Invasive Devices Review  Invasive Devices       Peripherally Inserted Central Catheter Line  Duration             PICC Line 03/08/24 Right Basilic 6 days              Central Venous Catheter Line  Duration             CVC Central Lines 03/11/24 Triple 20cm 3 days              Arterial Line  Duration             Arterial Line 03/11/24 Right Radial 3 days              Drain  Duration             Urethral Catheter Latex;Straight-tip 16 Fr. 14 days    NG/OG/Enteral Tube Nasogastric Left nare <1 day    NG/OG/Enteral Tube Nasogastric Right  "nare <1 day                    Rationale for remaining devices: Critically ill patient with active large volume GI bleed  ---------------------------------------------------------------------------------------  Advance Directive and Living Will: Yes    Power of :    POLST:        Uche Blair MD      Portions of the record may have been created with voice recognition software.  Occasional wrong word or \"sound a like\" substitutions may have occurred due to the inherent limitations of voice recognition software.  Read the chart carefully and recognize, using context, where substitutions have occurred     "

## 2024-03-15 NOTE — ASSESSMENT & PLAN NOTE
Lab Results   Component Value Date    EGFR 45 03/15/2024    EGFR 49 03/14/2024    EGFR 45 03/14/2024    CREATININE 1.14 03/15/2024    CREATININE 1.07 03/14/2024    CREATININE 1.15 03/14/2024

## 2024-03-15 NOTE — ASSESSMENT & PLAN NOTE
Patient with significant anasarca likely component of iatrogenic fluid overload, hypoalbuminemia, oliguria  Preserved EF on echocardiogram  Resume Lasix gtt this afternoon  Monitor volume status and UOP

## 2024-03-15 NOTE — ASSESSMENT & PLAN NOTE
Seen on CT 3/4/24  Consider septic emboli vs aspiration    AFB x3 NGTD  MTB PCR negative    Sputum Cx 3/3 growing 2+ pseudomonas--susceptible to Zosyn, meropenem, levofloxacin, and tobramycin. Intermediate susceptibility to cefepime.    ID on board, appreciate recommendations  Continue Unasyn x6 weeks for tx of E avium bacteremia  Continue chest physio  Will likely need repeat imaging outpatient

## 2024-03-15 NOTE — ASSESSMENT & PLAN NOTE
Failed bedside swallow with nursing  Speech eval on 3/8: Cough present w/ successive sips of thin. Intermittently followed verbal commands for slow rate/small sips. Benefits from verbal cues to keep head at midline. Continue clear liquids , medications crushed, Slow rate, Small sips, cue to keep head at midline    Level 4 CMO  Regular Diet -- Pleasure feed

## 2024-03-15 NOTE — ASSESSMENT & PLAN NOTE
Failed bedside swallow with nursing  Speech eval on 3/8: Cough present w/ successive sips of thin. Intermittently followed verbal commands for slow rate/small sips. Benefits from verbal cues to keep head at midline. Continue clear liquids , medications crushed, Slow rate, Small sips, cue to keep head at midline    Pt intubated 3/11. Extubated 3/14. Will request re-evaluation by Speech

## 2024-03-15 NOTE — ASSESSMENT & PLAN NOTE
Possibly related to hepatic congestion vs medication vs sepsis/bacteremia plus bleeding  3/10 Peripheral blood smear shows normochromic normocytic anemia with nRBCs and polychromasia, no schistocytes, mild thrombocytopenia, mild leukocytosis with neutrophilia.  3/13 Repeat smear positive for schistocytes, c/w hemolysis. Ddx includes DIC, SLE, and 2/2 transfusion   Monitor CBC closely  Transfuse PLT if <20, <50 if actively bleeding  Transfused plt 2 U this AM

## 2024-03-15 NOTE — WOUND OSTOMY CARE
Progress Note - Wound   Darlyn Nguyen 78 y.o. female MRN: [id number]  Unit/Bed#: ICU 02 Encounter: [id number]        Assessment:   Patient seen for wound care follow-up along with primary RN.  She continues to have frequent loose stools with rupesh blood.  Not a candidate for fecal management system due to bleeding ulcerations in the colon/rectum and rectal prolapse.  Unable to take wound photos--patient became somewhat unstable with prolonged turning/laying flat, decreased O2 saturation and blood pressure changes.    Sacral wounds appear stable today.  No new open areas appreciated.  Radha-rectal open areas draining less.  Attempted fecal pouch to manage liquid stool incontinence--crusted radha-rectal open areas with stomahesive powder prior to pouch application.      Ulcerations to medial thighs improved.  However, ulcers to posterior thighs still present and stable.      Continue with current sacral wound care plan for now with normal saline wet-to-dry dressings twice daily and as needed with soilage.  Will continue to follow closely.  Will restart Santyl to sacral ulcerations if liquid stools can be consistently managed.      Wound Care Plan:   1-Hydraguard lotion to bilateral heels twice daily and as needed.  2-Elevate heels off of bed/chair surface to offload pressure--offloading heel boots.  3-Offloading air cushion in chair when out of bed.  4-Moisturize skin daily with skin nourishing lotion.  5-Turn/reposition every 2 hours while in bed using positioning wedges; and weight shift frequently while in chair for pressure re-distribution on skin.   6-Low air-loss mattress--Place order for P500 if transferred out of ICU.  7-Breast folds--cleanse with soap and water, pat dry.  Apply Interdry in single layer to skin fold, ensure approximately 2 inches of fabric is visible outside of fold.  Remove Interdry daily for bathing and re-apply.  Change Interdry sheet every 3 days or if visibly soiled.  DO NOT USE CREAMS OR  POWDERS IN AREAS WHERE INTERDRY IS IN USE.  8-Perineum/radha-rectal and medial thigh wounds--cleanse with soap and water, pat dry.  Dust open areas with stomahesive powder then apply zinc oxide protect paste (Calazime) three times daily and as needed with incontinence care.  9-Buttocks/sacrum--cleanse with normal saline, pat dry.  Apply normal saline moistened gauze to open areas.  Cover with ABD or silicone bordered foam dressing.  Change dressing twice daily and as needed with soilage.    Liz CHRISTENSENN, RN, CWON

## 2024-03-15 NOTE — PLAN OF CARE
Problem: Prexisting or High Potential for Compromised Skin Integrity  Goal: Skin integrity is maintained or improved  Description: INTERVENTIONS:  - Identify patients at risk for skin breakdown  - Assess and monitor skin integrity  - Assess and monitor nutrition and hydration status  - Monitor labs   - Assess for incontinence   - Turn and reposition patient  - Assist with mobility/ambulation  - Relieve pressure over bony prominences  - Avoid friction and shearing  - Provide appropriate hygiene as needed including keeping skin clean and dry  - Evaluate need for skin moisturizer/barrier cream  - Collaborate with interdisciplinary team   - Patient/family teaching  - Consider wound care consult   Outcome: Not Progressing     Problem: SAFETY ADULT  Goal: Patient will remain free of falls  Description: INTERVENTIONS:  - Educate patient/family on patient safety including physical limitations  - Instruct patient to call for assistance with activity   - Consult OT/PT to assist with strengthening/mobility   - Keep Call bell within reach  - Keep bed low and locked with side rails adjusted as appropriate  - Keep care items and personal belongings within reach  - Initiate and maintain comfort rounds  - Make Fall Risk Sign visible to staff  - Offer Toileting every 2 Hours, in advance of need  - Initiate/Maintain bed alarm  - Obtain necessary fall risk management equipment: side rails up, fall signs.   - Apply yellow socks and bracelet for high fall risk patients  - Consider moving patient to room near nurses station  Outcome: Not Progressing  Goal: Maintain or return to baseline ADL function  Description: INTERVENTIONS:  -  Assess patient's ability to carry out ADLs; assess patient's baseline for ADL function and identify physical deficits which impact ability to perform ADLs (bathing, care of mouth/teeth, toileting, grooming, dressing, etc.)  - Assess/evaluate cause of self-care deficits   - Assess range of motion  - Assess  patient's mobility; develop plan if impaired  - Assess patient's need for assistive devices and provide as appropriate  - Encourage maximum independence but intervene and supervise when necessary  - Involve family in performance of ADLs  - Assess for home care needs following discharge   - Consider OT consult to assist with ADL evaluation and planning for discharge  - Provide patient education as appropriate  Outcome: Not Progressing  Goal: Maintains/Returns to pre admission functional level  Description: INTERVENTIONS:  - Perform AM-PAC 6 Click Basic Mobility/ Daily Activity assessment daily.  - Set and communicate daily mobility goal to care team and patient/family/caregiver.   - Collaborate with rehabilitation services on mobility goals if consulted  - Perform Range of Motion 4 times a day.  - Reposition patient every 2 hours.  - Dangle patient 0 times a day  - Stand patient 0 times a day  - Ambulate patient 0 times a day  - Out of bed to chair 0 times a day   - Out of bed for meals 0 times a day  - Out of bed for toileting  - Record patient progress and toleration of activity level   Outcome: Not Progressing     Problem: DISCHARGE PLANNING  Goal: Discharge to home or other facility with appropriate resources  Description: INTERVENTIONS:  - Identify barriers to discharge w/patient and caregiver  - Arrange for needed discharge resources and transportation as appropriate  - Identify discharge learning needs (meds, wound care, etc.)  - Arrange for interpretive services to assist at discharge as needed  - Refer to Case Management Department for coordinating discharge planning if the patient needs post-hospital services based on physician/advanced practitioner order or complex needs related to functional status, cognitive ability, or social support system  Outcome: Not Progressing     Problem: Knowledge Deficit  Goal: Patient/family/caregiver demonstrates understanding of disease process, treatment plan, medications,  and discharge instructions  Description: Complete learning assessment and assess knowledge base.  Interventions:  - Provide teaching at level of understanding  - Provide teaching via preferred learning methods  Outcome: Not Progressing     Problem: INFECTION - ADULT  Goal: Absence or prevention of progression during hospitalization  Description: INTERVENTIONS:  - Assess and monitor for signs and symptoms of infection  - Monitor lab/diagnostic results  - Monitor all insertion sites, i.e. indwelling lines, tubes, and drains  - Monitor endotracheal if appropriate and nasal secretions for changes in amount and color  - [place] appropriate cooling/warming therapies per order  - Administer medications as ordered  - Instruct and encourage patient and family to use good hand hygiene technique  - Identify and instruct in appropriate isolation precautions for identified infection/condition  Outcome: Not Progressing     Problem: SAFETY,RESTRAINT: NV/NON-SELF DESTRUCTIVE BEHAVIOR  Goal: Remains free of harm/injury (restraint for non violent/non self-detsructive behavior)  Description: INTERVENTIONS:  - Instruct patient/family regarding restraint use   - Assess and monitor physiologic and psychological status   - Provide interventions and comfort measures to meet assessed patient needs   - Identify and implement measures to help patient regain control  - Assess readiness for release of restraint   Outcome: Not Progressing  Goal: Returns to optimal restraint-free functioning  Description: INTERVENTIONS:  - Assess the patient's behavior and symptoms that indicate continued need for restraint  - Identify and implement measures to help patient regain control  - Assess readiness for release of restraint   Outcome: Not Progressing

## 2024-03-15 NOTE — ASSESSMENT & PLAN NOTE
Possibly related to hepatic congestion vs medication vs sepsis/bacteremia plus bleeding  3/10 Peripheral blood smear shows normochromic normocytic anemia with nRBCs and polychromasia, no schistocytes, mild thrombocytopenia, mild leukocytosis with neutrophilia.  3/13 Repeat smear positive for schistocytes, c/w hemolysis. Ddx includes DIC, SLE, and 2/2 transfusion   Monitor CBC closely  Transfuse PLT if <20, <50 if actively bleeding  Transfuse plt 2 U this AM

## 2024-03-15 NOTE — RESPIRATORY THERAPY NOTE
RT Ventilator Management Note  Darlyn Nguyen 78 y.o. female MRN: [id number]  Unit/Bed#: ICU 02 Encounter: [id number]      Daily Screen         3/13/2024  1930 3/14/2024  0737          Patient safety screen outcome:: Failed Passed      Not Ready for Weaning due to:: Underline problem not resolved --      Spont breathing trial outcome:: -- Passed      Name of Medical Team Notified:: -- NP/Physican      Preparing to extubate/ Notify Nurse: -- Yes      Extubation order obtained: -- Yes      Consider Cuff Test: -- No (comment)      RSBI: -- 35                Physical Exam:          Resp Comments: initiated vest therapy  Vest therapy discontinued

## 2024-03-15 NOTE — ASSESSMENT & PLAN NOTE
Recurrence of Afib morning of 3/15, converted to NSR following administration of p.o. diltiazem.

## 2024-03-15 NOTE — ASSESSMENT & PLAN NOTE
Na 148, increased from 147  Suspect d/t loop diuretic, Lasix gtt held after IV contrast    Level 4 CMO  Will not resume lasix gtt

## 2024-03-15 NOTE — PHYSICAL THERAPY NOTE
Physical Therapy Cancellation Note       03/15/24 1112   PT Last Visit   PT Visit Date 03/15/24   Note Type   Note type Cancelled Session   Cancel Reasons Patient to operating room   Additional Comments Consult received and chart reviewed. Patient currently off the floor to IR for emergent arteriogram and embolization with anesthesia. Will f/u for formal evaluation when patient available and appropriate for functional evaluation.     Kristen Cruz, PT, DPT

## 2024-03-15 NOTE — ASSESSMENT & PLAN NOTE
Pt arrived intubated 3/3, extubated 3/4  Pt intubated 3/11 to protect airway, extubated 3/14, satting well on 3 L NC.  Intubated 3/15 for IR procedure, returned to floor intubated. ETT placement confirmed w CXR.  Pt became acutely hypoxemic at 15:30, desatting initially to 70s and then becoming bradycardic and hypotensive as she continued to desat to a lanette in 60s. Pushed epi 1 mg IV without improvement. Pushed atropine 1 mg IV. Pt briefly asystolic for 5-10 seconds before return of spontaneous circulation. Respiratory therapy at bedside began manually bagging patient, with improvement in SpO2 to 70s. STAT CXR showed ETT appropriately positioned, no large pneumothorax, no acute changes seen to account for new hypoxia. Family, present at bedside, elected to update code status to Level 4, comfort measures only.    PE suspected as most likely cause of acute decompensation--pt has known bilateral DVT and suspected hypercoagulability, not on anticoagulation due to recurrent, active GI bleeding, s/p IVC filter. Small, non-occlusive PEs had been noted on CTA 3/14/24.

## 2024-03-15 NOTE — ASSESSMENT & PLAN NOTE
Patient with significant anasarca likely component of iatrogenic fluid overload, hypoalbuminemia, oliguria  Preserved EF on echocardiogram  Level 4 CMO

## 2024-03-16 PROBLEM — J18.9 CAVITARY PNEUMONIA: Status: RESOLVED | Noted: 2024-02-29 | Resolved: 2024-03-16

## 2024-03-16 PROBLEM — N18.2 CKD (CHRONIC KIDNEY DISEASE), STAGE II: Status: RESOLVED | Noted: 2024-01-01 | Resolved: 2024-01-01

## 2024-03-16 PROBLEM — I82.403 ACUTE DEEP VEIN THROMBOSIS (DVT) OF BOTH LOWER EXTREMITIES (HCC): Status: RESOLVED | Noted: 2024-01-01 | Resolved: 2024-01-01

## 2024-03-16 PROBLEM — S31.000A SACRAL WOUND: Status: RESOLVED | Noted: 2024-01-01 | Resolved: 2024-01-01

## 2024-03-16 PROBLEM — R13.10 DYSPHAGIA: Status: RESOLVED | Noted: 2024-03-05 | Resolved: 2024-03-16

## 2024-03-16 PROBLEM — E43 SEVERE PROTEIN-CALORIE MALNUTRITION (HCC): Status: ACTIVE | Noted: 2024-01-01

## 2024-03-16 PROBLEM — E87.0 HYPERNATREMIA: Status: RESOLVED | Noted: 2024-01-01 | Resolved: 2024-01-01

## 2024-03-16 PROBLEM — K92.2 GI BLEED: Status: RESOLVED | Noted: 2024-01-01 | Resolved: 2024-01-01

## 2024-03-16 PROBLEM — I48.91 NEW ONSET A-FIB (HCC): Status: RESOLVED | Noted: 2024-01-01 | Resolved: 2024-01-01

## 2024-03-16 PROBLEM — E87.6 HYPOKALEMIA: Status: RESOLVED | Noted: 2024-01-01 | Resolved: 2024-01-01

## 2024-03-16 PROBLEM — E43 SEVERE PROTEIN-CALORIE MALNUTRITION (HCC): Status: RESOLVED | Noted: 2024-01-01 | Resolved: 2024-01-01

## 2024-03-16 PROBLEM — N76.6 GENITAL LABIAL ULCER: Status: RESOLVED | Noted: 2024-03-14 | Resolved: 2024-03-16

## 2024-03-16 PROBLEM — I27.20 PULMONARY HYPERTENSION (HCC): Status: RESOLVED | Noted: 2024-03-15 | Resolved: 2024-03-16

## 2024-03-16 PROBLEM — G93.41 ACUTE METABOLIC ENCEPHALOPATHY: Status: RESOLVED | Noted: 2024-01-01 | Resolved: 2024-01-01

## 2024-03-16 PROBLEM — M32.9 LUPUS (HCC): Status: RESOLVED | Noted: 2024-01-01 | Resolved: 2024-01-01

## 2024-03-16 PROBLEM — R60.1 ANASARCA: Status: RESOLVED | Noted: 2024-01-01 | Resolved: 2024-01-01

## 2024-03-16 PROBLEM — J96.01 ACUTE RESPIRATORY FAILURE WITH HYPOXIA (HCC): Status: RESOLVED | Noted: 2024-01-01 | Resolved: 2024-01-01

## 2024-03-16 PROBLEM — R78.81 BACTEREMIA DUE TO ENTEROCOCCUS: Status: RESOLVED | Noted: 2024-01-01 | Resolved: 2024-01-01

## 2024-03-16 PROBLEM — B95.2 BACTEREMIA DUE TO ENTEROCOCCUS: Status: RESOLVED | Noted: 2024-01-01 | Resolved: 2024-01-01

## 2024-03-16 PROBLEM — J98.4 CAVITARY PNEUMONIA: Status: RESOLVED | Noted: 2024-02-29 | Resolved: 2024-03-16

## 2024-03-16 PROBLEM — D69.6 THROMBOCYTOPENIA (HCC): Status: RESOLVED | Noted: 2024-01-01 | Resolved: 2024-01-01

## 2024-03-16 PROBLEM — M32.14: Status: RESOLVED | Noted: 2024-01-01 | Resolved: 2024-01-01

## 2024-03-16 PROBLEM — E11.9 DIABETES MELLITUS (HCC): Status: RESOLVED | Noted: 2024-01-01 | Resolved: 2024-01-01

## 2024-03-16 PROBLEM — Z79.52 STEROID DEPENDENT FOR ADRENAL SUPRESSION: Status: RESOLVED | Noted: 2024-01-01 | Resolved: 2024-01-01

## 2024-03-16 NOTE — PLAN OF CARE
Problem: SAFETY ADULT  Goal: Patient will remain free of falls  Description: INTERVENTIONS:  - Educate patient/family on patient safety including physical limitations  - Instruct patient to call for assistance with activity   - Consult OT/PT to assist with strengthening/mobility   - Keep Call bell within reach  - Keep bed low and locked with side rails adjusted as appropriate  - Keep care items and personal belongings within reach  - Initiate and maintain comfort rounds  - Make Fall Risk Sign visible to staff  - Offer Toileting in advance of need  - Initiate/Maintain bed alarm  - Obtain necessary fall risk management equipment  - Apply yellow socks and bracelet for high fall risk patients  - Consider moving patient to room near nurses station  Outcome: Progressing     Problem: SAFETY,RESTRAINT: NV/NON-SELF DESTRUCTIVE BEHAVIOR  Goal: Remains free of harm/injury (restraint for non violent/non self-detsructive behavior)  Description: INTERVENTIONS:  - Instruct patient/family regarding restraint use   - Assess and monitor physiologic and psychological status   - Provide interventions and comfort measures to meet assessed patient needs   - Identify and implement measures to help patient regain control  - Assess readiness for release of restraint   Outcome: Progressing  Goal: Returns to optimal restraint-free functioning  Description: INTERVENTIONS:  - Assess the patient's behavior and symptoms that indicate continued need for restraint  - Identify and implement measures to help patient regain control  - Assess readiness for release of restraint   Outcome: Progressing     Problem: Prexisting or High Potential for Compromised Skin Integrity  Goal: Skin integrity is maintained or improved  Description: INTERVENTIONS:  - Identify patients at risk for skin breakdown  - Assess and monitor skin integrity  - Assess and monitor nutrition and hydration status  - Monitor labs   - Assess for incontinence   - Turn and reposition  patient  - Assist with mobility/ambulation  - Relieve pressure over bony prominences  - Avoid friction and shearing  - Provide appropriate hygiene as needed including keeping skin clean and dry  - Evaluate need for skin moisturizer/barrier cream  - Collaborate with interdisciplinary team   - Patient/family teaching  - Consider wound care consult   Outcome: Not Progressing     Problem: SAFETY ADULT  Goal: Maintain or return to baseline ADL function  Description: INTERVENTIONS:  -  Assess patient's ability to carry out ADLs; assess patient's baseline for ADL function and identify physical deficits which impact ability to perform ADLs (bathing, care of mouth/teeth, toileting, grooming, dressing, etc.)  - Assess/evaluate cause of self-care deficits   - Assess range of motion  - Assess patient's mobility; develop plan if impaired  - Assess patient's need for assistive devices and provide as appropriate  - Encourage maximum independence but intervene and supervise when necessary  - Involve family in performance of ADLs  - Assess for home care needs following discharge   - Consider OT consult to assist with ADL evaluation and planning for discharge  - Provide patient education as appropriate  Outcome: Not Progressing  Goal: Maintains/Returns to pre admission functional level  Description: INTERVENTIONS:  - Perform AM-PAC 6 Click Basic Mobility/ Daily Activity assessment daily.  - Set and communicate daily mobility goal to care team and patient/family/caregiver.   - Collaborate with rehabilitation services on mobility goals if consulted  - Out of bed for toileting  - Record patient progress and toleration of activity level   Outcome: Not Progressing     Problem: DISCHARGE PLANNING  Goal: Discharge to home or other facility with appropriate resources  Description: INTERVENTIONS:  - Identify barriers to discharge w/patient and caregiver  - Arrange for needed discharge resources and transportation as appropriate  - Identify  discharge learning needs (meds, wound care, etc.)  - Arrange for interpretive services to assist at discharge as needed  - Refer to Case Management Department for coordinating discharge planning if the patient needs post-hospital services based on physician/advanced practitioner order or complex needs related to functional status, cognitive ability, or social support system  Outcome: Not Progressing     Problem: Knowledge Deficit  Goal: Patient/family/caregiver demonstrates understanding of disease process, treatment plan, medications, and discharge instructions  Description: Complete learning assessment and assess knowledge base.  Interventions:  - Provide teaching at level of understanding  - Provide teaching via preferred learning methods  Outcome: Not Progressing     Problem: INFECTION - ADULT  Goal: Absence or prevention of progression during hospitalization  Description: INTERVENTIONS:  - Assess and monitor for signs and symptoms of infection  - Monitor lab/diagnostic results  - Monitor all insertion sites, i.e. indwelling lines, tubes, and drains  - Monitor endotracheal if appropriate and nasal secretions for changes in amount and color  - [place] appropriate cooling/warming therapies per order  - Administer medications as ordered  - Instruct and encourage patient and family to use good hand hygiene technique  - Identify and instruct in appropriate isolation precautions for identified infection/condition  Outcome: Not Progressing

## 2024-03-16 NOTE — PROGRESS NOTES
Rheumatology Inpatient Progress Note    Spoke to Dr. Ashley today.  After terminal extubation yesterday, patient remains on room air more or less stable though still having bleeding.  Dr. Ashley to speak with the family regarding whether or not they would like to pursue further treatment.    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    I have spent a total time of 20 minutes on 03/16/24 in caring for this patient including Documenting in the medical record, Reviewing / ordering tests, medicine, procedures  , and Communicating with other healthcare professionals .    Keshav Pisano DO, [facility]

## 2024-03-16 NOTE — QUICK NOTE
RENAL NOTE   Darlyn Nguyen 78 y.o. female MRN: [id number]  Unit/Bed#: ICU 02 Encounter: [id number]    Chart reviewed. Events noted.   Patient had arteriogram yesterday but there was no active bleeding noted so embolization was not performed.  Patient had an episode of hypoxia and PEA arrest which lasted for 10 seconds yesterday.  Due to clinical deterioration, critical care discussed with family and they have opted for comfort care.  Nothing further to add from a renal standpoint.  Will sign off.  Please call us back for any questions or concerns.

## 2024-03-16 NOTE — PLAN OF CARE
Problem: Prexisting or High Potential for Compromised Skin Integrity  Goal: Skin integrity is maintained or improved  Description: INTERVENTIONS:  - Identify patients at risk for skin breakdown  - Assess and monitor skin integrity  - Assess and monitor nutrition and hydration status  - Monitor labs   - Assess for incontinence   - Turn and reposition patient  - Assist with mobility/ambulation  - Relieve pressure over bony prominences  - Avoid friction and shearing  - Provide appropriate hygiene as needed including keeping skin clean and dry  - Evaluate need for skin moisturizer/barrier cream  - Collaborate with interdisciplinary team   - Patient/family teaching  - Consider wound care consult   Outcome: Progressing     Problem: SAFETY ADULT  Goal: Patient will remain free of falls  Description: INTERVENTIONS:  - Educate patient/family on patient safety including physical limitations  - Instruct patient to call for assistance with activity   - Consult OT/PT to assist with strengthening/mobility   - Keep Call bell within reach  - Keep bed low and locked with side rails adjusted as appropriate  - Keep care items and personal belongings within reach  - Initiate and maintain comfort rounds  - Make Fall Risk Sign visible to staff  - Offer Toileting every 2 Hours, in advance of need  - Initiate/Maintain bed alarm  - Obtain necessary fall risk management equipment  - Apply yellow socks and bracelet for high fall risk patients  - Consider moving patient to room near nurses station  Outcome: Progressing     Problem: DISCHARGE PLANNING  Goal: Discharge to home or other facility with appropriate resources  Description: INTERVENTIONS:  - Identify barriers to discharge w/patient and caregiver  - Arrange for needed discharge resources and transportation as appropriate  - Identify discharge learning needs (meds, wound care, etc.)  - Arrange for interpretive services to assist at discharge as needed  - Refer to Case Management  Department for coordinating discharge planning if the patient needs post-hospital services based on physician/advanced practitioner order or complex needs related to functional status, cognitive ability, or social support system  Outcome: Progressing     Problem: Knowledge Deficit  Goal: Patient/family/caregiver demonstrates understanding of disease process, treatment plan, medications, and discharge instructions  Description: Complete learning assessment and assess knowledge base.  Interventions:  - Provide teaching at level of understanding  - Provide teaching via preferred learning methods  Outcome: Progressing     Problem: INFECTION - ADULT  Goal: Absence or prevention of progression during hospitalization  Description: INTERVENTIONS:  - Assess and monitor for signs and symptoms of infection  - Monitor lab/diagnostic results  - Monitor all insertion sites, i.e. indwelling lines, tubes, and drains  - Monitor endotracheal if appropriate and nasal secretions for changes in amount and color  - [place] appropriate cooling/warming therapies per order  - Administer medications as ordered  - Instruct and encourage patient and family to use good hand hygiene technique  - Identify and instruct in appropriate isolation precautions for identified infection/condition  Outcome: Progressing     Problem: SAFETY,RESTRAINT: NV/NON-SELF DESTRUCTIVE BEHAVIOR  Goal: Remains free of harm/injury (restraint for non violent/non self-detsructive behavior)  Description: INTERVENTIONS:  - Instruct patient/family regarding restraint use   - Assess and monitor physiologic and psychological status   - Provide interventions and comfort measures to meet assessed patient needs   - Identify and implement measures to help patient regain control  - Assess readiness for release of restraint   Outcome: Progressing  Goal: Returns to optimal restraint-free functioning  Description: INTERVENTIONS:  - Assess the patient's behavior and symptoms that  indicate continued need for restraint  - Identify and implement measures to help patient regain control  - Assess readiness for release of restraint   Outcome: Progressing

## 2024-03-16 NOTE — PROGRESS NOTES
[facility]  Progress Note  Name: Darlyn Nguyen I  MRN: [id number]  Unit/Bed#: ICU 02 I Date of Admission: 3/3/2024   Date of Service: 3/16/2024 I Hospital Day: 13    Assessment/Plan   * Comfort measures only status  Assessment & Plan  Code status updated to level 4 comfort measures only  Continue palliative meds  D/c-Morphine as needed for pain or dyspnea, change to dilaudid given low GFR  Lorazepam as needed for anxiety  Haldol as needed for agitation or nausea  Zofran as needed for nausea  Continue family support, offer  support  Comfort cart provided   Transfer to [facility]    Acute respiratory failure with hypoxia (HCC)-resolved as of 3/16/2024  Assessment & Plan  Pt arrived intubated 3/3, extubated 3/4  Pt intubated 3/11 to protect airway, extubated 3/14, satting well on 3 L NC.  Intubated 3/15 for IR procedure, returned to floor intubated. ETT placement confirmed w CXR.  Pt became acutely hypoxemic at 15:30, desatting initially to 70s and then becoming bradycardic and hypotensive as she continued to desat to a lanette in 60s. Pushed epi 1 mg IV without improvement. Pushed atropine 1 mg IV. Pt briefly asystolic for 5-10 seconds before return of spontaneous circulation. Respiratory therapy at bedside began manually bagging patient, with improvement in SpO2 to 70s. STAT CXR showed ETT appropriately positioned, no large pneumothorax, no acute changes seen to account for new hypoxia. Family, present at bedside, elected to update code status to Level 4, comfort measures only.    PE suspected as most likely cause of acute decompensation--pt has known bilateral DVT and suspected hypercoagulability, not on anticoagulation due to recurrent, active GI bleeding, s/p IVC filter. Small, non-occlusive PEs had been noted on CTA 3/14/24.    Cavitary pneumonia-resolved as of 3/16/2024  Assessment & Plan  Seen on CT 3/4/24  Consider septic emboli vs aspiration    AFB x3 NGTD  MTB PCR negative    Level  4 CMO    Bacteremia due to Enterococcus-resolved as of 3/16/2024  Assessment & Plan  E. avium bacteremia. Pt recently started on prednisone, atovaquone, hydroxychloroquine, and mycophenolate for lupus. Colonic/rectal ulcerations possible point of entry.     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)-resolved as of 3/16/2024  Assessment & Plan  Biopsy-proven at [facility]    Persistent oliguria, with good diuresis on Lasix gtt    UA 3/12/24 shows moderate blood (30-50 RBCs on micro), moderate leukocytes (30-50 on micro), 1+ protein, 1+ ketones, and increased specific gravity; not specific, but c/w lupus nephritis    U protein:Cr elevated  BUN stable  Cr stable  Level 4 CMO    Diabetes mellitus (HCC)-resolved as of 3/16/2024  Assessment & Plan  Lab Results   Component Value Date    HGBA1C 5.9 (H) 03/03/2024       Recent Labs     03/15/24  0756 03/15/24  0957 03/15/24  1235 03/15/24  1434   POCGLU 156* 154* 162* 161*         Blood Sugar Average: Last 72 hrs:  (P) [id number]    Level 4 CMO    D/C D5W and insulin gtt      Sacral wound-resolved as of 3/16/2024  Assessment & Plan  Repositioning and prn medications for patient comfort    Acute deep vein thrombosis (DVT) of both lower extremities (HCC)-resolved as of 3/16/2024  Assessment & Plan  Discovered 2/20/24, started on Eliquis at home, started Hep gtt at [facility] but developed GI bleed, AC held for recurrent, active LGIB  S/p IVC filter with IR  3/7/24  Small non-occlusive PEs seen on CTA of 3/14/24    Dysphagia-resolved as of 3/16/2024  Assessment & Plan  Failed bedside swallow with nursing  Speech eval on 3/8: Cough present w/ successive sips of thin. Intermittently followed verbal commands for slow rate/small sips. Benefits from verbal cues to keep head at midline. Continue clear liquids , medications crushed, Slow rate, Small sips, cue to keep head at midline    Level 4 CMO  Regular Diet -- Pleasure feed    Punctate ulcers of labia and medial thighs-resolved as of 3/16/2024  Assessment & Plan  Labia erythematous with scattered ulcerations. Scattered ulcerations with pink and yellow tissue extending from radha-rectal area to medial thighs.    Suspect CMV based on colon Bx    Level 4 CMO  D/C ganciclovir, started 3/14/24    Hypernatremia-resolved as of 3/16/2024  Assessment & Plan  Na 148, increased from 147  Suspect d/t loop diuretic, Lasix gtt held after IV contrast    Level 4 CMO  Will not resume lasix gtt    Severe protein-calorie malnutrition (HCC)-resolved as of 3/16/2024  Assessment & Plan  Malnutrition Findings:   Adult Malnutrition type: Acute illness  Adult Degree of Malnutrition: Other severe protein calorie malnutrition  Malnutrition Characteristics: Fluid accumulation, Inadequate energy                  360 Statement: Severe calorie-protein malnutrition in context to acute illness r/t GI bleed, dysphagia, inadequate PO intake as evidance by energy intake less than 50% compared to estimated needs >5 days, +2 generalized edema; Treated with TBD (diet +oral supplements vs nutrition support)    BMI Findings:           Body mass index is 31.64 kg/m².      Level 4 CMO  Pleasure feed    Acute metabolic encephalopathy-resolved as of 3/16/2024  Assessment & Plan  Rapid response called for unresponsive patient on morning of 3/11/24. Earlier that morning, patient described as confused and unable to answer orientation questions, which had been consistent  throughout this admission.  Etiology unclear.   At time of rapid response, findings significant for Hgb 6.7 in the setting of acute GIB and hypoxemia on iSTAT. Vitals were normal and other lab values were unremarkable.    TSH wnl at 1.329, free T4 low at 0.38    Pt mentation improved in ICU, though remained disoriented, with waxing and waning mood and level of attention and engagement    Thrombocytopenia (HCC)-resolved as of 3/16/2024  Assessment & Plan  Possibly related to hepatic congestion vs medication vs sepsis/bacteremia plus bleeding  3/10 Peripheral blood smear shows normochromic normocytic anemia with nRBCs and polychromasia, no schistocytes, mild thrombocytopenia, mild leukocytosis with neutrophilia.  3/13 Repeat smear positive for schistocytes, c/w hemolysis. Ddx includes DIC, SLE, and 2/2 transfusion   Monitor CBC closely  Transfuse PLT if <20, <50 if actively bleeding  Transfused plt 2 U this AM    Anasarca-resolved as of 3/16/2024  Assessment & Plan  Patient with significant anasarca likely component of iatrogenic fluid overload, hypoalbuminemia, oliguria  Preserved EF on echocardiogram  Level 4 CMO    New onset a-fib (HCC)-resolved as of 3/16/2024  Assessment & Plan  Recurrence of Afib morning of 3/15, converted to NSR following administration of p.o. diltiazem.      Hypokalemia-resolved as of 3/16/2024  Assessment & Plan  K 3.7  Mg 1.7    Gave Mg 2 g IV   Gave K 40 mg IV  Level 4 CMO  D/C lab monitoring    CKD (chronic kidney disease), stage II-resolved as of 3/16/2024  Assessment & Plan  Lab Results   Component Value Date    EGFR 45 03/15/2024    EGFR 49 03/14/2024    EGFR 45 03/14/2024    CREATININE 1.14 03/15/2024    CREATININE 1.07 03/14/2024    CREATININE 1.15 03/14/2024       GI bleed-resolved as of 3/16/2024  Assessment & Plan  Persitent LGIB  Patient was initially on heparin due to acute DVT  3/6 flexible sigmoidoscopy revealing ulcerated rectal mucosa, status post cauterization  Repeat Flex  sig w GI on 3/11: multiple ulcers throughout colon, biopsies taken; one oozing ulcer in transverse colon, clipped with hemostasis; severe diverticula; prolapsed rectum; rectal ulcers.  Colonoscopy 3/13: Multiple ulcers in the IC valve, cecum, ascending colon, hepatic flexure and transverse colon; Ulcer in the hepatic flexure with oozing hemorrhage; placed 2 clips successfully; hemostasis achieved  Pathology on colon biopsy shows rare cells positive for CMV  3/15: Pt bleeding overnight in early AM hours with large drop in Hgb, transfused 2 U pRBC and 1 U FFP and GI updated. Added transfusion of 1 U FFP, 1 U cryo, 2 U plt. CT bleeding scan showed active bleed in R colon near hepatic flexure.   Taken to IR 3/15 for possible embolization, however unable to identify a source of bleeding, and so no intervention available.   Had planned for rapid bowel prep and Hgb monitoring to assess whether ongoing acute bleed, however pt abruptly desatted, followed by bradycardia and hypotension-->gave epi followed by atropine-->Pt asystolic for 5-10 seconds, chest compressions were not initiated, as pt was level 2 DNR-->pt had ROSC, with resultant improvement in HR and BP. Family was present at bedside during this episode, and subsequently elected to withdraw medical care and make patient CMO.    Level 4 - Comfort measures only             Disposition: Med Surg    ICU Core Measures     A: Assess, Prevent, and Manage Pain Has pain been assessed? Yes  Need for changes to pain regimen? Yes   B: Both SAT/SAT  N/A   C: Choice of Sedation RASS Goal: 0 Alert and Calm  Need for changes to sedation or analgesia regimen? No   D: Delirium CAM-ICU: Unable to perform secondary to Acute cognitive dysfunction   E: Early Mobility  Plan for early mobility? No   F: Family Engagement Plan for family engagement today? Yes       Review of Invasive Devices:    [place] Plan: end of life care  Central access plan:  iv access during end of life care        Prophylaxis:  VTE VTE covered by:    None       Stress Ulcer  not ordered         Significant 24hr Events     24hr events:   - Transitioned to comfort care after hypoxia and PEA arrest. Possibly related to hypercoagulable state  - Remains surrounded by family and friends, comfortable and somnolent on palliative meds      Subjective   Review of Systems   Unable to perform ROS: Mental status change      Objective                            Vitals I/O      Most Recent Min/Max in 24hrs   Temp 98.6 °F (37 °C) Temp  Min: 94.6 °F (34.8 °C)  Max: 98.6 °F (37 °C)   Pulse 68 Pulse  Min: 58  Max: 128   Resp (!) 10 Resp  Min: 7  Max: 34   /61 BP  Min: 68/40  Max: 216/86   O2 Sat (!) 85 % SpO2  Min: 5 %  Max: 100 %      Intake/Output Summary (Last 24 hours) at 3/16/2024 0451  Last data filed at 3/15/2024 2301  Gross per 24 hour   Intake 7439.46 ml   Output 4775 ml   Net 2664.46 ml       Diet Regular; Pleasure Feed    Invasive Monitoring           Physical Exam   Physical Exam  Vitals and nursing note reviewed.   Eyes:      General: Vision grossly intact.      Pupils: Pupils are equal, round, and reactive to light.   Skin:     General: Skin is warm.   HENT:      Head: Normocephalic and atraumatic.      Right Ear: Tympanic membrane normal.      Left Ear: Tympanic membrane normal.      Mouth/Throat:      Mouth: Mucous membranes are dry.   Neck:   no midline tenderness Cardiovascular:      Rate and Rhythm: Normal rate and regular rhythm.      Pulses: Decreased pulses.      Heart sounds: Normal heart sounds.   Musculoskeletal:         General: Normal range of motion.      Right lower leg: No edema.      Left lower leg: No edema.   Abdominal: General: Bowel sounds are normal. There is no distension.      Palpations: Abdomen is soft.      Tenderness: There is no abdominal tenderness.   Constitutional:       General: She is not in acute distress.     Appearance: She is well-developed and well-nourished. She is ill-appearing.       Interventions: She is not sedated and not restrained.     Comments: Acute on chronically ill appearing   Somnolent- sleeping with mouth wide open taking large TV and then breath stacking    Pulmonary:      Effort: Pulmonary effort is normal.      Breath sounds: Normal breath sounds.   Neurological:      Mental Status: She is somnolent. She is CAM ICU negative.      Motor: Strength full and intact in all extremities.            Diagnostic Studies      EKG: No events on telemetry   Imaging: No new imaging, I have personally reviewed pertinent reports.       Medications:  Scheduled PRN      haloperidol lactate, 0.5 mg, Q2H PRN  HYDROmorphone, 0.2 mg, Q30 Min PRN  LORazepam, 0.5 mg, Q10 Min PRN  ondansetron, 4 mg, Q4H PRN       Continuous          Labs:    CBC    Recent Labs     03/15/24  0719 03/15/24  1316   WBC 7.35 7.49   HGB 10.2* 7.1*   HCT 29.0* 20.0*   PLT 92* 182     BMP    Recent Labs     03/14/24  1804 03/15/24  0456   SODIUM 146 148*   K 3.5 3.7   * 118*   CO2 22 19*   AGAP 8 11   BUN 24 25   CREATININE 1.07 1.14   CALCIUM 8.3* 7.2*       Coags    Recent Labs     03/14/24  1128 03/15/24  0456   INR 1.22* 1.39*   PTT 30 32        Additional Electrolytes  Recent Labs     03/14/24  0646 03/15/24  0456   MG  --  1.7*   PHOS  --  4.6*   CAIONIZED 1.17 1.06*          Blood Gas    Recent Labs     03/15/24  1009   PHART 7.367   OYH0WCO 40.1   PO2ART 114.9   [id number] 22.5   BEART -2.6   SOURCE Line, Arterial     Recent Labs     03/15/24  1009   SOURCE Line, Arterial    LFTs  Recent Labs     03/15/24  0456   ALT 99*   AST 53*   ALKPHOS 123*   ALB 2.3*   TBILI 1.63*       Infectious  No recent results  Glucose  Recent Labs     03/14/24  1128 03/14/24  1804 03/15/24  0456   GLUC 134 90 164*               Sanam Epperson PA-C

## 2024-03-17 PROBLEM — D73.5 SPLENIC INFARCT: Status: ACTIVE | Noted: 2024-03-17

## 2024-03-17 PROBLEM — I48.0 PAROXYSMAL ATRIAL FIBRILLATION (HCC): Status: ACTIVE | Noted: 2024-01-01

## 2024-03-17 PROBLEM — J69.0 ASPIRATION PNEUMONIA (HCC): Status: ACTIVE | Noted: 2024-01-01

## 2024-03-17 PROBLEM — K76.9 LIVER LESION: Status: ACTIVE | Noted: 2024-01-01

## 2024-03-17 PROBLEM — G93.40 ENCEPHALOPATHY ACUTE: Status: ACTIVE | Noted: 2024-03-17

## 2024-03-17 PROBLEM — D62 ACUTE BLOOD LOSS ANEMIA: Status: ACTIVE | Noted: 2024-01-01

## 2024-03-17 NOTE — PROGRESS NOTES
[facility]  Progress Note  Name: Darlyn Nguyen I  MRN: [id number]  Unit/Bed#: ICU 02 I Date of Admission: 3/3/2024   Date of Service: 3/17/2024 I Hospital Day: 14    Assessment/Plan   * Lower GI bleed  Assessment & Plan  Persitent LGIB  Patient was initially on heparin due to acute DVT  3/6 flexible sigmoidoscopy revealing ulcerated rectal mucosa, status post cauterization  Repeat Flex sig w GI on 3/11: multiple ulcers throughout colon, biopsies taken; one oozing ulcer in transverse colon, clipped with hemostasis; severe diverticula; prolapsed rectum; rectal ulcers.  Colonoscopy 3/13: Multiple ulcers in the IC valve, cecum, ascending colon, hepatic flexure and transverse colon; Ulcer in the hepatic flexure with oozing hemorrhage; placed 2 clips successfully; hemostasis achieved  3/15: Pt bleeding overnight in early AM hours with large drop in Hgb, transfused 2 U pRBC and 1 U FFP and GI updated. Added transfusion of 1 U FFP, 1 U cryo, 2 U plt. CT bleeding scan showed active bleed in R colon near hepatic flexure.   Pathology on colon biopsy shows rare cells positive for CMV    Ddx includes autoimmune/SLE vasculitis, ischemic vasculitis, microvascular thrombosis  Taken to IR 3/15 for embolization, however unable to identify active bleed    -Continue rituximab Qweekly, started 3/16/24  -Continue ganciclovir, started 3/14/24 for CMV  -Total blood products this admission: RBC x14, FFP x5, plt x3, cryo x3  -Monitor Hgb q24h  -Level of care 3, no intubation or resuscitation, no aggressive interventions including hemodialysis or colonoscopy or any other intervention if she starts bleeding again or she gets worse. Will discuss other supportive measures if not very invasive at the time.     Lupus nephritis (HCC)  Assessment & Plan  Biopsy-proven at [facility]    Persistent oliguria, with good UOP to Lasix gtt    UA 3/12/24 shows moderate blood (30-50 RBCs on micro), moderate leukocytes (30-50 on micro), 1+  protein, 1+ ketones, and increased specific gravity; not specific, but c/w lupus nephritis    U protein:Cr elevated  BUN stable  Cr stable  Restart Lasix gtt @ 10 ml/h    Lupus (HCC)  Assessment & Plan  Diagnosed in December,   Initially started on steroids, prednisone 60 mg daily, which patient had tapered to 30 mg daily prior to admission with the addition of atovaquone, hydroxychloroquine, and mycophenolate    Holding atovaquone, hydroxychloroquine, and mycophenolate in the setting of acute infection    random cortisol 10.9 collected 3/11     Solumedrol 1 g QD, day 5 of 5  Rituximab qweekly, first dose 3/16/24  U protien:Cr elevated  Anti-ds DNA Ab negative  Rheumatology on board, appreciate recommendations    Severe protein-calorie malnutrition (HCC)  Assessment & Plan  Malnutrition Findings:   Adult Malnutrition type: Acute illness  Adult Degree of Malnutrition: Other severe protein calorie malnutrition  Malnutrition Characteristics: Fluid accumulation, Inadequate energy                  360 Statement: Severe calorie-protein malnutrition in context to acute illness r/t GI bleed, dysphagia, inadequate PO intake as evidance by energy intake less than 50% compared to estimated needs >5 days, +2 generalized edema; Treated with TBD (diet +oral supplements vs nutrition support)    BMI Findings:           Body mass index is 31.64 kg/m².     Place NGT and restart TF w Vital AF 1.2 with goal rate 50 ml/h,  ml Q4H  D/C D5W once TF resumed    Anasarca  Assessment & Plan  +14 liters this admission  Oliguric UOP when not on diuretic  Profound pitting edema in the dependent soft tissues  Small skin tears of the BUE weeping serous fluid  Restart Lasix gtt 10 mg/h  Can add albumin 25/25 q6h if not diuresing adequately or concerned for intravascular volume depletion  Monitor renal indices while diuresing    Hypernatremia  Assessment & Plan  Na minimally elevated to 148 this morning  Increase D5W from 30 ml/h to 100  ml/h  Place NGT and start FWF @400 ml q4h  D/C D5W once starting TF and FWF  Check BMP in AM    Enterococcal bacteremia  Assessment & Plan  Repeat cultures negative  Unasyn x 6 week course    Paroxysmal atrial fibrillation (HCC)  Assessment & Plan  Pt noted to be in SVT on 3/6/24. EKG showing afib. Converted on diltiazem IV, transitioned to cardizem PO.    Echo 2/20/24 unremarkable--showed grade 1 diastolic dysfunction and mild mitral regurg.    Chads-vasc=7 (high risk for CVA)  Has-bled=5 (high risk for significant bleeding event)    Continue diltiazem 30 mg PO Q6H  Will need shared decision making with patient/family regarding long-term anticoagulation plan    Type 2 diabetes mellitus (HCC)  Assessment & Plan  Lab Results   Component Value Date    HGBA1C 5.9 (H) 03/03/2024       Recent Labs     03/16/24  2354 03/17/24  0157 03/17/24  0551 03/17/24  1213   POCGLU 167* 170* 188* 167*       Blood Sugar Average: Last 72 hrs:  (P) [id number]    Insulin gtt discontinued due to minimal requirements  Continue SSI  Starting Tube Feeds, so will monitor BG and insulin requirements and adjust regimen accordingly    Encephalopathy acute  Assessment & Plan  Unclear etiology, consider acute delirium in the setting of critical illness and prolonged hospitalization on chronic cognitive decline    Pressure ulcers of skin of multiple topographic sites  Assessment & Plan  Wound Care following, appreciate recommendations  Continue wound care and repositioning with nursing    Liver lesion  Assessment & Plan  Noted on CT of 3/15/24, new since  2/29/24   Most likely d/t infarcts or embolic phenomenon     Splenic infarct  Assessment & Plan  Suspect septic emboli in the setting of GPC bacteremia    Aspiration pneumonia (HCC)  Assessment & Plan  Cavitary lesion of RUL, appearance most c/w aspiration pneumonia, for which pt has been adequately covered by Unasyn for her enterococcal bacteremia    Thrombocytopenia (HCC)  Assessment &  Plan  Stable  Suspect autoimmune process secondary to lupus and consumption from GI bleed, possible DIC    DVT (deep venous thrombosis) (HCC)  Assessment & Plan  Discovered 24, started on Eliquis at home, started Hep gtt at [facility] but developed GI bleed, AC currently on hold     S/p IVC filter with IR 3/7/24  If/when LGIB resolves and hgb stabilizes, will eventually need trial on hep gtt prior to transition to oral agent.  Eventual retrieval of IVC       ----------------------------------------------------------------------------------------  HPI/24hr events: No acute events. First dose of rituximab yesterday. Discontinued insulin gtt and started SSI. Small brown streak BM.    Patient appropriate for transfer out of the ICU today?: No  Disposition: Continue Critical Care   Code Status: Level 3 - DNAR and DNI  ---------------------------------------------------------------------------------------  SUBJECTIVE  This morning, Ms. Nguyen is seen sitting up in bed, awake, alert, engaged with exam, in no acute distress, disoriented. Denies any complaints, including subjective SOB or difficulty breathing, or pain, including pain in the abdomen, but pt is poor historian.     Review of Systems  Review of systems was unable to be performed secondary to encephalopathy  ---------------------------------------------------------------------------------------  OBJECTIVE    Vitals   Vitals:    24 1331 24 1400 24 1431 24 1501   BP: 159/85 149/76 150/72 (!) 173/92   Pulse: 80 84 94 98   Resp:    Temp:       TempSrc:       SpO2: 98% 100% 96% 99%   Weight:       Height:         Temp (24hrs), Av.4 °F (35.8 °C), Min:94.3 °F (34.6 °C), Max:97.8 °F (36.6 °C)  Current: Temperature: 97.8 °F (36.6 °C)  Arterial Line BP: 202/70  Arterial Line MAP (mmHg): (!) 4 mmHg    Respiratory:  SpO2: SpO2: 99 %, SpO2 Activity: SpO2 Activity: At Rest, SpO2 Device: O2 Device: Nasal cannula, Capnography: Capnography:  No  Nasal Cannula O2 Flow Rate (L/min): 6 L/min    Invasive/non-invasive ventilation settings   Respiratory      Lab Data (Last 4 hours)      None           O2/Vent Data (Last 4 hours)      None                    Physical Exam  Vitals and nursing note reviewed.   Constitutional:       General: She is not in acute distress.     Appearance: She is obese. She is ill-appearing.   HENT:      Head: Normocephalic and atraumatic.   Eyes:      General: No scleral icterus.        Right eye: No discharge.         Left eye: No discharge.      Conjunctiva/sclera: Conjunctivae normal.   Cardiovascular:      Rate and Rhythm: Normal rate and regular rhythm.      Pulses: Normal pulses.      Heart sounds: Normal heart sounds. No murmur heard.     No friction rub. No gallop.   Pulmonary:      Breath sounds: Wheezing and rhonchi present.      Comments: Coarse breath sounds  Abdominal:      General: There is no distension.      Palpations: Abdomen is soft.      Tenderness: There is no abdominal tenderness. There is no guarding or rebound.      Comments: Hypoactive bowel sounds   Musculoskeletal:         General: Swelling present. No tenderness.      Right lower leg: Edema present.      Left lower leg: Edema present.   Skin:     General: Skin is warm and dry.      Findings: Bruising and lesion (skin tears of the BUE weeping serous fluid. labial ulcerations.) present.   Neurological:      Mental Status: She is alert. She is disoriented.      Comments: Oriented to self and family, disoriented to place and time             Laboratory and Diagnostics:  Results from last 7 days   Lab Units 03/17/24  0437 03/16/24 2015 03/16/24  1244 03/15/24  1316 03/15/24  0719 03/15/24  0456 03/15/24  0052 03/14/24  1128 03/14/24  0432 03/13/24  1203 03/13/24  0351 03/12/24  2203 03/11/24  0933 03/11/24  0304   WBC Thousand/uL 13.25* 12.11* 11.46* 7.49 7.35 9.22  --   --  7.35   < > 9.47 11.99*   < > 13.21*   HEMOGLOBIN g/dL 9.2* 8.8* 9.3* 7.1* 10.2* 12.7  10.4*   < > 12.4   < > 6.8* 7.9*   < > 8.8*   I STAT HEMOGLOBIN   --   --   --   --   --   --   --   --   --   --   --   --    < >  --    HEMATOCRIT % 27.8* 26.1* 27.2* 20.0* 29.0* 35.3  --   --  35.3   < > 20.5* 23.8*   < > 26.4*   HEMATOCRIT, ISTAT   --   --   --   --   --   --   --   --   --   --   --   --    < >  --    PLATELETS Thousands/uL 123* 118* 138* 182 92* 44*  --   --  68*   < > 43* 54*   < > 66*   NEUTROS PCT % 94* 90*  --   --   --   --   --   --  88*  --   --  83*  --  83*   MONOS PCT % 3* 4  --   --   --   --   --   --  5  --   --  4  --  3*   MONO PCT %  --   --  2*  --   --   --   --   --   --   --  3*  --   --   --    EOS PCT % 0 0 0  --   --   --   --   --  0  --  0 0  --  0    < > = values in this interval not displayed.     Results from last 7 days   Lab Units 03/17/24  0437 03/16/24 2015 03/16/24  1244 03/15/24  0456 03/14/24  1804 03/14/24  1128 03/14/24  0432 03/13/24  0610 03/13/24  0351 03/11/24  1706 03/11/24  1201 03/11/24  1055 03/11/24  0304   SODIUM mmol/L 148* 147 149* 148* 146 145 147   < >  --    < > 148*  --  146   POTASSIUM mmol/L 3.8 4.2 3.1* 3.7 3.5 4.0 3.4*   < >  --    < > 3.8  --  3.2*   CHLORIDE mmol/L 117* 117* 115* 118* 116* 116* 115*   < >  --    < > 117*  --  114*   CO2 mmol/L 24 24 23 19* 22 22 21   < >  --    < > 22  --  26   CO2, I-STAT   --   --   --   --   --   --   --   --   --    < >  --    < >  --    ANION GAP mmol/L 7 6 11 11 8 7 11   < >  --    < > 9  --  6   BUN mg/dL 23 22 23 25 24 23 24   < >  --    < > 25  --  24   CREATININE mg/dL 1.01 1.02 1.04 1.14 1.07 1.15 1.19   < >  --    < > 0.98  --  0.86   CALCIUM mg/dL 8.5 8.4 9.0 7.2* 8.3* 8.1* 8.2*   < >  --    < > 7.4*  --  7.5*   GLUCOSE RANDOM mg/dL 179* 191* 174* 164* 90 134 135   < >  --    < > 275*  --  265*   ALT U/L  --   --  79* 99*  --   --  157*  --  385*  --  28  --  30   AST U/L  --   --  22 53*  --   --  174*  --  515*  --  16  --  14   ALK PHOS U/L  --   --  118* 123*  --   --  194*  --  60   "--  53  --  55   ALBUMIN g/dL  --   --  2.8* 2.3*  --   --  3.6  --  2.9*  --  2.1*  --  2.3*   TOTAL BILIRUBIN mg/dL  --   --  0.92 1.63*  --   --  2.95*  --  0.82  --  0.67  --  0.64    < > = values in this interval not displayed.     Results from last 7 days   Lab Units 03/17/24  0437 03/15/24  0456 03/14/24  0432 03/13/24  0610 03/13/24  0351 03/12/24  0436   MAGNESIUM mg/dL 2.3 1.7* 1.9  --  1.8* 1.9   PHOSPHORUS mg/dL  --  4.6* 3.5 4.0 3.9 4.4*      Results from last 7 days   Lab Units 03/16/24  2015 03/15/24  0456 03/14/24  1128 03/13/24  0610 03/13/24  0425 03/12/24  0436 03/11/24  1201   INR  1.00 1.39* 1.22*  --  1.65* 1.25* 1.33*   PTT seconds 27 32 30 40*  --   --   --           Results from last 7 days   Lab Units 03/12/24  0945 03/12/24  0540   LACTIC ACID mmol/L 2.3* 3.0*     ABG:  Results from last 7 days   Lab Units 03/15/24  1009   PH ART  7.367   PCO2 ART mm Hg 40.1   PO2 ART mm Hg 114.9   HCO3 ART mmol/L 22.5   BASE EXC ART mmol/L -2.6   ABG SOURCE  Line, Arterial     VBG:  Results from last 7 days   Lab Units 03/16/24  1244 03/15/24  1009   PH RADHA  7.295*  --    PCO2 RADHA mm Hg 41.7*  --    PO2 RADHA mm Hg 80.2*  --    HCO3 RADHA mmol/L 19.8*  --    BASE EXC RADHA mmol/L -6.3  --    ABG SOURCE   --  Line, Arterial     Results from last 7 days   Lab Units 03/11/24  0304   PROCALCITONIN ng/ml 0.30*       Micro        EKG: tele: NSR 86. One episode of tachycardia yesterday afternoon, self-resolved.  Imaging:  No new imaging    Intake and Output  I/O         03/15 0701  03/16 0700 03/16 0701  03/17 0700 03/17 0701 03/18 0700    P.O. 0      I.V. (mL/kg) 1283.1 (15.3) 1038.8 (12.4)     Blood 958.3      NG/GT 2000      IV Piggyback 100 900     Total Intake(mL/kg) 4341.4 (51.9) 1938.8 (23.2)     Urine (mL/kg/hr) 1250 (0.6) 655 (0.3)     Stool 2700      Total Output 3950 655     Net +391.4 +1283.8                    Height and Weights   Height: 5' 4\" (162.6 cm)  IBW (Ideal Body Weight): 54.7 kg  Body mass " index is 31.64 kg/m².  Weight (last 2 days)       Date/Time Weight    03/16/24 1606 83.6 (184.3)              Nutrition       Diet Orders   (From admission, onward)                 Start     Ordered    03/17/24 1351  Diet Enteral/Parenteral; Tube Feeding No Oral Diet; Vital AF 1.2; Continuous; 50; 400; Water; Every 4 hours  Diet effective now        References:    Adult Nutrition Support Algorithm    RD Therapeutic Diet Order Protocol   Question Answer Comment   Diet Type Enteral/Parenteral    Enteral/Parenteral Tube Feeding No Oral Diet    Tube Feeding Formula: Vital AF 1.2    Bolus/Cyclic/Continuous Continuous    Tube Feeding Goal Rate (mL/hr): 50    Tube Feeding flush (mL): 400    Water Flush type: Water    Water flush frequency: Every 4 hours    RD to adjust diet per protocol? Yes        03/17/24 1351                      Active Medications  Scheduled Meds:  Current Facility-Administered Medications   Medication Dose Route Frequency Provider Last Rate    acetaminophen  1,000 mg Intravenous Q6H PRN Sanam Epperson PA-C      ampicillin-sulbactam  3 g Intravenous Q6H AUGUSTA Ibarra 3 g (03/17/24 1513)    furosemide  10 mg/hr Intravenous Continuous Uche Blair MD 10 mg/hr (03/17/24 1100)    ganciclovir  2.5 mg/kg (Adjusted) Intravenous Q24H Az Ashley  mg (03/16/24 1809)    insulin lispro  1-6 Units Subcutaneous Q6H CRUZITO Epperson PA-C      methylPREDNISolone sodium succinate  1,000 mg Intravenous Q24H AUGUSTA Ibarra 1,000 mg (03/16/24 1830)    [START ON 3/18/2024] methylPREDNISolone sodium succinate  50 mg Intravenous Daily Uche Blair MD      ondansetron  4 mg Intravenous Q4H PRN AUGUSTA Barber       Continuous Infusions:  furosemide, 10 mg/hr, Last Rate: 10 mg/hr (03/17/24 1100)      PRN Meds:   acetaminophen, 1,000 mg, Q6H PRN  ondansetron, 4 mg, Q4H PRN        Invasive Devices Review  Invasive Devices       Peripherally Inserted Central Catheter Line   "Duration             PICC Line 03/08/24 Right Basilic 9 days              Central Venous Catheter Line  Duration             CVC Central Lines 03/11/24 Triple 20cm 5 days              Drain  Duration             Urethral Catheter Latex;Straight-tip 16 Fr. 16 days    NG/OG/Enteral Tube 18 Fr Left nare <1 day                    Rationale for remaining devices: PICC for long-term abx. Consider discontinuing CVC, pending goal for medical interventions in the event of clinical decline. NGT for initiation of TF and FWF. Peralta catheter for wound mitigation, accurate I/Os.  ---------------------------------------------------------------------------------------  Advance Directive and Living Will: Yes    Power of :    POLST:    ---------------------------------------------------------------------------------------      Uche Blair MD      Portions of the record may have been created with voice recognition software.  Occasional wrong word or \"sound a like\" substitutions may have occurred due to the inherent limitations of voice recognition software.  Read the chart carefully and recognize, using context, where substitutions have occurred   "

## 2024-03-17 NOTE — ASSESSMENT & PLAN NOTE
Discovered 2/20/24, started on Eliquis at home, started Hep gtt at [facility] but developed GI bleed, AC currently on hold     S/p IVC filter with IR 3/7/24  If/when LGIB resolves and hgb stabilizes, will eventually need trial on hep gtt prior to transition to oral agent.  Eventual retrieval of IVC

## 2024-03-17 NOTE — ASSESSMENT & PLAN NOTE
Na minimally elevated to 148 this morning  Increase D5W from 30 ml/h to 100 ml/h  Place NGT and start FWF @400 ml q4h  D/C D5W once starting TF and FWF  Check BMP in AM

## 2024-03-17 NOTE — ASSESSMENT & PLAN NOTE
Pt noted to be in SVT on 3/6/24. EKG showing afib. Converted on diltiazem IV, transitioned to cardizem PO.    Echo 2/20/24 unremarkable--showed grade 1 diastolic dysfunction and mild mitral regurg.    Chads-vasc=7 (high risk for CVA)  Has-bled=5 (high risk for significant bleeding event)    Continue diltiazem 30 mg PO Q6H  Will need shared decision making with patient/family regarding long-term anticoagulation plan

## 2024-03-17 NOTE — ASSESSMENT & PLAN NOTE
Persitent LGIB  Patient was initially on heparin due to acute DVT  3/6 flexible sigmoidoscopy revealing ulcerated rectal mucosa, status post cauterization  Repeat Flex sig w GI on 3/11: multiple ulcers throughout colon, biopsies taken; one oozing ulcer in transverse colon, clipped with hemostasis; severe diverticula; prolapsed rectum; rectal ulcers.  Colonoscopy 3/13: Multiple ulcers in the IC valve, cecum, ascending colon, hepatic flexure and transverse colon; Ulcer in the hepatic flexure with oozing hemorrhage; placed 2 clips successfully; hemostasis achieved  3/15: Pt bleeding overnight in early AM hours with large drop in Hgb, transfused 2 U pRBC and 1 U FFP and GI updated. Added transfusion of 1 U FFP, 1 U cryo, 2 U plt. CT bleeding scan showed active bleed in R colon near hepatic flexure.   Pathology on colon biopsy shows rare cells positive for CMV    Ddx includes autoimmune/SLE vasculitis, ischemic vasculitis, microvascular thrombosis  Taken to IR 3/15 for embolization, however unable to identify active bleed    -Continue rituximab Qweekly, started 3/16/24  -Continue ganciclovir, started 3/14/24 for CMV  -Total blood products this admission: RBC x14, FFP x5, plt x3, cryo x3  -Monitor Hgb q24h  -Level of care 3, no intubation or resuscitation, no aggressive interventions including hemodialysis or colonoscopy or any other intervention if she starts bleeding again or she gets worse. Will discuss other supportive measures if not very invasive at the time.

## 2024-03-17 NOTE — ASSESSMENT & PLAN NOTE
Unclear etiology, consider acute delirium in the setting of critical illness and prolonged hospitalization on chronic cognitive decline

## 2024-03-17 NOTE — ASSESSMENT & PLAN NOTE
+14 liters this admission  Oliguric UOP when not on diuretic  Profound pitting edema in the dependent soft tissues  Small skin tears of the BUE weeping serous fluid  Restart Lasix gtt 10 mg/h  Can add albumin 25/25 q6h if not diuresing adequately or concerned for intravascular volume depletion  Monitor renal indices while diuresing

## 2024-03-17 NOTE — ASSESSMENT & PLAN NOTE
Diagnosed in December,   Initially started on steroids, prednisone 60 mg daily, which patient had tapered to 30 mg daily prior to admission with the addition of atovaquone, hydroxychloroquine, and mycophenolate    Holding atovaquone, hydroxychloroquine, and mycophenolate in the setting of acute infection    random cortisol 10.9 collected 3/11     Solumedrol 1 g QD, day 5 of 5  Rituximab qweekly, first dose 3/16/24  U protien:Cr elevated  Anti-ds DNA Ab negative  Rheumatology on board, appreciate recommendations

## 2024-03-17 NOTE — ASSESSMENT & PLAN NOTE
Biopsy-proven at [facility]    Persistent oliguria, with good UOP to Lasix gtt    UA 3/12/24 shows moderate blood (30-50 RBCs on micro), moderate leukocytes (30-50 on micro), 1+ protein, 1+ ketones, and increased specific gravity; not specific, but c/w lupus nephritis    U protein:Cr elevated  BUN stable  Cr stable  Restart Lasix gtt @ 10 ml/h

## 2024-03-17 NOTE — ASSESSMENT & PLAN NOTE
Malnutrition Findings:   Adult Malnutrition type: Acute illness  Adult Degree of Malnutrition: Other severe protein calorie malnutrition  Malnutrition Characteristics: Fluid accumulation, Inadequate energy                  360 Statement: Severe calorie-protein malnutrition in context to acute illness r/t GI bleed, dysphagia, inadequate PO intake as evidance by energy intake less than 50% compared to estimated needs >5 days, +2 generalized edema; Treated with TBD (diet +oral supplements vs nutrition support)    BMI Findings:           Body mass index is 31.64 kg/m².     Place NGT and restart TF w Vital AF 1.2 with goal rate 50 ml/h,  ml Q4H  D/C D5W once TF resumed

## 2024-03-17 NOTE — ASSESSMENT & PLAN NOTE
Lab Results   Component Value Date    HGBA1C 5.9 (H) 03/03/2024       Recent Labs     03/16/24  2354 03/17/24  0157 03/17/24  0551 03/17/24  1213   POCGLU 167* 170* 188* 167*       Blood Sugar Average: Last 72 hrs:  (P) [id number]    Insulin gtt discontinued due to minimal requirements  Continue SSI  Starting Tube Feeds, so will monitor BG and insulin requirements and adjust regimen accordingly

## 2024-03-17 NOTE — PLAN OF CARE
Problem: Prexisting or High Potential for Compromised Skin Integrity  Goal: Skin integrity is maintained or improved  Description: INTERVENTIONS:  - Identify patients at risk for skin breakdown  - Assess and monitor skin integrity  - Assess and monitor nutrition and hydration status  - Monitor labs   - Assess for incontinence   - Turn and reposition patient  - Assist with mobility/ambulation  - Relieve pressure over bony prominences  - Avoid friction and shearing  - Provide appropriate hygiene as needed including keeping skin clean and dry  - Evaluate need for skin moisturizer/barrier cream  - Collaborate with interdisciplinary team   - Patient/family teaching  - Consider wound care consult   Outcome: Progressing     Problem: SAFETY ADULT  Goal: Patient will remain free of falls  Description: INTERVENTIONS:  - Educate patient/family on patient safety including physical limitations  - Instruct patient to call for assistance with activity   - Consult OT/PT to assist with strengthening/mobility   - Keep Call bell within reach  - Keep bed low and locked with side rails adjusted as appropriate  - Keep care items and personal belongings within reach  - Initiate and maintain comfort rounds  - Make Fall Risk Sign visible to staff  - Offer Toileting every 2 Hours, in advance of need  - Initiate/Maintain bed alarm  - Obtain necessary fall risk management equipment  - Apply yellow socks and bracelet for high fall risk patients  - Consider moving patient to room near nurses station  Outcome: Progressing  Goal: Maintain or return to baseline ADL function  Description: INTERVENTIONS:  -  Assess patient's ability to carry out ADLs; assess patient's baseline for ADL function and identify physical deficits which impact ability to perform ADLs (bathing, care of mouth/teeth, toileting, grooming, dressing, etc.)  - Assess/evaluate cause of self-care deficits   - Assess range of motion  - Assess patient's mobility; develop plan if  impaired  - Assess patient's need for assistive devices and provide as appropriate  - Encourage maximum independence but intervene and supervise when necessary  - Involve family in performance of ADLs  - Assess for home care needs following discharge   - Consider OT consult to assist with ADL evaluation and planning for discharge  - Provide patient education as appropriate  Outcome: Progressing  Goal: Maintains/Returns to pre admission functional level  Description: INTERVENTIONS:  - Perform AM-PAC 6 Click Basic Mobility/ Daily Activity assessment daily.  - Set and communicate daily mobility goal to care team and patient/family/caregiver.   - Collaborate with rehabilitation services on mobility goals if consulted  - Perform Range of Motion 3 times a day.  - Reposition patient every 2 hours.  - Dangle patient 3 times a day  - Stand patient 3 times a day  - Ambulate patient 3 times a day  - Out of bed to chair 3 times a day   - Out of bed for meals 3 times a day  - Out of bed for toileting  - Record patient progress and toleration of activity level   Outcome: Progressing     Problem: DISCHARGE PLANNING  Goal: Discharge to home or other facility with appropriate resources  Description: INTERVENTIONS:  - Identify barriers to discharge w/patient and caregiver  - Arrange for needed discharge resources and transportation as appropriate  - Identify discharge learning needs (meds, wound care, etc.)  - Arrange for interpretive services to assist at discharge as needed  - Refer to Case Management Department for coordinating discharge planning if the patient needs post-hospital services based on physician/advanced practitioner order or complex needs related to functional status, cognitive ability, or social support system  Outcome: Progressing     Problem: Knowledge Deficit  Goal: Patient/family/caregiver demonstrates understanding of disease process, treatment plan, medications, and discharge instructions  Description:  Complete learning assessment and assess knowledge base.  Interventions:  - Provide teaching at level of understanding  - Provide teaching via preferred learning methods  Outcome: Progressing     Problem: INFECTION - ADULT  Goal: Absence or prevention of progression during hospitalization  Description: INTERVENTIONS:  - Assess and monitor for signs and symptoms of infection  - Monitor lab/diagnostic results  - Monitor all insertion sites, i.e. indwelling lines, tubes, and drains  - Monitor endotracheal if appropriate and nasal secretions for changes in amount and color  - [place] appropriate cooling/warming therapies per order  - Administer medications as ordered  - Instruct and encourage patient and family to use good hand hygiene technique  - Identify and instruct in appropriate isolation precautions for identified infection/condition  Outcome: Progressing     Problem: SAFETY,RESTRAINT: NV/NON-SELF DESTRUCTIVE BEHAVIOR  Goal: Remains free of harm/injury (restraint for non violent/non self-detsructive behavior)  Description: INTERVENTIONS:  - Instruct patient/family regarding restraint use   - Assess and monitor physiologic and psychological status   - Provide interventions and comfort measures to meet assessed patient needs   - Identify and implement measures to help patient regain control  - Assess readiness for release of restraint   Outcome: Progressing  Goal: Returns to optimal restraint-free functioning  Description: INTERVENTIONS:  - Assess the patient's behavior and symptoms that indicate continued need for restraint  - Identify and implement measures to help patient regain control  - Assess readiness for release of restraint   Outcome: Progressing

## 2024-03-17 NOTE — ASSESSMENT & PLAN NOTE
Wound Care following, appreciate recommendations  Continue wound care and repositioning with nursing

## 2024-03-17 NOTE — ASSESSMENT & PLAN NOTE
Cavitary lesion of RUL, appearance most c/w aspiration pneumonia, for which pt has been adequately covered by Unasyn for her enterococcal bacteremia

## 2024-03-18 PROBLEM — E87.0 HYPERNATREMIA: Status: RESOLVED | Noted: 2024-01-01 | Resolved: 2024-01-01

## 2024-03-18 NOTE — ASSESSMENT & PLAN NOTE
Biopsy-proven at [facility]    UA 3/12/24 shows moderate blood (30-50 RBCs on micro), moderate leukocytes (30-50 on micro), 1+ protein, 1+ ketones, and increased specific gravity; not specific, but c/w lupus nephritis    U protein:Cr elevated  Continue to monitor I&O's, trend renal indices  Consider discontinuation of lasix gtt as patient is having adequate urine output  Continue IV solumedrol

## 2024-03-18 NOTE — PLAN OF CARE
Problem: Prexisting or High Potential for Compromised Skin Integrity  Goal: Skin integrity is maintained or improved  Description: INTERVENTIONS:  - Identify patients at risk for skin breakdown  - Assess and monitor skin integrity  - Assess and monitor nutrition and hydration status  - Monitor labs   - Assess for incontinence   - Turn and reposition patient  - Assist with mobility/ambulation  - Relieve pressure over bony prominences  - Avoid friction and shearing  - Provide appropriate hygiene as needed including keeping skin clean and dry  - Evaluate need for skin moisturizer/barrier cream  - Collaborate with interdisciplinary team   - Patient/family teaching  - Consider wound care consult   Outcome: Progressing     Problem: SAFETY ADULT  Goal: Patient will remain free of falls  Description: INTERVENTIONS:  - Educate patient/family on patient safety including physical limitations  - Instruct patient to call for assistance with activity   - Consult OT/PT to assist with strengthening/mobility   - Keep Call bell within reach  - Keep bed low and locked with side rails adjusted as appropriate  - Keep care items and personal belongings within reach  - Initiate and maintain comfort rounds  - Make Fall Risk Sign visible to staff  - Offer Toileting every 2 Hours, in advance of need  - Initiate/Maintain bed alarm  - Apply yellow socks and bracelet for high fall risk patients  - Consider moving patient to room near nurses station  Outcome: Progressing  Goal: Maintain or return to baseline ADL function  Description: INTERVENTIONS:  -  Assess patient's ability to carry out ADLs; assess patient's baseline for ADL function and identify physical deficits which impact ability to perform ADLs (bathing, care of mouth/teeth, toileting, grooming, dressing, etc.)  - Assess/evaluate cause of self-care deficits   - Assess range of motion  - Assess patient's mobility; develop plan if impaired  - Assess patient's need for assistive  devices and provide as appropriate  - Encourage maximum independence but intervene and supervise when necessary  - Involve family in performance of ADLs  - Assess for home care needs following discharge   - Consider OT consult to assist with ADL evaluation and planning for discharge  - Provide patient education as appropriate  Outcome: Progressing  Goal: Maintains/Returns to pre admission functional level  Description: INTERVENTIONS:  - Perform AM-PAC 6 Click Basic Mobility/ Daily Activity assessment daily.  - Set and communicate daily mobility goal to care team and patient/family/caregiver.   - Collaborate with rehabilitation services on mobility goals if consulted  - Perform Range of Motion 4 times a day.  - Reposition patient every 2 hours.  - Out of bed for toileting  - Record patient progress and toleration of activity level   Outcome: Progressing     Problem: DISCHARGE PLANNING  Goal: Discharge to home or other facility with appropriate resources  Description: INTERVENTIONS:  - Identify barriers to discharge w/patient and caregiver  - Arrange for needed discharge resources and transportation as appropriate  - Identify discharge learning needs (meds, wound care, etc.)  - Arrange for interpretive services to assist at discharge as needed  - Refer to Case Management Department for coordinating discharge planning if the patient needs post-hospital services based on physician/advanced practitioner order or complex needs related to functional status, cognitive ability, or social support system  Outcome: Progressing     Problem: Knowledge Deficit  Goal: Patient/family/caregiver demonstrates understanding of disease process, treatment plan, medications, and discharge instructions  Description: Complete learning assessment and assess knowledge base.  Interventions:  - Provide teaching at level of understanding  - Provide teaching via preferred learning methods  Outcome: Progressing     Problem: INFECTION - ADULT  Goal:  Absence or prevention of progression during hospitalization  Description: INTERVENTIONS:  - Assess and monitor for signs and symptoms of infection  - Monitor lab/diagnostic results  - Monitor all insertion sites, i.e. indwelling lines, tubes, and drains  - Monitor endotracheal if appropriate and nasal secretions for changes in amount and color  - [place] appropriate cooling/warming therapies per order  - Administer medications as ordered  - Instruct and encourage patient and family to use good hand hygiene technique  - Identify and instruct in appropriate isolation precautions for identified infection/condition  Outcome: Progressing     Problem: CARDIOVASCULAR - ADULT  Goal: Maintains optimal cardiac output and hemodynamic stability  Description: INTERVENTIONS:  - Monitor I/O, vital signs and rhythm  - Monitor for S/S and trends of decreased cardiac output  - Administer and titrate ordered vasoactive medications to optimize hemodynamic stability  - Assess quality of pulses, skin color and temperature  - Assess for signs of decreased coronary artery perfusion  - Instruct patient to report change in severity of symptoms  Outcome: Progressing  Goal: Absence of cardiac dysrhythmias or at baseline rhythm  Description: INTERVENTIONS:  - Continuous cardiac monitoring, vital signs, obtain 12 lead EKG if ordered  - Administer antiarrhythmic and heart rate control medications as ordered  - Monitor electrolytes and administer replacement therapy as ordered  Outcome: Progressing     Problem: RESPIRATORY - ADULT  Goal: Achieves optimal ventilation and oxygenation  Description: INTERVENTIONS:  - Assess for changes in respiratory status  - Assess for changes in mentation and behavior  - Position to facilitate oxygenation and minimize respiratory effort  - Oxygen administered by appropriate delivery if ordered  - Initiate smoking cessation education as indicated  - Encourage broncho-pulmonary hygiene including cough, deep  breathe, Incentive Spirometry  - Assess the need for suctioning and aspirate as needed  - Assess and instruct to report SOB or any respiratory difficulty  - Respiratory Therapy support as indicated  Outcome: Progressing     Problem: SKIN/TISSUE INTEGRITY - ADULT  Goal: Skin Integrity remains intact(Skin Breakdown Prevention)  Description: Assess:  -Perform Дмитрий assessment every ***  -Clean and moisturize skin every ***  -Inspect skin when repositioning, toileting, and assisting with ADLS  -Assess under medical devices such as *** every ***  -Assess extremities for adequate circulation and sensation     Bed Management:  -Have minimal linens on bed & keep smooth, unwrinkled  -Change linens as needed when moist or perspiring  -Avoid sitting or lying in one position for more than *** hours while in bed  -Keep HOB at ***degrees     Toileting:  -Offer bedside commode  -Assess for incontinence every ***  -Use incontinent care products after each incontinent episode such as ***    Activity:  -Mobilize patient *** times a day  -Encourage activity and walks on unit  -Encourage or provide ROM exercises   -Turn and reposition patient every *** Hours  -Use appropriate equipment to lift or move patient in bed  -Instruct/ Assist with weight shifting every *** when out of bed in chair  -Consider limitation of chair time *** hour intervals    Skin Care:  -Avoid use of baby powder, tape, friction and shearing, hot water or constrictive clothing  -Relieve pressure over bony prominences using ***  -Do not massage red bony areas    Next Steps:  -Teach patient strategies to minimize risks such as ***   -Consider consults to  interdisciplinary teams such as ***  Outcome: Progressing  Goal: Incision(s), wounds(s) or drain site(s) healing without S/S of infection  Description: INTERVENTIONS  - Assess and document dressing, incision, wound bed, drain sites and surrounding tissue  - Provide patient and family education  - Perform skin  care/dressing changes every ***  Outcome: Progressing  Goal: Pressure injury heals and does not worsen  Description: Interventions:  - Implement low air loss mattress or specialty surface (Criteria met)  - Apply silicone foam dressing  - Instruct/assist with weight shifting every *** minutes when in chair   - Limit chair time to *** hour intervals  - Use special pressure reducing interventions such as *** when in chair   - Apply fecal or urinary incontinence containment device   - Perform passive or active ROM every ***  - Turn and reposition patient & offload bony prominences every *** hours   - Utilize friction reducing device or surface for transfers   - Consider consults to  interdisciplinary teams such as ***  - Use incontinent care products after each incontinent episode such as ***  - Consider nutrition services referral as needed  Outcome: Progressing     Problem: HEMATOLOGIC - ADULT  Goal: Maintains hematologic stability  Description: INTERVENTIONS  - Assess for signs and symptoms of bleeding or hemorrhage  - Monitor labs  - Administer supportive blood products/factors as ordered and appropriate  Outcome: Progressing     Problem: Nutrition/Hydration-ADULT  Goal: Nutrient/Hydration intake appropriate for improving, restoring or maintaining nutritional needs  Description: Monitor and assess patient's nutrition/hydration status for malnutrition. Collaborate with interdisciplinary team and initiate plan and interventions as ordered.  Monitor patient's weight and dietary intake as ordered or per policy. Utilize nutrition screening tool and intervene as necessary. Determine patient's food preferences and provide high-protein, high-caloric foods as appropriate.     INTERVENTIONS:  - Monitor oral intake, urinary output, labs, and treatment plans  - Assess nutrition and hydration status and recommend course of action  - Evaluate amount of meals eaten  - Assist patient with eating if necessary   - Allow adequate time  for meals  - Recommend/ encourage appropriate diets, oral nutritional supplements, and vitamin/mineral supplements  - Order, calculate, and assess calorie counts as needed  - Recommend, monitor, and adjust tube feedings and TPN/PPN based on assessed needs  - Assess need for intravenous fluids  - Provide specific nutrition/hydration education as appropriate  - Include patient/family/caregiver in decisions related to nutrition  Outcome: Progressing     Problem: SAFETY,RESTRAINT: NV/NON-SELF DESTRUCTIVE BEHAVIOR  Goal: Remains free of harm/injury (restraint for non violent/non self-detsructive behavior)  Description: INTERVENTIONS:  - Instruct patient/family regarding restraint use   - Assess and monitor physiologic and psychological status   - Provide interventions and comfort measures to meet assessed patient needs   - Identify and implement measures to help patient regain control  - Assess readiness for release of restraint   Outcome: Progressing  Goal: Returns to optimal restraint-free functioning  Description: INTERVENTIONS:  - Assess the patient's behavior and symptoms that indicate continued need for restraint  - Identify and implement measures to help patient regain control  - Assess readiness for release of restraint   Outcome: Progressing     Problem: COPING  Goal: Pt/Family able to verbalize concerns and demonstrate effective coping strategies  Description: INTERVENTIONS:  - Assist patient/family to identify coping skills, available support systems and cultural and spiritual values  - Provide emotional support, including active listening and acknowledgement of concerns of patient and caregivers  - Reduce environmental stimuli, as able  - Provide patient education  - Assess for spiritual pain/suffering and initiate spiritual care, including notification of Pastoral Care or jacinda based community as needed  - Assess effectiveness of coping strategies  Outcome: Progressing  Goal: Will report anxiety at  manageable levels  Description: INTERVENTIONS:  - Administer medication as ordered  - Teach and encourage coping skills  - Provide emotional support  - Assess patient/family for anxiety and ability to cope  Outcome: Progressing

## 2024-03-18 NOTE — ASSESSMENT & PLAN NOTE
CT chest shows cavitary lesion in RUL with air fluid level with mild atelectatic changes    Plan:  Continue unasyn x6 weeks as above

## 2024-03-18 NOTE — ASSESSMENT & PLAN NOTE
Diagnosed in December,   Initially started on steroids, prednisone 60 mg daily, which patient had tapered to 30 mg daily prior to admission with the addition of atovaquone, hydroxychloroquine, and mycophenolate. Patient recently completed 5 day course of solumedrol 1g daily.     Holding atovaquone, hydroxychloroquine, and mycophenolate in the setting of acute infection    random cortisol 10.9 collected 3/11    Plan:  Continue Solumedrol 50 mg daily  Rituximab qweekly, first dose 3/16/24  Rheumatology consulted, appreciate recommendations

## 2024-03-18 NOTE — ASSESSMENT & PLAN NOTE
Lab Results   Component Value Date    HGBA1C 5.9 (H) 03/03/2024       Recent Labs     03/17/24  1726 03/18/24  0120 03/18/24  0619 03/18/24  1115   POCGLU 198* 146* 268* 267*         Blood Sugar Average: Last 72 hrs:  (P) 168.9    Regimen increased to insulin gtt while on steroids, will de-escalate when able  BG goal 140-180

## 2024-03-18 NOTE — ASSESSMENT & PLAN NOTE
Persitent LGIB  Patient was initially on heparin due to acute DVT  3/6 flexible sigmoidoscopy revealing ulcerated rectal mucosa, status post cauterization  Repeat Flex sig w GI on 3/11: multiple ulcers throughout colon, biopsies taken; one oozing ulcer in transverse colon, clipped with hemostasis; severe diverticula; prolapsed rectum; rectal ulcers.  Colonoscopy 3/13: Multiple ulcers in the IC valve, cecum, ascending colon, hepatic flexure and transverse colon; Ulcer in the hepatic flexure with oozing hemorrhage; placed 2 clips successfully; hemostasis achieved  3/15: Pt bleeding overnight in early AM hours with large drop in Hgb, transfused 2 U pRBC and 1 U FFP and GI updated. Added transfusion of 1 U FFP, 1 U cryo, 2 U plt. CT bleeding scan showed active bleed in R colon near hepatic flexure.   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 Will discuss other supportive measures if not very invasive at the time.  Continue ongoing goals of care discussions

## 2024-03-18 NOTE — ASSESSMENT & PLAN NOTE
Noted on CT of 3/15/24, new since  2/29/24   Most likely d/t infarcts or embolic phenomenon     Plan:  Continue unasyn for 6 weeks total

## 2024-03-18 NOTE — PROGRESS NOTES
03/18/24 1300   Clinical Encounter Type   Visited With Patient and family together   Routine Visit Follow-up   Continue Visiting Yes   Sacramental Encounters   Sacrament of Sick-Anointing Anointed

## 2024-03-18 NOTE — ASSESSMENT & PLAN NOTE
Persitent LGIB  Patient was initially on heparin due to acute DVT  3/6 flexible sigmoidoscopy revealing ulcerated rectal mucosa, status post cauterization  Repeat Flex sig w GI on 3/11: multiple ulcers throughout colon, biopsies taken; one oozing ulcer in transverse colon, clipped with hemostasis; severe diverticula; prolapsed rectum; rectal ulcers.  Colonoscopy 3/13: Multiple ulcers in the IC valve, cecum, ascending colon, hepatic flexure and transverse colon; Ulcer in the hepatic flexure with oozing hemorrhage; placed 2 clips successfully; hemostasis achieved  3/15: Pt bleeding overnight in early AM hours with large drop in Hgb, transfused 2 U pRBC and 1 U FFP and GI updated. Added transfusion of 1 U FFP, 1 U cryo, 2 U plt. CT bleeding scan showed active bleed in R colon near hepatic flexure. Taken to IR 3/15 for embolization, however unable to identify active bleed    Total blood products this admission: RBC x14, FFP x5, plt x3, cryo x3    Patient continues with bloody bowel movements overnight and a 2 gram hemoglobin drop. Will need goals of care discussions with family regarding further care.    Plan:  Continue rituximab Qweekly, started 3/16/24  Continue ganciclovir, started 3/14/24 for CMV, continue 14 day course per ID  Monitor Hgb on daily labs  Level of care 3, no intubation or resuscitation, no aggressive interventions including hemodialysis or colonoscopy or any other intervention if she starts bleeding again or she gets worse. Will discuss other supportive measures if not very invasive at the time.  Continue ongoing goals of care discussions

## 2024-03-18 NOTE — ASSESSMENT & PLAN NOTE
Pt noted to be in SVT on 3/6/24. EKG showing afib. Converted on diltiazem IV, transitioned to cardizem PO.    Echo 2/20/24 unremarkable--showed grade 1 diastolic dysfunction and mild mitral regurg.    Chads-vasc=5    Questionable aflutter/afib RVR overnight, given cardizem and a 500cc isolyte bolus without conversion. She was given IV 5mg lopressor and converted to normal sinus rhythm. Consider restarting cardizem 30mg q6h.

## 2024-03-18 NOTE — PROGRESS NOTES
Progress Note - Infectious Disease   Darlyn Nguyen 78 y.o. female MRN: [id number]  Unit/Bed#: ICU 02 Encounter: [id number]    Impression/Plan:  1. Septic shock. Developing over admission. Leukocytosis, hypotension, and lactic acidosis. Likely secondary to enterococcus avium bacteremia and RUL pneumonia with cavitary lesion. This is complicated by fact that patient is immunosuppressed in setting of recent SLE with lupus nephritis diagnosis. The patient initially had clinical improvement. She was extubated and transitioned out of ICU to med/surg care. Patient then had significant BRBPR and became nonresponsive. She was intubated and transferred back to ICU. Flex sig performed which showed significant bleeding in transverse colon. This weekend the patient was successfully extubated. This morning she remains on the nancy hugger. WBC count remains elevated but this is in setting of steroids and solumedrol.  -antibiotic as below  -monitor CBCD and BMP  -monitor vitals  -supportive care     2. Enterococcus avium bacteremia. Single set positive from 2/29/2024. Suspect likely GI source given patient's recent GI bleed and pancolitis with heavy diarrhea. Patient completed CT A/P which noted scattered splenic hypodensities which is concerning for septic emboli. With this pathogen there is concern for vancomycin resistance. Patient has no intravascular devices or hardware. All repeat blood cultures are negative >5 days. CT head with no acute findings. TTE was negative. The patient has been transitioned to IV Unasyn. She appears to be tolerating the antibiotic without difficulty. I will continue the Unasyn for now in anticipation of 6 weeks of IV antibiotic treatment.  -continue IV Unasyn through 4/12/2024 for 6 weeks of IV antibiotic treatment after cleared blood cultures  -weekly CBCD and BMP while on IV antibiotic  -monitor vitals  -PICC to be removed after patient's final dose of IV antibiotic on 4/12/2024  -anticipate  surveillance blood cultures 2 weeks after finishing IV antibiotic treatment, draw on 4/26/2024  -patient will require outpatient ID follow up after discharge     3. RUL pneumonia with cavitary lung lesions. Chest CT showed a RUL cavitary lesion, dense RUL consolidation, R basilar atelectasis, and small B/L pleural effusions. Consider aspiration played a role. Consider possilibily of septic emboli in setting of bacteremia. With new immunosuppression will need to consider TB. AFB sputum cultures are negative x2 so far. Direct TB PCR was negative. Sputum culture with growth of pseudomonas, however after antibiotic treatment this may be colonization. Aspergillus was negative. I personally reviewed her most recent CXR from 3/16/2024 which showed stable RUL cavity, R effusion, and R basilar atelectasis.The patient is stable on 2L nasal cannula O2 this morning.  -antibiotic as above  -follow up AFBs  -monitor vitals  -monitor respiratory status  -O2 support per primary service      4. Pancolitis with heavy diarrhea and recurrent GI bleed. This is likely source of her bacteremia above. Infectious stool workup was negative. CT A/P showed persistent fluid throughout the colon, improvement to previous areas of colonic mucosal hyperemia, and colonic diverticulosis without diverticulitis. Gastroenterology is following. She underwent flex sig which showed hemorrhoids, diverticulitis, and ulcerated mucosa. Unfortunately she had worsening BRBPR with significant drop in Hemoglobin. Repeat flex sig noted significant amount of pancolonic fresh blood and clotting and an ulcer in the transverse colon with oozing hemorrhage. Hemostasis was achieved with endoclip. She is also noted to have prolapsed rectum with multiple nonbleeding ulcerse and severe diverticula. Patient then with ongoing bleeding. General surgery has been consulted, no plans for surgical intervention at this time. Patient's colon biopsy pathology is showing CMV  highlights in rare positive cells. She has been started on Ganciclovir.  -continue IV Ganciclovir    -serial abdominal exams  -monitor GI symptoms  -monitor stool output   -continue follow up with gastroenterology  -continue follow up with general surgery     5. Acute hypoxic respiratory failure. Likely secondary to RUL pnuemonia with cavitary lesion. Also consider role of sepsis with shock. Consider possibility of aspiration vs septic emboli in bacteremic patient with new splenic hypodensities. She did recently have B/L LE DVTs, however she's been on Eliquis so less concern for PE. Patient's respiratory status did improve and she was extubated. Patient then experienced recurrent GI bleed and became nonresponsive. She was reintubated but was successfully extubated over the weekend.  -antibiotic as above  -monitor CBCD and BMP  -monitor vitals  -monitor respiratory status  -O2 support per critical care     6. SLE. With lupus nephritis. Diagnosed in 12/2023 at [facility]. She has been managed by rheumatology and nephrology. She was started on prednisone, atovaquone, hydroxychloroquine, and mycophenolate. Atovaquone, hydroxychloroquine, and mycophenolate currently on hold. She was given a dose of Solu-Cortef at [facility]. Nephrology has assessed and patient has been on prednisone. Steroid use is risk factor for infection. Rheumatology was consulted and they recommend pulse solumedrol.  -steroid management per nephrology/rheumatology  -patient will eventually need to go back on mepron from PJP prophylaxis  -continue close follow up with nephrology  -continue close follow up with rheumatology     7. CKD stage 2. This can affect antibiotic dosing. Upon review of patient's available past medical records it appears her baseline creatinine is approximately 0.8. Also with lupus nephritis as above. Suspect patient is high risk for ARYAN. Her creatinine is 0.92 today.  -monitor creatinine  -dose adjust antibiotic for renal function  "as needed  -avoid nephrotoxins  -continue close follow up with nephrology     8. Sacral and buttock ulcers. It was documented that patient had a gluteal skin care during recent hospitalization at [facility]. Initial presentation showed some areas appearing more superficial with raw granular tissue, other areas were likely stage 2 with some gray stringy slough and fibrous tissue. Suspect pressure and prolonged down time are contributing to skin integrity. Wound management is following. Initial treatment was started with Santyl but now getting wet-to-dry BID and PRN. New scattered rash with some superficial tissue loss noted along the medial posterior thighs on 3/13/2024. Unclear if this is viral. Unclear if this is lupus related.  -serial skin exams  -frequent turning and repositioning to offload pressure  -wound management per wound management team  -continue follow up with wound management     9. Type 2 diabetes mellitus without long term insulin use. Patient's last HbA1c was 5.9% on 3/3/2024. Elevated blood glucose is risk factor for wounds and infection. Recommend tight glycemic control.  -blood glucose management per primary service    Above plan was discussed in detail with patient at the bedside.  Above plan was discussed in detail with critical care who agrees with plan for antimicrobial treatment.    Antimicrobials:  Unasyn 15  Antibiotics 19  Ganciclovir 5  Antivirals 5    Subjective:  Limited ROS as patient is very weak this morning. When I asked her how she's feeling she nods her head \"yes.\" Patient was extubated over the weekend and her O2 saturation has remained stable on nasal cannula. She is having dark stools but no active BRBPR this morning. Peralta output also with pink tinge. She continues to require the nancy hugger.    Objective:  Vitals:  Temp:  [96.1 °F (35.6 °C)-98.2 °F (36.8 °C)] 97.3 °F (36.3 °C)  HR:  [] 110  Resp:  [11-30] 17  BP: (104-173)/(64-92) 137/71  SpO2:  [94 %-100 %] 100 %  Temp " (24hrs), Av.3 °F (36.3 °C), Min:96.1 °F (35.6 °C), Max:98.2 °F (36.8 °C)  Current: Temperature: (!) 97.3 °F (36.3 °C)    Physical Exam:   General Appearance:  More alert but minimally interactive likely due to weakness, nontoxic, in no acute distress. She appears comfortable semi-supine in bed. She appears significantly debilitated.   Lungs:   Coarse to auscultation bilaterally, very decreased in the bases; no wheezes, rhonchi or rales; respirations unlabored on nasal cannula O2.   Heart:  Tachycardic, irregular; no murmur, rub or gallop.   Abdomen:   Soft, non-tender, non-distended, positive bowel sounds.     Extremities: No clubbing or cyanosis; B/L UE edema and anasarca with mild improvement, still significant in the B/L LE.    Genitourinary: Peralta intact, urine output with pink tinge, cloudy.   Skin: No new rashes noted on exposed skin. Chest and extremities x4 with patchy ecchymosis and patchy flat erythema.     Labs, Imaging, & Other studies:   All pertinent labs and imaging studies were personally reviewed  Results from last 7 days   Lab Units 24  0536 247 24   WBC Thousand/uL 14.86* 13.25* 12.11*   HEMOGLOBIN g/dL 9.1* 9.2* 8.8*   PLATELETS Thousands/uL 95* 123* 118*     Results from last 7 days   Lab Units 24  0536 24  1244 03/15/24  0456 24  1128 24  0432 24  1711 24  1706   POTASSIUM mmol/L 2.9*   < > 3.1* 3.7   < > 3.4*   < >  --    CHLORIDE mmol/L 109*   < > 115* 118*   < > 115*   < >  --    CO2 mmol/L 25   < > 23 19*   < > 21   < >  --    CO2, I-STAT mmol/L  --   --   --   --   --   --   --  20*   BUN mg/dL 23   < > 23 25   < > 24   < >  --    CREATININE mg/dL 0.92   < > 1.04 1.14   < > 1.19   < >  --    EGFR ml/min/1.73sq m 58   < > 50 45   < > 43   < >  --    GLUCOSE, ISTAT mg/dl  --   --   --   --   --   --   --  275*   CALCIUM mg/dL 8.3*   < > 9.0 7.2*   < > 8.2*   < >  --    AST U/L  --   --  22 53*  --  174*   < >   --    ALT U/L  --   --  79* 99*  --  157*   < >  --    ALK PHOS U/L  --   --  118* 123*  --  194*   < >  --     < > = values in this interval not displayed.

## 2024-03-18 NOTE — PLAN OF CARE
Problem: RESPIRATORY - ADULT  Goal: Achieves optimal ventilation and oxygenation  Description: INTERVENTIONS:  - Assess for changes in respiratory status  - Assess for changes in mentation and behavior  - Position to facilitate oxygenation and minimize respiratory effort  - Oxygen administered by appropriate delivery if ordered  - Initiate smoking cessation education as indicated  - Encourage broncho-pulmonary hygiene including cough, deep breathe, Incentive Spirometry  - Assess the need for suctioning and aspirate as needed  - Assess and instruct to report SOB or any respiratory difficulty  - Respiratory Therapy support as indicated  Outcome: Progressing     Problem: HEMATOLOGIC - ADULT  Goal: Maintains hematologic stability  Description: INTERVENTIONS  - Assess for signs and symptoms of bleeding or hemorrhage  - Monitor labs  - Administer supportive blood products/factors as ordered and appropriate  Outcome: Progressing     Problem: COPING  Goal: Pt/Family able to verbalize concerns and demonstrate effective coping strategies  Description: INTERVENTIONS:  - Assist patient/family to identify coping skills, available support systems and cultural and spiritual values  - Provide emotional support, including active listening and acknowledgement of concerns of patient and caregivers  - Reduce environmental stimuli, as able  - Provide patient education  - Assess for spiritual pain/suffering and initiate spiritual care, including notification of Pastoral Care or jacinda based community as needed  - Assess effectiveness of coping strategies  Outcome: Progressing  Goal: Will report anxiety at manageable levels  Description: INTERVENTIONS:  - Administer medication as ordered  - Teach and encourage coping skills  - Provide emotional support  - Assess patient/family for anxiety and ability to cope  Outcome: Progressing

## 2024-03-18 NOTE — WOUND OSTOMY CARE
Consult Note - Wound   Darlyn Nguyen 78 y.o. female MRN: [id number]  Unit/Bed#: ICU 02 Encounter: [id number]    Received re-consultation due to patient transitioning back to level 3 DNR from comfort cares on 3/16/24.  Wound care team following.     Assessment Findings:   Patient assessed along with primary RN.  Dependent for turning/repositioning.  On low air-loss mattress with ATR positioning system in use.  Peralta catheter in place.  Grossly incontinent of ongoing liquid, bloody stools--radha-anal care provided.  Not a candidate for fecal management system.  Attempted fecal pouch on 3/15/24--unsuccessful due to open areas adjacent to anus.  Bilateral heels intact, pink, blanchable with offloading heel boots in place.  Left axilla dry, flaky, with bruising likely from friction of BP cuff--ABD placed in skin fold to prevent further friction.  Patient's chest with significant bruising and petechial rash likely due to thrombocytopenia.  Nutrition team following, receiving tube feeding.   Present on admission evolving deep tissue pressure injury to mid/right sacrum--ALL OPEN AREAS TO BUTTOCKS, SACRUM, AND PERINEUM HAVE CONVERGED--measured as one area.  Center with yellow slough and black/dark purple evolving areas.  There is undermining from 9-4 o'clock with greatest at 3 o'clock. This central area is surrounded by full thickness pale pink wounds.  There is small serosanguinous and yellow drainage.  No foul odor appreciated.  Radha-wound intact, fragile with blanchable erythema.  No induration present.  Deep tissue pressure injuries can be stage 3, 4, or unstageable when fully evolved.   Labia and medial thighs with unclear etiology--Labia and medial thighs erythematous with scattered pink, partial thickness ulcerations.  Scattered ulcerations also extending from radha-rectal area to posterior thighs.  Small serous drainage.  Radha-wound intact, pink, fragile.  These ulcerations are very painful to patient with any type of  care.      See flowsheet for wound details. Patient continues to have poor healing potential.    Wound Care Plan:   1-Apply silicone bordered foam dressings to bilateral heels for prevention.  Buck with P.  Peel back at least daily for skin assessment and re-apply.  Change dressing every 3 days and PRN.  2-Elevate heels off of bed/chair surface to offload pressure--offloading heel boots.  3-Offloading air cushion in chair when out of bed.  4-Moisturize skin daily with skin nourishing lotion.  5-Turn/reposition every 2 hours while in bed using positioning wedges; and weight shift frequently while in chair for pressure re-distribution on skin.   6-Low air-loss mattress--Place order for P500 if transferred out of ICU.  7-Breast folds--cleanse with soap and water, pat dry.  Apply Interdry in single layer to skin fold, ensure approximately 2 inches of fabric is visible outside of fold.  Remove Interdry daily for bathing and re-apply.  Change Interdry sheet every 3 days or if visibly soiled.  DO NOT USE CREAMS OR POWDERS IN AREAS WHERE INTERDRY IS IN USE.  8-Buttocks/sacrum--cleanse with normal saline or foam cleanser (avoid using bath wipes), pat dry.  Apply silver gel to central open area with slough and loosely pack with saline moistened gauze.  Apply Triad paste to remainder of open areas on buttocks.  Lay an ABD over all wounds (no need to tape).  Change dressing daily and as needed with soilage.  9-Groin folds, medial/posterior thighs, and perineum--cleanse with foam cleanser, pat dry.  Apply Calazime paste three times daily.  If Calazime is not adhering to wounds, may dust with stomahesive powder prior to paste application.     Wound care team to follow.  Plan of care reviewed with primary RN.    Patient should follow-up at the wound center on discharge.    Wound 02/29/24 Pressure Injury Sacrum Right (Active)   Wound Image   03/18/24 1335   Wound Description Pink;Yellow;Slough;Black 03/18/24 1335   Pressure Injury  Stage DTPI 03/18/24 1335   Shanta-wound Assessment Erythema 03/18/24 1335   Wound Length (cm) 12.5 cm 03/18/24 1335   Wound Width (cm) 13 cm 03/18/24 1335   Wound Depth (cm) 1 cm 03/18/24 1335   Wound Surface Area (cm^2) 162.5 cm^2 03/18/24 1335   Wound Volume (cm^3) 162.5 cm^3 03/18/24 1335   Calculated Wound Volume (cm^3) 162.5 cm^3 03/18/24 1335   Change in Wound Size % -07031.19 03/18/24 1335   Number of underminings 1 03/18/24 1335   Undermining 1 1.8 03/18/24 1335   Undermining 1 is depth extending from 9-4 o'clock 03/18/24 1335   Drainage Amount Small 03/18/24 1335   Drainage Description Yellow;Serosanguineous 03/18/24 1335   Non-staged Wound Description Full thickness 03/18/24 1335   Treatments Cleansed 03/18/24 1335   Dressing Silvasorb gel;Gauze;ABD 03/18/24 1335   Dressing Changed Changed 03/18/24 1335   Patient Tolerance Tolerated well 03/18/24 1335   Dressing Status Clean;Dry;Intact 03/18/24 1335       Wound 02/29/24 MASD Groin Left;Right (Active)   Wound Image   03/18/24 1335   Wound Description [place] 03/18/24 1335   Shanta-wound Assessment Edema;Pink;Fragile 03/18/24 1335   Drainage Amount Scant 03/18/24 1335   Drainage Description Serous 03/18/24 1335   Non-staged Wound Description Partial thickness 03/18/24 1335   Treatments Cleansed 03/18/24 1335   Dressing Protective barrier 03/18/24 1335   Patient Tolerance Tolerated well 03/18/24 1335           Liz Shepard BSN, RN, CWON

## 2024-03-18 NOTE — ASSESSMENT & PLAN NOTE
Likely acute delirium in the setting of critical illness and prolonged hospitalization on chronic cognitive decline    Plan:  Delirium precautions  Regulate sleep wake cycles, melatonin qHS

## 2024-03-18 NOTE — ASSESSMENT & PLAN NOTE
Discovered 2/20/24, started on Eliquis at home, started Hep gtt at [facility] but developed GI bleed, AC currently on hold   S/p IVC filter with IR 3/7/24    Plan:  Hold AC, continue to monitor s/sx of bleeding

## 2024-03-18 NOTE — ASSESSMENT & PLAN NOTE
Likely acute delirium in the setting of critical illness and prolonged hospitalization on chronic cognitive decline

## 2024-03-18 NOTE — ASSESSMENT & PLAN NOTE
Potassium 2.9 this AM    Plan:  IV potassium 80mEq over 4hrs through central line  PO potassium replacement  Likely due to renal potassium losses in setting of lasix gtt

## 2024-03-18 NOTE — ASSESSMENT & PLAN NOTE
Lab Results   Component Value Date    HGBA1C 5.9 (H) 03/03/2024       Recent Labs     03/17/24  1213 03/17/24  1726 03/18/24  0120 03/18/24  0619   POCGLU 167* 198* 146* 268*         Blood Sugar Average: Last 72 hrs:  (P) [id number]    Continue SSI, accuchecks  BG goal 140-180

## 2024-03-18 NOTE — ASSESSMENT & PLAN NOTE
Malnutrition Findings:   Adult Malnutrition type: Acute illness  Adult Degree of Malnutrition: Other severe protein calorie malnutrition  Malnutrition Characteristics: Fluid accumulation, Inadequate energy                  360 Statement: Severe calorie-protein malnutrition in context to acute illness r/t GI bleed, dysphagia, inadequate PO intake as evidance by energy intake less than 50% compared to estimated needs >5 days, +2 generalized edema; Treated with TBD (diet +oral supplements vs nutrition support)    BMI Findings:           Body mass index is 31.64 kg/m².     Continue TF w Vital AF 1.2 with goal rate 60 ml/h,  ml Q4H  Nutrition consult

## 2024-03-18 NOTE — ASSESSMENT & PLAN NOTE
Likely was in the setting of furosemide gtt    Plan:  Continue IV and PO potassium replacement  Continue to monitor

## 2024-03-18 NOTE — ASSESSMENT & PLAN NOTE
+14 liters this admission  Oliguric UOP when not on diuretic  Profound pitting edema in the dependent soft tissues, likely in setting of low oncotic pressures  Small skin tears of the BUE weeping serous fluid    Plan  Consider discontinuing lasix gtt  Continue to monitor I&O's  Trend renal indices

## 2024-03-18 NOTE — ASSESSMENT & PLAN NOTE
Profound pitting edema in the dependent soft tissues, likely in setting of low oncotic pressures  Small skin tears of the BUE weeping serous fluid    Plan  Lasix gtt discontinued, continue as needed IV furosemide for goal net negative 500cc  Continue to monitor I&O's  Trend renal indices

## 2024-03-18 NOTE — PROGRESS NOTES
Pastoral Care Progress Note    3/18/2024  Patient: Darlyn Nguyen : 1945  Admission Date & Time: 3/3/2024 1556  MRN: [id number] [facility]: [id number]    Visited with patient and family today, patient is more awake and alert, will continue to follow patient and support family.

## 2024-03-18 NOTE — ASSESSMENT & PLAN NOTE
Discovered 2/20/24, started on Eliquis at home, started Hep gtt at [facility] but developed GI bleed, AC currently on hold   S/p IVC filter with IR 3/7/24    Plan:  Hold AC, continue to monitor s/sx of bleeding  Consider restarting DVT ppx once Hg stabilizes and no further bloody bowel movements

## 2024-03-18 NOTE — ASSESSMENT & PLAN NOTE
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

## 2024-03-18 NOTE — ASSESSMENT & PLAN NOTE
Malnutrition Findings:   Adult Malnutrition type: Acute illness  Adult Degree of Malnutrition: Other severe protein calorie malnutrition  Malnutrition Characteristics: Fluid accumulation, Inadequate energy                  360 Statement: Severe calorie-protein malnutrition in context to acute illness r/t GI bleed, dysphagia, inadequate PO intake as evidance by energy intake less than 50% compared to estimated needs >5 days, +2 generalized edema; Treated with TBD (diet +oral supplements vs nutrition support)    BMI Findings:           Body mass index is 31.64 kg/m².     Continue TF w Vital AF 1.2 with goal rate 50 ml/h,  ml Q4H  Nutrition consult

## 2024-03-18 NOTE — PROGRESS NOTES
[facility]  Progress Note  Name: Darlyn Nguyen I  MRN: [id number]  Unit/Bed#: ICU 02 I Date of Admission: 3/3/2024   Date of Service: 3/18/2024 I Hospital Day: 15    Assessment/Plan   Liver lesion  Assessment & Plan  Noted on CT of 3/15/24, new since  2/29/24   Most likely d/t infarcts or embolic phenomenon     Splenic infarct  Assessment & Plan  Suspect septic emboli in the setting of bacteremia    Aspiration pneumonia (HCC)  Assessment & Plan  CT chest shows cavitary lesion in RUL with air fluid level with mild atelectatic changes    Plan:  Continue unasyn x6 weeks as above    Encephalopathy acute  Assessment & Plan  Likely acute delirium in the setting of critical illness and prolonged hospitalization on chronic cognitive decline    Severe protein-calorie malnutrition (HCC)  Assessment & Plan  Malnutrition Findings:   Adult Malnutrition type: Acute illness  Adult Degree of Malnutrition: Other severe protein calorie malnutrition  Malnutrition Characteristics: Fluid accumulation, Inadequate energy                  360 Statement: Severe calorie-protein malnutrition in context to acute illness r/t GI bleed, dysphagia, inadequate PO intake as evidance by energy intake less than 50% compared to estimated needs >5 days, +2 generalized edema; Treated with TBD (diet +oral supplements vs nutrition support)    BMI Findings:           Body mass index is 31.64 kg/m².     Continue TF w Vital AF 1.2 with goal rate 50 ml/h,  ml Q4H  Nutrition consult    Thrombocytopenia (HCC)  Assessment & Plan  Stable, continue to monitor  Suspect autoimmune process secondary to lupus and consumption from GI bleed/DIC    Anasarca  Assessment & Plan  +14 liters this admission  Oliguric UOP when not on diuretic  Profound pitting edema in the dependent soft tissues, likely in setting of low oncotic pressures  Small skin tears of the BUE weeping serous fluid    Plan  Consider discontinuing lasix gtt  Continue to  monitor I&O's  Trend renal indices    Paroxysmal atrial fibrillation (HCC)  Assessment & Plan  Pt noted to be in SVT on 3/6/24. EKG showing afib. Converted on diltiazem IV, transitioned to cardizem PO.    Echo 2/20/24 unremarkable--showed grade 1 diastolic dysfunction and mild mitral regurg.    Chads-vasc=5    Questionable aflutter/afib RVR overnight, given cardizem and a 500cc isolyte bolus without conversion. She was given IV 5mg lopressor and converted to normal sinus rhythm. Consider restarting cardizem 30mg q6h.    Hypokalemia  Assessment & Plan  Potassium 2.9 this AM    Plan:  IV potassium 80mEq over 4hrs through central line  PO potassium replacement  Likely due to renal potassium losses in setting of lasix gtt    Lupus nephritis (HCC)  Assessment & Plan  Biopsy-proven at [facility]    UA 3/12/24 shows moderate blood (30-50 RBCs on micro), moderate leukocytes (30-50 on micro), 1+ protein, 1+ ketones, and increased specific gravity; not specific, but c/w lupus nephritis    U protein:Cr elevated  Continue to monitor I&O's, trend renal indices  Consider discontinuation of lasix gtt as patient is having adequate urine output  Continue IV solumedrol    Type 2 diabetes mellitus (HCC)  Assessment & Plan  Lab Results   Component Value Date    HGBA1C 5.9 (H) 03/03/2024       Recent Labs     03/17/24  1726 03/18/24  0120 03/18/24  0619 03/18/24  1115   POCGLU 198* 146* 268* 267*         Blood Sugar Average: Last 72 hrs:  (P) 168.9    Continue SSI, accuchecks  BG goal 140-180    Enterococcal bacteremia  Assessment & Plan  Repeat cultures negative  Unasyn x 6 week course (thru 4/12)    Pressure ulcers of skin of multiple topographic sites  Assessment & Plan  Wound Care following, appreciate recommendations  Continue wound care and repositioning with nursing    DVT (deep venous thrombosis) ([facility])  Assessment & Plan  Discovered 2/20/24, started on Eliquis at home, started Hep gtt at [facility] but developed GI bleed, AC currently on  hold   S/p IVC filter with IR 3/7/24    Plan:  Hold AC, continue to monitor s/sx of bleeding    Lupus (HCC)  Assessment & Plan  Diagnosed in December,   Initially started on steroids, prednisone 60 mg daily, which patient had tapered to 30 mg daily prior to admission with the addition of atovaquone, hydroxychloroquine, and mycophenolate. Patient recently completed 5 day course of solumedrol 1g daily.     Holding atovaquone, hydroxychloroquine, and mycophenolate in the setting of acute infection    random cortisol 10.9 collected 3/11    Plan:  Continue Solumedrol 50 mg daily  Rituximab qweekly, first dose 3/16/24  Rheumatology consulted, appreciate recommendations    * Lower GI bleed  Assessment & Plan  Persitent LGIB  Patient was initially on heparin due to acute DVT  3/6 flexible sigmoidoscopy revealing ulcerated rectal mucosa, status post cauterization  Repeat Flex sig w GI on 3/11: multiple ulcers throughout colon, biopsies taken; one oozing ulcer in transverse colon, clipped with hemostasis; severe diverticula; prolapsed rectum; rectal ulcers.  Colonoscopy 3/13: Multiple ulcers in the IC valve, cecum, ascending colon, hepatic flexure and transverse colon; Ulcer in the hepatic flexure with oozing hemorrhage; placed 2 clips successfully; hemostasis achieved  3/15: Pt bleeding overnight in early AM hours with large drop in Hgb, transfused 2 U pRBC and 1 U FFP and GI updated. Added transfusion of 1 U FFP, 1 U cryo, 2 U plt. CT bleeding scan showed active bleed in R colon near hepatic flexure.   Pathology on colon biopsy shows rare cells positive for CMV    Ddx includes autoimmune/SLE vasculitis, ischemic vasculitis, microvascular thrombosis  Taken to IR 3/15 for embolization, however unable to identify active bleed  Total blood products this admission: RBC x14, FFP x5, plt x3, cryo x3    Plan:  Continue rituximab Qweekly, started 3/16/24  Continue ganciclovir, started 3/14/24 for CMV, continue 14 day course per  ID  Monitor Hgb q24h  Level of care 3, no intubation or resuscitation, no aggressive interventions including hemodialysis or colonoscopy or any other intervention if she starts bleeding again or she gets worse. Will discuss other supportive measures if not very invasive at the time.  Continue ongoing goals of care discussions             Disposition: Critical care    ICU Core Measures     A: Assess, Prevent, and Manage Pain Has pain been assessed? Yes  Need for changes to pain regimen? No   B: Both SAT/SAT  N/A   C: Choice of Sedation RASS Goal: N/A patient not on sedation  Need for changes to sedation or analgesia regimen? NA   D: Delirium CAM-ICU: Negative   E: Early Mobility  Plan for early mobility? Yes   F: Family Engagement Plan for family engagement today? Yes       Antibiotic Review: Infectious disease consulted    Review of Invasive Devices:    Peralta Plan: Voiding trial after improvement in ambulation   Central access plan: Medications requiring central line Patient requires PICC secondary to long term antibiotics      Prophylaxis:  VTE VTE covered by:    None       Stress Ulcer  not ordered         Significant 24hr Events     24hr events: afib RVR overnight requiring 10mg iv cardizem and 5mg lopressor, had black bowel movement overnight, no blood noted.      Subjective   Review of Systems   Constitutional:  Positive for fatigue. Negative for appetite change, chills and fever.   Respiratory:  Positive for cough. Negative for wheezing.    Cardiovascular:  Negative for chest pain.   Gastrointestinal:  Negative for abdominal pain, nausea and vomiting.   Genitourinary:  Negative for hematuria.   Neurological:  Negative for light-headedness and headaches.      Objective                            Vitals I/O      Most Recent Min/Max in 24hrs   Temp 97.7 °F (36.5 °C) Temp  Min: 96.1 °F (35.6 °C)  Max: 98.2 °F (36.8 °C)   Pulse (!) 108 Pulse  Min: 80  Max: 144   Resp 22 Resp  Min: 11  Max: 30   /84 BP  Min:  104/70  Max: 173/92   O2 Sat 100 % SpO2  Min: 94 %  Max: 100 %      Intake/Output Summary (Last 24 hours) at 3/18/2024 1120  Last data filed at 3/18/2024 1001  Gross per 24 hour   Intake 2292.19 ml   Output 3425 ml   Net -1132.81 ml       Diet Enteral/Parenteral; Tube Feeding No Oral Diet; Vital AF 1.2; Continuous; 50; 400; Water; Every 4 hours    Invasive Monitoring           Physical Exam   Physical Exam  Vitals and nursing note reviewed.   Eyes:      General: No scleral icterus.     Extraocular Movements: Extraocular movements intact.      Conjunctiva/sclera: Conjunctivae normal.      Pupils: Pupils are equal, round, and reactive to light.   Skin:     General: Skin is warm.      Comments: Ecchymotic areas diffusely scattered on torso   HENT:      Head: Normocephalic and atraumatic.      Mouth/Throat:      Mouth: Mucous membranes are dry.   Neck:      Vascular: Central line present.   Cardiovascular:      Rate and Rhythm: Tachycardia present. Rhythm irregular.      Heart sounds: Normal heart sounds.   Musculoskeletal:      Right lower le+ Edema present.      Left lower le+ Edema present.   Abdominal: General: There is no distension.      Palpations: Abdomen is soft.      Tenderness: There is no abdominal tenderness. There is no guarding.   Constitutional:       General: She is awake.      Appearance: She is cachectic. She is ill-appearing. She is not toxic-appearing.      Interventions: Nasal cannula in place.   Pulmonary:      Effort: No accessory muscle usage or accessory muscle usage.      Breath sounds: Rhonchi present. No wheezing.      Comments: Coarse rhonchi in right lung fields, poor inspiratory effort, weak cough.  Neurological:      Mental Status: She is alert. She is calm.      Comments: Generalized weakness due to deconditioning, barely can lift upper extremities to gravity.    Genitourinary/Anorectal:  Peralta present.          Diagnostic Studies      EKG: 3/18 aflutter  Imaging:  I have  personally reviewed pertinent reports.       Medications:  Scheduled PRN   ampicillin-sulbactam, 3 g, Q6H  ganciclovir, 2.5 mg/kg (Adjusted), Q24H  insulin lispro, 1-6 Units, Q6H CRUZITO  methylPREDNISolone sodium succinate, 50 mg, Daily  potassium chloride, 40 mEq, Once      acetaminophen, 1,000 mg, Q6H PRN  ondansetron, 4 mg, Q4H PRN       Continuous    furosemide, 10 mg/hr, Last Rate: 10 mg/hr (03/17/24 1100)         Labs:    CBC    Recent Labs     03/17/24 0437 03/18/24  0536   WBC 13.25* 14.86*   HGB 9.2* 9.1*   HCT 27.8* 27.5*   * 95*     BMP    Recent Labs     03/17/24 0437 03/18/24  0536   SODIUM 148* 143   K 3.8 2.9*   * 109*   CO2 24 25   AGAP 7 9   BUN 23 23   CREATININE 1.01 0.92   CALCIUM 8.5 8.3*       Coags    Recent Labs     03/16/24 2015   INR 1.00   PTT 27        Additional Electrolytes  Recent Labs     03/17/24 0437 03/18/24  0536   MG 2.3 3.0*   PHOS  --  3.8   CAIONIZED  --  1.17          Blood Gas    No recent results  Recent Labs     03/16/24  1244   PHVEN 7.295*   [id number] 41.7*   PO2VEN 80.2*   [id number] 19.8*   BEVEN -6.3   [id number] 91.9*    LFTs  Recent Labs     03/16/24  1244   ALT 79*   AST 22   ALKPHOS 118*   ALB 2.8*   TBILI 0.92       Infectious  No recent results  Glucose  Recent Labs     03/16/24  1244 03/16/24 2015 03/17/24  0437 03/18/24  0536   GLUC 174* 191* 179* 295*               Cynthia Peralta PA-C

## 2024-03-18 NOTE — ASSESSMENT & PLAN NOTE
Pt noted to be in SVT on 3/6/24. EKG showing afib. Converted on diltiazem IV, transitioned to cardizem PO.    Echo 2/20/24 unremarkable--showed grade 1 diastolic dysfunction and mild mitral regurg.  Chads-vasc=5    Questionable aflutter/afib RVR overnight, given cardizem and a 500cc isolyte bolus without conversion. She was given IV 5mg lopressor and converted to normal sinus rhythm.    Plan:  Home coreg 6.25 BID restarted  Continuous cardiopulmonary monitoring

## 2024-03-18 NOTE — ASSESSMENT & PLAN NOTE
Diagnosed in December,   Initially started on steroids, prednisone 60 mg daily, which patient had tapered to 30 mg daily prior to admission with the addition of atovaquone, hydroxychloroquine, and mycophenolate. Patient recently completed 5 day course of solumedrol 1g daily.     Holding atovaquone, hydroxychloroquine, and mycophenolate in the setting of acute infection  random cortisol 10.9 collected 3/11    Plan:  Continue Solumedrol 50 mg daily  Rituximab qweekly, first dose 3/16/24, anticipate next dose 3/22  Rheumatology consulted, appreciate recommendations

## 2024-03-18 NOTE — ASSESSMENT & PLAN NOTE
Biopsy-proven at [facility]    UA 3/12/24 shows moderate blood (30-50 RBCs on micro), moderate leukocytes (30-50 on micro), 1+ protein, 1+ ketones, and increased specific gravity; not specific, but c/w lupus nephritis  U protein:Cr elevated    Plan:  Continue to monitor I&O's, trend renal indices  Continue IV solumedrol 50mg daily

## 2024-03-18 NOTE — SPEECH THERAPY NOTE
Speech Language/Pathology  Pt screened. Does not appear appropriate for eval today. Will check status tomorrow. Nurse in agreement. Continue NGT.

## 2024-03-18 NOTE — ASSESSMENT & PLAN NOTE
Lab Results   Component Value Date    HGBA1C 5.9 (H) 03/03/2024       Recent Labs     03/17/24  1726 03/18/24  0120 03/18/24  0619 03/18/24  1115   POCGLU 198* 146* 268* 267*       Blood Sugar Average: Last 72 hrs:  (P) 168.9

## 2024-03-19 NOTE — PROGRESS NOTES
Progress Note - Infectious Disease   Darlyn Nguyen 78 y.o. female MRN: [id number]  Unit/Bed#: ICU 02 Encounter: [id number]    Impression/Plan:  1. Septic shock. Developing over admission. Leukocytosis, hypotension, and lactic acidosis. Likely secondary to enterococcus avium bacteremia and RUL pneumonia with cavitary lesion. This is complicated by fact that patient is immunosuppressed in setting of recent SLE with lupus nephritis diagnosis. The patient initially had clinical improvement. She was extubated and transitioned out of ICU to med/surg care. Patient then had significant BRBPR and became nonresponsive. She was intubated and transferred back to ICU. Flex sig performed which showed significant bleeding in transverse colon. This weekend the patient was successfully extubated. This morning she remains on the nancy hugger. WBC count remains elevated but this is in setting of steroids and solumedrol.  -antibiotic as below  -monitor CBCD and BMP  -monitor vitals  -supportive care     2. Enterococcus avium bacteremia. Single set positive from 2/29/2024. Suspect likely GI source given patient's recent GI bleed and pancolitis with heavy diarrhea. Patient completed CT A/P which noted scattered splenic hypodensities which is concerning for septic emboli. With this pathogen there is concern for vancomycin resistance. Patient has no intravascular devices or hardware. All repeat blood cultures are negative >5 days. CT head with no acute findings. TTE was negative. The patient has been transitioned to IV Unasyn. She appears to be tolerating the antibiotic without difficulty. I will continue the Unasyn for now in anticipation of 6 weeks of IV antibiotic treatment.  -continue IV Unasyn through 4/12/2024 for 6 weeks of IV antibiotic treatment after cleared blood cultures  -weekly CBCD and BMP while on IV antibiotic  -monitor vitals  -PICC to be removed after patient's final dose of IV antibiotic on 4/12/2024  -anticipate  surveillance blood cultures 2 weeks after finishing IV antibiotic treatment, draw on 4/26/2024  -patient will require outpatient ID follow up after discharge     3. RUL pneumonia with cavitary lung lesions. Chest CT showed a RUL cavitary lesion, dense RUL consolidation, R basilar atelectasis, and small B/L pleural effusions. Consider aspiration played a role. Consider possilibily of septic emboli in setting of bacteremia. With new immunosuppression will need to consider TB. AFB sputum cultures are negative x2 so far. Direct TB PCR was negative. Sputum culture with growth of pseudomonas, however after antibiotic treatment this may be colonization. Aspergillus was negative. Most recent CXR from 3/16/2024 showed stable RUL cavity, R effusion, and R basilar atelectasis.The patient is stable on 3.5L nasal cannula O2 this morning.  -antibiotic as above  -follow up AFBs  -monitor vitals  -monitor respiratory status  -O2 support per primary service      4. Pancolitis with heavy diarrhea and recurrent GI bleed. This is likely source of her bacteremia above. Infectious stool workup was negative. CT A/P showed persistent fluid throughout the colon, improvement to previous areas of colonic mucosal hyperemia, and colonic diverticulosis without diverticulitis. Gastroenterology is following. She underwent flex sig which showed hemorrhoids, diverticulitis, and ulcerated mucosa. Unfortunately she had worsening BRBPR with significant drop in Hemoglobin. Repeat flex sig noted significant amount of pancolonic fresh blood and clotting and an ulcer in the transverse colon with oozing hemorrhage. Hemostasis was achieved with endoclip. She is also noted to have prolapsed rectum with multiple nonbleeding ulcerse and severe diverticula. Patient then with ongoing bleeding. General surgery has been consulted, no plans for surgical intervention at this time. Patient's colon biopsy pathology is showing CMV highlights in rare positive cells. She  has been started on Ganciclovir. This morning the patient continues to have heavy black stool output with PRBPR.  -continue IV Ganciclovir    -serial abdominal exams  -monitor GI symptoms  -monitor stool output   -continue follow up with gastroenterology  -continue follow up with general surgery     5. Acute hypoxic respiratory failure. Likely secondary to RUL pnuemonia with cavitary lesion. Also consider role of sepsis with shock. Consider possibility of aspiration vs septic emboli in bacteremic patient with new splenic hypodensities. She did recently have B/L LE DVTs, however she's been on Eliquis so less concern for PE. Patient's respiratory status did improve and she was extubated. Patient then experienced recurrent GI bleed and became nonresponsive. She was reintubated but was successfully extubated over the weekend.  -antibiotic as above  -monitor CBCD and BMP  -monitor vitals  -monitor respiratory status  -O2 support per critical care     6. SLE. With lupus nephritis. Diagnosed in 12/2023 at [facility]. She has been managed by rheumatology and nephrology. She was started on prednisone, atovaquone, hydroxychloroquine, and mycophenolate. Atovaquone, hydroxychloroquine, and mycophenolate currently on hold. She was given a dose of Solu-Cortef at [facility]. Nephrology has assessed and patient has been on prednisone. Steroid use is risk factor for infection. Rheumatology was consulted and they recommend pulse solumedrol.  -steroid management per nephrology/rheumatology  -patient will eventually need to go back on mepron from PJP prophylaxis  -continue close follow up with nephrology  -continue close follow up with rheumatology     7. CKD stage 2. This can affect antibiotic dosing. Upon review of patient's available past medical records it appears her baseline creatinine is approximately 0.8. Also with lupus nephritis as above. Suspect patient is high risk for ARYAN. Her creatinine is 0.92 today.  -monitor  creatinine  -dose adjust antibiotic for renal function as needed  -avoid nephrotoxins  -continue close follow up with nephrology     8. Sacral and buttock ulcers. It was documented that patient had a gluteal skin care during recent hospitalization at [facility]. Initial presentation showed some areas appearing more superficial with raw granular tissue, other areas were likely stage 2 with some gray stringy slough and fibrous tissue. Suspect pressure and prolonged down time are contributing to skin integrity. Wound management is following. New scattered rash with some superficial tissue loss noted along the medial posterior thighs on 3/13/2024. Consider this may be viral. Unclear if this is lupus related.  -continue antiviral as above  -serial skin exams  -frequent turning and repositioning to offload pressure  -wound management per wound management team  -continue follow up with wound management     9. Type 2 diabetes mellitus without long term insulin use. Patient's last HbA1c was 5.9% on 3/3/2024. Elevated blood glucose is risk factor for wounds and infection. Recommend tight glycemic control.  -blood glucose management per primary service    Above plan was discussed in detail with patient at the bedside.  Above plan was discussed in detail with critical care who agree with ongoing antimicrobial plan.    Antimicrobials:  Unasyn 16  Antibiotics 20  Ganciclovir 6  Antivirals 6    Subjective:  Limited ROS, patient tired and weak. She does answer some questions and nods. She denies pain in her back and buttock today. She denies difficulty breathing. She feels congested and has a heavy wet cough today. I assisted patient in rolling for wound care, stool output was black with additional BRBPR again.    Objective:  Vitals:  Temp:  [97.4 °F (36.3 °C)-98.3 °F (36.8 °C)] 97.4 °F (36.3 °C)  HR:  [] 76  Resp:  [11-26] 19  BP: ()/() 134/60  SpO2:  [90 %-100 %] 99 %  Temp (24hrs), Av.8 °F (36.6 °C), Min:97.4 °F  (36.3 °C), Max:98.3 °F (36.8 °C)  Current: Temperature: (!) 97.4 °F (36.3 °C)    Physical Exam:   General Appearance:  More alert and interactive today but significantly weak. Debilitated. She appears comfortable after repositioning, leaning towards the left, in no acute distress.   Throat: Oropharynx moist without lesions.    Lungs:   Coarse to auscultation bilaterally; no wheezes; respirations unlabored on nasal cannula O2.   Heart:  Irregular; no murmur, rub or gallop.   Abdomen:   Soft, obese, non-tender, non-distended, positive bowel sounds.     Extremities: No clubbing or cyanosis, + peripheral edema and anasarca is stable today.   Skin: Dense erythema along the labia and in the B/L groins.     Labs, Imaging, & Other studies:   All pertinent labs and imaging studies were personally reviewed  Results from last 7 days   Lab Units 03/19/24 0427 03/18/24  0536 03/17/24  0437   WBC Thousand/uL 17.12* 14.86* 13.25*   HEMOGLOBIN g/dL 7.3* 9.1* 9.2*   PLATELETS Thousands/uL 82* 95* 123*     Results from last 7 days   Lab Units 03/19/24  0427 03/16/24 2015 03/16/24  1244 03/15/24  0456   POTASSIUM mmol/L 3.4*   < > 3.1* 3.7   CHLORIDE mmol/L 111*   < > 115* 118*   CO2 mmol/L 28   < > 23 19*   BUN mg/dL 26*   < > 23 25   CREATININE mg/dL 0.68   < > 1.04 1.14   EGFR ml/min/1.73sq m 82   < > 50 45   CALCIUM mg/dL 7.8*   < > 9.0 7.2*   AST U/L 29  --  22 53*   ALT U/L 77*  --  79* 99*   ALK PHOS U/L 222*  --  118* 123*    < > = values in this interval not displayed.

## 2024-03-19 NOTE — PLAN OF CARE
Problem: OCCUPATIONAL THERAPY ADULT  Goal: Performs self-care activities at highest level of function for planned discharge setting.  See evaluation for individualized goals.  Description: Treatment Interventions: ADL retraining, Functional transfer training, UE strengthening/ROM, Endurance training, Cognitive reorientation, Patient/family training, Equipment evaluation/education, Compensatory technique education, Continued evaluation          See flowsheet documentation for full assessment, interventions and recommendations.   Note: Limitation: Decreased ADL status, Decreased UE ROM, Decreased Safe judgement during ADL, Decreased cognition, Decreased endurance, Decreased UE strength, Decreased high-level ADLs, Visual deficit  Prognosis: Poor  Assessment: Pt is a 79y/o female admitted to the hospial 2* septic shock. Pt was initially evaluated on 3/8, but later therapty was placed on hold 2* declining medical status. Pt currently allowed re-consult of therapy services. Pt remaints to have lower GI bleed. Pt currently demonstrating deficits with her functional balance, functional mobility, ADL status, transfer safety, b/l UE strength, activity tolerance(currently fair=15-20mins), and cognition(i.e.orientation, memory). Functional mobility assessment limited 2* pt's fatigue and dependent bed mobility status. If pt continues to improve medically, pt would benefit from continued OT assessment and tx for the above deficits. 2-3xwk/1-2wks. The patient's raw score on the AM-PAC Daily Activity Inpatient Short Form is 7. A raw score of less than 19 suggests the patient may benefit from discharge to post-acute rehabilitation services. Please refer to the recommendation of the Occupational Therapist for safe discharge planning.     Rehab Resource Intensity Level, OT: II (Moderate Resource Intensity)

## 2024-03-19 NOTE — PHYSICAL THERAPY NOTE
PHYSICAL THERAPY EVALUATION          Patient Name: Darlyn Nguyen  Today's Date: 3/19/2024  PT EVALUATION    78 y.o.    [id number]    Diarrhea of presumed infectious origin [R19.7]  Sepsis (HCC) [A41.9]    Past Medical History:   Diagnosis Date    Acute deep vein thrombosis (DVT) of both lower extremities (HCC)     Acute GI bleeding 03/03/2024    Acute metabolic encephalopathy     Acute respiratory failure with hypoxia (HCC)     Anasarca     Bacteremia due to Enterococcus     Cavitary pneumonia     CKD (chronic kidney disease), stage II     Diabetes mellitus (HCC)     Diabetes mellitus (HCC)     Dysphagia     Fibromyalgia     GI bleed     Hyperlipidemia     Hypernatremia     Hypertension     Hypokalemia     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)     Lupus (HCC)     New onset a-fib (HCC)     Pulmonary hypertension (HCC)     Sacral wound     Severe protein-calorie malnutrition (HCC)     Shock (HCC) 03/03/2024    Steroid dependent for adrenal supression     Getting iv stress dose per ICU     Past Surgical History:   Procedure Laterality Date    ACHILLES TENDON REPAIR Right     ADENOIDECTOMY      APPENDECTOMY      IR EMBOLIZATION (SPECIFY VESSEL OR SITE)  3/15/2024    IR IVC FILTER PLACEMENT OPTIONAL/TEMPORARY  3/7/2024    SKIN CANCER EXCISION      x 4 - back    TONSILLECTOMY      TOTAL ABDOMINAL HYSTERECTOMY      US GUIDED KIDNEY BIOPSY  12/26/2023 03/19/24 1102   PT Last Visit   PT Visit Date 03/19/24   Note Type   Note type Evaluation   Pain Assessment   Pain Assessment Tool 0-10   Pain Score No Pain   Restrictions/Precautions   Other Precautions Contact/isolation;Cognitive;Chair Alarm;Bed Alarm;Multiple lines;Telemetry;Fall Risk;O2;Limb alert   Home Living   Type of Home House   Home Layout Two level;Bed/bath upstairs;Able to live on main level with bedroom/bathroom;1/2 bath on main level   Home Equipment Walker;Stair glide   Additional  Comments 0 EUGENIO. recently staying on first fl w 1/2 bath. stair glide to second fl   Prior Function   Level of [place] Independent with functional mobility;Independent with ADLs   Lives With Spouse   Falls in the last 6 months 0   Comments indep at baseline. using RW since getting sick. spouse home   General   Additional Pertinent History pt admitted 3/324 (transf from [facility]).   Family/Caregiver Present Yes   Cognition   Overall Cognitive Status Impaired   Arousal/Participation Responsive   Orientation Level Oriented to person;Disoriented to place;Disoriented to time;Disoriented to situation   Following Commands Follows one step commands inconsistently   Comments limited verbalizations. shakes head yes/no to questions   RLE Assessment   RLE Assessment X  (no AROM noted)   LLE Assessment   LLE Assessment X  (no AROM noted)   Coordination   Sensation WFL   Bed Mobility   Rolling R 1  Dependent   Additional items Assist x 2   Rolling L 1  Dependent   Additional items Assist x 2   Endurance Deficit   Endurance Deficit Yes   Endurance Deficit Description lethargic   Activity Tolerance   Activity Tolerance Treatment limited secondary to medical complications (Comment)   Medical Staff Made Aware Leighton OT   Nurse Made Aware Payton RN   Assessment   Prognosis Guarded   Assessment Darlyn Nguyen is a 78 y.o. female admitted to [facility] on 3/3/2024 for Lower GI bleed. PT was consulted and pt was seen on 3/19/2024 for mobility assessment and d/c planning. Pt presents w readmission, contact isolation, high fall risk, multiple lines. At baseline is indep; has been using RW since getting sick. Pt is currently functioning at a dep x2 to roll L/R. Defer further mobility secondary to guarded medical status, patient discomfort, bowel incontinence. During evaluation medical team having discussion w family regarding goals of care; following evaluation therapy orders discontinued. Will be available for re-consult as appropriate.   Plan   PT  Frequency   ((PT orders discontinued following evaluation))   AM-PAC Basic Mobility Inpatient   Turning in Flat Bed Without Bedrails 1   Lying on Back to Sitting on Edge of Flat Bed Without Bedrails 1   Moving Bed to Chair 1   Standing Up From Chair Using Arms 1   Walk in Room 1   Climb 3-5 Stairs With Railing 1   Basic Mobility Inpatient Raw Score 6   [facility] Highest Level Of Mobility   -HL Goal 2: Bed activities/Dependent transfer   JH-HLM Achieved 2: Bed activities/Dependent transfer   End of Consult   Patient Position at End of Consult Supine;All needs within reach     History: co - morbidities including age, social background, past experience, use of assistive device, current experience including fall risk, multiple lines, contact isolation  Exam: impairments in systems including multiple body structures involved; musculoskeletal (strength, elevated BMI), neuromuscular (bed level transfers, motor control, sensation), am-pac, integumentary (skin integrity, presence of weeping edema all extremities), cognition; activity limitations (difficulties executing an action); participation restrictions (problems associated w involvement in life situations)  Clinical: unstable/unpredictable  Complexity:high      Brittany Wisdom, PT

## 2024-03-19 NOTE — PROGRESS NOTES
Pastoral Care Progress Note    3/19/2024  Patient: Darlyn Nguyen : 1945  Admission Date & Time: 3/3/2024 1556  MRN: [id number] CSN: [id number]    Was presence when doctor talked to family about patient condition  at presence. Patient is now on comfort care, will continue to follow patient and support family.

## 2024-03-19 NOTE — CASE MANAGEMENT
Case Management Discharge Planning Note    Patient name Darlyn Nguyen  Location ICU ICU  MRN [id number]  : 1945 Date 3/19/2024       Current Admission Date: 3/3/2024  Current Admission Diagnosis:Lower GI bleed   Patient Active Problem List    Diagnosis Date Noted    Encephalopathy acute 2024    Aspiration pneumonia (HCC) 2024    Splenic infarct 2024    Liver lesion 2024    Severe protein-calorie malnutrition (HCC) 2024    Comfort measures only status 03/15/2024    Anasarca 03/10/2024    Thrombocytopenia (HCC) 03/10/2024    Paroxysmal atrial fibrillation (HCC) 2024    Sepsis (HCC) 2024    Lupus nephritis (HCC) 2024    Hypokalemia 2024    Type 2 diabetes mellitus (HCC) 2024    Lower GI bleed 2024    Enterococcal bacteremia 2024    Pressure ulcers of skin of multiple topographic sites 2024    Diarrhea of presumed infectious origin 2024    Positive fecal occult blood test 2024    DVT (deep venous thrombosis) (HCC) 2024    Acute blood loss anemia 2024    Lupus (HCC) 2024    Hypertension 2024    Pancolitis (HCC) 2024    Metabolic encephalopathy 2024    Hypomagnesemia 2024    High serum lactic acid 2024      LOS (days): 16  Geometric Mean LOS (GMLOS) (days): 5.1  Days to GMLOS:-10.7     OBJECTIVE:  Risk of Unplanned Readmission Score: 36.61         Current admission status: Inpatient   Preferred Pharmacy:   [id number] - [place] - [address]  [address]  [place] [address]  Phone: [phone] Fax: [phone]    Primary Care Provider: Yaa Hedrick MD    Primary Insurance: MEDICARE  [facility]    DISCHARGE DETAILS:    Additional Comments: Patient remains in ICU. Ongoing Goals of Care discussion with family. 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  Dispo: pending medical improvement. CM team following for d/c planning.

## 2024-03-19 NOTE — SPEECH THERAPY NOTE
Speech Language/Pathology  Speech/Language Pathology  Assessment    Patient Name: Darlyn Nguyen  Today's Date: 3/19/2024     Problem List  Principal Problem:    Lower GI bleed  Active Problems:    Lupus (HCC)    DVT (deep venous thrombosis) (HCC)    Pressure ulcers of skin of multiple topographic sites    Enterococcal bacteremia    Type 2 diabetes mellitus (HCC)    Lupus nephritis (HCC)    Hypokalemia    Paroxysmal atrial fibrillation (HCC)    Anasarca    Thrombocytopenia (HCC)    Severe protein-calorie malnutrition (HCC)    Encephalopathy acute    Aspiration pneumonia (HCC)    Splenic infarct    Liver lesion    Past Medical History  Past Medical History:   Diagnosis Date    Acute deep vein thrombosis (DVT) of both lower extremities (HCC)     Acute GI bleeding 03/03/2024    Acute metabolic encephalopathy     Acute respiratory failure with hypoxia (HCC)     Anasarca     Bacteremia due to Enterococcus     Cavitary pneumonia     CKD (chronic kidney disease), stage II     Diabetes mellitus (HCC)     Diabetes mellitus (HCC)     Dysphagia     Fibromyalgia     GI bleed     Hyperlipidemia     Hypernatremia     Hypertension     Hypokalemia     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)     Lupus (HCC)     New onset a-fib (HCC)     Pulmonary hypertension (HCC)     Sacral wound     Severe protein-calorie malnutrition (HCC)     Shock (HCC) 03/03/2024    Steroid dependent for adrenal supression     Getting iv stress dose per ICU     Past Surgical History  Past Surgical History:   Procedure Laterality Date    ACHILLES TENDON REPAIR Right     ADENOIDECTOMY      APPENDECTOMY      IR EMBOLIZATION (SPECIFY VESSEL OR SITE)  3/15/2024    IR IVC FILTER PLACEMENT OPTIONAL/TEMPORARY  3/7/2024    SKIN CANCER EXCISION      x 4 - back    TONSILLECTOMY      TOTAL ABDOMINAL HYSTERECTOMY      US GUIDED KIDNEY BIOPSY  12/26/2023          Bedside Swallow Evaluation:    Summary:  Pt presented lethargic but  arousal. Limited trials given. Pt stated she wasn't hungry or thirsty. Oral appeared mildly reduced w/ min amount.  No overt s/s w/ ice chips though unable to palpate a swallow due to pt's size and redundant tissue. Initially w/ spill of straw sips thin liquid to pharynx w/ delayed swallow. Pt appeared to be suppressing a cough response,  though she denied. Improved w/ cues to to to swallow as quickly as she could. Eventually able to feel minimal laryngeal movement.  Refused to cough. stated she was tired. Eventually agreeable. Cough was very wet and congested. Cough/throat cleared  w/ initial trial of apple sauce. Again she denied. Trialed a few more. After that she refused Instructed to cough if she felt the need and explained the importance of clearing her lungs. Suspect mod/severe or severe pharyngeal stage. Stated she was too tired to cough when cued.     Recommendations:  Diet:npo, ok ice chips  Meds:tube/iv  Supervision:full  Positioning:Upright  Pt to take PO/Meds only when fully alert and upright.   Oral care  Aspiration precautions  Reflux precautions  Therapy Prognosis:guarded  Prognosis considerations:overall medical status  Frequency: 2-5x/wk as able and indicated.   Noted order was later cancelled. Order dc'd. Consider po as tolerated as pt desires if hospice.     Consider consult w/:  Rehab   GI following  Nutrition    Goal(s):  Dysphagia LTG  -Patient will demonstrate safe and effective oral intake (without overt s/s significant oral/pharyngeal dysphagia including s/s penetration or aspiration) for the highest appropriate diet level.     1.Pt will tolerate least restrictive diet w/out s/s aspiration or oral/pharyngeal difficulties.   2.Pt will will effectively manipulate/masticate and transfer purees/solids w/out s/s dysphagia/aspiration.   3.Pt will tolerate thin liquids w/out s/s aspiration.   -If indicated, patient will comply with a Video/Modified Barium Swallow study for more complete assessment  of swallowing anatomy/physiology/aspiration risk and to assess efficacy of treatment techniques so as to best guide treatment plan     H&P/Admit info/ pertinent provider notes: (PMH noted above)  Chief Complaint: Respiratory failure requiring intubation and transfer for ICU care  3/18/24 CC Attestation   Assessment & Plan  Acute metabolic encephalopathy  Acute hypoxic respiratory failure requiring intubation twice first time emergent for airway protection and encephalopathy, and the second time for IR procedure.  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 and was on steroids and CellCept, the latter was stopped on admission due to infection and GI bleed/ulceration  New diagnosis of SLE as described above, was on prednisone as outpatient and earlier in admission- completed pulse solumedrol dosing 3/17, now on 50 solumedrol and rituximab weekly x 4 doses  Coagulopathy with thrombocytopenia, increased PT and PTT and decreased fibrinogen concerning for DIC secondary to above, improved with transfusions and cryoprecipitate   Acute DVT from 2/28/2024 with evidence of PE on recent CT scan was taken off anticoagulation due to GI bleed, status post IVC filter during this admission  New onset atrial fibrillation with RVR during this admission  Enterococcus bacteremia from 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  Most recent CT scan showed air-fluid level in the cavitary lesion but no evidence of infection, could be aspiration.  Splenic infarcts most likely septic in the setting of bacteremia  Liver lesions most likely in the setting of infarcts or embolic phenomenon  Methemoglobinemia as a complication to dapsone, resolved and dapsone was stopped, I believe this probably happened at [facility] on prior admission  Anasarca/third spacing with skin bruising in details  DM-2  Sacral decubitus ulcer POA and thigh ulceration  Hypokalemia  History of Present Illness []Expand by Default  HX and PE limited by: patient intubated and sedated  Darlyn Nguyen is a 78 y.o. female patient with medical history including recently diagnosed lupus,  diabetes, methemoglobinemia 2/2 dapsone which was discontinued, hemolytic anemia, subacute diarrhea, hypertension and sacral ulcer who presented to [facility] 2/29/2024 due to persistent diarrhea, found to have positive FOBT.  Initial evaluation demonstrated evidence of colitis, pneumonia and a mild drop of her hemoglobin from prior.  The patient received 2 units PRBC on admission and was started on broad-spectrum antibiotics.  Patient's pneumonia workup was unrevealing.  Her sacral ulcer wound cultures were multi microbial including E. coli, Proteus mirabilis and Pseudomonas aeruginosa MDR.  Her urine cultures were also noted for low count Pseudomonas, 10,000-19,000 CFU.  Patient's diarrhea persisted throughout her hospitalization.  Blood cultures growing Enterococcus avium in 1 of 2 sets.  The patient was noted for poor oral intake and overall clinical deterioration.  Her antibiotic therapy initially consisted of ceftriaxone and Flagyl, broadened to cefepime, Flagyl and IV vancomycin with vancomycin then replaced with linezolid.    On 3/3/2024 the patient was noted to meet sepsis  criteria with leukocytosis and tachycardia, lactic acid level at 7.1 consistent with septic shock. The patient was initiated on fluid resuscitation however developed hypotension following IV fluids per sepsis protocol with 30 mL/kg.  She was initiated on Levophed. The patient started to develop respiratory failure and worsening mental status and was intubated. Central line was placed and vasopressin was added.  Meropenem was also added to therapy.  Arrangements were made for transfer to [facility] where she was accepted under critical care service Dr. Carey.  Pt arrived to the [facility] ICU intubated, on Levophed 25, vasopression 0.04, and heparin drip. BP in the hypertensive range, so levo weaned to 15. Noted to have small bloody bowel movement upon arrival and bloody secretions suctioned from ETT. Hgb 6.2 from 8.0 this morning, so heparin drip held and GI consulted. PTT subsequently resulted supratherapeutic. Propofol initiated for sedation.  Family seen at bedside. Report that patient had been independent and in good health as recently as October 2023, but has declined since that time. Was diagnosed with Lupus in December 2023. Has been predominantly confined to the couch in recent months, and has developed painful sacral ulcer, exacerbated by recent diarrhea. They note a family history of GI cancers in multiple family members, including colon cancer in a sister and gastric cancer in a nephew. They deny any prior history of significant GI bleed, though her spouse does believe she's had episodes hemorrhoidal bleeding in the past.   With respect to Enterococcus avium infection, pt's daughter and son-in-law keep chickens, but state that patient is not exposed to these or other birds.   Reviewed home medications with patient's spouse. While he does not recognize Eliquis as a home med, this is likely because this medication appears to have been started on 2/24/24 following discovery of acute BLE DVTs on  2/20/24.      History obtained from chart review and family (spouse, daughter, son-in-law).      Special Studies:  Most recent CXR  3/15/24:  ET tube 3 cm above the jenaro.  Redemonstration of large cavitary lesion in the right midlung with moderate right effusion and right base atelectasis. Pneumonia not excluded in the appropriate clinical setting.    See other GI studies.     Procalcitonin:    WBC:  17.12  3/19     Previous MBS:  none    Patient's goal: wanted to sleep    Did the pt report pain?no  If yes, was nursing notified/was it addressed? N/a    Reason for consult:  R/o aspiration  Determine safest and least restrictive diet  respiratory compromise, intubated 3/11-3/14, also intubated on transfer from [facility]  h/o dysphagia     Precautions:  Contact  Aspiration  Fall    Food Allergies:  none   Current Diet:  Npo/ngt   Premorbid diet:  Regular prior to medical decline.Placed on ndd2 and thin at [facility]s 3/1.   O2 requirement:  3.5 nc   Social/Prior living  Home w/  proir to recent medical decline.    Voice/Speech:  Minimal . No dysphonia. Reduced volume.    Follows commands:  basic   Cognitive status:  arousable     Oral [facility] exam:  Dentition:natural  Lips (VII):no gross asymmetry  Tongue (XII):no gross asymmetry  Velum (X): WNL  Secretion management:WNL  Volitional cough: wet/gurgly  Volitional swallow: unable to palpate    Esophageal stage:  H/o GI bleed.   See egd reports. \    Results d/w:  Pt, nursing, family

## 2024-03-19 NOTE — PROGRESS NOTES
03/19/24 1300   Clinical Encounter Type   Visited With Family;Health care provider   Routine Visit Follow-up   Continue Visiting Yes

## 2024-03-19 NOTE — PROGRESS NOTES
[facility]  Progress Note  Name: Darlyn Nguyen I  MRN: [id number]  Unit/Bed#: ICU 02 I Date of Admission: 3/3/2024   Date of Service: 3/19/2024 I Hospital Day: 16    Assessment/Plan   Liver lesion  Assessment & Plan  Noted on CT of 3/15/24, new since  2/29/24   Most likely d/t infarcts or embolic phenomenon     Plan:  Continue unasyn for 6 weeks total    Splenic infarct  Assessment & Plan  Suspect septic emboli in the setting of bacteremia    Plan:  Continue unasyn for 6 weeks    Aspiration pneumonia (HCC)  Assessment & Plan  CT chest shows cavitary lesion in RUL with air fluid level with mild atelectatic changes    Plan:  Continue unasyn x6 weeks as above    Encephalopathy acute  Assessment & Plan  Likely acute delirium in the setting of critical illness and prolonged hospitalization on chronic cognitive decline    Plan:  Delirium precautions  Regulate sleep wake cycles, melatonin qHS    Severe protein-calorie malnutrition (HCC)  Assessment & Plan  Malnutrition Findings:   Adult Malnutrition type: Acute illness  Adult Degree of Malnutrition: Other severe protein calorie malnutrition  Malnutrition Characteristics: Fluid accumulation, Inadequate energy                  360 Statement: Severe calorie-protein malnutrition in context to acute illness r/t GI bleed, dysphagia, inadequate PO intake as evidance by energy intake less than 50% compared to estimated needs >5 days, +2 generalized edema; Treated with TBD (diet +oral supplements vs nutrition support)    BMI Findings:           Body mass index is 31.64 kg/m².     Continue TF w Vital AF 1.2 with goal rate 60 ml/h,  ml Q4H  Nutrition consult    Thrombocytopenia (HCC)  Assessment & Plan  Stable, continue to monitor  Suspect autoimmune process secondary to lupus and consumption from GI bleed/DIC    Anasarca  Assessment & Plan  Profound pitting edema in the dependent soft tissues, likely in setting of low oncotic pressures  Small skin  tears of the BUE weeping serous fluid    Plan  Lasix gtt discontinued, continue as needed IV furosemide for goal net negative 500cc  Continue to monitor I&O's  Trend renal indices    Paroxysmal atrial fibrillation (HCC)  Assessment & Plan  Pt noted to be in SVT on 3/6/24. EKG showing afib. Converted on diltiazem IV, transitioned to cardizem PO.    Echo 2/20/24 unremarkable--showed grade 1 diastolic dysfunction and mild mitral regurg.  Chads-vasc=5    Questionable aflutter/afib RVR overnight, given cardizem and a 500cc isolyte bolus without conversion. She was given IV 5mg lopressor and converted to normal sinus rhythm.    Plan:  Home coreg 6.25 BID restarted  Continuous cardiopulmonary monitoring    Hypokalemia  Assessment & Plan  Likely was in the setting of furosemide gtt    Plan:  Continue IV and PO potassium replacement  Continue to monitor    Lupus nephritis (HCC)  Assessment & Plan  Biopsy-proven at [facility] 3/12/24 shows moderate blood (30-50 RBCs on micro), moderate leukocytes (30-50 on micro), 1+ protein, 1+ ketones, and increased specific gravity; not specific, but c/w lupus nephritis  U protein:Cr elevated    Plan:  Continue to monitor I&O's, trend renal indices  Continue IV solumedrol 50mg daily    Type 2 diabetes mellitus (HCC)  Assessment & Plan  Lab Results   Component Value Date    HGBA1C 5.9 (H) 03/03/2024       Recent Labs     03/17/24  1726 03/18/24  0120 03/18/24  0619 03/18/24  1115   POCGLU 198* 146* 268* 267*         Blood Sugar Average: Last 72 hrs:  (P) 168.9    Regimen increased to insulin gtt while on steroids, will de-escalate when able  BG goal 140-180    Enterococcal bacteremia  Assessment & Plan  Repeat cultures negative  Unasyn x 6 week course (thru 4/12)    Pressure ulcers of skin of multiple topographic sites  Assessment & Plan  Wound Care following, appreciate recommendations  Continue wound care and repositioning with nursing    DVT (deep venous thrombosis) ([facility])  Assessment &  Plan  Discovered 2/20/24, started on Eliquis at home, started Hep gtt at [facility] but developed GI bleed, AC currently on hold   S/p IVC filter with IR 3/7/24    Plan:  Hold AC, continue to monitor s/sx of bleeding  Consider restarting DVT ppx once Hg stabilizes and no further bloody bowel movements    Lupus (HCC)  Assessment & Plan  Diagnosed in December,   Initially started on steroids, prednisone 60 mg daily, which patient had tapered to 30 mg daily prior to admission with the addition of atovaquone, hydroxychloroquine, and mycophenolate. Patient recently completed 5 day course of solumedrol 1g daily.     Holding atovaquone, hydroxychloroquine, and mycophenolate in the setting of acute infection  random cortisol 10.9 collected 3/11    Plan:  Continue Solumedrol 50 mg daily  Rituximab qweekly, first dose 3/16/24, anticipate next dose 3/22  Rheumatology consulted, appreciate recommendations    * Lower GI bleed  Assessment & Plan  Persitent LGIB  Patient was initially on heparin due to acute DVT  3/6 flexible sigmoidoscopy revealing ulcerated rectal mucosa, status post cauterization  Repeat Flex sig w GI on 3/11: multiple ulcers throughout colon, biopsies taken; one oozing ulcer in transverse colon, clipped with hemostasis; severe diverticula; prolapsed rectum; rectal ulcers.  Colonoscopy 3/13: Multiple ulcers in the IC valve, cecum, ascending colon, hepatic flexure and transverse colon; Ulcer in the hepatic flexure with oozing hemorrhage; placed 2 clips successfully; hemostasis achieved  3/15: Pt bleeding overnight in early AM hours with large drop in Hgb, transfused 2 U pRBC and 1 U FFP and GI updated. Added transfusion of 1 U FFP, 1 U cryo, 2 U plt. CT bleeding scan showed active bleed in R colon near hepatic flexure. Taken to IR 3/15 for embolization, however unable to identify active bleed    Total blood products this admission: RBC x14, FFP x5, plt x3, cryo x3    Patient continues with bloody bowel movements  overnight and a 2 gram hemoglobin drop. Will need goals of care discussions with family regarding further care.    Plan:  Continue rituximab Qweekly, started 3/16/24  Continue ganciclovir, started 3/14/24 for CMV, continue 14 day course per ID  Monitor Hgb on daily labs  Level of care 3, no intubation or resuscitation, no aggressive interventions including hemodialysis or colonoscopy or any other intervention if she starts bleeding again or she gets worse. Will discuss other supportive measures if not very invasive at the time.  Continue ongoing goals of care discussions             Disposition: Stepdown Level 1    ICU Core Measures     A: Assess, Prevent, and Manage Pain Has pain been assessed? Yes  Need for changes to pain regimen? No   B: Both SAT/SAT  N/A   C: Choice of Sedation RASS Goal: N/A patient not on sedation  Need for changes to sedation or analgesia regimen? NA   D: Delirium CAM-ICU: Positive   E: Early Mobility  Plan for early mobility? Yes   F: Family Engagement Plan for family engagement today? Yes       Antibiotic Review: Infectious disease consulted    Review of Invasive Devices:    [place] Plan: Voiding trial after improvement in ambulation   Central access plan: Medications requiring central line Patient requires PICC secondary to long term abx      Prophylaxis:  VTE VTE covered by:    None       Stress Ulcer  not ordered         Significant 24hr Events     24hr events: had multiple bloody bowel movements overnight, hg noted to drop to 7.3 this AM.     Subjective   Review of Systems   Constitutional:  Negative for chills and fever.   Respiratory:  Positive for cough. Negative for shortness of breath.    Cardiovascular:  Negative for chest pain and palpitations.   Gastrointestinal:  Positive for blood in stool. Negative for abdominal pain, nausea and vomiting.   Genitourinary:  Negative for hematuria.   Neurological:  Positive for weakness. Negative for dizziness, light-headedness and headaches.       Objective                            Vitals I/O      Most Recent Min/Max in 24hrs   Temp (!) 97.4 °F (36.3 °C) Temp  Min: 97.4 °F (36.3 °C)  Max: 98.3 °F (36.8 °C)   Pulse 80 Pulse  Min: 76  Max: 108   Resp 20 Resp  Min: 11  Max: 26   /58 BP  Min: 94/83  Max: 165/84   O2 Sat 95 % SpO2  Min: 90 %  Max: 100 %      Intake/Output Summary (Last 24 hours) at 3/19/2024 0810  Last data filed at 3/19/2024 0608  Gross per 24 hour   Intake 3481.86 ml   Output 2545 ml   Net 936.86 ml       Diet Enteral/Parenteral; Tube Feeding No Oral Diet; Vital AF 1.2; Continuous; 60; 400; Water; Every 4 hours    Invasive Monitoring           Physical Exam   Physical Exam  Vitals and nursing note reviewed.   Eyes:      General: No scleral icterus.     Extraocular Movements: Extraocular movements intact.      Conjunctiva/sclera: Conjunctivae normal.      Pupils: Pupils are equal, round, and reactive to light.   HENT:      Head: Normocephalic and atraumatic.      Mouth/Throat:      Mouth: Mucous membranes are dry.   Cardiovascular:      Rate and Rhythm: Normal rate. Rhythm irregular.      Heart sounds: Normal heart sounds.   Musculoskeletal:      Comments: Globally weak in b/l UE and b/l LE.   Abdominal: General: There is no distension.      Palpations: Abdomen is soft.      Tenderness: There is no abdominal tenderness. There is no guarding.   Constitutional:       General: She is awake. She is not in acute distress.     Appearance: She is cachectic. She is ill-appearing. She is not toxic-appearing.   Pulmonary:      Effort: No accessory muscle usage, respiratory distress or accessory muscle usage.      Breath sounds: Rhonchi (coarse rhonchi throughout b/l lung fields) present. No wheezing.   Neurological:      Mental Status: She is alert. She is disoriented to time and disoriented to situation.      Comments: Follows commands, able to tell me she is in the hospital. When asked the month she states February. She tells me the year is  '4046'    Genitourinary/Anorectal:  Fabian present.          Diagnostic Studies      EKG: 3/18 aflutter  Imaging:  I have personally reviewed pertinent reports.       Medications:  Scheduled PRN   ampicillin-sulbactam, 3 g, Q6H  atorvastatin, 40 mg, Daily With Dinner  atovaquone, 1,500 mg, Daily With Breakfast  carvedilol, 6.25 mg, BID With Meals  ganciclovir, 2.5 mg/kg (Adjusted), Q12H  ipratropium-albuterol, 3 mL, Q6H  methylPREDNISolone sodium succinate, 50 mg, Daily  potassium chloride, 40 mEq, Once      Acetaminophen, 650 mg, Q4H PRN  ondansetron, 4 mg, Q4H PRN       Continuous    insulin regular (HumuLIN R,NovoLIN R) 1 Units/mL in sodium chloride 0.9 % 100 mL infusion, 0.3-21 Units/hr, Last Rate: 0.5 Units/hr (03/19/24 0608)         Labs:    CBC    Recent Labs     03/18/24  0536 03/19/24  0427   WBC 14.86* 17.12*   HGB 9.1* 7.3*   HCT 27.5* 22.0*   PLT 95* 82*     BMP    Recent Labs     03/18/24  1526 03/19/24  0427   SODIUM 142 144   K 3.4* 3.4*   * 111*   CO2 27 28   AGAP 6 5   BUN 24 26*   CREATININE 0.88 0.68   CALCIUM 8.1* 7.8*       Coags    No recent results     Additional Electrolytes  Recent Labs     03/18/24  0536   MG 3.0*   PHOS 3.8   CAIONIZED 1.17          Blood Gas    No recent results  No recent results LFTs  Recent Labs     03/19/24  0427   ALT 77*   AST 29   ALKPHOS 222*   ALB 2.4*   TBILI 0.59       Infectious  No recent results  Glucose  Recent Labs     03/18/24  0536 03/18/24  1526 03/19/24  0427   GLUC 295* 338* 130               Cynthia Peralta PA-C

## 2024-03-19 NOTE — NURSING NOTE
Monitoring wires removed, oral care provided, and lip moisturizer applied, pt denied pain, pain medications offered, comfort care provided for family, family informed it they thought pt was uncomfortable to inform nursing staff

## 2024-03-19 NOTE — OCCUPATIONAL THERAPY NOTE
Occupational Therapy Re-Evaluation     Patient Name: Darlyn Nguyen  Today's Date: 3/19/2024  Problem List  Principal Problem:    Lower GI bleed  Active Problems:    Lupus (HCC)    DVT (deep venous thrombosis) (HCC)    Pressure ulcers of skin of multiple topographic sites    Enterococcal bacteremia    Type 2 diabetes mellitus (HCC)    Lupus nephritis (HCC)    Hypokalemia    Paroxysmal atrial fibrillation (HCC)    Anasarca    Thrombocytopenia (HCC)    Severe protein-calorie malnutrition (HCC)    Encephalopathy acute    Aspiration pneumonia (HCC)    Splenic infarct    Liver lesion    Past Medical History  Past Medical History:   Diagnosis Date    Acute deep vein thrombosis (DVT) of both lower extremities (HCC)     Acute GI bleeding 03/03/2024    Acute metabolic encephalopathy     Acute respiratory failure with hypoxia (HCC)     Anasarca     Bacteremia due to Enterococcus     Cavitary pneumonia     CKD (chronic kidney disease), stage II     Diabetes mellitus (HCC)     Diabetes mellitus (HCC)     Dysphagia     Fibromyalgia     GI bleed     Hyperlipidemia     Hypernatremia     Hypertension     Hypokalemia     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)     Lupus (HCC)     New onset a-fib (HCC)     Pulmonary hypertension (HCC)     Sacral wound     Severe protein-calorie malnutrition (HCC)     Shock (HCC) 03/03/2024    Steroid dependent for adrenal supression     Getting iv stress dose per ICU     Past Surgical History  Past Surgical History:   Procedure Laterality Date    ACHILLES TENDON REPAIR Right     ADENOIDECTOMY      APPENDECTOMY      IR EMBOLIZATION (SPECIFY VESSEL OR SITE)  3/15/2024    IR IVC FILTER PLACEMENT OPTIONAL/TEMPORARY  3/7/2024    SKIN CANCER EXCISION      x 4 - back    TONSILLECTOMY      TOTAL ABDOMINAL HYSTERECTOMY      US GUIDED KIDNEY BIOPSY  12/26/2023 03/19/24 1045   Note Type   Note type Re-Evaluation   Pain Assessment   Pain Assessment Tool  "FLACC   Pain Rating: FLACC (Rest) - Face 1   Pain Rating: FLACC (Rest) - Legs 0   Pain Rating: FLACC (Rest) - Activity 0   Pain Rating: FLACC (Rest) - Cry 1   Restrictions/Precautions   Weight Bearing Precautions Per Order No   Other Precautions Fall Risk;Pain;Multiple lines;Telemetry;Cognitive;Chair Alarm;Bed Alarm  (sacral wound)   Prior Function   Lives With Spouse   Lifestyle   Autonomy Per husbands report(prior to hospitalization) PTA pt was independent with ADL/IADLs, transfers, and functional mobility - with use of RW. Per  pt is (-) falls, (-) home alone.   Reciprocal Relationships spouse   Service to Others homemaker   Intrinsic Gratification watching tv   General   Family/Caregiver Present Yes  ()   Subjective   Subjective \"We are in [place].\"   ADL   Where Assessed Supine, bed   Eating Assistance 1  Total Assistance   Grooming Assistance 1  Total Assistance   UB Bathing Assistance 1  Total Assistance   LB Bathing Assistance 1  Total Assistance   UB Dressing Assistance 1  Total Assistance   LB Dressing Assistance 1  Total Assistance   Toileting Assistance  1  Total Assistance   Bed Mobility   Rolling R 1  Dependent   Additional items Assist x 2;Increased time required;Verbal cues;LE management   Rolling L 1  Dependent   Additional items Assist x 2;Increased time required;Verbal cues;LE management   Transfers   Sit to Stand Unable to assess   Additional Comments pt currently with fatigue, limiting EOB or OOB mobility   Functional Mobility   Functional Mobility   (recommend hoyerlift for OOB with nsg)   Activity Tolerance   Activity Tolerance Patient limited by fatigue;Treatment limited secondary to medical complications (Comment)   Medical Staff Made Aware MD radha, P.T.   RUE Assessment   RUE Assessment X  (PROM=WFLs, limited AROM noted throughout(i.e.shr flex=10-20*))   RUE Strength   RUE Overall Strength   (2-/5 throughout)   LUE Assessment   LUE Assessment X  (PROM=WFLs, limited " AROM(i.e.shr flex=30-40*; elbow-distal=grossly WFLs))   LUE Strength   LUE Overall Strength   (shr=2-/5, elbow-distal=3-/5)   Hand Function   Gross Motor Coordination Impaired   Fine Motor Coordination Impaired   Sensation   Light Touch Not tested   Proprioception   Proprioception Not tested   Vision - Complex Assessment   Visual Fields   (required verbal cues to scan visual fields)   Psychosocial   Psychosocial (WDL) X   Patient Behaviors/Mood Cooperative;Flat affect   Cognition   Overall Cognitive Status Impaired   Arousal/Participation Arousable   Attention Attends with cues to redirect   Orientation Level Oriented to person;Disoriented to place;Disoriented to time;Disoriented to situation   Memory Decreased recall of precautions;Decreased short term memory;Decreased recall of recent events   Following Commands Follows one step commands with increased time or repetition   Assessment   Limitation Decreased ADL status;Decreased UE ROM;Decreased Safe judgement during ADL;Decreased cognition;Decreased endurance;Decreased UE strength;Decreased high-level ADLs;Visual deficit   Prognosis Poor   Assessment Pt is a 79y/o female admitted to the hospial 2* septic shock. Pt was initially evaluated on 3/8, but later therapty was placed on hold 2* declining medical status. Pt currently allowed re-consult of therapy services. Pt remaints to have lower GI bleed. Pt currently demonstrating deficits with her functional balance, functional mobility, ADL status, transfer safety, b/l UE strength, activity tolerance(currently fair=15-20mins), and cognition(i.e.orientation, memory). Functional mobility assessment limited 2* pt's fatigue and dependent bed mobility status. If pt continues to improve medically, pt would benefit from continued OT assessment and tx for the above deficits. 2-3xwk/1-2wks. The patient's raw score on the AM-PAC Daily Activity Inpatient Short Form is 7. A raw score of less than 19 suggests the patient may  benefit from discharge to post-acute rehabilitation services. Please refer to the recommendation of the Occupational Therapist for safe discharge planning.   Goals   Patient Goals unable to fully assess   STG Time Frame   (1-7 days)   Short Term Goal #1 Pt will demonstrate mod A with her bed mobility to faciliate EOB ADLs.   Short Term Goal #2 Pt will demonstrate improved activity tolerance to fair(15-20mins) and sitting tolerance, on EOB, to 5-10mins.   Short Term Goal  Pt will tolerate continued cognitive/functional mobility assessment and appropriate d/c recommendations/goals will be provided.   LTG Time Frame   (7-14 days)   Long Term Goal #1 Pt will demonstrate improved b/l UE strength by 1 grade to assist with ADLs.   Long Term Goal #2 Pt will demonstrate Ileana with simple ADL tasks(i.e.grooming, self-feeding).   Long Term Goal Pt will independently verbalize 2-3 methods in pressure relief techniques.   Plan   Treatment Interventions ADL retraining;Functional transfer training;UE strengthening/ROM;Endurance training;Cognitive reorientation;Patient/family training;Equipment evaluation/education;Compensatory technique education;Continued evaluation   Goal Expiration Date 04/02/24   OT Treatment Day 0   OT Frequency 2-3x/wk   Discharge Recommendation   Rehab Resource Intensity Level, OT II (Moderate Resource Intensity)   AM-PAC Daily Activity Inpatient   Lower Body Dressing 1   Bathing 1   Toileting 1   Upper Body Dressing 1   Grooming 1   Eating 2   Daily Activity Raw Score 7   Turning Head Towards Sound 2   Follow Simple Instructions 2   Low Function Daily Activity Raw Score 11   Low Function Daily Activity Standardized Score  19.45   AM-PAC Applied Cognition Inpatient   Following a Speech/Presentation 2   Understanding Ordinary Conversation 2   Taking Medications 1   Remembering Where Things Are Placed or Put Away 1   Remembering List of 4-5 Errands 1   Taking Care of Complicated Tasks 1   Applied Cognition Raw  Score 8   Applied Cognition Standardized Score 19.32   Leighton Gandhi

## 2024-03-19 NOTE — ACP (ADVANCE CARE PLANNING)
Advanced Care Planning Note     Darlyn Nguyen 78 y.o. female MRN: [id number]    Unit/Bed#: ICU 02 Encounter: [id number]    Darlyn Nguyen is a 78 year old female that presented today secondary to having an acute condition requiring critical care provider evaluation.  The patient has chronic comorbidities, including but not limited to SLE, and now is inflicted with following acute conditions: recurrent GI bleed, colitis.  Due to the severity of the patient's acute condition and/or the extent of chronic conditions, there is need for advanced care planning at this time.  Please see my previous documentation in regards to the patient's current condition, assessment, and treatment plan.  During this discussion with the patient and/or family members, it was established that all stake holders were agreeable and understood the rationale for advanced care planning to occur at this time.  The following individuals were present & participated in the face to face conversation I had about the patient's advanced directives & advanced care planning: , son-in-law, daughter  Please see my following comments for details of the conversation & decisions that were made:    Patient with recurrent GI bleeding.  She is confused minimally responsive.  Discussed hospital course, patient's previous wishes with family  Decision made to transition to comfort-directed care    Total time spent, (15) minutes.    CODE STATUS: Level 4  POA:    POLST:      SIGNATURE: Diogenes Martinez MD  DATE: March 19, 2024  TIME: 11:30 AM

## 2024-03-20 NOTE — PLAN OF CARE
Problem: Prexisting or High Potential for Compromised Skin Integrity  Goal: Skin integrity is maintained or improved  Description: INTERVENTIONS:  - Identify patients at risk for skin breakdown  - Assess and monitor skin integrity  - Assess and monitor nutrition and hydration status  - Monitor labs   - Assess for incontinence   - Turn and reposition patient  - Assist with mobility/ambulation  - Relieve pressure over bony prominences  - Avoid friction and shearing  - Provide appropriate hygiene as needed including keeping skin clean and dry  - Evaluate need for skin moisturizer/barrier cream  - Collaborate with interdisciplinary team   - Patient/family teaching  - Consider wound care consult   Outcome: Progressing     Problem: SAFETY ADULT  Goal: Patient will remain free of falls  Description: INTERVENTIONS:  - Educate patient/family on patient safety including physical limitations  - Instruct patient to call for assistance with activity   - Consult OT/PT to assist with strengthening/mobility   - Keep Call bell within reach  - Keep bed low and locked with side rails adjusted as appropriate  - Keep care items and personal belongings within reach  - Initiate and maintain comfort rounds  - Make Fall Risk Sign visible to staff  - Offer Toileting every 2 Hours, in advance of need  - Initiate/Maintain Bed alarm  - Obtain necessary fall risk management equipment  - Apply yellow socks and bracelet for high fall risk patients  - Consider moving patient to room near nurses station  Outcome: Progressing  Goal: Maintain or return to baseline ADL function  Description: INTERVENTIONS:  -  Assess patient's ability to carry out ADLs; assess patient's baseline for ADL function and identify physical deficits which impact ability to perform ADLs (bathing, care of mouth/teeth, toileting, grooming, dressing, etc.)  - Assess/evaluate cause of self-care deficits   - Assess range of motion  - Assess patient's mobility; develop plan if  impaired  - Assess patient's need for assistive devices and provide as appropriate  - Encourage maximum independence but intervene and supervise when necessary  - Involve family in performance of ADLs  - Assess for home care needs following discharge   - Consider OT consult to assist with ADL evaluation and planning for discharge  - Provide patient education as appropriate  Outcome: Progressing  Goal: Maintains/Returns to pre admission functional level  Description: INTERVENTIONS:  - Perform AM-PAC 6 Click Basic Mobility/ Daily Activity assessment daily.  - Set and communicate daily mobility goal to care team and patient/family/caregiver.   - Collaborate with rehabilitation services on mobility goals if consulted  - Perform Range of Motion 3 times a day.  - Reposition patient every 2 hours.  - Dangle patient 3 times a day  - Stand patient 3 times a day  - Ambulate patient 3 times a day  - Out of bed to chair 3 times a day   - Out of bed for meals 3 times a day  - Out of bed for toileting  - Record patient progress and toleration of activity level   Outcome: Progressing     Problem: DISCHARGE PLANNING  Goal: Discharge to home or other facility with appropriate resources  Description: INTERVENTIONS:  - Identify barriers to discharge w/patient and caregiver  - Arrange for needed discharge resources and transportation as appropriate  - Identify discharge learning needs (meds, wound care, etc.)  - Arrange for interpretive services to assist at discharge as needed  - Refer to Case Management Department for coordinating discharge planning if the patient needs post-hospital services based on physician/advanced practitioner order or complex needs related to functional status, cognitive ability, or social support system  Outcome: Progressing     Problem: Knowledge Deficit  Goal: Patient/family/caregiver demonstrates understanding of disease process, treatment plan, medications, and discharge instructions  Description:  Complete learning assessment and assess knowledge base.  Interventions:  - Provide teaching at level of understanding  - Provide teaching via preferred learning methods  Outcome: Progressing     Problem: INFECTION - ADULT  Goal: Absence or prevention of progression during hospitalization  Description: INTERVENTIONS:  - Assess and monitor for signs and symptoms of infection  - Monitor lab/diagnostic results  - Monitor all insertion sites, i.e. indwelling lines, tubes, and drains  - Monitor endotracheal if appropriate and nasal secretions for changes in amount and color  - [place] appropriate cooling/warming therapies per order  - Administer medications as ordered  - Instruct and encourage patient and family to use good hand hygiene technique  - Identify and instruct in appropriate isolation precautions for identified infection/condition  Outcome: Progressing     Problem: CARDIOVASCULAR - ADULT  Goal: Maintains optimal cardiac output and hemodynamic stability  Description: INTERVENTIONS:  - Monitor I/O, vital signs and rhythm  - Monitor for S/S and trends of decreased cardiac output  - Administer and titrate ordered vasoactive medications to optimize hemodynamic stability  - Assess quality of pulses, skin color and temperature  - Assess for signs of decreased coronary artery perfusion  - Instruct patient to report change in severity of symptoms  Outcome: Progressing  Goal: Absence of cardiac dysrhythmias or at baseline rhythm  Description: INTERVENTIONS:  - Continuous cardiac monitoring, vital signs, obtain 12 lead EKG if ordered  - Administer antiarrhythmic and heart rate control medications as ordered  - Monitor electrolytes and administer replacement therapy as ordered  Outcome: Progressing     Problem: RESPIRATORY - ADULT  Goal: Achieves optimal ventilation and oxygenation  Description: INTERVENTIONS:  - Assess for changes in respiratory status  - Assess for changes in mentation and behavior  - Position to  facilitate oxygenation and minimize respiratory effort  - Oxygen administered by appropriate delivery if ordered  - Initiate smoking cessation education as indicated  - Encourage broncho-pulmonary hygiene including cough, deep breathe, Incentive Spirometry  - Assess the need for suctioning and aspirate as needed  - Assess and instruct to report SOB or any respiratory difficulty  - Respiratory Therapy support as indicated  Outcome: Progressing     Problem: SKIN/TISSUE INTEGRITY - ADULT  Goal: Skin Integrity remains intact(Skin Breakdown Prevention)  Description: Assess:  -Perform Дмитрий assessment every   -Clean and moisturize skin every   -Inspect skin when repositioning, toileting, and assisting with ADLS  -Assess under medical devices such as  every   -Assess extremities for adequate circulation and sensation     Bed Management:  -Have minimal linens on bed & keep smooth, unwrinkled  -Change linens as needed when moist or perspiring  -Avoid sitting or lying in one position for more than  hours while in bed  -Keep HOB at degrees     Toileting:  -Offer bedside commode  -Assess for incontinence every   -Use incontinent care products after each incontinent episode such as     Activity:  -Mobilize patient times a day  -Encourage activity and walks on unit  -Encourage or provide ROM exercises   -Turn and reposition patient every  Hours  -Use appropriate equipment to lift or move patient in bed  -Instruct/ Assist with weight shifting every  when out of bed in chair  -Consider limitation of chair time  hour intervals    Skin Care:  -Avoid use of baby powder, tape, friction and shearing, hot water or constrictive clothing  -Relieve pressure over bony prominences using   -Do not massage red bony areas    Next Steps:  -Teach patient strategies to minimize risks such as    -Consider consults to  interdisciplinary teams such as   Outcome: Progressing  Goal: Incision(s), wounds(s) or drain site(s) healing without S/S of  infection  Description: INTERVENTIONS  - Assess and document dressing, incision, wound bed, drain sites and surrounding tissue  - Provide patient and family education  - Perform skin care/dressing changes every   Outcome: Progressing  Goal: Pressure injury heals and does not worsen  Description: Interventions:  - Implement low air loss mattress or specialty surface (Criteria met)  - Apply silicone foam dressing  - Instruct/assist with weight shifting every  minutes when in chair   - Limit chair time to  hour intervals  - Use special pressure reducing interventions such as  when in chair   - Apply fecal or urinary incontinence containment device   - Perform passive or active ROM every   - Turn and reposition patient & offload bony prominences every  hours   - Utilize friction reducing device or surface for transfers   - Consider consults to  interdisciplinary teams such as   - Use incontinent care products after each incontinent episode such as   - Consider nutrition services referral as needed  Outcome: Progressing     Problem: HEMATOLOGIC - ADULT  Goal: Maintains hematologic stability  Description: INTERVENTIONS  - Assess for signs and symptoms of bleeding or hemorrhage  - Monitor labs  - Administer supportive blood products/factors as ordered and appropriate  Outcome: Progressing     Problem: Nutrition/Hydration-ADULT  Goal: Nutrient/Hydration intake appropriate for improving, restoring or maintaining nutritional needs  Description: Monitor and assess patient's nutrition/hydration status for malnutrition. Collaborate with interdisciplinary team and initiate plan and interventions as ordered.  Monitor patient's weight and dietary intake as ordered or per policy. Utilize nutrition screening tool and intervene as necessary. Determine patient's food preferences and provide high-protein, high-caloric foods as appropriate.     INTERVENTIONS:  - Monitor oral intake, urinary output, labs, and treatment plans  - Assess  nutrition and hydration status and recommend course of action  - Evaluate amount of meals eaten  - Assist patient with eating if necessary   - Allow adequate time for meals  - Recommend/ encourage appropriate diets, oral nutritional supplements, and vitamin/mineral supplements  - Order, calculate, and assess calorie counts as needed  - Recommend, monitor, and adjust tube feedings and TPN/PPN based on assessed needs  - Assess need for intravenous fluids  - Provide specific nutrition/hydration education as appropriate  - Include patient/family/caregiver in decisions related to nutrition  Outcome: Progressing     Problem: SAFETY,RESTRAINT: NV/NON-SELF DESTRUCTIVE BEHAVIOR  Goal: Remains free of harm/injury (restraint for non violent/non self-detsructive behavior)  Description: INTERVENTIONS:  - Instruct patient/family regarding restraint use   - Assess and monitor physiologic and psychological status   - Provide interventions and comfort measures to meet assessed patient needs   - Identify and implement measures to help patient regain control  - Assess readiness for release of restraint   Outcome: Progressing  Goal: Returns to optimal restraint-free functioning  Description: INTERVENTIONS:  - Assess the patient's behavior and symptoms that indicate continued need for restraint  - Identify and implement measures to help patient regain control  - Assess readiness for release of restraint   Outcome: Progressing     Problem: COPING  Goal: Pt/Family able to verbalize concerns and demonstrate effective coping strategies  Description: INTERVENTIONS:  - Assist patient/family to identify coping skills, available support systems and cultural and spiritual values  - Provide emotional support, including active listening and acknowledgement of concerns of patient and caregivers  - Reduce environmental stimuli, as able  - Provide patient education  - Assess for spiritual pain/suffering and initiate spiritual care, including  notification of Pastoral Care or jacinda based community as needed  - Assess effectiveness of coping strategies  Outcome: Progressing  Goal: Will report anxiety at manageable levels  Description: INTERVENTIONS:  - Administer medication as ordered  - Teach and encourage coping skills  - Provide emotional support  - Assess patient/family for anxiety and ability to cope  Outcome: Progressing

## 2024-03-20 NOTE — DISCHARGE SUMMARY
"Discharge Summary -     Darlyn Nguyen 78 y.o. female MRN: [id number]    Unit/Bed#: ICU 02 Encounter: [id number] PCP: Yaa Hedrick MD    Admission Date:   Admission Orders (From admission, onward)       Ordered        03/03/24 1635  Inpatient Admission  Once                            Admitting Diagnosis: Diarrhea of presumed infectious origin [R19.7]  Sepsis (HCC) [A41.9]    HPI:     \"Darlyn Nguyen is a 78 y.o. 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  Initial evaluation demonstrated evidence of colitis, pneumonia and a mild drop of her hemoglobin from prior.  The patient received 2 units PRBC on admission and was started on broad-spectrum antibiotics.  Patient's pneumonia workup was unrevealing.  Her sacral ulcer wound cultures were multi microbial including E. coli, Proteus mirabilis and Pseudomonas aeruginosa MDR.  Her urine cultures were also noted for low count Pseudomonas, 10,000-19,000 CFU.  Patient's diarrhea persisted throughout her hospitalization.  Blood cultures growing Enterococcus avium in 1 of 2 sets.     The patient was noted for poor oral intake and overall clinical deterioration.  Her antibiotic therapy initially consisted of ceftriaxone and Flagyl, broadened to cefepime, Flagyl and IV vancomycin with vancomycin then replaced with linezolid.      On 3/3/2024 the patient was noted to meet sepsis criteria with leukocytosis and tachycardia, lactic acid level at 7.1 consistent with septic shock. The patient was initiated on fluid resuscitation however developed hypotension following IV fluids per sepsis protocol with 30 mL/kg.  She was initiated on Levophed. The patient started to develop respiratory failure and worsening " "mental status and was intubated. Central line was placed and vasopressin was added.  Meropenem was also added to therapy.  Arrangements were made for transfer to [facility] where she was accepted under critical care service Dr. Carey.    Pt arrived to the [facility] ICU intubated, on Levophed 25, vasopression 0.04, and heparin drip. BP in the hypertensive range, so levo weaned to 15. Noted to have small bloody bowel movement upon arrival and bloody secretions suctioned from ETT. Hgb 6.2 from 8.0 this morning, so heparin drip held and GI consulted. PTT subsequently resulted supratherapeutic. Propofol initiated for sedation.    Family seen at bedside. Report that patient had been independent and in good health as recently as October 2023, but has declined since that time. Was diagnosed with Lupus in December 2023. Has been predominantly confined to the couch in recent months, and has developed painful sacral ulcer, exacerbated by recent diarrhea. They note a family history of GI cancers in multiple family members, including colon cancer in a sister and gastric cancer in a nephew. They deny any prior history of significant GI bleed, though her spouse does believe she's had episodes hemorrhoidal bleeding in the past.     With respect to Enterococcus avium infection, pt's daughter and son-in-law keep chickens, but state that patient is not exposed to these or other birds.     Reviewed home medications with patient's spouse. While he does not recognize Eliquis as a home med, this is likely because this medication appears to have been started on 2/24/24 following discovery of acute BLE DVTs on 2/20/24. \"        Procedures Performed:   Orders Placed This Encounter   Procedures    Central Line    Intubation       Summary of Hospital Course:    Darlyn Nguyen is an 78 y.o. 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 Transferred from [facility] to [facility] on 3/3/24 after clinical deterioration due to shock requiring initiation of pressors and intubation. Lower GI bleed with marked drop in Hgb noted, pt received blood transfusion, heparin drip discontinued, was quickly able to wean off pressors and extubate on 3/4. Bacteremia treated with Unasyn, with planned 6 week course. Pt had recurrence of LGIB when heparin was reinitiated, and so was discontinued. Flexible sigmoidoscopy with GI found ulcerated rectal mucosa, which was cauterized. IVC filter placed with IR due to presence of acute DVTs, first noted on 2/20/24. Pt also developed new-onset paroxysmal atrial fibrillation with RVR on 3/6, requiring Cardizem gtt while unable to take PO meds, but then successfully maintained with Cardizem PO. Pt noted to be delirious with waxing and waning mental status since extubation on 3/4/24.    [facility] for ongoing treatment of bacteremia, GI bleed, as well as anasarca, dysphagia.    Morning of 3/11/24, Rapid Response called for acute alteration of mental status--pt was noted to be unresponsive, including to sternal rub. Per nursing, pt had multiple large bloody bowel movements overnight and that morning. Due to obtunded mental status, pt transferred to ICU for emergent intubation to protect airway. Recurrence of lower GI bleed, colonoscopy 3/11 identified multiple ulcers in the ascending and transverse colon, with one actively bleeding ulcer near the hepatic flexure, clipped with hemostasis. Recurrence of lower GI bleed, colonoscopy 3/13/24 showed new bleeding ulcer at hepatic flexure, clipped with hemostasis. 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 Colon biopsies found to have rare cells positive for CMV, so was started on ganciclovir. Extubated 3/14/24, doing well on 3 L via NC. Recurrence of lower GI bleed morning 3/15/24, taken to IR for embolization, however, no active bleed identified for intervention. Returned from procedure intubated. Following return from IR, had planned for rapid bowel prep via NGT with ongoing Hgb monitoring to assess whether ongoing acute bleed, with possible repeat C-scope and/or angiogram w IR, however, pt abruptly desatted, followed by bradycardia and hypotension-->gave epi followed by atropine-->pt asystolic for 5-10 seconds, chest compressions were not initiated, as pt was level 2 DNR-->pt had ROSC, with resultant improvement in HR and BP. On 3/16 patient was started on Rituxumab. Unfortunately patient had continued GI bleed, encephalopathy, lethargy and was made comfort care on 3/19. Patient passed away peacefully on 3/20.      Liver lesion  Assessment & Plan  Noted on CT of 3/15/24, new since  2/29/24   Most likely d/t infarcts or embolic phenomenon      Plan:  Continue unasyn for 6 weeks total     Splenic infarct  Assessment & Plan  Suspect septic emboli in the setting of bacteremia     Plan:  Continue unasyn for 6 weeks     Aspiration pneumonia (HCC)  Assessment & Plan  CT chest shows cavitary lesion in RUL with air fluid level with mild atelectatic changes     Plan:  Continue unasyn x6 weeks as above     Encephalopathy acute  Assessment & Plan  Likely acute delirium in the setting of critical illness and prolonged hospitalization on chronic cognitive decline     Plan:  Delirium precautions  Regulate sleep wake cycles, melatonin qHS     Severe protein-calorie malnutrition (HCC)  Assessment & Plan  Malnutrition Findings:   Adult Malnutrition type: Acute illness  Adult Degree of Malnutrition: Other severe protein calorie malnutrition  Malnutrition Characteristics: Fluid accumulation,  Inadequate energy        360 Statement: Severe calorie-protein malnutrition in context to acute illness r/t GI bleed, dysphagia, inadequate PO intake as evidance by energy intake less than 50% compared to estimated needs >5 days, +2 generalized edema; Treated with TBD (diet +oral supplements vs nutrition support)     BMI Findings:           Body mass index is 31.64 kg/m².      Continue TF w Vital AF 1.2 with goal rate 60 ml/h,  ml Q4H  Nutrition consult     Thrombocytopenia (HCC)  Assessment & Plan  Stable, continue to monitor  Suspect autoimmune process secondary to lupus and consumption from GI bleed/DIC     Anasarca  Assessment & Plan  Profound pitting edema in the dependent soft tissues, likely in setting of low oncotic pressures  Small skin tears of the BUE weeping serous fluid     Plan  Lasix gtt discontinued, continue as needed IV furosemide for goal net negative 500cc  Continue to monitor I&O's  Trend renal indices     Paroxysmal atrial fibrillation (HCC)  Assessment & Plan  Pt noted to be in SVT on 3/6/24. EKG showing afib. Converted on diltiazem IV, transitioned to cardizem PO.     Echo 2/20/24 unremarkable--showed grade 1 diastolic dysfunction and mild mitral regurg.  Chads-vasc=5     Questionable aflutter/afib RVR overnight, given cardizem and a 500cc isolyte bolus without conversion. She was given IV 5mg lopressor and converted to normal sinus rhythm.     Plan:  Home coreg 6.25 BID restarted  Continuous cardiopulmonary monitoring     Hypokalemia  Assessment & Plan  Likely was in the setting of furosemide gtt     Plan:  Continue IV and PO potassium replacement  Continue to monitor     Lupus nephritis (HCC)  Assessment & Plan  Biopsy-proven at [facility]     UA 3/12/24 shows moderate blood (30-50 RBCs on micro), moderate leukocytes (30-50 on micro), 1+ protein, 1+ ketones, and increased specific gravity; not specific, but c/w lupus nephritis  U protein:Cr elevated     Plan:  Continue to monitor I&O's,  trend renal indices  Continue IV solumedrol 50mg daily     Type 2 diabetes mellitus (HCC)  Assessment & Plan        Lab Results   Component Value Date     HGBA1C 5.9 (H) 03/03/2024                Recent Labs     03/17/24  1726 03/18/24  0120 03/18/24  0619 03/18/24  1115   POCGLU 198* 146* 268* 267*            Blood Sugar Average: Last 72 hrs:  (P) 168.9     Regimen increased to insulin gtt while on steroids, will de-escalate when able  BG goal 140-180       Enterococcal bacteremia  Assessment & Plan  Repeat cultures negative  Unasyn x 6 week course (thru 4/12)       Pressure ulcers of skin of multiple topographic sites  Assessment & Plan  Wound Care following, appreciate recommendations  Continue wound care and repositioning with nursing   .    DVT (deep venous thrombosis) ([facility])  Assessment & Plan  Discovered 2/20/24, started on Eliquis at home, started Hep gtt at [facility] but developed GI bleed, AC currently on hold   S/p IVC filter with IR 3/7/24     Plan:  Hold AC, continue to monitor s/sx of bleeding  Consider restarting DVT ppx once Hg stabilizes and no further bloody bowel movements       Lupus (HCC)  Assessment & Plan  Diagnosed in December,   Initially started on steroids, prednisone 60 mg daily, which patient had tapered to 30 mg daily prior to admission with the addition of atovaquone, hydroxychloroquine, and mycophenolate. Patient recently completed 5 day course of solumedrol 1g daily.      Holding atovaquone, hydroxychloroquine, and mycophenolate in the setting of acute infection  random cortisol 10.9 collected 3/11     Plan:  Continue Solumedrol 50 mg daily  Rituximab qweekly, first dose 3/16/24, anticipate next dose 3/22  Rheumatology consulted, appreciate recommendations       * Lower GI bleed  Assessment & Plan  Persitent LGIB  Patient was initially on heparin due to acute DVT  3/6 flexible sigmoidoscopy revealing ulcerated rectal mucosa, status post cauterization  Repeat Flex sig w GI on 3/11:  multiple ulcers throughout colon, biopsies taken; one oozing ulcer in transverse colon, clipped with hemostasis; severe diverticula; prolapsed rectum; rectal ulcers.  Colonoscopy 3/13: Multiple ulcers in the IC valve, cecum, ascending colon, hepatic flexure and transverse colon; Ulcer in the hepatic flexure with oozing hemorrhage; placed 2 clips successfully; hemostasis achieved  3/15: Pt bleeding overnight in early AM hours with large drop in Hgb, transfused 2 U pRBC and 1 U FFP and GI updated. Added transfusion of 1 U FFP, 1 U cryo, 2 U plt. CT bleeding scan showed active bleed in R colon near hepatic flexure. Taken to IR 3/15 for embolization, however unable to identify active bleed     Total blood products this admission: RBC x14, FFP x5, plt x3, cryo x3     Patient continues with bloody bowel movements overnight and a 2 gram hemoglobin drop. Will need goals of care discussions with family regarding further care.     Plan:  Continue rituximab Qweekly, started 3/16/24  Continue ganciclovir, started 3/14/24 for CMV, continue 14 day course per ID  Monitor Hgb on daily labs  Level of care 3, no intubation or resuscitation, no aggressive interventions including hemodialysis or colonoscopy or any other intervention if she starts bleeding again or she gets worse. Will discuss other supportive measures if not very invasive at the time.  Continue ongoing goals of care discussions      Disposition:        Medical Problems       Resolved Problems  Date Reviewed: 3/18/2024            Resolved    Acute respiratory failure with hypoxia (HCC) 3/16/2024     Resolved by  Sanam Epperson PA-C    Cavitary pneumonia 3/16/2024     Resolved by  Sanam Epperson PA-C    Sacral wound 3/16/2024     Resolved by  Sanam Epperson PA-C    Shock (HCC) 3/5/2024     Resolved by  AUGUSTA Lewis    Lactic acidosis 3/5/2024     Resolved by  Uche Blair MD    CKD (chronic kidney disease), stage II 3/16/2024      Resolved by  JAMIE Bolaños-DASHAWN    Hyperglycemia 3/5/2024     Resolved by  Uche Blair MD    Acute deep vein thrombosis (DVT) of both lower extremities (HCC) 3/16/2024     Resolved by  Sanam Epperson PA-DASHAWN    Dysphagia 3/16/2024     Resolved by  JAMIE Bolaños-DASHAWN    New onset a-fib (HCC) 3/16/2024     Resolved by  Sanam Epperson, PA-DASHAWN    Acute metabolic encephalopathy 3/16/2024     Resolved by  Sanam Epperson, PA-DASHAWN    Severe protein-calorie malnutrition (HCC) 3/16/2024     Resolved by  Sanam Epperson, PA-DASHAWN    Hypernatremia 3/18/2024     Resolved by  Cynthia Peralta PA-C    Punctate ulcers of labia and medial thighs 3/16/2024     Resolved by  Sanam Epperson, OLE    Pulmonary hypertension (HCC) 3/16/2024     Resolved by  Sanam Epperson PA-C    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 female MRN: [id number]  Unit/Bed#: ICU 02 Encounter: [id number]    Date, Time and Cause of Death    Date of Death: 3/20/24  Time of Death: 12:54 AM  Entered by: Palak DERAS[AL1.1]       Attribution       AL1.1 AUGUSTA Hutchison 24 01:07             Patient's Information  Pronounced by: Palak DERAS  Did the patient's death occur in the ED?: No  Did the patient's death occur in the OR?: No  Did the patient's death occur less than 10 days post-op?: No  Did the patient's death occur within 24 hours of admission?: No  Was code status DNR at the time of death?: Yes    PHYSICAL EXAM:  Unresponsive to noxious stimuli, Spontaneous  respirations absent, Breath sounds absent, Carotid pulse absent, Heart sounds absent, Pupillary light reflex absent, and Corneal blink reflex absent       Medical Examiner's office notified?:  Yes   Medical Examiner accepted case?:  No  Name of Medical Examiner: Jonathan Armenta     Family Notification  Was the family notified?: Yes  Date Notified: 24  Time Notified: 103  Notified by: Palak DERAS  Name of Family Notified of Death: Krystle   Relationship to Patient: Spouse  Family Notification Route: Telephone  Was the family told to contact a  home?: Yes  Name of  Home:: [place]  Home & [facility]    Autopsy Options:  Post-mortem examination declined by next of kin    Primary Service Attending Physician notified?:  yes - Attending:  Diogenes Martinez MD

## 2024-03-20 NOTE — DEATH NOTE
INPATIENT DEATH NOTE  Darlyn Nguyen 78 y.o. female MRN: [id number]  Unit/Bed#: ICU 02 Encounter: [id number]    Date, Time and Cause of Death    Date of Death: 3/20/24  Time of Death: 12:54 AM  Entered by: Palak DERAS[AL1.1]       Attribution       AL1.1 AUGUSTA Hutchison 24 01:07             Patient's Information  Pronounced by: Palak DERAS  Did the patient's death occur in the ED?: No  Did the patient's death occur in the OR?: No  Did the patient's death occur less than 10 days post-op?: No  Did the patient's death occur within 24 hours of admission?: No  Was code status DNR at the time of death?: Yes    PHYSICAL EXAM:  Unresponsive to noxious stimuli, Spontaneous respirations absent, Breath sounds absent, Carotid pulse absent, Heart sounds absent, Pupillary light reflex absent, and Corneal blink reflex absent       Medical Examiner's office notified?:  Yes   Medical Examiner accepted case?:  No  Name of Medical Examiner: Jonathan Armenta     Family Notification  Was the family notified?: Yes  Date Notified: 24  Time Notified: 0103  Notified by: Palak DERAS  Name of Family Notified of Death: Krystle   Relationship to Patient: Spouse  Family Notification Route: Telephone  Was the family told to contact a  home?: Yes  Name of  Home:: [facility]  [employer]    Autopsy Options:  Post-mortem examination declined by next of kin    Primary Service Attending Physician notified?:  yes - Attending:  Diogenes Martinez MD

## 2024-03-26 LAB
MYCOBACTERIUM SPEC CULT: NORMAL
RHODAMINE-AURAMINE STN SPEC: NORMAL
RHODAMINE-AURAMINE STN SPEC: NORMAL

## 2024-04-02 LAB
MYCOBACTERIUM SPEC CULT: NORMAL
MYCOBACTERIUM SPEC CULT: NORMAL
RHODAMINE-AURAMINE STN SPEC: NORMAL
RHODAMINE-AURAMINE STN SPEC: NORMAL

## 2024-04-26 LAB — SCAN RESULT: NORMAL

## 2024-04-30 LAB — MYCOBACTERIUM SPEC CULT: NORMAL

## 2024-05-22 NOTE — ASSESSMENT & PLAN NOTE
Bed: 10  Expected date:   Expected time:   Means of arrival:   Comments:  CT-FALL   Repeat cultures negative  Unasyn x 6 week course